# Patient Record
Sex: FEMALE | Race: WHITE | NOT HISPANIC OR LATINO | Employment: OTHER
[De-identification: names, ages, dates, MRNs, and addresses within clinical notes are randomized per-mention and may not be internally consistent; named-entity substitution may affect disease eponyms.]

---

## 2017-01-04 ENCOUNTER — RECORDS - HEALTHEAST (OUTPATIENT)
Dept: ADMINISTRATIVE | Facility: OTHER | Age: 37
End: 2017-01-04

## 2017-01-12 ENCOUNTER — COMMUNICATION - HEALTHEAST (OUTPATIENT)
Dept: NURSING | Facility: CLINIC | Age: 37
End: 2017-01-12

## 2017-01-12 DIAGNOSIS — R53.83 FATIGUE: ICD-10-CM

## 2017-01-13 ENCOUNTER — COMMUNICATION - HEALTHEAST (OUTPATIENT)
Dept: FAMILY MEDICINE | Facility: CLINIC | Age: 37
End: 2017-01-13

## 2017-01-17 ENCOUNTER — OFFICE VISIT - HEALTHEAST (OUTPATIENT)
Dept: FAMILY MEDICINE | Facility: CLINIC | Age: 37
End: 2017-01-17

## 2017-01-17 DIAGNOSIS — R63.5 WEIGHT GAIN DUE TO MEDICATION: ICD-10-CM

## 2017-01-17 DIAGNOSIS — T50.905A WEIGHT GAIN DUE TO MEDICATION: ICD-10-CM

## 2017-01-17 DIAGNOSIS — N89.8 VAGINAL DISCHARGE: ICD-10-CM

## 2017-01-17 DIAGNOSIS — G43.009 MIGRAINE WITHOUT AURA: ICD-10-CM

## 2017-01-17 DIAGNOSIS — M79.7 FIBROMYALGIA: ICD-10-CM

## 2017-02-15 ENCOUNTER — COMMUNICATION - HEALTHEAST (OUTPATIENT)
Dept: NURSING | Facility: CLINIC | Age: 37
End: 2017-02-15

## 2017-02-17 ENCOUNTER — COMMUNICATION - HEALTHEAST (OUTPATIENT)
Dept: NURSING | Facility: CLINIC | Age: 37
End: 2017-02-17

## 2017-02-17 DIAGNOSIS — R53.83 FATIGUE: ICD-10-CM

## 2017-03-06 ENCOUNTER — OFFICE VISIT - HEALTHEAST (OUTPATIENT)
Dept: NURSING | Facility: CLINIC | Age: 37
End: 2017-03-06

## 2017-03-06 DIAGNOSIS — G43.919 INTRACTABLE MIGRAINE WITHOUT STATUS MIGRAINOSUS, UNSPECIFIED MIGRAINE TYPE: ICD-10-CM

## 2017-03-06 DIAGNOSIS — G43.909 MIGRAINE: ICD-10-CM

## 2017-03-06 DIAGNOSIS — M79.7 FIBROMYALGIA: ICD-10-CM

## 2017-03-06 DIAGNOSIS — G47.00 INSOMNIA, UNSPECIFIED: ICD-10-CM

## 2017-03-13 ENCOUNTER — OFFICE VISIT - HEALTHEAST (OUTPATIENT)
Dept: FAMILY MEDICINE | Facility: CLINIC | Age: 37
End: 2017-03-13

## 2017-03-13 DIAGNOSIS — F33.9 MAJOR DEPRESSION, RECURRENT, CHRONIC (H): ICD-10-CM

## 2017-03-13 DIAGNOSIS — G43.909 MIGRAINE: ICD-10-CM

## 2017-03-13 DIAGNOSIS — M79.7 FIBROMYALGIA: ICD-10-CM

## 2017-03-13 DIAGNOSIS — G93.32 CHRONIC FATIGUE SYNDROME: ICD-10-CM

## 2017-03-13 DIAGNOSIS — J32.9 SINUSITIS: ICD-10-CM

## 2017-03-13 DIAGNOSIS — L70.9 ACNE: ICD-10-CM

## 2017-03-14 ENCOUNTER — AMBULATORY - HEALTHEAST (OUTPATIENT)
Dept: FAMILY MEDICINE | Facility: CLINIC | Age: 37
End: 2017-03-14

## 2017-03-14 ENCOUNTER — COMMUNICATION - HEALTHEAST (OUTPATIENT)
Dept: NURSING | Facility: CLINIC | Age: 37
End: 2017-03-14

## 2017-03-14 ENCOUNTER — COMMUNICATION - HEALTHEAST (OUTPATIENT)
Dept: FAMILY MEDICINE | Facility: CLINIC | Age: 37
End: 2017-03-14

## 2017-03-15 ENCOUNTER — COMMUNICATION - HEALTHEAST (OUTPATIENT)
Dept: FAMILY MEDICINE | Facility: CLINIC | Age: 37
End: 2017-03-15

## 2017-03-15 ENCOUNTER — AMBULATORY - HEALTHEAST (OUTPATIENT)
Dept: FAMILY MEDICINE | Facility: CLINIC | Age: 37
End: 2017-03-15

## 2017-03-16 ENCOUNTER — AMBULATORY - HEALTHEAST (OUTPATIENT)
Dept: NURSING | Facility: CLINIC | Age: 37
End: 2017-03-16

## 2017-03-17 ENCOUNTER — AMBULATORY - HEALTHEAST (OUTPATIENT)
Dept: FAMILY MEDICINE | Facility: CLINIC | Age: 37
End: 2017-03-17

## 2017-03-20 ENCOUNTER — AMBULATORY - HEALTHEAST (OUTPATIENT)
Dept: FAMILY MEDICINE | Facility: CLINIC | Age: 37
End: 2017-03-20

## 2017-03-20 DIAGNOSIS — G43.009 MIGRAINE WITHOUT AURA: ICD-10-CM

## 2017-03-30 ENCOUNTER — COMMUNICATION - HEALTHEAST (OUTPATIENT)
Dept: FAMILY MEDICINE | Facility: CLINIC | Age: 37
End: 2017-03-30

## 2017-03-30 DIAGNOSIS — M79.7 FIBROMYALGIA: ICD-10-CM

## 2017-04-17 ENCOUNTER — COMMUNICATION - HEALTHEAST (OUTPATIENT)
Dept: NURSING | Facility: CLINIC | Age: 37
End: 2017-04-17

## 2017-04-17 ENCOUNTER — OFFICE VISIT - HEALTHEAST (OUTPATIENT)
Dept: FAMILY MEDICINE | Facility: CLINIC | Age: 37
End: 2017-04-17

## 2017-04-17 DIAGNOSIS — G93.32 CHRONIC FATIGUE SYNDROME: ICD-10-CM

## 2017-04-17 DIAGNOSIS — K58.9 IRRITABLE BOWEL SYNDROME, UNSPECIFIED TYPE: ICD-10-CM

## 2017-04-17 DIAGNOSIS — R53.83 FATIGUE: ICD-10-CM

## 2017-04-17 DIAGNOSIS — F33.9 MAJOR DEPRESSION, RECURRENT, CHRONIC (H): ICD-10-CM

## 2017-04-17 DIAGNOSIS — Z30.9 CONTRACEPTION MANAGEMENT: ICD-10-CM

## 2017-04-17 DIAGNOSIS — R41.9 COGNITIVE COMPLAINTS: ICD-10-CM

## 2017-04-17 DIAGNOSIS — H02.409 DROOPING EYELID: ICD-10-CM

## 2017-06-12 ENCOUNTER — COMMUNICATION - HEALTHEAST (OUTPATIENT)
Dept: NURSING | Facility: CLINIC | Age: 37
End: 2017-06-12

## 2017-06-12 ENCOUNTER — OFFICE VISIT - HEALTHEAST (OUTPATIENT)
Dept: FAMILY MEDICINE | Facility: CLINIC | Age: 37
End: 2017-06-12

## 2017-06-12 DIAGNOSIS — R53.83 FATIGUE: ICD-10-CM

## 2017-06-12 DIAGNOSIS — G43.909 MIGRAINE: ICD-10-CM

## 2017-06-12 DIAGNOSIS — E88.40 MITOCHONDRIAL DISEASE (H): ICD-10-CM

## 2017-06-12 DIAGNOSIS — L70.0 ACNE VULGARIS: ICD-10-CM

## 2017-06-12 DIAGNOSIS — F33.9 MAJOR DEPRESSION, RECURRENT, CHRONIC (H): ICD-10-CM

## 2017-06-12 DIAGNOSIS — M26.609 TMJ (TEMPOROMANDIBULAR JOINT SYNDROME): ICD-10-CM

## 2017-06-12 DIAGNOSIS — G43.009 MIGRAINE WITHOUT AURA: ICD-10-CM

## 2017-06-12 DIAGNOSIS — K21.9 GERD (GASTROESOPHAGEAL REFLUX DISEASE): ICD-10-CM

## 2017-06-12 ASSESSMENT — MIFFLIN-ST. JEOR: SCORE: 1279.37

## 2017-06-20 ENCOUNTER — RECORDS - HEALTHEAST (OUTPATIENT)
Dept: ADMINISTRATIVE | Facility: OTHER | Age: 37
End: 2017-06-20

## 2017-06-21 ENCOUNTER — COMMUNICATION - HEALTHEAST (OUTPATIENT)
Dept: NURSING | Facility: CLINIC | Age: 37
End: 2017-06-21

## 2017-06-21 ENCOUNTER — COMMUNICATION - HEALTHEAST (OUTPATIENT)
Dept: FAMILY MEDICINE | Facility: CLINIC | Age: 37
End: 2017-06-21

## 2017-06-21 DIAGNOSIS — G43.519 MIGRAINE AURA, PERSISTENT, INTRACTABLE: ICD-10-CM

## 2017-06-23 ENCOUNTER — COMMUNICATION - HEALTHEAST (OUTPATIENT)
Dept: FAMILY MEDICINE | Facility: CLINIC | Age: 37
End: 2017-06-23

## 2017-07-06 ENCOUNTER — OFFICE VISIT - HEALTHEAST (OUTPATIENT)
Dept: FAMILY MEDICINE | Facility: CLINIC | Age: 37
End: 2017-07-06

## 2017-07-06 DIAGNOSIS — N76.0 BV (BACTERIAL VAGINOSIS): ICD-10-CM

## 2017-07-06 DIAGNOSIS — J32.9 SINUSITIS: ICD-10-CM

## 2017-07-06 DIAGNOSIS — B96.89 BV (BACTERIAL VAGINOSIS): ICD-10-CM

## 2017-07-07 ENCOUNTER — COMMUNICATION - HEALTHEAST (OUTPATIENT)
Dept: NURSING | Facility: CLINIC | Age: 37
End: 2017-07-07

## 2017-07-11 ENCOUNTER — RECORDS - HEALTHEAST (OUTPATIENT)
Dept: ADMINISTRATIVE | Facility: OTHER | Age: 37
End: 2017-07-11

## 2017-07-11 ENCOUNTER — OFFICE VISIT - HEALTHEAST (OUTPATIENT)
Dept: FAMILY MEDICINE | Facility: CLINIC | Age: 37
End: 2017-07-11

## 2017-07-11 DIAGNOSIS — R30.0 DYSURIA: ICD-10-CM

## 2017-07-11 ASSESSMENT — MIFFLIN-ST. JEOR: SCORE: 1292.98

## 2017-07-13 ENCOUNTER — COMMUNICATION - HEALTHEAST (OUTPATIENT)
Dept: SCHEDULING | Facility: CLINIC | Age: 37
End: 2017-07-13

## 2017-07-14 ENCOUNTER — COMMUNICATION - HEALTHEAST (OUTPATIENT)
Dept: FAMILY MEDICINE | Facility: CLINIC | Age: 37
End: 2017-07-14

## 2017-07-17 ENCOUNTER — OFFICE VISIT - HEALTHEAST (OUTPATIENT)
Dept: FAMILY MEDICINE | Facility: CLINIC | Age: 37
End: 2017-07-17

## 2017-07-17 DIAGNOSIS — G93.32 CHRONIC FATIGUE SYNDROME: ICD-10-CM

## 2017-07-17 DIAGNOSIS — E88.40 MITOCHONDRIAL DISEASE (H): ICD-10-CM

## 2017-07-17 DIAGNOSIS — K58.9 IRRITABLE BOWEL SYNDROME, UNSPECIFIED TYPE: ICD-10-CM

## 2017-07-17 DIAGNOSIS — R82.998 DARK URINE: ICD-10-CM

## 2017-07-17 DIAGNOSIS — M79.7 FIBROMYALGIA: ICD-10-CM

## 2017-07-24 ENCOUNTER — OFFICE VISIT - HEALTHEAST (OUTPATIENT)
Dept: FAMILY MEDICINE | Facility: CLINIC | Age: 37
End: 2017-07-24

## 2017-07-24 DIAGNOSIS — J20.9 ACUTE BRONCHITIS: ICD-10-CM

## 2017-07-24 DIAGNOSIS — J01.90 ACUTE SINUSITIS: ICD-10-CM

## 2017-07-28 ENCOUNTER — AMBULATORY - HEALTHEAST (OUTPATIENT)
Dept: CARE COORDINATION | Facility: CLINIC | Age: 37
End: 2017-07-28

## 2017-07-28 ENCOUNTER — COMMUNICATION - HEALTHEAST (OUTPATIENT)
Dept: CARE COORDINATION | Facility: CLINIC | Age: 37
End: 2017-07-28

## 2017-08-08 ENCOUNTER — RECORDS - HEALTHEAST (OUTPATIENT)
Dept: ADMINISTRATIVE | Facility: OTHER | Age: 37
End: 2017-08-08

## 2017-08-17 ENCOUNTER — RECORDS - HEALTHEAST (OUTPATIENT)
Dept: ADMINISTRATIVE | Facility: OTHER | Age: 37
End: 2017-08-17

## 2017-08-22 ENCOUNTER — OFFICE VISIT - HEALTHEAST (OUTPATIENT)
Dept: FAMILY MEDICINE | Facility: CLINIC | Age: 37
End: 2017-08-22

## 2017-08-22 DIAGNOSIS — R53.83 FATIGUE: ICD-10-CM

## 2017-08-22 DIAGNOSIS — E88.40 MITOCHONDRIAL DISEASE (H): ICD-10-CM

## 2017-08-22 DIAGNOSIS — Z12.4 SCREENING FOR CERVICAL CANCER: ICD-10-CM

## 2017-08-22 DIAGNOSIS — N89.8 VAGINAL ITCHING: ICD-10-CM

## 2017-08-25 ENCOUNTER — COMMUNICATION - HEALTHEAST (OUTPATIENT)
Dept: NURSING | Facility: CLINIC | Age: 37
End: 2017-08-25

## 2017-08-26 ENCOUNTER — COMMUNICATION - HEALTHEAST (OUTPATIENT)
Dept: FAMILY MEDICINE | Facility: CLINIC | Age: 37
End: 2017-08-26

## 2017-08-28 ENCOUNTER — COMMUNICATION - HEALTHEAST (OUTPATIENT)
Dept: FAMILY MEDICINE | Facility: CLINIC | Age: 37
End: 2017-08-28

## 2017-08-28 ENCOUNTER — AMBULATORY - HEALTHEAST (OUTPATIENT)
Dept: FAMILY MEDICINE | Facility: CLINIC | Age: 37
End: 2017-08-28

## 2017-08-28 LAB
HPV INTERPRETATION - HISTORICAL: NORMAL
HPV INTERPRETER - HISTORICAL: NORMAL

## 2017-08-29 ENCOUNTER — RECORDS - HEALTHEAST (OUTPATIENT)
Dept: ADMINISTRATIVE | Facility: OTHER | Age: 37
End: 2017-08-29

## 2017-08-30 ENCOUNTER — COMMUNICATION - HEALTHEAST (OUTPATIENT)
Dept: FAMILY MEDICINE | Facility: CLINIC | Age: 37
End: 2017-08-30

## 2017-08-30 DIAGNOSIS — G43.909 MIGRAINE HEADACHE: ICD-10-CM

## 2017-08-30 LAB
BKR LAB AP ABNORMAL BLEEDING: NO
BKR LAB AP BIRTH CONTROL/HORMONES: NORMAL
BKR LAB AP CERVICAL APPEARANCE: NORMAL
BKR LAB AP GYN ADEQUACY: NORMAL
BKR LAB AP GYN INTERPRETATION: NORMAL
BKR LAB AP HPV REFLEX: NORMAL
BKR LAB AP LMP: NORMAL
BKR LAB AP PATIENT STATUS: NORMAL
BKR LAB AP PREVIOUS ABNORMAL: NORMAL
BKR LAB AP PREVIOUS NORMAL: NORMAL
HIGH RISK?: YES
PATH REPORT.COMMENTS IMP SPEC: NORMAL
RESULT FLAG (HE HISTORICAL CONVERSION): NORMAL

## 2017-09-01 ENCOUNTER — COMMUNICATION - HEALTHEAST (OUTPATIENT)
Dept: FAMILY MEDICINE | Facility: CLINIC | Age: 37
End: 2017-09-01

## 2017-09-06 ENCOUNTER — COMMUNICATION - HEALTHEAST (OUTPATIENT)
Dept: FAMILY MEDICINE | Facility: CLINIC | Age: 37
End: 2017-09-06

## 2017-09-07 ENCOUNTER — RECORDS - HEALTHEAST (OUTPATIENT)
Dept: ADMINISTRATIVE | Facility: OTHER | Age: 37
End: 2017-09-07

## 2017-09-12 ENCOUNTER — RECORDS - HEALTHEAST (OUTPATIENT)
Dept: ADMINISTRATIVE | Facility: OTHER | Age: 37
End: 2017-09-12

## 2017-10-13 ENCOUNTER — COMMUNICATION - HEALTHEAST (OUTPATIENT)
Dept: NURSING | Facility: CLINIC | Age: 37
End: 2017-10-13

## 2017-10-16 ENCOUNTER — COMMUNICATION - HEALTHEAST (OUTPATIENT)
Dept: FAMILY MEDICINE | Facility: CLINIC | Age: 37
End: 2017-10-16

## 2017-10-17 ENCOUNTER — OFFICE VISIT - HEALTHEAST (OUTPATIENT)
Dept: FAMILY MEDICINE | Facility: CLINIC | Age: 37
End: 2017-10-17

## 2017-10-17 DIAGNOSIS — G43.909 MIGRAINE: ICD-10-CM

## 2017-10-17 DIAGNOSIS — L70.0 ACNE VULGARIS: ICD-10-CM

## 2017-10-17 DIAGNOSIS — G93.32 CHRONIC FATIGUE SYNDROME: ICD-10-CM

## 2017-10-17 DIAGNOSIS — M79.7 FIBROMYALGIA: ICD-10-CM

## 2017-10-17 DIAGNOSIS — E88.40 MITOCHONDRIAL DISEASE (H): ICD-10-CM

## 2017-10-17 DIAGNOSIS — J20.9 ACUTE BRONCHITIS: ICD-10-CM

## 2017-10-17 ASSESSMENT — MIFFLIN-ST. JEOR: SCORE: 1320.19

## 2017-10-27 ENCOUNTER — COMMUNICATION - HEALTHEAST (OUTPATIENT)
Dept: NURSING | Facility: CLINIC | Age: 37
End: 2017-10-27

## 2017-11-13 ENCOUNTER — RECORDS - HEALTHEAST (OUTPATIENT)
Dept: ADMINISTRATIVE | Facility: OTHER | Age: 37
End: 2017-11-13

## 2017-11-24 ENCOUNTER — COMMUNICATION - HEALTHEAST (OUTPATIENT)
Dept: NURSING | Facility: CLINIC | Age: 37
End: 2017-11-24

## 2017-12-04 ENCOUNTER — RECORDS - HEALTHEAST (OUTPATIENT)
Dept: ADMINISTRATIVE | Facility: OTHER | Age: 37
End: 2017-12-04

## 2017-12-05 ENCOUNTER — COMMUNICATION - HEALTHEAST (OUTPATIENT)
Dept: FAMILY MEDICINE | Facility: CLINIC | Age: 37
End: 2017-12-05

## 2017-12-05 DIAGNOSIS — M79.7 FIBROMYALGIA: ICD-10-CM

## 2017-12-05 DIAGNOSIS — E88.40 MITOCHONDRIAL DISEASE (H): ICD-10-CM

## 2017-12-07 ENCOUNTER — COMMUNICATION - HEALTHEAST (OUTPATIENT)
Dept: FAMILY MEDICINE | Facility: CLINIC | Age: 37
End: 2017-12-07

## 2017-12-12 ENCOUNTER — TRANSFERRED RECORDS (OUTPATIENT)
Dept: HEALTH INFORMATION MANAGEMENT | Facility: CLINIC | Age: 37
End: 2017-12-12

## 2017-12-12 ENCOUNTER — RECORDS - HEALTHEAST (OUTPATIENT)
Dept: ADMINISTRATIVE | Facility: OTHER | Age: 37
End: 2017-12-12

## 2017-12-27 ENCOUNTER — COMMUNICATION - HEALTHEAST (OUTPATIENT)
Dept: FAMILY MEDICINE | Facility: CLINIC | Age: 37
End: 2017-12-27

## 2017-12-27 DIAGNOSIS — E88.40 MITOCHONDRIAL DISEASE (H): ICD-10-CM

## 2018-01-22 ENCOUNTER — COMMUNICATION - HEALTHEAST (OUTPATIENT)
Dept: FAMILY MEDICINE | Facility: CLINIC | Age: 38
End: 2018-01-22

## 2018-01-30 ENCOUNTER — COMMUNICATION - HEALTHEAST (OUTPATIENT)
Dept: FAMILY MEDICINE | Facility: CLINIC | Age: 38
End: 2018-01-30

## 2018-02-01 ENCOUNTER — COMMUNICATION - HEALTHEAST (OUTPATIENT)
Dept: FAMILY MEDICINE | Facility: CLINIC | Age: 38
End: 2018-02-01

## 2018-02-05 ENCOUNTER — COMMUNICATION - HEALTHEAST (OUTPATIENT)
Dept: FAMILY MEDICINE | Facility: CLINIC | Age: 38
End: 2018-02-05

## 2018-02-07 ENCOUNTER — COMMUNICATION - HEALTHEAST (OUTPATIENT)
Dept: FAMILY MEDICINE | Facility: CLINIC | Age: 38
End: 2018-02-07

## 2018-02-16 ENCOUNTER — OFFICE VISIT - HEALTHEAST (OUTPATIENT)
Dept: FAMILY MEDICINE | Facility: CLINIC | Age: 38
End: 2018-02-16

## 2018-02-16 DIAGNOSIS — E88.40 MITOCHONDRIAL DISEASE (H): ICD-10-CM

## 2018-02-16 DIAGNOSIS — B37.0 THRUSH: ICD-10-CM

## 2018-02-16 DIAGNOSIS — G43.909 MIGRAINE: ICD-10-CM

## 2018-02-16 DIAGNOSIS — M79.7 FIBROMYALGIA: ICD-10-CM

## 2018-02-16 DIAGNOSIS — M26.609 TMJ DISEASE: ICD-10-CM

## 2018-02-20 ENCOUNTER — COMMUNICATION - HEALTHEAST (OUTPATIENT)
Dept: FAMILY MEDICINE | Facility: CLINIC | Age: 38
End: 2018-02-20

## 2018-02-20 ENCOUNTER — ANESTHESIA - HEALTHEAST (OUTPATIENT)
Dept: SURGERY | Facility: HOSPITAL | Age: 38
End: 2018-02-20

## 2018-02-20 ASSESSMENT — MIFFLIN-ST. JEOR: SCORE: 1298.65

## 2018-02-21 ENCOUNTER — OFFICE VISIT - HEALTHEAST (OUTPATIENT)
Dept: FAMILY MEDICINE | Facility: CLINIC | Age: 38
End: 2018-02-21

## 2018-02-21 ENCOUNTER — TRANSFERRED RECORDS (OUTPATIENT)
Dept: HEALTH INFORMATION MANAGEMENT | Facility: CLINIC | Age: 38
End: 2018-02-21

## 2018-02-21 DIAGNOSIS — N92.6 MENSTRUAL DISORDER: ICD-10-CM

## 2018-02-21 DIAGNOSIS — Z01.818 PRE-OP EXAM: ICD-10-CM

## 2018-02-21 DIAGNOSIS — M79.7 FIBROMYALGIA: ICD-10-CM

## 2018-02-21 LAB
ANION GAP SERPL CALCULATED.3IONS-SCNC: 10 MMOL/L (ref 5–18)
BUN SERPL-MCNC: 11 MG/DL (ref 8–22)
CALCIUM SERPL-MCNC: 9.4 MG/DL (ref 8.5–10.5)
CHLORIDE BLD-SCNC: 103 MMOL/L (ref 98–107)
CO2 SERPL-SCNC: 20 MMOL/L (ref 22–31)
CREAT SERPL-MCNC: 0.71 MG/DL (ref 0.6–1.1)
GFR SERPL CREATININE-BSD FRML MDRD: >60 ML/MIN/1.73M2
GLUCOSE BLD-MCNC: 93 MG/DL (ref 70–125)
HCG UR QL: NEGATIVE
HGB BLD-MCNC: 12.6 G/DL (ref 12–16)
POTASSIUM BLD-SCNC: 4.1 MMOL/L (ref 3.5–5)
SODIUM SERPL-SCNC: 133 MMOL/L (ref 136–145)
SP GR UR STRIP: 1.01 (ref 1–1.03)

## 2018-02-21 ASSESSMENT — MIFFLIN-ST. JEOR: SCORE: 1319.06

## 2018-02-22 ENCOUNTER — SURGERY - HEALTHEAST (OUTPATIENT)
Dept: SURGERY | Facility: HOSPITAL | Age: 38
End: 2018-02-22

## 2018-02-22 ENCOUNTER — COMMUNICATION - HEALTHEAST (OUTPATIENT)
Dept: FAMILY MEDICINE | Facility: CLINIC | Age: 38
End: 2018-02-22

## 2018-02-22 ASSESSMENT — MIFFLIN-ST. JEOR: SCORE: 1316.79

## 2018-02-23 ASSESSMENT — MIFFLIN-ST. JEOR: SCORE: 1350.36

## 2018-02-24 ASSESSMENT — MIFFLIN-ST. JEOR: SCORE: 1335.39

## 2018-02-25 ASSESSMENT — MIFFLIN-ST. JEOR: SCORE: 1320.87

## 2018-02-26 ENCOUNTER — COMMUNICATION - HEALTHEAST (OUTPATIENT)
Dept: FAMILY MEDICINE | Facility: CLINIC | Age: 38
End: 2018-02-26

## 2018-02-26 ASSESSMENT — MIFFLIN-ST. JEOR: SCORE: 1335.22

## 2018-02-27 ENCOUNTER — TRANSFERRED RECORDS (OUTPATIENT)
Dept: HEALTH INFORMATION MANAGEMENT | Facility: CLINIC | Age: 38
End: 2018-02-27

## 2018-02-27 ASSESSMENT — MIFFLIN-ST. JEOR: SCORE: 1332.67

## 2018-03-02 ENCOUNTER — COMMUNICATION - HEALTHEAST (OUTPATIENT)
Dept: FAMILY MEDICINE | Facility: CLINIC | Age: 38
End: 2018-03-02

## 2018-03-06 ENCOUNTER — OFFICE VISIT - HEALTHEAST (OUTPATIENT)
Dept: FAMILY MEDICINE | Facility: CLINIC | Age: 38
End: 2018-03-06

## 2018-03-06 DIAGNOSIS — D49.2 ABNORMAL SKIN GROWTH: ICD-10-CM

## 2018-03-06 DIAGNOSIS — Z90.710 H/O: HYSTERECTOMY: ICD-10-CM

## 2018-03-08 LAB
LAB AP CHARGES (HE HISTORICAL CONVERSION): NORMAL
PATH REPORT.COMMENTS IMP SPEC: NORMAL
PATH REPORT.FINAL DX SPEC: NORMAL
PATH REPORT.GROSS SPEC: NORMAL
PATH REPORT.MICROSCOPIC SPEC OTHER STN: NORMAL
PATH REPORT.RELEVANT HX SPEC: NORMAL
RESULT FLAG (HE HISTORICAL CONVERSION): NORMAL

## 2018-03-10 ENCOUNTER — COMMUNICATION - HEALTHEAST (OUTPATIENT)
Dept: FAMILY MEDICINE | Facility: CLINIC | Age: 38
End: 2018-03-10

## 2018-03-13 ENCOUNTER — TRANSFERRED RECORDS (OUTPATIENT)
Dept: HEALTH INFORMATION MANAGEMENT | Facility: CLINIC | Age: 38
End: 2018-03-13

## 2018-03-13 ENCOUNTER — RECORDS - HEALTHEAST (OUTPATIENT)
Dept: ADMINISTRATIVE | Facility: OTHER | Age: 38
End: 2018-03-13

## 2018-03-26 ENCOUNTER — COMMUNICATION - HEALTHEAST (OUTPATIENT)
Dept: FAMILY MEDICINE | Facility: CLINIC | Age: 38
End: 2018-03-26

## 2018-04-15 ENCOUNTER — COMMUNICATION - HEALTHEAST (OUTPATIENT)
Dept: FAMILY MEDICINE | Facility: CLINIC | Age: 38
End: 2018-04-15

## 2018-05-09 ENCOUNTER — OFFICE VISIT - HEALTHEAST (OUTPATIENT)
Dept: FAMILY MEDICINE | Facility: CLINIC | Age: 38
End: 2018-05-09

## 2018-05-09 ENCOUNTER — MEDICAL CORRESPONDENCE (OUTPATIENT)
Dept: HEALTH INFORMATION MANAGEMENT | Facility: CLINIC | Age: 38
End: 2018-05-09
Payer: COMMERCIAL

## 2018-05-09 DIAGNOSIS — R53.82 CHRONIC FATIGUE: ICD-10-CM

## 2018-05-09 DIAGNOSIS — L70.0 ACNE VULGARIS: ICD-10-CM

## 2018-05-09 DIAGNOSIS — G71.3 MITOCHONDRIAL MYOPATHY: ICD-10-CM

## 2018-05-09 DIAGNOSIS — R06.2 WHEEZING: ICD-10-CM

## 2018-05-09 DIAGNOSIS — M79.7 FIBROMYALGIA: ICD-10-CM

## 2018-05-12 ENCOUNTER — COMMUNICATION - HEALTHEAST (OUTPATIENT)
Dept: FAMILY MEDICINE | Facility: CLINIC | Age: 38
End: 2018-05-12

## 2018-05-14 ENCOUNTER — COMMUNICATION - HEALTHEAST (OUTPATIENT)
Dept: FAMILY MEDICINE | Facility: CLINIC | Age: 38
End: 2018-05-14

## 2018-05-15 ENCOUNTER — COMMUNICATION - HEALTHEAST (OUTPATIENT)
Dept: NURSING | Facility: CLINIC | Age: 38
End: 2018-05-15

## 2018-05-17 ENCOUNTER — COMMUNICATION - HEALTHEAST (OUTPATIENT)
Dept: FAMILY MEDICINE | Facility: CLINIC | Age: 38
End: 2018-05-17

## 2018-05-23 ENCOUNTER — PRE VISIT (OUTPATIENT)
Dept: NEUROLOGY | Facility: CLINIC | Age: 38
End: 2018-05-23

## 2018-05-23 NOTE — TELEPHONE ENCOUNTER
FUTURE VISIT INFORMATION      FUTURE VISIT INFORMATION:    Date: 8/27/18    Time: 3:50p    Location: [facility] [place]  REFERRAL INFORMATION:    Referring provider:   Dr. Martinez     Referring providers clinic:  [facility]    Reason for visit/diagnosis  Mitochondrial Myopathy-    RECORDS REQUESTED FROM:       Clinic name Comments Records Status Imaging Status   [facility] Ref, OV, MR Head Report Received PACS   [facility] OV Received    Jm Morin [facility] OV Received    [facility] OV Received                  RECORDS STATUS        RECORDS RECEIVED FROM: [facility]   DATE RECEIVED: 5/23/18   NOTES (FOR ALL VISITS) STATUS DETAILS   OFFICE NOTE from referring provider Received 3/13/18, 2/26/18, 2/21/18, 2/16/18, 10/17/17, 7/17/17, 6/12/17, 4/17/17   OFFICE NOTE from other specialist Received [facility]: 12/12/17, 9/12/17  Jm Morin: 11/13/17  [facility] 9/7/17   DISCHARGE SUMMARY from hospital N/A    DISCHARGE REPORT from the ER N/A    OPERATIVE REPORT N/A    MEDICATION LIST Received    IMAGING  (FOR ALL VISITS)     EMG N/A    EEG N/A    ECT N/A    MRI (HEAD, NECK, SPINE) Received MR Head Report: 10/29/15 (image in PACS)   CT (HEAD, NECK, SPINE) N/A    OTHER

## 2018-05-25 ENCOUNTER — COMMUNICATION - HEALTHEAST (OUTPATIENT)
Dept: FAMILY MEDICINE | Facility: CLINIC | Age: 38
End: 2018-05-25

## 2018-05-30 ENCOUNTER — COMMUNICATION - HEALTHEAST (OUTPATIENT)
Dept: FAMILY MEDICINE | Facility: CLINIC | Age: 38
End: 2018-05-30

## 2018-06-14 ENCOUNTER — COMMUNICATION - HEALTHEAST (OUTPATIENT)
Dept: NURSING | Facility: CLINIC | Age: 38
End: 2018-06-14

## 2018-06-20 ENCOUNTER — COMMUNICATION - HEALTHEAST (OUTPATIENT)
Dept: FAMILY MEDICINE | Facility: CLINIC | Age: 38
End: 2018-06-20

## 2018-06-25 ENCOUNTER — RECORDS - HEALTHEAST (OUTPATIENT)
Dept: ADMINISTRATIVE | Facility: OTHER | Age: 38
End: 2018-06-25

## 2018-07-07 ENCOUNTER — COMMUNICATION - HEALTHEAST (OUTPATIENT)
Dept: FAMILY MEDICINE | Facility: CLINIC | Age: 38
End: 2018-07-07

## 2018-07-07 DIAGNOSIS — M79.7 FIBROMYALGIA: ICD-10-CM

## 2018-07-09 ENCOUNTER — COMMUNICATION - HEALTHEAST (OUTPATIENT)
Dept: FAMILY MEDICINE | Facility: CLINIC | Age: 38
End: 2018-07-09

## 2018-07-16 ENCOUNTER — COMMUNICATION - HEALTHEAST (OUTPATIENT)
Dept: NURSING | Facility: CLINIC | Age: 38
End: 2018-07-16

## 2018-07-24 ENCOUNTER — RECORDS - HEALTHEAST (OUTPATIENT)
Dept: ADMINISTRATIVE | Facility: OTHER | Age: 38
End: 2018-07-24

## 2018-08-16 ENCOUNTER — COMMUNICATION - HEALTHEAST (OUTPATIENT)
Dept: NURSING | Facility: CLINIC | Age: 38
End: 2018-08-16

## 2018-08-23 NOTE — TELEPHONE ENCOUNTER
Note      FUTURE VISIT INFORMATION        FUTURE VISIT INFORMATION:    Date: 8/27/18    Time: 3:50p    Location: [facility] [place]  REFERRAL INFORMATION:    Referring provider:   Dr. Martinez     Referring providers clinic:  formerly [facility]    Reason for visit/diagnosis  Mitochondrial Myopathy-     RECORDS REQUESTED FROM:         Clinic name Comments Records Status Imaging Status   [facility] Ref, OV, MR Head Report Received PACS   [facility] OV Received     Jm Morin PT OV Received     [facility] OV Received                            RECORDS STATUS           RECORDS RECEIVED FROM: [facility]   DATE RECEIVED: 5/23/18   NOTES (FOR ALL VISITS) STATUS DETAILS   OFFICE NOTE from referring provider Received 3/13/18, 2/26/18, 2/21/18, 2/16/18, 10/17/17, 7/17/17, 6/12/17, 4/17/17   OFFICE NOTE from other specialist Received [facility]: 12/12/17, 9/12/17  Jm Morin: 11/13/17  [facility]"" [facility] 9/7/17   DISCHARGE SUMMARY from hospital Care Everywhere  02/22/2018, 01/08/2015   DISCHARGE REPORT from the ER Care Everywhere   10/13/2015   OPERATIVE REPORT Care Everywhere   02/22/2018   MEDICATION LIST Received     IMAGING  (FOR ALL VISITS)       EMG N/A     EEG N/A     ECT N/A     MRI (HEAD, NECK, SPINE) Received MR Head Report: 10/29/15 (image in PACS)   CT (HEAD, NECK, SPINE) N/A     OTHER        XR Cervical Spine   XR Thorasic Spine   PACS   03/25/2015

## 2018-08-25 ENCOUNTER — OFFICE VISIT - HEALTHEAST (OUTPATIENT)
Dept: FAMILY MEDICINE | Facility: CLINIC | Age: 38
End: 2018-08-25

## 2018-08-25 DIAGNOSIS — K21.9 GASTROESOPHAGEAL REFLUX DISEASE, ESOPHAGITIS PRESENCE NOT SPECIFIED: ICD-10-CM

## 2018-08-27 ENCOUNTER — RECORDS - HEALTHEAST (OUTPATIENT)
Dept: ADMINISTRATIVE | Facility: OTHER | Age: 38
End: 2018-08-27

## 2018-08-27 ENCOUNTER — OFFICE VISIT (OUTPATIENT)
Dept: NEUROLOGY | Facility: CLINIC | Age: 38
End: 2018-08-27
Payer: COMMERCIAL

## 2018-08-27 ENCOUNTER — PRE VISIT (OUTPATIENT)
Dept: NEUROLOGY | Facility: CLINIC | Age: 38
End: 2018-08-27

## 2018-08-27 VITALS
DIASTOLIC BLOOD PRESSURE: 75 MMHG | WEIGHT: 139.7 LBS | BODY MASS INDEX: 25.71 KG/M2 | SYSTOLIC BLOOD PRESSURE: 118 MMHG | HEIGHT: 62 IN | HEART RATE: 102 BPM

## 2018-08-27 DIAGNOSIS — G72.9 MYOPATHY: Primary | ICD-10-CM

## 2018-08-27 DIAGNOSIS — R53.82 CHRONIC FATIGUE: ICD-10-CM

## 2018-08-27 RX ORDER — RIZATRIPTAN BENZOATE 10 MG/1
10 TABLET, ORALLY DISINTEGRATING ORAL PRN
COMMUNITY
Start: 2018-05-09 | End: 2019-09-05

## 2018-08-27 RX ORDER — ZOLPIDEM TARTRATE 5 MG/1
2.5-5 TABLET ORAL
Status: ON HOLD | COMMUNITY
Start: 2018-06-20 | End: 2019-09-20

## 2018-08-27 RX ORDER — LIDOCAINE 50 MG/G
1 PATCH TOPICAL PRN
Status: ON HOLD | COMMUNITY
Start: 2017-12-13 | End: 2019-09-20

## 2018-08-27 RX ORDER — NAPROXEN 500 MG/1
500 TABLET ORAL DAILY PRN
COMMUNITY
End: 2019-09-05

## 2018-08-27 RX ORDER — CLINDAMYCIN AND BENZOYL PEROXIDE 10; 50 MG/G; MG/G
GEL TOPICAL
COMMUNITY
Start: 2018-07-09

## 2018-08-27 RX ORDER — OMEPRAZOLE 40 MG/1
40 CAPSULE, DELAYED RELEASE ORAL 2 TIMES DAILY
COMMUNITY
Start: 2017-10-17 | End: 2019-09-05

## 2018-08-27 RX ORDER — RIBOFLAVIN (VITAMIN B2) 400 MG
400 TABLET ORAL DAILY
COMMUNITY

## 2018-08-27 RX ORDER — DULOXETIN HYDROCHLORIDE 60 MG/1
60 CAPSULE, DELAYED RELEASE ORAL DAILY
COMMUNITY
Start: 2018-02-16 | End: 2019-09-05

## 2018-08-27 RX ORDER — CYCLOBENZAPRINE HCL 10 MG
5 TABLET ORAL EVERY 8 HOURS PRN
COMMUNITY
Start: 2018-02-16 | End: 2019-09-05

## 2018-08-27 RX ORDER — TRETINOIN 0.25 MG/G
GEL TOPICAL DAILY
COMMUNITY
Start: 2018-05-09 | End: 2023-05-03

## 2018-08-27 RX ORDER — MULTIVIT WITH MINERALS/LUTEIN
1000 TABLET ORAL DAILY
COMMUNITY
End: 2023-05-15

## 2018-08-27 RX ORDER — ALBUTEROL SULFATE 90 UG/1
1-2 AEROSOL, METERED RESPIRATORY (INHALATION) EVERY 4 HOURS PRN
COMMUNITY
Start: 2018-05-09 | End: 2019-09-05

## 2018-08-27 RX ORDER — ASCORBIC ACID 500 MG
500 TABLET ORAL DAILY
COMMUNITY
End: 2019-09-05

## 2018-08-27 RX ORDER — VALACYCLOVIR HYDROCHLORIDE 1 G/1
TABLET, FILM COATED ORAL PRN
COMMUNITY
Start: 2017-08-22 | End: 2019-09-05

## 2018-08-27 RX ORDER — CLOBETASOL PROPIONATE 0.5 MG/G
OINTMENT TOPICAL DAILY
COMMUNITY
Start: 2018-08-16 | End: 2019-09-05

## 2018-08-27 RX ORDER — ONDANSETRON 4 MG/1
TABLET, ORALLY DISINTEGRATING ORAL EVERY 8 HOURS PRN
COMMUNITY
Start: 2018-07-09 | End: 2019-09-05

## 2018-08-27 ASSESSMENT — ENCOUNTER SYMPTOMS
BLOATING: 1
JOINT SWELLING: 0
BREAST MASS: 0
BOWEL INCONTINENCE: 0
NAUSEA: 1
SPEECH CHANGE: 0
BACK PAIN: 1
ALTERED TEMPERATURE REGULATION: 0
BLOOD IN STOOL: 0
RECTAL PAIN: 0
HOT FLASHES: 0
DECREASED CONCENTRATION: 0
POLYPHAGIA: 1
TINGLING: 1
INCREASED ENERGY: 1
EYE WATERING: 0
EYE PAIN: 0
DECREASED APPETITE: 1
DIZZINESS: 0
DIARRHEA: 0
HEARTBURN: 1
CONSTIPATION: 0
INSOMNIA: 0
DEPRESSION: 1
FEVER: 0
LOSS OF CONSCIOUSNESS: 0
ARTHRALGIAS: 0
MYALGIAS: 1
MUSCLE CRAMPS: 0
VOMITING: 0
POLYDIPSIA: 0
JAUNDICE: 0
NECK PAIN: 1
DECREASED LIBIDO: 1
BREAST PAIN: 0
MEMORY LOSS: 0
NUMBNESS: 0
CHILLS: 1
WEIGHT LOSS: 1
EYE IRRITATION: 0
NERVOUS/ANXIOUS: 1
HALLUCINATIONS: 0
DOUBLE VISION: 0
PARALYSIS: 0
FATIGUE: 1
MUSCLE WEAKNESS: 1
STIFFNESS: 0
EYE REDNESS: 0
SEIZURES: 0
TREMORS: 0
DISTURBANCES IN COORDINATION: 0
WEAKNESS: 1
ABDOMINAL PAIN: 1
HEADACHES: 1
PANIC: 0

## 2018-08-27 NOTE — PATIENT INSTRUCTIONS
I cannot be confident that you have a mitochondrial disease until I see the report of your DNA testing that the late Dr Garcia ordered.    If your DNA testing showed an unequivocally pathogenic mitochondrial DNA mutation, then there is no question about the diagnosis.  If it didn't, however, then we may need to run further tests including muscle biopsy.     I would like you to have some FASTING (done after overnight fast) blood and urine tests that indicate how mitochondria work.  I would also like you to undergo an EMG test- I will do it. This is absolutely indicated in every person with fatigue/muscle tightness and pain who requests an evaluation from a Neuromuscular specialist.

## 2018-08-27 NOTE — LETTER
8/27/2018       RE: Sherry Sweeney  [address] [place] [address]     Dear Colleague,    Thank you for referring your patient, Sherry Sweeney, to the [facility] NEUROLOGY at [facility]. Please see a copy of my visit note below.    Referral: Dr. Martinez    Chief Complaint: Widespread Fatigue- ? Diagnosis of Mitochondrial myopathy  History of Present Illness:      Sherry Sweeney is a 38 year old female who presents to clinic today for a transfer of care with the questionable diagnosis of mitochondrial myopathy. Around 2012-13, she started to experience generalized fatigue. She had a stressful job at the time; despite switching to less stressful job, it seemed to get worse. The fatigue was constant. Sleeping well helped. She was off of antidepressants for several years. After having this fatigue for some time she was prescribed an anti-depressant which did not help. She reports tightness and pain of facial, neck, trunk and limb muscles when she would push herself to perform activities when tired. Any phsyical activity and her menstrual cycle would make the fatigue much worse. She has to sit after walking 2-4 blocks. She was sometimes have days where she would be in bed for days. Sometimes she could only sit up for 10 minutes at a time.   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   She had a hysterectomy in 2/2018. Her fatigue has improved by 10-20% since. She reports darker brown urine at times; she is not sure if it correlated with being more active. Fatigue is worse with worse with illness (when she had pneumonia, flu, colds, bacterial vaginosis).     She has numbness and tingling of fingertips. +Ligtheadedness, early satiety. Denies anhidrosis, syncope, or early satiety and has not noticed changes in bowel or bladder function. She denies cramps, fasciculations. Speech and swallowing are normal. No joint pain, night  sweats, rashes. 15 lb weight loss since hysterectomyReduced appetite; she may have an ulcer per her gastroenterologist (will get endoscopy this week). She denies breathing difficulties or shortness of breath while lying flat. She uses a wheelchair if she gets very fatigued; this rarely occurs. She sleeps 10-12 hours of good quality sleep after being put on Ambien.    She gets Botox for her migraines. They are well managed now.     She tried Vitamin B2 200mg BID, multivamins, Vitamin E 1000mg.   He has not tried to B1, B6, B12, CoQ10 or carnitine.   She uses muscle relaxers and TENS unit to help with the muscle tightening.   She did not have a muscle biopsy or EMG/NCS. She had a mitochondrial DNA blood test.      Prior pertinent laboratory work-up:  (2/2018) normal AST, ALT, lactic acid  (2/2016)  Normal CK, Vitamin D, TSH   (2015) normal CRP, cortisol  (2013) C3/4  (2013)- negative SSA, SSB, anti smith, anti RNP, SCL-70, Liz-1, Hepatitis C, HIV, CCP ab    Past Medical History:     Past Surgical History: hysterectomy 2/2018    Family history: no family history of mitochondrial disease diagnosis   Her maternal grandmother, mother had chronic fatigue. Maternal aunt fibromyalgia.    Social History: He denies tobacco, alcohol, or illicit drug use.   There is no known exposure to toxins or heavy metals.   Currently is on disability. No children.     Medical Allergies:  NKDA     Current Medications:      Physical examination:    General Appearance: NAD  Skin: There are no rashes or other skin lesions.  Musculoskeletal:  There is no scoliosis, lordosis, kyphosis, pes cavus, or hammertoes.    Neurologic examination:    Mental status:  Patient is alert, attentive, and oriented x 3.  Language is coherent and fluent without dysarthria or aphasia.  Memory, comprehension and ability to follow commands were intact.       Cranial nerves:  Optic discs were sharp.  Pupils were round and reacted to light.  Extraocular movements were  full. There was no face, jaw, palate or tongue weakness or atrophy. Hearing was grossly intact.  Shoulder shrug was normal.       Motor exam:   Motor exam: No atrophy or fasciculations.  No action or percussion myotonia or paramyotonia.  Manual muscle testing revealed the following MRC grade muscle power:   Right Left   Neck flexion 4-    Neck extension: 4-    Shoulder abduction:  4 4   Elbow extension: 5 5   Elbow flexion:  4+ 4+   Wrist flexion:  4 4   Wrist extension:  5 5   Finger flexion 4 4   FDI 5 5   APB 3+ 3+   Hip flexion 4 4   Hip extension 5 5   Knee flexion 4+ 4+   Knee extension 5 5   Dorsiflexion 4+ 4+   Plantar flexion 5 5   Able to stand with arms crossed from sitting positon unaided x 3  (some give way weakness on exam)  Complex motor skills revealed normal coordination.  Finger-nose- finger and heel to shin were intact.       Sensory exam unreliable and variable. Proprioception and vibration was intact. Romberg sign was absent.  Gait was normal.  He was able to walk on his heels, toes and tandem without any difficulty.    Able to stand on toes and heels    Deep tendon reflexes:   Right Left   Triceps 2 2   Biceps 2 2   Brachioradialis 2 2   Knee jerk 3+ 3+   Ankle jerk 2 2   + Cross adducotrs b/l  Plantar responses were flexor bilaterally.       Assessment:      Sherry Sweeney has a six year history of fluctuating fatigue and multiple somatic symptoms. We explained to her that she fits under the umbrella of chronic fatigue/fibromyalgia syndrome, which is a common condition (affecting up to 1 million Americans), unfortunately remaining unexplained in the majority of cases. Medical workup for common causes of fatigue such as anemia, thyroid disorders, adrenal insufficiency, infectious disorders (hepatitis, Lyme, etc) has been previously performed and was unrevealing. We told her that her given diagnosis of mitochondrial myopathy requires revisiting. A diagnosis of mitochondrial myopathy is solid only  if one of two criteria are fulfilled: 1) mtDNA analysis from blood or muscle sample shows an UNEQUIVOCALLY pathogenic mutation, and not a variant of unknown significance, or 2) Muscle biopsy shows definite mitochondrial abnormalities by histology, or biochemistry. She has not had a muscle biopsy ever and did not bring the report of her mtDNA analysis done last year with her. In addition, fatigue and weakness may be due to several other neuromuscular disorders (myasthenia, other myopathies, etc) that have not been thoroughly evaluated yet- she has not had any EMG studies, etc.    Plan:        - check biomarkers of mitochondrial dysfunction in fasting blood sample: GDF-15, organic acids, amino acids, etc.   - EMG/NCS- evaluate for myopathy, neuromuscular junction disorder  - will request and review mitochondrial DNA testing results from [facility]  - If mtDNA sequencing does NOT show a definitive pathogenic mutation, she will require a muscle biopsy     Gloria Kaminski DO 3  [facility] Neuromuscular Fellow [phone]    ATTENDING ADDENDUM: Patient seen and examined with Fellow Dr Kaminski at the [facility] today. Agree with her assessment and plan as above. TT spent for patient care 45 minutes; more than half was counseling.     Again, thank you for allowing me to participate in the care of your patient.      Sincerely,    Nadeem Trejo MD

## 2018-08-27 NOTE — MR AVS SNAPSHOT
After Visit Summary   8/27/2018    Sherry Sweeney    MRN: [id number]           Patient Information     Date Of Birth          1980        Visit Information        Provider Department      8/27/2018 3:50 PM Nadeem Trejo MD [facility] Neurology        Today's Diagnoses     Myopathy    -  1    Chronic fatigue           Follow-ups after your visit        Your next 10 appointments already scheduled     Sep 13, 2018  3:15 PM CDT   (Arrive by 3:00 PM)   EMG with Nadeem Trejo MD   [facility] EMG ([facility])    [address] [place] [id number] [phone]           Do not use lotions or creams on the area to be tested. If you are on blood thinners (Warfarin, Coumadin, Lovenox, etc), please contact your primary care physician to check if it is safe to stop them 3 days prior to testing. If you have anxiety, please check with your referring physician to obtain anti-anxiety medication for the procedure.              Future tests that were ordered for you today     Open Future Orders        Priority Expected Expires Ordered    EMG Routine  8/27/2019 8/27/2018    Carnitine free and total Routine  8/28/2019 8/27/2018    plasma GDF15 levels (Growth Differentiation Factor 15) Levels to [place] [facility]: Laboratory Miscellaneous Order Routine  8/28/2019 8/27/2018    Organic acid comprehensive urine Routine  8/28/2019 8/27/2018    Amino acids plasma quantitative Routine  8/28/2019 8/27/2018            Who to contact     Please call your clinic at [phone] to:    Ask questions about your health    Make or cancel appointments    Discuss your medicines    Learn about your test results    Speak to your doctor            Additional Information About Your Visit        [employer] Information     Travefy is an electronic gateway that provides easy, online access to your medical records. With Trave[employer], you can request a clinic  "appointment, read your test results, renew a prescription or communicate with your care team.     To sign up for [employer] visit the website at [email or web address]   You will be asked to enter the access code listed below, as well as some personal information. Please follow the directions to create your username and password.     Your access code is: [email or web address]  Expires: 2018  6:30 AM     Your access code will  in 90 days. If you need help or a new code, please contact your [facility] or call [phone] for assistance.        Care EveryWhere ID     This is your Care EveryWhere ID. This could be used by other organizations to access your [place] medical records  [id number]        Your Vitals Were     Pulse Height BMI (Body Mass Index)             102 1.575 m (5' 2\") 25.55 kg/m2          Blood Pressure from Last 3 Encounters:   18 118/75    Weight from Last 3 Encounters:   18 63.4 kg (139 lb 11.2 oz)               Primary Care Provider    None Specified       No primary provider on file.        Equal Access to Services     [facility] : Hadii marino oliver hadcatina Smallwood, wamalloryda perry, qaflorina mckeon, gerson redding . So [facility] [phone].    ATENCIÓN: Si habla español, tiene a cordoba disposición servicios gratuitos de asistencia lingüística. Llame al [phone].    We comply with applicable federal civil rights laws and [place] laws. We do not discriminate on the basis of race, color, national origin, age, disability, sex, sexual orientation, or gender identity.            Thank you!     Thank you for choosing [facility] NEUROLOGY  for your care. Our goal is always to provide you with excellent care. Hearing back from our patients is one way we can continue to improve our services. Please take a few minutes to complete the written survey that you may receive in the mail after your visit with us. Thank you!           "   Your Updated Medication List - Protect others around you: Learn how to safely use, store and throw away your medicines at www.disposemymeds.org.          This list is accurate as of 8/27/18  5:19 PM.  Always use your most recent med list.                   Brand Name Dispense Instructions for use Diagnosis    albuterol 108 (90 Base) MCG/ACT inhaler    PROAIR HFA/PROVENTIL HFA/VENTOLIN HFA     Inhale 1-2 puffs into the lungs every 4 hours as needed        ascorbic acid 500 MG tablet    VITAMIN C     Take 500 mg by mouth daily        boric acid 600 mg vaginal suppository - PHARMACY TO MIX COMPOUND      Place 600 mg vaginally At Bedtime        BOTOX IJ      Botox        clindamycin-benzoyl peroxide gel    BENZACLIN     daily as needed        clobetasol 0.05 % ointment    TEMOVATE     daily        cyclobenzaprine 10 MG tablet    FLEXERIL     Take 5 mg by mouth every 8 hours as needed        DAILY MULTI PO      Take 1 tablet by mouth daily        DULoxetine 60 MG EC capsule    CYMBALTA     Take 60 mg by mouth daily        lidocaine 5 % Patch    LIDODERM     as needed        naproxen 500 MG tablet    NAPROSYN     Take 500 mg by mouth daily as needed        omeprazole 40 MG capsule    priLOSEC     Take 40 mg by mouth 2 times daily        ondansetron 4 MG ODT tab    ZOFRAN-ODT     every 8 hours as needed        Riboflavin 400 MG Tabs      Take 400 mg by mouth 2 times daily        rizatriptan 10 MG ODT tab    MAXALT-MLT     Take 10 mg by mouth as needed        tretinoin 0.025 % topical gel    RETIN-A     Apply topically daily        valACYclovir 1000 mg tablet    VALTREX     as needed        vitamin E 1000 UNIT capsule    TOCOPHEROL     Take 1,000 Units by mouth daily        zolpidem 5 MG tablet    AMBIEN     Take 5 mg by mouth daily

## 2018-08-27 NOTE — PROGRESS NOTES
Referral: Dr. Martinez    Chief Complaint: Widespread Fatigue- ? Diagnosis of Mitochondrial myopathy  History of Present Illness:      Sherry Sweeney is a 38 year old female who presents to clinic today for a transfer of care with the questionable diagnosis of mitochondrial myopathy. Around 2012-13, she started to experience generalized fatigue. She had a stressful job at the time; despite switching to less stressful job, it seemed to get worse. The fatigue was constant. Sleeping well helped. She was off of antidepressants for several years. After having this fatigue for some time she was prescribed an anti-depressant which did not help. She reports tightness and pain of facial, neck, trunk and limb muscles when she would push herself to perform activities when tired. Any phsyical activity and her menstrual cycle would make the fatigue much worse. She has to sit after walking 2-4 blocks. She was sometimes have days where she would be in bed for days. Sometimes she could only sit up for 10 minutes at a time.   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   She had a hysterectomy in 2/2018. Her fatigue has improved by 10-20% since. She reports darker brown urine at times; she is not sure if it correlated with being more active. Fatigue is worse with worse with illness (when she had pneumonia, flu, colds, bacterial vaginosis).     She has numbness and tingling of fingertips. +Ligtheadedness, early satiety. Denies anhidrosis, syncope, or early satiety and has not noticed changes in bowel or bladder function. She denies cramps, fasciculations. Speech and swallowing are normal. No joint pain, night sweats, rashes. 15 lb weight loss since hysterectomyReduced appetite; she may have an ulcer per her gastroenterologist (will get endoscopy this week). She denies breathing difficulties or shortness of breath while lying flat. She uses a wheelchair if she gets very fatigued;  this rarely occurs. She sleeps 10-12 hours of good quality sleep after being put on Ambien.    She gets Botox for her migraines. They are well managed now.     She tried Vitamin B2 200mg BID, multivamins, Vitamin E 1000mg.   He has not tried to B1, B6, B12, CoQ10 or carnitine.   She uses muscle relaxers and TENS unit to help with the muscle tightening.   She did not have a muscle biopsy or EMG/NCS. She had a mitochondrial DNA blood test.      Prior pertinent laboratory work-up:  (2/2018) normal AST, ALT, lactic acid  (2/2016)  Normal CK, Vitamin D, TSH   (2015) normal CRP, cortisol  (2013) C3/4  (2013)- negative SSA, SSB, anti smith, anti RNP, SCL-70, Liz-1, Hepatitis C, HIV, CCP ab    Past Medical History:     Past Surgical History: hysterectomy 2/2018    Family history: no family history of mitochondrial disease diagnosis   Her maternal grandmother, mother had chronic fatigue. Maternal aunt fibromyalgia.    Social History: He denies tobacco, alcohol, or illicit drug use.   There is no known exposure to toxins or heavy metals.   Currently is on disability. No children.     Medical Allergies:  NKDA     Current Medications:      Physical examination:    General Appearance: NAD  Skin: There are no rashes or other skin lesions.  Musculoskeletal:  There is no scoliosis, lordosis, kyphosis, pes cavus, or hammertoes.    Neurologic examination:    Mental status:  Patient is alert, attentive, and oriented x 3.  Language is coherent and fluent without dysarthria or aphasia.  Memory, comprehension and ability to follow commands were intact.       Cranial nerves:  Optic discs were sharp.  Pupils were round and reacted to light.  Extraocular movements were full. There was no face, jaw, palate or tongue weakness or atrophy. Hearing was grossly intact.  Shoulder shrug was normal.       Motor exam:   Motor exam: No atrophy or fasciculations.  No action or percussion myotonia or paramyotonia.  Manual muscle testing revealed the  following MRC grade muscle power:   Right Left   Neck flexion 4-    Neck extension: 4-    Shoulder abduction:  4 4   Elbow extension: 5 5   Elbow flexion:  4+ 4+   Wrist flexion:  4 4   Wrist extension:  5 5   Finger flexion 4 4   FDI 5 5   APB 3+ 3+   Hip flexion 4 4   Hip extension 5 5   Knee flexion 4+ 4+   Knee extension 5 5   Dorsiflexion 4+ 4+   Plantar flexion 5 5   Able to stand with arms crossed from sitting positon unaided x 3  (some give way weakness on exam)  Complex motor skills revealed normal coordination.  Finger-nose- finger and heel to shin were intact.       Sensory exam unreliable and variable. Proprioception and vibration was intact. Romberg sign was absent.  Gait was normal.  He was able to walk on his heels, toes and tandem without any difficulty.    Able to stand on toes and heels    Deep tendon reflexes:   Right Left   Triceps 2 2   Biceps 2 2   Brachioradialis 2 2   Knee jerk 3+ 3+   Ankle jerk 2 2   + Cross adducotrs b/l  Plantar responses were flexor bilaterally.       Assessment:      Sherry Sweeney has a six year history of fluctuating fatigue and multiple somatic symptoms. We explained to her that she fits under the umbrella of chronic fatigue/fibromyalgia syndrome, which is a common condition (affecting up to 1 million Americans), unfortunately remaining unexplained in the majority of cases. Medical workup for common causes of fatigue such as anemia, thyroid disorders, adrenal insufficiency, infectious disorders (hepatitis, Lyme, etc) has been previously performed and was unrevealing. We told her that her given diagnosis of mitochondrial myopathy requires revisiting. A diagnosis of mitochondrial myopathy is solid only if one of two criteria are fulfilled: 1) mtDNA analysis from blood or muscle sample shows an UNEQUIVOCALLY pathogenic mutation, and not a variant of unknown significance, or 2) Muscle biopsy shows definite mitochondrial abnormalities by histology, or biochemistry. She has  not had a muscle biopsy ever and did not bring the report of her mtDNA analysis done last year with her. In addition, fatigue and weakness may be due to several other neuromuscular disorders (myasthenia, other myopathies, etc) that have not been thoroughly evaluated yet- she has not had any EMG studies, etc.    Plan:        - check biomarkers of mitochondrial dysfunction in fasting blood sample: GDF-15, organic acids, amino acids, etc.   - EMG/NCS- evaluate for myopathy, neuromuscular junction disorder  - will request and review mitochondrial DNA testing results from [facility]  - If mtDNA sequencing does NOT show a definitive pathogenic mutation, she will require a muscle biopsy     Gloria Kaminski DO 3  [facility] Neuromuscular Fellow [id number]    ATTENDING ADDENDUM: Patient seen and examined with Fellow Dr Kaminski at the [facility] today. Agree with her assessment and plan as above. TT spent for patient care 45 minutes; more than half was counseling. Nadeem Trejo MD    ADDENDUM (9/10/2018): Genetic testing done at [facility] on 12/2016 was reviewed. 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  I called patient two times and left voicemails twice. Those results DO NOT PROVE A DIAGNOSIS OF MITOCHONDRIAL MYOPATHY. The patient will require additional investigation to confirm or refute diagnosis, specifically a muscle biopsy and biochemical testing. Hopefully she will return our calls to schedule the biopsy. GM                  Answers for HPI/ROS submitted by the patient on 8/27/2018   General Symptoms: Yes  Skin Symptoms: No  HENT Symptoms: No  EYE SYMPTOMS: Yes  HEART SYMPTOMS: No  LUNG SYMPTOMS:  No  INTESTINAL SYMPTOMS: Yes  URINARY SYMPTOMS: No  GYNECOLOGIC SYMPTOMS: Yes  BREAST SYMPTOMS: Yes  SKELETAL SYMPTOMS: Yes  BLOOD SYMPTOMS: No  NERVOUS SYSTEM SYMPTOMS: Yes  MENTAL HEALTH SYMPTOMS: Yes  Fever: No  Loss of appetite: Yes  Weight loss: Yes  Fatigue: Yes  Chills: Yes  Increased stress: Yes  Excessive hunger: Yes  Excessive thirst: No  Feeling hot or cold when others believe the temperature is normal: No  Loss of height: No  Post-operative complications: No  Surgical site pain: No  Hallucinations: No  Change in or Loss of Energy: Yes  Hyperactivity: No  Confusion: No  Eye pain: No  Vision loss: No  Dry eyes: Yes  Watery eyes: No  Eye bulging: Yes  Double vision: No  Flashing of lights: No  Spots: Yes  Floaters: No  Redness: No  Crossed eyes: No  Tunnel Vision: No  Yellowing of eyes: No  Eye irritation: No  Heart burn or indigestion: Yes  Nausea: Yes  Vomiting: No  Abdominal pain: Yes  Bloating: Yes  Constipation: No  Diarrhea: No  Blood in stool: No  Black stools: No  Rectal or Anal pain: No  Fecal incontinence: No  Yellowing of skin or eyes: No  Vomit with blood: No  Change in stools: No  Back pain: Yes  Muscle aches: Yes  Neck pain: Yes  Swollen joints: No  Joint pain: No  Bone pain: No  Muscle cramps: No  Muscle weakness: Yes  Joint stiffness: No  Bone fracture: No  Trouble with coordination: No  Dizziness or trouble with balance: No  Fainting or black-out spells: No  Memory loss: No  Headache: Yes  Seizures: No  Speech problems: No  Tingling: Yes  Tremor: No  Weakness: Yes  Difficulty walking: Yes  Paralysis: No  Numbness: No  Bleeding or spotting between periods: No  Heavy or painful periods: No  Irregular periods: No  Vaginal discharge: No  Hot flashes: No  Vaginal dryness: Yes  Genital ulcers: No  Reduced libido: Yes  Painful intercourse: No  Difficulty with sexual arousal: Yes  Post-menopausal bleeding: No  Discharge: No  Lumps: No  Pain: No  Nipple retraction: Yes  Nervous or Anxious:  Yes  Depression: Yes  Trouble sleeping: No  Trouble thinking or concentrating: No  Mood changes: Yes  Panic attacks: No  PHQ-2 Score: 2

## 2018-09-05 ENCOUNTER — COMMUNICATION - HEALTHEAST (OUTPATIENT)
Dept: FAMILY MEDICINE | Facility: CLINIC | Age: 38
End: 2018-09-05

## 2018-09-14 ENCOUNTER — OFFICE VISIT - HEALTHEAST (OUTPATIENT)
Dept: FAMILY MEDICINE | Facility: CLINIC | Age: 38
End: 2018-09-14

## 2018-09-14 DIAGNOSIS — M79.7 FIBROMYALGIA: ICD-10-CM

## 2018-09-14 DIAGNOSIS — F33.9 MAJOR DEPRESSION, RECURRENT, CHRONIC (H): ICD-10-CM

## 2018-09-14 DIAGNOSIS — R10.13 ABDOMINAL PAIN, EPIGASTRIC: ICD-10-CM

## 2018-09-14 DIAGNOSIS — G71.3 MITOCHONDRIAL MYOPATHY: ICD-10-CM

## 2018-09-14 ASSESSMENT — MIFFLIN-ST. JEOR: SCORE: 1260.09

## 2018-09-24 ENCOUNTER — COMMUNICATION - HEALTHEAST (OUTPATIENT)
Dept: FAMILY MEDICINE | Facility: CLINIC | Age: 38
End: 2018-09-24

## 2018-09-27 ENCOUNTER — RECORDS - HEALTHEAST (OUTPATIENT)
Dept: ADMINISTRATIVE | Facility: OTHER | Age: 38
End: 2018-09-27

## 2018-10-08 ENCOUNTER — COMMUNICATION - HEALTHEAST (OUTPATIENT)
Dept: FAMILY MEDICINE | Facility: CLINIC | Age: 38
End: 2018-10-08

## 2018-10-24 ENCOUNTER — COMMUNICATION - HEALTHEAST (OUTPATIENT)
Dept: FAMILY MEDICINE | Facility: CLINIC | Age: 38
End: 2018-10-24

## 2018-12-01 ENCOUNTER — COMMUNICATION - HEALTHEAST (OUTPATIENT)
Dept: FAMILY MEDICINE | Facility: CLINIC | Age: 38
End: 2018-12-01

## 2018-12-13 ENCOUNTER — COMMUNICATION - HEALTHEAST (OUTPATIENT)
Dept: FAMILY MEDICINE | Facility: CLINIC | Age: 38
End: 2018-12-13

## 2018-12-28 ENCOUNTER — OFFICE VISIT - HEALTHEAST (OUTPATIENT)
Dept: FAMILY MEDICINE | Facility: CLINIC | Age: 38
End: 2018-12-28

## 2018-12-28 DIAGNOSIS — R39.9 UTI SYMPTOMS: ICD-10-CM

## 2018-12-28 DIAGNOSIS — J01.10 ACUTE FRONTAL SINUSITIS, RECURRENCE NOT SPECIFIED: ICD-10-CM

## 2018-12-28 LAB
ALBUMIN UR-MCNC: NEGATIVE MG/DL
APPEARANCE UR: CLEAR
BILIRUB UR QL STRIP: NEGATIVE
COLOR UR AUTO: YELLOW
GLUCOSE UR STRIP-MCNC: NEGATIVE MG/DL
HGB UR QL STRIP: NEGATIVE
KETONES UR STRIP-MCNC: NEGATIVE MG/DL
LEUKOCYTE ESTERASE UR QL STRIP: NEGATIVE
NITRATE UR QL: NEGATIVE
PH UR STRIP: 7 [PH] (ref 5–8)
SP GR UR STRIP: 1.02 (ref 1–1.03)
UROBILINOGEN UR STRIP-ACNC: NORMAL

## 2019-01-02 ENCOUNTER — RECORDS - HEALTHEAST (OUTPATIENT)
Dept: ADMINISTRATIVE | Facility: OTHER | Age: 39
End: 2019-01-02

## 2019-01-04 ENCOUNTER — RECORDS - HEALTHEAST (OUTPATIENT)
Dept: ADMINISTRATIVE | Facility: OTHER | Age: 39
End: 2019-01-04

## 2019-01-09 ENCOUNTER — OFFICE VISIT - HEALTHEAST (OUTPATIENT)
Dept: FAMILY MEDICINE | Facility: CLINIC | Age: 39
End: 2019-01-09

## 2019-01-09 DIAGNOSIS — R68.89 COLD INTOLERANCE: ICD-10-CM

## 2019-01-09 DIAGNOSIS — M79.661 PAIN OF RIGHT LOWER LEG: ICD-10-CM

## 2019-01-09 DIAGNOSIS — H04.123 DRY EYES: ICD-10-CM

## 2019-01-09 DIAGNOSIS — G71.3 MITOCHONDRIAL MYOPATHY: ICD-10-CM

## 2019-01-10 ENCOUNTER — COMMUNICATION - HEALTHEAST (OUTPATIENT)
Dept: FAMILY MEDICINE | Facility: CLINIC | Age: 39
End: 2019-01-10

## 2019-01-10 LAB — TSH SERPL DL<=0.005 MIU/L-ACNC: 1.62 UIU/ML (ref 0.3–5)

## 2019-01-16 ENCOUNTER — COMMUNICATION - HEALTHEAST (OUTPATIENT)
Dept: FAMILY MEDICINE | Facility: CLINIC | Age: 39
End: 2019-01-16

## 2019-02-05 ENCOUNTER — HOSPITAL ENCOUNTER (OUTPATIENT)
Dept: ULTRASOUND IMAGING | Facility: HOSPITAL | Age: 39
Discharge: HOME | End: 2019-02-05
Attending: FAMILY MEDICINE

## 2019-02-05 DIAGNOSIS — M79.661 PAIN OF RIGHT LOWER LEG: ICD-10-CM

## 2019-02-09 ENCOUNTER — COMMUNICATION - HEALTHEAST (OUTPATIENT)
Dept: FAMILY MEDICINE | Facility: CLINIC | Age: 39
End: 2019-02-09

## 2019-02-11 ENCOUNTER — COMMUNICATION - HEALTHEAST (OUTPATIENT)
Dept: FAMILY MEDICINE | Facility: CLINIC | Age: 39
End: 2019-02-11

## 2019-02-22 ENCOUNTER — OFFICE VISIT (OUTPATIENT)
Dept: URGENT CARE | Facility: URGENT CARE | Age: 39
End: 2019-02-22
Payer: COMMERCIAL

## 2019-02-22 VITALS
DIASTOLIC BLOOD PRESSURE: 86 MMHG | OXYGEN SATURATION: 100 % | TEMPERATURE: 97.7 F | WEIGHT: 130 LBS | HEART RATE: 90 BPM | BODY MASS INDEX: 23.78 KG/M2 | SYSTOLIC BLOOD PRESSURE: 125 MMHG

## 2019-02-22 DIAGNOSIS — R10.32 LLQ ABDOMINAL PAIN: Primary | ICD-10-CM

## 2019-02-22 DIAGNOSIS — R19.5 DARK STOOLS: ICD-10-CM

## 2019-02-22 LAB
BASOPHILS # BLD AUTO: 0 10E9/L (ref 0–0.2)
BASOPHILS NFR BLD AUTO: 0.3 %
DIFFERENTIAL METHOD BLD: ABNORMAL
EOSINOPHIL # BLD AUTO: 0.2 10E9/L (ref 0–0.7)
EOSINOPHIL NFR BLD AUTO: 1.6 %
ERYTHROCYTE [DISTWIDTH] IN BLOOD BY AUTOMATED COUNT: 12.9 % (ref 10–15)
HCT VFR BLD AUTO: 41.7 % (ref 35–47)
HEMOCCULT SP1 STL QL: NEGATIVE
HGB BLD-MCNC: 13.4 G/DL (ref 11.7–15.7)
LYMPHOCYTES # BLD AUTO: 4.8 10E9/L (ref 0.8–5.3)
LYMPHOCYTES NFR BLD AUTO: 34.5 %
MCH RBC QN AUTO: 27.6 PG (ref 26.5–33)
MCHC RBC AUTO-ENTMCNC: 32.1 G/DL (ref 31.5–36.5)
MCV RBC AUTO: 86 FL (ref 78–100)
MONOCYTES # BLD AUTO: 0.8 10E9/L (ref 0–1.3)
MONOCYTES NFR BLD AUTO: 5.4 %
NEUTROPHILS # BLD AUTO: 8.1 10E9/L (ref 1.6–8.3)
NEUTROPHILS NFR BLD AUTO: 58.2 %
PLATELET # BLD AUTO: 342 10E9/L (ref 150–450)
RBC # BLD AUTO: 4.86 10E12/L (ref 3.8–5.2)
WBC # BLD AUTO: 13.8 10E9/L (ref 4–11)

## 2019-02-22 PROCEDURE — 85025 COMPLETE CBC W/AUTO DIFF WBC: CPT | Performed by: FAMILY MEDICINE

## 2019-02-22 PROCEDURE — 82270 OCCULT BLOOD FECES: CPT | Performed by: FAMILY MEDICINE

## 2019-02-22 PROCEDURE — 99214 OFFICE O/P EST MOD 30 MIN: CPT | Performed by: FAMILY MEDICINE

## 2019-02-22 PROCEDURE — 36415 COLL VENOUS BLD VENIPUNCTURE: CPT | Performed by: FAMILY MEDICINE

## 2019-02-23 NOTE — PATIENT INSTRUCTIONS
Recheck on Monday, with either your primary care provider or your OB/GYN.  Continue working with your OB/gyn for the ultrasound they have planned for you.     If any new or worsening symptoms develop over the weekend (for example - fevers, increased pain, feeling lightheaded, or any other concerning symptoms), go to the emergency room for further evaluation.       Patient Education     Unknown Causes of Abdominal Pain (Female)    The exact cause of your belly (abdominal) pain is not clear. This does not mean that this is something to worry about. Everyone likes to know the exact cause of the problem. But sometimes with belly pain, there is no clear-cut cause, and this could be a good thing. The good news is that your symptoms can be treated, and you will feel better.   Your condition does not seem serious now. But sometimes the signs of a serious problem may take more time to appear. For this reason, it is important for you to watch for any new symptoms, problems, or worsening of your condition.  Over the next few days, the abdominal pain may come and go. Or it may be constant. Other common symptoms can include nausea and vomiting. Sometimes it can be difficult to tell if you feel nauseous. You may just feel bad and not connect that feeling to nausea. Constipation, diarrhea, and a fever may go along with the pain.  The pain may continue even if treated correctly over the following days. Depending on how things go, sometimes the cause can become clear and may need more or different treatment. Additional evaluations, medicines, or tests may also be needed.  Home care  Your healthcare provider may prescribe medicine for pain, symptoms, or an infection.  Follow the healthcare provider's instructions for taking these medicines.  General care    Rest as much as you can until your next exam. No strenuous activities.    Try to find positions that ease discomfort. A small pillow placed on the abdomen may help relieve  pain.    Something warm on your abdomen (such as a heating pad) may help, but be careful not to burn yourself.  Diet    Don t force yourself to eat, especially if having cramps, vomiting, or diarrhea.    Water is important so you don't get dehydrated. Soup may also be good. Sports drinks may also help, especially if they are not too acidic. Don't drink sugary drinks as this can make things worse. Take liquids in small amounts. Don t guzzle them.    Caffeine sometimes makes the pain and cramping worse.    Don t take dairy products if you have vomiting or diarrhea.    Don't eat large amounts at a time. Wait a few minutes between bites.    Eat a diet low in fiber (called a low-residue diet). Foods allowed include refined breads, white rice, fruit and vegetable juices without pulp, tender meats. These foods will pass more easily through the intestine.    Don t have whole-grain foods, whole fruits and vegetables, meats, seeds and nuts, fried or fatty foods, dairy, alcohol and spicy foods until your symptoms go away.  Follow-up care  Follow up with your healthcare provider, or as advised, if your pain does not begin to improve in the next 24 hours.  Call 911  Call 911 if any of these occur:    Trouble breathing    Confusion    Fainting or loss of consciousness    Rapid heart rate    Seizure  When to seek medical advice  Call your healthcare provider right away if any of these occur:    Pain gets worse or moves to the right lower abdomen    New or worsening vomiting or diarrhea    Swelling of the abdomen    Unable to pass stool for more than 3 days    Fever of 100.4 F (38 C) or higher, or as directed by your healthcare provider.    Blood in vomit or bowel movements (dark red or black color)    Yellow color of eyes and skin (jaundice)    Weakness, dizziness    Chest, arm, back, neck, or jaw pain    Unexpected vaginal bleeding or missed period    Can't keep down liquids or water and you are getting dehydrated  Date Last  Reviewed: 6/1/2018 2000-2018 [employer] [address] [place] [address], [place] [address]. All rights reserved. This information is not intended as a substitute for professional medical care. Always follow your healthcare professional's instructions.

## 2019-02-23 NOTE — PROGRESS NOTES
SUBJECTIVE:   Sherry Sweeney is a 38 year old female presenting with   Chief Complaint   Patient presents with     Urgent Care     Bowel Problems     c/o black stool and stomach pain for 1 week     Symptoms started 1 week ago and include: small loose to hard dark stools that are difficult to pass at times.  Left lower quadrant abdominal pressure and sharp pain that waxes and wanes but hasn't resolved.  No cramping pain.  No urinary frequency or dysuria.  Has had a white vaginal discharge.  Nausea but no vomiting.  No fevers.    No breathing concerns.    She went to see her OB/Gyn today and reports that she had a UA and wet prep that were negative.  She has also been set up to do an ultrasound next week for the LLQ pain.   Here in UC tonight due to the black coloration of the stools this week.   She is s/p hysterectomy.  Still has a single ovary remaining.     ROS:  5-Point Review of Systems Negative-- Except as stated above.    OBJECTIVE  /86   Pulse 90   Temp 97.7  F (36.5  C) (Oral)   Wt 59 kg (130 lb)   SpO2 100%   BMI 23.78 kg/m    GENERAL:  Awake, alert and interactive. No acute distress.  HEAD:   NC/AT, EOMI, clear conjunctiva.    CHEST:  Lungs are clear, no rhonchi, wheezing or rales. Normal symmetric air entry throughout both lung fields.   HEART:  S1 and S2 normal, no murmurs. Regular rate and rhythm.  BACK: no CVA TTP  ABD:  Soft, not distended, bowel sounds normal and heard throughout, mild tenderness to palpation left lower quadrant, rest of abdomen is non TTP  RECTUM:  Skin intact, no fissures or hemorrhoids present.  Tone intact.  No stool appreciated in rectum.      Labs:  Results for orders placed or performed in visit on 02/22/19   CBC with platelets and differential   Result Value Ref Range    WBC 13.8 (H) 4.0 - 11.0 10e9/L    RBC Count 4.86 3.8 - 5.2 10e12/L    Hemoglobin 13.4 11.7 - 15.7 g/dL    Hematocrit 41.7 35.0 - 47.0 %    MCV 86 78 - 100 fl    MCH 27.6 26.5 - 33.0 pg    MCHC 32.1  31.5 - 36.5 g/dL    RDW 12.9 10.0 - 15.0 %    Platelet Count 342 150 - 450 10e9/L    % Neutrophils 58.2 %    % Lymphocytes 34.5 %    % Monocytes 5.4 %    % Eosinophils 1.6 %    % Basophils 0.3 %    Absolute Neutrophil 8.1 1.6 - 8.3 10e9/L    Absolute Lymphocytes 4.8 0.8 - 5.3 10e9/L    Absolute Monocytes 0.8 0.0 - 1.3 10e9/L    Absolute Eosinophils 0.2 0.0 - 0.7 10e9/L    Absolute Basophils 0.0 0.0 - 0.2 10e9/L    Diff Method Automated Method    Occult blood stool 1-3 spec   Result Value Ref Range    Occult Blood Slide 1 Negative NEG^Negative         ASSESSMENT/PLAN    ICD-10-CM    1. LLQ abdominal pain R10.32 CBC with platelets and differential     Occult blood stool 1-3 spec     Occult blood stool 1-3 spec     CANCELED: Occult blood stool 1-3 spec   2. Dark stools R19.5 Occult blood stool 1-3 spec     CANCELED: Occult blood stool 1-3 spec       LLQ abdominal pain and dark stools x 1 week.  Afebrile.   WBC count elevated tonight at 13.8.  Hgb WNL.  Stool sample was provided in office that was negative for occult blood.   UA and wet prep negative per patient from earlier today when she visited her OB/gyn.  Ultrasound ordered to be done next week by her OB/gyn.  Colitis, diverticulitis, ovarian cyst vs other all remain in our differential tonight.  Advised conservative cares for now, but discussed if not improving over next few days, or if any worsening symptoms - further evaluation and treatment would be necessary.  Close f/u with PCP - has appointment already set up for Tuesday.  Discussed if ANY worsening symptoms over the weekend, to ER for more comprehensive evaluation/treatment.      Patient Instructions   Recheck on Monday, with either your primary care provider or your OB/GYN.  Continue working with your OB/gyn for the ultrasound they have planned for you.     If any new or worsening symptoms develop over the weekend (for example - fevers, increased pain, feeling lightheaded, or any other concerning  symptoms), go to the emergency room for further evaluation.

## 2019-02-27 ENCOUNTER — RECORDS - HEALTHEAST (OUTPATIENT)
Dept: ADMINISTRATIVE | Facility: OTHER | Age: 39
End: 2019-02-27

## 2019-03-01 ENCOUNTER — OFFICE VISIT - HEALTHEAST (OUTPATIENT)
Dept: FAMILY MEDICINE | Facility: CLINIC | Age: 39
End: 2019-03-01

## 2019-03-01 ENCOUNTER — RECORDS - HEALTHEAST (OUTPATIENT)
Dept: ADMINISTRATIVE | Facility: OTHER | Age: 39
End: 2019-03-01

## 2019-03-01 DIAGNOSIS — F33.9 MAJOR DEPRESSION, RECURRENT, CHRONIC (H): ICD-10-CM

## 2019-03-01 DIAGNOSIS — R10.32 LLQ ABDOMINAL PAIN: ICD-10-CM

## 2019-03-01 DIAGNOSIS — G71.3 MITOCHONDRIAL MYOPATHY: ICD-10-CM

## 2019-03-01 DIAGNOSIS — F41.1 GENERALIZED ANXIETY DISORDER: ICD-10-CM

## 2019-03-01 DIAGNOSIS — Z13.21 ENCOUNTER FOR VITAMIN DEFICIENCY SCREENING: ICD-10-CM

## 2019-03-01 LAB
C REACTIVE PROTEIN LHE: 0.2 MG/DL (ref 0–0.8)
ERYTHROCYTE [DISTWIDTH] IN BLOOD BY AUTOMATED COUNT: 12 % (ref 11–14.5)
ERYTHROCYTE [SEDIMENTATION RATE] IN BLOOD BY WESTERGREN METHOD: 10 MM/HR (ref 0–20)
HCT VFR BLD AUTO: 43.1 % (ref 35–47)
HGB BLD-MCNC: 13.9 G/DL (ref 12–16)
MCH RBC QN AUTO: 27.5 PG (ref 27–34)
MCHC RBC AUTO-ENTMCNC: 32.2 G/DL (ref 32–36)
MCV RBC AUTO: 85 FL (ref 80–100)
PLATELET # BLD AUTO: 346 THOU/UL (ref 140–440)
PMV BLD AUTO: 7.8 FL (ref 7–10)
RBC # BLD AUTO: 5.05 MILL/UL (ref 3.8–5.4)
WBC: 16.2 THOU/UL (ref 4–11)

## 2019-03-01 ASSESSMENT — MIFFLIN-ST. JEOR: SCORE: 1246.49

## 2019-03-04 ENCOUNTER — COMMUNICATION - HEALTHEAST (OUTPATIENT)
Dept: FAMILY MEDICINE | Facility: CLINIC | Age: 39
End: 2019-03-04

## 2019-03-05 ENCOUNTER — OFFICE VISIT - HEALTHEAST (OUTPATIENT)
Dept: FAMILY MEDICINE | Facility: CLINIC | Age: 39
End: 2019-03-05

## 2019-03-05 DIAGNOSIS — R11.0 NAUSEA: ICD-10-CM

## 2019-03-05 DIAGNOSIS — R10.33 PERIUMBILICAL ABDOMINAL PAIN: ICD-10-CM

## 2019-03-05 DIAGNOSIS — D72.829 LEUKOCYTOSIS, UNSPECIFIED TYPE: ICD-10-CM

## 2019-03-05 LAB
ERYTHROCYTE [DISTWIDTH] IN BLOOD BY AUTOMATED COUNT: 11.6 % (ref 11–14.5)
HCT VFR BLD AUTO: 40.3 % (ref 35–47)
HGB BLD-MCNC: 13.2 G/DL (ref 12–16)
MCH RBC QN AUTO: 27.6 PG (ref 27–34)
MCHC RBC AUTO-ENTMCNC: 32.7 G/DL (ref 32–36)
MCV RBC AUTO: 84 FL (ref 80–100)
PLATELET # BLD AUTO: 283 THOU/UL (ref 140–440)
PMV BLD AUTO: 7.9 FL (ref 7–10)
RBC # BLD AUTO: 4.77 MILL/UL (ref 3.8–5.4)
WBC: 7.9 THOU/UL (ref 4–11)

## 2019-03-07 ENCOUNTER — COMMUNICATION - HEALTHEAST (OUTPATIENT)
Dept: FAMILY MEDICINE | Facility: CLINIC | Age: 39
End: 2019-03-07

## 2019-03-07 DIAGNOSIS — R11.0 NAUSEA: ICD-10-CM

## 2019-03-07 DIAGNOSIS — R10.10 PAIN OF UPPER ABDOMEN: ICD-10-CM

## 2019-03-26 ENCOUNTER — COMMUNICATION - HEALTHEAST (OUTPATIENT)
Dept: FAMILY MEDICINE | Facility: CLINIC | Age: 39
End: 2019-03-26

## 2019-03-26 ENCOUNTER — OFFICE VISIT - HEALTHEAST (OUTPATIENT)
Dept: FAMILY MEDICINE | Facility: CLINIC | Age: 39
End: 2019-03-26

## 2019-03-26 DIAGNOSIS — F41.0 ANXIETY ATTACK: ICD-10-CM

## 2019-03-27 ENCOUNTER — OFFICE VISIT - HEALTHEAST (OUTPATIENT)
Dept: INTERNAL MEDICINE | Facility: CLINIC | Age: 39
End: 2019-03-27

## 2019-03-27 DIAGNOSIS — F41.1 GENERALIZED ANXIETY DISORDER: ICD-10-CM

## 2019-03-27 DIAGNOSIS — F33.9 MAJOR DEPRESSION, RECURRENT, CHRONIC (H): ICD-10-CM

## 2019-03-27 DIAGNOSIS — F41.0 ANXIETY ATTACK: ICD-10-CM

## 2019-03-27 ASSESSMENT — MIFFLIN-ST. JEOR: SCORE: 1228.34

## 2019-04-01 ENCOUNTER — OFFICE VISIT - HEALTHEAST (OUTPATIENT)
Dept: FAMILY MEDICINE | Facility: CLINIC | Age: 39
End: 2019-04-01

## 2019-04-01 DIAGNOSIS — F41.1 GENERALIZED ANXIETY DISORDER: ICD-10-CM

## 2019-04-03 ENCOUNTER — RECORDS - HEALTHEAST (OUTPATIENT)
Dept: ADMINISTRATIVE | Facility: OTHER | Age: 39
End: 2019-04-03

## 2019-04-04 ENCOUNTER — COMMUNICATION - HEALTHEAST (OUTPATIENT)
Dept: FAMILY MEDICINE | Facility: CLINIC | Age: 39
End: 2019-04-04

## 2019-04-04 DIAGNOSIS — F41.0 ANXIETY ATTACK: ICD-10-CM

## 2019-04-08 ENCOUNTER — COMMUNICATION - HEALTHEAST (OUTPATIENT)
Dept: FAMILY MEDICINE | Facility: CLINIC | Age: 39
End: 2019-04-08

## 2019-04-15 ENCOUNTER — OFFICE VISIT - HEALTHEAST (OUTPATIENT)
Dept: FAMILY MEDICINE | Facility: CLINIC | Age: 39
End: 2019-04-15

## 2019-04-15 DIAGNOSIS — F41.0 ANXIETY ATTACK: ICD-10-CM

## 2019-04-15 DIAGNOSIS — F41.1 GENERALIZED ANXIETY DISORDER: ICD-10-CM

## 2019-04-15 DIAGNOSIS — F33.9 MAJOR DEPRESSION, RECURRENT, CHRONIC (H): ICD-10-CM

## 2019-04-15 DIAGNOSIS — G71.3 MITOCHONDRIAL MYOPATHY: ICD-10-CM

## 2019-04-18 ENCOUNTER — COMMUNICATION - HEALTHEAST (OUTPATIENT)
Dept: FAMILY MEDICINE | Facility: CLINIC | Age: 39
End: 2019-04-18

## 2019-04-23 ENCOUNTER — COMMUNICATION - HEALTHEAST (OUTPATIENT)
Dept: FAMILY MEDICINE | Facility: CLINIC | Age: 39
End: 2019-04-23

## 2019-04-23 DIAGNOSIS — F41.1 GENERALIZED ANXIETY DISORDER: ICD-10-CM

## 2019-04-26 ENCOUNTER — COMMUNICATION - HEALTHEAST (OUTPATIENT)
Dept: FAMILY MEDICINE | Facility: CLINIC | Age: 39
End: 2019-04-26

## 2019-05-03 ENCOUNTER — OFFICE VISIT - HEALTHEAST (OUTPATIENT)
Dept: FAMILY MEDICINE | Facility: CLINIC | Age: 39
End: 2019-05-03

## 2019-05-03 DIAGNOSIS — R00.0 TACHYCARDIA: ICD-10-CM

## 2019-05-03 DIAGNOSIS — R03.0 ELEVATED BLOOD PRESSURE READING WITHOUT DIAGNOSIS OF HYPERTENSION: ICD-10-CM

## 2019-05-03 DIAGNOSIS — F41.1 GENERALIZED ANXIETY DISORDER: ICD-10-CM

## 2019-05-03 ASSESSMENT — MIFFLIN-ST. JEOR: SCORE: 1193.87

## 2019-05-06 ENCOUNTER — COMMUNICATION - HEALTHEAST (OUTPATIENT)
Dept: FAMILY MEDICINE | Facility: CLINIC | Age: 39
End: 2019-05-06

## 2019-05-06 DIAGNOSIS — G47.00 INSOMNIA, UNSPECIFIED TYPE: ICD-10-CM

## 2019-05-06 LAB
ATRIAL RATE - MUSE: 100 BPM
DIASTOLIC BLOOD PRESSURE - MUSE: NORMAL MMHG
INTERPRETATION ECG - MUSE: NORMAL
P AXIS - MUSE: 63 DEGREES
PR INTERVAL - MUSE: 122 MS
QRS DURATION - MUSE: 80 MS
QT - MUSE: 332 MS
QTC - MUSE: 428 MS
R AXIS - MUSE: 46 DEGREES
SYSTOLIC BLOOD PRESSURE - MUSE: NORMAL MMHG
T AXIS - MUSE: 41 DEGREES
VENTRICULAR RATE- MUSE: 100 BPM

## 2019-05-07 ENCOUNTER — TRANSFERRED RECORDS (OUTPATIENT)
Dept: HEALTH INFORMATION MANAGEMENT | Facility: CLINIC | Age: 39
End: 2019-05-07

## 2019-05-08 ENCOUNTER — MEDICAL CORRESPONDENCE (OUTPATIENT)
Dept: HEALTH INFORMATION MANAGEMENT | Facility: CLINIC | Age: 39
End: 2019-05-08

## 2019-05-15 ENCOUNTER — AMBULATORY - HEALTHEAST (OUTPATIENT)
Dept: NURSING | Facility: CLINIC | Age: 39
End: 2019-05-15

## 2019-05-15 DIAGNOSIS — F41.1 GENERALIZED ANXIETY DISORDER: ICD-10-CM

## 2019-05-21 ENCOUNTER — OFFICE VISIT - HEALTHEAST (OUTPATIENT)
Dept: FAMILY MEDICINE | Facility: CLINIC | Age: 39
End: 2019-05-21

## 2019-05-21 DIAGNOSIS — E88.40 MITOCHONDRIAL DISEASE (H): ICD-10-CM

## 2019-05-21 DIAGNOSIS — M79.7 FIBROMYALGIA: ICD-10-CM

## 2019-05-21 DIAGNOSIS — R20.2 NUMBNESS AND TINGLING: ICD-10-CM

## 2019-05-21 DIAGNOSIS — R61 NIGHT SWEATS: ICD-10-CM

## 2019-05-21 DIAGNOSIS — R20.0 NUMBNESS AND TINGLING: ICD-10-CM

## 2019-05-21 DIAGNOSIS — F33.9 MAJOR DEPRESSION, RECURRENT, CHRONIC (H): ICD-10-CM

## 2019-05-21 DIAGNOSIS — F41.1 GENERALIZED ANXIETY DISORDER: ICD-10-CM

## 2019-05-21 DIAGNOSIS — G71.3 MITOCHONDRIAL MYOPATHY: ICD-10-CM

## 2019-05-21 DIAGNOSIS — R30.0 DYSURIA: ICD-10-CM

## 2019-05-21 LAB
ALBUMIN SERPL-MCNC: 4.1 G/DL (ref 3.5–5)
ALBUMIN UR-MCNC: NEGATIVE MG/DL
ALP SERPL-CCNC: 64 U/L (ref 45–120)
ALT SERPL W P-5'-P-CCNC: 12 U/L (ref 0–45)
ANION GAP SERPL CALCULATED.3IONS-SCNC: 11 MMOL/L (ref 5–18)
APPEARANCE UR: CLEAR
AST SERPL W P-5'-P-CCNC: 15 U/L (ref 0–40)
BILIRUB SERPL-MCNC: 0.4 MG/DL (ref 0–1)
BILIRUB UR QL STRIP: NEGATIVE
BUN SERPL-MCNC: 6 MG/DL (ref 8–22)
CALCIUM SERPL-MCNC: 9.9 MG/DL (ref 8.5–10.5)
CHLORIDE BLD-SCNC: 105 MMOL/L (ref 98–107)
CO2 SERPL-SCNC: 23 MMOL/L (ref 22–31)
COLOR UR AUTO: YELLOW
CREAT SERPL-MCNC: 0.75 MG/DL (ref 0.6–1.1)
ESTRADIOL SERPL-MCNC: 34 PG/ML
FSH SERPL-ACNC: 4.8 MIU/ML
GFR SERPL CREATININE-BSD FRML MDRD: >60 ML/MIN/1.73M2
GLUCOSE BLD-MCNC: 91 MG/DL (ref 70–125)
GLUCOSE UR STRIP-MCNC: NEGATIVE MG/DL
HGB UR QL STRIP: NEGATIVE
KETONES UR STRIP-MCNC: NEGATIVE MG/DL
LEUKOCYTE ESTERASE UR QL STRIP: NEGATIVE
LH SERPL-ACNC: 2.2 MIU/ML
NITRATE UR QL: NEGATIVE
PH UR STRIP: 7 [PH] (ref 5–8)
POTASSIUM BLD-SCNC: 4.5 MMOL/L (ref 3.5–5)
PROT SERPL-MCNC: 7.1 G/DL (ref 6–8)
SODIUM SERPL-SCNC: 139 MMOL/L (ref 136–145)
SP GR UR STRIP: 1.01 (ref 1–1.03)
UROBILINOGEN UR STRIP-ACNC: NORMAL
VIT B12 SERPL-MCNC: 908 PG/ML (ref 213–816)

## 2019-05-22 ENCOUNTER — COMMUNICATION - HEALTHEAST (OUTPATIENT)
Dept: FAMILY MEDICINE | Facility: CLINIC | Age: 39
End: 2019-05-22

## 2019-05-25 ENCOUNTER — COMMUNICATION - HEALTHEAST (OUTPATIENT)
Dept: FAMILY MEDICINE | Facility: CLINIC | Age: 39
End: 2019-05-25

## 2019-05-25 DIAGNOSIS — F41.1 GENERALIZED ANXIETY DISORDER: ICD-10-CM

## 2019-06-21 ENCOUNTER — OFFICE VISIT - HEALTHEAST (OUTPATIENT)
Dept: FAMILY MEDICINE | Facility: CLINIC | Age: 39
End: 2019-06-21

## 2019-06-21 DIAGNOSIS — F41.1 GAD (GENERALIZED ANXIETY DISORDER): ICD-10-CM

## 2019-06-21 DIAGNOSIS — G47.00 INSOMNIA, UNSPECIFIED TYPE: ICD-10-CM

## 2019-06-21 ASSESSMENT — MIFFLIN-ST. JEOR: SCORE: 1160.3

## 2019-06-25 ENCOUNTER — COMMUNICATION - HEALTHEAST (OUTPATIENT)
Dept: FAMILY MEDICINE | Facility: CLINIC | Age: 39
End: 2019-06-25

## 2019-06-26 ENCOUNTER — COMMUNICATION - HEALTHEAST (OUTPATIENT)
Dept: FAMILY MEDICINE | Facility: CLINIC | Age: 39
End: 2019-06-26

## 2019-06-27 ENCOUNTER — COMMUNICATION - HEALTHEAST (OUTPATIENT)
Dept: FAMILY MEDICINE | Facility: CLINIC | Age: 39
End: 2019-06-27

## 2019-07-02 ENCOUNTER — AMBULATORY - HEALTHEAST (OUTPATIENT)
Dept: FAMILY MEDICINE | Facility: CLINIC | Age: 39
End: 2019-07-02

## 2019-07-02 ENCOUNTER — COMMUNICATION - HEALTHEAST (OUTPATIENT)
Dept: PHARMACY | Facility: CLINIC | Age: 39
End: 2019-07-02

## 2019-07-02 ENCOUNTER — AMBULATORY - HEALTHEAST (OUTPATIENT)
Dept: PHARMACY | Facility: CLINIC | Age: 39
End: 2019-07-02

## 2019-07-02 DIAGNOSIS — F41.1 GENERALIZED ANXIETY DISORDER: ICD-10-CM

## 2019-07-02 DIAGNOSIS — G71.3 MITOCHONDRIAL MYOPATHY: ICD-10-CM

## 2019-07-02 DIAGNOSIS — Z71.89 ENCOUNTER FOR HERB AND VITAMIN SUPPLEMENT MANAGEMENT: ICD-10-CM

## 2019-07-02 DIAGNOSIS — M79.7 FIBROMYALGIA: ICD-10-CM

## 2019-07-02 DIAGNOSIS — R06.02 SOB (SHORTNESS OF BREATH): ICD-10-CM

## 2019-07-02 DIAGNOSIS — G47.00 INSOMNIA, UNSPECIFIED TYPE: ICD-10-CM

## 2019-07-02 DIAGNOSIS — G43.009 MIGRAINE WITHOUT AURA AND WITHOUT STATUS MIGRAINOSUS, NOT INTRACTABLE: ICD-10-CM

## 2019-07-02 DIAGNOSIS — K27.9 PEPTIC ULCER: ICD-10-CM

## 2019-07-03 ENCOUNTER — RECORDS - HEALTHEAST (OUTPATIENT)
Dept: ADMINISTRATIVE | Facility: OTHER | Age: 39
End: 2019-07-03

## 2019-07-11 ENCOUNTER — COMMUNICATION - HEALTHEAST (OUTPATIENT)
Dept: FAMILY MEDICINE | Facility: CLINIC | Age: 39
End: 2019-07-11

## 2019-07-12 ENCOUNTER — AMBULATORY - HEALTHEAST (OUTPATIENT)
Dept: PHARMACY | Facility: CLINIC | Age: 39
End: 2019-07-12

## 2019-07-12 DIAGNOSIS — G47.00 INSOMNIA, UNSPECIFIED TYPE: ICD-10-CM

## 2019-07-12 DIAGNOSIS — F41.1 GENERALIZED ANXIETY DISORDER: ICD-10-CM

## 2019-07-12 DIAGNOSIS — G43.009 MIGRAINE WITHOUT AURA AND WITHOUT STATUS MIGRAINOSUS, NOT INTRACTABLE: ICD-10-CM

## 2019-07-12 DIAGNOSIS — M79.7 FIBROMYALGIA: ICD-10-CM

## 2019-07-12 DIAGNOSIS — E88.40 MITOCHONDRIAL DISEASE (H): ICD-10-CM

## 2019-07-22 ENCOUNTER — COMMUNICATION - HEALTHEAST (OUTPATIENT)
Dept: PHARMACY | Facility: CLINIC | Age: 39
End: 2019-07-22

## 2019-08-05 ENCOUNTER — AMBULATORY - HEALTHEAST (OUTPATIENT)
Dept: PHARMACY | Facility: CLINIC | Age: 39
End: 2019-08-05

## 2019-08-05 DIAGNOSIS — F41.1 GENERALIZED ANXIETY DISORDER: ICD-10-CM

## 2019-08-10 ENCOUNTER — COMMUNICATION - HEALTHEAST (OUTPATIENT)
Dept: FAMILY MEDICINE | Facility: CLINIC | Age: 39
End: 2019-08-10

## 2019-08-11 ENCOUNTER — COMMUNICATION - HEALTHEAST (OUTPATIENT)
Dept: FAMILY MEDICINE | Facility: CLINIC | Age: 39
End: 2019-08-11

## 2019-08-21 ENCOUNTER — OFFICE VISIT - HEALTHEAST (OUTPATIENT)
Dept: FAMILY MEDICINE | Facility: CLINIC | Age: 39
End: 2019-08-21

## 2019-08-21 DIAGNOSIS — F43.10 POSTTRAUMATIC STRESS DISORDER: ICD-10-CM

## 2019-08-21 DIAGNOSIS — F33.9 MAJOR DEPRESSION, RECURRENT, CHRONIC (H): ICD-10-CM

## 2019-08-21 DIAGNOSIS — G47.00 INSOMNIA, UNSPECIFIED TYPE: ICD-10-CM

## 2019-08-21 DIAGNOSIS — F41.1 GENERALIZED ANXIETY DISORDER: ICD-10-CM

## 2019-08-21 ASSESSMENT — MIFFLIN-ST. JEOR: SCORE: 1109.72

## 2019-08-22 ENCOUNTER — COMMUNICATION - HEALTHEAST (OUTPATIENT)
Dept: FAMILY MEDICINE | Facility: CLINIC | Age: 39
End: 2019-08-22

## 2019-09-05 ENCOUNTER — RECORDS - HEALTHEAST (OUTPATIENT)
Dept: ADMINISTRATIVE | Facility: OTHER | Age: 39
End: 2019-09-05

## 2019-09-05 ENCOUNTER — HOSPITAL ENCOUNTER (INPATIENT)
Facility: CLINIC | Age: 39
LOS: 15 days | Discharge: HOME | End: 2019-09-20
Attending: PSYCHIATRY & NEUROLOGY | Admitting: PSYCHIATRY & NEUROLOGY
Payer: COMMERCIAL

## 2019-09-05 DIAGNOSIS — R45.851 SUICIDAL IDEATIONS: ICD-10-CM

## 2019-09-05 DIAGNOSIS — R11.0 NAUSEA: ICD-10-CM

## 2019-09-05 DIAGNOSIS — F41.8 DEPRESSION WITH ANXIETY: ICD-10-CM

## 2019-09-05 DIAGNOSIS — M54.9 CHRONIC BACK PAIN, UNSPECIFIED BACK LOCATION, UNSPECIFIED BACK PAIN LATERALITY: ICD-10-CM

## 2019-09-05 DIAGNOSIS — G47.00 INSOMNIA, UNSPECIFIED TYPE: ICD-10-CM

## 2019-09-05 DIAGNOSIS — J30.2 SEASONAL ALLERGIC RHINITIS, UNSPECIFIED TRIGGER: ICD-10-CM

## 2019-09-05 DIAGNOSIS — G43.719 INTRACTABLE CHRONIC MIGRAINE WITHOUT AURA AND WITHOUT STATUS MIGRAINOSUS: ICD-10-CM

## 2019-09-05 DIAGNOSIS — G89.29 CHRONIC BACK PAIN, UNSPECIFIED BACK LOCATION, UNSPECIFIED BACK PAIN LATERALITY: ICD-10-CM

## 2019-09-05 DIAGNOSIS — R00.0 TACHYCARDIA: Primary | ICD-10-CM

## 2019-09-05 LAB
AMPHETAMINES UR QL SCN: NEGATIVE
BARBITURATES UR QL: NEGATIVE
BENZODIAZ UR QL: NEGATIVE
CANNABINOIDS UR QL SCN: NEGATIVE
COCAINE UR QL: NEGATIVE
ETHANOL UR QL SCN: NEGATIVE
HCG UR QL: NEGATIVE
OPIATES UR QL SCN: NEGATIVE

## 2019-09-05 PROCEDURE — 99285 EMERGENCY DEPT VISIT HI MDM: CPT | Mod: 25 | Performed by: PSYCHIATRY & NEUROLOGY

## 2019-09-05 PROCEDURE — 80307 DRUG TEST PRSMV CHEM ANLYZR: CPT | Performed by: PSYCHIATRY & NEUROLOGY

## 2019-09-05 PROCEDURE — 90791 PSYCH DIAGNOSTIC EVALUATION: CPT

## 2019-09-05 PROCEDURE — 25000131 ZZH RX MED GY IP 250 OP 636 PS 637: Mod: GY | Performed by: NURSE PRACTITIONER

## 2019-09-05 PROCEDURE — 81025 URINE PREGNANCY TEST: CPT | Performed by: PSYCHIATRY & NEUROLOGY

## 2019-09-05 PROCEDURE — 12400001 ZZH R&B MH UMMC

## 2019-09-05 PROCEDURE — 25000132 ZZH RX MED GY IP 250 OP 250 PS 637: Mod: GY | Performed by: NURSE PRACTITIONER

## 2019-09-05 PROCEDURE — 80320 DRUG SCREEN QUANTALCOHOLS: CPT | Performed by: PSYCHIATRY & NEUROLOGY

## 2019-09-05 PROCEDURE — 99284 EMERGENCY DEPT VISIT MOD MDM: CPT | Mod: Z6 | Performed by: PSYCHIATRY & NEUROLOGY

## 2019-09-05 RX ORDER — ATENOLOL 25 MG/1
25 TABLET ORAL 2 TIMES DAILY
Status: DISCONTINUED | OUTPATIENT
Start: 2019-09-05 | End: 2019-09-10

## 2019-09-05 RX ORDER — LORAZEPAM 0.5 MG/1
0.5 TABLET ORAL 3 TIMES DAILY
Status: DISCONTINUED | OUTPATIENT
Start: 2019-09-05 | End: 2019-09-09

## 2019-09-05 RX ORDER — MULTIVIT WITH MINERALS/LUTEIN
1000 TABLET ORAL DAILY
Status: DISCONTINUED | OUTPATIENT
Start: 2019-09-06 | End: 2019-09-20 | Stop reason: HOSPADM

## 2019-09-05 RX ORDER — ALUMINA, MAGNESIA, AND SIMETHICONE 2400; 2400; 240 MG/30ML; MG/30ML; MG/30ML
30 SUSPENSION ORAL EVERY 4 HOURS PRN
Status: DISCONTINUED | OUTPATIENT
Start: 2019-09-05 | End: 2019-09-20 | Stop reason: HOSPADM

## 2019-09-05 RX ORDER — ASCORBIC ACID 500 MG
500 TABLET ORAL DAILY
Status: DISCONTINUED | OUTPATIENT
Start: 2019-09-06 | End: 2019-09-20 | Stop reason: HOSPADM

## 2019-09-05 RX ORDER — TRAZODONE HYDROCHLORIDE 50 MG/1
50 TABLET, FILM COATED ORAL
Status: DISCONTINUED | OUTPATIENT
Start: 2019-09-05 | End: 2019-09-20 | Stop reason: HOSPADM

## 2019-09-05 RX ORDER — RIZATRIPTAN BENZOATE 10 MG/1
10 TABLET ORAL
Status: ON HOLD | COMMUNITY
End: 2019-09-20

## 2019-09-05 RX ORDER — OLANZAPINE 5 MG/1
5-10 TABLET ORAL
Status: DISCONTINUED | OUTPATIENT
Start: 2019-09-05 | End: 2019-09-20 | Stop reason: HOSPADM

## 2019-09-05 RX ORDER — HYDROXYZINE HYDROCHLORIDE 25 MG/1
25-50 TABLET, FILM COATED ORAL 3 TIMES DAILY PRN
Status: ON HOLD | COMMUNITY
End: 2019-09-20

## 2019-09-05 RX ORDER — ASCORBIC ACID 500 MG
500 TABLET ORAL DAILY
COMMUNITY

## 2019-09-05 RX ORDER — LIDOCAINE 4 G/G
1 PATCH TOPICAL DAILY PRN
Status: DISCONTINUED | OUTPATIENT
Start: 2019-09-05 | End: 2019-09-20 | Stop reason: HOSPADM

## 2019-09-05 RX ORDER — ATENOLOL 25 MG/1
25 TABLET ORAL 2 TIMES DAILY
Status: ON HOLD | COMMUNITY
End: 2019-09-20

## 2019-09-05 RX ORDER — RIBOFLAVIN (VITAMIN B2) 100 MG
400 TABLET ORAL DAILY
Status: DISCONTINUED | OUTPATIENT
Start: 2019-09-06 | End: 2019-09-20 | Stop reason: HOSPADM

## 2019-09-05 RX ORDER — ONDANSETRON 4 MG/1
4 TABLET, FILM COATED ORAL EVERY 8 HOURS PRN
Status: DISCONTINUED | OUTPATIENT
Start: 2019-09-05 | End: 2019-09-20 | Stop reason: HOSPADM

## 2019-09-05 RX ORDER — DESVENLAFAXINE 50 MG/1
50 TABLET, FILM COATED, EXTENDED RELEASE ORAL DAILY
Status: DISCONTINUED | OUTPATIENT
Start: 2019-09-06 | End: 2019-09-09

## 2019-09-05 RX ORDER — ONDANSETRON 4 MG/1
4 TABLET, FILM COATED ORAL EVERY 8 HOURS PRN
Status: ON HOLD | COMMUNITY
End: 2019-09-20

## 2019-09-05 RX ORDER — LORAZEPAM 0.5 MG/1
0.5 TABLET ORAL 3 TIMES DAILY
Status: ON HOLD | COMMUNITY
End: 2019-09-20

## 2019-09-05 RX ORDER — HYDROXYZINE HYDROCHLORIDE 25 MG/1
25-50 TABLET, FILM COATED ORAL 3 TIMES DAILY PRN
Status: DISCONTINUED | OUTPATIENT
Start: 2019-09-05 | End: 2019-09-20 | Stop reason: HOSPADM

## 2019-09-05 RX ORDER — CLINDAMYCIN AND BENZOYL PEROXIDE 10; 50 MG/G; MG/G
GEL TOPICAL DAILY
Status: DISCONTINUED | OUTPATIENT
Start: 2019-09-06 | End: 2019-09-20 | Stop reason: HOSPADM

## 2019-09-05 RX ORDER — OLANZAPINE 10 MG/2ML
5-10 INJECTION, POWDER, FOR SOLUTION INTRAMUSCULAR
Status: DISCONTINUED | OUTPATIENT
Start: 2019-09-05 | End: 2019-09-20 | Stop reason: HOSPADM

## 2019-09-05 RX ORDER — MULTIPLE VITAMINS W/ MINERALS TAB 9MG-400MCG
1 TAB ORAL DAILY
Status: DISCONTINUED | OUTPATIENT
Start: 2019-09-06 | End: 2019-09-20 | Stop reason: HOSPADM

## 2019-09-05 RX ORDER — DESVENLAFAXINE 50 MG/1
50 TABLET, FILM COATED, EXTENDED RELEASE ORAL DAILY
Status: ON HOLD | COMMUNITY
End: 2019-09-20

## 2019-09-05 RX ORDER — BISACODYL 10 MG
10 SUPPOSITORY, RECTAL RECTAL DAILY PRN
Status: DISCONTINUED | OUTPATIENT
Start: 2019-09-05 | End: 2019-09-20 | Stop reason: HOSPADM

## 2019-09-05 RX ORDER — ACETAMINOPHEN 325 MG/1
650 TABLET ORAL EVERY 4 HOURS PRN
Status: DISCONTINUED | OUTPATIENT
Start: 2019-09-05 | End: 2019-09-20 | Stop reason: HOSPADM

## 2019-09-05 RX ORDER — RIZATRIPTAN BENZOATE 10 MG/1
10 TABLET ORAL
Status: DISCONTINUED | OUTPATIENT
Start: 2019-09-05 | End: 2019-09-20 | Stop reason: HOSPADM

## 2019-09-05 RX ADMIN — ONDANSETRON HYDROCHLORIDE 4 MG: 4 TABLET, FILM COATED ORAL at 22:32

## 2019-09-05 RX ADMIN — LORAZEPAM 0.5 MG: 0.5 TABLET ORAL at 22:32

## 2019-09-05 ASSESSMENT — ENCOUNTER SYMPTOMS
NERVOUS/ANXIOUS: 1
SHORTNESS OF BREATH: 0
DIZZINESS: 0
HALLUCINATIONS: 0
DYSPHORIC MOOD: 1
ABDOMINAL PAIN: 0
BACK PAIN: 0
FEVER: 0
CHEST TIGHTNESS: 0

## 2019-09-05 ASSESSMENT — MIFFLIN-ST. JEOR: SCORE: 1126.3

## 2019-09-05 NOTE — ED PROVIDER NOTES
History     Chief Complaint   Patient presents with     Suicidal     The history is provided by the patient, medical records and a relative.     Sherry Sweeney is a 39 year old female who comes in due to having difficulties feeling better.  She has been struggling with more anxiety, depression and now some passive suicidal thoughts.  She has a lot of fatigue, hopelessness and helplessness.  She has never attempted suicide.  She has not been able to stay at her house alone and has been staying with various family members when her  is not at home.  Some of this has to do with some work being done on the house due to some water damage.  She is too anxious and uncomfortable with the workers in the house. She has struggled to sleep.  She has chronic pain from fibromyalgia.  She also has chronic fatigue syndrome.  She has tried multiple medications in the last 6 months which have all had side effects and made matters worse.  This all started when she stopped cymbalta which she had been taking for years.  It worked at first but then she thought it was making her symptoms of pain, fatigue and anxiety worse.  She stopped it.  Since then, her fatigue, pain and anxiety has even been worse.  The only new medication that has somewhat helped is pristiq.  She feels overwhelmed and is struggling to function at all.  She does have intrusive thoughts of shooting herself or hanging herself.  She asked the  to get rid of all the ropes in the house.  She has no access to a gun.  She denies she has any intent or plan at this time.    Please see the 's assessment in [facility] from today (9/5/19) for further details.    I have reviewed the Medications, Allergies, Past Medical and Surgical History, and Social History in the Epic system.    Review of Systems   Constitutional: Negative for fever.   Eyes: Negative for visual disturbance.   Respiratory: Negative for chest tightness and shortness of breath.    Cardiovascular:  Negative for chest pain.   Gastrointestinal: Negative for abdominal pain.   Musculoskeletal: Negative for back pain.   Neurological: Negative for dizziness.   Psychiatric/Behavioral: Positive for dysphoric mood and suicidal ideas (passive). Negative for hallucinations and self-injury. The patient is nervous/anxious.    All other systems reviewed and are negative.      Physical Exam   BP: (!) 141/92  Pulse: 89  SpO2: 100 %      Physical Exam   Constitutional: She is oriented to person, place, and time. She appears well-developed and well-nourished.   Cardiovascular: Normal rate, regular rhythm and normal heart sounds.   Pulmonary/Chest: Effort normal and breath sounds normal. No respiratory distress.   Neurological: She is alert and oriented to person, place, and time.   Psychiatric: Her speech is normal and behavior is normal. Judgment normal. Her mood appears anxious. She is not actively hallucinating. Thought content is not paranoid and not delusional. Cognition and memory are normal. She exhibits a depressed mood. She expresses suicidal (passive) ideation. She expresses no homicidal ideation. She expresses no suicidal plans and no homicidal plans.   Sherry is a 38 y/o female who looks older than her age.  She is well groomed with good eye contact.    Nursing note and vitals reviewed.      ED Course        Procedures               Labs Ordered and Resulted from Time of ED Arrival Up to the Time of Departure from the ED - No data to display         Assessments & Plan (with Medical Decision Making)   Sherry will be admitted to the hospital due to her unstable mood, anxiety and suicidal thoughts. She is not functioning well and has had several outpatient failures for treatment especially with medications.  She will go to station 20 under Dr. Rausch.    I have reviewed the nursing notes.    I have reviewed the findings, diagnosis, plan and need for follow up with the patient.    New Prescriptions    No medications on  file       Final diagnoses:   Depression with anxiety       9/5/2019   [facility], [place], EMERGENCY DEPARTMENT     Candido Romero MD  09/05/19 [id number]

## 2019-09-05 NOTE — ED NOTES
ED to Behavioral Floor Handoff    SITUATION  Sherry Sweeney is a 39 year old female who speaks English and lives in a home with others The patient arrived in the ED by private car from home with a complaint of Suicidal  .The patient's current symptoms started/worsened 1 day(s) ago and during this time the symptoms have increased.   In the ED, pt was diagnosed with   Final diagnoses:   Depression with anxiety        Initial vitals were: BP: (!) 141/92  Pulse: 89  SpO2: 100 %   --------  Is the patient diabetic? No   If yes, last blood glucose? --     If yes, was this treated in the ED? --  --------  Is the patient inebriated (ETOH) No or Impaired on other substances? No  MSSA done? No  Last MSSA score: --    Were withdrawal symptoms treated? No  Does the patient have a seizure history? No. If yes, date of most recent seizure--  --------  Is the patient patient experiencing suicidal ideation? reports occasional suicidal thoughts representing feeling that life is not worth feeling    Homicidal ideation? denies current or recent homicidal ideation or behaviors.    Self-injurious behavior/urges? denies current or recent self injurious behavior or ideation.  ------  Was pt aggressive in the ED No  Was a code called No  Is the pt now cooperative? Yes  -------  Meds given in ED: Medications - No data to display   Family present during ED course? Yes  Family currently present? Yes    BACKGROUND  Does the patient have a cognitive impairment or developmental disability? No  Allergies:   Allergies   Allergen Reactions     No Clinical Screening - See Comments Anaphylaxis     Stomach swelling     Albumin, Egg Other (See Comments)     Swollen colon, belly pain     Milnacipran Other (See Comments)     Chest pain, hot/cold flashes     Ciprofloxacin Itching and Rash     Rash over whole body      Sulfa Drugs Rash   .   Social demographics are   Social History     Socioeconomic History     Marital status:      Spouse name: None      Number of children: None     Years of education: None     Highest education level: None   Occupational History     None   Social Needs     Financial resource strain: None     Food insecurity:     Worry: None     Inability: None     Transportation needs:     Medical: None     Non-medical: None   Tobacco Use     Smoking status: Never Smoker     Smokeless tobacco: Never Used   Substance and Sexual Activity     Alcohol use: No     Drug use: No     Sexual activity: None   Lifestyle     Physical activity:     Days per week: None     Minutes per session: None     Stress: None   Relationships     Social connections:     Talks on phone: None     Gets together: None     Attends Anglican service: None     Active member of club or organization: None     Attends meetings of clubs or organizations: None     Relationship status: None     Intimate partner violence:     Fear of current or ex partner: None     Emotionally abused: None     Physically abused: None     Forced sexual activity: None   Other Topics Concern     None   Social History Narrative     None        ASSESSMENT  Labs results   Labs Ordered and Resulted from Time of ED Arrival Up to the Time of Departure from the ED   HCG QUALITATIVE URINE   DRUG ABUSE SCREEN 6 CHEM DEP URINE ([facility])      Imaging Studies: No results found for this or any previous visit (from the past 24 hour(s)).   Most recent vital signs BP (!) 141/92   Pulse 89   SpO2 100%    Abnormal labs/tests/findings requiring intervention:---   Pain control: good  Nausea control: good    RECOMMENDATION  Are any infection precautions needed (MRSA, VRE, etc.)? No If yes, what infection? --  ---  Does the patient have mobility issues? independently. If yes, what device does the pt use? ---  ---  Is patient on 72 hour hold or commitment? No If on 72 hour hold, have hold and rights been given to patient? No  Are admitting orders written if after 10 p.m. ?No  Tasks needing to be completed:---     Burak POLLARD  NURA Brown   [facility]-- [id number]   [phone] ED   [phone] ED

## 2019-09-05 NOTE — ED TRIAGE NOTES
Pt states that they recently took her off Cymbalta due to her depression not being manage well.  Pt after being switched to a new med found that the new med was making her Fibromyalgia signs and symptoms worse and was switched back to Cymbalta but is now having increasing depression and compulsive behavior.  Pt states she is having thoughts of taking her live but denies having taken anything in an attempt to harm herself.

## 2019-09-06 LAB
ALBUMIN SERPL-MCNC: 3.7 G/DL (ref 3.4–5)
ALP SERPL-CCNC: 67 U/L (ref 40–150)
ALT SERPL W P-5'-P-CCNC: 20 U/L (ref 0–50)
ANION GAP SERPL CALCULATED.3IONS-SCNC: 6 MMOL/L (ref 3–14)
AST SERPL W P-5'-P-CCNC: 17 U/L (ref 0–45)
BASOPHILS # BLD AUTO: 0 10E9/L (ref 0–0.2)
BASOPHILS NFR BLD AUTO: 0.4 %
BILIRUB SERPL-MCNC: 0.5 MG/DL (ref 0.2–1.3)
BUN SERPL-MCNC: 8 MG/DL (ref 7–30)
CALCIUM SERPL-MCNC: 8.7 MG/DL (ref 8.5–10.1)
CHLORIDE SERPL-SCNC: 102 MMOL/L (ref 94–109)
CHOLEST SERPL-MCNC: 190 MG/DL
CO2 SERPL-SCNC: 29 MMOL/L (ref 20–32)
CREAT SERPL-MCNC: 0.77 MG/DL (ref 0.52–1.04)
DIFFERENTIAL METHOD BLD: NORMAL
EOSINOPHIL # BLD AUTO: 0.1 10E9/L (ref 0–0.7)
EOSINOPHIL NFR BLD AUTO: 1.3 %
ERYTHROCYTE [DISTWIDTH] IN BLOOD BY AUTOMATED COUNT: 13.2 % (ref 10–15)
GFR SERPL CREATININE-BSD FRML MDRD: >90 ML/MIN/{1.73_M2}
GLUCOSE SERPL-MCNC: 83 MG/DL (ref 70–99)
HCT VFR BLD AUTO: 43.8 % (ref 35–47)
HDLC SERPL-MCNC: 49 MG/DL
HGB BLD-MCNC: 14 G/DL (ref 11.7–15.7)
IMM GRANULOCYTES # BLD: 0 10E9/L (ref 0–0.4)
IMM GRANULOCYTES NFR BLD: 0.2 %
LDLC SERPL CALC-MCNC: 117 MG/DL
LYMPHOCYTES # BLD AUTO: 3 10E9/L (ref 0.8–5.3)
LYMPHOCYTES NFR BLD AUTO: 34.1 %
MCH RBC QN AUTO: 27.5 PG (ref 26.5–33)
MCHC RBC AUTO-ENTMCNC: 32 G/DL (ref 31.5–36.5)
MCV RBC AUTO: 86 FL (ref 78–100)
MONOCYTES # BLD AUTO: 0.4 10E9/L (ref 0–1.3)
MONOCYTES NFR BLD AUTO: 4.3 %
NEUTROPHILS # BLD AUTO: 5.3 10E9/L (ref 1.6–8.3)
NEUTROPHILS NFR BLD AUTO: 59.7 %
NONHDLC SERPL-MCNC: 141 MG/DL
NRBC # BLD AUTO: 0 10*3/UL
NRBC BLD AUTO-RTO: 0 /100
PLATELET # BLD AUTO: 321 10E9/L (ref 150–450)
POTASSIUM SERPL-SCNC: 4 MMOL/L (ref 3.4–5.3)
PROT SERPL-MCNC: 7.3 G/DL (ref 6.8–8.8)
RBC # BLD AUTO: 5.1 10E12/L (ref 3.8–5.2)
SODIUM SERPL-SCNC: 137 MMOL/L (ref 133–144)
TRIGL SERPL-MCNC: 121 MG/DL
TSH SERPL DL<=0.005 MIU/L-ACNC: 3.06 MU/L (ref 0.4–4)
WBC # BLD AUTO: 8.9 10E9/L (ref 4–11)

## 2019-09-06 PROCEDURE — 80053 COMPREHEN METABOLIC PANEL: CPT | Performed by: NURSE PRACTITIONER

## 2019-09-06 PROCEDURE — G0177 OPPS/PHP; TRAIN & EDUC SERV: HCPCS

## 2019-09-06 PROCEDURE — 99223 1ST HOSP IP/OBS HIGH 75: CPT | Mod: AI | Performed by: PSYCHIATRY & NEUROLOGY

## 2019-09-06 PROCEDURE — 36415 COLL VENOUS BLD VENIPUNCTURE: CPT | Performed by: NURSE PRACTITIONER

## 2019-09-06 PROCEDURE — 25000131 ZZH RX MED GY IP 250 OP 636 PS 637: Performed by: NURSE PRACTITIONER

## 2019-09-06 PROCEDURE — 80061 LIPID PANEL: CPT | Performed by: NURSE PRACTITIONER

## 2019-09-06 PROCEDURE — 85025 COMPLETE CBC W/AUTO DIFF WBC: CPT | Performed by: NURSE PRACTITIONER

## 2019-09-06 PROCEDURE — 25000132 ZZH RX MED GY IP 250 OP 250 PS 637: Mod: GY

## 2019-09-06 PROCEDURE — 25000132 ZZH RX MED GY IP 250 OP 250 PS 637: Performed by: PSYCHIATRY & NEUROLOGY

## 2019-09-06 PROCEDURE — 12400001 ZZH R&B MH UMMC

## 2019-09-06 PROCEDURE — 25000132 ZZH RX MED GY IP 250 OP 250 PS 637: Performed by: NURSE PRACTITIONER

## 2019-09-06 PROCEDURE — 84443 ASSAY THYROID STIM HORMONE: CPT | Performed by: NURSE PRACTITIONER

## 2019-09-06 RX ORDER — DESVENLAFAXINE 25 MG/1
25 TABLET, EXTENDED RELEASE ORAL AT BEDTIME
Status: DISCONTINUED | OUTPATIENT
Start: 2019-09-06 | End: 2019-09-09

## 2019-09-06 RX ADMIN — LORAZEPAM 0.5 MG: 0.5 TABLET ORAL at 07:02

## 2019-09-06 RX ADMIN — RIZATRIPTAN 10 MG: 10 TABLET, FILM COATED ORAL at 21:57

## 2019-09-06 RX ADMIN — LORAZEPAM 0.5 MG: 0.5 TABLET ORAL at 20:35

## 2019-09-06 RX ADMIN — DESVENLAFAXINE SUCCINATE 50 MG: 50 TABLET, EXTENDED RELEASE ORAL at 10:53

## 2019-09-06 RX ADMIN — OXYCODONE HYDROCHLORIDE AND ACETAMINOPHEN 500 MG: 500 TABLET ORAL at 11:46

## 2019-09-06 RX ADMIN — ONDANSETRON HYDROCHLORIDE 4 MG: 4 TABLET, FILM COATED ORAL at 12:06

## 2019-09-06 RX ADMIN — ATENOLOL 25 MG: 25 TABLET ORAL at 20:35

## 2019-09-06 RX ADMIN — DESVENLAFAXINE SUCCINATE 25 MG: 25 TABLET, EXTENDED RELEASE ORAL at 21:58

## 2019-09-06 RX ADMIN — LORAZEPAM 0.5 MG: 0.5 TABLET ORAL at 14:30

## 2019-09-06 RX ADMIN — Medication 2.5 MG: at 00:46

## 2019-09-06 RX ADMIN — MULTIPLE VITAMINS W/ MINERALS TAB 1 TABLET: TAB at 11:46

## 2019-09-06 RX ADMIN — Medication 400 MG: at 10:53

## 2019-09-06 RX ADMIN — Medication 1000 UNITS: at 11:46

## 2019-09-06 ASSESSMENT — ACTIVITIES OF DAILY LIVING (ADL)
LAUNDRY: WITH SUPERVISION
DRESS: 0-->INDEPENDENT
DRESS: INDEPENDENT
AMBULATION: 0-->INDEPENDENT
RETIRED_COMMUNICATION: 0-->UNDERSTANDS/COMMUNICATES WITHOUT DIFFICULTY
DRESS: INDEPENDENT
COGNITION: 0 - NO COGNITION ISSUES REPORTED
FALL_HISTORY_WITHIN_LAST_SIX_MONTHS: NO
HYGIENE/GROOMING: INDEPENDENT
TRANSFERRING: 0-->INDEPENDENT
ORAL_HYGIENE: INDEPENDENT
ORAL_HYGIENE: INDEPENDENT
HYGIENE/GROOMING: INDEPENDENT
SWALLOWING: 0-->SWALLOWS FOODS/LIQUIDS WITHOUT DIFFICULTY
TOILETING: 0-->INDEPENDENT
BATHING: 0-->INDEPENDENT
RETIRED_EATING: 0-->INDEPENDENT

## 2019-09-06 NOTE — PROGRESS NOTES
Writer met with the patient to complete the initial psychosocial assessment. The patient reported that she had a Black coin Psychotropic test completed at [facility]. She gave writer permission to make a copy of the completed report. That copy has been placed in the patient's paper chart.

## 2019-09-06 NOTE — PROGRESS NOTES
"     09/05/19 2138   Valuables   Patient Belongings locker;sent to security per site process   Patient Belongings Remaining with Patient clothing   Patient Belongings Put in Hospital Secure Location (Security or Locker, etc.) other (see comments)   Did you bring any home meds/supplements to the hospital?  Yes   Disposition of meds  Sent to security/pharmacy per site process     Pt belongings:  Bra  Underwear  Blue bag  Tape measure  multiple Hair ties  5x packs electrode pads  Skin prep  Toothbrush  Toothpaste  Empi device  Assorted papers  Purple sweater  Blue long sleeve shirt  Grey tank top  Tylor france pants  Black sneakers  Blue folder with assorted papers  Rechargeable battery  Facial tissue  Cell phone  Blue belt  \"Best Self\" book  \"When God Winks..\" Book  [employer]  Compact disk  Photographs  Sunglasses  USB cord  Black bag  Black striped purse  bandaids  Panty liner  lipgloss  3x keys on a key chain  citranella oil  Plastic baggies  chapstick  Business cards  Pen   Black wallet  Health insurance cards  's license    Security envelope #[id number]:  [employer] #[id number]  Visa #[id number]  [employer] #[id number]  Visa Debit #[id number]  Checkbook #[id number]  $20.62    Security envelope #[id number]:  medications        A               Admission:  I am responsible for any personal items that are not sent to the safe or pharmacy.  [facility] is not responsible for loss, theft or damage of any property in my possession.    Signature:  _________________________________ Date: _______  Time: _____                                              Staff Signature:  ____________________________ Date: ________  Time: _____      2nd Staff person, if patient is unable/unwilling to sign:    Signature: ________________________________ Date: ________  Time: _____     Discharge:  [facility] has returned all of my personal belongings:    Signature: _________________________________ Date: ________  Time: _____                                      "     Staff Signature:  ____________________________ Date: ________  Time: _____

## 2019-09-06 NOTE — PROGRESS NOTES
Pt c/o heart palpitations. Pt stated it was anxiety. Vital Signs stable. Pt's 0800 ativan administered early offered support and reassurance.  She went back to her room to try to sleep.

## 2019-09-06 NOTE — PLAN OF CARE
Calm and pleasant.  Reports some fleeting thoughts of suicide but states she feels safe here. Is able to contract for safety. Has been up in milieu most of the day, social with peers  soft spoken.  Endorses depression and c/o some anxiety.  Received scheduled ativan with decrease in anxiety.

## 2019-09-06 NOTE — PLAN OF CARE
BEHAVIORAL TEAM DISCUSSION    Participants: Dr. Rausch and Fan Gandhi ([facility])  Progress: Continuing to assess.   Anticipated length of stay: 7-10 days.   Continued Stay Criteria/Rationale: Assessment, evaluation and recommendations.   Medical/Physical: No acute medical issues.   Precautions:   Behavioral Orders   Procedures     Code 1 - Restrict to Unit     Routine Programming     As clinically indicated     Status 15     Every 15 minutes.     Suicide precautions     Patients on Suicide Precautions should have a Combination Diet ordered that includes a Diet selection(s) AND a Behavioral Tray selection for Safe Tray - with utensils, or Safe Tray - NO utensils       Plan: The plan is to assess the patient for mental health and medication needs. The patient will be prescribed medications to treat the identified symptoms. Patient will participate in therapeutic skill building groups on the unit. [facility] to coordinate discharge/after care planning.     Rationale for change in precautions or plan: None.
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
## 2019-09-06 NOTE — PHARMACY-ADMISSION MEDICATION HISTORY
Admission medication history for the September 5, 2019 admission is complete.     Interview Sources:  Patient, [facility] medication dispensing report, patient's home medication bottles    Reliability of Source: Good    Medication Adherence: Poor    Current Outpatient Pharmacy: [id number]    Changes made to PTA medication list (reason)  Added: Hydroxyzine, lorazepam, desvenlafaxine, atenolol (patient provided prescription medication bottles for each of these medications)  Deleted:   - albuterol inhaler: inhale 1-2 puffs into lungs every 4 hours as needed  - cyclobenzaprine 10 mg: take 5 mg by mouth every 8 hours as needed  - naproxen 500 mg: take 500 mg by mouth daily as needed  - omeprazole 40 mg: take 40 mg by mouth 2 times daily  - valacyclovir 1000 mg: as needed  - clobetasol 0.05% ointment: daily    Changed:   - lidocaine 5% patch: as needed --> place 1 patch onto the skin as needed to neck, shoulders, and back.  - ondansetron 4 mg ODT tablet --> ondansetron 4 mg tablet  - rizatriptan 10 mg ODT tablet --> rizatriptan 10 mg tablet  - zolpidem 5 mg: take 5 mg by mouth daily --> take 2.5-5 mg by mouth nightly as needed  - clindamycin-benzoyl peroxide gel: daily as needed --> apply topically to acne once daily  - riboflavin: take 400 mg by mouth 2 times daily --> take 400 mg by mouth daily    Additional medication history information:   - For all medications that have been deleted, the patient reports that she does not need them anymore and has not taken them in over a month.  - The patient reports that she has not been taking her ascorbic acid, multivitamin, riboflavin, vitamin E or using her clindamycin-benzoyl peroxide and tretinoin gels due her poor mental health and states that her self-care has not been a priority. They patient states that she uses these medications when she is doing well mentally.  - The patient had genotyping done and is concerned about the possible adverse effects of some medications. She  has the results with her.     Prior to Admission Medication List:  Prior to Admission Medications   Prescriptions Last Dose Informant     LORazepam (ATIVAN) 0.5 MG tablet 9/3/2019 Pharmacy     Sig: Take 0.5 mg by mouth 3 times daily   Multiple Vitamins-Minerals (DAILY MULTI PO) Past Month Self     Sig: Take 1 tablet by mouth daily   OnabotulinumtoxinA (BOTOX IJ) More than a month Self     Sig: Inject every 3 months for migraines   Riboflavin 400 MG TABS More than a month Self     Sig: Take 400 mg by mouth daily    ascorbic acid 500 MG TABS More than a month Self     Sig: Take 500 mg by mouth daily   atenolol (TENORMIN) 25 MG tablet Past Month Pharmacy     Sig: Take 25 mg by mouth 2 times daily   boric acid 600 mg vaginal suppository - PHARMACY TO MIX COMPOUND 9/4/2019 at PM Self     Sig: Place 600 mg vaginally At Bedtime   clindamycin-benzoyl peroxide (BENZACLIN) gel More than a month Other     Sig: Apply topically to acne once daily   desvenlafaxine (PRISTIQ) 50 MG 24 hr tablet 9/5/2019 at AM Pharmacy     Sig: Take 50 mg by mouth daily   hydrOXYzine (ATARAX) 25 MG tablet 9/3/2019 Pharmacy     Sig: Take 25-50 mg by mouth 3 times daily as needed for itching    lidocaine (LIDODERM) 5 % Patch 9/4/2019 at pm Self     Sig: Place 1 patch onto the skin as needed To neck, shoulders, and back   ondansetron (ZOFRAN) 4 MG tablet Past Month Self     Sig: Take 4 mg by mouth every 8 hours as needed for nausea   rizatriptan (MAXALT) 10 MG tablet Past Week Self     Sig: Take 10 mg by mouth at onset of headache for migraine   tretinoin (RETIN-A) 0.025 % topical gel More than a month Self     Sig: Apply topically daily   vitamin E (TOCOPHEROL) 1000 UNIT capsule More than a month Self     Sig: Take 1,000 Units by mouth daily   zolpidem (AMBIEN) 5 MG tablet 9/4/2019 at PM Pharmacy     Sig: Take 2.5-5 mg by mouth nightly as needed       Facility-Administered Medications: None           Time spent: 45 minutes    Medication history  completed by:   Keyona Judd - Pharmacy Intern

## 2019-09-06 NOTE — PROGRESS NOTES
SPIRITUAL HEALTH SERVICES  SPIRITUAL ASSESSMENT Progress Note  [facility] ([facility]) Station 20     REFERRAL SOURCE: I did visit this morning patient Sherry per [facility]  referral. Pt was in the group room with the other patients and I informed her about the [facility] and my duty as the unit . Pt appreciated my visit attempt today and when she is ready for any spiritual care support, she promised to contact me.    PLAN: I will remain open to provide spiritual care for the pt as needed.    Jeet Glynn M.Div. ([place]), M.Th., D.Min., [facility]  Staff   Pager [phone]

## 2019-09-06 NOTE — PROGRESS NOTES
"   09/06/19 1505   General Information   Date Initially Attended OT 09/06/19     Attended 3/3 hrs of occupational therapy groups offered this date however excused self early in all three d/t physical symptoms i.e.. Stomach ache. Overall restricted affect yet cooperative.    Topic group focused on prompted questions to elicit sharing of ones interests, emotions, memories, hopes, etc. Pt Response: Respectful within group context. Demonstrated openness with peers and write..     Pt's first attendance in Occupational Therapy Clinic. Pt Response: Required encouragement to initiate, a simple coloring project. Demonstrated fair focus. Needs further observation for planning and problem solving. Able to ask for assistance as needed and appeared comfortable in the presence of peers and staff.     Collaborative multi-step hands-on meal preparation group. 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   Pt Response: Expressed interest in educational materials as she hasn't \"given much thought to how food changes my mood\". Attentive to discussion and receptive to information.       "

## 2019-09-06 NOTE — PROGRESS NOTES
"Pt is 39 year old female. Per report pt has been having suicide thoughts to hang self or use a gun. Per report pt has extreme fear of being left home alone without the  while at work. Pt feels unsafe at home when  is at work. Per report pt was set to start adult day treatment in 12 days at [facility]. Pt presents with a blunted and flat affect. Pt states she wishes \"I wasn't around anymore\" denies any plan. Pt contracts for safety while in the hospital. Denies /VH. Utox was negative. Pt is voluntary. Pt was unable to complete the admission interview due to being too tired.   "

## 2019-09-07 PROCEDURE — 25000132 ZZH RX MED GY IP 250 OP 250 PS 637: Mod: GY | Performed by: NURSE PRACTITIONER

## 2019-09-07 PROCEDURE — 25000132 ZZH RX MED GY IP 250 OP 250 PS 637: Mod: GY | Performed by: PSYCHIATRY & NEUROLOGY

## 2019-09-07 PROCEDURE — 25000131 ZZH RX MED GY IP 250 OP 636 PS 637: Mod: GY | Performed by: NURSE PRACTITIONER

## 2019-09-07 PROCEDURE — 12400001 ZZH R&B MH UMMC

## 2019-09-07 PROCEDURE — 25000125 ZZHC RX 250: Performed by: NURSE PRACTITIONER

## 2019-09-07 RX ADMIN — ONDANSETRON HYDROCHLORIDE 4 MG: 4 TABLET, FILM COATED ORAL at 10:17

## 2019-09-07 RX ADMIN — Medication 1000 UNITS: at 09:37

## 2019-09-07 RX ADMIN — ONDANSETRON HYDROCHLORIDE 4 MG: 4 TABLET, FILM COATED ORAL at 22:35

## 2019-09-07 RX ADMIN — DESVENLAFAXINE SUCCINATE 25 MG: 25 TABLET, EXTENDED RELEASE ORAL at 21:37

## 2019-09-07 RX ADMIN — LORAZEPAM 0.5 MG: 0.5 TABLET ORAL at 15:21

## 2019-09-07 RX ADMIN — CLINDAMYCIN PHOSPHATE AND BENZOYL PEROXIDE: 10; 50 GEL TOPICAL at 21:40

## 2019-09-07 RX ADMIN — LORAZEPAM 0.5 MG: 0.5 TABLET ORAL at 09:35

## 2019-09-07 RX ADMIN — MULTIPLE VITAMINS W/ MINERALS TAB 1 TABLET: TAB at 09:37

## 2019-09-07 RX ADMIN — Medication 2.5 MG: at 23:12

## 2019-09-07 RX ADMIN — ATENOLOL 25 MG: 25 TABLET ORAL at 21:37

## 2019-09-07 RX ADMIN — OXYCODONE HYDROCHLORIDE AND ACETAMINOPHEN 500 MG: 500 TABLET ORAL at 09:38

## 2019-09-07 RX ADMIN — DESVENLAFAXINE SUCCINATE 50 MG: 50 TABLET, EXTENDED RELEASE ORAL at 09:35

## 2019-09-07 RX ADMIN — Medication 400 MG: at 15:22

## 2019-09-07 RX ADMIN — LORAZEPAM 0.5 MG: 0.5 TABLET ORAL at 21:37

## 2019-09-07 ASSESSMENT — ACTIVITIES OF DAILY LIVING (ADL)
LAUNDRY: WITH SUPERVISION
HYGIENE/GROOMING: INDEPENDENT
HYGIENE/GROOMING: INDEPENDENT
ORAL_HYGIENE: INDEPENDENT
ORAL_HYGIENE: INDEPENDENT
DRESS: INDEPENDENT
DRESS: INDEPENDENT
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
## 2019-09-07 NOTE — PLAN OF CARE
"48 hour assessment:  continues to c/o some anxiety and feeling \"down\".  Soft spoken, alert and orientated x 4.  Denies suicidal thoughts, thoughts of self harm and hallucinations.  Has been up in milieu and interacting with some of her peers. Continues to c/o ongoing nausea, medicated x 1 with Zofran.   "

## 2019-09-07 NOTE — PROGRESS NOTES
Pt denies SI/SIB and hallucinations. Pt states she is feeling much better today and that she is happy with how much more at ease the medication has made her feel. Pt states she is hopeful about the change to her antidepressant. Pt showered this evening.         09/06/19 6252   Behavioral Health   Hallucinations denies / not responding to hallucinations   Thinking poor concentration   Orientation person: oriented;place: oriented;date: oriented;time: oriented   Memory baseline memory   Insight insight appropriate to situation   Judgement impaired   Eye Contact at examiner   Affect full range affect   Mood mood is calm   Physical Appearance/Attire neat   Hygiene well groomed   Suicidality other (see comments)  (denies)   1. Wish to be Dead (Past Month) No   2. Non-Specific Active Suicidal Thoughts (Past Month) No   Self Injury other (see comment)  (denies)   Elopement   (none observed)   Activity other (see comment)  (visible in milieu)   Speech clear;coherent   Medication Sensitivity no observed side effects   Psychomotor / Gait balanced;steady   Activities of Daily Living   Hygiene/Grooming independent   Oral Hygiene independent   Dress independent   Laundry with supervision   Room Organization independent

## 2019-09-08 LAB
DEPRECATED S PYO AG THROAT QL EIA: NORMAL
SPECIMEN SOURCE: NORMAL

## 2019-09-08 PROCEDURE — 12400001 ZZH R&B MH UMMC

## 2019-09-08 PROCEDURE — 25000132 ZZH RX MED GY IP 250 OP 250 PS 637: Mod: GY | Performed by: PSYCHIATRY & NEUROLOGY

## 2019-09-08 PROCEDURE — 87880 STREP A ASSAY W/OPTIC: CPT | Performed by: NURSE PRACTITIONER

## 2019-09-08 PROCEDURE — 25000125 ZZHC RX 250: Performed by: NURSE PRACTITIONER

## 2019-09-08 PROCEDURE — 25000132 ZZH RX MED GY IP 250 OP 250 PS 637: Mod: GY | Performed by: NURSE PRACTITIONER

## 2019-09-08 PROCEDURE — 25000132 ZZH RX MED GY IP 250 OP 250 PS 637: Mod: GY

## 2019-09-08 PROCEDURE — 87081 CULTURE SCREEN ONLY: CPT | Performed by: NURSE PRACTITIONER

## 2019-09-08 RX ADMIN — LORAZEPAM 0.5 MG: 0.5 TABLET ORAL at 09:30

## 2019-09-08 RX ADMIN — OXYCODONE HYDROCHLORIDE AND ACETAMINOPHEN 500 MG: 500 TABLET ORAL at 09:30

## 2019-09-08 RX ADMIN — MULTIPLE VITAMINS W/ MINERALS TAB 1 TABLET: TAB at 09:29

## 2019-09-08 RX ADMIN — ATENOLOL 25 MG: 25 TABLET ORAL at 21:54

## 2019-09-08 RX ADMIN — LORAZEPAM 0.5 MG: 0.5 TABLET ORAL at 19:14

## 2019-09-08 RX ADMIN — Medication 1000 UNITS: at 09:30

## 2019-09-08 RX ADMIN — Medication 400 MG: at 09:30

## 2019-09-08 RX ADMIN — DESVENLAFAXINE SUCCINATE 50 MG: 50 TABLET, EXTENDED RELEASE ORAL at 09:30

## 2019-09-08 RX ADMIN — CLINDAMYCIN PHOSPHATE AND BENZOYL PEROXIDE: 10; 50 GEL TOPICAL at 09:31

## 2019-09-08 RX ADMIN — DESVENLAFAXINE SUCCINATE 25 MG: 25 TABLET, EXTENDED RELEASE ORAL at 21:54

## 2019-09-08 ASSESSMENT — MIFFLIN-ST. JEOR: SCORE: 1132.65

## 2019-09-08 ASSESSMENT — ACTIVITIES OF DAILY LIVING (ADL)
DRESS: INDEPENDENT
HYGIENE/GROOMING: INDEPENDENT
LAUNDRY: WITH SUPERVISION
HYGIENE/GROOMING: INDEPENDENT
ORAL_HYGIENE: INDEPENDENT
ORAL_HYGIENE: INDEPENDENT
DRESS: INDEPENDENT

## 2019-09-08 NOTE — PROGRESS NOTES
09/08/19 1449   Behavioral Health   Hallucinations denies / not responding to hallucinations   Thinking intact   Orientation person: oriented;place: oriented;date: oriented   Memory baseline memory   Insight admits / accepts   Judgement impaired   Eye Contact at examiner   Affect blunted, flat   Mood depressed;other (see comments)  (reports feeling better)   Physical Appearance/Attire attire appropriate to age and situation   Hygiene well groomed   Suicidality other (see comments)  (denies)   1. Wish to be Dead (Past Month) No   2. Non-Specific Active Suicidal Thoughts (Past Month) No   Self Injury other (see comment)  (denies)   Elopement   (nothing to report)   Activity other (see comment)  (social and in the milieu)   Speech clear;coherent   Medication Sensitivity sedation   Psychomotor / Gait steady;balanced   Psycho Education   Type of Intervention 1:1 intervention   Response participates, initiates socially appropriate   Hours 0.5   Treatment Detail   (check in)   Activities of Daily Living   Hygiene/Grooming independent   Oral Hygiene independent   Dress independent   Room Organization independent       The patient was out of her room and in the milieu most of the shift.  The patient was out for both meals and was engaged with the activities on the unit.  The patient had a visit from her  and several family members this shift and it appeared to be a positive visit.  The patient reports she is feeling better and more stable.  The patient reported she was feeling physically tired today and thinks it might be due to her medication.  The patient denies SI and SiB.

## 2019-09-08 NOTE — PROVIDER NOTIFICATION
Pt explains that she became nauseous after taking her Prestiq this evening and last evening. Pt given Zofran PRN after.     Pt also explained to writer that the last two mornings after waking up she has felt heart palpitations in her chest. She said it goes away in a few minutes and she has been waiting until they subside before getting out of bed. They have not occurred at anytime throughout the day. VSS.

## 2019-09-08 NOTE — PROGRESS NOTES
09/07/19    Art Therapy   Type of Intervention structured groups   Response Participated with encouragement   Hours 1   Treatment Detail    Identity Art      Problem-  Major depressive disorder severe recurrence  Generalized anxiety disorder  History of posttraumatic stress disorder  History of eating disorder  History of dyslexia        Goal- process, express, cope and regulate feelings and emotions through Art Therapy directives.     Outcome- Pt  Has an art background and really enjoyed exploring the mediums of brush pens and tempera paint sticks. She made a floral piece with her name. She said she enjoyed gardening and giving fresh cut flowers as gifts. She was pleasant and cooperative and interested int he field of Art Therapy.

## 2019-09-09 PROCEDURE — 25000132 ZZH RX MED GY IP 250 OP 250 PS 637: Mod: GY | Performed by: PSYCHIATRY & NEUROLOGY

## 2019-09-09 PROCEDURE — 99232 SBSQ HOSP IP/OBS MODERATE 35: CPT | Performed by: PSYCHIATRY & NEUROLOGY

## 2019-09-09 PROCEDURE — 12400001 ZZH R&B MH UMMC

## 2019-09-09 PROCEDURE — 25000132 ZZH RX MED GY IP 250 OP 250 PS 637: Mod: GY

## 2019-09-09 PROCEDURE — 25000132 ZZH RX MED GY IP 250 OP 250 PS 637: Mod: GY | Performed by: NURSE PRACTITIONER

## 2019-09-09 RX ORDER — LORAZEPAM 0.5 MG/1
0.5 TABLET ORAL 3 TIMES DAILY PRN
Status: DISCONTINUED | OUTPATIENT
Start: 2019-09-09 | End: 2019-09-20 | Stop reason: HOSPADM

## 2019-09-09 RX ORDER — DESVENLAFAXINE 25 MG/1
25 TABLET, EXTENDED RELEASE ORAL ONCE
Status: COMPLETED | OUTPATIENT
Start: 2019-09-09 | End: 2019-09-09

## 2019-09-09 RX ADMIN — Medication 400 MG: at 09:38

## 2019-09-09 RX ADMIN — HYDROXYZINE HYDROCHLORIDE 25 MG: 25 TABLET, FILM COATED ORAL at 21:21

## 2019-09-09 RX ADMIN — MAGNESIUM HYDROXIDE 30 ML: 400 SUSPENSION ORAL at 21:24

## 2019-09-09 RX ADMIN — MULTIPLE VITAMINS W/ MINERALS TAB 1 TABLET: TAB at 09:38

## 2019-09-09 RX ADMIN — Medication 1000 UNITS: at 09:38

## 2019-09-09 RX ADMIN — LORAZEPAM 0.5 MG: 0.5 TABLET ORAL at 09:38

## 2019-09-09 RX ADMIN — DESVENLAFAXINE 25 MG: 25 TABLET, EXTENDED RELEASE ORAL at 15:18

## 2019-09-09 RX ADMIN — OXYCODONE HYDROCHLORIDE AND ACETAMINOPHEN 500 MG: 500 TABLET ORAL at 09:38

## 2019-09-09 RX ADMIN — CLINDAMYCIN PHOSPHATE AND BENZOYL PEROXIDE: 10; 50 GEL TOPICAL at 09:37

## 2019-09-09 RX ADMIN — DESVENLAFAXINE SUCCINATE 50 MG: 50 TABLET, EXTENDED RELEASE ORAL at 09:37

## 2019-09-09 ASSESSMENT — ACTIVITIES OF DAILY LIVING (ADL)
HYGIENE/GROOMING: INDEPENDENT
HYGIENE/GROOMING: INDEPENDENT
DRESS: SCRUBS (BEHAVIORAL HEALTH)
LAUNDRY: WITH SUPERVISION
ORAL_HYGIENE: INDEPENDENT
ORAL_HYGIENE: INDEPENDENT
HYGIENE/GROOMING: INDEPENDENT
DRESS: INDEPENDENT
DRESS: SCRUBS (BEHAVIORAL HEALTH)
LAUNDRY: WITH SUPERVISION
LAUNDRY: WITH SUPERVISION
ORAL_HYGIENE: INDEPENDENT

## 2019-09-09 NOTE — PROGRESS NOTES
09/08/19 2000   Group Therapy Session   Group Attendance attended group session   Total Time (minutes) 45   Group Type psychotherapeutic   Group Topic Covered other (see comments)   Patient Participation/Contribution cooperative with task;discussed personal experience with topic;expressed understanding of topic;offered helpful suggestions to peers;listened actively   Psychotherapy group goals: Identify and share responses to 4 questions (fears, loves/likes, things to let go, wants).  Valery presented in a pleasant mood. She was very helpful to an elderly patient by getting her a snack and asking if she needed anything else.  She actively engaged in the activity and group discussion.

## 2019-09-09 NOTE — PLAN OF CARE
"Pt was visible in the milieu watching tv but withdrawn. Pt reported that earlier in the shift she had negative thoughts \"like I am going to lose my  or am not going to make it\". Pt requested for prn medication and was helpful. Denied SI/SIB/AH/VH. Endorsed anxiety \"5\" and depression \"7\" prn medication and was helpful. Pt ate supper.   " Telephone Encounter by Brenda Krause RMA at 08/17/18 09:02 AM     Author:  Brenda Krause RMA Service:  (none) Author Type:  Medical Assistant     Filed:  08/17/18 09:02 AM Encounter Date:  7/24/2018 Status:  Signed     :  Brenda Krause RMA (Medical Assistant)            msg routed to R/S pool[EP1.1M]   Electronically Signed by:    KIA Villalobos , 8/17/2018[EP1.1T]        Revision History        User Key Date/Time User Provider Type Action    > EP1.1 08/17/18 09:02 AM Brenda Krause RMA Medical Assistant Sign    M - Manual, T - Template

## 2019-09-09 NOTE — PROGRESS NOTES
Pt spent the entire morning sleeping and attended afternoon groups. She reported to the writer that she is depressed, suicidal ideations thoughts but no plans,some anxiety. She had both meals with no problems. She was later on observed reading in the lounge. She appears cooperative and pleasant on approach.     09/09/19 1413   Behavioral Health   Hallucinations denies / not responding to hallucinations   Thinking distractable   Orientation time: oriented;date: oriented;place: oriented;person: oriented   Memory baseline memory   Insight admits / accepts   Judgement impaired   Eye Contact at examiner   Affect blunted, flat   Mood depressed;mood is calm   Physical Appearance/Attire appears stated age   Hygiene well groomed   Suicidality other (see comments)  (Denies)   1. Wish to be Dead (Past Month) No   2. Non-Specific Active Suicidal Thoughts (Past Month) No   Self Injury other (see comment)  (Denies)   Activity other (see comment)  (Social with others)   Speech coherent;clear   Medication Sensitivity no stated side effects   Psychomotor / Gait balanced;steady   Psycho Education   Type of Intervention 1:1 intervention   Response participates, initiates socially appropriate   Hours 0.5   Activities of Daily Living   Hygiene/Grooming independent   Oral Hygiene independent   Dress scrubs (behavioral health)   Laundry with supervision   Room Organization independent   Activity   Activity Assistance Provided independent

## 2019-09-09 NOTE — PROGRESS NOTES
"  [facility], [place]   Psychiatric Progress Note        Interim History:   The patient's care was discussed with the treatment team during the daily team meeting and/or staff's chart notes were reviewed.  Staff report patient is visible but keep to self. Reported feeling dep and anxious but denied SI and CATHI. No overt psychosis, sher or confusion. Affect is blunted but engage don approach. Slept 7hrs last night. Appetite is intact. Independent with ADL.     The patient noted that she continues to feel depressed and anxious. Tells me that she continues to have \"suicidal visions\", unable to contract for safety in the community but no intent or plans while in the hospital. She stated that she is \"afraid to be alone\". Encouraged to attend and participate in programing. Hesitant about DBT but willing to consider. No hallucination or racing thoughts but ruminative thoughts persist. Tolerating medications well. Hesitant about starting Gabapentin stating that it was sedating when give for pain in the past, but likely started at higher dose. Agreed to switch Ativan to PRN and noted that PRN Vistaril been helpful. Eating and sleeping well.     Reviewed medications and care plan.        Medications:       atenolol  25 mg Oral BID     Boric acid 600 mg vaginal suppository  600 mg Vaginal At Bedtime     clindamycin-benzoyl peroxide   Topical Daily     desvenlafaxine succinate  25 mg Oral Once     [START ON 9/10/2019] desvenlafaxine  75 mg Oral Daily     multivitamin w/minerals  1 tablet Oral Daily     riboflavin  400 mg Oral Daily     vitamin C  500 mg Oral Daily     vitamin E  1,000 Units Oral Daily          Allergies:     Allergies   Allergen Reactions     No Clinical Screening - See Comments Anaphylaxis     Stomach swelling     Albumin, Egg Other (See Comments)     Swollen colon, belly pain     Milnacipran Other (See Comments)     Chest pain, hot/cold flashes     Ciprofloxacin Itching and Rash "     Rash over whole body      Sulfa Drugs Rash          Labs:     Recent Results (from the past 24 hour(s))   Rapid strep screen    Collection Time: 09/08/19  7:50 PM   Result Value Ref Range    Specimen Description Throat     Rapid Strep A Screen       NEGATIVE: No Group A streptococcal antigen detected by immunoassay, await culture report.   Beta strep group A culture    Collection Time: 09/08/19  7:50 PM   Result Value Ref Range    Specimen Description Throat     Special Requests Specimen collected in eSwab transport (white cap)     Culture Micro       Negative after 24 hours, await final report at 48 hours.          Psychiatric Examination:     Vitals:    09/08/19 1600 09/08/19 1700 09/08/19 2100 09/09/19 0838   BP: 94/66 107/74 104/73    Pulse: 87 87 76    Resp: 16 16  16   Temp: 98.6  F (37  C) 98.3  F (36.8  C)  98  F (36.7  C)   TempSrc: Oral Oral  Oral   SpO2:       Weight:       Height:                         Sitting Orthostatic BP: 106/75      Sitting Orthostatic Pulse: 79 bpm      Standing Orthostatic BP: 108/77      Standing Orthostatic Pulse: 81 bpm       Weight is 111 lbs 3.2 oz  Body mass index is 20.34 kg/m .    Appearance: awake, alert, appeared as age stated, well groomed and no apparent distress  Attitude:  cooperative  Eye Contact:  good  Mood:  anxious and depressed  Affect:  appropriate and in normal range and reactive  Speech:  clear, coherent and normal prosody  Psychomotor Behavior:  no evidence of tardive dyskinesia, dystonia, or tics and intact station, gait and muscle tone  Throught Process:  linear and goal oriented  Associations:  no loose associations  Thought Content:  no evidence of psychotic thought and passive suicidal ideation present  Insight:  fair  Judgement:  fair  Oriented to:  time, person, and place  Attention Span and Concentration:  intact  Recent and Remote Memory:  intact         Precautions:     Behavioral Orders   Procedures     Code 1 - Restrict to Unit     Routine  Programming     As clinically indicated     Status 15     Every 15 minutes.     Suicide precautions     Patients on Suicide Precautions should have a Combination Diet ordered that includes a Diet selection(s) AND a Behavioral Tray selection for Safe Tray - with utensils, or Safe Tray - NO utensils            Diagnoses:     Major depressive disorder severe recurrence  Generalized anxiety disorder  History of posttraumatic stress disorder  History of eating disorder  History of dyslexia  Cluster B traits suggestive.          Plan:     Medications:  -- Pristiq: resumed on admission, titrated to 50 mg qam and 25 mg, and later adjusted to 75 mg daily.   -- Ativan: continued at 0.5 mg TID, but later switched to PRN for severe anxiety. PRN Vistaril 25-50 mg TID PRN continued. May consider adding Gabapentin if symptoms persist.    -- Ambien: resumed at 5 mg qhs.     Legal Status and Disposition:  -- volunt.   -- discharge will be granted once established mood stabilizations and safety in the community. Recommended referral to Day Program vs DBT.

## 2019-09-10 LAB
BACTERIA SPEC CULT: NORMAL
Lab: NORMAL
SPECIMEN SOURCE: NORMAL

## 2019-09-10 PROCEDURE — G0177 OPPS/PHP; TRAIN & EDUC SERV: HCPCS

## 2019-09-10 PROCEDURE — 25000132 ZZH RX MED GY IP 250 OP 250 PS 637: Mod: GY | Performed by: PSYCHIATRY & NEUROLOGY

## 2019-09-10 PROCEDURE — 12400001 ZZH R&B MH UMMC

## 2019-09-10 PROCEDURE — 25000132 ZZH RX MED GY IP 250 OP 250 PS 637: Mod: GY

## 2019-09-10 PROCEDURE — 25000131 ZZH RX MED GY IP 250 OP 636 PS 637: Mod: GY | Performed by: NURSE PRACTITIONER

## 2019-09-10 PROCEDURE — 99232 SBSQ HOSP IP/OBS MODERATE 35: CPT | Performed by: PSYCHIATRY & NEUROLOGY

## 2019-09-10 PROCEDURE — H2032 ACTIVITY THERAPY, PER 15 MIN: HCPCS

## 2019-09-10 PROCEDURE — 25000132 ZZH RX MED GY IP 250 OP 250 PS 637: Mod: GY | Performed by: NURSE PRACTITIONER

## 2019-09-10 RX ORDER — GABAPENTIN 100 MG/1
100 CAPSULE ORAL 3 TIMES DAILY
Status: DISCONTINUED | OUTPATIENT
Start: 2019-09-10 | End: 2019-09-11

## 2019-09-10 RX ADMIN — Medication 2.5 MG: at 23:27

## 2019-09-10 RX ADMIN — Medication 1000 UNITS: at 10:20

## 2019-09-10 RX ADMIN — OXYCODONE HYDROCHLORIDE AND ACETAMINOPHEN 500 MG: 500 TABLET ORAL at 10:20

## 2019-09-10 RX ADMIN — ONDANSETRON HYDROCHLORIDE 4 MG: 4 TABLET, FILM COATED ORAL at 10:50

## 2019-09-10 RX ADMIN — MULTIPLE VITAMINS W/ MINERALS TAB 1 TABLET: TAB at 10:20

## 2019-09-10 RX ADMIN — HYDROXYZINE HYDROCHLORIDE 25 MG: 25 TABLET, FILM COATED ORAL at 13:32

## 2019-09-10 RX ADMIN — DESVENLAFAXINE SUCCINATE 75 MG: 50 TABLET, EXTENDED RELEASE ORAL at 10:20

## 2019-09-10 RX ADMIN — HYDROXYZINE HYDROCHLORIDE 25 MG: 25 TABLET, FILM COATED ORAL at 22:35

## 2019-09-10 RX ADMIN — CLINDAMYCIN PHOSPHATE AND BENZOYL PEROXIDE: 10; 50 GEL TOPICAL at 15:16

## 2019-09-10 RX ADMIN — GABAPENTIN 100 MG: 100 CAPSULE ORAL at 21:16

## 2019-09-10 ASSESSMENT — ACTIVITIES OF DAILY LIVING (ADL)
ORAL_HYGIENE: INDEPENDENT
DRESS: INDEPENDENT
HYGIENE/GROOMING: PROMPTS

## 2019-09-10 NOTE — PLAN OF CARE
48 Hour assessment       Problem: Suicidal Behavior  Goal: Suicidal Behavior is Absent or Managed  Outcome: Improving       D: Pt had an unremarkable evening. She was present in the milieu. Patient presents with flat affect but brighten slightly on approach. Patient is organized and logical in conversation. Patient presents well-groomed. Patient continues to be medication compliant.     Patient c/o constipation MOM given.   Patient refused HS atenolol     Patient denies SI/SIB/HI.   Patient endorses anxiety rate 4/10 as well as depression rated  5/10    A: Provided active listening, emotional encouragement and goal setting, safety planning safety checks q 15min, and medication administration.    R: Patient was behaviorally appropriate during this shift.  PRN MOM given. Did not require any restraints or seclusion.

## 2019-09-10 NOTE — PROGRESS NOTES
"  [facility], [place]   Psychiatric Progress Note        Interim History:     The patient's care was discussed with the treatment team during the daily team meeting and/or staff's chart notes were reviewed.  Staff report patient is blunted but visible and engaged on approach. Out for meals but not attending groups. Reported feeling better, endorsing \"some\" depression and anxiety. BP is low. No SI or CATHI. No overt psychosis, sher or confusion. Sleeping well. Appetite is intact. Independent with ADL.     The patient noted that she is not feeling well today. Anxiety and depression increased partly due to disruption in the milieu. Slept and eating well. Tolerating medications well and receptive to proposed changed. Agreed to add Gabapentin after I reviewed medication profile. No current SI or CATHI, but noted that she feels \"scared to leave'. No hallucinations or racing thought. Agreed to referral to DBT. Noted that she is having loose stool after given laxative.     Reviewed medications and care plan.          Medications:       atenolol  25 mg Oral BID     Boric acid 600 mg vaginal suppository  600 mg Vaginal At Bedtime     clindamycin-benzoyl peroxide   Topical Daily     desvenlafaxine  75 mg Oral Daily     multivitamin w/minerals  1 tablet Oral Daily     riboflavin  400 mg Oral Daily     vitamin C  500 mg Oral Daily     vitamin E  1,000 Units Oral Daily          Allergies:     Allergies   Allergen Reactions     No Clinical Screening - See Comments Anaphylaxis     Stomach swelling     Albumin, Egg Other (See Comments)     Swollen colon, belly pain     Milnacipran Other (See Comments)     Chest pain, hot/cold flashes     Ciprofloxacin Itching and Rash     Rash over whole body      Sulfa Drugs Rash          Labs:     No results found for this or any previous visit (from the past 24 hour(s)).       Psychiatric Examination:     Vitals:    09/08/19 2100 09/09/19 0838 09/09/19 1609 09/09/19 2000 "   BP: 104/73  102/70 109/76   Pulse: 76  82 87   Resp:  16 16 16   Temp:  98  F (36.7  C) 98.2  F (36.8  C) 98.3  F (36.8  C)   TempSrc:  Oral Oral Oral   SpO2:       Weight:       Height:                         Sitting Orthostatic BP: 106/75      Sitting Orthostatic Pulse: 79 bpm      Standing Orthostatic BP: 108/77      Standing Orthostatic Pulse: 81 bpm       Weight is 111 lbs 3.2 oz  Body mass index is 20.34 kg/m .    Appearance: awake, alert, appeared as age stated, well groomed and no apparent distress  Attitude:  cooperative  Eye Contact:  good  Mood:  anxious and depressed  Affect:  appropriate and in normal range and reactive  Speech:  clear, coherent and normal prosody  Psychomotor Behavior:  no evidence of tardive dyskinesia, dystonia, or tics and intact station, gait and muscle tone  Throught Process:  linear and goal oriented  Associations:  no loose associations  Thought Content:  no evidence of psychotic thought and no current SI but does not feel safe in the community if discharged.   Insight:  fair  Judgement:  fair  Oriented to:  time, person, and place  Attention Span and Concentration:  intact  Recent and Remote Memory:  intact         Precautions:     Behavioral Orders   Procedures     Code 1 - Restrict to Unit     Routine Programming     As clinically indicated     Status 15     Every 15 minutes.     Suicide precautions     Patients on Suicide Precautions should have a Combination Diet ordered that includes a Diet selection(s) AND a Behavioral Tray selection for Safe Tray - with utensils, or Safe Tray - NO utensils            Diagnoses:     Major depressive disorder severe recurrence  Generalized anxiety disorder  History of posttraumatic stress disorder  History of eating disorder  History of dyslexia  Cluster B traits suggestive.          Plan:     Medications:  -- Pristiq: resumed on admission, titrated to 50 mg qam and 25 mg, and later adjusted to 75 mg daily.   -- Ativan: continued at 0.5  mg TID, but later switched to PRN for severe anxiety. PRN Vistaril 25-50 mg TID PRN continued. May consider adding Gabapentin if symptoms persist.    -- Ambien: resumed at 5 mg qhs.   -- Gabapentin started t 100 mg TID to address anxiety.     Legal Status and Disposition:  -- volunt.   -- discharge will be granted once established mood stabilizations and safety in the community. Recommended referral to DBT. The patient plans to start Day program at [facility] while awaiting DBT.

## 2019-09-10 NOTE — PROGRESS NOTES
09/09/19 2300   Behavioral Health   Hallucinations denies / not responding to hallucinations   Thinking distractable   Orientation person: oriented;place: oriented   Memory baseline memory   Insight insight appropriate to situation   Judgement impaired   Eye Contact at examiner   Affect blunted, flat   Mood mood is calm   Physical Appearance/Attire neat   Hygiene well groomed   Suicidality other (see comments)  (denies)   1. Wish to be Dead (Past Month) No   2. Non-Specific Active Suicidal Thoughts (Past Month) No   Self Injury other (see comment)  (denies)   Activity other (see comment)  (denies)   Speech clear;coherent   Activities of Daily Living   Hygiene/Grooming independent   Oral Hygiene independent   Dress independent   Laundry with supervision   Room Organization independent       Pt denied SI and SIB.  Pt ate supper, visible in the milieu and socialized with others.

## 2019-09-10 NOTE — PROGRESS NOTES
Writer spoke with the patient in her room who was teary eyed. The patient stated that she is not feeling like she is getting any better. She reported that today is very hard day for her. She stated that she has been thinking about the reason for her admission and her life overall and this is not where she saw herself being. The patient stated that she is just sad today. The patient reported that she does not know if her medications are working and requested that writer inform the attending about her feelings. The patient stated that she is trying to stay out of her room to avoid becoming more depressed and isolative.     Writer received a voicemail from the patient's sister, Linda. Linda stated that she is wanting to speak with writer regarding the acuity of the unit. She stated that when the patient was first admitted, they were assured that the unit the patient would be on would be quiet and optimal for her mental health. Linda stated that with the patients currently on the unit she is not sure that the patient is getting the care that she needs. Linda provided her contact information and requested a return call.     The patient signed an JASON for her sister, Linda. A copy has been placed in the patient's paper chart.

## 2019-09-10 NOTE — PROGRESS NOTES
"Pt appears anxious, sad and worried. Pt was observed pouting and tearing up in the milieu. Pt endorsed anxiety and depression which both were rated a 9. Pt denies feeling suicidal or wanting to hurt herself and others. Pt expressed the following why she is feeling so anxious and depressed. \" I just feel really hopeless, like failure, fearful and my PTSD is being triggered by this male patient that keeps acting erratically\". When pt was asked why her anxiety and depression are so high she replied \" I used to have a really good job and I got sick and now I am on social security. So now I feel trapped, scared of the future and I am just really dependent on my family and I have never been dependent on anyone.\" pt was asked about the things/cpimg skills she practices to maintain a positive mindset and attitude as she continues to get help here in the hospital. Pt shared that staying out of her room, going to groups and being social helps her. Pt denies Si, SIB.        09/10/19 1258   Behavioral Health   Hallucinations denies / not responding to hallucinations   Thinking confused;distractable;poor concentration   Orientation person: oriented;place: oriented;date: oriented;time: oriented   Memory baseline memory   Insight poor   Judgement impaired   Eye Contact at examiner   Affect blunted, flat;sad   Mood shame/guilt;anxious   Physical Appearance/Attire disheveled   Hygiene neglected grooming - unclean body, hair, teeth   1. Wish to be Dead (Past Month) No   2. Non-Specific Active Suicidal Thoughts (Past Month) No   Self Injury   (denies)   Elopement   (none observed)   Activity isolative;withdrawn   Speech clear;coherent   Psychomotor / Gait balanced;steady   Activities of Daily Living   Hygiene/Grooming prompts   Oral Hygiene independent   Dress independent      "

## 2019-09-11 PROCEDURE — H2032 ACTIVITY THERAPY, PER 15 MIN: HCPCS

## 2019-09-11 PROCEDURE — G0177 OPPS/PHP; TRAIN & EDUC SERV: HCPCS

## 2019-09-11 PROCEDURE — 12400001 ZZH R&B MH UMMC

## 2019-09-11 PROCEDURE — 25000132 ZZH RX MED GY IP 250 OP 250 PS 637: Mod: GY | Performed by: NURSE PRACTITIONER

## 2019-09-11 PROCEDURE — 25000132 ZZH RX MED GY IP 250 OP 250 PS 637: Mod: GY

## 2019-09-11 PROCEDURE — 90853 GROUP PSYCHOTHERAPY: CPT

## 2019-09-11 PROCEDURE — 25000132 ZZH RX MED GY IP 250 OP 250 PS 637: Mod: GY | Performed by: PSYCHIATRY & NEUROLOGY

## 2019-09-11 PROCEDURE — 99232 SBSQ HOSP IP/OBS MODERATE 35: CPT | Performed by: PSYCHIATRY & NEUROLOGY

## 2019-09-11 RX ORDER — GABAPENTIN 100 MG/1
200 CAPSULE ORAL 3 TIMES DAILY
Status: DISCONTINUED | OUTPATIENT
Start: 2019-09-11 | End: 2019-09-12

## 2019-09-11 RX ADMIN — HYDROXYZINE HYDROCHLORIDE 25 MG: 25 TABLET, FILM COATED ORAL at 17:54

## 2019-09-11 RX ADMIN — TRAZODONE HYDROCHLORIDE 50 MG: 50 TABLET ORAL at 22:54

## 2019-09-11 RX ADMIN — GABAPENTIN 200 MG: 100 CAPSULE ORAL at 20:55

## 2019-09-11 RX ADMIN — OXYCODONE HYDROCHLORIDE AND ACETAMINOPHEN 500 MG: 500 TABLET ORAL at 08:29

## 2019-09-11 RX ADMIN — MULTIPLE VITAMINS W/ MINERALS TAB 1 TABLET: TAB at 08:29

## 2019-09-11 RX ADMIN — DESVENLAFAXINE SUCCINATE 75 MG: 50 TABLET, EXTENDED RELEASE ORAL at 08:30

## 2019-09-11 RX ADMIN — GABAPENTIN 200 MG: 100 CAPSULE ORAL at 13:36

## 2019-09-11 RX ADMIN — GABAPENTIN 100 MG: 100 CAPSULE ORAL at 08:30

## 2019-09-11 ASSESSMENT — ACTIVITIES OF DAILY LIVING (ADL)
HYGIENE/GROOMING: INDEPENDENT
DRESS: INDEPENDENT
HYGIENE/GROOMING: INDEPENDENT
ORAL_HYGIENE: INDEPENDENT
ORAL_HYGIENE: INDEPENDENT
DRESS: SCRUBS (BEHAVIORAL HEALTH)
LAUNDRY: WITH SUPERVISION

## 2019-09-11 NOTE — PROGRESS NOTES
Writer spoke with the patient this morning to inquire about how the rest of her night went yesterday. The patient stated that her day yesterday was rough but this morning is better. The patient reported some anxiety  Associated with a patient that was re-admitted. She stated that she is worried that she will not get better and be re-admitted to the hospital. The patient stated that she is feeling some SI today, but it comes and goes. The patient provided writer with her sister, Linda's phone number for writer to return a call to her.

## 2019-09-11 NOTE — PROGRESS NOTES
09/10/19 1400   Occupational Therapy   Type of Intervention structured groups   Response Initiates, socially acceptable   Hours 2     Attended 2/2 hrs of occupational therapy groups offered this date. Overall sad affect yet cooperative with encouragement.      Wellness and coping skill based game: Movement [place]. Education was provided on the sensory system, mind-body connection, and the use of movement and sensory strategies for self regulation. Pt Response: When asked how she treats her body well for overall health and wellness, Pt became tearful and stated that she typically makes sure she is eating healthy, however this morning was the first time she did not have an appetite / did not eat her breakfast and that worried her. Following, was I to participate in guided stretches and exercises, however appeared tired with restricted movement and minimal enthusiasm.     Occupational therapy clinic to complete a simple coloring task. Pt Response: Quiet and content. Demonstrated consistent performance skills as observed on previous dates.     OT staff met with pt to review the role of occupational therapy and explained the value of having them involved in their treatment plan including options to meet current needs/self-identified goals. Pt was given a self-assessment to inform OT initial assessment - to be completed with continued clinical observation.

## 2019-09-11 NOTE — PROGRESS NOTES
Pt attended groups this shift and she reported having the anxiety and depression. She denies suicidal ideations or SIB. She has been in the lounge with other peers and appears cooperative when approached. No side effects with medications. She was observed reading in the lounge.     09/11/19 1400   Behavioral Health   Hallucinations denies / not responding to hallucinations   Thinking intact   Orientation time: oriented;date: oriented;place: oriented;person: oriented   Memory baseline memory   Insight poor   Judgement impaired   Eye Contact at examiner   Affect blunted, flat   Mood depressed;mood is calm   Physical Appearance/Attire disheveled   Hygiene well groomed   Suicidality other (see comments)  (Denies)   1. Wish to be Dead (Past Month) No   2. Non-Specific Active Suicidal Thoughts (Past Month) No   Self Injury other (see comment)  (Denies)   Activity other (see comment)  (Social with others)   Speech coherent;clear   Medication Sensitivity no stated side effects   Psychomotor / Gait balanced;steady   Psycho Education   Type of Intervention 1:1 intervention   Response participates, initiates socially appropriate   Hours 0.5   Activities of Daily Living   Hygiene/Grooming independent   Oral Hygiene independent   Dress scrubs (behavioral health)   Laundry with supervision   Room Organization independent   Activity   Activity Assistance Provided independent

## 2019-09-11 NOTE — PROGRESS NOTES
Occupational Therapy Initial Assessment     Description: OT staff met with pt to review the role of occupational therapy and explain the value of having them involved in their treatment plan including options to meet current needs/self-identified goals. The below evaluation is a compilation of chart review, functional performance observation, and information obtained from an OT self-assessment.     Pt reported minimal structure within daily routine. 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 Took pride in this work and experienced a significant loss of purposeful engagement when resigned d/t onset of chronic fatigue syndrome. Currently unemployed, on SSDI. Reported poor self-esteem and decreased mood exacerbated by depending on  financially and minimal engagement in her community. 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      09/11/19 0919   Clinical Impression   Affect Restricted   Orientation Oriented to person, place and time   Appearance and ADLs General cleanliness observed in most areas   Attention to Internal Stimuli No observed signs   Interaction Skills Interacts with prompts, minimal response   Ability to Communicate Needs Independent   Verbal Content Clear   Ability to Maintain Boundaries Maintains appropriate physical boundaries;Maintains appropriate verbal boundaries   Participation Participates with minimal encouragement   Concentration Concentrates 30+ minutes   Ability to Concentrate With structure   Follows and Comprehends Directions Independently follows multi-step directions   Memory Delayed and immediate recall intact   Organization Independently organizes all tasks   Decision Making Independent   Planning and Problem Solving Independently plans ahead   Ability to Apply and Learn Concepts Applies within group structure   Frustrations /  "Stress Tolerance Independently identifies sources of frustration/stress  (Self reported stressors: isolation and finances. Utilizes coping skills with encouragement - identified \"being with family\" and coloring as helpful. Receptive to additional coping skill suggestions)   Level of Insight Some insight  (Reported goal to \"figure out medications\" to improve mood )   Self Esteem Needs further assessment  (unable to idetify positive qualities of self)   Social Supports Has knowledge of support systems  (\"family and friends\" specifically )     Pt identified interest to explore healthy coping strategies via cooking/baking, journaling, coloring, and arts/crafts in hopes to improve overall MH stability, specifically improved mood. Hopeful that changes in medications will alleviate depression.      Assessment: Pt would benefit from engagement in OT groups that support healthy recovery, specifically exploration of positive coping skills for symptom management/relapse prevention.     Plan: Initiate occupational therapy goals per plan of care.     Within 1 week, Pt will demonstrate increased self-esteem as evidenced by >2 self-reported positive affirmations.       "

## 2019-09-11 NOTE — PROGRESS NOTES
Pt said that overall she is still struggling mightily, but is thankful that at least for today she has not had SI. The blinds were opened to let sunlight into her room which she said helped her mood. She was appreciative of 1:1 time, and also attended music OT group later this evening. She expressed some frustration that her Rx changes have not had the desired effect as soon as she would like, and she was also exasperated by a loud intrusive peer on the unit. She was easily tearful but was notably brighter later in evening after having socialized a bit. She was also taught about the anti depressive effects of aerobic exercise and said she hopes to start a routine post discharge. Overall she is fragile but perhaps slightly improving very slowly.

## 2019-09-11 NOTE — PROGRESS NOTES
Participated in Music Therapy group with focus on mood elevation, validation and decreasing anxiety and improved group cohesiveness. Engaged and cooperative in music listening interventions.   Showed progress in session goals.     Appeared slightly withdrawn, but did engage when prompted.

## 2019-09-11 NOTE — PROGRESS NOTES
"  [facility], [place]   Psychiatric Progress Note        Interim History:     The patient's care was discussed with the treatment team during the daily team meeting and/or staff's chart notes were reviewed.  Staff report patient is continued to reported ongoing depression and anxiety, but inconsistent with reported SI. Tearful at times. Slept well. Attending groups and visible in the milieu.  Out for meals and engaged on approach.  No overt psychosis, sher or confusion.  Appetite is intact. Independent with ADL.     The patient noted that she is feeling better today, partly, because the milieu is \"more calm, the other juana gone\". Slept well, but had some difficulty falling sleep due to ruminative thoughts. Believes that depression and anxiety improved today. No current SI or CATHI. No hallucinations or paranoia. Eating well. Tolerating medications well, believes  that Gabapentin been helpful and open to increasing the dose. I also discussed ECT, which maybe considered in future, if symptoms failed to resolve.      Reviewed medications and care plan.          Medications:       Boric acid 600 mg vaginal suppository  600 mg Vaginal At Bedtime     clindamycin-benzoyl peroxide   Topical Daily     desvenlafaxine  75 mg Oral Daily     gabapentin  100 mg Oral TID     multivitamin w/minerals  1 tablet Oral Daily     riboflavin  400 mg Oral Daily     vitamin C  500 mg Oral Daily     vitamin E  1,000 Units Oral Daily          Allergies:     Allergies   Allergen Reactions     No Clinical Screening - See Comments Anaphylaxis     Stomach swelling     Albumin, Egg Other (See Comments)     Swollen colon, belly pain     Milnacipran Other (See Comments)     Chest pain, hot/cold flashes     Ciprofloxacin Itching and Rash     Rash over whole body      Sulfa Drugs Rash          Labs:     No results found for this or any previous visit (from the past 24 hour(s)).       Psychiatric Examination:     Vitals:    " 09/09/19 1609 09/09/19 2000 09/10/19 1600 09/11/19 0835   BP: 102/70 109/76 109/63    Pulse: 82 87 89    Resp: 16 16 16 16   Temp: 98.2  F (36.8  C) 98.3  F (36.8  C) 98.6  F (37  C) 97.5  F (36.4  C)   TempSrc: Oral Oral Oral Oral   SpO2:       Weight:       Height:                         Sitting Orthostatic BP: 106/75      Sitting Orthostatic Pulse: 79 bpm      Standing Orthostatic BP: 108/77      Standing Orthostatic Pulse: 81 bpm       Weight is 111 lbs 3.2 oz  Body mass index is 20.34 kg/m .    Appearance: awake, alert, appeared as age stated, well groomed and no apparent distress  Attitude:  cooperative  Eye Contact:  good  Mood:  anxious, depressed and better  Affect:  appropriate and in normal range and reactive  Speech:  clear, coherent and normal prosody  Psychomotor Behavior:  no evidence of tardive dyskinesia, dystonia, or tics and intact station, gait and muscle tone  Throught Process:  linear and goal oriented  Associations:  no loose associations  Thought Content:  no evidence of psychotic thought and no current SI but does not feel safe in the community if discharged.   Insight:  fair  Judgement:  fair  Oriented to:  time, person, and place  Attention Span and Concentration:  intact  Recent and Remote Memory:  intact         Precautions:     Behavioral Orders   Procedures     Code 1 - Restrict to Unit     Routine Programming     As clinically indicated     Status 15     Every 15 minutes.     Suicide precautions     Patients on Suicide Precautions should have a Combination Diet ordered that includes a Diet selection(s) AND a Behavioral Tray selection for Safe Tray - with utensils, or Safe Tray - NO utensils            Diagnoses:     Major depressive disorder severe recurrence  Generalized anxiety disorder  History of posttraumatic stress disorder  History of eating disorder  History of dyslexia  Cluster B traits suggestive.          Plan:     Medications:  -- Pristiq: resumed on admission, titrated  to 50 mg qam and 25 mg, and later adjusted to 75 mg daily. Plan to increase to 100 mg in 1-2 days.   -- Ativan: continued at 0.5 mg TID, but later switched to PRN for severe anxiety. PRN Vistaril 25-50 mg TID PRN continued. May consider adding Gabapentin if symptoms persist.    -- Ambien: resumed at 2.5-5 mg qhs.   -- Gabapentin started and titrated to 200 mg TID to address anxiety.     Legal Status and Disposition:  -- volunt.   -- discharge will be granted once established mood stabilizations and safety in the community. Recommended referral to DBT. The patient plans to start Day program at [facility] while awaiting DBT.

## 2019-09-12 PROCEDURE — 90853 GROUP PSYCHOTHERAPY: CPT

## 2019-09-12 PROCEDURE — 12400001 ZZH R&B MH UMMC

## 2019-09-12 PROCEDURE — 25000132 ZZH RX MED GY IP 250 OP 250 PS 637: Mod: GY | Performed by: NURSE PRACTITIONER

## 2019-09-12 PROCEDURE — 25000132 ZZH RX MED GY IP 250 OP 250 PS 637: Mod: GY

## 2019-09-12 PROCEDURE — 99232 SBSQ HOSP IP/OBS MODERATE 35: CPT | Performed by: PSYCHIATRY & NEUROLOGY

## 2019-09-12 PROCEDURE — 25000132 ZZH RX MED GY IP 250 OP 250 PS 637: Mod: GY | Performed by: PSYCHIATRY & NEUROLOGY

## 2019-09-12 PROCEDURE — G0177 OPPS/PHP; TRAIN & EDUC SERV: HCPCS

## 2019-09-12 RX ORDER — GABAPENTIN 100 MG/1
100 CAPSULE ORAL 3 TIMES DAILY
Status: DISCONTINUED | OUTPATIENT
Start: 2019-09-12 | End: 2019-09-16

## 2019-09-12 RX ORDER — LANOLIN ALCOHOL/MO/W.PET/CERES
3 CREAM (GRAM) TOPICAL
Status: DISCONTINUED | OUTPATIENT
Start: 2019-09-12 | End: 2019-09-20 | Stop reason: HOSPADM

## 2019-09-12 RX ADMIN — GABAPENTIN 100 MG: 100 CAPSULE ORAL at 20:57

## 2019-09-12 RX ADMIN — GABAPENTIN 100 MG: 100 CAPSULE ORAL at 13:25

## 2019-09-12 RX ADMIN — OXYCODONE HYDROCHLORIDE AND ACETAMINOPHEN 500 MG: 500 TABLET ORAL at 09:29

## 2019-09-12 RX ADMIN — DESVENLAFAXINE SUCCINATE 75 MG: 50 TABLET, EXTENDED RELEASE ORAL at 09:26

## 2019-09-12 RX ADMIN — Medication 400 MG: at 09:28

## 2019-09-12 RX ADMIN — Medication 1000 UNITS: at 09:27

## 2019-09-12 RX ADMIN — HYDROXYZINE HYDROCHLORIDE 25 MG: 25 TABLET, FILM COATED ORAL at 23:41

## 2019-09-12 RX ADMIN — CLINDAMYCIN PHOSPHATE AND BENZOYL PEROXIDE: 10; 50 GEL TOPICAL at 09:31

## 2019-09-12 RX ADMIN — MELATONIN TAB 3 MG 3 MG: 3 TAB at 23:59

## 2019-09-12 RX ADMIN — GABAPENTIN 200 MG: 100 CAPSULE ORAL at 09:29

## 2019-09-12 RX ADMIN — MULTIPLE VITAMINS W/ MINERALS TAB 1 TABLET: TAB at 09:28

## 2019-09-12 ASSESSMENT — ACTIVITIES OF DAILY LIVING (ADL)
ORAL_HYGIENE: INDEPENDENT
ORAL_HYGIENE: INDEPENDENT
DRESS: INDEPENDENT
LAUNDRY: WITH SUPERVISION
LAUNDRY: WITH SUPERVISION
DRESS: INDEPENDENT
HYGIENE/GROOMING: INDEPENDENT
HYGIENE/GROOMING: INDEPENDENT

## 2019-09-12 ASSESSMENT — MIFFLIN-ST. JEOR: SCORE: 1137.64

## 2019-09-12 NOTE — PLAN OF CARE
BEHAVIORAL TEAM DISCUSSION    Participants: Fan Blackwell (CTC), Mellisa (NP-Student), Nimo (RN), Vi Smith (OT)  Progress: Some progress; staff report that the patient is participatory in activities on the unit, but continues to report Anxiety and Depression symptoms.   Anticipated length of stay: 3-5 days.   Continued Stay Criteria/Rationale: Continued stabilization.   Medical/Physical: No acute medical issues.   Precautions:   Behavioral Orders   Procedures     Code 1 - Restrict to Unit     Routine Programming     As clinically indicated     Status 15     Every 15 minutes.     Suicide precautions     Patients on Suicide Precautions should have a Combination Diet ordered that includes a Diet selection(s) AND a Behavioral Tray selection for Safe Tray - with utensils, or Safe Tray - NO utensils       Plan: The patient will continue to have medication adjustments. [facility] will coordinate the discharge plans for the patient. Upon stabilization, the patient will be assessed for discharge.     Rationale for change in precautions or plan: None.

## 2019-09-12 NOTE — PROGRESS NOTES
Behavioral Health  Note   Behavioral Health  Spirituality Group Note     Unit 20    Name: Sherry Sweeney    YOB: 1980   MRN: [id number]    Age: 39 year old     Patient attended -led group, which included discussion of spirituality, coping with illness and building resilience.   Patient attended group for 1.0 hrs.   patient minimally participated, but was respectful to the group process.     Jeet [facility]  Staff    Page [phone]

## 2019-09-12 NOTE — PROGRESS NOTES
"  [facility], [place]   Psychiatric Progress Note        Interim History:     The patient's care was discussed with the treatment team during the daily team meeting and/or staff's chart notes were reviewed.  Staff report patient c/o having anxiety but no SI or CATHI. Somatic with c/o facial numbness. Attending groups. Engaged with peers, brighten on approach and social with peers last evening. Slept well. Out for meals and eating well. No overt psychosis, sher or confusion. Tearful at times.  Independent with ADL.     The patient noted that she reported that dep and anx is better, but has more \"physical problems\". She tells me that she has recognized a pattern, feeling emotional better when has physical symptoms and physical better when she is depressed and anxious. She tells me that she had difficulty falling sleep last night but slept well. She c/o have facial \"numbness\" and believes that she has edema (but none observed on exam), she believes that it might be due to increasing Gabapentin or taking PRN trazodone  Last night. She acknowledged that Gabapentin been helpful. No SI or CATHI. No hallucinations or racing thoughts. Eating well. I reviewed ECT and she asked to review educational material.  I reviewed few DBT and CBT principles.      Reviewed medications and care plan.          Medications:       Boric acid 600 mg vaginal suppository  600 mg Vaginal At Bedtime     clindamycin-benzoyl peroxide   Topical Daily     desvenlafaxine  75 mg Oral Daily     gabapentin  200 mg Oral TID     multivitamin w/minerals  1 tablet Oral Daily     riboflavin  400 mg Oral Daily     vitamin C  500 mg Oral Daily     vitamin E  1,000 Units Oral Daily          Allergies:     Allergies   Allergen Reactions     No Clinical Screening - See Comments Anaphylaxis     Stomach swelling     Albumin, Egg Other (See Comments)     Swollen colon, belly pain     Milnacipran Other (See Comments)     Chest pain, hot/cold " flashes     Ciprofloxacin Itching and Rash     Rash over whole body      Sulfa Drugs Rash          Labs:     No results found for this or any previous visit (from the past 24 hour(s)).       Psychiatric Examination:     Vitals:    09/11/19 0835 09/11/19 1558 09/11/19 1801 09/12/19 0901   BP:  116/80 121/85    Pulse:  91 88    Resp: 16 16 16 16   Temp: 97.5  F (36.4  C) 98.7  F (37.1  C)  98  F (36.7  C)   TempSrc: Oral Oral  Oral   SpO2:       Weight:    50.9 kg (112 lb 4.8 oz)   Height:                         Sitting Orthostatic BP: 106/75      Sitting Orthostatic Pulse: 79 bpm      Standing Orthostatic BP: 108/77      Standing Orthostatic Pulse: 81 bpm       Weight is 112 lbs 4.8 oz  Body mass index is 20.54 kg/m .    Appearance: awake, alert, appeared as age stated, well groomed and no apparent distress  Attitude:  cooperative  Eye Contact:  good  Mood:  anxious, depressed and better  Affect:  appropriate and in normal range and reactive  Speech:  clear, coherent and normal prosody  Psychomotor Behavior:  no evidence of tardive dyskinesia, dystonia, or tics and intact station, gait and muscle tone  Throught Process:  linear and goal oriented  Associations:  no loose associations  Thought Content:  no evidence of suicidal ideation or homicidal ideation and no evidence of psychotic thought  Insight:  fair  Judgement:  fair  Oriented to:  time, person, and place  Attention Span and Concentration:  intact  Recent and Remote Memory:  intact         Precautions:     Behavioral Orders   Procedures     Code 1 - Restrict to Unit     Routine Programming     As clinically indicated     Status 15     Every 15 minutes.     Suicide precautions     Patients on Suicide Precautions should have a Combination Diet ordered that includes a Diet selection(s) AND a Behavioral Tray selection for Safe Tray - with utensils, or Safe Tray - NO utensils            Diagnoses:     Major depressive disorder severe recurrence  Generalized  anxiety disorder  Cluster B traits vs disorder suggestive.   History of posttraumatic stress disorder  History of eating disorder  History of dyslexia           Plan:     -- patient to review ECT material. May consider ECT consult if symptoms failed to improved.     Medications:  -- Pristiq: resumed on admission, titrated to 50 mg qam and 25 mg, and later adjusted to 75 mg daily. Plan to increase if symptoms persist.    -- Ativan: continued at 0.5 mg TID, but later switched to PRN for severe anxiety. PRN Vistaril 25-50 mg TID PRN continued. May consider adding Gabapentin if symptoms persist.    -- Ambien: resumed at 2.5-5 mg qhs.   -- Gabapentin started and titrated to 200 mg TID to address anxiety. However, dose was lowered to 100 mg TID due to perceived side effects.     Legal Status and Disposition:  -- volunt.   -- discharge will be granted once established mood stabilizations and safety in the community. Recommended referral to DBT. The patient plans to start Day program at [facility] while awaiting DBT.

## 2019-09-12 NOTE — PROGRESS NOTES
"   09/11/19 1400   Occupational Therapy   Type of Intervention structured groups   Response Initiates, socially acceptable   Hours 2     Presented with a broader range affect and increased socialization this date.     Mental health management group focusing on coping skill exploration. Education provided on maladaptive versus adaptive response to stress /symptoms and use within routine. Pt Response: Identified several coping skills utilized to reduce stress and manage symptoms: \"quit negative thinking\" and go for a bike ride. Accepted additional suggestions within conversation.     Occupational Therapy Clinic. Pt Response:  Improvement seen in initiation of more complex, creative, and meaningful projects. I to gather materials, sequence and adjust to workspace demands as needed as well as helping others. Demonstrated improved focus, planning, and problem solving. Appeared more comfortable interacting with peers and staff.     "

## 2019-09-12 NOTE — PROGRESS NOTES
09/11/19 2005   Group Therapy Session   Group Attendance attended group session   Total Time (minutes) 45   Group Type psychotherapeutic   Group Topic Covered other (see comments)   Patient Participation/Contribution cooperative with task;discussed personal experience with topic;listened actively;offered helpful suggestions to peers   Psychotherapy group goals: Identify positive thoughts/affirmations and process those that resonate with patients.     Valery presented as pleasant though sad. She actively engaged in the activity and participated in the group discussion demonstrating insight into her mental health and the benefits of positive thoughts.

## 2019-09-12 NOTE — PROGRESS NOTES
09/12/19 0415   Behavioral Health   Hallucinations denies / not responding to hallucinations   Thinking intact   Orientation person: oriented;place: oriented;date: oriented;time: oriented   Memory baseline memory   Insight poor   Judgement impaired   Eye Contact at examiner   Affect blunted, flat   Mood mood is calm   Physical Appearance/Attire neat   Hygiene well groomed   Suicidality other (see comments)  (denies)   1. Wish to be Dead (Past Month) No   2. Non-Specific Active Suicidal Thoughts (Past Month) No   Self Injury other (see comment)  (none observed)   Elopement   (none observed)   Activity other (see comment)  (active in milieu)   Speech clear;coherent   Medication Sensitivity no stated side effects   Psychomotor / Gait balanced;steady   Activities of Daily Living   Hygiene/Grooming independent   Oral Hygiene independent   Dress independent   Laundry with supervision   Room Organization independent     Pt was present in the milieu, attended group programming and was sociable with peers. Pt reports feeling depressed at a 4 out of 10 with 10 being the most severe and anxious at a 4 out of 10 with 10 being the most severe. Pt denies SI/SIB/HI/AH/VH or racing thoughts.

## 2019-09-12 NOTE — PLAN OF CARE
"Pt was visible in the milieu. Pt was watching tv in the lounge and was minimally social with peers.  Pt presents with flat affect. Denies SI/SIB/AH/VH. Pt reports feeling \"tired\" \"weird\". Pt says she is worried about \"life things\". Endorsed anxiety and prn was administered and was helpful.   "

## 2019-09-12 NOTE — PROGRESS NOTES
"   09/12/19 1400   Occupational Therapy   Type of Intervention structured groups   Response Initiates, socially acceptable   Hours 2     Attended 2/2 hrs of occupational therapy groups offered this date. Overall congruent affect and full engagement.      Mental health management group focused on self-care planning. Education was provided on various ways to take care of one's emotional, physical, social, mental, and occupational self. Pt Response: Able to identify >2 self-care strategies/domain of 'My Self Care Plan\" worksheet. I to engage in hands-on activity to follow.     Occupational therapy clinic to continue work on a personally meaningful project. Pt Response: Demonstrated consistent performance skills as observed on previous dates.     "

## 2019-09-12 NOTE — PROGRESS NOTES
"Pt demonstrated significant ability to attend to the needs of peers moving in and out of the session.  When supported to listen to the needs she feels in her own body and express them in movement, pt appeared restricted in the torso, and tentative but curious about her own needs.  Progressively, her movement did appear to become more pleasurable (though still gentle and cautious) and she was surprised she was physically able to participate since just months earlier she was in a wheelchair due to pain.  She explained her pain has shifted to mental and emotional, and she became tearful and physically shaky.    She verbalized her symptoms and personal history to student RNs attending the session expressing a hope that they would find it helpful for their future careers.  Pt started her personal history at the time of her own undergraduate studies and ended with being unsure about finding her current meaningful work, wondering out loud if it might be \"too late\" for her.  Pt expressed gratitude for the perspective of \"listening to her pain\" as a source for guiding her healing path, as well as being encouraged to continue her development throughout her lifetime.  Pt lingered after the session, and appeared eager to start OT.      This therapist recommends continuing to assess if groups are meeting pt treatment goals given current acuity on the unit.  In this session, four separate peers came in and out of the session (brief enough to be not billable) with disruptive behaviors.  This therapist observed the patient's therapeutic process interrupted repeatedly by peers during this single DMT session.  This was communicated to the unit OT.    "

## 2019-09-13 ENCOUNTER — COMMUNICATION - HEALTHEAST (OUTPATIENT)
Dept: FAMILY MEDICINE | Facility: CLINIC | Age: 39
End: 2019-09-13

## 2019-09-13 DIAGNOSIS — F33.9 MAJOR DEPRESSION, RECURRENT, CHRONIC (H): ICD-10-CM

## 2019-09-13 PROCEDURE — 25000132 ZZH RX MED GY IP 250 OP 250 PS 637: Mod: GY | Performed by: NURSE PRACTITIONER

## 2019-09-13 PROCEDURE — 12400001 ZZH R&B MH UMMC

## 2019-09-13 PROCEDURE — G0177 OPPS/PHP; TRAIN & EDUC SERV: HCPCS

## 2019-09-13 PROCEDURE — 25000132 ZZH RX MED GY IP 250 OP 250 PS 637: Mod: GY

## 2019-09-13 PROCEDURE — 25000132 ZZH RX MED GY IP 250 OP 250 PS 637: Mod: GY | Performed by: PSYCHIATRY & NEUROLOGY

## 2019-09-13 PROCEDURE — H2032 ACTIVITY THERAPY, PER 15 MIN: HCPCS

## 2019-09-13 RX ADMIN — OXYCODONE HYDROCHLORIDE AND ACETAMINOPHEN 500 MG: 500 TABLET ORAL at 09:39

## 2019-09-13 RX ADMIN — MELATONIN TAB 3 MG 3 MG: 3 TAB at 23:39

## 2019-09-13 RX ADMIN — CLINDAMYCIN PHOSPHATE AND BENZOYL PEROXIDE: 10; 50 GEL TOPICAL at 09:51

## 2019-09-13 RX ADMIN — TRAZODONE HYDROCHLORIDE 50 MG: 50 TABLET ORAL at 00:49

## 2019-09-13 RX ADMIN — ALUMINUM HYDROXIDE, MAGNESIUM HYDROXIDE, AND DIMETHICONE 30 ML: 400; 400; 40 SUSPENSION ORAL at 21:42

## 2019-09-13 RX ADMIN — MULTIPLE VITAMINS W/ MINERALS TAB 1 TABLET: TAB at 09:39

## 2019-09-13 RX ADMIN — DESVENLAFAXINE SUCCINATE 75 MG: 50 TABLET, EXTENDED RELEASE ORAL at 09:40

## 2019-09-13 RX ADMIN — GABAPENTIN 100 MG: 100 CAPSULE ORAL at 14:02

## 2019-09-13 RX ADMIN — HYDROXYZINE HYDROCHLORIDE 25 MG: 25 TABLET, FILM COATED ORAL at 23:39

## 2019-09-13 RX ADMIN — Medication 400 MG: at 09:39

## 2019-09-13 RX ADMIN — Medication 1000 UNITS: at 09:39

## 2019-09-13 RX ADMIN — GABAPENTIN 100 MG: 100 CAPSULE ORAL at 09:39

## 2019-09-13 RX ADMIN — GABAPENTIN 100 MG: 100 CAPSULE ORAL at 21:40

## 2019-09-13 ASSESSMENT — ACTIVITIES OF DAILY LIVING (ADL)
HYGIENE/GROOMING: INDEPENDENT
DRESS: INDEPENDENT
DRESS: INDEPENDENT
LAUNDRY: WITH SUPERVISION
LAUNDRY: WITH SUPERVISION
HYGIENE/GROOMING: INDEPENDENT
ORAL_HYGIENE: INDEPENDENT
ORAL_HYGIENE: INDEPENDENT

## 2019-09-13 NOTE — PROGRESS NOTES
09/12/19 2000   Behavioral Health   Hallucinations denies / not responding to hallucinations   Thinking intact   Orientation person: oriented;place: oriented   Memory baseline memory   Insight insight appropriate to situation   Judgement impaired   Eye Contact at examiner   Affect full range affect   Mood mood is calm   Physical Appearance/Attire neat   Hygiene well groomed   Suicidality other (see comments)  (denies)   1. Wish to be Dead (Past Month) No   2. Non-Specific Active Suicidal Thoughts (Past Month) No   Self Injury other (see comment)  (denies)   Activity other (see comment)  (visible in the milieu and socialized with others )   Speech clear;coherent   Activities of Daily Living   Hygiene/Grooming independent   Oral Hygiene independent   Dress independent   Laundry with supervision   Room Organization independent       Pt denied SI and SIB.  Pt was seems calm, ate supper, visible in the milieu, attended group and socialized with others.

## 2019-09-13 NOTE — PROGRESS NOTES
09/12/19 2005   Group Therapy Session   Group Attendance attended group session   Total Time (minutes) 50   Group Type psychotherapeutic   Group Topic Covered other (see comments)   Patient Participation/Contribution cooperative with task;discussed personal experience with topic;expressed understanding of topic;listened actively;offered helpful suggestions to peers   Patient participated in psychotherapy group which focused on personal resiliency by identifying individual strengths and supports.    Sherry presented as calm and quiet though she was actively engaged in the activity and processed with the group, sharing feedback and demonstrated positive social interactions.

## 2019-09-13 NOTE — PLAN OF CARE
Problem: OT General Care Plan  Goal: OT Frequency  Description  Pt will practice using >2 coping strategies to manage stress and reduce symptoms to demonstrate increased readiness for discharge.     Attended 2/2 hrs of occupational therapy groups offered this date. Overall congruent-bright affect and full engagement.    Occupational therapy clinic to initiate a novel task with added personal challenge. Pt Response: Demonstrated improved performance skills as observed on previous dates in the context of increased complexity of chosen project.     Outcome: Improving

## 2019-09-14 PROCEDURE — 25000132 ZZH RX MED GY IP 250 OP 250 PS 637: Mod: GY | Performed by: PSYCHIATRY & NEUROLOGY

## 2019-09-14 PROCEDURE — 25000132 ZZH RX MED GY IP 250 OP 250 PS 637: Mod: GY

## 2019-09-14 PROCEDURE — 12400001 ZZH R&B MH UMMC

## 2019-09-14 PROCEDURE — 25000132 ZZH RX MED GY IP 250 OP 250 PS 637: Mod: GY | Performed by: NURSE PRACTITIONER

## 2019-09-14 RX ADMIN — Medication 400 MG: at 08:43

## 2019-09-14 RX ADMIN — DESVENLAFAXINE SUCCINATE 75 MG: 50 TABLET, EXTENDED RELEASE ORAL at 08:45

## 2019-09-14 RX ADMIN — HYDROXYZINE HYDROCHLORIDE 25 MG: 25 TABLET, FILM COATED ORAL at 21:05

## 2019-09-14 RX ADMIN — Medication 1000 UNITS: at 08:45

## 2019-09-14 RX ADMIN — CLINDAMYCIN PHOSPHATE AND BENZOYL PEROXIDE: 10; 50 GEL TOPICAL at 08:47

## 2019-09-14 RX ADMIN — OXYCODONE HYDROCHLORIDE AND ACETAMINOPHEN 500 MG: 500 TABLET ORAL at 08:45

## 2019-09-14 RX ADMIN — MELATONIN TAB 3 MG 3 MG: 3 TAB at 21:05

## 2019-09-14 RX ADMIN — GABAPENTIN 100 MG: 100 CAPSULE ORAL at 21:03

## 2019-09-14 RX ADMIN — GABAPENTIN 100 MG: 100 CAPSULE ORAL at 14:18

## 2019-09-14 RX ADMIN — MULTIPLE VITAMINS W/ MINERALS TAB 1 TABLET: TAB at 08:45

## 2019-09-14 RX ADMIN — GABAPENTIN 100 MG: 100 CAPSULE ORAL at 08:45

## 2019-09-14 ASSESSMENT — ACTIVITIES OF DAILY LIVING (ADL)
LAUNDRY: WITH SUPERVISION
DRESS: INDEPENDENT
LAUNDRY: WITH SUPERVISION
ORAL_HYGIENE: INDEPENDENT
HYGIENE/GROOMING: INDEPENDENT
ORAL_HYGIENE: INDEPENDENT
HYGIENE/GROOMING: INDEPENDENT
DRESS: INDEPENDENT

## 2019-09-14 NOTE — PROGRESS NOTES
Patient was visible in milieu, social and playing cards with peers. Attended and participated in community meeting and other unit activities. Patient appears brighter today but somewhat anxious. Denied SI/SIB.         09/14/19 1517   Behavioral Health   Hallucinations denies / not responding to hallucinations   Thinking distractable   Orientation person: oriented;place: oriented;date: oriented;time: oriented   Memory baseline memory   Insight insight appropriate to situation   Judgement impaired   Eye Contact at examiner   Affect full range affect   Mood mood is calm   Physical Appearance/Attire appears stated age   Hygiene well groomed   Suicidality other (see comments)  (denied )   1. Wish to be Dead (Past Month) No   2. Non-Specific Active Suicidal Thoughts (Past Month) No   Self Injury   (denied)   Elopement   (none observed/mentioned )   Activity other (see comment)  (visible in milieu. socializing and playing cards )   Speech clear;coherent   Medication Sensitivity no stated side effects   Psychomotor / Gait balanced;steady   Psycho Education   Type of Intervention 1:1 intervention   Response participates, initiates socially appropriate   Hours 0.5   Treatment Detail   (check-in)   Safety   Suicidality Status 15   Activities of Daily Living   Hygiene/Grooming independent   Oral Hygiene independent   Dress independent   Laundry with supervision   Room Organization independent   Groups   Details   (attended and participated in community meeting )

## 2019-09-14 NOTE — PLAN OF CARE
"Pt was visible in the milieu and social with peers. Played some games with peers and attended groups. Pt presents with full range affect. Pt Denies SI/SIB/VH/AH. Rates anxiety \"4\" and depression \"6\". Pt reports that she is usually more depressed in the morning and as the day progresses she gets better.   "

## 2019-09-15 PROCEDURE — 25000132 ZZH RX MED GY IP 250 OP 250 PS 637: Mod: GY | Performed by: PSYCHIATRY & NEUROLOGY

## 2019-09-15 PROCEDURE — 25000132 ZZH RX MED GY IP 250 OP 250 PS 637: Mod: GY | Performed by: NURSE PRACTITIONER

## 2019-09-15 PROCEDURE — 12400001 ZZH R&B MH UMMC

## 2019-09-15 PROCEDURE — 25000132 ZZH RX MED GY IP 250 OP 250 PS 637: Mod: GY

## 2019-09-15 PROCEDURE — H2032 ACTIVITY THERAPY, PER 15 MIN: HCPCS

## 2019-09-15 RX ADMIN — GABAPENTIN 100 MG: 100 CAPSULE ORAL at 22:00

## 2019-09-15 RX ADMIN — HYDROXYZINE HYDROCHLORIDE 50 MG: 25 TABLET, FILM COATED ORAL at 08:59

## 2019-09-15 RX ADMIN — GABAPENTIN 100 MG: 100 CAPSULE ORAL at 08:53

## 2019-09-15 RX ADMIN — MULTIPLE VITAMINS W/ MINERALS TAB 1 TABLET: TAB at 08:53

## 2019-09-15 RX ADMIN — HYDROXYZINE HYDROCHLORIDE 25 MG: 25 TABLET, FILM COATED ORAL at 22:27

## 2019-09-15 RX ADMIN — CLINDAMYCIN PHOSPHATE AND BENZOYL PEROXIDE: 10; 50 GEL TOPICAL at 08:57

## 2019-09-15 RX ADMIN — ACETAMINOPHEN 650 MG: 325 TABLET, FILM COATED ORAL at 10:38

## 2019-09-15 RX ADMIN — DESVENLAFAXINE SUCCINATE 75 MG: 50 TABLET, EXTENDED RELEASE ORAL at 08:53

## 2019-09-15 RX ADMIN — MELATONIN TAB 3 MG 3 MG: 3 TAB at 22:25

## 2019-09-15 RX ADMIN — Medication 200 MG: at 08:52

## 2019-09-15 RX ADMIN — Medication 1000 UNITS: at 08:53

## 2019-09-15 RX ADMIN — LORAZEPAM 0.5 MG: 0.5 TABLET ORAL at 10:38

## 2019-09-15 RX ADMIN — GABAPENTIN 100 MG: 100 CAPSULE ORAL at 14:32

## 2019-09-15 ASSESSMENT — ACTIVITIES OF DAILY LIVING (ADL)
DRESS: INDEPENDENT
ORAL_HYGIENE: INDEPENDENT
HYGIENE/GROOMING: INDEPENDENT
DRESS: INDEPENDENT
ORAL_HYGIENE: INDEPENDENT
LAUNDRY: WITH SUPERVISION
HYGIENE/GROOMING: INDEPENDENT

## 2019-09-15 ASSESSMENT — MIFFLIN-ST. JEOR: SCORE: 1138.55

## 2019-09-15 NOTE — PROGRESS NOTES
Patient was calm and had a bright affect. The patient spent the evening in lounge socializing, playing games and watching tv with others. Patient complained of pressure in frontal lobe. She thinks this could be a cause from depression. Continues to feel depressed but denied SI/SIB.  Reports feeling down during the morning hours but as the day goes on her mood improves.        09/14/19 2129   Behavioral Health   Hallucinations denies / not responding to hallucinations   Thinking distractable   Orientation person: oriented;place: oriented;date: oriented;time: oriented   Memory baseline memory   Insight admits / accepts   Judgement impaired   Eye Contact at examiner   Affect full range affect   Mood mood is calm   Physical Appearance/Attire appears stated age   Hygiene well groomed   Suicidality other (see comments)  (denied )   1. Wish to be Dead (Past Month) No   2. Non-Specific Active Suicidal Thoughts (Past Month) No   Self Injury   (denied )   Elopement   (none observed/mentioned )   Activity other (see comment)  (visible, social and watching tv with others )   Speech clear;coherent   Medication Sensitivity no stated side effects   Psychomotor / Gait balanced;steady   Psycho Education   Type of Intervention 1:1 intervention   Response participates, initiates socially appropriate   Hours 0.5   Treatment Detail   (check-in)   Safety   Suicidality Status 15   Activities of Daily Living   Hygiene/Grooming independent   Oral Hygiene independent   Dress independent   Laundry with supervision   Room Organization independent   Groups   Details   (attended groups)

## 2019-09-16 PROCEDURE — G0177 OPPS/PHP; TRAIN & EDUC SERV: HCPCS

## 2019-09-16 PROCEDURE — 25000132 ZZH RX MED GY IP 250 OP 250 PS 637: Mod: GY | Performed by: NURSE PRACTITIONER

## 2019-09-16 PROCEDURE — 99232 SBSQ HOSP IP/OBS MODERATE 35: CPT | Performed by: PSYCHIATRY & NEUROLOGY

## 2019-09-16 PROCEDURE — 25000132 ZZH RX MED GY IP 250 OP 250 PS 637: Mod: GY | Performed by: PSYCHIATRY & NEUROLOGY

## 2019-09-16 PROCEDURE — 12400001 ZZH R&B MH UMMC

## 2019-09-16 PROCEDURE — 25000132 ZZH RX MED GY IP 250 OP 250 PS 637: Mod: GY

## 2019-09-16 PROCEDURE — 90853 GROUP PSYCHOTHERAPY: CPT

## 2019-09-16 RX ORDER — DESVENLAFAXINE 50 MG/1
100 TABLET, FILM COATED, EXTENDED RELEASE ORAL DAILY
Status: DISCONTINUED | OUTPATIENT
Start: 2019-09-17 | End: 2019-09-18

## 2019-09-16 RX ORDER — GABAPENTIN 100 MG/1
200 CAPSULE ORAL 3 TIMES DAILY
Status: DISCONTINUED | OUTPATIENT
Start: 2019-09-16 | End: 2019-09-20 | Stop reason: HOSPADM

## 2019-09-16 RX ADMIN — MELATONIN TAB 3 MG 3 MG: 3 TAB at 20:47

## 2019-09-16 RX ADMIN — DESVENLAFAXINE SUCCINATE 75 MG: 50 TABLET, EXTENDED RELEASE ORAL at 09:16

## 2019-09-16 RX ADMIN — CLINDAMYCIN PHOSPHATE AND BENZOYL PEROXIDE: 10; 50 GEL TOPICAL at 09:21

## 2019-09-16 RX ADMIN — GABAPENTIN 200 MG: 100 CAPSULE ORAL at 14:31

## 2019-09-16 RX ADMIN — MULTIPLE VITAMINS W/ MINERALS TAB 1 TABLET: TAB at 09:17

## 2019-09-16 RX ADMIN — OXYCODONE HYDROCHLORIDE AND ACETAMINOPHEN 500 MG: 500 TABLET ORAL at 09:17

## 2019-09-16 RX ADMIN — HYDROXYZINE HYDROCHLORIDE 25 MG: 25 TABLET, FILM COATED ORAL at 20:47

## 2019-09-16 RX ADMIN — Medication 1000 UNITS: at 09:16

## 2019-09-16 RX ADMIN — GABAPENTIN 100 MG: 100 CAPSULE ORAL at 09:17

## 2019-09-16 RX ADMIN — GABAPENTIN 200 MG: 100 CAPSULE ORAL at 20:47

## 2019-09-16 RX ADMIN — Medication 400 MG: at 09:17

## 2019-09-16 ASSESSMENT — ACTIVITIES OF DAILY LIVING (ADL)
DRESS: STREET CLOTHES
LAUNDRY: WITH SUPERVISION
HYGIENE/GROOMING: INDEPENDENT
ORAL_HYGIENE: INDEPENDENT

## 2019-09-16 NOTE — PROGRESS NOTES
Pt attended groups and she reported not feeling well. She reported having SI thoughts  but no plans. She had both meals with no problems. No side effects from medications and said to writer that no consideration of ECT treatment.. She reports being depressed.     09/16/19 1341   Behavioral Health   Hallucinations denies / not responding to hallucinations   Thinking distractable   Orientation time: oriented;date: oriented;place: oriented;person: oriented   Memory baseline memory   Insight admits / accepts   Judgement impaired   Eye Contact at examiner   Affect blunted, flat   Mood mood is calm;depressed   Physical Appearance/Attire appears stated age   Hygiene well groomed   Suicidality thoughts only   1. Wish to be Dead (Past Month) No   2. Non-Specific Active Suicidal Thoughts (Past Month) No   Self Injury other (see comment)  (Denies)   Elopement Statements about wanting to leave   Medication Sensitivity no stated side effects   Psycho Education   Type of Intervention 1:1 intervention   Response participates, initiates socially appropriate   Hours 0.5   Activities of Daily Living   Hygiene/Grooming independent   Oral Hygiene independent   Dress street clothes   Laundry with supervision   Room Organization independent   Activity   Activity Assistance Provided independent

## 2019-09-16 NOTE — PROGRESS NOTES
[facility], [place]   Psychiatric Progress Note        Interim History:     The patient's care was discussed with the treatment team during the daily team meeting and/or staff's chart notes were reviewed.  Staff report patient c/o having anxiety and reporting periodical Suicidal ideation. She is able to contract for safety. Was seen at the interview room. Was cooperative and pleasant, had many questions about her meds. Said that she was glad to be at this hospital because still present Suicidal ideation and severe anxiety. She agreed with my suggestion to increase Pristiq to 100 mg daily and Gabapentin to 200 mg tid. Said that she was planning to start going to Day Program at [facility] and asked if she could come here instead. I suggested her to chose program which would be more convenient to her.   Reviewed medications and care plan. Provided support and psychoeducation.          Medications:       Boric acid 600 mg vaginal suppository  600 mg Vaginal At Bedtime     clindamycin-benzoyl peroxide   Topical Daily     [START ON 9/17/2019] desvenlafaxine  100 mg Oral Daily     gabapentin  200 mg Oral TID     multivitamin w/minerals  1 tablet Oral Daily     riboflavin  400 mg Oral Daily     vitamin C  500 mg Oral Daily     vitamin E  1,000 Units Oral Daily          Allergies:     Allergies   Allergen Reactions     No Clinical Screening - See Comments Anaphylaxis     Stomach swelling     Albumin, Egg Other (See Comments)     Swollen colon, belly pain     Milnacipran Other (See Comments)     Chest pain, hot/cold flashes     Ciprofloxacin Itching and Rash     Rash over whole body      Sulfa Drugs Rash          Labs:     No results found for this or any previous visit (from the past 24 hour(s)).       Psychiatric Examination:     Vitals:    09/14/19 1603 09/15/19 0837 09/15/19 1600 09/16/19 0836   BP:   107/76 113/79   BP Location:   Right arm Right arm   Pulse:   90 86   Resp: 16 16 16 16   Temp:  97.4  F (36.3  C) 98.3  F (36.8  C) 98.6  F (37  C) 97.7  F (36.5  C)   TempSrc: Oral Oral Oral Temporal   SpO2:       Weight:  51 kg (112 lb 8 oz)     Height:           Orthostatic Vitals       Most Recent      Sitting Orthostatic /79 09/15 0837    Sitting Orthostatic Pulse (bpm) 93 09/15 0837    Standing Orthostatic /82 09/16 0836    Standing Orthostatic Pulse (bpm) 96 09/16 0836         Weight is 112 lbs 8 oz  Body mass index is 20.58 kg/m .    Appearance: awake, alert, appeared as age stated, well groomed and no apparent distress  Attitude:  cooperative  Eye Contact:  good  Mood:  anxious, depressed and slightly better  Affect:  appropriate and in normal range and reactive  Speech:  clear, coherent and normal prosody  Psychomotor Behavior:  no evidence of tardive dyskinesia, dystonia, or tics and intact station, gait and muscle tone  Throught Process:  linear and goal oriented  Associations:  no loose associations  Thought Content:  no evidence of suicidal ideation or homicidal ideation and no evidence of psychotic thought  Insight:  fair  Judgement:  fair  Oriented to:  time, person, and place  Attention Span and Concentration:  intact  Recent and Remote Memory:  intact         Precautions:     Behavioral Orders   Procedures     Code 1 - Restrict to Unit     Routine Programming     As clinically indicated     Status 15     Every 15 minutes.     Suicide precautions     Patients on Suicide Precautions should have a Combination Diet ordered that includes a Diet selection(s) AND a Behavioral Tray selection for Safe Tray - with utensils, or Safe Tray - NO utensils            Diagnoses:     Major depressive disorder severe recurrence  Generalized anxiety disorder  Cluster B traits vs disorder suggestive.   History of posttraumatic stress disorder  History of eating disorder  History of dyslexia           Plan:     -- patient to review ECT material. May consider ECT consult if symptoms failed to improved.      Medications:  -- Pristiq: resumed on admission, titrated to 50 mg qam and 25 mg, and later adjusted to 75 mg daily. Will increase to 100 mg daily today.   -- Ativan: continued at 0.5 mg TID, but later switched to PRN for severe anxiety. PRN Vistaril 25-50 mg TID PRN continued.   -- Ambien: resumed at 2.5-5 mg qhs.   -- Gabapentin started and titrated to 200 mg TID to address anxiety.     Legal Status and Disposition:  -- volunt.   -- discharge will be granted once established mood stabilizations and safety in the community. Recommended referral to DBT. The patient plans to start Day program at [facility] while awaiting DBT.     Johny Almaraz MD  [facility] Psychiatry

## 2019-09-16 NOTE — PLAN OF CARE
Problem: OT General Care Plan  Goal: OT Frequency  Description  Pt will practice using >2 coping strategies to manage stress and reduce symptoms to demonstrate increased readiness for discharge.     Attended 3/3 hrs of occupational therapy groups offered this date. Overall content and cooperative.    Mental health management group with a focus on coping through movement to facilitate relaxation and stress management via floor yoga. Pt Response: followed and engaged in all yoga poses, and verbalized beneficial response to practice.     Occupational therapy clinic to initiate a new project. Pt Response: Demonstrated consistent performance skills as observed on previous dates.     Collaborative multi-step hands-on meal preparation group. 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 Pt Response: Demonstrated excellent process, performance, and collaborative social interaction skills, specifically attention to detail and organization.     Outcome: Improving

## 2019-09-16 NOTE — PROGRESS NOTES
"   09/15/19 2000   Behavioral Health   Hallucinations denies / not responding to hallucinations   Thinking distractable   Orientation person: oriented;place: oriented   Memory baseline memory   Insight insight appropriate to situation   Judgement impaired   Eye Contact at examiner   Affect full range affect   Mood mood is calm   Physical Appearance/Attire neat   Hygiene well groomed   Suicidality other (see comments)  (denies)   1. Wish to be Dead (Past Month) No   2. Non-Specific Active Suicidal Thoughts (Past Month) No   Self Injury other (see comment)  (denies)   Activity other (see comment)  (visible in the miliue and socialized with others )   Speech clear;coherent   Activities of Daily Living   Hygiene/Grooming independent   Oral Hygiene independent   Dress independent   Laundry with supervision   Room Organization independent       Pt denied SI and SIB.  Pt reported feeling \"fine.\" Pt ate supper, visible in the milieu, attended groups and socialized with others.   "

## 2019-09-16 NOTE — PROGRESS NOTES
Participated in Music Therapy group with focus on mood elevation, validation and decreasing anxiety and improved group cohesiveness. Engaged and cooperative in music listening interventions.   Showed progress in session goals.     Showed great tolerance and compassion for other louder group members.

## 2019-09-17 PROCEDURE — 12400001 ZZH R&B MH UMMC

## 2019-09-17 PROCEDURE — 25000132 ZZH RX MED GY IP 250 OP 250 PS 637: Mod: GY | Performed by: NURSE PRACTITIONER

## 2019-09-17 PROCEDURE — 25000132 ZZH RX MED GY IP 250 OP 250 PS 637: Mod: GY

## 2019-09-17 PROCEDURE — G0177 OPPS/PHP; TRAIN & EDUC SERV: HCPCS

## 2019-09-17 PROCEDURE — 25000132 ZZH RX MED GY IP 250 OP 250 PS 637: Mod: GY | Performed by: PSYCHIATRY & NEUROLOGY

## 2019-09-17 PROCEDURE — H2032 ACTIVITY THERAPY, PER 15 MIN: HCPCS

## 2019-09-17 RX ADMIN — GABAPENTIN 200 MG: 100 CAPSULE ORAL at 21:03

## 2019-09-17 RX ADMIN — HYDROXYZINE HYDROCHLORIDE 25 MG: 25 TABLET, FILM COATED ORAL at 21:05

## 2019-09-17 RX ADMIN — GABAPENTIN 200 MG: 100 CAPSULE ORAL at 08:51

## 2019-09-17 RX ADMIN — HYDROXYZINE HYDROCHLORIDE 25 MG: 25 TABLET, FILM COATED ORAL at 15:45

## 2019-09-17 RX ADMIN — GABAPENTIN 200 MG: 100 CAPSULE ORAL at 15:44

## 2019-09-17 RX ADMIN — Medication 1000 UNITS: at 15:44

## 2019-09-17 RX ADMIN — MULTIPLE VITAMINS W/ MINERALS TAB 1 TABLET: TAB at 08:51

## 2019-09-17 RX ADMIN — DESVENLAFAXINE SUCCINATE 100 MG: 50 TABLET, EXTENDED RELEASE ORAL at 08:51

## 2019-09-17 RX ADMIN — MELATONIN TAB 3 MG 3 MG: 3 TAB at 21:05

## 2019-09-17 RX ADMIN — OXYCODONE HYDROCHLORIDE AND ACETAMINOPHEN 500 MG: 500 TABLET ORAL at 08:53

## 2019-09-17 RX ADMIN — CLINDAMYCIN PHOSPHATE AND BENZOYL PEROXIDE: 10; 50 GEL TOPICAL at 08:56

## 2019-09-17 RX ADMIN — Medication 400 MG: at 15:44

## 2019-09-17 RX ADMIN — HYDROXYZINE HYDROCHLORIDE 25 MG: 25 TABLET, FILM COATED ORAL at 08:51

## 2019-09-17 ASSESSMENT — ACTIVITIES OF DAILY LIVING (ADL)
ORAL_HYGIENE: INDEPENDENT
HYGIENE/GROOMING: INDEPENDENT
LAUNDRY: WITH SUPERVISION
DRESS: STREET CLOTHES

## 2019-09-17 ASSESSMENT — MIFFLIN-ST. JEOR: SCORE: 1140.81

## 2019-09-17 NOTE — PLAN OF CARE
Patient was pleasant and bright upon approach, presented with full range affect, was visible in the milieu, social with peers and staff, was able to verbalize need, appeared well groomed and neat, endorsed anxiety of 5/10 and depression of  6/10, stated that her mood has improved from baseline during morning hours but began to spiral down during evening hours due to the acquity of the milieu, very anxious about future plans, said she appreciates when staff are available to her and it makes her feel present and acknowledged, denies any urges of SI/SIB or hallucinations, asked for PRN melatonin 3 mg and Hydroxyzine 25 mg along with her nigh medications, no other concerns reported or observed,  currently stable at baseline will continue to monitor.

## 2019-09-17 NOTE — PROGRESS NOTES
Case Management Note    Writer attempted to meet with patient this morning just to introduce self and discuss her care. At the time she was participating in an OT led group with two therapists and the therapist reported that due to the sensitive and powerful nature of the the patient's sharing/participation, she suggested that writer attempt to meet with the pt at another time. There was no team meeting today.      Patient continues to stabilize.  Notes indicate that this patient's discharge plan once stabilized is for Day Tx at [facility] and that Dr. REYNOLDS also recommends DBT.

## 2019-09-17 NOTE — PLAN OF CARE
Problem: OT General Care Plan  Goal: OT Frequency  Description  Pt will practice using >2 coping strategies to manage stress and reduce symptoms to demonstrate increased readiness for discharge.     Attended 2/2 hrs of occupational therapy groups offered this date. Overall congruent affect and full engagement.    Mental health management and coping group: '[place]' to increase awareness of where one is in the recovery process and to identify obstacles and tools which will enable one to improve mental health. Pt Response: Actively participated and shared insight with peers and staff including personal obstacles and tools used. Stated she has acquired more tools while she has been here.   Pt attended >45 mins in OT Clinic group - No Charge.     Outcome: Improving

## 2019-09-17 NOTE — PROGRESS NOTES
"The pt had a \"rough morning, emotionally.\" She also reported to this writer that she was having certain \"memory glitches\" in which she thought it was March. She later reported feeling like \"my frontal lobe is achy or pressured.\" She has told this writer in previous shifts that mornings are the worst for her. She denies any SI/SIB urges and hallucinations. She attends all groups offered, and is somewhat social when she is out. She continues to have a blunted/flat affect. She has agreed to come to staff if she has a change in mood or urges.     09/17/19 1435   Behavioral Health   Hallucinations denies / not responding to hallucinations   Thinking poor concentration   Orientation date, disoriented;person: oriented;place: oriented   Memory other (see comment)  (pt states she \"didn't know what month it was\")   Insight insight appropriate to situation   Judgement impaired   Eye Contact at examiner   Affect blunted, flat;sad   Mood mood is calm;depressed   Physical Appearance/Attire appears stated age   Hygiene well groomed   Suicidality other (see comments)  (none stated)   1. Wish to be Dead (Past Month) No   2. Non-Specific Active Suicidal Thoughts (Past Month) No   Self Injury other (see comment)  (pt denies)   Elopement   (nothing to report)   Activity other (see comment)  (attended all groups)   Speech coherent;clear   Medication Sensitivity no stated side effects;no observed side effects   Psychomotor / Gait balanced;steady     "

## 2019-09-17 NOTE — PROGRESS NOTES
09/16/19 2000   Group Therapy Session   Group Attendance attended group session   Total Time (minutes) 40   Group Type psychotherapeutic   Group Topic Covered other (see comments)   Patient Participation/Contribution cooperative with task;discussed personal experience with topic;listened actively   Patient participated in psychotherapy group which included a mindfulness activity focusing on the 5 senses and then processing as a group.    Sherry presented in a pleasant, calm mood. She engaged in the activity and processed with the group. Initiated social interactions with group.

## 2019-09-17 NOTE — PROGRESS NOTES
09/17/19 1600   General Information   Art Directive other (see comments)   AT directive was to draw and/or paint an image of self as a landscape. Goals of directive: to use metaphor to explore aspects of self, to create a personal self narrative, to identify personal strengths and goals, emotional expression. Pt was a positive participant, focused on task for the full duration of group. Pt created a mountain landscape and shared that the mountains represent various perspectives, referring to focusing on the positive things in life moving forward.  Pts mood was calm.

## 2019-09-18 PROCEDURE — 25000132 ZZH RX MED GY IP 250 OP 250 PS 637: Mod: GY | Performed by: PSYCHIATRY & NEUROLOGY

## 2019-09-18 PROCEDURE — 99221 1ST HOSP IP/OBS SF/LOW 40: CPT | Performed by: PHYSICIAN ASSISTANT

## 2019-09-18 PROCEDURE — 25000132 ZZH RX MED GY IP 250 OP 250 PS 637: Mod: GY | Performed by: PHYSICIAN ASSISTANT

## 2019-09-18 PROCEDURE — 12400001 ZZH R&B MH UMMC

## 2019-09-18 PROCEDURE — 99207 ZZC CONSULT E&M CHANGED TO INITIAL LEVEL: CPT | Performed by: PHYSICIAN ASSISTANT

## 2019-09-18 PROCEDURE — 99233 SBSQ HOSP IP/OBS HIGH 50: CPT | Performed by: PSYCHIATRY & NEUROLOGY

## 2019-09-18 PROCEDURE — 25000132 ZZH RX MED GY IP 250 OP 250 PS 637: Mod: GY

## 2019-09-18 PROCEDURE — 25000132 ZZH RX MED GY IP 250 OP 250 PS 637: Mod: GY | Performed by: NURSE PRACTITIONER

## 2019-09-18 PROCEDURE — H2032 ACTIVITY THERAPY, PER 15 MIN: HCPCS

## 2019-09-18 PROCEDURE — G0177 OPPS/PHP; TRAIN & EDUC SERV: HCPCS

## 2019-09-18 RX ORDER — LORATADINE 10 MG/1
10 TABLET ORAL DAILY
Status: DISCONTINUED | OUTPATIENT
Start: 2019-09-18 | End: 2019-09-20 | Stop reason: HOSPADM

## 2019-09-18 RX ORDER — BUPROPION HYDROCHLORIDE 100 MG/1
100 TABLET, EXTENDED RELEASE ORAL DAILY
Status: DISCONTINUED | OUTPATIENT
Start: 2019-09-18 | End: 2019-09-20 | Stop reason: HOSPADM

## 2019-09-18 RX ORDER — FLUTICASONE PROPIONATE 50 MCG
2 SPRAY, SUSPENSION (ML) NASAL DAILY
Status: DISCONTINUED | OUTPATIENT
Start: 2019-09-18 | End: 2019-09-20 | Stop reason: HOSPADM

## 2019-09-18 RX ADMIN — GABAPENTIN 200 MG: 100 CAPSULE ORAL at 13:27

## 2019-09-18 RX ADMIN — DESVENLAFAXINE SUCCINATE 100 MG: 50 TABLET, EXTENDED RELEASE ORAL at 10:14

## 2019-09-18 RX ADMIN — GABAPENTIN 200 MG: 100 CAPSULE ORAL at 21:10

## 2019-09-18 RX ADMIN — LORATADINE 10 MG: 10 TABLET ORAL at 13:27

## 2019-09-18 RX ADMIN — Medication 400 MG: at 10:15

## 2019-09-18 RX ADMIN — MELATONIN TAB 3 MG 3 MG: 3 TAB at 21:12

## 2019-09-18 RX ADMIN — Medication 1000 UNITS: at 10:15

## 2019-09-18 RX ADMIN — MULTIPLE VITAMINS W/ MINERALS TAB 1 TABLET: TAB at 10:15

## 2019-09-18 RX ADMIN — GABAPENTIN 200 MG: 100 CAPSULE ORAL at 10:15

## 2019-09-18 RX ADMIN — FLUTICASONE PROPIONATE 2 SPRAY: 50 SPRAY, METERED NASAL at 16:09

## 2019-09-18 RX ADMIN — BUPROPION HYDROCHLORIDE 100 MG: 100 TABLET, EXTENDED RELEASE ORAL at 13:26

## 2019-09-18 RX ADMIN — OXYCODONE HYDROCHLORIDE AND ACETAMINOPHEN 500 MG: 500 TABLET ORAL at 10:15

## 2019-09-18 RX ADMIN — CLINDAMYCIN PHOSPHATE AND BENZOYL PEROXIDE: 10; 50 GEL TOPICAL at 10:21

## 2019-09-18 RX ADMIN — HYDROXYZINE HYDROCHLORIDE 25 MG: 25 TABLET, FILM COATED ORAL at 21:12

## 2019-09-18 ASSESSMENT — ACTIVITIES OF DAILY LIVING (ADL)
HYGIENE/GROOMING: INDEPENDENT
ORAL_HYGIENE: INDEPENDENT
DRESS: STREET CLOTHES
LAUNDRY: WITH SUPERVISION

## 2019-09-18 NOTE — PROGRESS NOTES
"Writer spoke with the patient to check-in and inquire about discharge. The patient stated that she is not doing well today. The patient reported that she is feeling very confused and does not know what the date is. She stated that she thought it was March yesterday. She stated that she thinks the confusion came about after she was started on her Cymbalta and Seroquel. The patient is also reporting increased back pain (6/10), her ears being \"plugged up\", and nerve tingling. The patient reported that she does not feel like she is ready for discharge. Stated that she would like to get better so she can leave. The patient stated that she does not think her mood has improved.   "

## 2019-09-18 NOTE — PROGRESS NOTES
09/18/19 1500   Occupational Therapy   Type of Intervention structured groups   Response Initiates, socially acceptable   Hours 1     Attended 1/2 hrs of occupational therapy groups offered this date. Overall content, bright upon approach, and engaged.      Occupational therapy clinic to continue work on a personally meaningful project. Pt Response: Expressed looking forward to working on her project. Worked alone yet appeared comfortable in the presence of peers. Demonstrated I performance skills.

## 2019-09-18 NOTE — PROGRESS NOTES
"Patient reports continued depression but with some improvement, and states today is the first day she has not had any SI thoughts at all. Patient states her \"fogginess\", confusion, and difficulty concentrating and remembering have gotten slightly worse. Patient also notes that she has a strange \"swelling\" feeling in her forehead and that he has been experiencing tingling around her left shoulder. Patient was visible and social with mostly flat affect during the shift.     09/17/19 2100   Behavioral Health   Hallucinations denies / not responding to hallucinations   Thinking confused;poor concentration   Orientation person: oriented;place: oriented;date: oriented;time: oriented   Memory new learning, recall loss   Insight admits / accepts;insight appropriate to situation   Judgement intact   Eye Contact at examiner   Affect blunted, flat;sad   Mood depressed;mood is calm   Physical Appearance/Attire appears stated age;attire appropriate to age and situation   Hygiene well groomed   Suicidality other (see comments)  (Denies)   1. Wish to be Dead (Past Month) No   2. Non-Specific Active Suicidal Thoughts (Past Month) No   Self Injury other (see comment)  (Denies)   Activity other (see comment)  (Visible and social.)   Speech clear;coherent   Medication Sensitivity no stated side effects;no observed side effects   Psychomotor / Gait balanced;steady   Activities of Daily Living   Hygiene/Grooming independent   Oral Hygiene independent   Dress street clothes   Laundry with supervision   Room Organization independent     "

## 2019-09-18 NOTE — CONSULTS
[facility]  Consult Note - Hospitalist Service     Date of Admission:  9/5/2019  Consult Requested by: Dr. Almaraz  Reason for Consult: Ear pain    Assessment & Plan   Sherry Sweeney is a 39 year old female with a history of fibromyalgia, mitochondrial disease, IBS, depression, PTSD, and anxiety who was admitted to inpatient behavioral health on 9/5/19 with suicidal ideation. Medicine consulted today for evaluation of ear pain.     Bilateral ear discomfort: Plugging, pressure, intermittent popping and vertigo along with seasonal nasal symptoms and recurrent sinusitis suggestive of underlying eustachian tube dysfunction, allergic rhinitis. No e/o AOM or otitis externa.    - Start Flonase 2 sprays each nare once daily   - Start Claritin 10 mg daily    Tension headache: Occurs intermittently.    - Ice/heat packs applied to the neck/shoulders PRN   - Tylenol PRN    The patient's care was discussed with the Patient.    Patient is currently stable and Medicine will sign off. Please do not hesitate to contact if new questions or concerns arise.     Concepción Farah PA-C  [facility]  Hospitalist Service  Pager: [phone]      ______________________________________________________________________    Chief Complaint   Ear plugging    History is obtained from the patient    History of Present Illness   Sherry Sweeney is a 39 year old female with a history of fibromyalgia, mitochondrial disease, IBS, depression, PTSD, and anxiety who was admitted to inpatient behavioral [facility] on 9/5/19 with suicidal ideation. Medicine consulted today for evaluation of ear pain.     She reports a few days of plugged sensation of bilateral ears. Inner ear occasionally feels painful. Sometimes hears popping noises. Intermittently has positional-induced dizziness that resolves spontaneously within seconds. No drainage, hearing changes, fever/chills. Does have  post-nasal drip and some mild nasal congestion along with occasional pain across frontal sinuses. Has a history of frequent sinusitis, symptoms get worse seasonally. Occasionally takes an unknown antihistamine at home but is not maintained on anything daily.     Has occasional throbbing vice-like headaches. No vision changes, photophobia, phonophobia, weakness, paresthesias. No jaw pain.       Review of Systems   The 10 point Review of Systems is negative other than noted in the HPI or here.     Past Medical History    I have reviewed this patient's medical history and updated it with pertinent information if needed.   Past Medical History:   Diagnosis Date     Depression      Fibromyalgia      IBS (irritable bowel syndrome)      Mitochondrial disease (H)        Past Surgical History   I have reviewed this patient's surgical history and updated it with pertinent information if needed.  History reviewed. No pertinent surgical history.    Social History   I have reviewed this patient's social history and updated it with pertinent information if needed.  Social History     Tobacco Use     Smoking status: Never Smoker     Smokeless tobacco: Never Used   Substance Use Topics     Alcohol use: No     Drug use: No       Family History   I have reviewed this patient's family history and updated it with pertinent information if needed.   Family History   Problem Relation Age of Onset     Depression Mother         sertraline     Depression Brother      Anxiety Disorder Maternal Grandmother        Medications   Current Facility-Administered Medications   Medication     acetaminophen (TYLENOL) tablet 650 mg     alum & mag hydroxide-simethicone (MYLANTA ES/MAALOX  ES) suspension 30 mL     bisacodyl (DULCOLAX) Suppository 10 mg     Boric acid 600 mg vaginal suppository     buPROPion (WELLBUTRIN SR) 12 hr tablet 100 mg     clindamycin-benzoyl peroxide (BENZACLIN) 1-5 % gel GEL     [START ON 9/19/2019] desvenlafaxine succinate  (PRISTIQ) 24 hr tablet 75 mg     gabapentin (NEURONTIN) capsule 200 mg     hydrOXYzine (ATARAX) tablet 25-50 mg     Lidocaine (LIDOCARE) 4 % Patch 1 patch     LORazepam (ATIVAN) tablet 0.5 mg     magnesium hydroxide (MILK OF MAGNESIA) suspension 30 mL     melatonin tablet 3 mg     multivitamin w/minerals (THERA-VIT-M) tablet 1 tablet     OLANZapine (zyPREXA) tablet 5-10 mg    Or     OLANZapine (zyPREXA) injection 5-10 mg     ondansetron (ZOFRAN) tablet 4 mg     riboflavin (vitamin  B-2) tablet 400 mg     rizatriptan (MAXALT) tablet 10 mg     traZODone (DESYREL) tablet 50 mg     vitamin C (ASCORBIC ACID) tablet 500 mg     vitamin E (TOCOPHEROL) 1000 units (900 mg) capsule 1,000 Units     zolpidem (AMBIEN) half-tab 2.5-5 mg       Allergies   Allergies   Allergen Reactions     No Clinical Screening - See Comments Anaphylaxis     Stomach swelling     Albumin, Egg Other (See Comments)     Swollen colon, belly pain     Milnacipran Other (See Comments)     Chest pain, hot/cold flashes     Seroquel [Quetiapine]      Tachycardia, nervousness, elevated blood pressure.      Ciprofloxacin Itching and Rash     Rash over whole body      Sulfa Drugs Rash       Physical Exam   Vital Signs: Temp: 98.2  F (36.8  C) Temp src: Oral BP: 109/73 Pulse: 87   Resp: 16 SpO2: 100 % O2 Device: None (Room air)    Weight: 113 lbs 0 oz    Constitutional: Awake and alert, in no apparent distress.   Eyes: Sclera clear, anicteric   ENT: Mucous membranes moist. Oropharynx with scant clear drainage, no exudate or erythema. Bilateral TMs are pearly gray with small clear effusions present, no inflammation or erythema, no perforation. Bilateral ear canals are patent, non-erythematous.   Respiratory: Breathing comfortably on room air. CTA bilaterally.  Cardiovascular: RRR, no rubs or murmurs.   Skin: Good color. No jaundice.  Neurologic: Alert and oriented. No focal deficits.   Neuropsychiatric: Calm and cooperative. Good eye contact.         Data    ROUTINE IP LABS (Last four results)  No lab results found in last 7 days.  No lab results found in last 7 days.  No lab results found in last 7 days.     Glucose Values Latest Ref Rng & Units 9/6/2019   Bedside Glucose (mg/dl )  - --   GLUCOSE 70 - 99 mg/dL 83   Some recent data might be hidden

## 2019-09-18 NOTE — PROGRESS NOTES
"Pt joined the session late but expressed disappointment in her assessment of \"lack of change or progress\" in her symptoms.  Indeed, physical movement appeared similarly bound to the previous week with this therapist.  Pt appreciated the visual image of emotional work being \"deep\" and \"beneath the surface\" where it can be harder to see and take longer to bear fruit.  This became the group movement analogy for physically reaching up and ou,t as well as down and in to explore emotional seeds.       09/18/19 1015   Dance Movement Therapy   Type of Intervention structured groups   Response participates with encouragement   Hours 0.5     "

## 2019-09-18 NOTE — PROGRESS NOTES
[facility], [place]   Psychiatric Progress Note        Interim History:     The patient's care was discussed with the treatment team during the daily team meeting and/or staff's chart notes were reviewed.  Staff report patient c/o having anxiety and reporting periodical Suicidal ideation. Interestingly, she told OT that she felt that she had learned a lot of helpful skills and appeared to be brighter and future focused. During today visit with me Sherry reported feeling pressure in her forehead area, fullness in ears, more on the right side. She also complained of feeling confused and connected it with increased dose of Pristiq. Didn't have any other explanation for still depressed mood. We discussed decreasing Pristiq and adding Wellbutrin SR. She was OK with this plan.   Reviewed medications and care plan. Provided support and psychoeducation.          Medications:       Boric acid 600 mg vaginal suppository  600 mg Vaginal At Bedtime     buPROPion  100 mg Oral Daily     clindamycin-benzoyl peroxide   Topical Daily     [START ON 9/19/2019] desvenlafaxine  75 mg Oral Daily     fluticasone  2 spray Both Nostrils Daily     gabapentin  200 mg Oral TID     loratadine  10 mg Oral Daily     multivitamin w/minerals  1 tablet Oral Daily     riboflavin  400 mg Oral Daily     vitamin C  500 mg Oral Daily     vitamin E  1,000 Units Oral Daily          Allergies:     Allergies   Allergen Reactions     No Clinical Screening - See Comments Anaphylaxis     Stomach swelling     Albumin, Egg Other (See Comments)     Swollen colon, belly pain     Milnacipran Other (See Comments)     Chest pain, hot/cold flashes     Seroquel [Quetiapine]      Tachycardia, nervousness, elevated blood pressure.      Ciprofloxacin Itching and Rash     Rash over whole body      Sulfa Drugs Rash          Labs:     No results found for this or any previous visit (from the past 24 hour(s)).       Psychiatric Examination:      Vitals:    09/16/19 1600 09/17/19 0854 09/17/19 1617 09/18/19 0911   BP: 115/76 111/78 109/73    BP Location: Right arm Right arm     Pulse: 85 101 87    Resp:  16 16    Temp: 99.2  F (37.3  C) 97.9  F (36.6  C) 97.6  F (36.4  C) 98.2  F (36.8  C)   TempSrc: Oral Temporal Oral Oral   SpO2:   100%    Weight:  51.3 kg (113 lb)     Height:           Orthostatic Vitals       Most Recent      Sitting Orthostatic /82 09/18 0911    Sitting Orthostatic Pulse (bpm) 96 09/18 0911    Standing Orthostatic /79 09/18 0911    Standing Orthostatic Pulse (bpm) 101 09/18 0911         Weight is 113 lbs 0 oz  Body mass index is 20.67 kg/m .    Appearance: awake, alert, appeared as age stated, well groomed and no apparent distress  Attitude:  cooperative  Eye Contact:  good  Mood:  anxious, depressed and slightly better  Affect:  appropriate and in normal range and reactive  Speech:  clear, coherent and normal prosody  Psychomotor Behavior:  no evidence of tardive dyskinesia, dystonia, or tics and intact station, gait and muscle tone  Throught Process:  linear and goal oriented  Associations:  no loose associations  Thought Content:  no evidence of active suicidal ideation or homicidal ideation and no evidence of psychotic thought, reports passive Suicidal ideation off and on.  Insight:  fair  Judgement:  fair  Oriented to:  time, person, and place  Attention Span and Concentration:  intact  Recent and Remote Memory:  intact         Precautions:     Behavioral Orders   Procedures     Code 1 - Restrict to Unit     Routine Programming     As clinically indicated     Status 15     Every 15 minutes.     Suicide precautions     Patients on Suicide Precautions should have a Combination Diet ordered that includes a Diet selection(s) AND a Behavioral Tray selection for Safe Tray - with utensils, or Safe Tray - NO utensils            Diagnoses:     Major depressive disorder severe recurrence  Generalized anxiety disorder  Cluster  B traits vs disorder suggestive.   History of posttraumatic stress disorder  History of eating disorder  History of dyslexia         Plan:     -- patient to review ECT material. May consider ECT consult if symptoms failed to improved.     Medications:  -- Pristiq: resumed on admission, titrated to 50 mg qam and 25 mg, and later adjusted to 75 mg daily. Because of possible side effects will decrease back to 75 mg daily.   -- Ativan: continued at 0.5 mg TID, but later switched to PRN for severe anxiety. PRN Vistaril 25-50 mg TID PRN continued.   -- Ambien: resumed at 2.5-5 mg qhs.   -- Gabapentin started and titrated to 200 mg TID to address anxiety.   -- Will start on Wellbutrin  mg daily.   Will ask for internal med consult due to above mentioned concerns.  Legal Status and Disposition:  -- volunt.   -- discharge will be granted once established mood stabilizations and safety in the community. Recommended referral to DBT. The patient plans to start Day program at [facility] while awaiting DBT.     Johny Almaraz MD  [facility] Psychiatry

## 2019-09-18 NOTE — PROGRESS NOTES
Pt states that in the morning is worse for her and she reports to the writer that she has SI thoughts only. She attended groups and she reports that her depression is due to financial issues and loneness at home. She spent most of her time in the lounge.     09/18/19 1438   Behavioral Health   Hallucinations denies / not responding to hallucinations   Thinking distractable   Orientation time: oriented;date: oriented;place: oriented;person: oriented   Memory baseline memory   Insight poor   Judgement impaired   Eye Contact at examiner   Affect blunted, flat   Mood depressed;mood is calm   Physical Appearance/Attire appears stated age   Hygiene well groomed   Suicidality thoughts only   1. Wish to be Dead (Past Month) No   2. Non-Specific Active Suicidal Thoughts (Past Month) No   Self Injury other (see comment)  (Denies)   Activity other (see comment)  (Social with others)   Speech clear;coherent   Medication Sensitivity no stated side effects   Psychomotor / Gait balanced;steady   Psycho Education   Type of Intervention 1:1 intervention   Response participates, initiates socially appropriate   Hours 0.5   Activities of Daily Living   Hygiene/Grooming independent   Oral Hygiene independent   Dress street clothes   Laundry with supervision   Room Organization independent   Activity   Activity Assistance Provided independent

## 2019-09-19 PROCEDURE — 12400001 ZZH R&B MH UMMC

## 2019-09-19 PROCEDURE — 25000132 ZZH RX MED GY IP 250 OP 250 PS 637: Mod: GY | Performed by: PSYCHIATRY & NEUROLOGY

## 2019-09-19 PROCEDURE — 25000132 ZZH RX MED GY IP 250 OP 250 PS 637: Mod: GY | Performed by: NURSE PRACTITIONER

## 2019-09-19 PROCEDURE — 25000131 ZZH RX MED GY IP 250 OP 636 PS 637: Mod: GY | Performed by: NURSE PRACTITIONER

## 2019-09-19 PROCEDURE — 25000132 ZZH RX MED GY IP 250 OP 250 PS 637: Mod: GY

## 2019-09-19 PROCEDURE — 90853 GROUP PSYCHOTHERAPY: CPT

## 2019-09-19 PROCEDURE — 99232 SBSQ HOSP IP/OBS MODERATE 35: CPT | Performed by: PSYCHIATRY & NEUROLOGY

## 2019-09-19 PROCEDURE — 25000132 ZZH RX MED GY IP 250 OP 250 PS 637: Mod: GY | Performed by: PHYSICIAN ASSISTANT

## 2019-09-19 RX ADMIN — CLINDAMYCIN PHOSPHATE AND BENZOYL PEROXIDE: 10; 50 GEL TOPICAL at 10:21

## 2019-09-19 RX ADMIN — GABAPENTIN 200 MG: 100 CAPSULE ORAL at 21:09

## 2019-09-19 RX ADMIN — HYDROXYZINE HYDROCHLORIDE 25 MG: 25 TABLET, FILM COATED ORAL at 11:39

## 2019-09-19 RX ADMIN — GABAPENTIN 200 MG: 100 CAPSULE ORAL at 10:10

## 2019-09-19 RX ADMIN — LORATADINE 10 MG: 10 TABLET ORAL at 10:09

## 2019-09-19 RX ADMIN — HYDROXYZINE HYDROCHLORIDE 25 MG: 25 TABLET, FILM COATED ORAL at 21:09

## 2019-09-19 RX ADMIN — Medication 400 MG: at 10:08

## 2019-09-19 RX ADMIN — Medication 1000 UNITS: at 10:09

## 2019-09-19 RX ADMIN — OXYCODONE HYDROCHLORIDE AND ACETAMINOPHEN 500 MG: 500 TABLET ORAL at 10:10

## 2019-09-19 RX ADMIN — MELATONIN TAB 3 MG 3 MG: 3 TAB at 21:09

## 2019-09-19 RX ADMIN — DESVENLAFAXINE SUCCINATE 75 MG: 50 TABLET, EXTENDED RELEASE ORAL at 10:10

## 2019-09-19 RX ADMIN — BUPROPION HYDROCHLORIDE 100 MG: 100 TABLET, EXTENDED RELEASE ORAL at 10:09

## 2019-09-19 RX ADMIN — FLUTICASONE PROPIONATE 2 SPRAY: 50 SPRAY, METERED NASAL at 10:10

## 2019-09-19 RX ADMIN — GABAPENTIN 200 MG: 100 CAPSULE ORAL at 15:23

## 2019-09-19 RX ADMIN — ONDANSETRON HYDROCHLORIDE 4 MG: 4 TABLET, FILM COATED ORAL at 11:04

## 2019-09-19 RX ADMIN — MULTIPLE VITAMINS W/ MINERALS TAB 1 TABLET: TAB at 10:09

## 2019-09-19 ASSESSMENT — ACTIVITIES OF DAILY LIVING (ADL)
LAUNDRY: WITH SUPERVISION
HYGIENE/GROOMING: INDEPENDENT
ORAL_HYGIENE: INDEPENDENT
DRESS: INDEPENDENT
DRESS: INDEPENDENT
LAUNDRY: WITH SUPERVISION
ORAL_HYGIENE: INDEPENDENT
HYGIENE/GROOMING: INDEPENDENT

## 2019-09-19 ASSESSMENT — MIFFLIN-ST. JEOR: SCORE: 1143.99

## 2019-09-19 NOTE — PROGRESS NOTES
"   09/18/19 2100   General Information   Art Directive other (see comments)   AT directive was to create a drawing that represents your own personal boundaries. Author read a passage referring to fences around a person's house as a metaphor for boundaries (thinking about how much you let others in or how much you try to keep them out.) Goals of directive: emotional expression, identifying personal strengths.  Pt was a positive participant, focused on task for the full duration of group.   Pt hurried through task, creating a simplified heart with a large black \"X\" through the center of heart. Pt held it up for author but was hesitant to talk about the image at this time and said she would like to create another drawing. Pt made a very detailed image of tulips.  Pts mood was calm.  "

## 2019-09-19 NOTE — PLAN OF CARE
BEHAVIORAL TEAM DISCUSSION    Participants: Dr. Almaraz, Fan Gandhi (CTC), Og Thomas (RN)  Progress: Progressing; staff report that the patient is participatory in activities on the unit.   Anticipated length of stay: Discharged planned for 9/20/19.   Continued Stay Criteria/Rationale: Stabilization and med monitoring.   Medical/Physical: No acute medical issues.   Precautions:   Behavioral Orders   Procedures     Code 1 - Restrict to Unit     Routine Programming     As clinically indicated     Status 15     Every 15 minutes.     Suicide precautions     Patients on Suicide Precautions should have a Combination Diet ordered that includes a Diet selection(s) AND a Behavioral Tray selection for Safe Tray - with utensils, or Safe Tray - NO utensils       Plan: The patient will discharge home tomorrow. Her disposition [facility], f/u outpatient psychiatry and DBT after Day Treatment completion.     Rationale for change in precautions or plan: None.

## 2019-09-19 NOTE — PLAN OF CARE
Patient appeared slightly brighter tonight, mood was calm, stated that she felt drowsy from the Claritin she took today, she showered, participated in group activities, was selectively social with peers, endorsed passive suicidal thoughts but had no intent to harm self, was able to contract for safety, took all her night  Medications, anxious about future plans, currently stable at baseline no other behaviors or issues noted.

## 2019-09-19 NOTE — PROGRESS NOTES
[facility], [place]   Psychiatric Progress Note        Interim History:     The patient's care was discussed with the treatment team during the daily team meeting and/or staff's chart notes were reviewed.  Staff report patient c/o having anxiety and reporting periodic Suicidal ideation, though, admitted it is chronic. She denied presence of active plan, contracted for safety on this unit. During today visit reported still having a lot of concerns of her safety after returning home, then, admitted that she would benefit from going to [facility] after discharge and reported that  is supportive. Was seen yesterday by IM, started on Claritin for sinus infection, denied having any side effects. She was started yesterday on Wellbutrin SR, no immediate side effects. Asked if she would be discharged on just Wellbutrin. I advised her that, most likely, she would leave on combination of Prstiq and Wellbutrin SR, recommended her to discuss further med changes with her outpatient provider.   Reviewed medications and care plan. Provided support and psychoeducation.       Medications:       Boric acid 600 mg vaginal suppository  600 mg Vaginal At Bedtime     buPROPion  100 mg Oral Daily     clindamycin-benzoyl peroxide   Topical Daily     desvenlafaxine  75 mg Oral Daily     fluticasone  2 spray Both Nostrils Daily     gabapentin  200 mg Oral TID     loratadine  10 mg Oral Daily     multivitamin w/minerals  1 tablet Oral Daily     riboflavin  400 mg Oral Daily     vitamin C  500 mg Oral Daily     vitamin E  1,000 Units Oral Daily          Allergies:     Allergies   Allergen Reactions     No Clinical Screening - See Comments Anaphylaxis     Stomach swelling     Albumin, Egg Other (See Comments)     Swollen colon, belly pain     Milnacipran Other (See Comments)     Chest pain, hot/cold flashes     Seroquel [Quetiapine]      Tachycardia, nervousness, elevated blood pressure.      Ciprofloxacin Itching  and Rash     Rash over whole body      Sulfa Drugs Rash          Labs:     No results found for this or any previous visit (from the past 24 hour(s)).       Psychiatric Examination:     Vitals:    09/17/19 1617 09/18/19 0911 09/18/19 1646 09/19/19 0843   BP: 109/73  108/71    BP Location:   Right arm    Pulse: 87  99    Resp: 16   16   Temp: 97.6  F (36.4  C) 98.2  F (36.8  C) 98.4  F (36.9  C) 98.7  F (37.1  C)   TempSrc: Oral Oral Oral Oral   SpO2: 100%   98%   Weight:    51.6 kg (113 lb 11.2 oz)   Height:           Orthostatic Vitals       Most Recent      Sitting Orthostatic /82 09/18 0911    Sitting Orthostatic Pulse (bpm) 96 09/18 0911    Standing Orthostatic /79 09/18 0911    Standing Orthostatic Pulse (bpm) 101 09/18 0911         Weight is 113 lbs 11.2 oz  Body mass index is 20.8 kg/m .    Appearance: awake, alert, appeared as age stated, well groomed and no apparent distress  Attitude:  cooperative  Eye Contact:  good  Mood:  anxious, depressed and slightly better  Affect:  appropriate and in normal range and reactive  Speech:  clear, coherent and normal prosody  Psychomotor Behavior:  no evidence of tardive dyskinesia, dystonia, or tics and intact station, gait and muscle tone  Throught Process:  linear and goal oriented  Associations:  no loose associations  Thought Content:  no evidence of active suicidal ideation or homicidal ideation and no evidence of psychotic thought, reports passive Suicidal ideation off and on.  Insight:  fair  Judgement:  fair  Oriented to:  time, person, and place  Attention Span and Concentration:  intact  Recent and Remote Memory:  intact         Precautions:     Behavioral Orders   Procedures     Code 1 - Restrict to Unit     Routine Programming     As clinically indicated     Status 15     Every 15 minutes.     Suicide precautions     Patients on Suicide Precautions should have a Combination Diet ordered that includes a Diet selection(s) AND a Behavioral Tray  selection for Safe Tray - with utensils, or Safe Tray - NO utensils            Diagnoses:     Major depressive disorder severe recurrence  Generalized anxiety disorder  Cluster B traits vs disorder suggestive.   History of posttraumatic stress disorder  History of eating disorder  History of dyslexia         Plan:     -- patient to review ECT material. May consider ECT consult if symptoms failed to improved.     Medications:  -- Pristiq: resumed on admission, titrated to 50 mg qam and 25 mg, and later adjusted to 75 mg daily. Because of possible side effects will continue 75 mg daily.   -- Ativan: continued at 0.5 mg TID, but later switched to PRN for severe anxiety. PRN Vistaril 25-50 mg TID PRN continued.   -- Ambien: resumed at 2.5-5 mg qhs.   -- Gabapentin started and titrated to 200 mg TID to address anxiety.   -- Will continue Wellbutrin  mg daily.   Will ask for internal med consult due to above mentioned concerns.  Legal Status and Disposition:  -- volunt.   -- discharge will be granted once established mood stabilizations and safety in the community. Likely, tomorrow. Recommended referral to DBT. The patient plans to start PHP at [facility] while awaiting DBT.     Johny Almaraz MD  [facility] Psychiatry

## 2019-09-19 NOTE — PROGRESS NOTES
Patient was present in milieu and social with others. Stated morning hours are difficult for her. Reports feeling nausea and upset stomach this morning. Refused breakfast but ate 65% of her lunch. Continues to reports worsen depression and loneness. Continues to have SI thought, denied SIB.     09/19/19 1409   Behavioral Health   Hallucinations denies / not responding to hallucinations   Thinking distractable;poor concentration   Orientation person: oriented;place: oriented;date: oriented;time: oriented   Memory baseline memory   Insight insight appropriate to situation   Judgement impaired   Eye Contact at examiner   Affect blunted, flat   Mood depressed;anxious   Physical Appearance/Attire appears stated age   Hygiene well groomed   Suicidality thoughts only   1. Wish to be Dead (Past Month) No   2. Non-Specific Active Suicidal Thoughts (Past Month) No   Self Injury   (denies)   Elopement   (none observed/mentioned )   Activity other (see comment)  (visible, slightly social and attending groups)   Speech clear;coherent   Medication Sensitivity no stated side effects   Psychomotor / Gait balanced;steady   Psycho Education   Type of Intervention 1:1 intervention   Response participates, initiates socially appropriate   Hours 0.5   Treatment Detail   (check-in)   Safety   Suicidality Status 15   Activities of Daily Living   Hygiene/Grooming independent   Oral Hygiene independent   Dress independent   Laundry with supervision   Room Organization independent   Groups   Details   (attended and participated in groups)

## 2019-09-20 VITALS
TEMPERATURE: 98.3 F | RESPIRATION RATE: 16 BRPM | HEART RATE: 108 BPM | BODY MASS INDEX: 20.92 KG/M2 | WEIGHT: 113.7 LBS | OXYGEN SATURATION: 98 % | HEIGHT: 62 IN | SYSTOLIC BLOOD PRESSURE: 113 MMHG | DIASTOLIC BLOOD PRESSURE: 78 MMHG

## 2019-09-20 PROCEDURE — 25000132 ZZH RX MED GY IP 250 OP 250 PS 637: Mod: GY | Performed by: PSYCHIATRY & NEUROLOGY

## 2019-09-20 PROCEDURE — 25000132 ZZH RX MED GY IP 250 OP 250 PS 637: Mod: GY | Performed by: NURSE PRACTITIONER

## 2019-09-20 PROCEDURE — 99239 HOSP IP/OBS DSCHRG MGMT >30: CPT | Performed by: PSYCHIATRY & NEUROLOGY

## 2019-09-20 RX ORDER — HYDROXYZINE HYDROCHLORIDE 25 MG/1
25-50 TABLET, FILM COATED ORAL 3 TIMES DAILY PRN
Qty: 60 TABLET | Refills: 1 | Status: SHIPPED | OUTPATIENT
Start: 2019-09-20 | End: 2021-08-25

## 2019-09-20 RX ORDER — DESVENLAFAXINE 25 MG/1
75 TABLET, EXTENDED RELEASE ORAL DAILY
Qty: 90 TABLET | Refills: 0 | Status: SHIPPED | OUTPATIENT
Start: 2019-09-21 | End: 2021-08-25 | Stop reason: SINTOL

## 2019-09-20 RX ORDER — ATENOLOL 25 MG/1
25 TABLET ORAL 2 TIMES DAILY
Qty: 45 TABLET | Refills: 0 | Status: ON HOLD | OUTPATIENT
Start: 2019-09-20 | End: 2021-10-18

## 2019-09-20 RX ORDER — LANOLIN ALCOHOL/MO/W.PET/CERES
3 CREAM (GRAM) TOPICAL
Qty: 30 TABLET | Refills: 0 | Status: SHIPPED | OUTPATIENT
Start: 2019-09-20 | End: 2021-08-25

## 2019-09-20 RX ORDER — ONDANSETRON 4 MG/1
4 TABLET, FILM COATED ORAL EVERY 8 HOURS PRN
Qty: 60 TABLET | Refills: 0 | Status: SHIPPED | OUTPATIENT
Start: 2019-09-20 | End: 2021-08-25

## 2019-09-20 RX ORDER — FLUTICASONE PROPIONATE 50 MCG
2 SPRAY, SUSPENSION (ML) NASAL DAILY
Qty: 11.1 ML | Refills: 0 | Status: SHIPPED | OUTPATIENT
Start: 2019-09-21 | End: 2021-08-25

## 2019-09-20 RX ORDER — LIDOCAINE 50 MG/G
1 PATCH TOPICAL PRN
Qty: 30 PATCH | Refills: 0 | Status: SHIPPED | OUTPATIENT
Start: 2019-09-20 | End: 2022-05-11

## 2019-09-20 RX ORDER — RIZATRIPTAN BENZOATE 10 MG/1
10 TABLET ORAL
Qty: 30 TABLET | Refills: 0 | Status: SHIPPED | OUTPATIENT
Start: 2019-09-20 | End: 2021-07-28

## 2019-09-20 RX ORDER — ZOLPIDEM TARTRATE 5 MG/1
2.5-5 TABLET ORAL
Qty: 30 TABLET | Refills: 0 | Status: SHIPPED | OUTPATIENT
Start: 2019-09-20 | End: 2021-08-25

## 2019-09-20 RX ORDER — GABAPENTIN 100 MG/1
200 CAPSULE ORAL 3 TIMES DAILY
Qty: 180 CAPSULE | Refills: 0 | Status: SHIPPED | OUTPATIENT
Start: 2019-09-20 | End: 2021-07-19

## 2019-09-20 RX ORDER — BUPROPION HYDROCHLORIDE 100 MG/1
100 TABLET, EXTENDED RELEASE ORAL DAILY
Qty: 30 TABLET | Refills: 1 | Status: SHIPPED | OUTPATIENT
Start: 2019-09-21 | End: 2021-08-25

## 2019-09-20 RX ORDER — LORATADINE 10 MG/1
10 TABLET ORAL DAILY
Qty: 30 TABLET | Refills: 0 | Status: SHIPPED | OUTPATIENT
Start: 2019-09-21 | End: 2021-08-25

## 2019-09-20 RX ORDER — LORAZEPAM 0.5 MG/1
0.5 TABLET ORAL 3 TIMES DAILY PRN
Qty: 60 TABLET | Refills: 0 | Status: SHIPPED | OUTPATIENT
Start: 2019-09-20 | End: 2021-08-25

## 2019-09-20 RX ADMIN — GABAPENTIN 200 MG: 100 CAPSULE ORAL at 09:28

## 2019-09-20 RX ADMIN — ACETAMINOPHEN 650 MG: 325 TABLET, FILM COATED ORAL at 09:52

## 2019-09-20 RX ADMIN — ACETAMINOPHEN 650 MG: 325 TABLET, FILM COATED ORAL at 14:25

## 2019-09-20 RX ADMIN — CLINDAMYCIN PHOSPHATE AND BENZOYL PEROXIDE: 10; 50 GEL TOPICAL at 09:30

## 2019-09-20 RX ADMIN — BUPROPION HYDROCHLORIDE 100 MG: 100 TABLET, EXTENDED RELEASE ORAL at 09:27

## 2019-09-20 RX ADMIN — FLUTICASONE PROPIONATE 2 SPRAY: 50 SPRAY, METERED NASAL at 09:28

## 2019-09-20 RX ADMIN — DESVENLAFAXINE SUCCINATE 75 MG: 50 TABLET, EXTENDED RELEASE ORAL at 09:27

## 2019-09-20 RX ADMIN — GABAPENTIN 200 MG: 100 CAPSULE ORAL at 14:25

## 2019-09-20 ASSESSMENT — ACTIVITIES OF DAILY LIVING (ADL)
ORAL_HYGIENE: INDEPENDENT
DRESS: INDEPENDENT;STREET CLOTHES
HYGIENE/GROOMING: INDEPENDENT

## 2019-09-20 NOTE — PROGRESS NOTES
"Pt reports \"I had one suicidal urge today but I asked for a hydroxyzine right away and felt better.\" Pt states that she is afraid of what will happen if she goes home and has these urges when she's alone. Pt rates her anxiety as 7/10 and depression as 6/10 compared to the 10/10 when she was admitted. Pt states these are worse in the mornings. Pt states that her sleep has been better with the switch to melatonin and hydroxyzine. Pt states that she thinks her dizziness and problems with remembering things that just happened are side effects of her medication. Pt agrees that PHP will be good for her but she is curious if it can happen more often. Pt attended groups and was present in the milieu.            09/19/19 2142   Behavioral Health   Hallucinations denies / not responding to hallucinations   Thinking distractable;poor concentration   Orientation person: oriented;place: oriented;date: oriented;time: oriented   Memory baseline memory   Insight insight appropriate to situation   Judgement impaired   Eye Contact at examiner   Affect blunted, flat   Mood depressed;anxious   Physical Appearance/Attire appears stated age   Hygiene well groomed   Suicidality thoughts only   1. Wish to be Dead (Past Month) No   2. Non-Specific Active Suicidal Thoughts (Past Month) No   Non-Specific Active Suicidal Thought Description (Recent) If I wasn't in the hospital I wouldn't be safe   Self Injury other (see comment)  (denies)   Elopement   (none observed)   Activity other (see comment)  (social in milieu, attending groups)   Speech clear;coherent   Medication Sensitivity other (see comment)  (dizziness, short term memory issues)   Psychomotor / Gait balanced;steady   Activities of Daily Living   Hygiene/Grooming independent   Oral Hygiene independent   Dress independent   Laundry with supervision   Room Organization independent     "

## 2019-09-20 NOTE — PROGRESS NOTES
09/19/19 2000   Group Therapy Session   Group Attendance attended group session   Total Time (minutes) 50   Group Type psychotherapeutic   Group Topic Covered other (see comments)   Patient Participation/Contribution cooperative with task;discussed personal experience with topic;listened actively   Patient participated in psychotherapy group which focused on personal resiliency by identifying individual strengths and positive coping skills.    Sherry presented in a pleasant mood. She was engaged with the activity and processed with the group. Demonstrated positive social interactions with group.

## 2019-09-20 NOTE — DISCHARGE INSTRUCTIONS
Behavioral Discharge Planning and Instructions  Summary:  You were admitted on 9/5/2019  due to Depression.  You were treated by Dr. Johny Almaraz MD and Dr. Kaelyn Thompson discharged on 09/20/2019 from [facility] to Home at [address] [place] [address].     Principal Diagnosis:   Major depressive disorder severe recurrence  Generalized anxiety disorder  History of posttraumatic stress disorder  History of eating disorder  History of dyslexia    Health Care Follow-up Appointments:  Medication Management   Wednesday, September 25th at 3:00pm with [facility]   [address] [facility] [facility], [address]  [place] [id number]  Phone: [phone]  The agency asks that you arrive at least 30 minutes prior to the start of the appointment to complete new patient paperwork. Please be sure to bring your insurance card and ID to present to the reception staff. If you need to reschedule this appointment, please call at least 24 hours prior to the start of the appointment to do so.     Adult Day Treatment (ADT)  Days: Mondays and Fridays, 1:00pm-4:00pm   [facility]  [address] [place] [address], [facility] [address]  [place] [address]  Phone: [phone]  Please be sure to bring your insurance card and ID to present to the reception staff. If you need to reschedule this appointment, please call at least 24 hours prior to the start of the appointment if you need to cancel your attendance for the day.      Outpatient Therapy  Thursday, October 3rd at 2:00pm with [facility]   [address] [facility], [address]  [place] [address]  Phone: [phone]   If you need to reschedule this appointment, please call at least 24 hours prior to the start of the appointment to do so.   Attend all scheduled appointments with your outpatient providers. Call at least 24 hours in advance if you need to reschedule an appointment to ensure continued access to your outpatient  "providers.   Major Treatments, Procedures and Findings:  You were provided with: a psychiatric assessment, medication evaluation and/or management, group therapy and milieu management    Symptoms to Report: feeling more aggressive, increased confusion, losing more sleep, mood getting worse or thoughts of suicide    Early warning signs can include: increased depression or anxiety sleep disturbances increased thoughts or behaviors of suicide or self-harm  increased unusual thinking, such as paranoia or hearing voices    Safety and Wellness:  Take all medicines as directed.  Make no changes unless your doctor suggests them. Follow treatment recommendations. Refrain from alcohol and non-prescribed drugs.  Ask your support system to help you reduce your access to items that could harm yourself or others. Items could include:   - Firearms  - Medicines (both prescribed and over-the-counter)  - Knives and other sharp objects  - Ropes and like materials  - Car keys  If there is a concern for safety, call 911. If there is a concern for safety, call 911.    Resources:   [facility] Crisis Response - Adult 955-684-7312  Crisis Intervention: 388.846.8514 or 718-114-9094 (TTY: 859.215.9032).  Call anytime for help.  National [place] on Mental Illness (www.[place].alonzo.org): 854.942.6581 or 302-221-9193.  Suicide Awareness Voices of Education (SAVE) (www.save.org): 711-543-GJZA (8110)  National Suicide Prevention Line (www.mentalhealthmn.org): 027-034-FMAJ (5926)  Mental Health Consumer/Survivor Network of [place] (www.mhcsn.net): 603.199.8778 or 031-291-3747  Mental Health Association of [place] (www.mentalhealth.org): 864.425.7816 or 449-065-5190  Self- Management and Recovery Training., SMART-- Toll free: 223.572.9118  www.[employer]  Text 4 Life: txt \"LIFE\" to 89903 for immediate support and crisis intervention  Crisis text line: Text \"[place]\" to 658374. Free, confidential, 24/7.  Crisis Intervention: 838.364.6843 or 495-489-2391. Call " anytime for help.     Lifestyle Adjustment:   1. Adjust your lifestyle to get enough sleep, relaxation, exercise and good nutrition.  Continue to develop healthy coping skills to decrease stress and promote a healthy  lifestyle.  2. Abstain from all substances of abuse.  3. Take medications as prescribed.  Please work with your doctor to discuss any concerns you have with your medications or side effects you may be experiencing.  4. Follow up with appointments as scheduled.      If you would like to obtain any specific documentation regarding your hospitalization after your discharge, contact [place] Release of Information/Medical Records:  [phone]

## 2019-09-20 NOTE — PLAN OF CARE
"Discharge:  alert and orientated x 4.  C/o generalizes body aches/pains.  Medicated x 2 with Tylenol with decrease in symptoms.  Denies suicidal thoughts , thoughts of self harm and hallucinations.   Has flat and blunted affect, but brightens upon approach. Received written discharge instructions with verbal review of instructions.  All questions answered, verbalizes understanding.  Aware of need to put up prescribed medications at [facility] pharmacy. All belongings returned, unable to locate documented \"3x keys on a key chain\" in belongings.  States she is unaware of bringing any keys into the hospital and does not know what kind of keys these could be.  Document on belongings sheet.  Discharge at 1530 to home ,  provided transportation home.   "

## 2019-09-22 NOTE — DISCHARGE SUMMARY
Admit Date:     09/05/2019   Discharge Date:     09/20/2019      The patient was hospitalized between 09/05/2019 and 09/20/2019.  On the day of discharge, I spent with the patient 40 minutes; greater than 50% of the time was spent on counseling and coordinating care, clarifying diagnostic prognostic issues, presence of support in community, reviewing discharge medications and post-discharge followup.      CHIEF COMPLAINT AND REASON FOR ADMISSION:  The patient is a 39-year-old female with history of fibromyalgia, ? disease affecting her muscles, depression, posttraumatic stress disorder, dyslexia, eating disorder, generalized anxiety disorder, was admitted for worsening depressive symptoms and suicidal thoughts.  The patient reported discontinuation of Cymbalta, thinking it was related to worsening migraines.  She also reported worsening thoughts of harming herself, trouble functioning.  Upon meeting with her, she said that she had not been working for several years and felt as though she was a failure.  She continued to have suicidal thoughts that have worsened recently.  The patient reported feeling unsafe for when she is alone.  For more details about her past psychiatric symptoms and the patient's current presentation, please refer to Dr. Ben Quesada's note from 09/06/2019.      DISCHARGE DIAGNOSES:   1.  Major depressive disorder, moderate severity, recurrent.   2.  Generalized anxiety disorder.   3.  History of posttraumatic stress disorder.   4.  History of eating disorder.   5.  History of dyslexia.     6.  There is also a possibility of combination of cluster B and C personality disorder traits.      CONSULTATIONS:  The patient was seen by Internal Medicine due to her complaints of ear pain.  Internal Medicine suspected eustachian tube dysfunction or allergic rhinitis, recommended to start Flonase and start Claritin.  I appreciate Internal Medicine's help with this patient.      LABORATORY WORK:   Comprehensive metabolic battery was normal.  Fasting lipid panel showed decreased high-density cholesterol 49, low-density cholesterol 117, non-high-density cholesterol 141, but normal triglycerides.  The patient's pregnancy test was negative.  CBC with differential was normal.  TSH was normal.  Glucose was normal.  Urine drug screen was negative for all screened substances.  Micro culture of specimen from the patient's throat showed no beta streptococcus.      HOSPITAL COURSE:  After the patient was admitted to the hospital, as she had already discontinued Cymbalta, she was started on Pristiq and followed by Noemi Young, clinical nurse specialist.  The patient was followed by myself for the last week.  She presented as isolative and initially highly anxious, very focused on medications and possible side effects, reported better sleep, better appetite.  She reported multiple physical problems, for example, tingling in her face, numbness, often a fullness behind her forehead.  She reported that she recognized a pattern of feeling emotionally better when she has physical symptoms and physically better when she is depressed and anxious.  Throughout hospitalization, multiple medication changes were done per patient's request.  Pristiq was increased to 75 mg and 100 mg and then decreased back to 75 mg, as patient started complaining about fullness in her head.  She was started on Wellbutrin, and we had a number of discussions about this medication, as patient tended to blame side effects on this medication.  She shows some insight and eventually agreed to give medication a little bit more time to kick in, as by the end of hospitalization, she still complained about feeling depressed and highly anxious.  I told her that as of the moment of discharge, she was still taking only 100 mg of Wellbutrin slow-release, with a maximum recommended dose of 450 mg.  The patient, as stated, agreed to be more patient with medication  and give it more time to kick in.  Attempts were made to provide services, as much as possible, support outside; however, confirmed the patient was enrolled in day treatment program at [facility] on Monday and Friday.  She was recommended to ask her therapist to see her more frequently and also talk to day treatment about 3 days per week program instead of 2 days per week.  The patient's Pristiq was also increased.  Gabapentin was also increased to 200 mg 3 times a day.  Overall, the patient reported improvement and, by the end of hospitalization, said that she could contract for safety.  As she remained needy and anxious and was asking multiple questions about her physical symptoms, she was seen by Internal Medicine.  Internal Medicine reported above.  On the day of discharge, I had quite a long meeting with the patient.  We went over her medications.  I advised her to talk about further changes in her antidepressants with her prescriber.  We talked about post-discharge followup.      DISCHARGE MEDICATIONS:   1.  Ascorbic acid 500 mg daily.   2.  Atenolol 25 mg 2 times a day.   3.  Boric acid 600 mg vaginal suppository; Pharmacy to mix compound.  Place 600 vaginally at bedtime.   4.  Botox injected every 3 months for migraines.   5.  Bupropion 100 mg daily, slow release.   6.  Clindamycin/benzoyl peroxide 1-5% external gel, apply topically to acne once a day.   7.  Multivitamins 1 tablet daily.   8.  Pristiq 75 mg daily.   9.  Flonase 2 sprays into both nostrils daily.   10.  Gabapentin 200 mg 3 times a day.   11.  Hydroxyzine 25-50 mg by mouth 3 times a day as needed for anxiety.   12.  Lidocaine 4% patch applied to skin as needed to neck, shoulders and back.   13.  Loratadine 10 mg daily.   14.  Lorazepam 0.5 mg 2 times a day p.r.n. for anxiety.   15.  Melatonin 3 mg nightly as needed for sleep.   16.  Zofran 4 mg every 8 hours as needed for nausea.   17.  Riboflavin 400 mg daily.   18.  Maxalt 10 mg  at onset of headache for migraine.   19.  Tretinoin 0.025% external gel, apply topically daily.   20.  Vitamin E 900 mg capsule daily.   21.  Ambien 2.5-5 mg at bedtime p.r.n. for sleep.      FOLLOWUP APPOINTMENTS:  [facility] on 2018 at 3 p.m. with Princess Hurt, nurse practitioner; adult day treatment  and  1 p.m. to 4 p.m. at [facility] in [place]; outpatient therapy appointment on 10/03/2019 at 2 p.m. with Dafne Nichols at [facility] in [place].         KATIA PERALTA MD             D: 2019   T: 2019   MT: TOMMY      Name:     MICHELET JONES   MRN:      [id number]        Account:        [id number]   :      1980           Admit Date:     2019                                  Discharge Date: 2019      Document: [id number]

## 2019-11-14 ENCOUNTER — RECORDS - HEALTHEAST (OUTPATIENT)
Dept: ADMINISTRATIVE | Facility: OTHER | Age: 39
End: 2019-11-14

## 2019-12-11 ENCOUNTER — OFFICE VISIT - HEALTHEAST (OUTPATIENT)
Dept: FAMILY MEDICINE | Facility: CLINIC | Age: 39
End: 2019-12-11

## 2019-12-11 ENCOUNTER — COMMUNICATION - HEALTHEAST (OUTPATIENT)
Dept: FAMILY MEDICINE | Facility: CLINIC | Age: 39
End: 2019-12-11

## 2019-12-11 DIAGNOSIS — N64.4 BREAST PAIN: ICD-10-CM

## 2019-12-11 DIAGNOSIS — L70.0 ACNE VULGARIS: ICD-10-CM

## 2019-12-11 DIAGNOSIS — F41.1 GENERALIZED ANXIETY DISORDER: ICD-10-CM

## 2019-12-11 DIAGNOSIS — G43.009 MIGRAINE WITHOUT AURA AND WITHOUT STATUS MIGRAINOSUS, NOT INTRACTABLE: ICD-10-CM

## 2019-12-11 DIAGNOSIS — F33.9 MAJOR DEPRESSION, RECURRENT, CHRONIC (H): ICD-10-CM

## 2019-12-11 ASSESSMENT — PATIENT HEALTH QUESTIONNAIRE - PHQ9: SUM OF ALL RESPONSES TO PHQ QUESTIONS 1-9: 21

## 2019-12-11 ASSESSMENT — MIFFLIN-ST. JEOR: SCORE: 1122.87

## 2019-12-12 ENCOUNTER — COMMUNICATION - HEALTHEAST (OUTPATIENT)
Dept: SCHEDULING | Facility: CLINIC | Age: 39
End: 2019-12-12

## 2019-12-12 DIAGNOSIS — N64.4 BREAST PAIN: ICD-10-CM

## 2019-12-19 ENCOUNTER — HOSPITAL ENCOUNTER (OUTPATIENT)
Dept: MAMMOGRAPHY | Facility: CLINIC | Age: 39
Discharge: HOME | End: 2019-12-19
Attending: FAMILY MEDICINE

## 2019-12-19 ENCOUNTER — RECORDS - HEALTHEAST (OUTPATIENT)
Dept: ADMINISTRATIVE | Facility: OTHER | Age: 39
End: 2019-12-19

## 2019-12-19 DIAGNOSIS — N64.4 BREAST PAIN: ICD-10-CM

## 2020-01-03 ENCOUNTER — HOSPITAL ENCOUNTER (OUTPATIENT)
Dept: CARDIOLOGY | Facility: CLINIC | Age: 40
Discharge: HOME | End: 2020-01-03

## 2020-01-03 ENCOUNTER — AMBULATORY - HEALTHEAST (OUTPATIENT)
Dept: LAB | Facility: CLINIC | Age: 40
End: 2020-01-03

## 2020-01-03 ENCOUNTER — AMBULATORY - HEALTHEAST (OUTPATIENT)
Dept: CARDIOLOGY | Facility: CLINIC | Age: 40
End: 2020-01-03

## 2020-01-03 DIAGNOSIS — R00.0 INCREASED PULSE RATE: ICD-10-CM

## 2020-01-03 DIAGNOSIS — R42 DIZZINESS: ICD-10-CM

## 2020-01-03 DIAGNOSIS — F33.1 MAJOR DEPRESSIVE DISORDER, RECURRENT EPISODE, MODERATE (H): ICD-10-CM

## 2020-01-03 DIAGNOSIS — F33.9 MAJOR DEPRESSION, RECURRENT, CHRONIC (H): ICD-10-CM

## 2020-01-03 LAB
25(OH)D3 SERPL-MCNC: 27.5 NG/ML (ref 30–80)
ALBUMIN SERPL-MCNC: 4.1 G/DL (ref 3.5–5)
ALP SERPL-CCNC: 56 U/L (ref 45–120)
ALT SERPL W P-5'-P-CCNC: 14 U/L (ref 0–45)
ANION GAP SERPL CALCULATED.3IONS-SCNC: 6 MMOL/L (ref 5–18)
AST SERPL W P-5'-P-CCNC: 17 U/L (ref 0–40)
ATRIAL RATE - MUSE: 75 BPM
BASOPHILS # BLD AUTO: 0.1 THOU/UL (ref 0–0.2)
BASOPHILS NFR BLD AUTO: 1 % (ref 0–2)
BILIRUB SERPL-MCNC: 0.4 MG/DL (ref 0–1)
BUN SERPL-MCNC: 8 MG/DL (ref 8–22)
CALCIUM SERPL-MCNC: 9.5 MG/DL (ref 8.5–10.5)
CHLORIDE BLD-SCNC: 104 MMOL/L (ref 98–107)
CHOLEST SERPL-MCNC: 193 MG/DL
CO2 SERPL-SCNC: 30 MMOL/L (ref 22–31)
CREAT SERPL-MCNC: 0.78 MG/DL (ref 0.6–1.1)
DIASTOLIC BLOOD PRESSURE - MUSE: NORMAL
EOSINOPHIL # BLD AUTO: 0.1 THOU/UL (ref 0–0.4)
EOSINOPHIL NFR BLD AUTO: 1 % (ref 0–6)
ERYTHROCYTE [DISTWIDTH] IN BLOOD BY AUTOMATED COUNT: 12.8 % (ref 11–14.5)
FASTING STATUS PATIENT QL REPORTED: YES
GFR SERPL CREATININE-BSD FRML MDRD: >60 ML/MIN/1.73M2
GLUCOSE BLD-MCNC: 84 MG/DL (ref 70–125)
HCT VFR BLD AUTO: 42.3 % (ref 35–47)
HDLC SERPL-MCNC: 52 MG/DL
HGB BLD-MCNC: 13.7 G/DL (ref 12–16)
INTERPRETATION ECG - MUSE: NORMAL
IRON SATN MFR SERPL: 28 % (ref 20–50)
IRON SERPL-MCNC: 82 UG/DL (ref 42–175)
LDLC SERPL CALC-MCNC: 116 MG/DL
LYMPHOCYTES # BLD AUTO: 3 THOU/UL (ref 0.8–4.4)
LYMPHOCYTES NFR BLD AUTO: 38 % (ref 20–40)
MAGNESIUM SERPL-MCNC: 2.2 MG/DL (ref 1.8–2.6)
MCH RBC QN AUTO: 28.2 PG (ref 27–34)
MCHC RBC AUTO-ENTMCNC: 32.4 G/DL (ref 32–36)
MCV RBC AUTO: 87 FL (ref 80–100)
MONOCYTES # BLD AUTO: 0.4 THOU/UL (ref 0–0.9)
MONOCYTES NFR BLD AUTO: 5 % (ref 2–10)
NEUTROPHILS # BLD AUTO: 4.4 THOU/UL (ref 2–7.7)
NEUTROPHILS NFR BLD AUTO: 55 % (ref 50–70)
P AXIS - MUSE: 74 DEGREES
PHOSPHATE SERPL-MCNC: 3.8 MG/DL (ref 2.5–4.5)
PLATELET # BLD AUTO: 296 THOU/UL (ref 140–440)
PMV BLD AUTO: 9.6 FL (ref 8.5–12.5)
POTASSIUM BLD-SCNC: 3.9 MMOL/L (ref 3.5–5)
PR INTERVAL - MUSE: 144 MS
PROT SERPL-MCNC: 7 G/DL (ref 6–8)
QRS DURATION - MUSE: 88 MS
QT - MUSE: 384 MS
QTC - MUSE: 428 MS
R AXIS - MUSE: 82 DEGREES
RBC # BLD AUTO: 4.86 MILL/UL (ref 3.8–5.4)
SODIUM SERPL-SCNC: 140 MMOL/L (ref 136–145)
SYSTOLIC BLOOD PRESSURE - MUSE: NORMAL
T AXIS - MUSE: 60 DEGREES
TIBC SERPL-MCNC: 288 UG/DL (ref 313–563)
TRANSFERRIN SERPL-MCNC: 231 MG/DL (ref 212–360)
TRIGL SERPL-MCNC: 125 MG/DL
TSH SERPL DL<=0.005 MIU/L-ACNC: 1.51 UIU/ML (ref 0.3–5)
VENTRICULAR RATE- MUSE: 75 BPM
WBC: 8 THOU/UL (ref 4–11)

## 2020-01-09 ENCOUNTER — AMBULATORY - HEALTHEAST (OUTPATIENT)
Dept: LAB | Facility: CLINIC | Age: 40
End: 2020-01-09

## 2020-01-09 DIAGNOSIS — F33.1 MAJOR DEPRESSIVE DISORDER, RECURRENT EPISODE, MODERATE (H): ICD-10-CM

## 2020-01-09 LAB
CK SERPL-CCNC: 76 U/L (ref 30–190)
FOLATE SERPL-MCNC: 17.9 NG/ML
VIT B12 SERPL-MCNC: 1039 PG/ML (ref 213–816)

## 2020-01-22 ENCOUNTER — OFFICE VISIT - HEALTHEAST (OUTPATIENT)
Dept: FAMILY MEDICINE | Facility: CLINIC | Age: 40
End: 2020-01-22

## 2020-01-22 DIAGNOSIS — F41.1 GENERALIZED ANXIETY DISORDER: ICD-10-CM

## 2020-01-22 DIAGNOSIS — F33.9 MAJOR DEPRESSION, RECURRENT, CHRONIC (H): ICD-10-CM

## 2020-01-22 DIAGNOSIS — E88.40 MITOCHONDRIAL DISEASE (H): ICD-10-CM

## 2020-01-22 DIAGNOSIS — G47.00 INSOMNIA, UNSPECIFIED TYPE: ICD-10-CM

## 2020-01-22 DIAGNOSIS — R53.82 CHRONIC FATIGUE: ICD-10-CM

## 2020-01-23 ENCOUNTER — COMMUNICATION - HEALTHEAST (OUTPATIENT)
Dept: FAMILY MEDICINE | Facility: CLINIC | Age: 40
End: 2020-01-23

## 2020-01-23 ASSESSMENT — PATIENT HEALTH QUESTIONNAIRE - PHQ9: SUM OF ALL RESPONSES TO PHQ QUESTIONS 1-9: 16

## 2020-01-27 ENCOUNTER — COMMUNICATION - HEALTHEAST (OUTPATIENT)
Dept: FAMILY MEDICINE | Facility: CLINIC | Age: 40
End: 2020-01-27

## 2020-01-27 DIAGNOSIS — F41.1 GENERALIZED ANXIETY DISORDER: ICD-10-CM

## 2020-01-28 ENCOUNTER — COMMUNICATION - HEALTHEAST (OUTPATIENT)
Dept: FAMILY MEDICINE | Facility: CLINIC | Age: 40
End: 2020-01-28

## 2020-03-17 ENCOUNTER — COMMUNICATION - HEALTHEAST (OUTPATIENT)
Dept: FAMILY MEDICINE | Facility: CLINIC | Age: 40
End: 2020-03-17

## 2020-03-17 DIAGNOSIS — R21 RASH: ICD-10-CM

## 2020-03-25 ENCOUNTER — COMMUNICATION - HEALTHEAST (OUTPATIENT)
Dept: FAMILY MEDICINE | Facility: CLINIC | Age: 40
End: 2020-03-25

## 2020-03-25 DIAGNOSIS — F43.10 POSTTRAUMATIC STRESS DISORDER: ICD-10-CM

## 2020-03-25 DIAGNOSIS — F33.9 MAJOR DEPRESSION, RECURRENT, CHRONIC (H): ICD-10-CM

## 2020-03-25 DIAGNOSIS — G71.3 MITOCHONDRIAL MYOPATHY: ICD-10-CM

## 2020-03-25 DIAGNOSIS — G43.009 MIGRAINE WITHOUT AURA AND WITHOUT STATUS MIGRAINOSUS, NOT INTRACTABLE: ICD-10-CM

## 2020-03-25 DIAGNOSIS — F41.1 GENERALIZED ANXIETY DISORDER: ICD-10-CM

## 2020-04-01 ENCOUNTER — COMMUNICATION - HEALTHEAST (OUTPATIENT)
Dept: FAMILY MEDICINE | Facility: CLINIC | Age: 40
End: 2020-04-01

## 2020-04-02 ENCOUNTER — RECORDS - HEALTHEAST (OUTPATIENT)
Dept: ADMINISTRATIVE | Facility: OTHER | Age: 40
End: 2020-04-02

## 2020-04-14 ENCOUNTER — OFFICE VISIT - HEALTHEAST (OUTPATIENT)
Dept: FAMILY MEDICINE | Facility: CLINIC | Age: 40
End: 2020-04-14

## 2020-04-14 ENCOUNTER — COMMUNICATION - HEALTHEAST (OUTPATIENT)
Dept: FAMILY MEDICINE | Facility: CLINIC | Age: 40
End: 2020-04-14

## 2020-04-14 DIAGNOSIS — J01.00 ACUTE NON-RECURRENT MAXILLARY SINUSITIS: ICD-10-CM

## 2020-04-15 ENCOUNTER — COMMUNICATION - HEALTHEAST (OUTPATIENT)
Dept: BEHAVIORAL HEALTH | Facility: CLINIC | Age: 40
End: 2020-04-15

## 2020-04-15 ENCOUNTER — OFFICE VISIT - HEALTHEAST (OUTPATIENT)
Dept: BEHAVIORAL HEALTH | Facility: CLINIC | Age: 40
End: 2020-04-15

## 2020-04-15 DIAGNOSIS — G47.09 OTHER INSOMNIA: ICD-10-CM

## 2020-04-15 DIAGNOSIS — F33.9 MAJOR DEPRESSION, RECURRENT, CHRONIC (H): ICD-10-CM

## 2020-04-15 DIAGNOSIS — F41.1 GENERALIZED ANXIETY DISORDER: ICD-10-CM

## 2020-04-15 ASSESSMENT — ANXIETY QUESTIONNAIRES
7. FEELING AFRAID AS IF SOMETHING AWFUL MIGHT HAPPEN: NEARLY EVERY DAY
3. WORRYING TOO MUCH ABOUT DIFFERENT THINGS: NEARLY EVERY DAY
5. BEING SO RESTLESS THAT IT IS HARD TO SIT STILL: MORE THAN HALF THE DAYS
4. TROUBLE RELAXING: MORE THAN HALF THE DAYS
GAD7 TOTAL SCORE: 15
2. NOT BEING ABLE TO STOP OR CONTROL WORRYING: MORE THAN HALF THE DAYS
1. FEELING NERVOUS, ANXIOUS, OR ON EDGE: MORE THAN HALF THE DAYS
IF YOU CHECKED OFF ANY PROBLEMS ON THIS QUESTIONNAIRE, HOW DIFFICULT HAVE THESE PROBLEMS MADE IT FOR YOU TO DO YOUR WORK, TAKE CARE OF THINGS AT HOME, OR GET ALONG WITH OTHER PEOPLE: EXTREMELY DIFFICULT
6. BECOMING EASILY ANNOYED OR IRRITABLE: SEVERAL DAYS

## 2020-04-15 ASSESSMENT — PATIENT HEALTH QUESTIONNAIRE - PHQ9: SUM OF ALL RESPONSES TO PHQ QUESTIONS 1-9: 16

## 2020-05-06 ENCOUNTER — OFFICE VISIT - HEALTHEAST (OUTPATIENT)
Dept: FAMILY MEDICINE | Facility: CLINIC | Age: 40
End: 2020-05-06

## 2020-05-06 ENCOUNTER — COMMUNICATION - HEALTHEAST (OUTPATIENT)
Dept: FAMILY MEDICINE | Facility: CLINIC | Age: 40
End: 2020-05-06

## 2020-05-06 DIAGNOSIS — N30.00 ACUTE CYSTITIS WITHOUT HEMATURIA: ICD-10-CM

## 2020-05-12 ENCOUNTER — COMMUNICATION - HEALTHEAST (OUTPATIENT)
Dept: BEHAVIORAL HEALTH | Facility: CLINIC | Age: 40
End: 2020-05-12

## 2020-05-13 ENCOUNTER — OFFICE VISIT - HEALTHEAST (OUTPATIENT)
Dept: BEHAVIORAL HEALTH | Facility: CLINIC | Age: 40
End: 2020-05-13

## 2020-05-13 DIAGNOSIS — F33.9 MAJOR DEPRESSION, RECURRENT, CHRONIC (H): ICD-10-CM

## 2020-05-13 ASSESSMENT — ANXIETY QUESTIONNAIRES
5. BEING SO RESTLESS THAT IT IS HARD TO SIT STILL: MORE THAN HALF THE DAYS
IF YOU CHECKED OFF ANY PROBLEMS ON THIS QUESTIONNAIRE, HOW DIFFICULT HAVE THESE PROBLEMS MADE IT FOR YOU TO DO YOUR WORK, TAKE CARE OF THINGS AT HOME, OR GET ALONG WITH OTHER PEOPLE: VERY DIFFICULT
4. TROUBLE RELAXING: MORE THAN HALF THE DAYS
7. FEELING AFRAID AS IF SOMETHING AWFUL MIGHT HAPPEN: NEARLY EVERY DAY
1. FEELING NERVOUS, ANXIOUS, OR ON EDGE: NEARLY EVERY DAY
GAD7 TOTAL SCORE: 15
3. WORRYING TOO MUCH ABOUT DIFFERENT THINGS: MORE THAN HALF THE DAYS
2. NOT BEING ABLE TO STOP OR CONTROL WORRYING: MORE THAN HALF THE DAYS
6. BECOMING EASILY ANNOYED OR IRRITABLE: SEVERAL DAYS

## 2020-05-13 ASSESSMENT — PATIENT HEALTH QUESTIONNAIRE - PHQ9: SUM OF ALL RESPONSES TO PHQ QUESTIONS 1-9: 11

## 2020-05-15 ENCOUNTER — COMMUNICATION - HEALTHEAST (OUTPATIENT)
Dept: FAMILY MEDICINE | Facility: CLINIC | Age: 40
End: 2020-05-15

## 2020-05-15 DIAGNOSIS — R53.83 FATIGUE: ICD-10-CM

## 2020-05-19 ENCOUNTER — COMMUNICATION - HEALTHEAST (OUTPATIENT)
Dept: BEHAVIORAL HEALTH | Facility: CLINIC | Age: 40
End: 2020-05-19

## 2020-05-19 DIAGNOSIS — F33.9 MAJOR DEPRESSION, RECURRENT, CHRONIC (H): ICD-10-CM

## 2020-06-09 ENCOUNTER — COMMUNICATION - HEALTHEAST (OUTPATIENT)
Dept: BEHAVIORAL HEALTH | Facility: CLINIC | Age: 40
End: 2020-06-09

## 2020-06-10 ENCOUNTER — OFFICE VISIT - HEALTHEAST (OUTPATIENT)
Dept: BEHAVIORAL HEALTH | Facility: CLINIC | Age: 40
End: 2020-06-10

## 2020-06-10 DIAGNOSIS — F41.1 GENERALIZED ANXIETY DISORDER: ICD-10-CM

## 2020-06-10 DIAGNOSIS — G47.09 OTHER INSOMNIA: ICD-10-CM

## 2020-06-10 DIAGNOSIS — F33.9 MAJOR DEPRESSION, RECURRENT, CHRONIC (H): ICD-10-CM

## 2020-06-10 ASSESSMENT — ANXIETY QUESTIONNAIRES
5. BEING SO RESTLESS THAT IT IS HARD TO SIT STILL: NOT AT ALL
3. WORRYING TOO MUCH ABOUT DIFFERENT THINGS: NEARLY EVERY DAY
6. BECOMING EASILY ANNOYED OR IRRITABLE: SEVERAL DAYS
7. FEELING AFRAID AS IF SOMETHING AWFUL MIGHT HAPPEN: NEARLY EVERY DAY
1. FEELING NERVOUS, ANXIOUS, OR ON EDGE: NEARLY EVERY DAY
GAD7 TOTAL SCORE: 14
2. NOT BEING ABLE TO STOP OR CONTROL WORRYING: SEVERAL DAYS
4. TROUBLE RELAXING: NEARLY EVERY DAY

## 2020-06-10 ASSESSMENT — PATIENT HEALTH QUESTIONNAIRE - PHQ9: SUM OF ALL RESPONSES TO PHQ QUESTIONS 1-9: 17

## 2020-06-11 ENCOUNTER — COMMUNICATION - HEALTHEAST (OUTPATIENT)
Dept: BEHAVIORAL HEALTH | Facility: CLINIC | Age: 40
End: 2020-06-11

## 2020-06-11 ENCOUNTER — COMMUNICATION - HEALTHEAST (OUTPATIENT)
Dept: FAMILY MEDICINE | Facility: CLINIC | Age: 40
End: 2020-06-11

## 2020-06-11 DIAGNOSIS — R21 RASH: ICD-10-CM

## 2020-06-17 ENCOUNTER — RECORDS - HEALTHEAST (OUTPATIENT)
Dept: ADMINISTRATIVE | Facility: OTHER | Age: 40
End: 2020-06-17

## 2020-06-22 ENCOUNTER — OFFICE VISIT - HEALTHEAST (OUTPATIENT)
Dept: BEHAVIORAL HEALTH | Facility: CLINIC | Age: 40
End: 2020-06-22

## 2020-06-22 DIAGNOSIS — F33.9 MAJOR DEPRESSION, RECURRENT, CHRONIC (H): ICD-10-CM

## 2020-06-22 DIAGNOSIS — F41.1 GENERALIZED ANXIETY DISORDER: ICD-10-CM

## 2020-06-22 ASSESSMENT — ANXIETY QUESTIONNAIRES
1. FEELING NERVOUS, ANXIOUS, OR ON EDGE: NEARLY EVERY DAY
GAD7 TOTAL SCORE: 19
6. BECOMING EASILY ANNOYED OR IRRITABLE: NEARLY EVERY DAY
2. NOT BEING ABLE TO STOP OR CONTROL WORRYING: MORE THAN HALF THE DAYS
7. FEELING AFRAID AS IF SOMETHING AWFUL MIGHT HAPPEN: NEARLY EVERY DAY
4. TROUBLE RELAXING: NEARLY EVERY DAY
5. BEING SO RESTLESS THAT IT IS HARD TO SIT STILL: MORE THAN HALF THE DAYS
3. WORRYING TOO MUCH ABOUT DIFFERENT THINGS: NEARLY EVERY DAY

## 2020-06-22 ASSESSMENT — PATIENT HEALTH QUESTIONNAIRE - PHQ9: SUM OF ALL RESPONSES TO PHQ QUESTIONS 1-9: 18

## 2020-06-23 ENCOUNTER — COMMUNICATION - HEALTHEAST (OUTPATIENT)
Dept: BEHAVIORAL HEALTH | Facility: CLINIC | Age: 40
End: 2020-06-23

## 2020-06-24 ENCOUNTER — COMMUNICATION - HEALTHEAST (OUTPATIENT)
Dept: BEHAVIORAL HEALTH | Facility: CLINIC | Age: 40
End: 2020-06-24

## 2020-07-06 ENCOUNTER — OFFICE VISIT - HEALTHEAST (OUTPATIENT)
Dept: BEHAVIORAL HEALTH | Facility: CLINIC | Age: 40
End: 2020-07-06

## 2020-07-06 DIAGNOSIS — F33.9 MAJOR DEPRESSION, RECURRENT, CHRONIC (H): ICD-10-CM

## 2020-07-06 DIAGNOSIS — F41.1 GENERALIZED ANXIETY DISORDER: ICD-10-CM

## 2020-07-06 ASSESSMENT — ANXIETY QUESTIONNAIRES
4. TROUBLE RELAXING: NEARLY EVERY DAY
7. FEELING AFRAID AS IF SOMETHING AWFUL MIGHT HAPPEN: NEARLY EVERY DAY
2. NOT BEING ABLE TO STOP OR CONTROL WORRYING: NEARLY EVERY DAY
1. FEELING NERVOUS, ANXIOUS, OR ON EDGE: NEARLY EVERY DAY
6. BECOMING EASILY ANNOYED OR IRRITABLE: NEARLY EVERY DAY
3. WORRYING TOO MUCH ABOUT DIFFERENT THINGS: NEARLY EVERY DAY
GAD7 TOTAL SCORE: 20
5. BEING SO RESTLESS THAT IT IS HARD TO SIT STILL: MORE THAN HALF THE DAYS

## 2020-07-06 ASSESSMENT — PATIENT HEALTH QUESTIONNAIRE - PHQ9: SUM OF ALL RESPONSES TO PHQ QUESTIONS 1-9: 14

## 2020-07-20 ENCOUNTER — COMMUNICATION - HEALTHEAST (OUTPATIENT)
Dept: FAMILY MEDICINE | Facility: CLINIC | Age: 40
End: 2020-07-20

## 2020-07-23 ENCOUNTER — COMMUNICATION - HEALTHEAST (OUTPATIENT)
Dept: FAMILY MEDICINE | Facility: CLINIC | Age: 40
End: 2020-07-23

## 2020-07-23 ENCOUNTER — OFFICE VISIT - HEALTHEAST (OUTPATIENT)
Dept: FAMILY MEDICINE | Facility: CLINIC | Age: 40
End: 2020-07-23

## 2020-07-23 DIAGNOSIS — R10.12 ABDOMINAL PAIN, LEFT UPPER QUADRANT: ICD-10-CM

## 2020-07-27 ENCOUNTER — COMMUNICATION - HEALTHEAST (OUTPATIENT)
Dept: BEHAVIORAL HEALTH | Facility: CLINIC | Age: 40
End: 2020-07-27

## 2020-08-03 ENCOUNTER — COMMUNICATION - HEALTHEAST (OUTPATIENT)
Dept: BEHAVIORAL HEALTH | Facility: CLINIC | Age: 40
End: 2020-08-03

## 2020-08-03 ENCOUNTER — OFFICE VISIT - HEALTHEAST (OUTPATIENT)
Dept: BEHAVIORAL HEALTH | Facility: CLINIC | Age: 40
End: 2020-08-03

## 2020-08-03 DIAGNOSIS — F41.1 GENERALIZED ANXIETY DISORDER: ICD-10-CM

## 2020-08-03 DIAGNOSIS — F33.9 MAJOR DEPRESSION, RECURRENT, CHRONIC (H): ICD-10-CM

## 2020-08-03 ASSESSMENT — ANXIETY QUESTIONNAIRES
IF YOU CHECKED OFF ANY PROBLEMS ON THIS QUESTIONNAIRE, HOW DIFFICULT HAVE THESE PROBLEMS MADE IT FOR YOU TO DO YOUR WORK, TAKE CARE OF THINGS AT HOME, OR GET ALONG WITH OTHER PEOPLE: SOMEWHAT DIFFICULT
2. NOT BEING ABLE TO STOP OR CONTROL WORRYING: SEVERAL DAYS
3. WORRYING TOO MUCH ABOUT DIFFERENT THINGS: MORE THAN HALF THE DAYS
6. BECOMING EASILY ANNOYED OR IRRITABLE: MORE THAN HALF THE DAYS
1. FEELING NERVOUS, ANXIOUS, OR ON EDGE: MORE THAN HALF THE DAYS
7. FEELING AFRAID AS IF SOMETHING AWFUL MIGHT HAPPEN: NEARLY EVERY DAY
GAD7 TOTAL SCORE: 13
5. BEING SO RESTLESS THAT IT IS HARD TO SIT STILL: SEVERAL DAYS
4. TROUBLE RELAXING: MORE THAN HALF THE DAYS

## 2020-08-03 ASSESSMENT — PATIENT HEALTH QUESTIONNAIRE - PHQ9: SUM OF ALL RESPONSES TO PHQ QUESTIONS 1-9: 10

## 2020-08-11 ENCOUNTER — COMMUNICATION - HEALTHEAST (OUTPATIENT)
Dept: BEHAVIORAL HEALTH | Facility: CLINIC | Age: 40
End: 2020-08-11

## 2020-08-17 ENCOUNTER — OFFICE VISIT - HEALTHEAST (OUTPATIENT)
Dept: BEHAVIORAL HEALTH | Facility: CLINIC | Age: 40
End: 2020-08-17

## 2020-08-17 ENCOUNTER — COMMUNICATION - HEALTHEAST (OUTPATIENT)
Dept: BEHAVIORAL HEALTH | Facility: CLINIC | Age: 40
End: 2020-08-17

## 2020-08-17 DIAGNOSIS — F33.9 MAJOR DEPRESSION, RECURRENT, CHRONIC (H): ICD-10-CM

## 2020-08-17 ASSESSMENT — ANXIETY QUESTIONNAIRES
GAD7 TOTAL SCORE: 14
2. NOT BEING ABLE TO STOP OR CONTROL WORRYING: SEVERAL DAYS
4. TROUBLE RELAXING: NEARLY EVERY DAY
7. FEELING AFRAID AS IF SOMETHING AWFUL MIGHT HAPPEN: MORE THAN HALF THE DAYS
1. FEELING NERVOUS, ANXIOUS, OR ON EDGE: MORE THAN HALF THE DAYS
3. WORRYING TOO MUCH ABOUT DIFFERENT THINGS: MORE THAN HALF THE DAYS
5. BEING SO RESTLESS THAT IT IS HARD TO SIT STILL: MORE THAN HALF THE DAYS
6. BECOMING EASILY ANNOYED OR IRRITABLE: MORE THAN HALF THE DAYS

## 2020-08-17 ASSESSMENT — PATIENT HEALTH QUESTIONNAIRE - PHQ9: SUM OF ALL RESPONSES TO PHQ QUESTIONS 1-9: 11

## 2020-08-24 ENCOUNTER — COMMUNICATION - HEALTHEAST (OUTPATIENT)
Dept: BEHAVIORAL HEALTH | Facility: CLINIC | Age: 40
End: 2020-08-24

## 2020-08-24 DIAGNOSIS — F33.9 MAJOR DEPRESSION, RECURRENT, CHRONIC (H): ICD-10-CM

## 2020-09-08 ENCOUNTER — COMMUNICATION - HEALTHEAST (OUTPATIENT)
Dept: FAMILY MEDICINE | Facility: CLINIC | Age: 40
End: 2020-09-08

## 2020-09-14 ENCOUNTER — OFFICE VISIT - HEALTHEAST (OUTPATIENT)
Dept: FAMILY MEDICINE | Facility: CLINIC | Age: 40
End: 2020-09-14

## 2020-09-14 DIAGNOSIS — K29.00 ACUTE GASTRITIS WITHOUT HEMORRHAGE, UNSPECIFIED GASTRITIS TYPE: ICD-10-CM

## 2020-09-14 DIAGNOSIS — G43.009 MIGRAINE WITHOUT AURA AND WITHOUT STATUS MIGRAINOSUS, NOT INTRACTABLE: ICD-10-CM

## 2020-09-14 ASSESSMENT — ANXIETY QUESTIONNAIRES
7. FEELING AFRAID AS IF SOMETHING AWFUL MIGHT HAPPEN: NEARLY EVERY DAY
GAD7 TOTAL SCORE: 19
2. NOT BEING ABLE TO STOP OR CONTROL WORRYING: NEARLY EVERY DAY
IF YOU CHECKED OFF ANY PROBLEMS ON THIS QUESTIONNAIRE, HOW DIFFICULT HAVE THESE PROBLEMS MADE IT FOR YOU TO DO YOUR WORK, TAKE CARE OF THINGS AT HOME, OR GET ALONG WITH OTHER PEOPLE: VERY DIFFICULT
6. BECOMING EASILY ANNOYED OR IRRITABLE: NEARLY EVERY DAY
5. BEING SO RESTLESS THAT IT IS HARD TO SIT STILL: MORE THAN HALF THE DAYS
3. WORRYING TOO MUCH ABOUT DIFFERENT THINGS: NEARLY EVERY DAY
4. TROUBLE RELAXING: NEARLY EVERY DAY
1. FEELING NERVOUS, ANXIOUS, OR ON EDGE: MORE THAN HALF THE DAYS

## 2020-09-14 ASSESSMENT — PATIENT HEALTH QUESTIONNAIRE - PHQ9: SUM OF ALL RESPONSES TO PHQ QUESTIONS 1-9: 8

## 2020-09-15 ENCOUNTER — OFFICE VISIT - HEALTHEAST (OUTPATIENT)
Dept: BEHAVIORAL HEALTH | Facility: CLINIC | Age: 40
End: 2020-09-15

## 2020-09-15 DIAGNOSIS — F33.9 MAJOR DEPRESSION, RECURRENT, CHRONIC (H): ICD-10-CM

## 2020-09-15 ASSESSMENT — ANXIETY QUESTIONNAIRES
GAD7 TOTAL SCORE: 19
IF YOU CHECKED OFF ANY PROBLEMS ON THIS QUESTIONNAIRE, HOW DIFFICULT HAVE THESE PROBLEMS MADE IT FOR YOU TO DO YOUR WORK, TAKE CARE OF THINGS AT HOME, OR GET ALONG WITH OTHER PEOPLE: EXTREMELY DIFFICULT
6. BECOMING EASILY ANNOYED OR IRRITABLE: NEARLY EVERY DAY
2. NOT BEING ABLE TO STOP OR CONTROL WORRYING: MORE THAN HALF THE DAYS
4. TROUBLE RELAXING: NEARLY EVERY DAY
7. FEELING AFRAID AS IF SOMETHING AWFUL MIGHT HAPPEN: NEARLY EVERY DAY
3. WORRYING TOO MUCH ABOUT DIFFERENT THINGS: NEARLY EVERY DAY
5. BEING SO RESTLESS THAT IT IS HARD TO SIT STILL: MORE THAN HALF THE DAYS
1. FEELING NERVOUS, ANXIOUS, OR ON EDGE: NEARLY EVERY DAY

## 2020-09-15 ASSESSMENT — PATIENT HEALTH QUESTIONNAIRE - PHQ9: SUM OF ALL RESPONSES TO PHQ QUESTIONS 1-9: 8

## 2020-09-17 ENCOUNTER — COMMUNICATION - HEALTHEAST (OUTPATIENT)
Dept: BEHAVIORAL HEALTH | Facility: CLINIC | Age: 40
End: 2020-09-17

## 2020-09-17 DIAGNOSIS — F33.9 MAJOR DEPRESSION, RECURRENT, CHRONIC (H): ICD-10-CM

## 2020-09-24 ENCOUNTER — COMMUNICATION - HEALTHEAST (OUTPATIENT)
Dept: BEHAVIORAL HEALTH | Facility: CLINIC | Age: 40
End: 2020-09-24

## 2020-09-24 DIAGNOSIS — F33.9 MAJOR DEPRESSION, RECURRENT, CHRONIC (H): ICD-10-CM

## 2020-10-13 ENCOUNTER — OFFICE VISIT - HEALTHEAST (OUTPATIENT)
Dept: BEHAVIORAL HEALTH | Facility: CLINIC | Age: 40
End: 2020-10-13

## 2020-10-13 DIAGNOSIS — E55.9 VITAMIN D DEFICIENCY, UNSPECIFIED: ICD-10-CM

## 2020-10-13 DIAGNOSIS — F41.1 GENERALIZED ANXIETY DISORDER: ICD-10-CM

## 2020-10-13 DIAGNOSIS — Z51.81 MEDICATION MONITORING ENCOUNTER: ICD-10-CM

## 2020-10-13 DIAGNOSIS — F33.41 MDD (MAJOR DEPRESSIVE DISORDER), RECURRENT, IN PARTIAL REMISSION (H): ICD-10-CM

## 2020-10-13 ASSESSMENT — ANXIETY QUESTIONNAIRES
4. TROUBLE RELAXING: NEARLY EVERY DAY
2. NOT BEING ABLE TO STOP OR CONTROL WORRYING: SEVERAL DAYS
6. BECOMING EASILY ANNOYED OR IRRITABLE: MORE THAN HALF THE DAYS
5. BEING SO RESTLESS THAT IT IS HARD TO SIT STILL: MORE THAN HALF THE DAYS
3. WORRYING TOO MUCH ABOUT DIFFERENT THINGS: MORE THAN HALF THE DAYS
7. FEELING AFRAID AS IF SOMETHING AWFUL MIGHT HAPPEN: NEARLY EVERY DAY
IF YOU CHECKED OFF ANY PROBLEMS ON THIS QUESTIONNAIRE, HOW DIFFICULT HAVE THESE PROBLEMS MADE IT FOR YOU TO DO YOUR WORK, TAKE CARE OF THINGS AT HOME, OR GET ALONG WITH OTHER PEOPLE: VERY DIFFICULT
1. FEELING NERVOUS, ANXIOUS, OR ON EDGE: MORE THAN HALF THE DAYS
GAD7 TOTAL SCORE: 15

## 2020-10-13 ASSESSMENT — PATIENT HEALTH QUESTIONNAIRE - PHQ9: SUM OF ALL RESPONSES TO PHQ QUESTIONS 1-9: 10

## 2020-10-22 ENCOUNTER — COMMUNICATION - HEALTHEAST (OUTPATIENT)
Dept: FAMILY MEDICINE | Facility: CLINIC | Age: 40
End: 2020-10-22

## 2020-10-22 DIAGNOSIS — K29.00 ACUTE GASTRITIS WITHOUT HEMORRHAGE, UNSPECIFIED GASTRITIS TYPE: ICD-10-CM

## 2020-11-10 ENCOUNTER — COMMUNICATION - HEALTHEAST (OUTPATIENT)
Dept: BEHAVIORAL HEALTH | Facility: CLINIC | Age: 40
End: 2020-11-10

## 2020-11-10 DIAGNOSIS — F33.9 MAJOR DEPRESSION, RECURRENT, CHRONIC (H): ICD-10-CM

## 2020-12-14 ENCOUNTER — COMMUNICATION - HEALTHEAST (OUTPATIENT)
Dept: FAMILY MEDICINE | Facility: CLINIC | Age: 40
End: 2020-12-14

## 2020-12-14 DIAGNOSIS — M79.661 RIGHT CALF PAIN: ICD-10-CM

## 2020-12-15 ENCOUNTER — HOSPITAL ENCOUNTER (OUTPATIENT)
Dept: ULTRASOUND IMAGING | Facility: HOSPITAL | Age: 40
Discharge: HOME | End: 2020-12-15
Attending: FAMILY MEDICINE

## 2020-12-15 DIAGNOSIS — M79.661 RIGHT CALF PAIN: ICD-10-CM

## 2020-12-16 ENCOUNTER — OFFICE VISIT - HEALTHEAST (OUTPATIENT)
Dept: FAMILY MEDICINE | Facility: CLINIC | Age: 40
End: 2020-12-16

## 2020-12-16 DIAGNOSIS — M79.661 RIGHT CALF PAIN: ICD-10-CM

## 2021-01-11 ENCOUNTER — OFFICE VISIT - HEALTHEAST (OUTPATIENT)
Dept: BEHAVIORAL HEALTH | Facility: CLINIC | Age: 41
End: 2021-01-11

## 2021-01-11 DIAGNOSIS — F33.9 MAJOR DEPRESSION, RECURRENT, CHRONIC (H): ICD-10-CM

## 2021-01-11 DIAGNOSIS — G47.09 OTHER INSOMNIA: ICD-10-CM

## 2021-01-11 ASSESSMENT — ANXIETY QUESTIONNAIRES
3. WORRYING TOO MUCH ABOUT DIFFERENT THINGS: MORE THAN HALF THE DAYS
IF YOU CHECKED OFF ANY PROBLEMS ON THIS QUESTIONNAIRE, HOW DIFFICULT HAVE THESE PROBLEMS MADE IT FOR YOU TO DO YOUR WORK, TAKE CARE OF THINGS AT HOME, OR GET ALONG WITH OTHER PEOPLE: EXTREMELY DIFFICULT
5. BEING SO RESTLESS THAT IT IS HARD TO SIT STILL: NOT AT ALL
4. TROUBLE RELAXING: NEARLY EVERY DAY
2. NOT BEING ABLE TO STOP OR CONTROL WORRYING: MORE THAN HALF THE DAYS
6. BECOMING EASILY ANNOYED OR IRRITABLE: NEARLY EVERY DAY
7. FEELING AFRAID AS IF SOMETHING AWFUL MIGHT HAPPEN: NEARLY EVERY DAY
GAD7 TOTAL SCORE: 16
1. FEELING NERVOUS, ANXIOUS, OR ON EDGE: NEARLY EVERY DAY

## 2021-01-11 ASSESSMENT — PATIENT HEALTH QUESTIONNAIRE - PHQ9: SUM OF ALL RESPONSES TO PHQ QUESTIONS 1-9: 16

## 2021-01-18 ENCOUNTER — RECORDS - HEALTHEAST (OUTPATIENT)
Dept: ADMINISTRATIVE | Facility: OTHER | Age: 41
End: 2021-01-18

## 2021-02-15 ENCOUNTER — COMMUNICATION - HEALTHEAST (OUTPATIENT)
Dept: BEHAVIORAL HEALTH | Facility: CLINIC | Age: 41
End: 2021-02-15

## 2021-02-15 DIAGNOSIS — G47.09 OTHER INSOMNIA: ICD-10-CM

## 2021-02-16 ENCOUNTER — COMMUNICATION - HEALTHEAST (OUTPATIENT)
Dept: BEHAVIORAL HEALTH | Facility: CLINIC | Age: 41
End: 2021-02-16

## 2021-02-16 DIAGNOSIS — G47.09 OTHER INSOMNIA: ICD-10-CM

## 2021-02-25 ENCOUNTER — COMMUNICATION - HEALTHEAST (OUTPATIENT)
Dept: BEHAVIORAL HEALTH | Facility: CLINIC | Age: 41
End: 2021-02-25

## 2021-02-25 ENCOUNTER — AMBULATORY - HEALTHEAST (OUTPATIENT)
Dept: BEHAVIORAL HEALTH | Facility: CLINIC | Age: 41
End: 2021-02-25

## 2021-02-25 ENCOUNTER — COMMUNICATION - HEALTHEAST (OUTPATIENT)
Dept: FAMILY MEDICINE | Facility: CLINIC | Age: 41
End: 2021-02-25

## 2021-02-25 DIAGNOSIS — K29.00 ACUTE GASTRITIS WITHOUT HEMORRHAGE, UNSPECIFIED GASTRITIS TYPE: ICD-10-CM

## 2021-03-01 ENCOUNTER — COMMUNICATION - HEALTHEAST (OUTPATIENT)
Dept: FAMILY MEDICINE | Facility: CLINIC | Age: 41
End: 2021-03-01

## 2021-03-05 ENCOUNTER — COMMUNICATION - HEALTHEAST (OUTPATIENT)
Dept: BEHAVIORAL HEALTH | Facility: CLINIC | Age: 41
End: 2021-03-05

## 2021-03-08 ENCOUNTER — COMMUNICATION - HEALTHEAST (OUTPATIENT)
Dept: FAMILY MEDICINE | Facility: CLINIC | Age: 41
End: 2021-03-08

## 2021-03-12 ENCOUNTER — COMMUNICATION - HEALTHEAST (OUTPATIENT)
Dept: BEHAVIORAL HEALTH | Facility: CLINIC | Age: 41
End: 2021-03-12

## 2021-03-15 ENCOUNTER — COMMUNICATION - HEALTHEAST (OUTPATIENT)
Dept: FAMILY MEDICINE | Facility: CLINIC | Age: 41
End: 2021-03-15

## 2021-03-15 DIAGNOSIS — G47.00 INSOMNIA, UNSPECIFIED TYPE: ICD-10-CM

## 2021-03-23 ENCOUNTER — COMMUNICATION - HEALTHEAST (OUTPATIENT)
Dept: BEHAVIORAL HEALTH | Facility: CLINIC | Age: 41
End: 2021-03-23

## 2021-03-23 DIAGNOSIS — F33.9 MAJOR DEPRESSION, RECURRENT, CHRONIC (H): ICD-10-CM

## 2021-03-25 ENCOUNTER — COMMUNICATION - HEALTHEAST (OUTPATIENT)
Dept: FAMILY MEDICINE | Facility: CLINIC | Age: 41
End: 2021-03-25

## 2021-03-31 ENCOUNTER — COMMUNICATION - HEALTHEAST (OUTPATIENT)
Dept: FAMILY MEDICINE | Facility: CLINIC | Age: 41
End: 2021-03-31

## 2021-04-05 ENCOUNTER — COMMUNICATION - HEALTHEAST (OUTPATIENT)
Dept: FAMILY MEDICINE | Facility: CLINIC | Age: 41
End: 2021-04-05

## 2021-04-05 ENCOUNTER — OFFICE VISIT - HEALTHEAST (OUTPATIENT)
Dept: BEHAVIORAL HEALTH | Facility: CLINIC | Age: 41
End: 2021-04-05

## 2021-04-05 ENCOUNTER — COMMUNICATION - HEALTHEAST (OUTPATIENT)
Dept: BEHAVIORAL HEALTH | Facility: CLINIC | Age: 41
End: 2021-04-05

## 2021-04-05 DIAGNOSIS — G47.09 OTHER INSOMNIA: ICD-10-CM

## 2021-04-05 DIAGNOSIS — F33.9 MAJOR DEPRESSION, RECURRENT, CHRONIC (H): ICD-10-CM

## 2021-04-05 ASSESSMENT — ANXIETY QUESTIONNAIRES
4. TROUBLE RELAXING: NEARLY EVERY DAY
IF YOU CHECKED OFF ANY PROBLEMS ON THIS QUESTIONNAIRE, HOW DIFFICULT HAVE THESE PROBLEMS MADE IT FOR YOU TO DO YOUR WORK, TAKE CARE OF THINGS AT HOME, OR GET ALONG WITH OTHER PEOPLE: EXTREMELY DIFFICULT
6. BECOMING EASILY ANNOYED OR IRRITABLE: NEARLY EVERY DAY
5. BEING SO RESTLESS THAT IT IS HARD TO SIT STILL: MORE THAN HALF THE DAYS
2. NOT BEING ABLE TO STOP OR CONTROL WORRYING: MORE THAN HALF THE DAYS
7. FEELING AFRAID AS IF SOMETHING AWFUL MIGHT HAPPEN: NEARLY EVERY DAY
3. WORRYING TOO MUCH ABOUT DIFFERENT THINGS: MORE THAN HALF THE DAYS
1. FEELING NERVOUS, ANXIOUS, OR ON EDGE: NEARLY EVERY DAY
GAD7 TOTAL SCORE: 18

## 2021-04-05 ASSESSMENT — PATIENT HEALTH QUESTIONNAIRE - PHQ9: SUM OF ALL RESPONSES TO PHQ QUESTIONS 1-9: 9

## 2021-04-23 ENCOUNTER — COMMUNICATION - HEALTHEAST (OUTPATIENT)
Dept: BEHAVIORAL HEALTH | Facility: CLINIC | Age: 41
End: 2021-04-23

## 2021-05-26 ASSESSMENT — PATIENT HEALTH QUESTIONNAIRE - PHQ9
SUM OF ALL RESPONSES TO PHQ QUESTIONS 1-9: 21
SUM OF ALL RESPONSES TO PHQ QUESTIONS 1-9: 11
SUM OF ALL RESPONSES TO PHQ QUESTIONS 1-9: 10
SUM OF ALL RESPONSES TO PHQ QUESTIONS 1-9: 16
SUM OF ALL RESPONSES TO PHQ QUESTIONS 1-9: 8

## 2021-05-27 VITALS
DIASTOLIC BLOOD PRESSURE: 77 MMHG | SYSTOLIC BLOOD PRESSURE: 112 MMHG | HEART RATE: 82 BPM | OXYGEN SATURATION: 100 % | RESPIRATION RATE: 16 BRPM | TEMPERATURE: 98.3 F

## 2021-05-27 ASSESSMENT — PATIENT HEALTH QUESTIONNAIRE - PHQ9
SUM OF ALL RESPONSES TO PHQ QUESTIONS 1-9: 10
SUM OF ALL RESPONSES TO PHQ QUESTIONS 1-9: 14
SUM OF ALL RESPONSES TO PHQ QUESTIONS 1-9: 16
SUM OF ALL RESPONSES TO PHQ QUESTIONS 1-9: 11
SUM OF ALL RESPONSES TO PHQ QUESTIONS 1-9: 16
SUM OF ALL RESPONSES TO PHQ QUESTIONS 1-9: 17
SUM OF ALL RESPONSES TO PHQ QUESTIONS 1-9: 9
SUM OF ALL RESPONSES TO PHQ QUESTIONS 1-9: 8
SUM OF ALL RESPONSES TO PHQ QUESTIONS 1-9: 18

## 2021-05-27 NOTE — PROGRESS NOTES
Assessment:     1. Generalized anxiety disorder  Ambulatory referral to Psychology    Ambulatory referral to Psychiatry   2. Major depression, recurrent, chronic (H)     3. Mitochondrial myopathy     4. Anxiety attack  LORazepam (ATIVAN) 0.5 MG tablet       Plan:     Sherry continues to struggle with severe anxiety symptoms and I would like her to have a consultation with 1 of our psychiatrist.  I also placed a referral for her to consult with a psychologist.  Continue on Cymbalta 60 mg daily and I am hoping that the improvement she felt over the weekend we will continue as she is on that dose of medication longer.  I refilled her Ativan to take as needed.  Follow-up in 2 weeks.    Subjective:       39 y.o. female presents for a follow-up visit as it relates to her anxiety symptoms.  The patient reports that she did increase her Cymbalta to 60 mg daily as of a week ago.  Her  accompanies her today and states that she actually had a pretty good weekend as it relates to her anxiety and did not need to take any benzodiazepines all weekend.  However, she has been feeling very anxious and agitated again today as her  went off to work.  The patient states that she is ruminating on all kinds of anxiety provoking thoughts and even having fatalistic thoughts like what happens if her  were to die.  She cannot seem to shake these and she has this significant feeling of anxiety.  The patient is tolerating the Cymbalta well and did not notice any side effects with the increase in the dosage.  She does not have an appointment yet to see a psychotherapist.      Reviewed: The following portions of the patient's history were reviewed and updated as appropriate: allergies, current medications, past family history, past medical history, past social history, past surgical history and problem list.    Review of Systems  Pertinent items are noted in HPI.        Objective:     /72 (Patient Site: Left Arm, Patient  Position: Sitting, Cuff Size: Adult Regular)   Pulse 62   Wt 124 lb (56.2 kg)   LMP 02/06/2018 (Approximate)   BMI 21.97 kg/m    General appearance: alert, appears stated age and anxious      This note has been dictated using voice recognition software. Any grammatical or context distortions are unintentional and inherent to the software

## 2021-05-27 NOTE — PROGRESS NOTES
Chief Complaint   Patient presents with     Anxiety     over whelming anxiety, panic and chest pain x 4 days           HPI      Patient is here for 4 days of feeling anxious with chest heaviness, shortness of breath, unable to sleep, poor oral intake, and unable to concentrate. She had a panic attack in the past and her current symptoms are like that. She stopped her Duloxetine 4 weeks ago because she was feeling well and wanted to be off meds, this was planned with her physician. No recent events that triggered her symptoms. She denied feeling depressed, SI/HI. She said it is all anxiety issues, no depression issues. No fever, chills, cough, abdominal pain. No hx of CAD, PE/DVT.    ROS: Pertinent ROS noted in HPI.     Allergies   Allergen Reactions     Influenza Virus Vaccines Shortness Of Breath     Yeast, Dried Anaphylaxis     Stomach swelling     Egg White Other (See Comments)     Swollen colon, belly pain     Gluten      Savella [Milnacipran] Other (See Comments)     Chest pain, hot/cold flashes     Ciprofloxacin Itching and Rash     Rash over whole body      Sulfa (Sulfonamide Antibiotics) Rash       Patient Active Problem List   Diagnosis     Major depression, recurrent, chronic (H)     Post-traumatic Stress Disorder     Peptic Ulcer     Generalized Anxiety Disorder     Fibromyalgia     Insomnia     Nausea     IBS (irritable bowel syndrome)     Migraine without aura     Chronic fatigue     Acne vulgaris     Mitochondrial myopathy     H/O: hysterectomy     HCAP (healthcare-associated pneumonia)       Family History   Problem Relation Age of Onset     Heart attack Father 45             Hyperlipidemia Mother      Hypertension Mother      Breast cancer Mother      Cancer Mother         thyroid      Meniere's disease Mother      Colon cancer Maternal Grandfather      Breast cancer Maternal Grandmother      Malig Hypertension Neg Hx        Social History     Socioeconomic History     Marital status:       Spouse name: Not on file     Number of children: Not on file     Years of education: Not on file     Highest education level: Not on file   Occupational History     Not on file   Social Needs     Financial resource strain: Not on file     Food insecurity:     Worry: Not on file     Inability: Not on file     Transportation needs:     Medical: Not on file     Non-medical: Not on file   Tobacco Use     Smoking status: Never Smoker     Smokeless tobacco: Never Used   Substance and Sexual Activity     Alcohol use: No     Drug use: No     Sexual activity: Not on file   Lifestyle     Physical activity:     Days per week: Not on file     Minutes per session: Not on file     Stress: Not on file   Relationships     Social connections:     Talks on phone: Not on file     Gets together: Not on file     Attends Baptism service: Not on file     Active member of club or organization: Not on file     Attends meetings of clubs or organizations: Not on file     Relationship status: Not on file     Intimate partner violence:     Fear of current or ex partner: Not on file     Emotionally abused: Not on file     Physically abused: Not on file     Forced sexual activity: Not on file   Other Topics Concern     Not on file   Social History Narrative     Not on file         Objective:      Vitals:    03/26/19 1500 03/26/19 1506   BP: (!) 141/101 109/76   Patient Site: Right Arm Right Arm   Patient Position: Sitting Sitting   Cuff Size: Adult Regular Adult Regular   Pulse: 98    Resp: 16    Temp: 98.3  F (36.8  C)    TempSrc: Oral    Weight: 129 lb 3 oz (58.6 kg)        Gen:NAD  CV: RRR, normal S1S2, no M, R, G  Pulm: CTAB, normal effort  Chest wall: mild sternal tenderness to palpation  Abd: normal inspection, normal bowel sounds, soft, no pain, no mass/HSM  Psych: normal mood and affect  JEREMY score = 18  PHQ-9 score = 20    Anxiety attack  -     LORazepam (ATIVAN) 0.5 MG tablet; Take 1 tablet (0.5 mg total) by mouth every 8 (eight) hours  as needed for anxiety.  -     Nursing communication    Will start Ativan to bridge her until seen by primary care, which we will set up for 1-2 days. F/u plan discussed.

## 2021-05-27 NOTE — PROGRESS NOTES
Assessment:     1. Generalized anxiety disorder  DULoxetine (CYMBALTA) 30 MG capsule       Plan:     Sherry is a 39-year-old female presenting today with symptoms of severe anxiety.  We talked about resuming her Cymbalta versus trying a different anxiety medication such as Lexapro.  Ultimately, because of the history of chronic pain issues related to fibromyalgia and her mitochondrial issues we did decide to resume her Cymbalta starting at 30 mg once daily at night.  I asked her to return to clinic in 2 weeks for reevaluation at which time we may need to increase the medication to 60 mg daily.  She can continue on the Ativan every 8 hours until we start to have a response from the Cymbalta with improvement in her symptoms.  If she does experience increase in her symptoms of sedation and headaches again, we could consider switching to a different medication.  Overall 25 minutes was spent with the patient today along with her  of which over 50% was spent counseling.    Subjective:       39 y.o. female presents for evaluation of significant increase in anxiety symptoms.  The patient gradually weaned off of her Cymbalta and has been off that medication for about a month now.  She did have some withdrawal side effects initially but felt like she was doing pretty well up until the past couple of weeks.  The patient does have a history of some depression, anxiety and PTSD.  She was motivated to get off of Cymbalta because of concerns about possible side effects including concerns about sedation and possible trigger for her migraines.  The patient mentions that she does feel that since coming off the medication she has been a bit less tired as well as having slightly less headaches.  However, her anxiety has been severe.  She describes panic attacks and feeling extremely anxious all day.  She is worrying about everything and feels the intensity similar to right after she had her traumatic event 2 years ago.  She was  seen a couple of times last week but has not resumed her Cymbalta and waited for today to talk about a comprehensive plan.  She was given Ativan and is taking that every 8 hours and does find that to take the edge off of her anxiety.      Reviewed: The following portions of the patient's history were reviewed and updated as appropriate: allergies, current medications, past family history, past medical history, past social history, past surgical history and problem list.    Review of Systems  Pertinent items are noted in HPI.        Objective:     BP 90/62 (Patient Site: Left Arm, Patient Position: Sitting, Cuff Size: Adult Regular)   Pulse 68   Wt 126 lb (57.2 kg)   LMP 02/06/2018 (Approximate)   BMI 22.32 kg/m    General appearance: alert, appears stated age, cooperative and very anxious appearing      This note has been dictated using voice recognition software. Any grammatical or context distortions are unintentional and inherent to the software
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
## 2021-05-27 NOTE — PATIENT INSTRUCTIONS - HE
Go to the Emergency Department if your symptoms worsen. Otherwise, follow up with primary care provider as scheduled.

## 2021-05-28 ASSESSMENT — ANXIETY QUESTIONNAIRES
GAD7 TOTAL SCORE: 15
GAD7 TOTAL SCORE: 19
GAD7 TOTAL SCORE: 13
GAD7 TOTAL SCORE: 18
GAD7 TOTAL SCORE: 16
GAD7 TOTAL SCORE: 19
GAD7 TOTAL SCORE: 14
GAD7 TOTAL SCORE: 15
GAD7 TOTAL SCORE: 14
GAD7 TOTAL SCORE: 19
GAD7 TOTAL SCORE: 20
GAD7 TOTAL SCORE: 15

## 2021-05-28 NOTE — PROGRESS NOTES
"Assessment:     1. Tachycardia  Electrocardiogram Perform and Read    atenolol (TENORMIN) 25 MG tablet   2. Generalized anxiety disorder  DULoxetine (CYMBALTA) 30 MG capsule   3. Elevated blood pressure reading without diagnosis of hypertension         Plan:     Sherry is a 39-year-old female presenting today with an unusual elevation in her blood pressure and pulse after initiating Seroquel.  An EKG today showed that the patient was indeed in sinus tachycardia and she certainly does have an elevated blood pressure reading.  I started her on atenolol 25 mill grams twice daily and agree with discontinuation of the Seroquel as this is a noted unusual reaction that can occur with the medication.  Continue to follow with psychiatry as it relates to psychiatric medications.    Subjective:       39 y.o. female presents for evaluation of elevated blood pressure and pulse.  The patient was seen by her new psychiatrist who started her on Seroquel 25 mg at bedtime.  However, the patient did not feel well after starting this medication complaining of palpitations in addition to other side effects.  The patient has contacted her psychiatrist who recommended discontinuing the Seroquel.  He also reduced her Cymbalta down to 30 mg daily.  His plan at this point is to have her genetically tested to help guide what medication she might do best with going forward.  The patient denies associated chest pain of any kind.  She denies any shortness of breath.  She is not having any headache at the moment.      Reviewed: The following portions of the patient's history were reviewed and updated as appropriate: allergies, current medications, past family history, past medical history, past social history, past surgical history and problem list.    Review of Systems  Pertinent items are noted in HPI.        Objective:     BP (!) 134/104 (Patient Site: Left Arm, Patient Position: Sitting, Cuff Size: Adult Regular)   Pulse (!) 104   Ht 5' 3\" " (1.6 m)   Wt 123 lb 6.4 oz (56 kg)   LMP 02/06/2018 (Approximate)   BMI 21.86 kg/m    General appearance: alert, appears stated age and cooperative  Lungs: clear to auscultation bilaterally  Heart: regular rate and rhythm, S1, S2 normal, no murmur, click, rub or gallop      This note has been dictated using voice recognition software. Any grammatical or context distortions are unintentional and inherent to the software

## 2021-05-28 NOTE — PROGRESS NOTES
I met with Sherry Sweeney at the request of Dr Cuevas recheck her blood pressure.  Blood pressure medications on the MAR were reviewed with patient.    Patient has taken all medications as per usual regimen: Yes  Patient reports tolerating them without any issues or concerns: Yes    Vitals:    05/15/19 0936   BP: 100/74   Patient Site: Left Arm   Patient Position: Sitting   Cuff Size: Adult Regular       Blood pressure was taken, previous encounter was reviewed, patient was instructed to Follow up May 21st with Dr Martinez.

## 2021-05-29 NOTE — PROGRESS NOTES
Assessment:     1. Generalized anxiety disorder     2. Dysuria  Urinalysis-UC if Indicated   3. Numbness and tingling  Vitamin B12    Comprehensive Metabolic Panel   4. Night sweats  Luteinizing Hormone (LH)    Follicle Stimulating Hormone (FSH)    Estradiol   5. Major depression, recurrent, chronic (H)     6. Fibromyalgia     7. Mitochondrial myopathy     8. Mitochondrial disease (H)         Plan:     Sherry is a 39-year-old who comes in today for follow-up visit.  The patient's blood pressure and pulse have normalized as she is now off of her Seroquel.  She is taking Ambien at bedtime and we talked about the use of sleep medications and the potential for dependency on those.  The patient will follow up with her psychiatrist in the next 2 to 3 weeks for reevaluation of her anxiety and depression medication and a new plan.  She is struggling still with significant disability from her chronic issues of mitochondrial myopathy and fibromyalgia.  I feel that the significant challenges with her mental health have certainly contributed in the past couple of months with exacerbation of those ongoing symptoms.  However, she really has had persistent and severe disabling symptoms associated with her chronic mitochondrial disease for the past several years.  I check some labs today as well as a urinalysis.  I will get back to the patient following those results.  Follow-up in 8 weeks.    Subjective:       39 y.o. female presents for a follow-up visit.  The patient has been struggling with extreme anxiety after she discontinued Cymbalta couple months ago.  She is back on Cymbalta 30 mg daily as she did not have any improvement and actually seemed worse on the 60 mg.  This was recommended by her psychiatrist who she saw an established care with a couple of weeks ago.  The patient was recommended to have a genetic testing when in for a swab which was performed and she has a follow-up with her psychiatrist in the next 2 to 3  weeks.  She states that some days are better than others but overall she is doing a bit better.  She was placed on Seroquel and had significant negative reaction to that including tachycardia and elevated blood pressure.  She is off of her atenolol now and blood pressure and pulse are back to normal.  The patient continues to have significant issues with pain and fatigue which seem again to be extreme following this bout with severe anxiety.  She has blurry vision and is noticing again cognitive changes especially around concentration and even motivation.  She gets intermittent numbness and tingling in her extremities and at times burning down her spine.  She had a hysterectomy in the last couple of years and unilateral oophorectomy and at times is having night sweats and wonders what her hormonal status is.  She is taking Ambien at night for sleep which does provide some benefit.  She describes muscle weakness and ongoing difficulty swallowing at times as well as dizziness.  The patient has some burning with urination a couple of weeks ago and had a test done at the pharmacy and was told she should follow-up with me due to some blood in the urine.      Reviewed: The following portions of the patient's history were reviewed and updated as appropriate: allergies, current medications, past family history, past medical history, past social history, past surgical history and problem list.    Review of Systems  Pertinent items are noted in HPI.        Objective:     /70 (Patient Site: Left Arm, Patient Position: Sitting, Cuff Size: Adult Regular)   Pulse 68   Wt 122 lb (55.3 kg)   LMP 02/06/2018 (Approximate)   BMI 21.61 kg/m    General appearance: alert, appears stated age and cooperative      This note has been dictated using voice recognition software. Any grammatical or context distortions are unintentional and inherent to the software

## 2021-05-29 NOTE — PROGRESS NOTES
Assessment:     1. JEREMY (generalized anxiety disorder)  Ambulatory referral to Psychology    Ambulatory referral to Medication Management    CANCELED: Ambulatory referral to Medication Management   2. Insomnia, unspecified type  Ambulatory referral to Psychology    Ambulatory referral to Medication Management    CANCELED: Ambulatory referral to Medication Management       Plan:     Sherry is a 39-year-old female presenting today with a number of symptoms of which many are closely related to her underlying severe anxiety.  The patient is recently on Pristiq but unfortunately is not feeling comfortable with recommendations or the approach of her psychiatrist.  In hearing her concerns, I would like to refer her internally to 1 of our psychiatrists and that order was placed.  In the meantime I did ask her to see Whitney, one of our PharmD's, to assess her medication and make any recommendations in the meantime.  I did not make any medication adjustments today but also placed referral to see 1 of our psychologist.    Subjective:       39 y.o. female presents for follow-up visit as it relates to ongoing significant symptoms including difficulty sleeping and severe anxiety.  The patient was referred to see psychiatry and has been seen a couple of times by her new psychiatrist but is not feeling comfortable with him as a provider.  She did have genetic drug testing done and brings in that report today.  Based on the information the patient was started on Pristiq about a week ago.  The patient continues to feel anxious all the time and actually describes an intense sense of fear on a regular basis.  She describes intense restlessness and is having severe difficulty sleeping.  She is using Ambien to sleep but only sleeps for 2 to 3 hours and then re-doses with Ambien 5 mg.  She has a tingly pressure-like sensation around her head.  She also describes decrease in smell and that she is forgetting things.  The patient is  "accompanied by her  and they mention today a symptom that had been ongoing for over a year when she was on her Cymbalta previously of excessive online shopping.  After discontinuation of the Cymbalta that seems to no longer be happening.  In addition since being off Cymbalta she notes a decrease in migraine headaches, increased energy and is not experiencing extreme fatigue as she had been.      Reviewed: The following portions of the patient's history were reviewed and updated as appropriate: allergies, current medications, past family history, past medical history, past social history, past surgical history and problem list.    Review of Systems  Pertinent items are noted in HPI.        Objective:     /72 (Patient Site: Left Arm, Patient Position: Sitting, Cuff Size: Adult Regular)   Pulse 74   Ht 5' 3\" (1.6 m)   Wt 116 lb (52.6 kg)   LMP 02/06/2018 (Approximate)   BMI 20.55 kg/m    General appearance: alert, appears stated age, cooperative and anxious appearing      This note has been dictated using voice recognition software. Any grammatical or context distortions are unintentional and inherent to the software  "

## 2021-05-29 NOTE — TELEPHONE ENCOUNTER
Refill Approved    Rx renewed per Medication Renewal Policy. Medication was last renewed on 5/3/19.    Nancy Peterson, [facility] Triage/Med Refill 5/26/2019     Requested Prescriptions   Pending Prescriptions Disp Refills     DULoxetine (CYMBALTA) 30 MG capsule [Pharmacy Med Name: DULOXETINE HCL DR 30 MG CAP] 60 capsule 1     Sig: TAKE 1 CAPSULE BY MOUTH TWICE A DAY       Tricyclics/Misc Antidepressant/Antianxiety Meds Refill Protocol Passed - 5/25/2019  7:40 AM        Passed - PCP or prescribing provider visit in last year     Last office visit with prescriber/PCP: 5/21/2019 Layla Martinez MD OR same dept: 5/21/2019 Layla Martinez MD OR same specialty: 5/21/2019 Layla Martinez MD  Last physical: 2/10/2016 Last MTM visit: 10/9/2015 Silvia Zhou, PharmD   Next visit within 3 mo: Visit date not found  Next physical within 3 mo: Visit date not found  Prescriber OR PCP: Layla Martinez MD  Last diagnosis associated with med order: 1. Generalized anxiety disorder  - DULoxetine (CYMBALTA) 30 MG capsule [Pharmacy Med Name: DULOXETINE HCL DR 30 MG CAP]; TAKE 1 CAPSULE BY MOUTH TWICE A DAY  Dispense: 60 capsule; Refill: 1    If protocol passes may refill for 12 months if within 3 months of last provider visit (or a total of 15 months).

## 2021-05-30 VITALS — WEIGHT: 135 LBS | BODY MASS INDEX: 24.3 KG/M2

## 2021-05-30 VITALS — WEIGHT: 128 LBS | BODY MASS INDEX: 23.04 KG/M2

## 2021-05-30 VITALS — BODY MASS INDEX: 24.53 KG/M2 | WEIGHT: 136.3 LBS

## 2021-05-30 NOTE — TELEPHONE ENCOUNTER
I spoke with Sherry today for MTM and we did mention about getting set up for psych and therapy. She is interested in [facility] -- do you know if that is an option for her? Thanks!     Whitney Cochran, Pharm.D., [facility] HospitalCP  Medication Therapy Management Pharmacist  [facility]

## 2021-05-30 NOTE — PROGRESS NOTES
MTM Follow Up Encounter  Assessment & Plan                                                        Anxiety: Since last appt, does not seem to have a significant change in her anxiety. Recommended increasing Pristiq to 100 mg daily, but she was hesitant. Recommended that she think about it. Therefore will refill 50 mg and I will MyChart her in 1 week. If she decides to increase to 100 mg, she can take two 50 mg tablets. Reviewed that we can start with this to see if it help maria c fogginess. If no change on 100 mg after 4 weeks, will decrease to 50 mg.   PLAN:   1. Refilled Pristiq 50 mg daily today. Recommended increase to 100 mg but patient was hesitant. Will follow up in about a week.      Insomnia: Will eventually start to taper her off zolpidem, but recommended to continue to use and limit middle of the night use to when she has 4 hours left.     Migraine Headaches: Patient to continue to follow up with neurology.      Mitochondrial Myopathy: Stable. Recommended to continue current regimen.      Fibromyalgia: Stable. Recommended to continue current regimen.      Follow Up  MyChart 1 week       Subjective & Objective                                                       Sherry Sweeney is a 39 y.o. female called for a follow up visit for Medication Therapy Management.     Chief Complaint: anxiety follow up    Medical History: Reports that she started down this path years ago (2006?). Extreme pain and fatigue, diagnosed with fibromyalgia + chronic fatigue. Also having really bad migraines. Was not able to get out of bed for a while, was in a wheelchair, not able to drive. [facility] and [facility] provider there thought she had mitochondrial myopathy (MM). That doctor passed away. Seen at the  after, but went back to [facility] and they left it with MM. Currently on social security disability.    Hx of hysterectomy. Would bedridden from her periods. Hysterectomy improved things, but was told that she was given less  anesthesia. She is sensitive to medications.      Anxiety: Continues on Pristiq 50 mg daily -- started 6/19/19. At last MTM appt, hydroxyzine 25-50 mg PRN started. Tried hydroxyzine 25 mg-- thinks it kind of helps, used when anxious.  Some improvement -- she has noticed she has been able to eat again, better appetite. A little better with the suicidal thoughts. Biggest concern right now is if she is off disability. She cannot think clearly.   She is not taking lorazepam right now. Denies panic attacks, but is restless when alone and cloudy thinking. Is able to do more things now however.  Is worried about being on medication that would contribute to dependency.  Stopped duloxetine in January, was starting to feel better and went into massive panic attack.    Reports she memory has been terrible lately. Psych told her that duloxetine put her in a state of sher, but she does not have bipolar. Was online shopping while on it. Now that she is off of it she is thinking more clearly.   Genesight results in media -- Intermediate metabolizer of CYP2B6 and 2C9, poor metabolizer of CYP2D6.   New psychiatrist appt on 8/2  Would like to be educated on her medications.   Atenolol was prescribed for a fast heartbeat -- heart palpitations from being on the wrong meds. Does not have symptoms right now, but would like to have it in case.      Insomnia: Currently taking zolpidem 5 mg HS. will take a second dose in the middle the night if needed -- now will only take if she has at last 4 hours left to sleep.  Worried about a dependency on Ambien -- tried to decrease to 5 mg. Getting about 6 hours of sleep. Denies parasomnia, but it runs in her family.  Used to sleep 10 to 12 hours a night, now sleeping 6 to 7 hours.  Wakes up only once.     Migraine Headaches: sees neurology. Botox every 3 months - last on 7/3/19.  Uses Maxalt PRN -- not needed for a while.  Wonders if Botox is good for her and whether she should continue. Were  really bad before and would last for days. Now since off duloxetine she has not had a migraine.      Mitochondrial Myopathy: currently taking no medication. Will get twitches sometimes, but not taking cyclobenzaprine 5-10 mg PRN right now.      Fibromyalgia: Thinks duloxetine was helping with her pain. Reports that her pain is ok. When she gets anxious and afraid her arms start to burn.       PMH: reviewed in EPIC   Allergies/ADRs: reviewed in EPIC   Alcohol: reviewed in EPIC  Tobacco:   Social History     Tobacco Use   Smoking Status Never Smoker   Smokeless Tobacco Never Used     Today's Vitals: There were no vitals filed for this visit.  ----------------    Much or all of the text in this note was generated through the use of Dragon Dictate voice-to-text software. Errors in spelling or words which seem out of context are unintentional. Sound alike errors, in particular, may have escaped editing.    The patient declined an after visit summary    I spent 15 minutes with this patient today;   All changes were made via collaborative practice agreement with Layla Martinez MD. A copy of the visit note was provided to the patient's provider.     Whitney Cochran, Pharm.D., [facility]  Medication Therapy Management Pharmacist  [facility]     Current Outpatient Medications   Medication Sig Dispense Refill     albuterol (PROAIR HFA;PROVENTIL HFA;VENTOLIN HFA) 90 mcg/actuation inhaler Inhale 1-2 puffs every 4 (four) hours as needed for wheezing or shortness of breath (or coughing). 1 Inhaler 2     atenolol (TENORMIN) 25 MG tablet Take 1 tablet (25 mg total) by mouth 2 (two) times a day as needed. 14 tablet 1     BORIC ACID, BULK, MISC Use As Directed.       clindamycin-benzoyl peroxide (BENZACLIN) gel APPLY TO ACNE ONCE DAILY 50 g 1     cyclobenzaprine (FLEXERIL) 10 MG tablet Take 0.5-1 tablets (5-10 mg total) by mouth every 8 (eight) hours as needed for muscle spasms. 30 tablet 2     desvenlafaxine  succinate (PRISTIQ) 50 MG 24 hr tablet Take 50 mg by mouth daily.             hydrOXYzine pamoate (VISTARIL) 25 MG capsule Take 1-2 capsules (25-50 mg total) by mouth 3 (three) times a day as needed for anxiety. 30 capsule 0     ibuprofen (ADVIL,MOTRIN) 400 MG tablet Take 1.5 tablets (600 mg total) by mouth every 6 (six) hours as needed. 30 tablet 0     lidocaine (LIDODERM) 5 % On for 12 hours then off for 12 hours 90 patch 1     multivitamin therapeutic tablet Take 1 tablet by mouth daily.       naproxen (NAPROSYN) 500 MG tablet TAKE 1 TABLET BY MOUTH TWICE A DAY WITH FOOD 20 tablet 0     nystatin (MYCOSTATIN) powder Apply topically 4 (four) times a day.       omeprazole (PRILOSEC) 40 MG capsule Take 1 capsule (40 mg total) by mouth 2 (two) times a day before meals. 60 capsule 1     ONABOTULINUMTOXINA (BOTOX INJ) Inject as directed every 3 (three) months.       ondansetron (ZOFRAN-ODT) 4 MG disintegrating tablet TAKE 1 TABLET (4 MG TOTAL) BY MOUTH EVERY 8 (EIGHT) HOURS AS NEEDED FOR NAUSEA. 30 tablet 1     riboflavin, vitamin B2, 400 mg Tab Take 400 mg by mouth 2 (two) times a day.       rizatriptan (MAXALT-MLT) 10 MG disintegrating tablet Take 1 tablet (10 mg total) by mouth as needed for migraine. 10 tablet 3     tretinoin (RETIN-A) 0.025 % gel Apply topically daily. 45 g 2     valACYclovir (VALTREX) 1000 MG tablet Take 2 tablets PO 12 hours apart PRN episode 20 tablet 2     vitamin E 1000 UNIT capsule Take 1,000 Units by mouth daily.       zolpidem (AMBIEN) 5 MG tablet Take 1 tablet (5 mg total) by mouth at bedtime. 60 tablet 0     No current facility-administered medications for this visit.

## 2021-05-31 ENCOUNTER — RECORDS - HEALTHEAST (OUTPATIENT)
Dept: ADMINISTRATIVE | Facility: CLINIC | Age: 41
End: 2021-05-31

## 2021-05-31 VITALS — BODY MASS INDEX: 26.05 KG/M2 | HEIGHT: 63 IN | WEIGHT: 147 LBS

## 2021-05-31 VITALS — BODY MASS INDEX: 25.52 KG/M2 | WEIGHT: 144 LBS | HEIGHT: 63 IN

## 2021-05-31 VITALS — BODY MASS INDEX: 27 KG/M2 | WEIGHT: 150 LBS

## 2021-05-31 VITALS — WEIGHT: 147 LBS | BODY MASS INDEX: 26.46 KG/M2

## 2021-05-31 VITALS — WEIGHT: 146 LBS | BODY MASS INDEX: 26.28 KG/M2

## 2021-05-31 VITALS — WEIGHT: 153 LBS | BODY MASS INDEX: 27.11 KG/M2 | HEIGHT: 63 IN

## 2021-05-31 NOTE — PROGRESS NOTES
Assessment:     1. Major depression, recurrent, chronic (H)  ARIPiprazole (ABILIFY) 5 MG tablet   2. Generalized anxiety disorder     3. Post-traumatic Stress Disorder     4. Insomnia, unspecified type         Plan:     Sherry is a 39-year-old female presenting today with ongoing concerns regarding her mental health.  I advised the patient to continue on Pristiq 50 mg daily but to discontinue the Lamictal based on fairly intense side effects at a relatively low dose.  I prescribed Abilify 5 mg to take once daily and recommend to take this in the evening.  I asked to follow-up in 2 weeks with me regarding her response to that medication.  I encouraged her to look into the option of seeing 1 of the psychiatric nurse practitioners at [facility] if she is unable to get into see a psychiatrist for a while.  We discussed a crisis plan and directives to go to the emergency department for possible inpatient treatment if her suicidal ideations become more active.  I do feel that she was able to contract for safety today and that she has good family support.    Subjective:       39 y.o. female presents today initially as an annual exam but comes accompanied by her sister-in-law with ongoing significant concerns regarding her mental health.  The patient has established now with a new mental health psychotherapist but is unable to have a consultation with a psychiatrist for several weeks.  She has seen her new psychotherapist twice and feels that she is going to be able to work well with that individual and that it is relieved to have someone to see on a weekly basis.  She called her health insurance who did offer her to have a consultation telephonically with 1 of their psychiatrists and she was advised to continue on her Desvenlafaxine 50 mg dose but had in addition of Lamictal 25 mg daily.  She has been taking the Lamictal for the past 4 to 5 days but reports significant sedation as well as increase in her diffuse  "body aches with that medication.  She is unable to get back in touch with the psychiatrist regarding a plan.  The patient feels she is tolerating the Pristiq well but is hesitant to go up any higher as she had a significant issue around excessive online shopping when she was on a higher dose of Cymbalta over a couple of year period and her tendency to do that completely went away when the medication was discontinued as did her energy level improved.  However, since coming off the medication she has suffered ongoing with severe anxiety as well as some depression.  It was suggested by someone that that behavior could have been related to triggering some bipolar symptoms but that diagnosis has not been established.  The patient does state that she successfully has weaned herself for the most part off of Ambien and takes it very rarely. The patient has had active suicidal ideations on a daily basis and has been staying with a family member 24 hours a day because of this.  She has been provided information regarding an intensive daytime program for depression and anxiety by her psychotherapist and she is in the process of looking into that further.  When asked if she has a plan as it relates to suicide or if she feels safe she states that with the support of her family she does feel she is in a safe environment and does not have any immediate plans.      Reviewed: The following portions of the patient's history were reviewed and updated as appropriate: allergies, current medications, past family history, past medical history, past social history, past surgical history and problem list.    Review of Systems  Pertinent items are noted in HPI.        Objective:     /62 (Patient Site: Left Arm, Patient Position: Sitting, Cuff Size: Adult Regular)   Pulse 64   Ht 5' 1.5\" (1.562 m)   Wt 110 lb 1.6 oz (49.9 kg)   LMP 02/06/2018 (Approximate)   BMI 20.47 kg/m    General appearance: alert, appears stated age, " cooperative, fatigued and anxious      This note has been dictated using voice recognition software. Any grammatical or context distortions are unintentional and inherent to the software

## 2021-06-01 VITALS — WEIGHT: 149 LBS | BODY MASS INDEX: 26.39 KG/M2

## 2021-06-01 VITALS — BODY MASS INDEX: 25.1 KG/M2 | WEIGHT: 141.7 LBS

## 2021-06-01 VITALS — BODY MASS INDEX: 27.36 KG/M2 | WEIGHT: 152 LBS

## 2021-06-01 VITALS — WEIGHT: 148 LBS | BODY MASS INDEX: 26.22 KG/M2

## 2021-06-01 VITALS — WEIGHT: 151 LBS | BODY MASS INDEX: 26.75 KG/M2 | HEIGHT: 63 IN

## 2021-06-01 VITALS — HEIGHT: 63 IN | WEIGHT: 154 LBS | BODY MASS INDEX: 27.29 KG/M2

## 2021-06-01 NOTE — TELEPHONE ENCOUNTER
RN cannot approve Refill Request    RN can NOT refill this medication med is not covered by policy/route to provider. Last office visit: 6/21/2019 Layla Martinez MD Last Physical: 8/21/2019 Last MTM visit: 10/9/2015 Silvia Zhou, PharmD Last visit same specialty: 6/21/2019 Layla Martinez MD.  Next visit within 3 mo: Visit date not found  Next physical within 3 mo: Visit date not found      Layla Ordonez, [facility] Triage/Med Refill 9/13/2019    Requested Prescriptions   Pending Prescriptions Disp Refills     ARIPiprazole (ABILIFY) 5 MG tablet [Pharmacy Med Name: ARIPIPRAZOLE 5 MG TABLET] 30 tablet 0     Sig: TAKE 1 TABLET BY MOUTH EVERY DAY       There is no refill protocol information for this order

## 2021-06-02 ENCOUNTER — RECORDS - HEALTHEAST (OUTPATIENT)
Dept: ADMINISTRATIVE | Facility: CLINIC | Age: 41
End: 2021-06-02

## 2021-06-02 VITALS — WEIGHT: 138 LBS | HEIGHT: 63 IN | BODY MASS INDEX: 24.45 KG/M2

## 2021-06-02 VITALS — WEIGHT: 129.19 LBS | BODY MASS INDEX: 22.88 KG/M2

## 2021-06-02 VITALS — BODY MASS INDEX: 21.97 KG/M2 | WEIGHT: 124 LBS

## 2021-06-02 VITALS — HEIGHT: 63 IN | BODY MASS INDEX: 23.92 KG/M2 | WEIGHT: 135 LBS

## 2021-06-02 VITALS — WEIGHT: 135 LBS | BODY MASS INDEX: 23.91 KG/M2

## 2021-06-02 VITALS — WEIGHT: 126 LBS | BODY MASS INDEX: 22.32 KG/M2

## 2021-06-02 VITALS — BODY MASS INDEX: 23.21 KG/M2 | HEIGHT: 63 IN | WEIGHT: 131 LBS

## 2021-06-02 VITALS — WEIGHT: 139 LBS | BODY MASS INDEX: 24.62 KG/M2

## 2021-06-02 VITALS — BODY MASS INDEX: 23.91 KG/M2 | WEIGHT: 135 LBS

## 2021-06-03 VITALS — HEIGHT: 63 IN | BODY MASS INDEX: 21.86 KG/M2 | WEIGHT: 123.4 LBS

## 2021-06-03 VITALS — HEIGHT: 63 IN | BODY MASS INDEX: 20.55 KG/M2 | WEIGHT: 116 LBS

## 2021-06-03 VITALS — BODY MASS INDEX: 20.26 KG/M2 | HEIGHT: 62 IN | WEIGHT: 110.1 LBS

## 2021-06-03 VITALS — BODY MASS INDEX: 21.61 KG/M2 | WEIGHT: 122 LBS

## 2021-06-04 VITALS
DIASTOLIC BLOOD PRESSURE: 70 MMHG | BODY MASS INDEX: 21.75 KG/M2 | SYSTOLIC BLOOD PRESSURE: 118 MMHG | HEART RATE: 68 BPM | WEIGHT: 117 LBS

## 2021-06-04 VITALS
BODY MASS INDEX: 20.8 KG/M2 | WEIGHT: 113 LBS | HEIGHT: 62 IN | SYSTOLIC BLOOD PRESSURE: 90 MMHG | DIASTOLIC BLOOD PRESSURE: 68 MMHG | HEART RATE: 66 BPM

## 2021-06-04 NOTE — PROGRESS NOTES
Assessment:     1. Breast pain  Mammo Diagnostic Right    US Breast Right Limited 1-3 Quadrants   2. Acne vulgaris  minocycline (MINOCIN,DYNACIN) 50 MG capsule   3. Migraine without aura and without status migrainosus, not intractable  rizatriptan (MAXALT) 10 MG tablet   4. Major depression, recurrent, chronic (H)     5. Generalized anxiety disorder         Plan:     Sherry is a 39-year-old female presenting today specifically regarding breast pain.  Her breast exam is unremarkable other than tenderness and I suspect this is likely hormonal.  However, especially with her family history of recommended a diagnostic mammogram and ultrasound and a referral was placed.  The patient continues to struggle with severe depression as well as anxiety but is seeing her mental health team on a regular basis and does have plans for an outpatient intensive program.  The patient would benefit from some oral antibiotics for her acne and a prescription was provided for minocycline 50 mg to take twice daily with a follow-up plan in 3 months.  I refilled the patient's Maxalt as it relates to her chronic recurrent migraine headaches.    Subjective:       39 y.o. female presents for evaluation of right-sided breast pain for the past 3 weeks.  The patient has a family history of both the mother as well as maternal grandmother with breast cancer and so she became concerned.  The patient states that her left breast hurts mildly as well.  She is status post hysterectomy.  She has not felt any lumps or bumps.  The patient continues to follow closely with her psychiatric team including a psychotherapist and a nurse practitioner who is managing her medications.  They have recommended an intensive outpatient treatment program and she is likely going to be participating in that in the near future.  The patient is requesting a refill on her Maxalt today for migraine headaches.  Lastly, the patient states that over the past few weeks she has had  "progressive worsening facial acne.  She states that with her anxiety she has been picking quite a bit as well.  She has had this issue in the past and has responded well to oral antibiotics and wonders if that is an option at this time.      Reviewed: The following portions of the patient's history were reviewed and updated as appropriate: allergies, current medications, past family history, past medical history, past social history, past surgical history and problem list.    Review of Systems  Pertinent items are noted in HPI.        Objective:     BP 90/68 (Patient Site: Left Arm, Patient Position: Sitting, Cuff Size: Adult Regular)   Pulse 66   Ht 5' 1.5\" (1.562 m)   Wt 113 lb (51.3 kg)   LMP 02/06/2018 (Approximate)   BMI 21.01 kg/m    General appearance: alert, appears stated age and cooperative  Breasts: tender to palpation right breast more than left. No masses or axillary adenopathy.   Skin: moderate acne cheeks and forehead with inflammatory papules and some scarring      This note has been dictated using voice recognition software. Any grammatical or context distortions are unintentional and inherent to the software  "

## 2021-06-04 NOTE — TELEPHONE ENCOUNTER
Orders being requested:   Scheduling needs clarification on mammogram and US orders.  Is the patient to have a bilateral mammogram?  And  Bilateral US  Scheduled, with patients history and complaints at visit?  Please enter into patients chart for scheduling to assist with the appropriate tests to order please    Current orders are for right side only    Reason service is needed/diagnosis: see patient visit notes 12/11/19  When are orders needed by: ASAP  Where to send Orders: Please place in patients chart for scheduling.  Okay to leave detailed message?  Yes  On patients chart if needed

## 2021-06-04 NOTE — TELEPHONE ENCOUNTER
Patient Returning Call  Reason for call: Clarification and Protocol clearance .   Information relayed to patient:  Relayed PCP note to scheduling.   Patient has additional questions:  Yes  If YES, what are your questions/concerns:  Scheduling states that per protocol, Patient should have a full bilateral US as it has been over a year since last US and it should be noted that she is also having pain on right side of breast . Please note and call central scheduling to bring resolution to this matter.   Okay to leave a detailed message?:   Ext [phone]

## 2021-06-04 NOTE — TELEPHONE ENCOUNTER
RN cannot approve Refill Request    RN can NOT refill this medication PCP messaged that patient is overdue for Labs. Last office visit: 12/11/2019 Layla Martinez MD Last Physical: 8/21/2019 Last MTM visit: Visit date not found Last visit same specialty: 12/11/2019 Layla Martinez MD.        Denise Eason, [facility] Triage/Med Refill 12/13/2019    Requested Prescriptions   Pending Prescriptions Disp Refills     desvenlafaxine succinate (PRISTIQ) 50 MG 24 hr tablet [Pharmacy Med Name: DESVENLAFAXINE SUC ER 50 MG TB] 30 tablet 1     Sig: TAKE 1 TABLET BY MOUTH EVERY DAY       Venlafaxine/Desvenlafaxine Refill Protocol Failed - 12/11/2019  9:32 AM        Failed - Fasting lipid cascade in last year     Cholesterol   Date Value Ref Range Status   02/10/2016 178 <=199 mg/dL Final     Triglycerides   Date Value Ref Range Status   02/10/2016 113 <=149 mg/dL Final     HDL Cholesterol   Date Value Ref Range Status   02/10/2016 54 >=50 mg/dL Final     LDL Calculated   Date Value Ref Range Status   02/10/2016 101 <=129 mg/dL Final     Patient Fasting > 8hrs?   Date Value Ref Range Status   02/10/2016 No  Final             Passed - LFT or AST or ALT in last year     Albumin   Date Value Ref Range Status   05/21/2019 4.1 3.5 - 5.0 g/dL Final     Bilirubin, Total   Date Value Ref Range Status   05/21/2019 0.4 0.0 - 1.0 mg/dL Final     Bilirubin, Direct   Date Value Ref Range Status   10/13/2015 0.1 <=0.5 mg/dL Final     Alkaline Phosphatase   Date Value Ref Range Status   05/21/2019 64 45 - 120 U/L Final     AST   Date Value Ref Range Status   05/21/2019 15 0 - 40 U/L Final     ALT   Date Value Ref Range Status   05/21/2019 12 0 - 45 U/L Final     Protein, Total   Date Value Ref Range Status   05/21/2019 7.1 6.0 - 8.0 g/dL Final                Passed - PCP or prescribing provider visit in last year     Last office visit with prescriber/PCP: 12/11/2019 Layla Martinez MD OR same dept: 12/11/2019 Michelle  Layla MEZA MD OR same specialty: 12/11/2019 Layla Martinez MD  Last physical: 8/21/2019 Last MTM visit: Visit date not found   Next visit within 3 mo: Visit date not found  Next physical within 3 mo: Visit date not found  Prescriber OR PCP: Layla Martinez MD  Last diagnosis associated with med order: 1. Generalized anxiety disorder  - desvenlafaxine succinate (PRISTIQ) 50 MG 24 hr tablet [Pharmacy Med Name: DESVENLAFAXINE SUC ER 50 MG TB]; TAKE 1 TABLET BY MOUTH EVERY DAY  Dispense: 30 tablet; Refill: 1    If protocol passes may refill for 12 months if within 3 months of last provider visit (or a total of 15 months).             Passed - Blood Pressure in last year     BP Readings from Last 1 Encounters:   12/11/19 90/68

## 2021-06-05 ENCOUNTER — RECORDS - HEALTHEAST (OUTPATIENT)
Dept: LAB | Facility: CLINIC | Age: 41
End: 2021-06-05

## 2021-06-05 VITALS
DIASTOLIC BLOOD PRESSURE: 62 MMHG | WEIGHT: 122 LBS | SYSTOLIC BLOOD PRESSURE: 100 MMHG | OXYGEN SATURATION: 99 % | BODY MASS INDEX: 22.31 KG/M2 | HEART RATE: 88 BPM

## 2021-06-05 DIAGNOSIS — R10.9 ABDOMINAL PAIN: ICD-10-CM

## 2021-06-05 DIAGNOSIS — R11.0 NAUSEA WITHOUT VOMITING: ICD-10-CM

## 2021-06-05 NOTE — PROGRESS NOTES
Assessment:     1. Major depression, recurrent, chronic (H)     2. Mitochondrial disease (H)     3. Generalized anxiety disorder     4. Chronic fatigue     5. Insomnia, unspecified type         Plan:     Sherry is a 39-year-old female presenting today for general follow-up visit.  I am pleased to see that she is finally doing a bit better from a mental health standpoint and that she seems to be responding to the intervention as provided by the outpatient mental health program that she is participating in.  She continues to follow-up with her mitochondrial specialist.  She continues to be significantly affected by her disability but feels she is actively working on acceptance as well as trying to figure out where she is going to go from here with her life.  I recommended that the patient follow-up for an annual physical exam in 6 months.    Subjective:       39 y.o. female presents today for a general follow-up visit the patient has a history significant for severe chronic fatigue secondary to mitochondrial disease.  The patient has a specialist whom she sees at [facility] for this diagnosis.  The patient has been disabled from her occupation and work in general for the past few years due to this diagnosis.  The patient continues to struggle significantly both from the mitochondrial myopathy as well as mental health issues related to that.  The patient has been struggling with severe depression as well as anxiety over the past year.  She sees a psychiatric nurse practitioner and was hospitalized at [facility] due to significant suicidal ideations and depression.  The patient comes in today doing a little bit better from a mental health standpoint.  She attributes this improvement in large part to the help she got while she was inpatient at [facility] but also an outpatient intensive mental health program that she has been participating in and finding very helpful.  The patient's psychiatric nurse practitioner is trying to  make some adjustments in her medication.  She continues to struggle with insomnia but has found that the combination of hydroxyzine and melatonin to be quite helpful most nights.  She is pleased that she is off of Ambien.  The patient is fatigued extremely easily and still spends quite a bit of time in bed.  However, she is surprised that she is able to make it through a half a day of the outpatient program and pleased as well as this gives her some hope.  Part of the patient's struggle in the past couple of years has been around a combination of excepting her current physical disabilities and profound fatigue and how that impacts her quality of life as well as even her self identity.  The patient had a very rewarding occupation preceding the progressive nature of her illness.      Reviewed: The following portions of the patient's history were reviewed and updated as appropriate: allergies, current medications, past family history, past medical history, past social history, past surgical history and problem list.    Review of Systems  Pertinent items are noted in HPI.        Objective:     /70 (Patient Site: Left Arm, Patient Position: Sitting, Cuff Size: Adult Regular)   Pulse 68   Wt 117 lb (53.1 kg)   LMP 02/06/2018 (Approximate)   BMI 21.75 kg/m    General appearance: alert, appears stated age and cooperative      This note has been dictated using voice recognition software. Any grammatical or context distortions are unintentional and inherent to the software

## 2021-06-05 NOTE — TELEPHONE ENCOUNTER
"Hi Dr Martinez, Please see below from patient.   There is nothing we can do on our end as far as getting this approved. Appealing is what would have to be done, and Carlene is working on that now.    I am not sure what you advise her to do as far as \"next step\".       "

## 2021-06-07 NOTE — PROGRESS NOTES
This video/telephone visit will be conducted via a call between you and your physician/provider. We have found that certain health care needs can be provided without the need for an in-person physical exam. This service lets us provide the care you need with a video /telephone conversation. If a prescription is necessary we can send it directly to your pharmacy. If lab work is needed we can place an order for that and you can then stop by our lab to have the test done at a later time.    Just as we bill insurance for in-person visits, we also bill insurance for video/telephone visits. If you have questions about your insurance coverage, we recommend that you speak with your insurance company.    Patient has given verbal consent for video visit? yes  Patient would like the video visit invitation sent by:   Send to email: [email or web address]  Rhonda Locke CMA      Patient verified allergies, medications and pharmacy via phone. PHQ : 16 very difficult and JEREMY: 15, extremely difficult done verbally with writer. Patient states she is ready for visit.

## 2021-06-07 NOTE — PROGRESS NOTES
Medications Phoned  to Pharmacy [] yes [x]no  Name of Pharmacist:  List Medications, including dose, quantity and instructions    Medications ordered this visit were e-scribed.  Verified by order class [x] yes  [] no    Medication changes or discontinuations were communicated to patient's pharmacy: [] yes  [x] no    Dictation completed at time of chart check: [] yes  [x] no    I have checked the documentation for today s encounters and the above information has been reviewed and completed.
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
## 2021-06-07 NOTE — PATIENT INSTRUCTIONS - HE
1. START taking gabapentin 300mg three times a day for anxiety  2. Continue all other medications as prescribed  3. Have your pharmacy contact us for a refill if you are running low on medications (We may ask you to come into clinic to get a refill from the nurse)  4. Complete Authorization for Release of Protected Health Information for past psychiatric records  5. No alcohol or drug use  6. No driving if sedated  7. Call the clinic with any questions or concerns ([phone])  8. Reach out for help if you feel like hurting yourself or others:   a. [facility]: [address] [place] [address], [address] (phone: [phone])  b. [facility] Suicide Hotline: [phone]   c. Call 911 or go to nearest Emergency room   9. Next appointment in approximately 1 month. Follow up as directed, for your appointments, per your After Visit Summary Form

## 2021-06-07 NOTE — PROGRESS NOTES
"Sherry Sweeney is a 40 y.o. female who is being evaluated via a billable video visit.      The patient has been notified of following:     \"This video visit will be conducted via a call between you and your physician/provider. We have found that certain health care needs can be provided without the need for an in-person physical exam.  This service lets us provide the care you need with a video conversation.  If a prescription is necessary we can send it directly to your pharmacy.  If lab work is needed we can place an order for that and you can then stop by our lab to have the test done at a later time.    Video visits are billed at different rates depending on your insurance coverage. Please reach out to your insurance provider with any questions.    If during the course of the call the physician/provider feels a video visit is not appropriate, you will not be charged for this service.\"    Patient has given verbal consent to a Video visit? Yes    Patient would like to receive their AVS by AVS Preference: Jaclyn.    Patient would like the video invitation sent by: Send to e-mail at: [email or web address]      Video Start Time: 3:34    Additional provider notes: Sherry is a 40-year-old female with a 2-week history of facial pain and pressure in particular on the left side over her maxillary sinus and somewhat behind her left eye.  She has some symptoms also involving the right maxillary sinus.  The patient reports that she had intermittent symptoms for a week or 2 prior.  She has tried a humidifier as well as some over-the-counter medication but without relief.  She describes some thick discolored nasal discharge at times although it is not copious and she does not have postnasal drip.  She denies any associated fever.  She also does not report associated allergy symptoms.  The patient also has a history of major depression and I did check in with her regarding this.  She went through an intensive outpatient program " and reports definite improvement.  She reports that she does still have days where she feels quite depressed but overall much improved.    Objective: Well-appearing female in no distress.    Problem List Items Addressed This Visit     None      Visit Diagnoses     Acute non-recurrent maxillary sinusitis    -  Primary    Relevant Medications    amoxicillin-clavulanate (AUGMENTIN) 875-125 mg per tablet            Video-Visit Details    Type of service:  Video Visit    Video End Time (time video stopped): 3:40    Originating Location (pt. Location): Home    Distant Location (provider location):  [facility]     Mode of Communication:  Video Conference via [facility]      Layla Martinez MD

## 2021-06-08 NOTE — PATIENT INSTRUCTIONS - HE
"1. START taking Wellbutrin XL 300mg daily   2. Continue other medications as prescribed  3. Have your pharmacy contact us for a refill if you are running low on medications (We may ask you to come into clinic to get a refill from the nurse)  4. No alcohol or drug use  5. No driving if sedated  6. Contact the clinic with any questions or concerns   a. Phone: [phone]  b. Fax: [phone]  7. Reach out for help if you feel like hurting yourself or others:   a. [facility]: [address] [place] [address], [address] (phone: [phone])  b. [facility] Suicide Hotline: [phone]   c. Crisis Texting Line: Text \"[place]\" to [phone]  d. Call 911 or go to nearest Emergency room   8. Follow up as directed in 2 weeks, for your appointments, per your After Visit Summary Form    "

## 2021-06-08 NOTE — PROGRESS NOTES
"Telemedicine Visit: The patient's condition can be safely assessed and treated via synchronous audio and visual telemedicine encounter.      Reason for Telemedicine Visit: Patient unable to travel    Originating Site (Patient Location): Patient's home    Distant Site (Provider Location): [facility]: [facility]    Consent:  The patient/guardian has verbally consented to: the potential risks and benefits of telemedicine (video visit) versus in person care; bill my insurance or make self-payment for services provided; and responsibility for payment of non-covered services.     Mode of Communication:  Video Conference via [employer]    As the provider I attest to compliance with applicable laws and regulations related to telemedicine.    Outpatient Psychiatric Follow Up    Date of Service: 5/13/2020    --  Chief Complaint: \"I have a list of things to talk to you about\"     --  History of Present Illness/Client Impression of Mental Health Consult:    Sherry Sweeney is a 40 y.o. female who presents for telephone visit follow up. Last visit occurred 4/15/20. At that time made no changes to current medications but added gabapentin three times a day for anxiety. Appears stated age, short hair, clean hair, somewhat unkempt hair, wearing purple sweater, appears to have 2 small red dots on face, and wearring earrings.    Since last visit, has had a friend that had to be tested for COVID-19 that she worries about. Continues to describe increased worrying related to corona virus situation. States UTI and sinus infection were treated over the last couple of weeks and that she has been taking antibiotics as prescribed. Notices itchy rash on back and more recently upper neck. Describes having scabs on her back from itching. States he medical NP decreased Pristiq previously and noticed rash went away but then re-increased dose and the rash returned.     States mood is \"good\". Rates " depression 6/10 and anxiety 6/10. However, feels like anxiety fluctuates during the day. Denies any sleep difficulties. Feels well rested in the morning. Denies appetite or weight concerns. Denies suicidal and homicidal ideation. No overt psychosis. Denies all other psychiatric symptoms. Denies physical concerns.     Did not start gabapentin due to concerns possible side effect of dizziness because she was needing to drive groceries to her mother. Would like to try initiation.  Asks about restarting methylphenidate because it was helpful for concentration when prescribed by a previous nurse practitioner. Notices improved concentration and ability to get things accomplished with bupropion. Wonders if this may be helpful if changed to extended release. Asks about melatonin being somewhat different than post-hospitalization prescription and if this could be changed back as she noticed improved sleep on the previous formulation. Denies any medication side effects other than possibly the rash on her back. However, states that rash is manageable, that she would prefer working through the rash versus changing antidepressant medication due to improved mood symptoms, and denies other signs of allergic reaction. Tells writer she has also discussed with PCP and prescribed cream for skin which has been helpful.    Medication adherence: Reviewed risk/benefits of medication , Patient able to verbalize understanding of side effects  and Patient verbally consents to taking medications  Medication side effects: itching and rash on upper back/neck  The patient was given information on medications: all current prescribed medications by writer.  [place] Prescription Monitoring program: Not indicated for this patient.    Clinical Outcomes Measures:  PHQ-9 Total Score: 11  JEREMY-7: 15    --  Current Medications:  Current Outpatient Medications   Medication Sig Dispense Refill     buPROPion (WELLBUTRIN SR) 150 MG 12 hr tablet Take 1  tablet (150 mg total) by mouth daily. 30 tablet 2     cholecalciferol, vitamin D3, 400 unit cap Take 2 capsules by mouth daily.       desvenlafaxine succinate 25 mg Tb24 Take 50 mg by mouth every morning AND 25 mg every evening. 90 tablet 2     gabapentin (NEURONTIN) 300 MG capsule Take 1 capsule (300 mg total) by mouth 3 (three) times a day. 90 capsule 2     hydrOXYzine pamoate (VISTARIL) 50 MG capsule Take 1 capsule (50 mg total) by mouth at bedtime as needed for other (sleep). 30 capsule 2     melatonin 3 mg Tab tablet Take 1 tablet (3 mg total) by mouth at bedtime as needed.  0     multivitamin therapeutic tablet Take 1 tablet by mouth daily.       nystatin (MYCOSTATIN) powder Apply topically 4 (four) times a day.       ondansetron (ZOFRAN-ODT) 4 MG disintegrating tablet TAKE 1 TABLET (4 MG TOTAL) BY MOUTH EVERY 8 (EIGHT) HOURS AS NEEDED FOR NAUSEA. 30 tablet 1     riboflavin, vitamin B2, 400 mg Tab Take 400 mg by mouth 2 (two) times a day.       rizatriptan (MAXALT) 10 MG tablet Take 1 tablet (10 mg total) by mouth as needed for migraine. May repeat in 2 hours if needed 10 tablet 3     triamcinolone (KENALOG) 0.1 % cream Apply to affected skin twice daily for up to 14 days. 30 g 0     ubidecarenone (COENZYME Q10 ORAL) Take 100 mg by mouth daily.       ibuprofen (ADVIL,MOTRIN) 400 MG tablet Take 1.5 tablets (600 mg total) by mouth every 6 (six) hours as needed. 30 tablet 0     melatonin-pyridoxine HCl, B6, 3-10 mg Tab Take 3 mg by mouth.       naproxen (NAPROSYN) 500 MG tablet TAKE 1 TABLET BY MOUTH TWICE A DAY WITH FOOD 20 tablet 0     ONABOTULINUMTOXINA (BOTOX INJ) Inject as directed every 3 (three) months.       valACYclovir (VALTREX) 1000 MG tablet Take 2 tablets PO 12 hours apart PRN episode 20 tablet 2     No current facility-administered medications for this visit.        --  Allergies  Allergies   Allergen Reactions     Influenza Virus Vaccines Shortness Of Breath     Yeast, Dried Anaphylaxis     Stomach  "swelling     Egg White Other (See Comments)     Swollen colon, belly pain     Gluten      Savella [Milnacipran] Other (See Comments)     Chest pain, hot/cold flashes     Ciprofloxacin Itching and Rash     Rash over whole body      Quetiapine Palpitations     Sulfa (Sulfonamide Antibiotics) Rash       --  Lab Results:   No visits with results within 30 Day(s) from this visit.   Latest known visit with results is:   Lab on 01/09/2020   Component Date Value     Vitamin B-12 01/09/2020 1,039*     Folate 01/09/2020 17.9      CK, Total 01/09/2020 76        __  Review of Systems:   There were no vitals filed for this visit. unable to assess  There is no height or weight on file to calculate BMI. unable to assess    As noted in the subjective section above, otherwise a 10 point review of systems is negative. Limited ability to assess given virtual nature of visit. Review of symptoms based entirely on patient's verbal report and what writer able to assess via camera  __  Psychiatric Examination:    Appearance:  Appears stated age, short hair, clean hair, somewhat unkempt hair, wearing purple sweater, appears to have 2 small red dots on face, and wearring earrings.  Orientation: Patient alert and oriented to person, place, time, and situation  Reliability:  Patient appears to be an adequate historian.    Behavior: Patient makes good eye contact, and engages with normal rapport in the interview.   There is no evidence of responding to hallucinations or flashbacks.  Speech: Speech is spontaneous and coherent, with a normal rate, rhythm and tone.    Language:There are no difficulties with expressive or receptive language as observed throughout the interview.    Mood: Described as \"good\".    Affect: Congruent and shows a normal range and level of reactivity  Judgement: Able to make basic decision regarding safety.  Insight: Good awareness of physical and mental health conditions and aware of needs around care for these.  Gait and " station: unable to assess  Thought process: Logical but tangential  Thought content: No evidence of delusions or paranoia.  No thoughts of self harm or suicide. No thoughts of harming others.  Associations: Connected  Fund of knowledge: Average  Attention / Concentration: Able to remain focused during the interview with minimal distractibility or need for redirection.  Short Term Memory: Grossly intact as evidence by client recalling themes and ideas discussed.  Long Term Memory: Intact  Motor Status: unable to assess    Assessment / Impression  1. Major depressive disorder, recurrent, moderate  1. Bupropion (Wellbutrin SR) 150 mg daily for depression  2. Pristiq 75 mg daily (50 mg in the morning and 25 mg at bedtime) for depression and anxiety  3. Hydroxyzine 50 mg at bedtime as needed for sleep and anxiety  4. Melatonin 3 mg at bedtime as needed for sleep  5. Gabapentin 300 mg 3 times a day for anxiety and pain     Sherry Sweeney is a 40 y.o. female who presents for follow up appointment.  Meets criteria for major depressive disorder, recurrent, moderate. Referred to this writer at the recommendation of primary care provider due to discharge from [facility]. Past medication trials include: Cymbalta and quetiapine.     Pristiq medication and bupropion combination seem to be well controlling depressive symptoms.  Patient hesitant to change bupropion or Pristiq dosing due to success on these medications. Willing to change to bupropion XR formulation today with goal of improving concentration throughout day.  Plan to initiate gabapentin 300 mg 3 times daily for for anxiety and fibromyalgia pain as patient did not start after last visit.  No new lab work ordered this visit due to restrictions on travel and comprehensive lab results available in January of this year.  We will refill all current medications in addition to new.  Will also attempt entering new NAC orders and change melatonin formulation per patient  request.     Plan to follow up in 4 weeks for evaluation of current medication trials, lab work, and ongoing psychiatric assessment. Patient educated that they may schedule sooner appointment or contact writer for any worsening or lack of improvement in symptoms.      Patient denies suicidal and homicidal ideation. Not at imminent risk this visit. Educated on need to seek emergent services should they become a risk to themselves or others. Sherry Sweeney verbalized understanding and agreement with this safety plan.     Rule out: Major depressive disorder, generalized anxiety disorder, adjustment disorder, and PTSD.    --  Plan  1. Continue to monitor for safety  2. Current Medications  1. CHANGE Bupropion (Wellbutrin SR) 150 mg daily to XL for depression  2. Continue Pristiq 75 mg daily (50 mg in the morning and 25 mg at bedtime) for depression and anxiety  3. Continue Hydroxyzine 50 mg at bedtime as needed for sleep and anxiety  4. CHANGE Melatonin 3 mg to Melatonin Pyridoxine HCL 3mg bedtime as needed for sleep  5. Continue Gabapentin 300 mg 3 times a day for anxiety and pain  6. Neuroleptic Consent Form Signed: n/a  7. Non-Opioid Contract Agreement Form Signed: n/a  8. REFILLS: new medication prescriptions sent to pharmacy  3. Labs ordered this visit: n/a  4. Collaborate with interdisciplinary care team as needed.  5. ROIs: n/a  6. Consent to Communicate: n/a  7. Patient will continue abstinence from drugs and alcohol  8. Patient to return to clinic in 4 weeks for evaluation of medication trials and continued assessment. Ongoing patient psychoeducation regarding chronic illness and treatment .  9. I reviewed the potential risks, side effects, and benefits of all medications with the patient. Patient verbalized understanding and was encouraged to call clinic with further questions or concerns.    START TIME: 10:42 AM  END TIME: 11:16    Phone call duration: 34 minutes with > 50% spent on coordination of care and  psycho-education.    Dr. Lisa Emanuel, DNP, APRN, PMHNP-BC  Nurse Practitioner - Psychiatry

## 2021-06-08 NOTE — PROGRESS NOTES
The patient emailed the Care Guide and requested a refill on her Methelphedidate and also to get a monthly appointment with Dr. Martinez set up for the year. The Care Guide set the appointments up for the monthly meetings with Dr. Martinez and sent in the request for the medication refill. The Care Guide called the patient with the list of appointments.

## 2021-06-08 NOTE — TELEPHONE ENCOUNTER
Received refill request for Bupropion, called pharmacy as it appears there is a refill, pharmacist said insurance is requiring a 90 day refill. Message left for patient to discuss with provider.

## 2021-06-08 NOTE — PATIENT INSTRUCTIONS - HE
1. Send pictures of rash and NAC prescription label through [employer] if able  a. Follow up with provider or emergency services as needed for any signs of worsening allergic reaction.  2. START taking one bupropion (Wellbutrin XL) 150mg tablet every morning  3. Continue other medications as prescribed  4. Have your pharmacy contact us for a refill if you are running low on medications (We may ask you to come into clinic to get a refill from the nurse)  5. No alcohol or drug use  6. No driving if sedated  7. Contact the clinic with any questions or concerns   a. Phone: [phone]  b. Fax: [phone]  8. Reach out for help if you feel like hurting yourself or others:   a. [facility]: [address] [place] [address], [address] (phone: [phone])  b. [facility] Suicide Hotline: 745.422.8207   c. Call 911 or go to nearest Emergency room   9. Follow up as directed in 1 month, for your appointments, per your After Visit Summary Form

## 2021-06-08 NOTE — PROGRESS NOTES
MARYAN received a e-mail from Marisa asking to schedule her appointments with her PCP and CG the same day throughout the year. MARYAN scheduled all appointments and faxed over all of her appointments for the year along with scheduling Marisa to see our MTM on March 6th at our [place] site.  MARYAN also requested a refill on patients METHYLPHENIDATE 10 MG TABLET

## 2021-06-08 NOTE — PROGRESS NOTES
"Sherry Sweeney is a 40 y.o. female who is being evaluated via a billable video visit.      The patient has been notified of following:     \"This video visit will be conducted via a call between you and your physician/provider. We have found that certain health care needs can be provided without the need for an in-person physical exam.  This service lets us provide the care you need with a video conversation.  If a prescription is necessary we can send it directly to your pharmacy.  If lab work is needed we can place an order for that and you can then stop by our lab to have the test done at a later time.    Video visits are billed at different rates depending on your insurance coverage. Please reach out to your insurance provider with any questions.    If during the course of the call the physician/provider feels a video visit is not appropriate, you will not be charged for this service.\"    Patient has given verbal consent to a Video visit? yes    Patient would like to receive their AVS by AVS Preference: Jaclyn.    Patient would like the video invitation sent by: [phone]    Will anyone else be joining your video visit? No        Video Start Time: 2:39    Additional provider notes: Sherry is a 40-year-old female presenting with a virtual visit today regarding urinary tract symptoms.  The patient was seen in 14 April with symptoms of acute sinusitis and was treated with a course of Augmentin.  However, in the past 2 to 3 days she has had increasing symptoms of urgency, frequency and some suprapubic discomfort.  She denies any back pain or fever.     GENERAL: healthy, alert and no distress  EYES: Eyes grossly normal to inspection, conjunctivae and sclerae normal  RESP: no audible wheeze, cough, or visible cyanosis.  No visible retractions or increased work of breathing.  Able to speak fully in complete sentences.  PSYCH: mentation appears normal, affect normal/bright, judgement and insight intact, normal speech " and appearance well-groomed     Problem List Items Addressed This Visit     None      Visit Diagnoses     Acute cystitis without hematuria    -  Primary    Relevant Medications    nitrofurantoin, macrocrystal-monohydrate, (MACROBID) 100 MG capsule          Video-Visit Details    Type of service:  Video Visit    Video End Time (time video stopped): 2:44 PM  Originating Location (pt. Location): Home    Distant Location (provider location):  [facility] MEDICINE/OB     Platform used for Video Visit: [email or web address]      Layla Martinez MD

## 2021-06-08 NOTE — PROGRESS NOTES
. This video/telephone visit will be conducted via a call between you and your physician/provider. We have found that certain health care needs can be provided without the need for an in-person physical exam. This service lets us provide the care you need with a video /telephone conversation. If a prescription is necessary we can send it directly to your pharmacy. If lab work is needed we can place an order for that and you can then stop by our lab to have the test done at a later time.    Just as we bill insurance for in-person visits, we also bill insurance for video/telephone visits. If you have questions about your insurance coverage, we recommend that you speak with your insurance company.    Patient has given verbal consent for video visit?  yes  Patient would like the video visit invitation sent by:  Send to email: [email or web address]  Patient verified allergies, medications and pharmacy via phone. PHQ : 11 and JEREMY: 15 done verbally with writer. Patient states she is ready for visit.  Rhonda Locke, CMA

## 2021-06-08 NOTE — PROGRESS NOTES
Assessment:     1. Vaginal discharge  Wet Prep, Vaginal   2. Weight gain due to medication     3. Fibromyalgia     4. Migraine without aura            Plan:     Sherry is a 35 yo with a negative wet prep; she may have cleared the infection on her own. We discussed the depo, weight gain and migraines. She is due for her next depo shot in March. We will see her back before that to reassess decision to continue this or look for an alternative.     Subjective:       36 y.o. female presents for evaluation of vaginal discharge. The patient has a history of recurrent bacterial vaginosis and called in recently with symptoms concerning for that. She noted a history of lower pelvic discomfort, bloating, vaginal discharge and odor. She relates that these are the same symptoms that she has had in the past. However, in the past couple of days her symptoms have improved and are nearly resolved.   The patient continues to struggle with weight gain since starting Depo. She does feel that it has really helped her frequency of migraines. She does still get migraine headaches but not for days and days. She also feels that they are easier to manage. She has tried cutting back and changing her diet but still struggles with the weight gain. She is looking into ways to exercise but is limited significantly by her fibromyalgia pain and fatigue related to that diagnosis.     The following portions of the patient's history were reviewed and updated as appropriate: allergies, current medications, past family history, past medical history, past social history, past surgical history and problem list.    Review of Systems  Pertinent items are noted in HPI.     History   Smoking Status     Never Smoker   Smokeless Tobacco     Never Used         Objective:        Visit Vitals     /78 (Patient Site: Left Arm, Patient Position: Sitting, Cuff Size: Adult Regular)     Pulse 68     Wt 128 lb (58.1 kg)     Breastfeeding No     BMI 23.04 kg/m2      General appearance: alert, appears stated age and cooperative  Pelvic: vagina normal with minimal discharge; wet prep done       This note has been dictated using voice recognition software. Any grammatical or context distortions are unintentional and inherent to the software

## 2021-06-09 NOTE — PROGRESS NOTES
Injection given today of Depo-subQProvera 104mg/0.65mL  Lot number [id number]  Exp  04/2020  NDC 1376-9538-61  Given sub q left side of abdomen  JIM Robertson

## 2021-06-09 NOTE — PATIENT INSTRUCTIONS - HE
"1. START taking Pristiq 75mg daily for depression and anxiety.   2. Continue other medications as prescribed  3. Have your pharmacy contact us for a refill if you are running low on medications (We may ask you to come into clinic to get a refill from the nurse)  4. No alcohol or drug use  5. No driving if sedated  6. Contact the clinic with any questions or concerns   a. Phone: [phone]  b. Fax: [phone]  7. Reach out for help if you feel like hurting yourself or others:   a. [facility]: [address] [place] [address], [address] (phone: [phone])  b. [facility] Suicide Hotline: 211.611.6851   c. Crisis Texting Line: Text \"[place]\" to 895159  d. Call 911 or go to nearest Emergency room   8. Follow up as directed in 1 month, for your appointments, per your After Visit Summary Form    "

## 2021-06-09 NOTE — PROGRESS NOTES
"Telemedicine Visit: The patient's condition can be safely assessed and treated via synchronous audio and visual telemedicine encounter.      Reason for Telemedicine Visit: Patient unable to travel    Originating Site (Patient Location): Patient's home    Distant Site (Provider Location): [facility]: [facility]    Consent:  The patient/guardian has verbally consented to: the potential risks and benefits of telemedicine (video visit) versus in person care; bill my insurance or make self-payment for services provided; and responsibility for payment of non-covered services.     Mode of Communication:  Video Conference via [employer]    As the provider I attest to compliance with applicable laws and regulations related to telemedicine.    Outpatient Psychiatric Follow Up    Date of Service: 6/22/2020    --  Chief Complaint: \"things have been interesting\"     --  History of Present Illness/Client Impression of Mental Health Consult:    Sherry Sweeney is a 40 y.o. female who presents for telephone visit follow up. Last visit occurred 6/10/20. At that time increased Wellbutrin XL dose. Appears stated age, short hair, clean hair, somewhat unkempt hair, wearing yellow sweater, appears to have no red dots on face, and wearring earrings.    Since last visit, noticed more irritability and stress since initiation. However, is not sure due to several construction projects going on at her home. Complaining of rash today that has now spread to knees, elbows, and umbilicus. Notices that it has gotten worse. Received cream from dermatologist. Negative for celiacs. Rash preceded Pristiq initiation but worsened as dose increased.  Continues using hydroxyzine at bedtime with positive effect for bedtime anxiety and sleep.     States mood is \"good\". Rates depression and anxiety lower. However, feels like anxiety fluctuates during the day in relation to current external stressors. Denies any " sleep difficulties. Feels well rested in the morning. Denies appetite or weight concerns. Denies suicidal and homicidal ideation. No overt psychosis. Denies all other psychiatric symptoms. Denies new physical concerns.     Medication adherence: Reviewed risk/benefits of medication , Patient able to verbalize understanding of side effects  and Patient verbally consents to taking medications  Medication side effects: denies  The patient was given information on medications: all current prescribed medications by writer.  [place] Prescription Monitoring program: Not indicated for this patient.     Clinical Outcomes Measures:  PHQ-9 Total Score: 18  JEREMY-7: 19    --  Current Medications:  Current Outpatient Medications   Medication Sig Dispense Refill     acetylcysteine (NAC) 600 mg cap capsule Take 1 capsule (600 mg total) by mouth 2 (two) times a day. 60 capsule 0     buPROPion (WELLBUTRIN XL) 150 MG 24 hr tablet Take 2 tablets (300 mg total) by mouth daily. 180 tablet 0     cholecalciferol, vitamin D3, 400 unit cap Take 2 capsules by mouth daily.       desvenlafaxine succinate 25 mg Tb24 Take 50 mg by mouth every morning AND 25 mg every evening. 270 tablet 2     gabapentin (NEURONTIN) 300 MG capsule Take 1 capsule (300 mg total) by mouth 3 (three) times a day. 90 capsule 2     hydrOXYzine pamoate (VISTARIL) 50 MG capsule Take 1 capsule (50 mg total) by mouth at bedtime as needed. 90 capsule 2     ibuprofen (ADVIL,MOTRIN) 400 MG tablet Take 1.5 tablets (600 mg total) by mouth every 6 (six) hours as needed. 30 tablet 0     melatonin 3 mg Tab tablet Take 1 tablet (3 mg total) by mouth at bedtime as needed. 90 tablet 0     multivitamin therapeutic tablet Take 1 tablet by mouth daily.       naproxen (NAPROSYN) 500 MG tablet TAKE 1 TABLET BY MOUTH TWICE A DAY WITH FOOD 20 tablet 0     nystatin (MYCOSTATIN) powder Apply topically 4 (four) times a day.       ONABOTULINUMTOXINA (BOTOX INJ) Inject as directed every 3 (three)  months.       ondansetron (ZOFRAN-ODT) 4 MG disintegrating tablet TAKE 1 TABLET (4 MG TOTAL) BY MOUTH EVERY 8 (EIGHT) HOURS AS NEEDED FOR NAUSEA. 30 tablet 1     riboflavin, vitamin B2, 400 mg Tab Take 400 mg by mouth 2 (two) times a day.       rizatriptan (MAXALT) 10 MG tablet Take 1 tablet (10 mg total) by mouth as needed for migraine. May repeat in 2 hours if needed 10 tablet 3     triamcinolone (KENALOG) 0.1 % cream Apply to affected skin twice daily for up to 14 days. 30 g 0     ubidecarenone (COENZYME Q10 ORAL) Take 100 mg by mouth daily.       valACYclovir (VALTREX) 1000 MG tablet Take 2 tablets PO 12 hours apart PRN episode 20 tablet 2     melatonin-pyridoxine HCl, B6, 3-1 mg Tab Take 3 mg by mouth at bedtime. 30 tablet 2     No current facility-administered medications for this visit.        --  Allergies  Allergies   Allergen Reactions     Influenza Virus Vaccines Shortness Of Breath     Yeast, Dried Anaphylaxis     Stomach swelling     Egg White Other (See Comments)     Swollen colon, belly pain     Gluten      Savella [Milnacipran] Other (See Comments)     Chest pain, hot/cold flashes     Ciprofloxacin Itching and Rash     Rash over whole body      Quetiapine Palpitations     Sulfa (Sulfonamide Antibiotics) Rash       --  Lab Results:   No visits with results within 30 Day(s) from this visit.   Latest known visit with results is:   Lab on 01/09/2020   Component Date Value     Vitamin B-12 01/09/2020 1,039*     Folate 01/09/2020 17.9      CK, Total 01/09/2020 76        __  Review of Systems:   There were no vitals filed for this visit. unable to assess  There is no height or weight on file to calculate BMI. unable to assess    As noted in the subjective section above, otherwise a 10 point review of systems is negative. Limited ability to assess given virtual nature of visit. Review of symptoms based entirely on patient's verbal report and what writer able to assess via camera  __  Psychiatric  "Examination:    Appearance:  Appears stated age, short hair, clean hair, somewhat unkempt hair, wearing purple sweater,no small red dots on face, and wearring earrings.  Orientation: Patient alert and oriented to person, place, time, and situation  Reliability:  Patient appears to be an adequate historian.    Behavior: Patient makes good eye contact, and engages with normal rapport in the interview.   There is no evidence of responding to hallucinations or flashbacks.  Speech: Speech is spontaneous and coherent, with a normal rate, rhythm and tone.    Language:There are no difficulties with expressive or receptive language as observed throughout the interview.    Mood: Described as \"good\".    Affect: Congruent and shows a normal range and level of reactivity  Judgement: Able to make basic decision regarding safety.  Insight: Good awareness of physical and mental health conditions and aware of needs around care for these.  Gait and station: unable to assess  Thought process: Logical but tangential  Thought content: No evidence of delusions or paranoia.  No thoughts of self harm or suicide. No thoughts of harming others.  Associations: Connected  Fund of knowledge: Average  Attention / Concentration: Able to remain focused during the interview with minimal distractibility or need for redirection.  Short Term Memory: Grossly intact as evidence by client recalling themes and ideas discussed.  Long Term Memory: Intact  Motor Status: unable to assess    Assessment / Impression  1. Major depression, recurrent, chronic (H)    2. Generalized anxiety disorder     Sherry Sweeney is a 40 y.o. female who presents for follow up appointment.  Meets criteria for major depressive disorder, recurrent, moderate. Referred to this writer at the recommendation of primary care provider due to discharge from [facility]. Past medication trials include: Cymbalta and quetiapine.     Pristiq medication and bupropion combination seem to be well " controlling depressive symptoms. Responded well to increase in Wellbutrin XL dose and has less tiredness but overall improvement in mood stability throughout day. Describes ongoing rash that has since spread on body since increase in Pristiq several visits ago. Will try decreasing Pristiq dose to see if rash improves and rule out medication causation. Responded well to gabapentin 300 mg 3 times daily for for anxiety and fibromyalgia pain.  No new lab work ordered this visit due to restrictions on travel and comprehensive lab results available in January of this year.  We will refill all current medications in addition to new.  Will also attempt entering new NAC orders and change melatonin formulation per patient request.     Plan to follow up in 2 weeks for evaluation of current medication trials, lab work, and ongoing psychiatric assessment. Patient educated that they may schedule sooner appointment or contact writer for any worsening or lack of improvement in symptoms.      Patient denies suicidal and homicidal ideation. Not at imminent risk this visit. Educated on need to seek emergent services should they become a risk to themselves or others. Sherry Sweeeny verbalized understanding and agreement with this safety plan.     Rule out: Major depressive disorder, generalized anxiety disorder, adjustment disorder, and PTSD.    --  Plan  1. Continue to monitor for safety  2. Current Medications  1. Continue Bupropion (Wellbutrin XL) 300 mg daily for depression  2. TAPER Pristiq 75mg to 50 mg daily (25 mg in the morning and 25 mg at bedtime) for depression and anxiety  3. Continue Hydroxyzine 50 mg at bedtime as needed for sleep and anxiety  4. Continue Melatonin Pyridoxine HCL 3mg bedtime as needed for sleep  5. Continue Gabapentin 300 mg 3 times a day for anxiety and pain  6. Continue  two times a day for intrusive thinking control   7. Neuroleptic Consent Form Signed: n/a  8. Non-Opioid Contract Agreement Form  Signed: n/a  9. REFILLS: new medication prescriptions sent to pharmacy  3. Labs ordered this visit: n/a  4. Collaborate with interdisciplinary care team as needed.  5. ROIs: n/a  6. Consent to Communicate: n/a  7. Patient will continue abstinence from drugs and alcohol  8. Patient to return to clinic in 2 weeks for evaluation of medication trials and continued assessment. Ongoing patient psychoeducation regarding chronic illness and treatment .  9. I reviewed the potential risks, side effects, and benefits of all medications with the patient. Patient verbalized understanding and was encouraged to call clinic with further questions or concerns.    START TIME: 12:30 AM  END TIME: 1:00 PM    Phone call duration: 30 minutes with > 50% spent on coordination of care and psycho-education.    Dr. Lisa Emanuel, DNP, APRN, PMHNP-BC  Nurse Practitioner - Psychiatry

## 2021-06-09 NOTE — PROGRESS NOTES
Assessment:     1. Fibromyalgia     2. Migraine  amitriptyline (ELAVIL) 10 MG tablet   3. Chronic fatigue syndrome     4. Acne     5. Major depression, recurrent, chronic     6. Sinusitis           Plan:     Sherry is a very pleasant 36-year-old female who comes in today for routine medication check was also some new acute issues.  I agree with moving to a subcutaneous Depo-Provera and a coordinated being able to provide this with our pharmacy team.  The patient is revealed, next week to have this done.  I prescribed BenzaClin gel to apply twice daily for her acne.  I also prescribed Augmentin twice daily for 10 days for presumed sinusitis.  Continue her other current chronic medications as she does seem to be responding and doing a little bit better.  However, the patient remains severely disabled by her fibromyalgia and chronic fatigue syndrome.    Subjective:       36 y.o. female presents for medication check visit.  The patient has a history of severe and debilitating fibromyalgia as well as chronic fatigue syndrome.  The patient also has a history of rather intractable migraine headaches which has responded to a combination of amitriptyline along with Depo-Provera.  The patient is on Cymbalta as well as Lyrica for her fibromyalgia pain.  The patient states that she has noted some slight improvement in her functioning.  Specifically she notes that she has been able to prepare dinner recently for her .  However, she remarks that she does this in several stages and has to rest in between.  She is still quite disabled by her chronic medical conditions and has days where she spends most of the day in bed.  The patient notes that she definitely feels that the Depo-Provera has helped with migraine management as well as the variability in her symptoms of fibromyalgia she would feel premenstrually.  However, she continues to experience progressive weight gain despite every effort to eat small portions, healthy  options and not indulge.  The patient was speaking with our Pharm.D. who suggested she could give a trial to the subcutaneous form of Depo-Provera which has significantly less weight gain associated with her.  She would like to give that a try.  She has called her insurance and found out that it is a covered benefit.  The patient does have one acute symptom and that is regarding sinus symptoms.  She reports left-sided facial discomfort around her maxillary as well as frontal sinus and has had thick discolored yellow discharge from her left knee are that at times is bloody.  She also reports an associated cough.  She denies any fever.  She is prone to sinus infections.  The patient also has been struggling with acne.  This seems to come on since starting the Depo-Provera and she wonders what options she has.    The following portions of the patient's history were reviewed and updated as appropriate: allergies, current medications, past family history, past medical history, past social history, past surgical history and problem list.    Review of Systems  Pertinent items are noted in HPI.     History   Smoking Status     Never Smoker   Smokeless Tobacco     Never Used         Objective:        Visit Vitals     /62 (Patient Site: Left Arm, Patient Position: Sitting, Cuff Size: Adult Regular)     Pulse 70     Wt 135 lb (61.2 kg)     Breastfeeding No     BMI 24.3 kg/m2     General appearance: alert, appears stated age, cooperative and fatigued  Lungs: clear to auscultation bilaterally  Heart: regular rate and rhythm, S1, S2 normal, no murmur, click, rub or gallop  Skin: acne present on her face       This note has been dictated using voice recognition software. Any grammatical or context distortions are unintentional and inherent to the software

## 2021-06-09 NOTE — PROGRESS NOTES
"Telemedicine Visit: The patient's condition can be safely assessed and treated via synchronous audio and visual telemedicine encounter.      Reason for Telemedicine Visit: Patient unable to travel    Originating Site (Patient Location): Patient's home    Distant Site (Provider Location): [facility]: [facility]    Consent:  The patient/guardian has verbally consented to: the potential risks and benefits of telemedicine (video visit) versus in person care; bill my insurance or make self-payment for services provided; and responsibility for payment of non-covered services.     Mode of Communication:  Video Conference via [employer]    As the provider I attest to compliance with applicable laws and regulations related to telemedicine.    Outpatient Psychiatric Follow Up    Date of Service: 7/6/2020    --  Chief Complaint: \"I've felt more depressed\"     --  History of Present Illness/Client Impression of Mental Health Consult:    Sherry Sweeney is a 40 y.o. female who presents for telephone visit follow up. Last visit occurred 6/22/20. At that time decreased Pristiq dosage due to spread of rash. Appears stated age, short hair, clean hair, somewhat unkempt hair, wearing yellow sweater, appears to have no red dots on face, and wearring earrings.    Since last visit, describes new steroid cream and feels that rash has significantly reduced. No new spreading of rash and not sure if this is due to new cream or Pristiq decrease. Notices more forgetfulness, difficulty getting up in morning, more depressive feelings, and more daytime tiredness since decrease in Pristiq. More easily irritable since last visit. Voices preference to resume previous Pristiq dosing due to worsening depression. Continues with weekly therapy visits.     States mood is \"tired\". Rates depression and anxiety lower. However, feels like anxiety fluctuates during the day in relation to current external " stressors. Denies any sleep difficulties. Feels poorly rested in the morning. Denies appetite or weight concerns. Denies suicidal and homicidal ideation. No overt psychosis. Denies all other psychiatric symptoms. Denies new physical concerns.     Medication adherence: Reviewed risk/benefits of medication , Patient able to verbalize understanding of side effects  and Patient verbally consents to taking medications  Medication side effects: denies  The patient was given information on medications: all current prescribed medications by writer.  [place] Prescription Monitoring program: Not indicated for this patient.     Clinical Outcomes Measures:  PHQ-9 Total Score: 14  JEREMY-7: 19    --  Current Medications:  Current Outpatient Medications   Medication Sig Dispense Refill     acetylcysteine (NAC) 600 mg cap capsule Take 1 capsule (600 mg total) by mouth 2 (two) times a day. 60 capsule 0     buPROPion (WELLBUTRIN XL) 150 MG 24 hr tablet Take 2 tablets (300 mg total) by mouth daily. 180 tablet 0     cholecalciferol, vitamin D3, 400 unit cap Take 2 capsules by mouth daily.       desvenlafaxine succinate 25 mg Tb24 Take 50 mg by mouth every morning AND 25 mg every evening. 270 tablet 2     gabapentin (NEURONTIN) 300 MG capsule Take 1 capsule (300 mg total) by mouth 3 (three) times a day. 90 capsule 2     hydrOXYzine pamoate (VISTARIL) 50 MG capsule Take 1 capsule (50 mg total) by mouth at bedtime as needed. 90 capsule 2     ibuprofen (ADVIL,MOTRIN) 400 MG tablet Take 1.5 tablets (600 mg total) by mouth every 6 (six) hours as needed. 30 tablet 0     melatonin-pyridoxine HCl, B6, 3-1 mg Tab Take 3 mg by mouth at bedtime. 30 tablet 2     multivitamin therapeutic tablet Take 1 tablet by mouth daily.       naproxen (NAPROSYN) 500 MG tablet TAKE 1 TABLET BY MOUTH TWICE A DAY WITH FOOD 20 tablet 0     nystatin (MYCOSTATIN) powder Apply topically 4 (four) times a day.       ONABOTULINUMTOXINA (BOTOX INJ) Inject as directed every  3 (three) months.       ondansetron (ZOFRAN-ODT) 4 MG disintegrating tablet TAKE 1 TABLET (4 MG TOTAL) BY MOUTH EVERY 8 (EIGHT) HOURS AS NEEDED FOR NAUSEA. 30 tablet 1     riboflavin, vitamin B2, 400 mg Tab Take 400 mg by mouth 2 (two) times a day.       rizatriptan (MAXALT) 10 MG tablet Take 1 tablet (10 mg total) by mouth as needed for migraine. May repeat in 2 hours if needed 10 tablet 3     triamcinolone (KENALOG) 0.1 % cream Apply to affected skin twice daily for up to 14 days. 30 g 0     ubidecarenone (COENZYME Q10 ORAL) Take 100 mg by mouth daily.       valACYclovir (VALTREX) 1000 MG tablet Take 2 tablets PO 12 hours apart PRN episode 20 tablet 2     No current facility-administered medications for this visit.        --  Allergies  Allergies   Allergen Reactions     Influenza Virus Vaccines Shortness Of Breath     Yeast, Dried Anaphylaxis     Stomach swelling     Egg White Other (See Comments)     Swollen colon, belly pain     Gluten      Savella [Milnacipran] Other (See Comments)     Chest pain, hot/cold flashes     Ciprofloxacin Itching and Rash     Rash over whole body      Quetiapine Palpitations     Sulfa (Sulfonamide Antibiotics) Rash       --  Lab Results:   No visits with results within 30 Day(s) from this visit.   Latest known visit with results is:   Lab on 01/09/2020   Component Date Value     Vitamin B-12 01/09/2020 1,039*     Folate 01/09/2020 17.9      CK, Total 01/09/2020 76        __  Review of Systems:   There were no vitals filed for this visit. unable to assess  There is no height or weight on file to calculate BMI. unable to assess    As noted in the subjective section above, otherwise a 10 point review of systems is negative. Limited ability to assess given virtual nature of visit. Review of symptoms based entirely on patient's verbal report and what writer able to assess via camera  __  Psychiatric Examination:    Appearance:  Appears stated age, short hair, clean hair, somewhat unkempt  "hair, wearing purple sweater, small red dots on face (related to skin picking), and wearring earrings.  Orientation: Patient alert and oriented to person, place, time, and situation  Reliability:  Patient appears to be an adequate historian.    Behavior: Patient makes good eye contact, and engages with normal rapport in the interview.   There is no evidence of responding to hallucinations or flashbacks.  Speech: Speech is spontaneous and coherent, with a normal rate, rhythm and tone.    Language:There are no difficulties with expressive or receptive language as observed throughout the interview.    Mood: Described as \"tired\".    Affect: Congruent and shows a normal range and level of reactivity  Judgement: Able to make basic decision regarding safety.  Insight: Good awareness of physical and mental health conditions and aware of needs around care for these.  Gait and station: unable to assess  Thought process: Logical but tangential  Thought content: No evidence of delusions or paranoia.  No thoughts of self harm or suicide. No thoughts of harming others.  Associations: Connected  Fund of knowledge: Average  Attention / Concentration: Able to remain focused during the interview with minimal distractibility or need for redirection.  Short Term Memory: Grossly intact as evidence by client recalling themes and ideas discussed.  Long Term Memory: Intact  Motor Status: No tremor or overt changes in motor status.     Assessment / Impression  1. Major depression, recurrent, chronic (H)    2. Generalized anxiety disorder     Sherry Sweeney is a 40 y.o. female who presents for follow up appointment.  Meets criteria for major depressive disorder, recurrent, moderate. Referred to this writer at the recommendation of primary care provider due to discharge from [facility]. Past medication trials include: Cymbalta and quetiapine.     Pristiq medication and bupropion combination seem to be well controlling depressive symptoms. Worsened " depressive symptoms since decrease of Pristiq in hopes of decreasing rash. Patient reports rash significantly better today in part to new cream she is using. Discussed risks and benefits of increasing Pristiq. Patient asked to reincrease dose due to worsened symptoms and verbalized education of risks. Made plan to communicate if return of rash symptoms and reduce dose at that time. No other medication changes indicated today. No new lab work ordered this visit due to restrictions on travel and comprehensive lab results available in January of this year.       Plan to follow up in 4 weeks for evaluation of current medication trials, lab work, and ongoing psychiatric assessment. Patient educated that they may schedule sooner appointment or contact writer for any worsening or lack of improvement in symptoms.      Patient denies suicidal and homicidal ideation. Not at imminent risk this visit. Educated on need to seek emergent services should they become a risk to themselves or others. Sherry Sweeney verbalized understanding and agreement with this safety plan.     Rule out: Major depressive disorder, generalized anxiety disorder, adjustment disorder, and PTSD.    --  Plan  1. Continue to monitor for safety  2. Current Medications  1. Continue Bupropion (Wellbutrin XL) 300 mg daily for depression  2. TITRATE Pristiq 50 mg to 75mg daily (25 mg in the morning and 50 mg at bedtime) for depression and anxiety  3. Continue Hydroxyzine 50 mg at bedtime as needed for sleep and anxiety  4. Continue melatonin Pyridoxine HCL 3mg bedtime as needed for sleep  5. Continue Gabapentin 300 mg 3 times a day for anxiety and pain  6. Continue  two times a day for intrusive thinking control   7. Neuroleptic Consent Form Signed: n/a  8. Non-Opioid Contract Agreement Form Signed: n/a  9. REFILLS: NAC, Pristiq and melatonin pyridoxine HCL refilled x2  3. Labs ordered this visit: n/a  4. Collaborate with interdisciplinary care team as  needed.  5. ROIs: n/a  6. Consent to Communicate: n/a  7. Patient will continue abstinence from drugs and alcohol  8. Patient to return to clinic in 4 weeks for evaluation of medication trials and continued assessment. Ongoing patient psychoeducation regarding chronic illness and treatment .  9. I reviewed the potential risks, side effects, and benefits of all medications with the patient. Patient verbalized understanding and was encouraged to call clinic with further questions or concerns.    START TIME: 12:09 AM  END TIME: 12:29 PM    Phone call duration:  20 minutes with > 50% spent on coordination of care and psycho-education.    Dr. Lisa Emanuel, DNP, APRN, PMHNP-BC  Nurse Practitioner - Psychiatry

## 2021-06-09 NOTE — PROGRESS NOTES
MTM Encounter    ASSESSMENT AND PLAN    1. Fibromyalgia/Chronic Fatigue  Stable, but still struggling with chronic pain and fatigue. She is working with [facility] and other specialists to possibly get more answers regarding her diagnosis and treatment options. She is on a lower dose of Lyrica - typical doses for treating fibromyalgia are 300-450 mg/day. I am hesitant to recommend a dose increase because of weight gain concerns and her evening dose tends to make it harder for her to fall asleep so she takes at dinner instead. I discussed this with her so she is aware there is room to optimize the dose for possible improvement in symptoms, but she feels it is working well enough right now.     2. Migraine  Improvement in migraine frequency since addition of amitriptyline. She is noticing a slight increase in severity, which the amitriptyline also helped with, so I would recommend a dose increase. Typical dose of migraine prevention is 25-50 mg daily. Because she is also on Cymbalta, which affects serotonin, and she tends to be sensitive to medication changes, recommended a slight dose increase from 20 mg to 30 mg. Signs of serotonin syndrome were discussed. She agreed with the plan.   Regarding the weight gain due to Depo-Provera, unfortunately this is a common side effect affecting up to 40% of patients. She is greatly restricting calories which is not helping. I understand her concern with being able to lose the weight because of her limitations with fibromyalgia and fatigue. This is unfortunate since the medication has been beneficial with reducing her migraines. One option to consider is changing to the SubQ version of Depo-Provera. Interestingly, the incidence of weight gain with SubQ is only 6% compared to 40% with IM. The SubQ version is not available generically, so is likely more expensive, but the patient will contact her insurance company and perhaps we could complete a PA. Other options to consider would be  Nexplanon or progesterone IUD, such as Mirena.   Plan   1. Increase amitriptyline to 30 mg at bedtime.  2. Consider Depo-Provera SubQ.     3. Insomnia  Stable and with some possible improvement in her daytime fatigue since stopping clonazepam and switching to amitriptyline, which helps both her sleep and migraines.         SUBJECTIVE AND OBJECTIVE  Sherry Sweeney is a 36 y.o. female here for a medication therapy management (MTM) appointment.     1. Fibromyalgia/Chronic Fatigue  Sherry is currently taking Lyrica 75mg BID and Cymbalta 60 mg BID. She also takes methylphenidate 15 mg BID to help with fatigue. We had tried a change to Savella in the past as an alternative SNRI, but she experienced some withdrawal when tapering duloxetine and then had cardiac-related side effects to Savella that resolved when she stopped it. As far as PRN medications for her pain, she uses Lidoderm patches and tizanidine as needed. She's been to [place] recently and diagnosed with fibromyalgia and chronic fatigue. She's also seeing a specialist who is checking her for myopathies and dystrophies, which she should have results back soon.     2. Migraine  Sherry is taking amitriptyline 20 mg at bedtime for migraine prevention. Since going on the Depo-Provera shot, her migraines have reduced in frequency. She likes not getting a period because it can be very draining for her and she thinks the hormone component is helping reduce her migraines. She's been gaining a lot of weight, which is very concerning for her as she has trouble exercising with her conditions and is worried how she will lose the weight. She's been cutting back on calories down to 1200 munir/day, and still gaining weight. Reducing her calories seems to exacerbate her fatigue. She uses Maxalt MLT as needed for abortive therapy, which works well. She's had about 5 migraines this past month, which is a big improvement. In October, she had a migraine that lasted 25 days.     Wt Readings  from Last 3 Encounters:   01/17/17 128 lb (58.1 kg)   12/16/16 127 lb (57.6 kg)   11/21/16 125 lb (56.7 kg)       3. Insomnia  Sherry has seen a sleep specialist and was diagnosed with parasomnia, which she describes as waking up in the middle of the night without being aware of it. She is no longer taking clonazepam 0.5mg every night because addition of amitriptyline for her migraines has been helping her fall asleep without it. She actually feels better off of the clonazepam. She has been on trazodone in the past, but this caused severe next day drowsiness. She also tried Ambien but it actually made her more awake and she was unable to sleep for three days.          Sherry's medication list was reviewed with them, discussing reason for use, directions for use, and potential side effects of each medication as needed. Indication, safety, efficacy, and convenience was assessed for all medications addressed above.  No environmental factors were noted currently affecting patient.  This care plan was communicated via EMR with her primary care provider, Layla Martinez MD, who is the authorizing prescriber for this visit.  Direct supervision was available by either the patient's PCP or other available provider.    Time Spent: 60 minutes  Time and complexity billing metrics are included in the doc-flowsheet linked to this visit.       Noemi Chun, PharmD, BCACP    Current Outpatient Prescriptions   Medication Sig Dispense Refill     amitriptyline (ELAVIL) 10 MG tablet TAKE 1 TABLET BY MOUTH EVERY EVENING 90 tablet 1     CA CARB & GLUC/MAG OX & GLUC (CALCIUM MAGNESIUM ORAL) Take by mouth.       CHOLECALCIFEROL, VITAMIN D3, (VITAMIN D3 ORAL) Take by mouth.       clonazePAM (KLONOPIN) 0.5 MG tablet TAKE 1 TABLET (0.5 MG TOTAL) BY MOUTH BEDTIME. 90 tablet 1     DULoxetine (CYMBALTA) 60 MG capsule Take 60 mg by mouth 2 (two) times a day.  1     hyoscyamine (LEVBID) 0.375 mg 12 hr tablet Take 0.375 mg by mouth 2 (two) times  a day as needed.       lidocaine (LIDODERM) 5 %(700 mg/patch) Place 1 patch on the skin daily. On for 12 hours then off for 12 hours       lidocaine-prilocaine (EMLA) cream        methylphenidate (RITALIN) 10 MG tablet Take 1.5 tablets PO twice daily 90 tablet 0     ondansetron (ZOFRAN-ODT) 4 MG disintegrating tablet Take 1 tablet (4 mg total) by mouth every 8 (eight) hours as needed for nausea. One or Two Tablets Every 4-6 Hours For Nausea 30 tablet 1     pregabalin (LYRICA) 75 MG capsule TAKE ONE CAPSULE BY MOUTH TWICE A DAY 60 capsule 5     rizatriptan (MAXALT-MLT) 10 MG disintegrating tablet        tiZANidine (ZANAFLEX) 2 MG tablet Take 1 tablet (2 mg total) by mouth every 8 (eight) hours as needed (muscle spasm and fibromyalgia). 30 tablet 2     valACYclovir (VALTREX) 1000 MG tablet Take 2 tablets PO 12 hours apart PRN episode 20 tablet 2     Current Facility-Administered Medications   Medication Dose Route Frequency Provider Last Rate Last Dose     medroxyPROGESTERone injection 150 mg (DEPO-PROVERA)  150 mg Intramuscular Q6 Months Layla Martinez MD   150 mg at 09/20/16 1620     medroxyPROGESTERone injection 150 mg (DEPO-PROVERA)  150 mg Intramuscular Q3 Months Layla Martinez MD   150 mg at 12/16/16 1650

## 2021-06-09 NOTE — PROGRESS NOTES
This video/telephone visit will be conducted via a call between you and your physician/provider. We have found that certain health care needs can be provided without the need for an in-person physical exam. This service lets us provide the care you need with a video /telephone conversation. If a prescription is necessary we can send it directly to your pharmacy. If lab work is needed we can place an order for that and you can then stop by our lab to have the test done at a later time.    Just as we bill insurance for in-person visits, we also bill insurance for video/telephone visits. If you have questions about your insurance coverage, we recommend that you speak with your insurance company.    Patient has given verbal consent for video/Telephone visit? yes  Patient would like the video visit invitation sent by: Text to cell phone: NICOLE or Send to email: [email or web address]  JIM/FELIZ: GAYE CHAIDEZ    Patient verified allergies, medications and pharmacy via phone. PHQ : 14 and JEREMY: 20 done verbally with writer. Patient states she is ready for visit.    ________________________________________  Medications Phoned  to Pharmacy [] yes [x]no  Name of Pharmacist:  List Medications, including dose, quantity and instructions    Medications ordered this visit were e-scribed.  Verified by order class [x] yes  [] no  NAC and Pristiq   Medication changes or discontinuations were communicated to patient's pharmacy: [] yes  [x] no    Dictation completed at time of chart check: [] yes  [] no    I have checked the documentation for today s encounters and the above information has been reviewed and completed.

## 2021-06-09 NOTE — PATIENT INSTRUCTIONS - HE
"1. START taking Pristiq 25mg two times a day  2. Continue other medications as prescribed  3. Have your pharmacy contact us for a refill if you are running low on medications (We may ask you to come into clinic to get a refill from the nurse)  4. No alcohol or drug use  5. No driving if sedated  6. Contact the clinic with any questions or concerns   a. Phone: [phone]  b. Fax: [phone]  7. Reach out for help if you feel like hurting yourself or others:   a. [facility]: [address] [place] [address], [address] (phone: [phone])  b. [facility] Suicide Hotline: 642.523.2267   c. Crisis Texting Line: Text \"[place]\" to 500253  d. Call 911 or go to nearest Emergency room   8. Follow up as directed in 2 weeks, for your appointments, per your After Visit Summary Form    "

## 2021-06-10 NOTE — TELEPHONE ENCOUNTER
PA for pristiq is denied. Appeal information faxed to provider's email and placed in provider's box.

## 2021-06-10 NOTE — PROGRESS NOTES
________________________________________  Medications Phoned  to Pharmacy [] yes [x]no  Name of Pharmacist:  List Medications, including dose, quantity and instructions    Medications ordered this visit were e-scribed.  Verified by order class [x] yes  [] no  Bupropion 150mg  Pristiq 50 mg  Gabapentin 300 mg    Medication changes or discontinuations were communicated to patient's pharmacy: [] yes  [x] no    Dictation completed at time of chart check: [x] yes  [] no    I have checked the documentation for today s encounters and the above information has been reviewed and completed.

## 2021-06-10 NOTE — PROGRESS NOTES
"Assessment:     1. Cognitive complaints  Neuropsychological testing   2. Fatigue  methylphenidate (RITALIN) 10 MG tablet   3. Irritable bowel syndrome, unspecified type     4. Major depression, recurrent, chronic     5. Drooping eyelid  Ambulatory referral to Ophthalmology   6. Contraception management  Ambulatory referral to Obstetrics / Gynecology       Plan:     I advised that we try to quantify her cognitive concerns with neuropsychological testing and an order was placed; I do have concerns, however, with her ability to complete this test due to her profound fatigue. I referred the patient to an eye doctor to see if she has issues with her vision due to her eyelids. I also referred her for a formal consult to talk to a gynecologist about the Essure procedure. After she and her  have decided what they wish to do about contraception, then I would discuss next steps related to her depo-provera.     Subjective:       37 y.o. female presents for routine follow up. Sherry is starting to find that she feels like she is noticing cognitive issues that are more pronounced than she has noticed in the past. She feels like she is confused at time and finds herself doing unusual things such as placing the milk in the cupboard. She is no longer driving at all due in part to this and her decreased reaction time. 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 She is no longer taking Clonazepam due to excessive daytime somnolence. Since stopping the medication she is not having as much sleepiness during the day and finds that the pain overall is not as bad. Her right ear has felt sore and a bit itchy for a while.  She also has a question regarding droopy eyelids. She finds that she frequently is elevating her forehead in order to \"open up\" her eyes more fully. She wonders if this \"scrunching\" of the muscles may be contributing to " "her headaches.   The patient also would like to get off of her Depo Provera due to continued issues with weight gain. The patient, despite dietary restricting and healthy eating, has been unable to stabilize her weight on Depo. She does still need something for contraception and would like to know options for permanent birth control. She is worried about having worsening issues with pain and headaches without the depo due to monthly cycle.     The following portions of the patient's history were reviewed and updated as appropriate: allergies, current medications, past family history, past medical history, past social history, past surgical history and problem list.    Review of Systems  Pertinent items are noted in HPI.     History   Smoking Status     Never Smoker   Smokeless Tobacco     Never Used       Objective:      /74 (Patient Site: Left Arm, Patient Position: Sitting, Cuff Size: Adult Regular)  Pulse 72  Wt 136 lb 4.8 oz (61.8 kg)  Breastfeeding? No  BMI 24.53 kg/m2  General appearance: alert, appears stated age, cooperative and fatigued  Eyes: eyelids do appear mildly \"droopy\"  Ears: normal TM's and external ear canals both ears  Throat: lips, mucosa, and tongue normal; teeth and gums normal       This note has been dictated using voice recognition software. Any grammatical or context distortions are unintentional and inherent to the software  "

## 2021-06-10 NOTE — PATIENT INSTRUCTIONS - HE
"1. Continue medications as prescribed  2. Have your pharmacy contact us for a refill if you are running low on medications (We may ask you to come into clinic to get a refill from the nurse)  3. No alcohol or drug use  4. No driving if sedated  5. Contact the clinic with any questions or concerns   a. Phone: [phone]  b. Fax: [phone]  6. Reach out for help if you feel like hurting yourself or others:   a. [facility]: [address] [place] [address], [address] (phone: [phone])  b. [facility] Suicide Hotline: [phone]   c. Crisis Texting Line: Text \"[place]\" to 301419  d. Call 911 or go to nearest Emergency room   7. Follow up as directed in 2 weeks, for your appointments, per your After Visit Summary Form    "

## 2021-06-10 NOTE — TELEPHONE ENCOUNTER
"Spoke with the pharmacy. They state that the previous PA approval was for the 25 mg tablet. Current script is for 50 mg, taking two daily. Pharmacy states that they would need a new script for a 100 mg tablet, ROCHELLE, with a note to pharmacy that \"Brand is necessary\". Placed a call to pt to find out of she's willing to try the 100 mg generic. Pharmacy states the generic will not need a PA, but the brand will need a new PA. Pharmacy thought they remembered pt was allergic to a certain dosage of the generic, but not all. Instructed pt to call triage for clarification.   "

## 2021-06-10 NOTE — TELEPHONE ENCOUNTER
Received refill request for patient Wellbutrin. Patient should still have meds remaining. Given 90 days supply on 6/10/2020

## 2021-06-10 NOTE — PROGRESS NOTES
This video visit will be conducted via a call between you and your physician/provider. We have found that certain health care needs can be provided without the need for an in-person physical exam. This service lets us provide the care you need with a video conversation. If a prescription is necessary we can send it directly to your pharmacy. If lab work is needed we can place an order for that and you can then stop by our lab to have the test done at a later time.    Just as we bill insurance for in-person visits, we also bill insurance for video visits. If you have questions about your insurance coverage, we recommend that you speak with your insurance company.    Patient has given verbal consent for video visit? yes  Patient would like the video visit invitation sent by:  Send to email: [email or web address]  Eliz ASNTIAGO RN.  Patient verified allergies, medications and pharmacy via phone. PHQ 9:10 and JEREMY 7 :13  done verbally with writer. Patient states yes  is ready for visit.

## 2021-06-10 NOTE — TELEPHONE ENCOUNTER
Pristiq order updated as PA for brand name now approved. Order signed and to be sent to pharmacy. Interim order to be discontinued.

## 2021-06-10 NOTE — PROGRESS NOTES
________________________________________  Medications Phoned  to Pharmacy [] yes [x]no  Name of Pharmacist:  List Medications, including dose, quantity and instructions    Medications ordered this visit were e-scribed.  Verified by order class [x] yes  [] no    Medication changes or discontinuations were communicated to patient's pharmacy: [] yes  [] no none noted    Dictation completed at time of chart check: [x] yes  [] no    I have checked the documentation for today s encounters and the above information has been reviewed and completed.

## 2021-06-10 NOTE — TELEPHONE ENCOUNTER
Appeal faxed back to # [phone].  Last visit notes faxed per COLLETTE Emanuel's request for Appeal. Confirmation fax received that is went through successfully.

## 2021-06-10 NOTE — PROGRESS NOTES
"Telemedicine Visit: The patient's condition can be safely assessed and treated via synchronous audio and visual telemedicine encounter.      Reason for Telemedicine Visit: Patient unable to travel    Originating Site (Patient Location): Patient's home    Distant Site (Provider Location): [facility]: [facility]    Consent:  The patient/guardian has verbally consented to: the potential risks and benefits of telemedicine (video visit) versus in person care; bill my insurance or make self-payment for services provided; and responsibility for payment of non-covered services.     Mode of Communication:  Video Conference via [employer]    As the provider I attest to compliance with applicable laws and regulations related to telemedicine.    Outpatient Psychiatric Follow Up    Date of Service: 8/3/2020    --  Chief Complaint: \"I've felt more depressed\"     --  History of Present Illness/Client Impression of Mental Health Consult:    Sherry Sweeney is a 40 y.o. female who presents for virtual visit follow up. Last visit occurred 6/22/20. At that time decreased Pristiq dosage due to spread of rash. Appears stated age, short hair, clean hair, somewhat unkempt hair, wearing yellow sweater, appears to have no red dots on face, and wearring earrings.    Since last visit, reports feeling better since titration of Pristiq back to 75mg. Notices improved clarity of thoughts, more energy, and less depression. Reports rash has returned. States she has been taking \"round tablets\" and had improved skin. Started taking \"square tablets\" which were the 25mg doses and noticed return of rash. Showed writer physical bottles containing medications. Generic 50mg tablet noted to be from company called \"[employer]\" and 25mg dose from company called \" [employer]\". Continues with weekly therapy visits.     States mood is \"better\". Rates depression and anxiety lower. However, feels like " anxiety fluctuates during the day in relation to current external stressors. Denies any sleep difficulties. Feels poorly rested in the morning. Denies appetite or weight concerns. Denies suicidal and homicidal ideation. No overt psychosis. Denies all other psychiatric symptoms. Denies new physical concerns.     Medication adherence: Reviewed risk/benefits of medication , Patient able to verbalize understanding of side effects  and Patient verbally consents to taking medications  Medication side effects: denies  The patient was given information on medications: all current prescribed medications by writer.  [place] Prescription Monitoring program: Not indicated for this patient.     Clinical Outcomes Measures:  PHQ-9 Total Score: 10  JEREMY-7: 19    --  Current Medications:  Current Outpatient Medications   Medication Sig Dispense Refill     norethindrone-ethinyl estradiol-iron (MICROGESTIN FE 1.5/30) 1.5 mg-30 mcg (21)/75 mg (7) per tablet Take 1 tablet by mouth daily.       spironolactone (ALDACTONE) 50 MG tablet Take 50 mg by mouth daily. 2  Tabs by mouth-patient reported       acetylcysteine (NAC) 600 mg cap capsule Take 1 capsule (600 mg total) by mouth 2 (two) times a day. 60 capsule 2     buPROPion (WELLBUTRIN XL) 150 MG 24 hr tablet Take 2 tablets (300 mg total) by mouth daily. 180 tablet 0     cholecalciferol, vitamin D3, 400 unit cap Take 2 capsules by mouth daily.       desvenlafaxine succinate 25 mg Tb24 Take 50 mg by mouth every morning AND 25 mg every evening. 270 tablet 2     gabapentin (NEURONTIN) 300 MG capsule Take 1 capsule (300 mg total) by mouth 3 (three) times a day. 90 capsule 2     hydrOXYzine pamoate (VISTARIL) 50 MG capsule Take 1 capsule (50 mg total) by mouth at bedtime as needed. 90 capsule 2     ibuprofen (ADVIL,MOTRIN) 400 MG tablet Take 1.5 tablets (600 mg total) by mouth every 6 (six) hours as needed. 30 tablet 0     melatonin-pyridoxine HCl, B6, 3-1 mg Tab Take 3 mg by mouth at  bedtime. 30 tablet 2     multivitamin therapeutic tablet Take 1 tablet by mouth daily.       naproxen (NAPROSYN) 500 MG tablet Take 1 tablet (500 mg total) by mouth 2 (two) times a day with meals. 30 tablet 0     nystatin (MYCOSTATIN) powder Apply topically 4 (four) times a day.       ONABOTULINUMTOXINA (BOTOX INJ) Inject as directed every 3 (three) months.       ondansetron (ZOFRAN-ODT) 4 MG disintegrating tablet TAKE 1 TABLET (4 MG TOTAL) BY MOUTH EVERY 8 (EIGHT) HOURS AS NEEDED FOR NAUSEA. 30 tablet 1     riboflavin, vitamin B2, 400 mg Tab Take 400 mg by mouth 2 (two) times a day.       rizatriptan (MAXALT) 10 MG tablet Take 1 tablet (10 mg total) by mouth as needed for migraine. May repeat in 2 hours if needed 10 tablet 3     triamcinolone (KENALOG) 0.1 % cream Apply to affected skin twice daily for up to 14 days. 30 g 0     ubidecarenone (COENZYME Q10 ORAL) Take 100 mg by mouth daily.       valACYclovir (VALTREX) 1000 MG tablet Take 2 tablets PO 12 hours apart PRN episode 20 tablet 2     No current facility-administered medications for this visit.        --  Allergies  Allergies   Allergen Reactions     Ciprofloxacin Itching, Rash and Hives     Rash over whole body      Egg White Other (See Comments)     Swollen colon, belly pain     Influenza Virus Vaccines Shortness Of Breath     Sulfa (Sulfonamide Antibiotics) Rash and Hives     Yeast, Dried Anaphylaxis     Stomach swelling     Gluten      Savella [Milnacipran] Other (See Comments)     Chest pain, hot/cold flashes     Quetiapine Palpitations     Tachycardia, nervousness, elevated blood pressure.        --  Lab Results:   Admission on 07/23/2020, Discharged on 07/23/2020   Component Date Value     WBC 07/23/2020 12.9*     RBC 07/23/2020 4.71      Hemoglobin 07/23/2020 13.2      Hematocrit 07/23/2020 40.7      MCV 07/23/2020 86      MCH 07/23/2020 28.0      MCHC 07/23/2020 32.4      RDW 07/23/2020 12.7      Platelets 07/23/2020 317      MPV 07/23/2020 9.5       Sodium 07/23/2020 138      Potassium 07/23/2020 3.9      Chloride 07/23/2020 103      CO2 07/23/2020 26      Anion Gap, Calculation 07/23/2020 9      Glucose 07/23/2020 87      BUN 07/23/2020 9      Creatinine 07/23/2020 0.75      GFR MDRD Af Amer 07/23/2020 >60      GFR MDRD Non Af Amer 07/23/2020 >60      Bilirubin, Total 07/23/2020 0.2      Calcium 07/23/2020 9.7      Protein, Total 07/23/2020 7.1      Albumin 07/23/2020 4.1      Alkaline Phosphatase 07/23/2020 52      AST 07/23/2020 21      ALT 07/23/2020 17      Lipase 07/23/2020 28      Color, UA 07/23/2020 Yellow      Clarity, UA 07/23/2020 Clear      Glucose, UA 07/23/2020 Negative      Bilirubin, UA 07/23/2020 Negative      Ketones, UA 07/23/2020 Negative      Specific Gravity, UA 07/23/2020 1.008      Blood, UA 07/23/2020 Negative      pH, UA 07/23/2020 6.0      Protein, UA 07/23/2020 Negative      Urobilinogen, UA 07/23/2020 <2.0 E.U./dL      Nitrite, UA 07/23/2020 Negative      Leukocytes, UA 07/23/2020 Negative      SYSTOLIC BLOOD PRESSURE 07/23/2020 133      DIASTOLIC BLOOD PRESSURE 07/23/2020 85      VENTRICULAR RATE 07/23/2020 76      ATRIAL RATE 07/23/2020 76      P-R INTERVAL 07/23/2020 160      QRS DURATION 07/23/2020 82      Q-T INTERVAL 07/23/2020 354      QTC CALCULATION (BEZET) 07/23/2020 398      P Axis 07/23/2020 83      R AXIS 07/23/2020 72      T AXIS 07/23/2020 95      MUSE DIAGNOSIS 07/23/2020                      Value:Normal sinus rhythm  Normal ECG  When compared with ECG of 03-JAN-2020 09:29,  No significant change was found  Confirmed by SEE ED PROVIDER NOTE FOR, ECG INTERPRETATION ([id number]),  COSMO LIRA ([id number]) on 7/24/2020 3:20:28 AM         __  Review of Systems:   There were no vitals filed for this visit. unable to assess  There is no height or weight on file to calculate BMI. unable to assess    As noted in the subjective section above, otherwise a 10 point review of systems is negative. Limited ability to assess given  "virtual nature of visit. Review of symptoms based entirely on patient's verbal report and what writer able to assess via camera  __  Psychiatric Examination:    Appearance:  Appears stated age, short hair, clean hair, somewhat unkempt hair, wearing purple sweater, small red dots on face (related to skin picking), and wearring earrings.  Orientation: Patient alert and oriented to person, place, time, and situation  Reliability:  Patient appears to be an adequate historian.    Behavior: Patient makes good eye contact, and engages with normal rapport in the interview.   There is no evidence of responding to hallucinations or flashbacks.  Speech: Speech is spontaneous and coherent, with a normal rate, rhythm and tone.    Language:There are no difficulties with expressive or receptive language as observed throughout the interview.    Mood: Described as \"tired\".    Affect: Congruent and shows a normal range and level of reactivity  Judgement: Able to make basic decision regarding safety.  Insight: Good awareness of physical and mental health conditions and aware of needs around care for these.  Gait and station: unable to assess  Thought process: Logical but tangential  Thought content: No evidence of delusions or paranoia.  No thoughts of self harm or suicide. No thoughts of harming others.  Associations: Connected  Fund of knowledge: Average  Attention / Concentration: Able to remain focused during the interview with minimal distractibility or need for redirection.  Short Term Memory: Grossly intact as evidence by client recalling themes and ideas discussed.  Long Term Memory: Intact  Motor Status: No tremor or overt changes in motor status.     Assessment / Impression  1. Major depression, recurrent, chronic (H)    2. Generalized anxiety disorder     Sherry Sweeney is a 40 y.o. female who presents for follow up appointment.  Meets criteria for major depressive disorder, recurrent, moderate. Referred to this writer at the " recommendation of primary care provider due to discharge from [facility]. Past medication trials include: Cymbalta and quetiapine.     Pristiq medication and bupropion combination seem to be well controlling depressive symptoms. Improved management since last dose increase but return of rash that seems to be associated with [employer] generic brand. Spoke with patient's pharmacy and generic dosing unable to be filled by [id number]. Requested that medication be filled with Pristiq brand only for this reason. Medication order updated with this information. No other medication changes indicated today. No new lab work ordered this visit due to restrictions on travel and comprehensive lab results available in January of this year.       Plan to follow up in 2 weeks for evaluation of current medication trials, lab work, and ongoing psychiatric assessment. Patient educated that they may schedule sooner appointment or contact writer for any worsening or lack of improvement in symptoms.      Patient denies suicidal and homicidal ideation. Not at imminent risk this visit. Educated on need to seek emergent services should they become a risk to themselves or others. Sherry Sweeney verbalized understanding and agreement with this safety plan.     Rule out: Major depressive disorder, generalized anxiety disorder, adjustment disorder, and PTSD.    --  Plan  1. Continue to monitor for safety  2. Current Medications  1. Continue Bupropion (Wellbutrin XL) 300 mg daily for depression  2. Continue Pristiq 75mg daily (25 mg in the morning and 50 mg at bedtime) for depression and anxiety. BRAND NAME ONLY. DO NOT FILL [employer] GENERIC DUE TO SIDE EFFECTS.   3. Continue Hydroxyzine 50 mg at bedtime as needed for sleep and anxiety  4. Continue melatonin Pyridoxine HCL 3mg bedtime as needed for sleep  5. Continue Gabapentin 300 mg 3 times a day for anxiety and pain  6. Continue  two times a day for intrusive thinking control    7. Neuroleptic Consent Form Signed: n/a  8. Non-Opioid Contract Agreement Form Signed: n/a  9. REFILLS: melatonin Pyridoxine HCL   3. Labs ordered this visit: n/a  4. Collaborate with interdisciplinary care team as needed.  5. ROIs: n/a  6. Consent to Communicate: n/a  7. Patient will continue abstinence from drugs and alcohol  8. Patient to return to clinic in 2 weeks for evaluation of medication trials and continued assessment. Ongoing patient psychoeducation regarding chronic illness and treatment .  9. I reviewed the potential risks, side effects, and benefits of all medications with the patient. Patient verbalized understanding and was encouraged to call clinic with further questions or concerns.    START TIME: 11:45 AM  END TIME: 12:00 PM    Phone call duration:  15 minutes with > 50% spent on coordination of care and psycho-education.    Dr. Lisa Emanuel, DNP, APRN, PMHNP-BC  Nurse Practitioner - Psychiatry

## 2021-06-10 NOTE — PATIENT INSTRUCTIONS - HE
"1. START taking Pristiq 50mg two times a day   2. Continue medications as prescribed  3. Have your pharmacy contact us for a refill if you are running low on medications (We may ask you to come into clinic to get a refill from the nurse)  4. No alcohol or drug use  5. No driving if sedated  6. Contact the clinic with any questions or concerns   a. Phone: [phone]  b. Fax: [phone]  7. Reach out for help if you feel like hurting yourself or others:   a. [facility]: [address] [place] [address], [address] (phone: [phone])  b. [facility] Suicide Hotline: [phone]   c. Crisis Texting Line: Text \"[place]\" to 307007  d. Call 911 or go to nearest Emergency room   8. Follow up as directed in 1 month, for your appointments, per your After Visit Summary Form    "

## 2021-06-10 NOTE — PROGRESS NOTES
"Telemedicine Visit: The patient's condition can be safely assessed and treated via synchronous audio and visual telemedicine encounter.      Reason for Telemedicine Visit: Patient unable to travel    Originating Site (Patient Location): Patient's home    Distant Site (Provider Location): [facility]: [facility]    Consent:  The patient/guardian has verbally consented to: the potential risks and benefits of telemedicine (video visit) versus in person care; bill my insurance or make self-payment for services provided; and responsibility for payment of non-covered services.     Mode of Communication:  Video Conference via [address]    As the provider I attest to compliance with applicable laws and regulations related to telemedicine.    Outpatient Psychiatric Follow Up    Date of Service: 8/17/2020    --  Chief Complaint: \"this rash was worse again\"     --  History of Present Illness/Client Impression of Mental Health Consult:    Sherry Sweeney is a 40 y.o. female who presents for virtual visit follow up. Last visit occurred 8/3/20. At that time changed formulation from generic to brand Pristiq due to side effects.     Started taking 50mg and 1/2 of 50mg pill of medications due to worsening rash symptoms. Rash improved after stopping 25mg desvenalafaxine brand of pills. Showed writer rash on R forearm and how it was finally starting to heal after blistering. Previously spoke with pharmacy to determine that particular generic was causing rash and that generic that was working was not produced in dosing patient required. Since then PA initiated and refused. Continues with weekly therapy visits.     States mood is \"good\". Rates depression and anxiety lower. However, feels like anxiety fluctuates during the day in relation to current external stressors. Denies any sleep difficulties. Feels poorly rested in the morning. Denies appetite or weight concerns. Denies suicidal and " homicidal ideation. No overt psychosis. Denies all other psychiatric symptoms. Denies new physical concerns.     Medication adherence: Reviewed risk/benefits of medication , Patient able to verbalize understanding of side effects  and Patient verbally consents to taking medications  Medication side effects: denies  The patient was given information on medications: all current prescribed medications by writer.  [place] Prescription Monitoring program: Not indicated for this patient.     Clinical Outcomes Measures:  PHQ-9 Total Score: 11  JEREMY-7: 19    --  Current Medications:  Current Outpatient Medications   Medication Sig Dispense Refill     acetylcysteine (NAC) 600 mg cap capsule Take 1 capsule (600 mg total) by mouth 2 (two) times a day. 60 capsule 2     buPROPion (WELLBUTRIN XL) 150 MG 24 hr tablet Take 2 tablets (300 mg total) by mouth daily. 180 tablet 0     cholecalciferol, vitamin D3, 400 unit cap Take 2 capsules by mouth daily.       clobetasoL (TEMOVATE) 0.05 % ointment APPLY 1 2 TIMES DAILY AS NEEDED TO AFFECTED AREA, AVOID FACE, ARMPITS, AND GENITALS       desvenlafaxine succinate 25 mg Tb24 Take 50 mg by mouth every morning AND 25 mg every evening. 270 tablet 2     hydrOXYzine pamoate (VISTARIL) 50 MG capsule Take 1 capsule (50 mg total) by mouth at bedtime as needed. 90 capsule 2     ibuprofen (ADVIL,MOTRIN) 400 MG tablet Take 1.5 tablets (600 mg total) by mouth every 6 (six) hours as needed. 30 tablet 0     melatonin-pyridoxine HCl, B6, 3-1 mg Tab Take 3 mg by mouth at bedtime. 30 tablet 2     multivitamin therapeutic tablet Take 1 tablet by mouth daily.       naproxen (NAPROSYN) 500 MG tablet Take 1 tablet (500 mg total) by mouth 2 (two) times a day with meals. 30 tablet 0     norethindrone-ethinyl estradiol-iron (MICROGESTIN FE 1.5/30) 1.5 mg-30 mcg (21)/75 mg (7) per tablet Take 1 tablet by mouth daily.       nystatin (MYCOSTATIN) powder Apply topically 4 (four) times a day.       ondansetron  (ZOFRAN-ODT) 4 MG disintegrating tablet TAKE 1 TABLET (4 MG TOTAL) BY MOUTH EVERY 8 (EIGHT) HOURS AS NEEDED FOR NAUSEA. 30 tablet 1     riboflavin, vitamin B2, 400 mg Tab Take 400 mg by mouth 2 (two) times a day.       rizatriptan (MAXALT) 10 MG tablet Take 1 tablet (10 mg total) by mouth as needed for migraine. May repeat in 2 hours if needed 10 tablet 3     triamcinolone (KENALOG) 0.1 % cream Apply to affected skin twice daily for up to 14 days. 30 g 0     ubidecarenone (COENZYME Q10 ORAL) Take 100 mg by mouth daily.       valACYclovir (VALTREX) 1000 MG tablet Take 2 tablets PO 12 hours apart PRN episode 20 tablet 2     gabapentin (NEURONTIN) 300 MG capsule Take 1 capsule (300 mg total) by mouth 3 (three) times a day. 90 capsule 2     ONABOTULINUMTOXINA (BOTOX INJ) Inject as directed every 3 (three) months.       spironolactone (ALDACTONE) 50 MG tablet Take 50 mg by mouth daily. 2  Tabs by mouth-patient reported       No current facility-administered medications for this visit.        --  Allergies  Allergies   Allergen Reactions     Ciprofloxacin Itching, Rash and Hives     Rash over whole body      Egg White Other (See Comments)     Swollen colon, belly pain     Influenza Virus Vaccines Shortness Of Breath     Sulfa (Sulfonamide Antibiotics) Rash and Hives     Yeast, Dried Anaphylaxis     Stomach swelling     Gluten      Savella [Milnacipran] Other (See Comments)     Chest pain, hot/cold flashes     Quetiapine Palpitations     Tachycardia, nervousness, elevated blood pressure.        --  Lab Results:   Admission on 07/23/2020, Discharged on 07/23/2020   Component Date Value     WBC 07/23/2020 12.9*     RBC 07/23/2020 4.71      Hemoglobin 07/23/2020 13.2      Hematocrit 07/23/2020 40.7      MCV 07/23/2020 86      MCH 07/23/2020 28.0      MCHC 07/23/2020 32.4      RDW 07/23/2020 12.7      Platelets 07/23/2020 317      MPV 07/23/2020 9.5      Sodium 07/23/2020 138      Potassium 07/23/2020 3.9      Chloride  07/23/2020 103      CO2 07/23/2020 26      Anion Gap, Calculation 07/23/2020 9      Glucose 07/23/2020 87      BUN 07/23/2020 9      Creatinine 07/23/2020 0.75      GFR MDRD Af Amer 07/23/2020 >60      GFR MDRD Non Af Amer 07/23/2020 >60      Bilirubin, Total 07/23/2020 0.2      Calcium 07/23/2020 9.7      Protein, Total 07/23/2020 7.1      Albumin 07/23/2020 4.1      Alkaline Phosphatase 07/23/2020 52      AST 07/23/2020 21      ALT 07/23/2020 17      Lipase 07/23/2020 28      Color, UA 07/23/2020 Yellow      Clarity, UA 07/23/2020 Clear      Glucose, UA 07/23/2020 Negative      Bilirubin, UA 07/23/2020 Negative      Ketones, UA 07/23/2020 Negative      Specific Gravity, UA 07/23/2020 1.008      Blood, UA 07/23/2020 Negative      pH, UA 07/23/2020 6.0      Protein, UA 07/23/2020 Negative      Urobilinogen, UA 07/23/2020 <2.0 E.U./dL      Nitrite, UA 07/23/2020 Negative      Leukocytes, UA 07/23/2020 Negative      SYSTOLIC BLOOD PRESSURE 07/23/2020 133      DIASTOLIC BLOOD PRESSURE 07/23/2020 85      VENTRICULAR RATE 07/23/2020 76      ATRIAL RATE 07/23/2020 76      P-R INTERVAL 07/23/2020 160      QRS DURATION 07/23/2020 82      Q-T INTERVAL 07/23/2020 354      QTC CALCULATION (BEZET) 07/23/2020 398      P Axis 07/23/2020 83      R AXIS 07/23/2020 72      T AXIS 07/23/2020 95      MUSE DIAGNOSIS 07/23/2020                      Value:Normal sinus rhythm  Normal ECG  When compared with ECG of 03-JAN-2020 09:29,  No significant change was found  Confirmed by SEE ED PROVIDER NOTE FOR, ECG INTERPRETATION ([id number]),  COSMO LIRA ([id number]) on 7/24/2020 3:20:28 AM         __  Review of Systems:   There were no vitals filed for this visit. unable to assess  There is no height or weight on file to calculate BMI. unable to assess    As noted in the subjective section above, otherwise a 10 point review of systems is negative. Limited ability to assess given virtual nature of visit. Review of symptoms based entirely on  "patient's verbal report and what writer able to assess via camera  __  Psychiatric Examination:    Appearance:  Appears stated age, short hair, clean hair, somewhat unkempt hair, wearing purple sweater, small red dots on face (related to skin picking), and wearring earrings. Rash spread to forearms. Healing blisters noted.  Orientation: Patient alert and oriented to person, place, time, and situation  Reliability:  Patient appears to be an adequate historian.    Behavior: Patient makes good eye contact, and engages with normal rapport in the interview.   There is no evidence of responding to hallucinations or flashbacks.  Speech: Speech is spontaneous and coherent, with a normal rate, rhythm and tone.    Language:There are no difficulties with expressive or receptive language as observed throughout the interview.    Mood: Described as \"good\".    Affect: Congruent and shows a normal range and level of reactivity  Judgement: Able to make basic decision regarding safety.  Insight: Good awareness of physical and mental health conditions and aware of needs around care for these.  Gait and station: unable to assess  Thought process: Logical but tangential  Thought content: No evidence of delusions or paranoia.  No thoughts of self harm or suicide. No thoughts of harming others.  Associations: Connected  Fund of knowledge: Average  Attention / Concentration: Able to remain focused during the interview with minimal distractibility or need for redirection.  Short Term Memory: Grossly intact as evidence by client recalling themes and ideas discussed.  Long Term Memory: Intact  Motor Status: No tremor or overt changes in motor status.         __  Assessment/Impression:     1. Major depression, recurrent, chronic (H)  - buPROPion (WELLBUTRIN XL) 150 MG 24 hr tablet; Take 2 tablets (300 mg total) by mouth daily.  Dispense: 180 tablet; Refill: 0  - melatonin-pyridoxine HCl, B6, 3-1 mg Tab; Take 3 mg by mouth at bedtime.  " Dispense: 90 tablet; Refill: 2  - desvenlafaxine succinate (PRISTIQ) 50 MG 24 hr tablet; Take 2 tablets (100 mg total) by mouth daily.  Dispense: 180 tablet; Refill: 0  - gabapentin (NEURONTIN) 300 MG capsule; Take 1 capsule (300 mg total) by mouth 3 (three) times a day.  Dispense: 270 capsule; Refill: 2    Sherry Sweeney is a 40 y.o. female who presents for follow up appointment.  Meets criteria for major depressive disorder, recurrent, moderate and JEREMY. Referred to this writer at the recommendation of primary care provider due to discharge from [facility]. Past medication trials include: duloxetine, 2 desvenlafaxine  generic brands, lorazepam, hydroxyzine, lamotrigine, aripiprazole, gabapentin, pregabalin, amitriptyline, quetiapine, bupropion, escitalopram, quetiapine and clonazepam.    Pristiq medication and bupropion combination seem to be well controlling depressive symptoms. However ongoing rash with spread since last visit with continued use of generic Pristiq medication. PA submitted and denied after last appointment. Called insurance company regarding refusal and clarified refusal reason. Sherry has tried at least 3 alternatives listed and has MDD diagnosis which were reportedly reasons denied. Will ask to have PA resubmitted. Requested that medication be filled with Pristiq brand only for this reason. Will [id number] generic order for less caustic 50mg generic brand until able to fill with 75mg of trade. Medication order updated with this information. Patient verbalized understanding and agreement with plan. No other medication changes indicated today. No new lab work ordered this visit due to restrictions on travel and comprehensive lab results available in January of this year.      Plan to follow up in 4 weeks for evaluation of current medication trials, lab work, and ongoing psychiatric assessment. Patient educated that they may schedule sooner appointment or contact writer for any worsening or lack of improvement  in symptoms.     Patient denies suicidal and homicidal ideation. Not at imminent risk this visit. Educated on need to seek emergent services should they become a risk to themselves or others. Sherry Sweeney verbalized understanding and agreement with this safety plan.    Rule out: Major depressive disorder, generalized anxiety disorder, adjustment disorder, and PTSD.    --  Plan  1. Continue to monitor for safety  2. Current Medications  1. Continue Bupropion (Wellbutrin XL) 300 mg daily for depression  2. TITRATE Pristiq 75mg daily to 50mg two times a day using only [place] generic brand. Plan to resume Pristiq trade brand at 75mg daily when able.  3. Continue Hydroxyzine 50 mg at bedtime as needed for sleep and anxiety  4. Continue melatonin Pyridoxine HCL 3mg bedtime as needed for sleep  5. Continue Gabapentin 300 mg 3 times a day for anxiety and pain  6. Continue  two times a day for intrusive thinking control   7. Neuroleptic Consent Form Signed: n/a  8. Non-Opioid Contract Agreement Form Signed: n/a  9. REFILLS: melatonin Pyridoxine HCL, gabapentin, bupropion, and desvenlafaxine   3. Labs ordered this visit: n/a  4. Collaborate with interdisciplinary care team as needed.  5. ROIs: n/a  6. Consent to Communicate: n/a  7. Patient will continue abstinence from drugs and alcohol  8. Patient to return to clinic in 2 weeks for evaluation of medication trials and continued assessment. Ongoing patient psychoeducation regarding chronic illness and treatment .  9. I reviewed the potential risks, side effects, and benefits of all medications with the patient. Patient verbalized understanding and was encouraged to call clinic with further questions or concerns.    START TIME: 3:04 PM  END TIME: 3:35 PM    Phone call duration:  31 minutes with > 50% spent on coordination of care and psycho-education.    Dr. Lisa Emanuel, DNP, APRN, PMHNP-BC  Nurse Practitioner - Psychiatry

## 2021-06-10 NOTE — TELEPHONE ENCOUNTER
PA initiated for Pristiq (brand - pharmacy indicates pt gets rash from generic) via fax through [employer]eds.

## 2021-06-11 NOTE — PROGRESS NOTES
Assessment/Plan:    Sherry was seen today for dizziness and vaginal itching.    Diagnoses and all orders for this visit:    Vaginal discharge: Positive for BV.  We will treat with metronidazole.  -     Wet Prep, Vaginal    Sinus pain: The patient is having symptoms that have actually improved and are in some ways consistent with bacterial rhinosinusitis.  Given the improvement as well as treatment with metronidazole, will defer starting treatment for sinus infection with antibiotics.  If she has worsening symptoms over the next 24-96 hours, I would consider starting pharmacotherapy.  Patient will follow-up as necessary.    Yossi Hernandez MD  _______________________________    Chief Complaint   Patient presents with     Dizziness     x 4-5 days      Vaginal Itching     Subjective: Sherry Sweeney is a 37 y.o. year old female who I have seen in clinic before who presents with the following acute complaint(s):    Multiple concerns:   - history of mitochondrial myopathy   - has had lots of infections recently.   - vaginal itchy and discharge   - dizzy and feels off.  She wonders if she has a sinus   - pains on the inside of her nose.   - some facial/teeth pain.     - discolored nasal discharge   - using nasal moisterizer   - subjective fevers   - energy has been poor.  Struggling to remain upright right now    ROS: Complete review of systems obtained.  Pertinent items are listed above.     The following portions of the patient's history were reviewed and updated as appropriate: allergies, current medications, past medical history and problem list.     Objective:   /70 (Patient Site: Left Arm, Patient Position: Sitting, Cuff Size: Adult Regular)  Pulse 100  Temp 98.1  F (36.7  C) (Oral)   Breastfeeding? No  Gen.: No acute distress  HEENT: Tympanic membranes bilaterally are gray and glistening.  Her submandibular lymph nodes are normal size.  She tells me that mildly tender.  Posterior pharynx without erythema or  tonsillar exudate.  Mild maxillary sinus tenderness.  No obvious nasal discharge.  Cardiac: Regular rate and rhythm, normal S1/S2, no murmurs or gallops  Respiratory: Clear to auscultation bilaterally.  : External genitalia appear normal.  There is scant normal physiologic discharge.  Vaginal vault appears normal.  Cervix appears normal.  No blood.    No results found for this or any previous visit (from the past 24 hour(s)).  No results found.    Additional History from Old Records Summarized (2): no  Decision to Obtain Records (1): no  Radiology Tests Summarized or Ordered (1): no  Labs Reviewed or Ordered (1): yes  Medicine Test Summarized or Ordered (1): no  Independent Review of EKG or X-RAY(2 each): no    This note has been dictated using voice recognition software. Any grammatical or context distortions are unintentional and inherent to the software

## 2021-06-11 NOTE — PROGRESS NOTES
"SUBJECTIVE: Sherry Sweeney is a 37 y.o. female who complains of brown urine with NO urinary frequency, urgency or dysuria x 14 days, without flank pain, fever, or abnormal vaginal discharge or bleeding. But she has had some chills and sweats.    OBJECTIVE: /70  Pulse 84  Temp 98.4  F (36.9  C) (Oral)   Resp 14  Ht 5' 2.5\" (1.588 m)  Wt 147 lb (66.7 kg)  BMI 26.46 kg/m2   Appears well, in no apparent distress.  Vital signs are normal. The abdomen is soft without tenderness, guarding, mass, rebound or organomegaly. No CVA tenderness or inguinal adenopathy noted. Urine dipstick shows positive for RBC's and positive for leukocytes.  Negative for nitrite.     uc pen     ASSESSMENT: 2 weeks of brown urine of unclear etiology.    PLAN: will await micro and  urine culture.  If negative would order ct stone run and urology consult because patient is very concerned and wants further action.    "

## 2021-06-11 NOTE — PROGRESS NOTES
ASSESSMENT:   SINUS CONGESTION  Treatment options were disucssed. Conservative measures (nasal saline mist,  such as simply saline over the counter, steroid nasal spray, along with tylenol/ ibuprofen) recommended.  If not effective by can call and I would start aggressive therapy with oral antibiotics.  She is hoping to avoid antibiotics if possible.     If still not getting better, let clinic know.      Chief Complaint   Patient presents with     Sinus Problem     Patient has been sick for over a week.      Urinary Tract Infection     Concerned because she has been having brown urine. Started a couple weeks ago.         SUBJECTIVE:Sherry Sweeney is a 37 y.o. female  comes in for nasal congestion, cough, green nasal drainage, postnasal drainage, sore throat, hoarse voice and general malaise  For 7 days. She feels likeshe is not getting better. There is not fever.  There is not no shortness of breath or chest pain.  She  does not know have pressure pain in the cheeks bilaterally but does feel some pain in the forehead and between the eyes bilaterally.      History   Smoking Status     Never Smoker   Smokeless Tobacco     Never Used      Current Outpatient Prescriptions   Medication Sig Dispense Refill     amitriptyline (ELAVIL) 10 MG tablet Take 3 tablets (30 mg total) by mouth at bedtime. 270 tablet 3     CA CARB & GLUC/MAG OX & GLUC (CALCIUM MAGNESIUM ORAL) Take by mouth.       CHOLECALCIFEROL, VITAMIN D3, (VITAMIN D3 ORAL) Take by mouth.       clindamycin-benzoyl peroxide (BENZACLIN) gel Apply to acne twice daily 50 g 1     DULoxetine (CYMBALTA) 60 MG capsule Take 60 mg by mouth 2 (two) times a day.  1     lidocaine (LIDODERM) 5 %(700 mg/patch) Place 1 patch on the skin daily. On for 12 hours then off for 12 hours       lidocaine-prilocaine (EMLA) cream        methylphenidate (RITALIN) 10 MG tablet Take 1.5 tablets PO twice daily 90 tablet 0     metroNIDAZOLE (FLAGYL) 500 MG tablet Take 1 tablet (500 mg total) by  "mouth 2 (two) times a day for 7 days. 14 tablet 0     omeprazole (PRILOSEC) 40 MG capsule Take 1 capsule (40 mg total) by mouth daily. 60 capsule 1     ondansetron (ZOFRAN-ODT) 4 MG disintegrating tablet Take 1 tablet (4 mg total) by mouth every 8 (eight) hours as needed for nausea. One or Two Tablets Every 4-6 Hours For Nausea 30 tablet 1     pregabalin (LYRICA) 75 MG capsule TAKE ONE CAPSULE BY MOUTH TWICE A DAY 60 capsule 5     rizatriptan (MAXALT-MLT) 10 MG disintegrating tablet Take 1 tablet (10 mg total) by mouth as needed for migraine. 10 tablet 3     tiZANidine (ZANAFLEX) 2 MG tablet Take 1 tablet (2 mg total) by mouth every 8 (eight) hours as needed (muscle spasm and fibromyalgia). 30 tablet 2     valACYclovir (VALTREX) 1000 MG tablet Take 2 tablets PO 12 hours apart PRN episode 20 tablet 2     No current facility-administered medications for this visit.      Allergies   Allergen Reactions     Ciprofloxacin      Dairy      Gluten      Other Food Allergy      Egg whites     Savella [Milnacipran] Other (See Comments)     Chest pain, hot/cold flashes     Sulfa (Sulfonamide Antibiotics)      Yeast, Dried      Review of Systems -  10 point ros is negative, except as noted above.        OBJECTIVE: /70  Pulse 84  Temp 98.4  F (36.9  C) (Oral)   Resp 14  Ht 5' 2.5\" (1.588 m)  Wt 147 lb (66.7 kg)  BMI 26.46 kg/m2   General: healthy but tired appearing 37 y.o. female   Eyes: normal.  No drainage.  Ears: normal canals and tms bilaterally   Nose: bilateral nasal congestion and erythema noted.   Oropharynx: mild erythema noted.   Sinuses: tenderness noted over the maxillary sinuses bilaterally.  CV: regular rate and rhythm normal s1 and s2 with no murmers.  Abdomen: benign. Soft.   Lungs clear to ascultation bilaterally    "

## 2021-06-11 NOTE — TELEPHONE ENCOUNTER
RN called [facility] pharmacy and spoke with a staff who stated that they are waiting to get the melatonin from the warehouse. They have to order it. The new prescription is there.

## 2021-06-11 NOTE — PROGRESS NOTES
Assessment:     1. GERD (gastroesophageal reflux disease)     2. Migraine  amitriptyline (ELAVIL) 10 MG tablet   3. Fatigue  methylphenidate (RITALIN) 10 MG tablet   4. Mitochondrial disease  Ambulatory referral to Neurology   5. TMJ (temporomandibular joint syndrome)  Ambulatory referral to [facility]   6. Migraine without aura     7. Major depression, recurrent, chronic     8. Acne vulgaris         Plan:     Sherry is a 37-year-old female here today for routine follow-up visit and medication refill.  I placed the referrals for her to follow-up with Dr. Garcia as well as her TMJ specialist.  I reviewed her medications today and would continue all of those.  I agree with discontinuing the Depo-Provera with the significant weight gain she is experienced from that.  We will need to keep an eye on recurrence of her PMS symptoms that previously affected her fibromyalgia and migraines significantly.     Subjective:       37 y.o. female presents for routine follow-up visit.  The patient did have a chance to meet with the gynecologist regarding contraception and the concern regarding difficult periods.  The gynecologist recommended at this point discontinuing the Depo-Provera and then simply monitoring her symptoms which could then be addressed as she starts to again experience more premenstrual issues.  Her  is looking into having a vasectomy performed and they plan to use abstinence in the meantime for contraception.  The patient contacted Dr. Garcia office, the neuromuscular specialist, to inquire regarding the results of her mitochondrial testing.  She was told that she does have a mitochondrial DNA abnormality and she has plans to follow-up with Dr. Garcia's office to review further.  The patient continues to experience profound fatigability with minimal activity above the norm.  She has found the Maxalt to be helpful at terminating her headaches when she gets them.  The patient continues on Cymbalta as well  "as amitriptyline and Lyrica to help with her fibromyalgia pain.  Lastly, the patient needs a referral back to see her TMJ specialist for getting another brace.    The following portions of the patient's history were reviewed and updated as appropriate: allergies, current medications, past family history, past medical history, past social history, past surgical history and problem list.    Review of Systems  Pertinent items are noted in HPI.     History   Smoking Status     Never Smoker   Smokeless Tobacco     Never Used       Objective:      /62 (Patient Site: Left Arm, Patient Position: Sitting, Cuff Size: Adult Regular)  Pulse 72  Ht 5' 2.5\" (1.588 m)  Wt 144 lb (65.3 kg)  Breastfeeding? No  BMI 25.92 kg/m2  General appearance: alert, appears stated age and cooperative       This note has been dictated using voice recognition software. Any grammatical or context distortions are unintentional and inherent to the software  "

## 2021-06-11 NOTE — PATIENT INSTRUCTIONS - HE
"1. Continue medications as prescribed  2. Have your pharmacy contact us for a refill if you are running low on medications (We may ask you to come into clinic to get a refill from the nurse)  3. No alcohol or drug use  4. No driving if sedated  5. Contact the clinic with any questions or concerns   a. Phone: [phone]  b. Fax: [phone]  6. Reach out for help if you feel like hurting yourself or others:   a. [facility]: [address] [place] [address], [address] (phone: [phone])  b. [facility] Suicide Hotline: 644.252.7538   c. Crisis Texting Line: Text \"[place]\" to 101609  d. Call 911 or go to nearest Emergency room   7. Follow up as directed in 1 month, for your appointments, per your After Visit Summary Form    "

## 2021-06-11 NOTE — PROGRESS NOTES
"Telemedicine Visit: The patient's condition can be safely assessed and treated via synchronous audio and visual telemedicine encounter.      Reason for Telemedicine Visit: Patient unable to travel    Originating Site (Patient Location): Patient's home    Distant Site (Provider Location): Provider Remote Setting- Home Office    Consent:  The patient/guardian has verbally consented to: the potential risks and benefits of telemedicine (video visit) versus in person care; bill my insurance or make self-payment for services provided; and responsibility for payment of non-covered services.     Mode of Communication:  Video Conference via [employer]    As the provider I attest to compliance with applicable laws and regulations related to telemedicine.    Outpatient Psychiatric Follow Up    Date of Service: 9/15/2020    --  Chief Complaint: \"this rash was worse again\"     --  History of Present Illness/Client Impression of Mental Health Consult:    Sherry Sweeney is a 40 y.o. female who presents for virtual visit follow up. Last visit occurred 8/17/20. At that time changed formulation from generic to brand Pristiq due to side effects.     Denies rash since starting Pristiq brand name prescription. Feeling somewhat more irritable since transition to brand approximately 2 weeks ago. Otherwise feels more stable with mood and anxiety. Diagnosed with acute gastritis by PCP and started on omeprazole 40mg for next 6 weeks.       Started taking 50mg and 1/2 of 50mg pill of medications due to worsening rash symptoms. Rash improved after stopping 25mg desvenalafaxine brand of pills. Showed writer rash on R forearm and how it was finally starting to heal after blistering. Previously spoke with pharmacy to determine that particular generic was causing rash and that generic that was working was not produced in dosing patient required. Since then PA initiated and refused. Continues with weekly therapy visits.     States mood is \"good\". " Rates depression and anxiety lower. However, feels like anxiety fluctuates during the day in relation to current external stressors. Denies any sleep difficulties. Feels poorly rested in the morning. Denies appetite or weight concerns. Denies suicidal and homicidal ideation. No overt psychosis. Denies all other psychiatric symptoms. Denies new physical concerns.     Medication adherence: Reviewed risk/benefits of medication , Patient able to verbalize understanding of side effects  and Patient verbally consents to taking medications  Medication side effects: denies  The patient was given information on medications: all current prescribed medications by writer.  [place] Prescription Monitoring program: Not indicated for this patient.     Clinical Outcomes Measures:  PHQ-9 Total Score: 8  JEREMY-7: 19    --  Current Medications:  Current Outpatient Medications   Medication Sig Dispense Refill     acetylcysteine (NAC) 600 mg cap capsule Take 1 capsule (600 mg total) by mouth 2 (two) times a day. 60 capsule 2     buPROPion (WELLBUTRIN XL) 150 MG 24 hr tablet Take 2 tablets (300 mg total) by mouth daily. 180 tablet 0     cholecalciferol, vitamin D3, 400 unit cap Take 2 capsules by mouth daily.       clobetasoL (TEMOVATE) 0.05 % ointment APPLY 1 2 TIMES DAILY AS NEEDED TO AFFECTED AREA, AVOID FACE, ARMPITS, AND GENITALS       desvenlafaxine succinate (PRISTIQ) 25 mg Tb24 Take 50 mg by mouth every morning AND 25 mg every evening. 270 tablet 0     gabapentin (NEURONTIN) 300 MG capsule Take 1 capsule (300 mg total) by mouth 3 (three) times a day. 270 capsule 2     hydrOXYzine pamoate (VISTARIL) 50 MG capsule Take 1 capsule (50 mg total) by mouth at bedtime as needed. 90 capsule 2     ibuprofen (ADVIL,MOTRIN) 400 MG tablet Take 1.5 tablets (600 mg total) by mouth every 6 (six) hours as needed. 30 tablet 0     melatonin-pyridoxine HCl, B6, 3-1 mg Tab Take 3 mg by mouth at bedtime. 90 tablet 2     multivitamin therapeutic tablet  Take 1 tablet by mouth daily.       naproxen (NAPROSYN) 500 MG tablet Take 1 tablet (500 mg total) by mouth 2 (two) times a day with meals. 30 tablet 0     norethindrone-ethinyl estradiol-iron (MICROGESTIN FE 1.5/30) 1.5 mg-30 mcg (21)/75 mg (7) per tablet Take 1 tablet by mouth daily.       nystatin (MYCOSTATIN) powder Apply topically 4 (four) times a day.       omeprazole (PRILOSEC) 40 MG capsule Take 1 capsule (40 mg total) by mouth daily. 45 capsule 0     ONABOTULINUMTOXINA (BOTOX INJ) Inject as directed every 3 (three) months.       ondansetron (ZOFRAN-ODT) 4 MG disintegrating tablet TAKE 1 TABLET (4 MG TOTAL) BY MOUTH EVERY 8 (EIGHT) HOURS AS NEEDED FOR NAUSEA. 30 tablet 1     riboflavin, vitamin B2, 400 mg Tab Take 400 mg by mouth 2 (two) times a day.       rizatriptan (MAXALT) 10 MG tablet Take 1 tablet (10 mg total) by mouth as needed for migraine. May repeat in 2 hours if needed 10 tablet 3     spironolactone (ALDACTONE) 50 MG tablet Take 50 mg by mouth daily. 2  Tabs by mouth-patient reported       triamcinolone (KENALOG) 0.1 % cream Apply to affected skin twice daily for up to 14 days. 30 g 0     ubidecarenone (COENZYME Q10 ORAL) Take 100 mg by mouth daily.       valACYclovir (VALTREX) 1000 MG tablet Take 2 tablets PO 12 hours apart PRN episode 20 tablet 2     No current facility-administered medications for this visit.        --  Allergies  Allergies   Allergen Reactions     Ciprofloxacin Itching, Rash and Hives     Rash over whole body      Egg White Other (See Comments)     Swollen colon, belly pain     Influenza Virus Vaccines Shortness Of Breath     Sulfa (Sulfonamide Antibiotics) Rash and Hives     Yeast, Dried Anaphylaxis     Stomach swelling     Gluten      Savella [Milnacipran] Other (See Comments)     Chest pain, hot/cold flashes     Quetiapine Palpitations     Tachycardia, nervousness, elevated blood pressure.        --  Lab Results:   No visits with results within 30 Day(s) from this visit.    Latest known visit with results is:   Admission on 07/23/2020, Discharged on 07/23/2020   Component Date Value     WBC 07/23/2020 12.9*     RBC 07/23/2020 4.71      Hemoglobin 07/23/2020 13.2      Hematocrit 07/23/2020 40.7      MCV 07/23/2020 86      MCH 07/23/2020 28.0      MCHC 07/23/2020 32.4      RDW 07/23/2020 12.7      Platelets 07/23/2020 317      MPV 07/23/2020 9.5      Sodium 07/23/2020 138      Potassium 07/23/2020 3.9      Chloride 07/23/2020 103      CO2 07/23/2020 26      Anion Gap, Calculation 07/23/2020 9      Glucose 07/23/2020 87      BUN 07/23/2020 9      Creatinine 07/23/2020 0.75      GFR MDRD Af Amer 07/23/2020 >60      GFR MDRD Non Af Amer 07/23/2020 >60      Bilirubin, Total 07/23/2020 0.2      Calcium 07/23/2020 9.7      Protein, Total 07/23/2020 7.1      Albumin 07/23/2020 4.1      Alkaline Phosphatase 07/23/2020 52      AST 07/23/2020 21      ALT 07/23/2020 17      Lipase 07/23/2020 28      Color, UA 07/23/2020 Yellow      Clarity, UA 07/23/2020 Clear      Glucose, UA 07/23/2020 Negative      Bilirubin, UA 07/23/2020 Negative      Ketones, UA 07/23/2020 Negative      Specific Gravity, UA 07/23/2020 1.008      Blood, UA 07/23/2020 Negative      pH, UA 07/23/2020 6.0      Protein, UA 07/23/2020 Negative      Urobilinogen, UA 07/23/2020 <2.0 E.U./dL      Nitrite, UA 07/23/2020 Negative      Leukocytes, UA 07/23/2020 Negative      SYSTOLIC BLOOD PRESSURE 07/23/2020 133      DIASTOLIC BLOOD PRESSURE 07/23/2020 85      VENTRICULAR RATE 07/23/2020 76      ATRIAL RATE 07/23/2020 76      P-R INTERVAL 07/23/2020 160      QRS DURATION 07/23/2020 82      Q-T INTERVAL 07/23/2020 354      QTC CALCULATION (BEZET) 07/23/2020 398      P Axis 07/23/2020 83      R AXIS 07/23/2020 72      T AXIS 07/23/2020 95      MUSE DIAGNOSIS 07/23/2020                      Value:Normal sinus rhythm  Normal ECG  When compared with ECG of 03-JAN-2020 09:29,  No significant change was found  Confirmed by SEE ED PROVIDER  "NOTE FOR, ECG INTERPRETATION (4000),  COSMO LIRA ([id number]) on 7/24/2020 3:20:28 AM         __  Review of Systems:   There were no vitals filed for this visit. unable to assess  There is no height or weight on file to calculate BMI. unable to assess    As noted in the subjective section above, otherwise a 10 point review of systems is negative. Limited ability to assess given virtual nature of visit. Review of symptoms based entirely on patient's verbal report and what writer able to assess via camera  __  Psychiatric Examination:    Appearance:  Appears stated age, short hair, clean hair, somewhat unkempt hair, wearing purple sweater, less red dots on face (related to skin picking/not rash), and wearring earrings. No rash visible on observable skin.  Orientation: Patient alert and oriented to person, place, time, and situation  Reliability:  Patient appears to be an adequate historian.    Behavior: Patient makes good eye contact, and engages with normal rapport in the interview.   There is no evidence of responding to hallucinations or flashbacks.  Speech: Speech is spontaneous and coherent, with a normal rate, rhythm and tone.    Language:There are no difficulties with expressive or receptive language as observed throughout the interview.    Mood: Described as \"good\".    Affect: Congruent and shows a normal range and level of reactivity  Judgement: Able to make basic decision regarding safety.  Insight: Good awareness of physical and mental health conditions and aware of needs around care for these.  Gait and station: unable to assess  Thought process: Logical but tangential  Thought content: No evidence of delusions or paranoia.  No thoughts of self harm or suicide. No thoughts of harming others.  Associations: Connected  Fund of knowledge: Average  Attention / Concentration: Able to remain focused during the interview with minimal distractibility or need for redirection.  Short Term Memory: Grossly intact " as evidence by client recalling themes and ideas discussed.  Long Term Memory: Intact  Motor Status: No tremor or overt changes in motor status.         __  Assessment/Impression:  1. Major depression, recurrent, chronic (H)  - acetylcysteine (NAC) 600 mg cap capsule; Take 1 capsule (600 mg total) by mouth 2 (two) times a day.  Dispense: 60 capsule; Refill: 2    Sherry Sweeney is a 40 y.o. female who presents for follow up appointment.  Meets criteria for major depressive disorder, recurrent, moderate and JEREMY. Referred to this writer at the recommendation of primary care provider due to discharge from [facility]. Past medication trials include: duloxetine, 2 desvenlafaxine  generic brands, lorazepam, hydroxyzine, lamotrigine, aripiprazole, gabapentin, pregabalin, amitriptyline, quetiapine, bupropion, escitalopram, quetiapine and clonazepam    Pristiq medication and bupropion combination seem to be well controlling depressive symptoms. No rash since cessation of generic and initiation of Pristiq brand medication. Possible side effects of GI discomfort and irritability with alteration in medication formulation and dose. Was taking 50mg of generic only until PA approved for brand. On brand name for last 2 weeks. Will re-evaluate at next appointment. Patient verbalized understanding and agreement with plan. No other medication changes indicated today. No new lab work ordered this visit.    Plan to follow up in 4 weeks for evaluation of current medication trials, lab work, and ongoing psychiatric assessment. Patient educated that they may schedule sooner appointment or contact writer for any worsening or lack of improvement in symptoms.    Patient denies suicidal and homicidal ideation. Not at imminent risk this visit. Educated on need to seek emergent services should they become a risk to themselves or others. Sherry Sweeney verbalized understanding and agreement with this safety plan.    --  Plan  1. Continue to monitor for  safety  2. Current Medications  1. Continue Bupropion (Wellbutrin XL) 300 mg daily for depression  2. Continue Pristiq (BRAND ONLY) 75mg daily for depression and anxiety  3. Continue Hydroxyzine 50 mg at bedtime as needed for sleep and anxiety  4. Continue melatonin Pyridoxine HCL 3mg bedtime as needed for sleep  5. Continue Gabapentin 300 mg 3 times a day for anxiety and pain  6. Continue  two times a day for intrusive thinking control   7. Neuroleptic Consent Form Signed: n/a  8. Non-Opioid Contract Agreement Form Signed: n/a  9. REFILLS: NAC  3. Labs ordered this visit: n/a  4. Collaborate with interdisciplinary care team as needed.  5. ROIs: n/a  6. Consent to Communicate: n/a  7. Patient will continue abstinence from drugs and alcohol  8. Patient to return to clinic in 4 weeks for evaluation of medication trials and continued assessment. Ongoing patient psychoeducation regarding chronic illness and treatment .  9. I reviewed the potential risks, side effects, and benefits of all medications with the patient. Patient verbalized understanding and was encouraged to call clinic with further questions or concerns.    START TIME: 11:06 AM  END TIME: 11:32 AM    Phone call duration:  26 minutes with > 50% spent on coordination of care and psycho-education.    Dr. Lisa Emanuel, DNP, APRN, PMHNP-BC  Nurse Practitioner - Psychiatry

## 2021-06-11 NOTE — PROGRESS NOTES
Assessment/Plan:     Problem List Items Addressed This Visit        Edg Concept Cardiac Problems    Migraine without aura    Relevant Medications    rizatriptan (MAXALT) 10 MG tablet      Other Visit Diagnoses     Acute gastritis without hemorrhage, unspecified gastritis type    -  Primary    Relevant Medications    omeprazole (PRILOSEC) 40 MG capsule        Something the patient has been some type of acute gastroenteritis which has since significantly improved.  However, she still has a sensation of early satiety, bloating and I think continuing on PPI would be a good idea.  I prescribed a prescription dose omeprazole 40 mg daily and recommended continuing this for 6 weeks.  I refilled the patient's Maxalt today she does continue to get migraine headaches.  She should continue to follow closely with her psychiatrist.    Subjective:       40 y.o. female presents for evaluation of GI symptoms.  Patient contacted me via my chart complaining of a burning abdominal discomfort associated with a two-week history of significant diarrhea.  Today the patient states that the diarrhea has resolved.  She purchased some over-the-counter omeprazole and start taking that and is feeling better.  The patient continues to follow with her psychiatrist and overall feels her mental health is doing reasonably well.  She continues to struggle with disabling fatigue but reflects on her much better she is than last year when she was hospitalized with severe depression and suicidal ideations.  The patient is on oral birth control pills as prescribed by gynecologist for a couple of small simple ovarian cysts.  She does report that although the burning discomfort has improved significantly in her abdomen, she at times feels a sense of pressure after eating even a small meal.      Reviewed: The following portions of the patient's history were reviewed and updated as appropriate: allergies, current medications, past family history, past medical  history, past social history, past surgical history and problem list.    Review of Systems  Pertinent items are noted in HPI.        Objective:     /62 (Patient Site: Left Arm, Patient Position: Sitting, Cuff Size: Adult Regular)   Pulse 88   Wt 122 lb (55.3 kg)   LMP 02/06/2018 (Approximate)   SpO2 99%   BMI 22.31 kg/m    General appearance: alert, appears stated age and cooperative      This note has been dictated using voice recognition software. Any grammatical or context distortions are unintentional and inherent to the software

## 2021-06-11 NOTE — TELEPHONE ENCOUNTER
"Writer spoke with Lisa Emanuel NP, and then contacted patient and pharmacy. Patient reports that according to her pill bottle she was using meds she had left from provider, LUCIANO Amaya, and took Melatonin SR 3 mg, replied \"I guess it worked\". Pharmacist said they had never been able to fill the combination melatonin and said the 5 mg/5 mg isn't available to them either.  "

## 2021-06-11 NOTE — PROGRESS NOTES
6/12/17-  Contacted patient, Left message advising Care Guide- Juanita, is out of the clinic and will not be able to meet with patient, advised patient PCP appointment is scheduled at 345 pm and provided my direct contact number if there are urgent issues or needs.

## 2021-06-11 NOTE — PROGRESS NOTES
Assessment:     1. Mitochondrial disease  Ambulatory referral to Physical Therapy   2. Dark urine  Urinalysis Macro & Micro   3. Chronic fatigue syndrome     4. Fibromyalgia     5. Irritable bowel syndrome, unspecified type           Plan:     In regards to the patient's urinary concerns today, I explained that it is very important that she stay well-hydrated and that is most likely the explanation for the color of her urine.  Her urinalysis in office today does not suggest anything else abnormal.  I did place a referral to physical therapy as it relates to trying to improve her strength.  The patient continues to struggle with irritable bowel symptoms and those have intensified to some extent lately.  She continues to have chronic pain that is quite diffuse related to her fibromyalgia.  I reviewed the patient's current medication list and I think this is reasonable.  She will be following up with Dr. Garcia in the near future and I asked her to follow-up with me in 3 months.    Subjective:       37 y.o. female presents for a visit related to a couple of issues and concerns.  First, the patient reports that her urine seems to be quite a bit darker than normal recently.  The patient was seen a couple of weeks ago regarding a possible urinary tract infection.  She really has not had symptoms of infection, however, but is concerned about the color of her urine.  She does admit to not drinking enough fluids often during the day.  The patient has been struggling with more intense migraine headaches since discontinuing the Depo-Provera but has had some relief of these in the past few days.  The patient brought in documentation from Dr. Garcia's office that she has been confirmed to have a mitochondrial disease that helps explain her fatigability and lack of stamina.  The patient does wonder if some physical therapy could help improve her strength at all.  The patient is otherwise overall coping but does have a significant  limitation to her life.  She has been disabled from being able to work for over 2 years now.  She spends much of the day needing to lay down.  She reports that something as simple as preparing a dinner for her and her  will cause such fatigue that she will be tired the entire next day.    The following portions of the patient's history were reviewed and updated as appropriate: allergies, current medications, past family history, past medical history, past social history, past surgical history and problem list.    Review of Systems  Pertinent items are noted in HPI.     History   Smoking Status     Never Smoker   Smokeless Tobacco     Never Used       Objective:      /62 (Patient Site: Left Arm, Patient Position: Sitting, Cuff Size: Adult Regular)  Pulse 72  Wt 146 lb (66.2 kg)  Breastfeeding? No  BMI 26.28 kg/m2  General appearance: alert, appears stated age, cooperative and fatigued       This note has been dictated using voice recognition software. Any grammatical or context distortions are unintentional and inherent to the software

## 2021-06-11 NOTE — TELEPHONE ENCOUNTER
[facility] pharmacy sent alternative request for Melatonin-pyridoxine. They said they are unable to order that product. Their suggestion was to order it separately. Pharmacist stated that is an OTC product and insurance won't cover.    Pharmacist said that he had informed patient that product was not available but hadn't discussed OTC status.     I called patient to let her know status. She said that she just found out that a melatonin 5 mg - pyridoxine B6 5 mg is available at [facility] and other pharmacies and requested that be added to note to Lisa Emanuel NP. Assured patient that I would include this information.

## 2021-06-11 NOTE — PROGRESS NOTES
________________________________________  Medications Phoned  to Pharmacy [] yes [x]no  Name of Pharmacist:  List Medications, including dose, quantity and instructions    Medications ordered this visit were e-scribed.  Verified by order class [x] yes  [] no  acetylcysteine (NAC) 600 mg     Medication changes or discontinuations were communicated to patient's pharmacy: [] yes  [x] no    Dictation completed at time of chart check: [x] yes  [] no    I have checked the documentation for today s encounters and the above information has been reviewed and completed.

## 2021-06-12 NOTE — TELEPHONE ENCOUNTER
In Dr. Martinez's absence, I reviewed the most recent note which outlined a plan for omeprazole.  If the patient has been feeling better, I recommend that she decrease her dose of omeprazole to 20 mg for 2 to 4 weeks and then further decrease to taking omeprazole every other day for a week or 2 before discontinuing the medication.  Refill of prescription strength omeprazole is not indicated based on Dr. Martinez's note.  Please review with this patient and have her follow-up with Dr. Martinez if she would like to revised this plan.

## 2021-06-12 NOTE — PROGRESS NOTES
E-mail sent to patient :  Good morning Sherry,    I hope you are able to stay indoors today with all of this rain!! I do have to say; I love listening to it outside my window, it is so relaxing!    How are you doing? I am wondering if you are still interested in meeting with our  to discuss some homemaking or other services that we could assist you with applying for. Our  Noemi is a great resource for you and can really give you some great information to assist you with some needs you may have.    Let me know if you would like me to schedule you an appointment with her. J    Thank you and have a great weekend!

## 2021-06-12 NOTE — PROGRESS NOTES
"Assessment:     1. Vaginal itching  Wet Prep, Vaginal   2. Fatigue  valACYclovir (VALTREX) 1000 MG tablet   3. Screening for cervical cancer  Gynecologic Cytology (PAP Smear)    HPV Cascade (PCR)   4. Mitochondrial disease         Plan:     Sherry is a pleasant 37-year-old female with a mitochondrial disorder who comes in today for follow-up visit.  Her chief complaint today was around vaginal irritation and itching.  A wet prep came back negative and the patient is going to plan to just monitor for now.  Her Pap smear was updated at today's visit.  Follow-up as needed.    Subjective:       37 y.o. female presents for a follow-up visit as well as a concern regarding vaginal itching.  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  She did bring in the genetic testing to be reviewed today and had some questions regarding some research she has been doing.  Due to weight gain, the patient has discontinued using Depo-Provera and has not yet resumed having her periods.  She does have intermittent severe headaches and recently met with a neurologist who recommended Botox injections and she is proceeding along with that process of initiating that.  The patient reports that she has really learned to minimize her excursions and activities because of how profoundly exhausted and fatigued she feels for a day or 2 after that.  She is finding if she can reserve her energy and \"pace herself\" she is a bit more functional at home but still struggles to perform things as basic is making a meal.  The patient recently has been experiencing some vaginal irritation and itching.  However, she denies any vaginal discharge.    The following portions of the patient's history were reviewed and updated as appropriate: allergies, current medications, past family history, past medical history, past social history, past surgical history and problem " list.    Review of Systems  Pertinent items are noted in HPI.     History   Smoking Status     Never Smoker   Smokeless Tobacco     Never Used       Objective:      /74 (Patient Site: Left Arm, Patient Position: Sitting, Cuff Size: Adult Regular)  Pulse 68  Wt 150 lb (68 kg)  BMI 27 kg/m2  General appearance: alert, appears stated age and cooperative  Pelvic: cervix normal in appearance, external genitalia normal and vagina normal without discharge; pap and wet prep done today      This note has been dictated using voice recognition software. Any grammatical or context distortions are unintentional and inherent to the software

## 2021-06-12 NOTE — PROGRESS NOTES
"Telemedicine Visit: The patient's condition can be safely assessed and treated via synchronous audio and visual telemedicine encounter.      Reason for Telemedicine Visit: Patient unable to travel    Originating Site (Patient Location): Patient's other parking lot    Distant Site (Provider Location): Provider Remote Setting- Home Office    Consent:  The patient/guardian has verbally consented to: the potential risks and benefits of telemedicine (video visit) versus in person care; bill my insurance or make self-payment for services provided; and responsibility for payment of non-covered services.     Mode of Communication:  Video Conference via [employer]    As the provider I attest to compliance with applicable laws and regulations related to telemedicine.    Outpatient Psychiatric Follow Up    Date of Service: 10/13/2020    --  Chief Complaint: \"I have no rash!\"     --  History of Present Illness/Client Impression of Mental Health Consult:    Sherry Sweeney is a 40 y.o. female who presents for virtual visit follow up. Last visit occurred 9/15/20. At that time no medication changes made. Observed calling from car sitting in parking lot.     Denies rash since starting Pristiq brand name prescription. Reports improvements to GI distress symptoms after completing transition from generic to brand Pristiq. Still taking omeprazole but hoping to have re-evaluated. Participating in counseling every Monday which continues to be helpful. Believes current combination of medications effective for symptoms. Denies side effects.    States mood is \"good\". Rates depression and anxiety lower. No reported sleep difficulties. Feels rested in the morning. No appetite or weight concerns. No suicidal and homicidal ideation. No overt psychosis. Denies all other psychiatric symptoms. Denies new physical concerns.     Medication adherence: Reviewed risk/benefits of medication , Patient able to verbalize understanding of side effects  and " Patient verbally consents to taking medications  Medication side effects: denies  The patient was given information on medications: all current prescribed medications by writer.  [place] Prescription Monitoring program: Not indicated for this patient.     Clinical Outcomes Measures:  PHQ-9 Total Score: 10  JEREMY-7: 15    --  Current Medications:  Current Outpatient Medications   Medication Sig Dispense Refill     acetylcysteine (NAC) 600 mg cap capsule Take 1 capsule (600 mg total) by mouth 2 (two) times a day. 60 capsule 2     buPROPion (WELLBUTRIN XL) 150 MG 24 hr tablet Take 2 tablets (300 mg total) by mouth daily. 180 tablet 0     cholecalciferol, vitamin D3, 400 unit cap Take 2 capsules by mouth daily.       clobetasoL (TEMOVATE) 0.05 % ointment APPLY 1 2 TIMES DAILY AS NEEDED TO AFFECTED AREA, AVOID FACE, ARMPITS, AND GENITALS       desvenlafaxine succinate (PRISTIQ) 25 mg Tb24 Take 50 mg by mouth every morning AND 25 mg every evening. 270 tablet 0     gabapentin (NEURONTIN) 300 MG capsule Take 1 capsule (300 mg total) by mouth 3 (three) times a day. 270 capsule 2     hydrOXYzine pamoate (VISTARIL) 50 MG capsule Take 1 capsule (50 mg total) by mouth at bedtime as needed. 90 capsule 2     ibuprofen (ADVIL,MOTRIN) 400 MG tablet Take 1.5 tablets (600 mg total) by mouth every 6 (six) hours as needed. 30 tablet 0     multivitamin therapeutic tablet Take 1 tablet by mouth daily.       naproxen (NAPROSYN) 500 MG tablet Take 1 tablet (500 mg total) by mouth 2 (two) times a day with meals. 30 tablet 0     norethindrone-ethinyl estradiol-iron (MICROGESTIN FE 1.5/30) 1.5 mg-30 mcg (21)/75 mg (7) per tablet Take 1 tablet by mouth daily.       nystatin (MYCOSTATIN) powder Apply topically 4 (four) times a day.       omeprazole (PRILOSEC) 40 MG capsule Take 1 capsule (40 mg total) by mouth daily. 45 capsule 0     ONABOTULINUMTOXINA (BOTOX INJ) Inject as directed every 3 (three) months.       ondansetron (ZOFRAN-ODT) 4 MG  disintegrating tablet TAKE 1 TABLET (4 MG TOTAL) BY MOUTH EVERY 8 (EIGHT) HOURS AS NEEDED FOR NAUSEA. 30 tablet 1     riboflavin, vitamin B2, 400 mg Tab Take 400 mg by mouth 2 (two) times a day.       rizatriptan (MAXALT) 10 MG tablet Take 1 tablet (10 mg total) by mouth as needed for migraine. May repeat in 2 hours if needed 10 tablet 3     spironolactone (ALDACTONE) 50 MG tablet Take 50 mg by mouth daily. 2  Tabs by mouth-patient reported       triamcinolone (KENALOG) 0.1 % cream Apply to affected skin twice daily for up to 14 days. 30 g 0     ubidecarenone (COENZYME Q10 ORAL) Take 100 mg by mouth daily.       valACYclovir (VALTREX) 1000 MG tablet Take 2 tablets PO 12 hours apart PRN episode 20 tablet 2     melatonin-pyridoxine HCl, B6, 3-1 mg Tab Take 3 mg by mouth at bedtime. 90 tablet 2     No current facility-administered medications for this visit.        --  Allergies  Allergies   Allergen Reactions     Ciprofloxacin Itching, Rash and Hives     Rash over whole body      Egg White Other (See Comments)     Swollen colon, belly pain     Influenza Virus Vaccines Shortness Of Breath     Sulfa (Sulfonamide Antibiotics) Rash and Hives     Yeast, Dried Anaphylaxis     Stomach swelling     Gluten      Savella [Milnacipran] Other (See Comments)     Chest pain, hot/cold flashes     Quetiapine Palpitations     Tachycardia, nervousness, elevated blood pressure.        --  Lab Results:   No visits with results within 30 Day(s) from this visit.   Latest known visit with results is:   Admission on 07/23/2020, Discharged on 07/23/2020   Component Date Value     WBC 07/23/2020 12.9*     RBC 07/23/2020 4.71      Hemoglobin 07/23/2020 13.2      Hematocrit 07/23/2020 40.7      MCV 07/23/2020 86      MCH 07/23/2020 28.0      MCHC 07/23/2020 32.4      RDW 07/23/2020 12.7      Platelets 07/23/2020 317      MPV 07/23/2020 9.5      Sodium 07/23/2020 138      Potassium 07/23/2020 3.9      Chloride 07/23/2020 103      CO2 07/23/2020 26       Anion Gap, Calculation 07/23/2020 9      Glucose 07/23/2020 87      BUN 07/23/2020 9      Creatinine 07/23/2020 0.75      GFR MDRD Af Amer 07/23/2020 >60      GFR MDRD Non Af Amer 07/23/2020 >60      Bilirubin, Total 07/23/2020 0.2      Calcium 07/23/2020 9.7      Protein, Total 07/23/2020 7.1      Albumin 07/23/2020 4.1      Alkaline Phosphatase 07/23/2020 52      AST 07/23/2020 21      ALT 07/23/2020 17      Lipase 07/23/2020 28      Color, UA 07/23/2020 Yellow      Clarity, UA 07/23/2020 Clear      Glucose, UA 07/23/2020 Negative      Bilirubin, UA 07/23/2020 Negative      Ketones, UA 07/23/2020 Negative      Specific Gravity, UA 07/23/2020 1.008      Blood, UA 07/23/2020 Negative      pH, UA 07/23/2020 6.0      Protein, UA 07/23/2020 Negative      Urobilinogen, UA 07/23/2020 <2.0 E.U./dL      Nitrite, UA 07/23/2020 Negative      Leukocytes, UA 07/23/2020 Negative      SYSTOLIC BLOOD PRESSURE 07/23/2020 133      DIASTOLIC BLOOD PRESSURE 07/23/2020 85      VENTRICULAR RATE 07/23/2020 76      ATRIAL RATE 07/23/2020 76      P-R INTERVAL 07/23/2020 160      QRS DURATION 07/23/2020 82      Q-T INTERVAL 07/23/2020 354      QTC CALCULATION (BEZET) 07/23/2020 398      P Axis 07/23/2020 83      R AXIS 07/23/2020 72      T AXIS 07/23/2020 95      MUSE DIAGNOSIS 07/23/2020                      Value:Normal sinus rhythm  Normal ECG  When compared with ECG of 03-JAN-2020 09:29,  No significant change was found  Confirmed by SEE ED PROVIDER NOTE FOR, ECG INTERPRETATION ([id number]),  COSMO LIRA ([id number]) on 7/24/2020 3:20:28 AM         __  Review of Systems:   There were no vitals filed for this visit. unable to assess  There is no height or weight on file to calculate BMI. unable to assess    As noted in the subjective section above, otherwise a 10 point review of systems is negative. Limited ability to assess given virtual nature of visit. Review of symptoms based entirely on patient's verbal report and what writer  "able to assess via camera  __  Psychiatric Examination:    Appearance:  Appears stated age, good hygiene, well groomed, casually dressed appropriate for weather, no marks on face (in past- related to skin picking/not rash), and wearring simple jewelry  Orientation: Patient alert and oriented to person, place, time, and situation  Reliability:  Patient appears to be an adequate historian.    Behavior: Patient makes good eye contact, and engages with normal rapport in the interview.   There is no evidence of responding to hallucinations or flashbacks.  Speech: Speech is spontaneous and coherent, with a normal rate, rhythm and tone.    Language:There are no difficulties with expressive or receptive language as observed throughout the interview.    Mood: Described as \"good\".    Affect: Congruent and shows a normal range and level of reactivity  Judgement: Able to make basic decision regarding safety.  Insight: Good awareness of physical and mental health conditions and aware of needs around care for these.  Gait and station: unable to assess  Thought process: Logical   Thought content: No evidence of delusions or paranoia.  No thoughts of self harm or suicide. No thoughts of harming others.  Associations: Connected  Fund of knowledge: Average  Attention / Concentration: Able to remain focused during the interview with minimal distractibility or need for redirection.  Short Term Memory: Grossly intact as evidence by client recalling themes and ideas discussed.  Long Term Memory: Intact  Motor Status: No tremor or overt changes in motor status.         __  Assessment/Impression:  1. MDD (major depressive disorder), recurrent, in partial remission (H)    2. Generalized anxiety disorder    3. Medication monitoring encounter  - Comprehensive metabolic panel; Future  - Basic metabolic panel; Future  - Vitamin D, Total (25-Hydroxy)  - Vitamin B12; Future  - Folate; Future    4. Vitamin D deficiency, unspecified   - Vitamin D, " Total (25-Hydroxy)    Sherry Sweeney is a 40 y.o. female who presents for follow up appointment.  Referred to this writer at the recommendation of primary care provider due to discharge from [facility]. Past medication trials include: duloxetine, 2 desvenlafaxine  generic brands, lorazepam, hydroxyzine, lamotrigine, aripiprazole, gabapentin, pregabalin, amitriptyline, quetiapine, bupropion, escitalopram, quetiapine and clonazepam    Pristiq medication and bupropion combination seem to be well controlling depressive and anxiety symptoms. At baseline. No rash since cessation of generic and initiation of Pristiq brand medication. Improvements in GI complaints since last visit. Denies side effects from medications. No other medication changes indicated today. Reviewed recent lab work. Placed several lab orders for medication monitoring with ask to be completed before next visit. Continue with outpatient psychotherapy visits due to positive therapeutic effect.    Patient denies suicidal and homicidal ideation. Not at imminent risk this visit. Educated on need to seek emergent services should they become a risk to themselves or others. Sherry Sweeney verbalized understanding and agreement with this safety plan.    --  Plan  1. Continue to monitor for safety  2. Current Medications  1. Continue Bupropion (Wellbutrin XL) 300 mg daily for depression  2. Continue Pristiq (BRAND ONLY) 75mg daily for depression and anxiety  3. Continue Hydroxyzine 50 mg at bedtime as needed for sleep and anxiety  4. Continue melatonin Pyridoxine HCL 3mg bedtime as needed for sleep  5. Continue Gabapentin 300 mg 3 times a day for anxiety and pain  6. Continue  two times a day for intrusive thinking control   7. Neuroleptic Consent Form Signed: n/a  8. Non-Opioid Contract Agreement Form Signed: n/a  9. REFILLS: none due today  3. Labs ordered this visit: n/a  4. Collaborate with interdisciplinary care team as needed.  5. ROIs: n/a  6. Consent to  Communicate: n/a  7. Patient will continue abstinence from drugs and alcohol  8. Patient to return to clinic in 3 months for evaluation of medication trials and continued assessment. Ongoing patient psychoeducation regarding chronic illness and treatment.  1. Patient educated that they may schedule sooner appointment or contact writer for any worsening or lack of improvement in symptoms.  9. I reviewed the potential risks, side effects, and benefits of all medications with the patient. Patient verbalized understanding and was encouraged to call clinic with further questions or concerns.    START TIME: 2:00 PM  END TIME: 2:30 PM    Appointment duration: 30 minutes with > 75% spent on coordination of care and psycho-education regarding illness, symptoms, neurobiological basis of disease, lab work, alternative interventions, sleep hygiene, safety planning, care planning, and pharmacology.    Dr. Lisa Emanuel, DNP, APRN, PMHNP-BC  Nurse Practitioner - Psychiatry    This medical report was made using Dragon Dictation. Spelling and grammatical errors with Dragon exist and are not intentional.

## 2021-06-12 NOTE — PROGRESS NOTES
Pt was a no show for [facility] SW visit to discuss homemaking and meals services.  Please reschedule at next outreach if needed. Based on diagnoses in chart, pt will most likely not qualify for these in-home services, FYI. SW can meet and assess farther, pt may have private pay options.    [facility] Care Guide Delegation:  Due: at next outreach  Delegation: Ask pt if she would like to re-schedule with SW.

## 2021-06-12 NOTE — PATIENT INSTRUCTIONS - HE
"1. Have lab work completed before next visit.   2. Continue medications as prescribed  3. Have your pharmacy contact us for a refill if you are running low on medications (We may ask you to come into clinic to get a refill from the nurse)  4. No alcohol or drug use  5. No driving if sedated  6. Contact the clinic with any questions or concerns   a. Phone: [phone]  b. Fax: [phone]  7. Reach out for help if you feel like hurting yourself or others:   a. [facility]: [address] [place] [address], [id number] (phone: [phone])  b. [facility] Suicide Hotline: [phone]   c. Crisis Texting Line: Text \"[place]\" to 090965  d. Call 911 or go to nearest Emergency room   8. Follow up as directed in 3 months by video for your appointment    "

## 2021-06-12 NOTE — TELEPHONE ENCOUNTER
RN cannot approve Refill Request    RN can NOT refill this medication Sig clarification. Last office visit: 9/14/2020 Layla Martinez MD Last Physical: 8/21/2019 Last MTM visit: Visit date not found Last visit same specialty: 9/14/2020 Layla Martinez MD.  Next visit within 3 mo: Visit date not found  Next physical within 3 mo: Visit date not found      [facility], Care Connection Triage/Med Refill 10/24/2020    Requested Prescriptions   Pending Prescriptions Disp Refills     omeprazole (PRILOSEC) 40 MG capsule [Pharmacy Med Name: OMEPRAZOLE DR 40 MG CAPSULE] 45 capsule 0     Sig: TAKE 1 CAPSULE BY MOUTH EVERY DAY       GI Medications Refill Protocol Passed - 10/22/2020 12:31 PM        Passed - PCP or prescribing provider visit in last 12 or next 3 months.     Last office visit with prescriber/PCP: 9/14/2020 Layla Martinez MD OR same dept: 9/14/2020 Layla Martinez MD OR same specialty: 9/14/2020 Layla Martinez MD  Last physical: 8/21/2019 Last MTM visit: Visit date not found   Next visit within 3 mo: Visit date not found  Next physical within 3 mo: Visit date not found  Prescriber OR PCP: Layla Martinez MD  Last diagnosis associated with med order: 1. Acute gastritis without hemorrhage, unspecified gastritis type  - omeprazole (PRILOSEC) 40 MG capsule [Pharmacy Med Name: OMEPRAZOLE DR 40 MG CAPSULE]; TAKE 1 CAPSULE BY MOUTH EVERY DAY  Dispense: 45 capsule; Refill: 0    If protocol passes may refill for 12 months if within 3 months of last provider visit (or a total of 15 months).

## 2021-06-13 NOTE — PROGRESS NOTES
Contact has been by email. I have sent a email today to Sherry at [email or web address]    Attempt 2: Care Guide called patient.  If this patient is returning my call, please transfer to Jena @ [phone]

## 2021-06-13 NOTE — PROGRESS NOTES
Contact has been by email. I have sent a email today to Sherry at [email or web address]    Attempt 1: Care Guide called patient.  If this patient is returning my call, please transfer to Jena @ [phone]

## 2021-06-13 NOTE — PROGRESS NOTES
Assessment:     1. Acne vulgaris     2. Acute bronchitis  albuterol (PROAIR HFA;PROVENTIL HFA;VENTOLIN HFA) 90 mcg/actuation inhaler   3. Migraine  amitriptyline (ELAVIL) 10 MG tablet   4. Mitochondrial disease     5. Fibromyalgia     6. Chronic fatigue syndrome           Plan:     Sherry is a 36 yo female presenting today for a routine medication check visit. I refilled a couple of the medications today and reviewed all of her chronic medications. I think it is reasonable to try Benzaclin once daily in the morning and continue with Retin A at bedtime. F/U in 3 months.     Subjective:       37 y.o. female presents for evaluation acne f/u doing well, overall. However, she would like to go back once daily on Benzaclin in addition to the Retin A for a more complete treatment. She received her first Botox from her neurologist for migraine headaches. She continues to have migraines at a frequency of about one per week but since the injection is finding that the Maxalt seems to be more effective. She emailed yesterday about her right breast itching around the nipple. I advised that she try some OTC Clotrimazole cream along with some low dose OTC hydrocortisone cream. She reports that her breast is tender as well now. She has had some spotting a couple times since coming off DepoProvera but has not yet had a period. She and her  are not sexually active due to her chronic pain and fatigue issues. She continues to have fluctuation in her symptoms day to day but overall her symptoms of fatigue and chronic pain are very debilitating.     The following portions of the patient's history were reviewed and updated as appropriate: allergies, current medications, past family history, past medical history, past social history, past surgical history and problem list.    Review of Systems  Pertinent items are noted in HPI.     History   Smoking Status     Never Smoker   Smokeless Tobacco     Never Used       Objective:      BP  "110/70 (Patient Site: Left Arm, Patient Position: Sitting, Cuff Size: Adult Regular)  Pulse 74  Ht 5' 2.5\" (1.588 m)  Wt 153 lb (69.4 kg)  Breastfeeding? No  BMI 27.54 kg/m2  General appearance: alert, appears stated age and cooperative  Skin: some mild acne present on nose and chin area.        This note has been dictated using voice recognition software. Any grammatical or context distortions are unintentional and inherent to the software  "

## 2021-06-13 NOTE — TELEPHONE ENCOUNTER
Date of Last Office Visit: 10/13/20  Date of Next Office Visit: None scheduled, behavioral access to contact patient.  No shows since last visit: 0  Cancellations since last visit: 0  ED visits since last visit:  0    Medication bupropion 150 mg date last ordered: 8/17/20  Qty: 180  Refills: 0    Lapse in therapy greater than 7 days: No  Medication refill request verified as identical to current order: Yes  Result of Last DAM, VPA, Li+ Level, CBC, or Carbamazepine Level (at or since last visit): N/A     [] Medication refilled per [facility].   [x] Medication unable to be refilled by RN due to criteria not met as indicated below:     []Eligibility - not seen in last year    [x]Supervision - no future appointment    []Compliance     []Verification - order discrepancy    []Controlled Medication    []Medication not included in RN Protocol    []90 - day supply request    []Other     Current Medication list:  Sherry Sweeney   (MRN [id number])  Your Current Medications Are    acetylcysteine (NAC) 600 mg cap capsule Take 1 capsule (600 mg total) by mouth 2 (two) times a day.   buPROPion (WELLBUTRIN XL) 150 MG 24 hr tablet Take 2 tablets (300 mg total) by mouth daily.   cholecalciferol, vitamin D3, 400 unit cap Take 2 capsules by mouth daily.   clobetasoL (TEMOVATE) 0.05 % ointment APPLY 1 2 TIMES DAILY AS NEEDED TO AFFECTED AREA, AVOID FACE, ARMPITS, AND GENITALS   desvenlafaxine succinate (PRISTIQ) 25 mg Tb24 Take 50 mg by mouth every morning AND 25 mg every evening.   gabapentin (NEURONTIN) 300 MG capsule Take 1 capsule (300 mg total) by mouth 3 (three) times a day.   hydrOXYzine pamoate (VISTARIL) 50 MG capsule Take 1 capsule (50 mg total) by mouth at bedtime as needed.   ibuprofen (ADVIL,MOTRIN) 400 MG tablet Take 1.5 tablets (600 mg total) by mouth every 6 (six) hours as needed.   melatonin-pyridoxine HCl, B6, 3-1 mg Tab Take 3 mg by mouth at bedtime.   multivitamin therapeutic tablet Take 1 tablet by mouth daily.    naproxen (NAPROSYN) 500 MG tablet Take 1 tablet (500 mg total) by mouth 2 (two) times a day with meals.   norethindrone-ethinyl estradiol-iron (MICROGESTIN FE 1.5/30) 1.5 mg-30 mcg (21)/75 mg (7) per tablet Take 1 tablet by mouth daily.   nystatin (MYCOSTATIN) powder Apply topically 4 (four) times a day.   omeprazole (PRILOSEC) 40 MG capsule Take 1 capsule (40 mg total) by mouth daily.   ONABOTULINUMTOXINA (BOTOX INJ) Inject as directed every 3 (three) months.   ondansetron (ZOFRAN-ODT) 4 MG disintegrating tablet TAKE 1 TABLET (4 MG TOTAL) BY MOUTH EVERY 8 (EIGHT) HOURS AS NEEDED FOR NAUSEA.   riboflavin, vitamin B2, 400 mg Tab Take 400 mg by mouth 2 (two) times a day.   rizatriptan (MAXALT) 10 MG tablet Take 1 tablet (10 mg total) by mouth as needed for migraine. May repeat in 2 hours if needed   spironolactone (ALDACTONE) 50 MG tablet Take 50 mg by mouth daily. 2  Tabs by mouth-patient reported   triamcinolone (KENALOG) 0.1 % cream Apply to affected skin twice daily for up to 14 days.   ubidecarenone (COENZYME Q10 ORAL) Take 100 mg by mouth daily.   valACYclovir (VALTREX) 1000 MG tablet Take 2 tablets PO 12 hours apart PRN episode       Medication Plan of Care at last office visit with MD/CNP:  Plan  1. Continue to monitor for safety  2. Current Medications  1. Continue Bupropion (Wellbutrin XL) 300 mg daily for depression  2. Continue Pristiq (BRAND ONLY) 75mg daily for depression and anxiety  3. Continue Hydroxyzine 50 mg at bedtime as needed for sleep and anxiety  4. Continue melatonin Pyridoxine HCL 3mg bedtime as needed for sleep  5. Continue Gabapentin 300 mg 3 times a day for anxiety and pain  6. Continue  two times a day for intrusive thinking control   7. Neuroleptic Consent Form Signed: n/a  8. Non-Opioid Contract Agreement Form Signed: n/a  9. REFILLS: none due today  3. Labs ordered this visit: n/a  4. Collaborate with interdisciplinary care team as needed.  5. ROIs: n/a  6. Consent to  Communicate: n/a  7. Patient will continue abstinence from drugs and alcohol  8. Patient to return to clinic in 3 months for evaluation of medication trials and continued assessment. Ongoing patient psychoeducation regarding chronic illness and treatment.  1. Patient educated that they may schedule sooner appointment or contact writer for any worsening or lack of improvement in symptoms.  I reviewed the potential risks, side effects, and benefits of all medications with the patient. Patient verbalized understanding and was encouraged to call clinic with further questions or concerns.

## 2021-06-13 NOTE — PROGRESS NOTES
"Sherry Sweeney is a 40 y.o. female who is being evaluated via a billable video visit.      The patient has been notified of following:     \"This video visit will be conducted via a call between you and your physician/provider. We have found that certain health care needs can be provided without the need for an in-person physical exam.  This service lets us provide the care you need with a video conversation.  If a prescription is necessary we can send it directly to your pharmacy.  If lab work is needed we can place an order for that and you can then stop by our lab to have the test done at a later time.    Video visits are billed at different rates depending on your insurance coverage. Please reach out to your insurance provider with any questions.    If during the course of the call the physician/provider feels a video visit is not appropriate, you will not be charged for this service.\"    Patient has given verbal consent to a Video visit? Yes  How would you like to obtain your AVS? AVS Preference: MyChart.  If dropped by the video visit, the video invitation should be sent to: Text to cell phone: [email or web address]  Will anyone else be joining your video visit? No        Video Start Time: 8:12    Additional provider notes: Sherry is a 40-year-old female presenting today via a virtual visit to discuss calf pain.  The patient reports onset of right localized calf pain about 4 to 5 days ago did contact me regarding this.  She described as sharp localized pain with tenderness when pressing on that area.  She states that she thought she saw a little bluish discoloration under the skin as well.  She denies associated swelling.  The pain increased over a couple of days and she became concerned and contacted me.  I did schedule her for an ultrasound to assess for DVT and she had that done yesterday and it came back negative.  She states that the pain does feel better today.     GENERAL: Healthy, alert and no " distress  EYES: Eyes grossly normal to inspection. No discharge or erythema, or obvious scleral/conjunctival abnormalities.    Problem List Items Addressed This Visit     None      Visit Diagnoses     Right calf pain    -  Primary        The patient's leg pain is improving and she had a negative duplex venous ultrasound done yesterday.  This sounds most likely to represent a superficial thrombophlebitis although 1 was not seen on ultrasound yesterday.  I discussed symptomatic treatment and she will monitor for now.    Video-Visit Details    Type of service:  Video Visit    Video End Time (time video stopped): 8:22  Originating Location (pt. Location): Home    Distant Location (provider location):  [facility]     Platform used for Video Visit: Steph Martinez MD

## 2021-06-14 NOTE — PROGRESS NOTES
"Telemedicine Visit: The patient's condition can be safely assessed and treated via synchronous audio and visual telemedicine encounter.      Reason for Telemedicine Visit: Patient unable to travel    Originating Site (Patient Location): Patient's home    Distant Site (Provider Location): Provider Remote Setting- Home Office    Consent:  The patient/guardian has verbally consented to: the potential risks and benefits of telemedicine (video visit) versus in person care; bill my insurance or make self-payment for services provided; and responsibility for payment of non-covered services.     Mode of Communication:  Video Conference via GFRANQ    As the provider I attest to compliance with applicable laws and regulations related to telemedicine.    Outpatient Psychiatric Follow Up    Date of Service: 1/11/2021      --  Chief Complaint: \"It went so fast, I thought I had more time\"     --  History of Present Illness/Client Impression of Mental Health Consult:    Sherry Sweeney is a 40 y.o. female who presents for virtual visit follow up. Last visit occurred 10/13/20.    Feeling more irritable which she wonders if because of the external stressor or medication. Also more difficulty waking up in morning due to excessive tiredness. Discussed strategies for current medications in efforts to address. Reports feeling current medication combination effective for mental health symptoms. Denies other side effects. Discussed previously ordered labs and deference due to ongoing pandemic and anxiety going into clinic.     States mood is \"good\". Rates depression and anxiety manageable. No other sleep or energy maintenance concerns. No appetite or weight concerns. No suicidal and homicidal ideation. No overt psychosis. Denies all other psychiatric symptoms. No new physical concerns.     Medication adherence: Reviewed risk/benefits of medication , Patient able to verbalize understanding of side effects  and Patient verbally consents " to taking medications  Medication side effects: none  The patient was given information on medications: currently prescribed    --  [place] Prescription Monitoring Program  No worrisome pharmacy activity.     --  Clinical Outcomes Measures:  PHQ-9 Total Score: 16  JEREMY-7: 16    --  Current Medications:  Current Outpatient Medications   Medication Sig Dispense Refill     acetylcysteine (NAC) 600 mg cap capsule Take 1 capsule (600 mg total) by mouth 2 (two) times a day. 60 capsule 2     buPROPion (WELLBUTRIN XL) 150 MG 24 hr tablet Take 2 tablets (300 mg total) by mouth daily. 180 tablet 0     cholecalciferol, vitamin D3, 400 unit cap Take 2 capsules by mouth daily.       clobetasoL (TEMOVATE) 0.05 % ointment APPLY 1 2 TIMES DAILY AS NEEDED TO AFFECTED AREA, AVOID FACE, ARMPITS, AND GENITALS       desvenlafaxine succinate (PRISTIQ) 25 mg Tb24 Take 50 mg by mouth every morning AND 25 mg every evening. 270 tablet 0     gabapentin (NEURONTIN) 300 MG capsule Take 1 capsule (300 mg total) by mouth 3 (three) times a day. 270 capsule 2     hydrOXYzine pamoate (VISTARIL) 50 MG capsule Take 1 capsule (50 mg total) by mouth at bedtime as needed. 90 capsule 2     ibuprofen (ADVIL,MOTRIN) 400 MG tablet Take 1.5 tablets (600 mg total) by mouth every 6 (six) hours as needed. 30 tablet 0     melatonin-pyridoxine HCl, B6, 3-1 mg Tab Take 3 mg by mouth at bedtime. 90 tablet 2     multivitamin therapeutic tablet Take 1 tablet by mouth daily.       naproxen (NAPROSYN) 500 MG tablet Take 1 tablet (500 mg total) by mouth 2 (two) times a day with meals. 30 tablet 0     norethindrone-ethinyl estradiol-iron (MICROGESTIN FE 1.5/30) 1.5 mg-30 mcg (21)/75 mg (7) per tablet Take 1 tablet by mouth daily.       nystatin (MYCOSTATIN) powder Apply topically 4 (four) times a day.       omeprazole (PRILOSEC) 40 MG capsule Take 1 capsule (40 mg total) by mouth daily. 45 capsule 0     ONABOTULINUMTOXINA (BOTOX INJ) Inject as directed every 3 (three)  months.       ondansetron (ZOFRAN-ODT) 4 MG disintegrating tablet TAKE 1 TABLET (4 MG TOTAL) BY MOUTH EVERY 8 (EIGHT) HOURS AS NEEDED FOR NAUSEA. 30 tablet 1     riboflavin, vitamin B2, 400 mg Tab Take 400 mg by mouth 2 (two) times a day.       rizatriptan (MAXALT) 10 MG tablet Take 1 tablet (10 mg total) by mouth as needed for migraine. May repeat in 2 hours if needed 10 tablet 3     spironolactone (ALDACTONE) 50 MG tablet Take 50 mg by mouth daily. 2  Tabs by mouth-patient reported       triamcinolone (KENALOG) 0.1 % cream Apply to affected skin twice daily for up to 14 days. 30 g 0     ubidecarenone (COENZYME Q10 ORAL) Take 100 mg by mouth daily.       valACYclovir (VALTREX) 1000 MG tablet Take 2 tablets PO 12 hours apart PRN episode 20 tablet 2     No current facility-administered medications for this visit.        --  Allergies  Allergies   Allergen Reactions     Ciprofloxacin Itching, Rash and Hives     Rash over whole body      Egg White Other (See Comments)     Swollen colon, belly pain     Influenza Virus Vaccines Shortness Of Breath     Sulfa (Sulfonamide Antibiotics) Rash and Hives     Yeast, Dried Anaphylaxis     Stomach swelling     Gluten      Savella [Milnacipran] Other (See Comments)     Chest pain, hot/cold flashes     Quetiapine Palpitations     Tachycardia, nervousness, elevated blood pressure.      --  Summary of Diagnostic Studies  No visits with results within 30 Day(s) from this visit.   Latest known visit with results is:   Admission on 07/23/2020, Discharged on 07/23/2020   Component Date Value Ref Range Status     WBC 07/23/2020 12.9* 4.0 - 11.0 thou/uL Final     RBC 07/23/2020 4.71  3.80 - 5.40 mill/uL Final     Hemoglobin 07/23/2020 13.2  12.0 - 16.0 g/dL Final     Hematocrit 07/23/2020 40.7  35.0 - 47.0 % Final     MCV 07/23/2020 86  80 - 100 fL Final     MCH 07/23/2020 28.0  27.0 - 34.0 pg Final     MCHC 07/23/2020 32.4  32.0 - 36.0 g/dL Final     RDW 07/23/2020 12.7  11.0 - 14.5 %  Final     Platelets 07/23/2020 317  140 - 440 thou/uL Final     MPV 07/23/2020 9.5  8.5 - 12.5 fL Final     Sodium 07/23/2020 138  136 - 145 mmol/L Final     Potassium 07/23/2020 3.9  3.5 - 5.0 mmol/L Final     Chloride 07/23/2020 103  98 - 107 mmol/L Final     CO2 07/23/2020 26  22 - 31 mmol/L Final     Anion Gap, Calculation 07/23/2020 9  5 - 18 mmol/L Final     Glucose 07/23/2020 87  70 - 125 mg/dL Final     BUN 07/23/2020 9  8 - 22 mg/dL Final     Creatinine 07/23/2020 0.75  0.60 - 1.10 mg/dL Final     GFR MDRD Af Amer 07/23/2020 >60  >60 mL/min/1.73m2 Final     GFR MDRD Non Af Amer 07/23/2020 >60  >60 mL/min/1.73m2 Final     Bilirubin, Total 07/23/2020 0.2  0.0 - 1.0 mg/dL Final     Calcium 07/23/2020 9.7  8.5 - 10.5 mg/dL Final     Protein, Total 07/23/2020 7.1  6.0 - 8.0 g/dL Final     Albumin 07/23/2020 4.1  3.5 - 5.0 g/dL Final     Alkaline Phosphatase 07/23/2020 52  45 - 120 U/L Final     AST 07/23/2020 21  0 - 40 U/L Final     ALT 07/23/2020 17  0 - 45 U/L Final     Lipase 07/23/2020 28  0 - 52 U/L Final     Color, UA 07/23/2020 Yellow  Colorless, Yellow, Straw, Light Yellow Final     Clarity, UA 07/23/2020 Clear  Clear Final     Glucose, UA 07/23/2020 Negative  Negative Final     Bilirubin, UA 07/23/2020 Negative  Negative Final     Ketones, UA 07/23/2020 Negative  Negative, 60 mg/dL Final     Specific Gravity, UA 07/23/2020 1.008  1.001 - 1.030 Final     Blood, UA 07/23/2020 Negative  Negative Final     pH, UA 07/23/2020 6.0  4.5 - 8.0 Final     Protein, UA 07/23/2020 Negative  Negative mg/dL Final     Urobilinogen, UA 07/23/2020 <2.0 E.U./dL  <2.0 E.U./dL, 2.0 E.U./dL Final     Nitrite, UA 07/23/2020 Negative  Negative Final     Leukocytes, UA 07/23/2020 Negative  Negative Final     SYSTOLIC BLOOD PRESSURE 07/23/2020 133  mmHg Final     DIASTOLIC BLOOD PRESSURE 07/23/2020 85  mmHg Final     VENTRICULAR RATE 07/23/2020 76  BPM Final     ATRIAL RATE 07/23/2020 76  BPM Final     P-R INTERVAL 07/23/2020  160  ms Final     QRS DURATION 07/23/2020 82  ms Final     Q-T INTERVAL 07/23/2020 354  ms Final     QTC CALCULATION (BEZET) 07/23/2020 398  ms Final     P Axis 07/23/2020 83  degrees Final     R AXIS 07/23/2020 72  degrees Final     T AXIS 07/23/2020 95  degrees Final     MUSE DIAGNOSIS 07/23/2020    Final                    Value:Normal sinus rhythm  Normal ECG  When compared with ECG of 03-JAN-2020 09:29,  No significant change was found  Confirmed by SEE ED PROVIDER NOTE FOR, ECG INTERPRETATION ([id number]),  COSMO LIRA ([id number]) on 7/24/2020 3:20:28 AM         --  Review of Systems  Wt Readings from Last 3 Encounters:   09/14/20 122 lb (55.3 kg)   07/23/20 118 lb (53.5 kg)   01/22/20 117 lb (53.1 kg)     Temp Readings from Last 3 Encounters:   07/23/20 98.2  F (36.8  C) (Oral)   07/23/20 98.3  F (36.8  C) (Oral)   03/26/19 98.3  F (36.8  C) (Oral)     BP Readings from Last 3 Encounters:   09/14/20 100/62   07/23/20 124/79   07/23/20 112/77     Pulse Readings from Last 3 Encounters:   09/14/20 88   07/23/20 78   07/23/20 82      LMP 02/06/2018 (Approximate)  unable to assess today Pain Score: moderate  Pain Location: throughout     As noted in the subjective section above, otherwise a 10 point review of systems is negative. Limited ability to assess given virtual nature of visit. Review of symptoms based entirely on patient's verbal report and what writer is able to assess via camera if used during appointment.    --  Mental Status Examination    Appearance: appears stated age, clean, well groomed, casually dressed appropriate for weather   Orientation: Patient alert and oriented to person, place, time, and situation  Reliability:  Patient appears to be an adequate historian.   Behavior: Patient makes good eye contact and engages with normal rapport in the interview.   There is no evidence of responding to hallucinations or flashbacks.  Speech: Speech is spontaneous and coherent, with a normal rate, rhythm,  "and tone.    Language: There are no difficulties with expressive or receptive language as observed throughout the interview.    Mood: Described as \"good\".    Affect: Congruent and shows a normal range and level of reactivity.  Judgement: Able to make basic decision regarding safety.  Insight: Good awareness of physical and mental health conditions and aware of needs around care for these.  Gait and station: unable to assess   Thought process: Logical   Thought content: No evidence of delusions or paranoia.  No thoughts of self-harm or suicide. No thoughts of harming others.  Associations: Connected  Fund of knowledge: Average  Attention / Concentration: Able to remain focused during the interview with minimal distractibility or need for redirection.  Short Term Memory: Grossly intact as evidence by client recalling themes and ideas discussed.  Long Term Memory: Intact  Motor Status: No recent apparent change.  No current tremor.    --  Diagnostic Impression:  1. Major depression, recurrent, chronic (H)  - acetylcysteine (NAC) 600 mg cap capsule; Take 1 capsule (600 mg total) by mouth 2 (two) times a day.  Dispense: 60 capsule; Refill: 2  - buPROPion (WELLBUTRIN XL) 300 MG 24 hr tablet; Take 1 tablet (300 mg total) by mouth every morning.  Dispense: 30 tablet; Refill: 2    2. Other insomnia  - hydrOXYzine pamoate (VISTARIL) 25 MG capsule; Take 1 capsule (25 mg total) by mouth at bedtime as needed, may repeat once for anxiety (sleep).  Dispense: 60 capsule; Refill: 0    --  Medical Decision-Making   Sherry Sweeney is a 40 y.o. female who presents for ongoing outpatient psychiatric care. Information today collected from patient's verbal report and review of records available in EHR. Referred to this writer at the recommendation of primary care provider due to discharge from [facility]. Medically complex: has Major depression, recurrent, chronic (H); Post-traumatic Stress Disorder; Generalized Anxiety Disorder; Fibromyalgia; " Insomnia; Low back pain; IBS (irritable bowel syndrome); Migraine without aura; Debility, unspecified; Acne vulgaris; Mitochondrial myopathy; H/O: hysterectomy; Mitochondrial disease (H); Suicidal ideations; Temporomandibular joint-pain-dysfunction syndrome (TMJ); At high risk for falls; Hypermobility syndrome; Muscle fatigue; Myofascial pain; and Parasomnia on their problem list. Past medication trials include: duloxetine, desvenlafaxine  generic brands, lorazepam, hydroxyzine, lamotrigine, aripiprazole, gabapentin, pregabalin, amitriptyline, quetiapine, bupropion, escitalopram, quetiapine and clonazepam    Pristiq medication and bupropion combination seem to be well controlling depressive and anxiety symptoms. At baseline. No rash since cessation of generic and initiation of Pristiq brand medication. Improvements in GI complaints since last visit. Possible irritability secondary to medications but unclear with external stressors. Encouraged to try taking morning Pristiq later in morning and trial hydroxyzine 25mg dose for excessive fatigue in morning reported. No other medication changes indicated today. Reviewed recent lab work. Placed several lab orders for medication monitoring with ask to be completed. Deferring at this time per patient request due to ongoing pandemic. Continue with outpatient psychotherapy visits due to positive therapeutic effect.    Patient denies suicidal and homicidal ideation. Not at imminent risk this visit. Educated on need to seek emergent services should they become a risk to themselves or others. Sherry Sweeney verbalized understanding and agreement with this safety plan.    --  Plan  1. Continue to monitor for safety  2. Current Medications   1. Continue Bupropion (Wellbutrin XL) 300 mg daily for depression  2. Continue Pristiq (BRAND ONLY) 75mg daily for depression and anxiety  3. Continue Hydroxyzine 25-50 mg at bedtime as needed for sleep and anxiety  4. Continue melatonin  Pyridoxine HCL 3mg bedtime as needed for sleep  5. Continue Gabapentin 300 mg 3 times a day for anxiety and pain  6. Continue  two times a day for intrusive thinking control   7. REFILLS: none due today  3. Labs ordered this visit: n/a  4. Continue outpatient therapy for mood stabilization and non-pharmacologic support. Collaborate with interdisciplinary care team as needed.  5. Patient will continue abstinence from drugs and alcohol  6. Patient to return to clinic in 3 months for evaluation of medication trials and continued assessment. Ongoing patient psychoeducation regarding chronic illness and treatment.  1. Patient educated that they may schedule sooner appointment or contact writer for any worsening or lack of improvement in symptoms.  7. I reviewed the potential risks, side effects, and benefits of all medications with the patient. Patient verbalized understanding and was encouraged to call clinic with further questions or concerns.    START TIME: 3:07 PM  END TIME: 3:30 PM    Appointment duration: 23 minutes with > 75% spent on coordination of care and psycho-education regarding illness, symptoms, neurobiological basis of disease, lab work, alternative interventions, sleep hygiene, safety planning, care planning, and pharmacology.    Dr. Lisa Emanuel, DNP, APRN, PMHNP-BC  Nurse Practitioner - Psychiatry    This medical report was made using Dragon Dictation. Spelling and grammatical errors with Dragon exist and are not intentional.

## 2021-06-14 NOTE — PROGRESS NOTES
This video/telephone visit will be conducted via a call between you and your physician/provider. We have found that certain health care needs can be provided without the need for an in-person physical exam. This service lets us provide the care you need with a video /telephone conversation. If a prescription is necessary we can send it directly to your pharmacy. If lab work is needed we can place an order for that and you can then stop by our lab to have the test done at a later time.    Just as we bill insurance for in-person visits, we also bill insurance for video/telephone visits. If you have questions about your insurance coverage, we recommend that you speak with your insurance company.    Patient has given verbal consent for video/Telephone visit? Yes  Patient would like the video visit invitation sent by:  Send to email: [email or web address], if connection issues, please call:  [phone]  JIM/FELIZ RUBI CMA    States she forgot to complete her lab work, thought she had more time.    Patient verified allergies, medications and pharmacy via phone. PHQ: 16 and JEREMY: 16 done verbally with writer. Patient states she is ready for visit.     info available Online

## 2021-06-14 NOTE — PROGRESS NOTES
Pt returned care guide's 11/24 email outreach to establish initial contact. Pt described interest in meeting in person and care guide offered apt. Pt also asked about wether or not she would continue to have her med refill requests handled by care guide. Care guide advised pt to call pharmacy directly, who will then send request to clinic, or call clinic directly for med refill requests.    12/5/17

## 2021-06-14 NOTE — PROGRESS NOTES
Contact has been by email. I have sent a email today to Sherry at [email or web address]    Attempt 3: Care Guide emailed patient.  If this patient is returning my call, please transfer to Katelyn at ext [phone].  I have called this patient 3 times over the past two weeks and have been unsuccessful in reaching her.  This patient has also not returned any of my messages.  I will continue attempting to reach out to this patient in one month.  I will also check this patient's chart for upcoming appointments, ER reports that may contain a new phone number, or any other recent activity.    11/24/17

## 2021-06-14 NOTE — PROGRESS NOTES
________________________________________  Medications Phoned  to Pharmacy [] yes [x]no  Name of Pharmacist:  List Medications, including dose, quantity and instructions    Medications ordered this visit were e-scribed.  Verified by order class [x] yes  [] no   mg  Bupropion 300 mg  Hydroxyzine 25 mg    Medication changes or discontinuations were communicated to patient's pharmacy: [] yes  [x] no    Dictation completed at time of chart check: [x] yes  [] no    I have checked the documentation for today s encounters and the above information has been reviewed and completed.

## 2021-06-14 NOTE — PATIENT INSTRUCTIONS - HE
"1. Continue medications as prescribed  2. Have your pharmacy contact us for a refill if you are running low on medications (We may ask you to come into clinic to get a refill from the nurse)  3. No alcohol or drug use  4. No driving if sedated  5. Contact the clinic with any questions or concerns   a. Phone: [phone]  b. Fax: [phone]  6. Reach out for help if you feel like hurting yourself or others:   a. [facility]: [address] [place] [address], [address] (phone: [phone])  b. [facility] Suicide Hotline: 192.423.5011   c. Crisis Texting Line: Text \"[place]\" to 331801  d. Call 911 or go to nearest Emergency room   7. Follow up as directed in 3 months by video for your appointment    "

## 2021-06-15 NOTE — TELEPHONE ENCOUNTER
Refill Approved    Rx renewed per Medication Renewal Policy. Medication was last renewed on 09/14/2020,  Last office visit was 12/16/2020 with PCP.    Keyona Simon, [facility] Triage/Med Refill 2/26/2021     Requested Prescriptions   Pending Prescriptions Disp Refills     omeprazole (PRILOSEC) 40 MG capsule [Pharmacy Med Name: OMEPRAZOLE DR 40 MG CAPSULE] 45 capsule 0     Sig: TAKE 1 CAPSULE BY MOUTH EVERY DAY       GI Medications Refill Protocol Passed - 2/25/2021  1:08 PM        Passed - PCP or prescribing provider visit in last 12 or next 3 months.     Last office visit with prescriber/PCP: 9/14/2020 Layla Martinez MD OR same dept: 9/14/2020 Layla Martinez MD OR same specialty: 9/14/2020 Layla Martinez MD  Last physical: 8/21/2019 Last MTM visit: Visit date not found   Next visit within 3 mo: Visit date not found  Next physical within 3 mo: Visit date not found  Prescriber OR PCP: Layla Martinez MD  Last diagnosis associated with med order: 1. Acute gastritis without hemorrhage, unspecified gastritis type  - omeprazole (PRILOSEC) 40 MG capsule [Pharmacy Med Name: OMEPRAZOLE DR 40 MG CAPSULE]; TAKE 1 CAPSULE BY MOUTH EVERY DAY  Dispense: 45 capsule; Refill: 0    If protocol passes may refill for 12 months if within 3 months of last provider visit (or a total of 15 months).

## 2021-06-15 NOTE — TELEPHONE ENCOUNTER
Called patient, no answer. Left message for her to return call to triage. Will send patient mychart message with details of provider's directives.

## 2021-06-15 NOTE — TELEPHONE ENCOUNTER
Date of Last Office Visit: 1-11-21  Date of Next Office Visit: 4-5-21  No shows since last visit: 0  Cancellations since last visit: 0    Medication requested: vistaril     Lapse in medication adherence greater than 5 days?: no    Medication refill request verified as identical to current order?: yes  Result of Last DAM, VPA, Li+ Level, CBC, or Carbamazepine Level (at or since last visit): N/A    [x]Medication refilled per  Medication Refill in Ambulatory Care  policy.  []Medication unable to be refilled by RN due to criteria not met as indicated below:    []Eligibility - not seen in the last year   []Supervision - no future appointment   []Compliance - no shows, cancellations or lapse in therapy   []Verification - order discrepancy   []Controlled medication   []Medication not included in policy   []90-day supply request   []Other

## 2021-06-15 NOTE — TELEPHONE ENCOUNTER
Called pharmacy and spoke with Po, pharmacist who said the Atarax script was received at the pharmacy and he will cancel the Vistaril/hydroxyzine pamoate.    Called patient, informed her of the above and she was very thankful. Said she had started feeling a little sick and frustrated, but this is helpful.

## 2021-06-15 NOTE — TELEPHONE ENCOUNTER
OK to wait for [place]  Looks like zolpidem 5 mg was given in July 2019.  Prior to that she was taking Zolpidem 12.5 mg CR.  I do not see retinoid cream on previous med list.

## 2021-06-15 NOTE — TELEPHONE ENCOUNTER
Reviewed encounter. Will ask nursing to call patient given medication error reviewed to collect more information. Please ask if noticing side effects or changes in physical/mood symptoms since error made? How many days does she think she was taking the 600mg dose? Will ask nursing to educate on medication side effect risks. Encourage follow up with PCP or in person appointment if noticing changes or worsening symptoms. In meantime she should continue prescribed dosing unless otherwise directed by medical provider. Also offer earlier appointment with myself as able.

## 2021-06-15 NOTE — TELEPHONE ENCOUNTER
Reviewed encounter documentation. Resent correct hydroxyzine HCL order. Please verify at pharmacy, discontinue pamoate, and alert patient once completed.

## 2021-06-15 NOTE — TELEPHONE ENCOUNTER
Attempted to reach pt by phone to discuss her concerns. Had to leave a message to return call to triage. Also contacted her pharmacy. She picked up the hydroxyzine script sent in on 2/16, however it was for the pamoate, which pt does not want. She states that it is ineffective and prefers the hydroxyzine HCl. Please advise.

## 2021-06-15 NOTE — PROGRESS NOTES
Care guide emailed pt to discuss pt priority needs: pain management, stress management (parasomnia not discussed). CG offered to meet pt in person for routine meet and greet. CG also offered RECAM.    At next outreach CG will:  Schedule RECAM if indicated

## 2021-06-16 NOTE — PROGRESS NOTES
This video/telephone visit will be conducted via a call between you and your physician/provider. We have found that certain health care needs can be provided without the need for an in-person physical exam. This service lets us provide the care you need with a video /telephone conversation. If a prescription is necessary we can send it directly to your pharmacy. If lab work is needed we can place an order for that and you can then stop by our lab to have the test done at a later time.    Just as we bill insurance for in-person visits, we also bill insurance for video/telephone visits. If you have questions about your insurance coverage, we recommend that you speak with your insurance company.    Patient has given verbal consent for video/Telephone visit? Yes Patient would like the video visit invitation sent by: Text to cell phone: NICOLE Send to email: [email or web address]  or SIP CALL to:  NICOLE FERNANDEZ/FELIZ : Harjinder BARRY LPN    Patient verified allergies, medications and pharmacy via phone. PHQ : 9  and JEREMY: 18 done verbally with writer. Patient states sheis ready for visit.    : Provider to review    ________________________________________  Medications Phoned  to Pharmacy [] yes [x]no  Name of Pharmacist:  List Medications, including dose, quantity and instructions    Medications ordered this visit were e-scribed.  Verified by order class [x] yes  [] no  Melatonin, Atarax, Pristiq, and Wellbutrin  Medication changes or discontinuations were communicated to patient's pharmacy: [] yes  [x] no    Dictation completed at time of chart check: [] yes  [x] no    I have checked the documentation for today s encounters and the above information has been reviewed and completed.

## 2021-06-16 NOTE — PROGRESS NOTES
"Sherry return CG email sharing that she was sorry to have not returned CG email earlier. She reported she \"totally spaced\" because her memory is \"not what it used to be.\" She reported missing an OV with PCP dt migraines and exhaustion.  She asked CG to postpone scheduling in person meeting because she \"just can't take on anything more at the moment.\"    CG deferred returning pt email to respect pt request for space. CG responded by providing psychosocial support, agreeing it is not necessary to meet in person if it adds to her stress levels. CG asked pt if she is comfortable conversing over email at this time.     Since this email 02/05, pt has been in to see PCP 02/16.    At next outreach CG will:  Assess pt priority need  Discuss goals if indicated    "

## 2021-06-16 NOTE — PROGRESS NOTES
Skin / nail biopsy  Date/Time: 3/11/2018 1:06 PM  Performed by: MATTHEW RESENDIZ  Authorized by: MATTHEW RESENDIZ   Consent: Verbal consent obtained.  Consent given by: patient  Comments: The patient has a 3 mm x 4 mm elevated papular lesion with some pigment just below her right breast.  The skin was cleansed with Betadine.  Lidocaine with epinephrine was injected subcutaneously.  A shave biopsy was performed.  Silver nitrate was used to obtain hemostasis.  Antibiotic ointment and a bandage was then applied.  I did send this for pathology.

## 2021-06-16 NOTE — PATIENT INSTRUCTIONS - HE
"1. Continue medications as prescribed  2. Have your pharmacy contact us for a refill if you are running low on medications (We may ask you to come into clinic to get a refill from the nurse)  3. No alcohol or drug use  4. No driving if sedated  5. Contact the clinic with any questions or concerns   a. Phone: [phone]  b. Fax: [phone]  6. Reach out for help if you feel like hurting yourself or others:   a. [facility]: [address] [place] [address], [address] (phone: [phone])  b. [facility] Suicide Hotline: 380.212.6021   c. Crisis Texting Line: Text \"[place]\" to 599625  d. Call 911 or go to nearest Emergency room   7. Follow up as directed in 3 months by video for your appointment    "

## 2021-06-16 NOTE — PROGRESS NOTES
"Telemedicine Visit: The patient's condition can be safely assessed and treated via synchronous audio and visual telemedicine encounter.      Reason for Telemedicine Visit: Patient unable to travel    Originating Site (Patient Location): Patient's home    Distant Site (Provider Location): Provider Remote Setting- Home Office    Consent:  The patient/guardian has verbally consented to: the potential risks and benefits of telemedicine (video visit) versus in person care; bill my insurance or make self-payment for services provided; and responsibility for payment of non-covered services.     Mode of Communication:  Video Conference via [employer]    As the provider I attest to compliance with applicable laws and regulations related to telemedicine.    Outpatient Psychiatric Follow Up    Date of Service: 4/5/2021      --  Chief Complaint: \"things have been good\"     --  History of Present Illness/Client Impression of Mental Health Consult:    Sherry Sweeney is a 41 y.o. female who presents for virtual visit follow up. Last visit occurred 1/11/2021.    Was accepted into the master's [employer] program at the [facility] which she is enjoying. Continues to feel irritability higher but manageable since starting medications. Reports feeling current medication combination effective for mental health symptoms and reluctant to consider changes. Denies other side effects.     States mood is \"good\". Rates depression and anxiety manageable. No other sleep or energy maintenance concerns. No appetite or weight concerns. No suicidal and homicidal ideation. No overt psychosis. Denies all other psychiatric symptoms. No new physical concerns.     Medication adherence: Reviewed risk/benefits of medication , Patient able to verbalize understanding of side effects  and Patient verbally consents to taking medications  Medication side effects: none  The patient was given information on medications: currently prescribed    --  [place] Prescription " Monitoring Program  No worrisome pharmacy activity.     --  Clinical Outcomes Measures:  PHQ-9 Total Score: 9  JEREMY-7: 18    --  Current Medications:  Current Outpatient Medications   Medication Sig Dispense Refill     acetylcysteine (NAC) 600 mg cap capsule Take 1 capsule (600 mg total) by mouth 2 (two) times a day. 60 capsule 2     buPROPion (WELLBUTRIN XL) 300 MG 24 hr tablet Take 1 tablet (300 mg total) by mouth every morning. 30 tablet 2     cholecalciferol, vitamin D3, 400 unit cap Take 2 capsules by mouth daily.       clobetasoL (TEMOVATE) 0.05 % ointment APPLY 1 2 TIMES DAILY AS NEEDED TO AFFECTED AREA, AVOID FACE, ARMPITS, AND GENITALS       desvenlafaxine succinate (PRISTIQ) 25 mg Tb24 Take 50 mg by mouth every morning AND 25 mg every evening. 270 tablet 0     gabapentin (NEURONTIN) 300 MG capsule Take 1 capsule (300 mg total) by mouth 3 (three) times a day. 270 capsule 2     hydrOXYzine HCL (ATARAX) 25 MG tablet Take 1 tablet (25 mg total) by mouth at bedtime as needed, may repeat once for anxiety (sleep). 60 tablet 2     ibuprofen (ADVIL,MOTRIN) 400 MG tablet Take 1.5 tablets (600 mg total) by mouth every 6 (six) hours as needed. 30 tablet 0     melatonin-pyridoxine HCl, B6, 3-1 mg Tab Take 3 mg by mouth at bedtime. 90 tablet 2     multivitamin therapeutic tablet Take 1 tablet by mouth daily.       naproxen (NAPROSYN) 500 MG tablet Take 1 tablet (500 mg total) by mouth 2 (two) times a day with meals. 30 tablet 0     norethindrone-ethinyl estradiol-iron (MICROGESTIN FE 1.5/30) 1.5 mg-30 mcg (21)/75 mg (7) per tablet Take 1 tablet by mouth daily.       nystatin (MYCOSTATIN) powder Apply topically 4 (four) times a day.       omeprazole (PRILOSEC) 40 MG capsule TAKE 1 CAPSULE BY MOUTH EVERY DAY 90 capsule 2     ONABOTULINUMTOXINA (BOTOX INJ) Inject as directed every 3 (three) months.       ondansetron (ZOFRAN-ODT) 4 MG disintegrating tablet TAKE 1 TABLET (4 MG TOTAL) BY MOUTH EVERY 8 (EIGHT) HOURS AS NEEDED  FOR NAUSEA. 30 tablet 1     riboflavin, vitamin B2, 400 mg Tab Take 400 mg by mouth 2 (two) times a day.       rizatriptan (MAXALT) 10 MG tablet Take 1 tablet (10 mg total) by mouth as needed for migraine. May repeat in 2 hours if needed 10 tablet 3     spironolactone (ALDACTONE) 50 MG tablet Take 50 mg by mouth daily. 2  Tabs by mouth-patient reported       triamcinolone (KENALOG) 0.1 % cream Apply to affected skin twice daily for up to 14 days. 30 g 0     ubidecarenone (COENZYME Q10 ORAL) Take 100 mg by mouth daily.       valACYclovir (VALTREX) 1000 MG tablet Take 2 tablets PO 12 hours apart PRN episode 20 tablet 2     zolpidem (AMBIEN) 5 MG tablet Take 1 tablet (5 mg total) by mouth daily. 30 tablet 0     No current facility-administered medications for this visit.        --  Allergies  Allergies   Allergen Reactions     Ciprofloxacin Itching, Rash and Hives     Rash over whole body      Egg White Other (See Comments)     Swollen colon, belly pain     Influenza Virus Vaccines Shortness Of Breath     Sulfa (Sulfonamide Antibiotics) Rash and Hives     Yeast, Dried Anaphylaxis     Stomach swelling     Gluten      Savella [Milnacipran] Other (See Comments)     Chest pain, hot/cold flashes     Quetiapine Palpitations     Tachycardia, nervousness, elevated blood pressure.      --  Summary of Diagnostic Studies  No visits with results within 30 Day(s) from this visit.   Latest known visit with results is:   Admission on 07/23/2020, Discharged on 07/23/2020   Component Date Value Ref Range Status     WBC 07/23/2020 12.9* 4.0 - 11.0 thou/uL Final     RBC 07/23/2020 4.71  3.80 - 5.40 mill/uL Final     Hemoglobin 07/23/2020 13.2  12.0 - 16.0 g/dL Final     Hematocrit 07/23/2020 40.7  35.0 - 47.0 % Final     MCV 07/23/2020 86  80 - 100 fL Final     MCH 07/23/2020 28.0  27.0 - 34.0 pg Final     MCHC 07/23/2020 32.4  32.0 - 36.0 g/dL Final     RDW 07/23/2020 12.7  11.0 - 14.5 % Final     Platelets 07/23/2020 317  140 - 440  thou/uL Final     MPV 07/23/2020 9.5  8.5 - 12.5 fL Final     Sodium 07/23/2020 138  136 - 145 mmol/L Final     Potassium 07/23/2020 3.9  3.5 - 5.0 mmol/L Final     Chloride 07/23/2020 103  98 - 107 mmol/L Final     CO2 07/23/2020 26  22 - 31 mmol/L Final     Anion Gap, Calculation 07/23/2020 9  5 - 18 mmol/L Final     Glucose 07/23/2020 87  70 - 125 mg/dL Final     BUN 07/23/2020 9  8 - 22 mg/dL Final     Creatinine 07/23/2020 0.75  0.60 - 1.10 mg/dL Final     GFR MDRD Af Amer 07/23/2020 >60  >60 mL/min/1.73m2 Final     GFR MDRD Non Af Amer 07/23/2020 >60  >60 mL/min/1.73m2 Final     Bilirubin, Total 07/23/2020 0.2  0.0 - 1.0 mg/dL Final     Calcium 07/23/2020 9.7  8.5 - 10.5 mg/dL Final     Protein, Total 07/23/2020 7.1  6.0 - 8.0 g/dL Final     Albumin 07/23/2020 4.1  3.5 - 5.0 g/dL Final     Alkaline Phosphatase 07/23/2020 52  45 - 120 U/L Final     AST 07/23/2020 21  0 - 40 U/L Final     ALT 07/23/2020 17  0 - 45 U/L Final     Lipase 07/23/2020 28  0 - 52 U/L Final     Color, UA 07/23/2020 Yellow  Colorless, Yellow, Straw, Light Yellow Final     Clarity, UA 07/23/2020 Clear  Clear Final     Glucose, UA 07/23/2020 Negative  Negative Final     Bilirubin, UA 07/23/2020 Negative  Negative Final     Ketones, UA 07/23/2020 Negative  Negative, 60 mg/dL Final     Specific Gravity, UA 07/23/2020 1.008  1.001 - 1.030 Final     Blood, UA 07/23/2020 Negative  Negative Final     pH, UA 07/23/2020 6.0  4.5 - 8.0 Final     Protein, UA 07/23/2020 Negative  Negative mg/dL Final     Urobilinogen, UA 07/23/2020 <2.0 E.U./dL  <2.0 E.U./dL, 2.0 E.U./dL Final     Nitrite, UA 07/23/2020 Negative  Negative Final     Leukocytes, UA 07/23/2020 Negative  Negative Final     SYSTOLIC BLOOD PRESSURE 07/23/2020 133  mmHg Final     DIASTOLIC BLOOD PRESSURE 07/23/2020 85  mmHg Final     VENTRICULAR RATE 07/23/2020 76  BPM Final     ATRIAL RATE 07/23/2020 76  BPM Final     P-R INTERVAL 07/23/2020 160  ms Final     QRS DURATION 07/23/2020 82  ms  Final     Q-T INTERVAL 07/23/2020 354  ms Final     QTC CALCULATION (BEZET) 07/23/2020 398  ms Final     P Axis 07/23/2020 83  degrees Final     R AXIS 07/23/2020 72  degrees Final     T AXIS 07/23/2020 95  degrees Final     MUSE DIAGNOSIS 07/23/2020    Final                    Value:Normal sinus rhythm  Normal ECG  When compared with ECG of 03-JAN-2020 09:29,  No significant change was found  Confirmed by SEE ED PROVIDER NOTE FOR, ECG INTERPRETATION ([id number]),  COSMO LIRA ([id number]) on 7/24/2020 3:20:28 AM         --  Review of Systems  Wt Readings from Last 3 Encounters:   09/14/20 122 lb (55.3 kg)   07/23/20 118 lb (53.5 kg)   01/22/20 117 lb (53.1 kg)     Temp Readings from Last 3 Encounters:   07/23/20 98.2  F (36.8  C) (Oral)   07/23/20 98.3  F (36.8  C) (Oral)   03/26/19 98.3  F (36.8  C) (Oral)     BP Readings from Last 3 Encounters:   09/14/20 100/62   07/23/20 124/79   07/23/20 112/77     Pulse Readings from Last 3 Encounters:   09/14/20 88   07/23/20 78   07/23/20 82      LMP 02/06/2018 (Approximate)  unable to assess today Pain Score: moderate  Pain Location: throughout     As noted in the subjective section above, otherwise a 10 point review of systems is negative. Limited ability to assess given virtual nature of visit. Review of symptoms based entirely on patient's verbal report and what writer is able to assess via camera if used during appointment.    --  Mental Status Examination    Appearance: appears stated age, clean, well groomed, casually dressed appropriate for weather   Orientation: Patient alert and oriented to person, place, time, and situation  Reliability:  Patient appears to be an adequate historian.   Behavior: Patient makes good eye contact and engages with normal rapport in the interview.   There is no evidence of responding to hallucinations or flashbacks.  Speech: Speech is spontaneous and coherent, with a normal rate, rhythm, and tone.    Language: There are no difficulties  "with expressive or receptive language as observed throughout the interview.    Mood: Described as \"good\".    Affect: Congruent and shows a normal range and level of reactivity.  Judgement: Able to make basic decision regarding safety.  Insight: Good awareness of physical and mental health conditions and aware of needs around care for these.  Gait and station: unable to assess   Thought process: Logical   Thought content: No evidence of delusions or paranoia.  No thoughts of self-harm or suicide. No thoughts of harming others.  Associations: Connected  Fund of knowledge: Average  Attention / Concentration: Able to remain focused during the interview with minimal distractibility or need for redirection.  Short Term Memory: Grossly intact as evidence by client recalling themes and ideas discussed.  Long Term Memory: Intact  Motor Status: No recent apparent change.  No current tremor.    --  Diagnostic Impression:  1. Major depression, recurrent, chronic (H)  - buPROPion (WELLBUTRIN XL) 300 MG 24 hr tablet; Take 1 tablet (300 mg total) by mouth every morning.  Dispense: 90 tablet; Refill: 0  - desvenlafaxine succinate (PRISTIQ) 25 mg Tb24; Take 50 mg by mouth every morning AND 25 mg every evening.  Dispense: 270 tablet; Refill: 0  - melatonin 3 mg Tab tablet; Take 1 tablet (3 mg total) by mouth at bedtime.  Dispense: 90 each; Refill: 0    2. Other insomnia  - hydrOXYzine HCL (ATARAX) 25 MG tablet; Take 1 tablet (25 mg total) by mouth at bedtime as needed, may repeat once for anxiety (sleep).  Dispense: 180 tablet; Refill: 0  - melatonin 3 mg Tab tablet; Take 1 tablet (3 mg total) by mouth at bedtime.  Dispense: 90 each; Refill: 0    --  Medical Decision-Making   Sherry Sweeney is a 41 y.o. female who presents for ongoing outpatient psychiatric care. Information today collected from patient's verbal report and review of records available in EHR. Referred to this writer at the recommendation of primary care provider due to " discharge from [facility]. Medically complex: has Major depression, recurrent, chronic (H); Post-traumatic Stress Disorder; Generalized Anxiety Disorder; Fibromyalgia; Insomnia; Low back pain; IBS (irritable bowel syndrome); Migraine without aura; Debility, unspecified; Acne vulgaris; Mitochondrial myopathy; H/O: hysterectomy; Mitochondrial disease (H); Suicidal ideations; Temporomandibular joint-pain-dysfunction syndrome (TMJ); At high risk for falls; Hypermobility syndrome; Muscle fatigue; Myofascial pain; and Parasomnia on their problem list. Past medication trials include: duloxetine, desvenlafaxine  generic brand, lorazepam, hydroxyzine, lamotrigine, aripiprazole, gabapentin, pregabalin, amitriptyline, quetiapine, bupropion, escitalopram, quetiapine and clonazepam    Pristiq medication and bupropion combination seem to be well controlling depressive and anxiety symptoms per subjective report. At baseline. No rash reported since cessation of generic and initiation of Pristiq brand medication. Possible irritability secondary to medications ongoing which we discussed. Resolution in fatigue since last visit. No other medication changes indicated today. Reviewed recent lab work. Past orders of several lab orders for medication monitoring pending with ask to be completed when able. Continue with outpatient psychotherapy visits due to positive therapeutic effect.    Patient denies suicidal and homicidal ideation. Not at imminent risk this visit. Educated on need to seek emergent services should they become a risk to themselves or others. Sherry Sweeney verbalized understanding and agreement with this safety plan.    --  Plan  1. Continue to monitor for safety  2. Current Medications   1. Continue Bupropion (Wellbutrin XL) 300 mg daily for depression  2. Continue Pristiq (BRAND ONLY) 75mg daily for depression and anxiety  3. Continue Hydroxyzine 25-50 mg at bedtime as needed for sleep and anxiety  4. Continue melatonin  Pyridoxine HCL 3mg bedtime as needed for sleep  5. Continue Gabapentin 300 mg 3 times a day for anxiety and pain  6. Continue  two times a day for intrusive thinking control   7. REFILLS: see above  3. Labs ordered this visit: see above  4. Continue outpatient therapy for mood stabilization and non-pharmacologic support. Collaborate with interdisciplinary care team as needed.  5. Patient will continue abstinence from drugs and alcohol  6. Patient to return to clinic in 3 months for evaluation of medication trials and continued assessment. Ongoing patient psychoeducation regarding chronic illness and treatment.  1. Patient educated that they may schedule sooner appointment or contact writer for any worsening or lack of improvement in symptoms.  7. I reviewed the potential risks, side effects, and benefits of all medications with the patient. Patient verbalized understanding and was encouraged to call clinic with further questions or concerns.    START TIME: 1:15 PM  END TIME: 1:45 PM    Appointment duration: 30 minutes with > 75% spent on coordination of care and psycho-education regarding illness, symptoms, neurobiological basis of disease, lab work, alternative interventions, sleep hygiene, safety planning, care planning, and pharmacology.    Lisa Shelton, DNP, APRN, PMHNP-BC  Nurse Practitioner - Psychiatry    This medical report was made using Dragon Dictation. Spelling and grammatical errors with Dragon exist and are not intentional.

## 2021-06-16 NOTE — TELEPHONE ENCOUNTER
"Pharmacy sent request, \"patient requesting Rx for Melatonin and Vitamin D3\"    Date of Last Office Visit: 1/11/21  Date of Next Office Visit: 4/5/21  No shows since last visit: none  Cancellations since last visit: none     Medication requested: melatonin SR 3 mg tab Date last ordered: 9/24/20 Qty: 90 Refills: 2  Medication requested: cholecalciferol, vitamin D3, 400 unit cap, take two cap daily Date last ordered: 1/22/2020 Qty: 0 (not listed, Provider Historical) Refills: 0     Review of [place] ?: NA    Lapse in medication adherence greater than 5 days?: unknown  If yes, call patient and gather details: NA  Medication refill request verified as identical to current order?: no  Result of Last DAM, VPA, Li+ Level, CBC, or Carbamazepine Level (at or since last visit): N/A    []Medication refilled per  Medication Refill in Ambulatory Care  policy.  [x]Medication unable to be refilled by RN due to criteria not met as indicated below:    []Eligibility - not seen in the last year   []Supervision - no future appointment   []Compliance - no shows, cancellations or lapse in therapy   []Verification - order discrepancy   []Controlled medication   [x]Medication not included in policy   []90-day supply request   []Other    "

## 2021-06-16 NOTE — PROGRESS NOTES
Assessment:     1. Fibromyalgia  Ambulatory referral to PT/OT   2. Mitochondrial disease  Ambulatory referral to PT/OT   3. Migraine  amitriptyline (ELAVIL) 10 MG tablet   4. TMJ disease  Ambulatory referral to TMJ Pain Clinic   5. Thrush         Plan:     Sherry is a 37-year-old female presenting today regarding a few issues.  I did prescribe nystatin swish and swallow for presumed thrush based on her symptoms.  I referred the patient to a TMJ pain clinic.  Referral was also placed for physical therapy.  I refilled the patient's amitriptyline to try to help with her migraine headaches.  I did discuss the patient the consultation she had with her OB/GYN regarding options.  Really the options come down to a trial of an IUD versus a hysterectomy.  She expressed that she really feels that she suffers so significantly through her period that she is leaning towards a hysterectomy.  I expressed that this is really a decision to be made between her and her gynecologist.  Follow-up as needed.    Subjective:       37 y.o. female presents for evaluation of a couple of concerns.  First, the patient has a history of thrush in the past and feels that she is getting that back again.  She has used the swish and swallow successfully in the past.  She feels a coating on her tongue and a little bit of burning.  The patient would also like me to check her ears as they have been feeling itchy lately.  Patient also wanted to discuss her visit with her gynecologist recently.  The patient saw her gynecologist as it relates to concerns about her period.  The patient had been on Depo-Provera for some time but had a significant amount of weight gain related to that.  She was on this to try to help suppress her periods due to feeling exhausted and having a lot of difficulty managing her fibromyalgia and other symptoms through her period.  The gynecologist provided her with a couple of options including a hysterectomy without oophorectomy.  She  wanted my opinion on that today.  The patient would also like a referral to a TMJ pain specialist.      The following portions of the patient's history were reviewed and updated as appropriate: allergies, current medications, past family history, past medical history, past social history, past surgical history and problem list.    Review of Systems  Pertinent items are noted in HPI.     History   Smoking Status     Never Smoker   Smokeless Tobacco     Never Used       Objective:      /78 (Patient Site: Left Arm, Patient Position: Sitting, Cuff Size: Adult Large)  Pulse 70  Wt 152 lb (68.9 kg)  LMP 01/30/2018 (Approximate)  Breastfeeding? No  BMI 27.36 kg/m2  General appearance: alert, appears stated age and cooperative       This note has been dictated using voice recognition software. Any grammatical or context distortions are unintentional and inherent to the software

## 2021-06-16 NOTE — PROGRESS NOTES
Care Guide Katelyn sent Sherry outreach email to ask about how her recovery from 02/22 surgery is going. Attempt 1: Care Guide emailed patient.  If this patient is returning my call, please transfer to Katelyn at ext [phone].    Next outreach: 04/26/18    At next outreach CG will:  Assess pt priority need

## 2021-06-16 NOTE — TELEPHONE ENCOUNTER
Will send refill of melatonin but ask that patient have primary care refill Vitamin D as this is not something I have prescribed for her in past.

## 2021-06-16 NOTE — PROGRESS NOTES
09/13/19 [facility]   Therapeutic Recreation   Type of Intervention structured groups   Activity game   Response Participates, initiates socially appropriate   Hours 1        Pt actively participated in a structured Therapeutic Recreation group with a focus on leisure participation, stress reduction, and social engagement via a group game. Pt remained focused and engaged throughout full duration of group. Showed progress in session goals. Pt mood was calm and was appropriate with interactions.  Appropriately shared sense of humor with peers during the group and appeared to brighten with social interaction. Pt was encouraging and giving compliments to another peer during the activity.    Conjuntivae and eyelids appear normal , Sclerae : White without injection

## 2021-06-16 NOTE — ANESTHESIA CARE TRANSFER NOTE
Last vitals:   Vitals:    02/22/18 0916   BP: 125/73   Pulse: (!) 115   Resp: 20   Temp: 36.3  C (97.4  F)   SpO2: 98%   patient complaining of 7/10 pain, cramping in nature, upon arrival to PACU.  Will give 50mcg Fentanyl now.    Patient's level of consciousness is drowsy  Spontaneous respirations: yes  Maintains airway independently: yes  Dentition unchanged: yes  Oropharynx: oropharynx clear of all foreign objects    QCDR Measures:  ASA# 20 - Surgical Safety Checklist: WHO surgical safety checklist completed prior to induction  PQRS# 430 - Adult PONV Prevention: 4558F - Pt received => 2 anti-emetic agents (different classes) preop & intraop  ASA# 8 - Peds PONV Prevention: NA - Not pediatric patient, not GA or 2 or more risk factors NOT present  PQRS# 424 - Amanda-op Temp Management: 4559F - At least one body temp DOCUMENTED => 35.5C or 95.9F within required timeframe  PQRS# 426 - PACU Transfer Protocol: - Transfer of care checklist used  ASA# 14 - Acute Post-op Pain: ASA14B - Patient did NOT experience pain >= 7 out of 10

## 2021-06-16 NOTE — PROGRESS NOTES
Preoperative Exam    Scheduled Procedure: total abdominal hysterectomy bilateral salpingectomy   Surgery Date:  02/22/2018  Surgery Location: [facility], fax [phone]    Surgeon:  Dr. Montes     Assessment/Plan:     Problem List Items Addressed This Visit     Fibromyalgia    Relevant Orders    Pregnancy (Beta-hCG, Qual), Urine    Hemoglobin    Basic Metabolic Panel    Menstrual disorder     No contraindication to surgery.  Able to perform >4 metabolic equivalents.  Removal of uterus is hoped to help with post-menstrual cycle fatigue related to menses and mitochondrial disorder.  Depo not effective.     - lab work today: surgery is tomorrow.  These should be reviewed at the hospital.     - no cardiovascular history.  ECG not indicated   -  will be available to help care for patient post-operatively   - history of intraoperative and post-operative nausea/emesis.  Recommend intraoperative anti-emetics.   - history of being slow to wake up with anesthesia    - complicated health history.  Recommend hospitalist consultation while inpatient.     - known IBS: recommend aggressive bowel regimen post-operatively.           Relevant Orders    Pregnancy (Beta-hCG, Qual), Urine    Hemoglobin    Basic Metabolic Panel      Other Visit Diagnoses     Pre-op exam    -  Primary    Relevant Orders    Pregnancy (Beta-hCG, Qual), Urine    Hemoglobin    Basic Metabolic Panel        Surgical Procedure Risk: Intermediate (reported cardiac risk generally 1-5%)  Have you had prior anesthesia?: Yes  Have you or any family members had a previous anesthesia reaction:  Yes: vomiting   Do you or any family members have a history of a clotting or bleeding disorder?: No  Cardiac Risk Assessment: no increased risk for major cardiac complications    Patient approved for surgery with general or local anesthesia.    Please Note:  Patient is taking medications for Chronic Pain. - Fibromyalgia medications.  No opioids.  No  benzos.     Functional Status: Independent  Patient plans to recover at home with family.     Subjective:      Sherry Sweeney is a 37 y.o. female who presents for a preoperative consultation.      All other systems reviewed and are negative, other than those listed in the HPI.    Pertinent History  Do you have difficulty breathing or chest pain after walking up a flight of stairs: Yes: part of mitochondrial myopathy.  Uses albuterol which helps.  History of obstructive sleep apnea: No  Steroid use in the last 6 months: No  Frequent Aspirin/NSAID use: No  Prior Blood Transfusion: No  Prior Blood Transfusion Reaction: No  If for some reason prior to, during or after the procedure, if it is medically indicated, would you be willing to have a blood transfusion?:  There is no transfusion refusal.    Current Outpatient Prescriptions   Medication Sig Dispense Refill     albuterol (PROAIR HFA;PROVENTIL HFA;VENTOLIN HFA) 90 mcg/actuation inhaler Inhale 1-2 puffs every 4 (four) hours as needed for wheezing or shortness of breath (or coughing). 1 Inhaler 2     amitriptyline (ELAVIL) 10 MG tablet Take 1 tablet (10 mg total) by mouth at bedtime. 30 tablet 5     ascorbic acid, vitamin C, (ASCORBIC ACID WITH MELI HIPS) 500 MG tablet Take 500 mg by mouth daily.       clindamycin-benzoyl peroxide (BENZACLIN) gel Apply to acne once daily 50 g 1     cyclobenzaprine (FLEXERIL) 10 MG tablet Take 0.5 tablets (5 mg total) by mouth every 8 (eight) hours as needed for muscle spasms. 30 tablet 2     DULoxetine (CYMBALTA) 60 MG capsule Take 1 capsule (60 mg total) by mouth daily. 90 capsule 3     fluconazole (DIFLUCAN) 150 MG tablet Take 1 tablet (150 mg total) by mouth daily. 5 tablet 0     Lactobacillus rhamnosus GG (CULTURELLE) 10-15 Billion cell capsule Take 1 capsule by mouth daily.       lidocaine (LIDODERM) 5 % On for 12 hours then off for 12 hours 90 patch 1     lidocaine-prilocaine (EMLA) cream        melatonin 5 mg Tab tablet Take 10  mg by mouth at bedtime as needed.        multivitamin with minerals (THERA-M) 9 mg iron-400 mcg Tab tablet Take 1 tablet by mouth daily.       naproxen (NAPROSYN) 500 MG tablet Take 1 tablet (500 mg total) by mouth 2 (two) times a day with meals. (Patient taking differently: Take 500 mg by mouth 2 (two) times a day as needed. ) 20 tablet 0     NON FORMULARY Midnight: 1.5 mg melatonin, bromelain       omeprazole (PRILOSEC) 40 MG capsule Take 1 capsule (40 mg total) by mouth daily. 90 capsule 1     ondansetron (ZOFRAN-ODT) 4 MG disintegrating tablet Take 1 tablet (4 mg total) by mouth every 8 (eight) hours as needed for nausea. 30 tablet 1     riboflavin, vitamin B2, (VITAMIN B-2) 25 mg Tab Take 400 mg by mouth 2 (two) times a day.       rizatriptan (MAXALT-MLT) 10 MG disintegrating tablet Take 1 tablet (10 mg total) by mouth as needed for migraine. 10 tablet 3     tretinoin (RETIN-A) 0.01 % gel Apply topically at bedtime. 45 g 1     valACYclovir (VALTREX) 1000 MG tablet Take 2 tablets PO 12 hours apart PRN episode 20 tablet 2     vitamin E 1000 UNIT capsule Take 1,000 Units by mouth daily.       No current facility-administered medications for this visit.         Allergies   Allergen Reactions     Yeast, Dried Anaphylaxis     Egg White Other (See Comments)     Swollen colon, belly pain     Gluten      Savella [Milnacipran] Other (See Comments)     Chest pain, hot/cold flashes     Ciprofloxacin Itching and Rash     Sulfa (Sulfonamide Antibiotics) Rash       Patient Active Problem List   Diagnosis     Major depression, recurrent, chronic     Post-traumatic Stress Disorder     Peptic Ulcer     Generalized Anxiety Disorder     Fibromyalgia     Insomnia     Nausea     IBS (irritable bowel syndrome)     Migraine without aura     Chronic fatigue syndrome     Acne vulgaris     Mitochondrial disease     Menstrual disorder       Past Medical History:   Diagnosis Date     Chronic fatigue syndrome      Chronic migraine       "Depression      Fibromyalgia      History of anesthesia complications     slow to wake     Mitochondrial myopathy      Parasomnia      PONV (postoperative nausea and vomiting)        Past Surgical History:   Procedure Laterality Date     SC REMOVAL OF OVARIAN CYST(S)      Description: Ovarian Cystectomy;  Recorded: 11/07/2013;       Social History     Social History     Marital status:      Spouse name: N/A     Number of children: N/A     Years of education: N/A     Occupational History     Not on file.     Social History Main Topics     Smoking status: Never Smoker     Smokeless tobacco: Never Used     Alcohol use No     Drug use: No     Sexual activity: Not on file     Other Topics Concern     Not on file     Social History Narrative       Patient Care Team:  Layla Martinez MD as PCP - General  Katelyn Art as Clinic Care Coordination Care Guide          Objective:     Vitals:    02/21/18 1447   BP: 126/80   Pulse: 88   Resp: 18   Temp: 98.1  F (36.7  C)   TempSrc: Oral   SpO2: 98%   Weight: 151 lb (68.5 kg)   Height: 5' 3\" (1.6 m)   LMP: 01/30/2018         Physical Exam:  Physical Exam   Constitutional: She is oriented to person, place, and time. She appears well-developed and well-nourished. No distress.   HENT:   Head: Normocephalic and atraumatic.   Right Ear: External ear normal.   Left Ear: External ear normal.   Mouth/Throat: Oropharynx is clear and moist. No oropharyngeal exudate.   Eyes: Conjunctivae and EOM are normal. Pupils are equal, round, and reactive to light. Left eye exhibits no discharge. No scleral icterus.   Neck: Normal range of motion. Neck supple. No thyromegaly present.   Cardiovascular: Normal rate, regular rhythm and normal heart sounds.  Exam reveals no gallop and no friction rub.    No murmur heard.  Pulmonary/Chest: Effort normal and breath sounds normal. No respiratory distress. She has no wheezes.   Abdominal: Soft. She exhibits no distension and no mass. There is no " tenderness. No hernia.   Musculoskeletal: Normal range of motion. She exhibits no edema.   Lymphadenopathy:     She has no cervical adenopathy.   Neurological: She is alert and oriented to person, place, and time. She has normal reflexes. She displays normal reflexes. No cranial nerve deficit.   Skin: Skin is warm. No rash noted.   Psychiatric: She has a normal mood and affect. Her behavior is normal. Judgment and thought content normal.     There are no Patient Instructions on file for this visit.    Labs:  Labs pending at this time.  Results will be reviewed when available.    Immunization History   Administered Date(s) Administered     Hep A, historic 10/11/2011, 04/13/2012     Hep B, historic 10/11/2011, 11/11/2011, 04/13/2012     Influenza,live, Nasal Laiv4 10/24/2014     Tdap 06/11/2010     Electronically signed by Yossi Hernandez MD 02/21/18 2:52 PM

## 2021-06-16 NOTE — ANESTHESIA POSTPROCEDURE EVALUATION
Patient: Sherry Sweeney  TOTAL ABDOMINAL HYSTERECTOMY BILATERAL SALPINGECTOMY, RIGHT OOPHORECTOMY  Anesthesia type: general    Patient location: PACU  Last vitals:   Vitals:    02/22/18 1410   BP: 99/60   Pulse: 100   Resp: 18   Temp: 36.9  C (98.4  F)   SpO2: 98%     Post vital signs: stable  Level of consciousness: awake and responds to simple questions  Post-anesthesia pain: pain controlled  Post-anesthesia nausea and vomiting: no  Pulmonary: unassisted, return to baseline  Cardiovascular: stable and blood pressure at baseline  Hydration: adequate  Anesthetic events: no    QCDR Measures:  ASA# 11 - Amanda-op Cardiac Arrest: ASA11B - Patient did NOT experience unanticipated cardiac arrest  ASA# 12 - Amanda-op Mortality Rate: ASA12B - Patient did NOT die  ASA# 13 - PACU Re-Intubation Rate: ASA13B - Patient did NOT require a new airway mgmt  ASA# 10 - Composite Anes Safety: ASA10A - No serious adverse event    Additional Notes:

## 2021-06-16 NOTE — ANESTHESIA PREPROCEDURE EVALUATION
"Anesthesia Evaluation      Patient summary reviewed   History of anesthetic complications (PONV.  Hx slow emergence.)     Airway   Mallampati: II  Neck ROM: full   Pulmonary - negative ROS and normal exam                          Cardiovascular - negative ROS and normal exam   Neuro/Psych    (+) neuromuscular disease (Mitochondrial myopathy. Fibromyalgia.),  depression (PTSD), anxiety/panic attacks,     Comments: Chronic fatigue syndrome.  Chronic migraine HA's.  Parasomnia    Endo/Other    (-) no diabetes     Comments: MITOCHONDRIAL MYOPATHY    TMJ Syndrome    GI/Hepatic/Renal    (+) GERD,     (-) impaired hepatic function, renal disease    Comments: IBS     Other findings:    Patient Active Problem List  Diagnosis    Major depression, recurrent, chronic    Post-traumatic Stress Disorder    Peptic Ulcer    Generalized Anxiety Disorder    Fibromyalgia    Insomnia    Nausea    IBS (irritable bowel syndrome)    Migraine without aura    Chronic fatigue syndrome    Acne vulgaris    Mitochondrial disease    Menstrual disorder        Past Medical History:  Diagnosis Date    Chronic fatigue syndrome      Chronic migraine      Depression      Fibromyalgia      History of anesthesia complications      slow to wake    Mitochondrial myopathy      Parasomnia      PONV (postoperative nausea and vomiting)          Past Surgical History:  Procedure Laterality Date    ID REMOVAL OF OVARIAN CYST(S)        Description: Ovarian Cystectomy; Recorded: 11/07/2013;          Dental - normal exam                        Anesthesia Plan  Planned anesthetic: general endotracheal  No Sux - may cause lethal, acute hyperkalemia.  0.9% NaCl IVF.  No lactated ringers.  Modified RSI with Zemuron  BIS monitor  No tylenol  Limit propofol use. No propofol infusion.  Precedex infusion 0.5 mcg/kg/hr  Fentanyl only. No dilaudid.  Titrate NDMR  Sugammadex reversal  Monitor glucose  Toradol 30 mg IV if \"ok\" with Dr. Bedolla  Decadron 10 mg " IV  Scopolamine patch  ASA 3   Induction: intravenous   Anesthetic plan and risks discussed with: patient and spouse  Anesthesia plan special considerations: rapid sequence induction, antiemetics,   Post-op plan: routine recovery

## 2021-06-16 NOTE — PROGRESS NOTES
Pt responded to CG's past email, updating that she is undergoing a hysterectomy on 02/22.  She shard that she appreciates the opportunity to update CG over email, as it's her preferred method of contact at this time.    CG returned pt email and asked if pt feels she has the support and services she'll need to recover from surgery. CG invited questions and concerns. CG asked pt what her priority is for her health an wellbeing at this time.    At next outreach CG will:  Assess pt priority need  Establish interval

## 2021-06-17 NOTE — PROGRESS NOTES
Assessment:     1. Mitochondrial myopathy  Ambulatory referral to Neurology   2. Wheezing  albuterol (PROAIR HFA;PROVENTIL HFA;VENTOLIN HFA) 90 mcg/actuation inhaler   3. Acne vulgaris     4. Chronic fatigue     5. Fibromyalgia         Plan:     Sherry is a 38-year-old female presenting today for a general follow-up visit.  I refilled the patient's albuterol and increased her Retin-A to 0.025% strength.  I referred the patient to the department of neurology at the [facility] and somebody who specializes in mitochondrial issues.  I am pleased that she has done so well following her hysterectomy and seems to have some improvement in her overall symptoms.  I reviewed her medication list today and would have her continue on all of those.  I prescribed Rozerem to try at bedtime to help with initiation of sleep.  We did talk today briefly about the possibility of low-dose Ambien perhaps 2.5 mg at bedtime to see if that would help if the Rozerem does not work.    Subjective:       38 y.o. female presents for a follow-up visit as it relates to her history of mitochondrial myopathy with associated chronic fatigue, diffuse pains as well as other symptoms.  The patient is currently in the process of finding a new provider who specializes in mitochondrial myopathy as her provider unfortunately passed away this past year.  The patient is now a couple months out from her hysterectomy which was done because of the significant increase in her symptoms of fatigue around the time of her menstrual cycle.  The patient has now recovered enough from the hysterectomy and is starting to feel that this procedure definitely helped her.  She reports that for the past few weeks she has felt better than she has in a while.  She continues to struggle and be limited with significant disability.  She went on a weekend trip for the first time in a couple of years but reports that it took a lot out of her and they did need to bring her  wheelchair in order to get around.  The patient is having difficulty with sleeping at night and in particular falling asleep.  She wonders what options there are to help with that.  She is found that most of the over-the-counter medications make her feel even more drowsy in the morning.  She has tried melatonin without much effectiveness.  Lastly, the patient does feel that Retin-A helps with her acne but she is requesting a slightly higher dosage.    The following portions of the patient's history were reviewed and updated as appropriate: allergies, current medications, past family history, past medical history, past social history, past surgical history and problem list.    Review of Systems  Pertinent items are noted in HPI.     History   Smoking Status     Never Smoker   Smokeless Tobacco     Never Used         Objective:      /62 (Patient Site: Left Arm, Patient Position: Sitting, Cuff Size: Adult Regular)  Pulse 66  Wt 149 lb (67.6 kg)  Breastfeeding? No  BMI 26.39 kg/m2  General appearance: alert, appears stated age and cooperative       This note has been dictated using voice recognition software. Any grammatical or context distortions are unintentional and inherent to the software

## 2021-06-17 NOTE — TELEPHONE ENCOUNTER
Telephone Encounter by Melissa Serrano RN at 8/17/2020  4:23 PM     Author: Melissa Serrano RN Service: -- Author Type: Registered Nurse    Filed: 8/17/2020  4:27 PM Encounter Date: 8/17/2020 Status: Signed    : Melissa Serrano RN (Registered Nurse)       Alternative for desvenlafaxine request from insurance. Insurance will not cover 2 tabs of 50mg but will cover one 100mg tablet daily.

## 2021-06-18 NOTE — PROGRESS NOTES
Care Guide Katelyn sent Sherry outreach email to ask about how she is feeling and if she has any concerns or new priorities she would like support from [facility] on. Pt did not respond to 's 03/26 email.  Attempt 2: Care Guide emailed patient.  If this patient is returning my call, please transfer to Katelyn at ext [phone] MWF, and ext [phone] T/TH.    Next outreach: 05/22/18

## 2021-06-18 NOTE — PROGRESS NOTES
Care Guide Katelyn sent Sherry outreach email to ask about how she is feeling and if she has any concerns or new priorities she would like support from [facility] on. Pt did not respond to 's 05/15 email.  Attempt 3: Care Guide emailed patient.  If this patient is returning my call, please transfer to Katelyn at ext [phone] MWF, and ext [phone] T/TH.     Next outreach: 07/14/18

## 2021-06-19 NOTE — PROGRESS NOTES
I have called Sherry several times over the past 3 months.  This patient has not returned any of my messages. I have informed the primary care provider by tasking regarding the lack of successful follow up. At this time the patient will be unenrolled from the Clinic Care Coordination program and no further outreach will be done. Should the patient's needs or engagement change in the future, I have encouraged the primary care provider to replace the order for Ambulatory Referral to Care Management.

## 2021-06-19 NOTE — PROGRESS NOTES
Care Guide emailed patient.  If this patient is returning my call, please transfer to Katelyn [facility] ext [phone], T/TH ext [phone]  I have emailed Sherry and have been unsuccessful in reaching this patient for 2 months now.  This patient has also not returned any of my messages.  I will continue attempting to reach out to this patient in one month.  I will also check this patient's chart for upcoming appointments, ER reports that may contain a new phone number, or any other recent activity.     Next outreach: 08/16/18    Plan:  -Unenroll from Clinic Care Coordination if no response

## 2021-06-20 NOTE — PROGRESS NOTES
Subjective:   Sherry Sweeney is a(n) 38 y.o. White or  female who presents to [facility] with the following complaint(s):  POSS ULCER (Increasingly worse x 3 weeks. No appetite. Pain in the abdomen after eating x week. )    History of Present Illness:  Symptoms developed approximately 3 weeks ago. First noted early satiety and bloating. Has not noted left upper abdominal discomfort, especially after eating. Feels tender in this area. Pain is described as raw, rated 6/10 at worst, after meals. Notes a bitter / metallic taste in her mouth after eating. Developed daytime reflux symptoms approximately 2 weeks ago. Reflux occurs randomly and after meals at times. Occasionally nauseated. No vomiting. No diarrhea. No constipation. No melena or hematochezia. Gallbladder is in place. Abdominal surgical history includes ovarian cystectomy and appendectomy in 1996 and total abdominal hysterectomy in February 2018. Had an unremarkable EGD in February 2015.     The following portions of the patient's history were reviewed and updated as appropriate: allergies, current medications, past family history, past medical history, past social history, past surgical history and problem list.    Review of Systems:   Review of Systems   Constitutional: Positive for appetite change, chills and fatigue. Negative for diaphoresis and fever.   HENT: Negative for congestion, ear pain, rhinorrhea, sinus pain, sinus pressure and sore throat.    Respiratory: Negative for cough and shortness of breath.    Cardiovascular: Negative for chest pain and palpitations.   Gastrointestinal: Positive for abdominal distention, abdominal pain and nausea. Negative for anal bleeding, blood in stool, constipation, diarrhea and vomiting.   Genitourinary: Positive for vaginal discharge (being treated for BV). Negative for decreased urine volume, dysuria, flank pain, hematuria, pelvic pain, vaginal bleeding and vaginal pain.   Skin: Negative for pallor and  rash.     Objective:     Vitals:    08/25/18 1315   BP: 90/60   Pulse: 95   Resp: 20   Temp: 97.9  F (36.6  C)   SpO2: 99%     Physical Exam   Constitutional: She is oriented to person, place, and time. She appears well-developed and well-nourished. No distress.   HENT:   Mouth/Throat: Oropharynx is clear and moist and mucous membranes are normal.   Neck: Neck supple. No thyromegaly present.   Cardiovascular: Normal rate, regular rhythm and normal heart sounds.  Exam reveals no gallop and no friction rub.    No murmur heard.  Pulmonary/Chest: Effort normal and breath sounds normal. No respiratory distress. She has no wheezes. She has no rales.   Abdominal: Soft. Normal appearance and bowel sounds are normal. She exhibits no distension and no mass. There is no hepatosplenomegaly. There is tenderness in the epigastric area and left upper quadrant. There is no guarding and no CVA tenderness.   Lymphadenopathy:     She has no cervical adenopathy.   Neurological: She is alert and oriented to person, place, and time.   Skin: Skin is warm and dry.       Assessment/Plan   1. Gastroesophageal reflux disease, esophagitis presence not specified  - Doubling omeprazole dose from 40 mg daily to 40 mg two times a day due to uncontrolled symptoms. Discussed a trial of sucralfate but opted not to proceed due to risk of inhibiting absorption of her other medications. Recommended follow up with PCP in 2 weeks to discuss symptoms with referral to Gastroenterology for EGD at that point if her symptoms have not improved. Patient requested that Gastroenterology referral be initiated now rather that waiting for follow up with her PCP.   - omeprazole (PRILOSEC) 40 MG capsule; Take 1 capsule (40 mg total) by mouth 2 (two) times a day before meals.  Dispense: 60 capsule; Refill: 0  - Ambulatory referral to Gastroenterology   - Discussed signs and symptoms that warrant emergent medical attention.   - Follow up as needed.     Kit Scales,  MD

## 2021-06-20 NOTE — PROGRESS NOTES
Assessment:     1. Mitochondrial myopathy     2. Major depression, recurrent, chronic (H)     3. Abdominal pain, epigastric     4. Fibromyalgia         Plan:     Sherry is a very pleasant 38-year-old female presenting today for a general follow-up visit.  I do think it is reasonable if she is more comfortable for her to schedule a consultation with the new neuromuscular specialist at [facility] since this is where all of her records were at since she is somewhat hesitant to have many of the tests completely repeated that have been done previously by Dr. Garcia.  She will make that appointment soon as she is able to start seeing patients in November.  Continue on her current medications for her treatment of depression.  She is currently on omeprazole but with some persistent epigastric symptoms and is seeing a gastroenterologist for this with a plan for possibly repeating an upper GI endoscopy.  They also talked about a referral to a functional bowel clinic.    Subjective:       38 y.o. female presents for a routine follow up visit.  The patient reports that overall she has been doing reasonably well over the summer.  The patient has been having chronically recurrent bacterial vaginosis infections.  She is seeing a gynecologist regarding this and has been requiring recurring dosages of metronidazole to treat this.  The patient does state that when she gets an infection she gets irritation and discharge and often increased fatigue as well.  She has also been having issues with left upper quadrant abdominal pain with concern about possible ulcer disease. She has been taking Omeprazole twice daily with some benefit and will plan to proceed to an UGI endoscopy if not getting better.  The patient did have a consultation at the [facility] with a physician regarding her mitochondrial myopathy.  He proposed repeating many of the tests that have been done previously by Dr. Garcia.  She would like my opinion regarding  "whether pursue that as another physician has been higher to replace Dr. Garcia at [facility].  The patient's mood has been doing reasonably well on her current medications.  She does still get very fatigued when she has an outing or does something out of the ordinary and will need a day or 2 to recover.  However, overall she has been able to manage this better and her quality of life does feel improved.      Reviewed: The following portions of the patient's history were reviewed and updated as appropriate: allergies, current medications, past family history, past medical history, past social history, past surgical history and problem list.    Review of Systems  Pertinent items are noted in HPI.       Objective:     BP 90/58 (Patient Site: Left Arm, Patient Position: Sitting, Cuff Size: Adult Regular)  Pulse 68  Ht 5' 3\" (1.6 m)  Wt 138 lb (62.6 kg)  LMP 02/06/2018 (Approximate)  BMI 24.45 kg/m2  General appearance: alert, appears stated age and cooperative      This note has been dictated using voice recognition software. Any grammatical or context distortions are unintentional and inherent to the software  "

## 2021-06-22 NOTE — PROGRESS NOTES
"ASSESSMENT:   1. Acute frontal sinusitis, recurrence not specified  Urinalysis-UC if Indicated    amoxicillin-clavulanate (AUGMENTIN) 875-125 mg per tablet   2. UTI symptoms  amoxicillin-clavulanate (AUGMENTIN) 875-125 mg per tablet       PLAN:  History and exam consistent with sinusitis.  UA negative for infection.  Will treat with Augmenting, symptomatic cares advised.  Follow up prn.    I discussed red flag symptoms, return precautions to clinic/ER and follow up care with patient/parent.  Expected clinical course, symptomatic cares advised. Questions answered. Patient/parent amenable with plan.    Patient Instructions:  Patient Instructions   I believe a sinus infection is the cause of your symptoms. I think this is a likely bacterial infection.  I have sent a prescription for Augmentin to your pharmacy.  Take this twice daily with food. Take a probiotic such as Culturelle or Florastor while on the antibiotic or eat a Greek yogurt containing \"live active cultures\" daily.       For relief of your symptoms:     Use Momo's vapor rub on your nostrils to promote air flow through your nose    Take hot steam showers/baths at least 2x per day until sinuses are cleared    Apply warm packs to the face.      Drink plenty of fluids to assist with clearing of secretions    Take Sudafed twice daily     Use afrin spray as prescribed for nasal congestion for the next 3 days.     Take Tylenol or Ibuprofen for pain. Do not take more than: Tylenol 1000 mg 4 times a day = 4000 mg per day. Ibuprofen 600 mg 5 times a day = 3000 mg WITH FOOD  Nasal saline rinses/sprays 1-2 times daily. Obtain nasal saline spray (Ayr or Ocean are brand names).  Get into a hot shower, and wait for the sensation of your sinuses \"opening\". Occlude one side of your nose, and use a gentle spray of saline into the opposite side of your nose.  Then blow your nose to try to mobilize the nasal secretions.    Please take your medicines as recommended above and " review the discharge instructions for concerning signs/symptoms that would require your prompt return to the clinic for further evaluation. Please follow up in clinic as we have recommended below.  If your symptoms worsen prior to your follow up appointment, do not hesitate to return here to the clinic or emergency department for further evaluation.          SUBJECTIVE:   Sherry Sweeney is a 38 y.o. female who presents today with 2 weeks of nasal congestion, runny nose, pressure around the left eye.  No fevers.  Is having hot/cold flashes, body aches.  Yesterday developed dysuria, difficulty starting urine stream today.  No abdominal or back pain.  No vomiting, but does have nausea.  No diarrhea.    ROS:  Comprehensive 12 pt ROS completed, positives noted in HPI, otherwise negative.      Past Medical History:  Patient Active Problem List   Diagnosis     Major depression, recurrent, chronic (H)     Post-traumatic Stress Disorder     Peptic Ulcer     Generalized Anxiety Disorder     Fibromyalgia     Insomnia     Nausea     IBS (irritable bowel syndrome)     Migraine without aura     Chronic fatigue     Acne vulgaris     Mitochondrial myopathy     H/O: hysterectomy     HCAP (healthcare-associated pneumonia)       Surgical History:  Past Surgical History:   Procedure Laterality Date     HYSTERECTOMY Bilateral 2018    Procedure: TOTAL ABDOMINAL HYSTERECTOMY BILATERAL SALPINGECTOMY, RIGHT OOPHORECTOMY;  Surgeon: Joanne Bedolla DO;  [place] [facility] [place]:      PICC  2018          [facility] REMOVAL OF OVARIAN CYST(S)      Description: Ovarian Cystectomy;  Recorded: 2013;           Family History:  Family History   Problem Relation Age of Onset     Heart attack Father 45             Hyperlipidemia Mother      Hypertension Mother      Breast cancer Mother      Cancer Mother         thyroid      Meniere's disease Mother      Colon cancer Maternal Grandfather      Breast cancer Maternal  Grandmother      Luis Hypertension Neg Hx        Reviewed; Non-contributory    Social History     Tobacco Use   Smoking Status Never Smoker   Smokeless Tobacco Never Used         Current Medications:  Current Outpatient Medications on File Prior to Visit   Medication Sig Dispense Refill     albuterol (PROAIR HFA;PROVENTIL HFA;VENTOLIN HFA) 90 mcg/actuation inhaler Inhale 1-2 puffs every 4 (four) hours as needed for wheezing or shortness of breath (or coughing). 1 Inhaler 2     ascorbic acid, vitamin C, (ASCORBIC ACID WITH MELI HIPS) 500 MG tablet Take 500 mg by mouth daily.       BORIC ACID, BULK, MISC Use As Directed.       clindamycin-benzoyl peroxide (BENZACLIN) gel APPLY TO ACNE ONCE DAILY 50 g 1     cyclobenzaprine (FLEXERIL) 10 MG tablet Take 0.5-1 tablets (5-10 mg total) by mouth every 8 (eight) hours as needed for muscle spasms. 30 tablet 2     DULoxetine (CYMBALTA) 60 MG capsule Take 1 capsule (60 mg total) by mouth daily. 90 capsule 3     DULoxetine (CYMBALTA) 60 MG capsule TAKE 1 CAPSULE (60 MG TOTAL) BY MOUTH 2 (TWO) TIMES A DAY. 180 capsule 2     ibuprofen (ADVIL,MOTRIN) 400 MG tablet Take 1.5 tablets (600 mg total) by mouth every 6 (six) hours as needed. 30 tablet 0     lidocaine (LIDODERM) 5 % On for 12 hours then off for 12 hours 90 patch 1     multivitamin therapeutic tablet Take 1 tablet by mouth daily.       naproxen (NAPROSYN) 500 MG tablet Take 500 mg by mouth 2 (two) times a day with meals.       naproxen (NAPROSYN) 500 MG tablet TAKE 1 TABLET BY MOUTH TWICE A DAY WITH FOOD 20 tablet 0     nystatin (MYCOSTATIN) powder Apply topically 4 (four) times a day.       omeprazole (PRILOSEC) 40 MG capsule Take 1 capsule (40 mg total) by mouth daily. 90 capsule 1     ONABOTULINUMTOXINA (BOTOX INJ) Inject as directed every 3 (three) months.       onabotulinumtoxinA (BOTOX INJ) Botox       ondansetron (ZOFRAN-ODT) 4 MG disintegrating tablet TAKE 1 TABLET (4 MG TOTAL) BY MOUTH EVERY 8 (EIGHT) HOURS AS  NEEDED FOR NAUSEA. 30 tablet 1     riboflavin, vitamin B2, 400 mg Tab Take 400 mg by mouth 2 (two) times a day.       rizatriptan (MAXALT-MLT) 10 MG disintegrating tablet Take 1 tablet (10 mg total) by mouth as needed for migraine. 10 tablet 3     tretinoin (RETIN-A) 0.025 % gel Apply topically daily. 45 g 2     valACYclovir (VALTREX) 1000 MG tablet Take 2 tablets PO 12 hours apart PRN episode 20 tablet 2     vitamin E 1000 UNIT capsule Take 1,000 Units by mouth daily.       zolpidem (AMBIEN) 10 mg tablet Take 1 tablet (10 mg total) by mouth at bedtime as needed for sleep. 30 tablet 2     No current facility-administered medications on file prior to visit.        Allergies:   Allergies   Allergen Reactions     Yeast, Dried Anaphylaxis     Stomach swelling     Egg White Other (See Comments)     Swollen colon, belly pain     Gluten      Savella [Milnacipran] Other (See Comments)     Chest pain, hot/cold flashes     Ciprofloxacin Itching and Rash     Rash over whole body      Sulfa (Sulfonamide Antibiotics) Rash       OBJECTIVE:   Vitals:    12/28/18 1539   BP: 110/68   Patient Site: Right Arm   Patient Position: Sitting   Cuff Size: Adult Regular   Pulse: 96   Resp: 20   Temp: 98.1  F (36.7  C)   TempSrc: Oral   SpO2: 98%   Weight: 139 lb (63 kg)     Physical exam reveals a pleasant 38 y.o. female.   Appears healthy, alert and cooperative. Non-toxic appearance.  Eyes:  No conjunctivitis, lids normal.   Ears:  Normal appearing auricle. External auditory meatus without excessive cerumen, edema or erythema. normal TMs bilaterally and normal canals bilaterally.  No mastoid tenderness. No pain with palpation over tragus.  Nose:    Mucosa normal. No rhinorrhea. Left frontal sinus tenderness.  Mouth:  Mucosa pink and moist.  postnasal drip. No trismus. No evidence of PTA. Normal phonation.  Neck: few small anterior cervical nodes  Lungs: Chest is clear, no wheezing, rhonchi or rales. Symmetric air entry throughout both lung  fields.  Heart: regular rate and rhythm, no murmur, rub or gallop  Abdomen: soft, nontender. No masses or organomegaly. No CVAT  Skin: pink, warm, dry, and without lesions on limited skin exam. No rashes.       RADIOLOGY    No results found.    LABORATORY STUDIES    Recent Results (from the past 24 hour(s))   Urinalysis-UC if Indicated   Result Value Ref Range    Color, UA Yellow Colorless, Yellow, Straw, Light Yellow    Clarity, UA Clear Clear    Glucose, UA Negative Negative    Bilirubin, UA Negative Negative    Ketones, UA Negative Negative    Specific Gravity, UA 1.020 1.005 - 1.030    Blood, UA Negative Negative    pH, UA 7.0 5.0 - 8.0    Protein, UA Negative Negative mg/dL    Urobilinogen, UA 0.2 E.U./dL 0.2 E.U./dL, 1.0 E.U./dL    Nitrite, UA Negative Negative    Leukocytes, UA Negative Negative           Latoya Waters, CNP

## 2021-06-22 NOTE — PROGRESS NOTES
Assessment:     1. Pain of right lower leg  US Venous Leg Right   2. Mitochondrial myopathy  Ambulatory referral to Neurology   3. Dry eyes  Ambulatory referral to Ophthalmology   4. Cold intolerance  Thyroid Cascade       Plan:     In regards to the pain, it is unusual with the recurrent bruising in the same area and I recommended checking an ultrasound of the leg as a next step.  I increased her omeprazole to twice daily and asked her to let me know if in 2 weeks her symptoms do not improve.  I think it is reasonable to try to discontinue her duloxetine but recommended stepping down to 30 mg daily for a month and if she is doing well and has not noticed a significant increase in her pain or worsening mood, she could discontinue the medication and see how she does.  I did note her allergy to influenza vaccine based on her respiratory symptoms that came on shortly after receiving the vaccine this year.  Referrals were placed.  Lastly, I checked a TSH due to her cold intolerance.  Follow-up in 3 months.    Subjective:       38 y.o. female presents today with a number of concerns and for regular medication check visit.  The patient has a history of mitochondrial myopathy and is on Social Security disability for this diagnosis.  The patient overall is doing reasonably well.  She reports taking a vacation over the holidays to visit a relative in [place].  This was difficult but she is pleased to say that she was able to make it.  They used a wheelchair to get her through the airport.  She did find sitting on the airplane for long period of time to be difficult.  She was quite exhausted upon return but overall was glad that she made it.  The patient still does not drive any long distance much more than 5 or 10 minutes because of fatigue with her arms.  She has a couple of concerns today including right lower leg pain and bruising.  The patient reports that this is been off and on that she gets an area of a significant  bruise that then resolves but comes back again.  As the pain increases that usually when she sees a bruise a present itself.  She has not had significant associated swelling of that leg.  The patient also is feeling recurrence of epigastric discomfort especially when her stomach is empty and is concerned that she may have her ulcer back.  She is currently on omeprazole 40 mg daily.  The patient continues to have itching of her ear and then she is having some issues with some pruritus involving her nipples.  She was told in a minute clinic to give a trial to nystatin powder but she finds it quite difficult and wonders if there is anything else that could help.  The patient is contemplating trying to come off of duloxetine.  She really feels that her mood has been more consistently much better and wonders if it really is helping with the pain.  The patient also wanted to mention that she had a significant reaction about an hour and a half after receiving the flu shot this year.  She had difficulty breathing and felt swelling of her throat.  She almost called 911 but instead used an old albuterol inhaler and after couple of hours the symptoms seem to improve.  The patient needs referrals placed for the neuromuscular specialist at [facility], as well as [facility].  Lastly, the patient reports that she is finding that she is not very tolerant of the cold recently and feels cold all the time.      Reviewed: The following portions of the patient's history were reviewed and updated as appropriate: allergies, current medications, past family history, past medical history, past social history, past surgical history and problem list.    Review of Systems  Pertinent items are noted in HPI.        Objective:     /64 (Patient Site: Left Arm, Patient Position: Sitting, Cuff Size: Adult Regular)   Pulse 62   Wt 135 lb (61.2 kg)   LMP 02/06/2018 (Approximate)   BMI 23.91 kg/m    General appearance: alert, appears  stated age and cooperative      This note has been dictated using voice recognition software. Any grammatical or context distortions are unintentional and inherent to the software

## 2021-06-24 NOTE — PROGRESS NOTES
Assessment:     1. Periumbilical abdominal pain     2. Leukocytosis, unspecified type  HM2(CBC w/o Differential)   3. Nausea         Plan:     Sherry is a 38-year-old female presenting today with periumbilical abdominal pain.  I rechecked a CBC today and it came back perfectly within normal limits.  She does have a history of ulcer and I do suspect that that might be what is going on now, however, she is already on omeprazole twice daily.  She has a gastroenterologist and I recommended making a follow-up appointment to see him for consultation.  She can continue to take Zofran as needed and I encouraged her to let me know if she has any worsening or red flag-like symptoms and reviewed those with her today.    Subjective:       38 y.o. female presents for reevaluation of abdominal pain and nausea.  The patient was seen last Friday at which time she complained of more of a left lower quadrant abdominal pain with associated nausea.  She also complained of feeling hot and chilled at times although had no definite fever.  She had seen her gynecologist and a pelvic ultrasound was pending.  I ordered a hemogram in addition to a sed rate and CRP and those came back somewhat discrepant.  The white count was elevated at 16,000 but her sed rate and CRP were completely within normal limits.  She is here today for reevaluation.  The patient states that she feels about the same as she did previously.  Her pelvic ultrasound came back to showing some tiny cysts on her ovaries but no explanation for the pain.  The pain has not changed although she describes it is more periumbilical today.  She does have nausea that seems a little bit worse after eating.  She denies any episode of vomiting and her bowel movements have gone back to normal.      Reviewed: The following portions of the patient's history were reviewed and updated as appropriate: allergies, current medications, past family history, past medical history, past social  history, past surgical history and problem list.    Review of Systems  Pertinent items are noted in HPI.        Objective:     /62 (Patient Site: Left Arm, Patient Position: Sitting, Cuff Size: Adult Regular)   Pulse 76   Wt 135 lb (61.2 kg)   LMP 02/06/2018 (Approximate)   BMI 23.91 kg/m    General appearance: alert, appears stated age and cooperative      This note has been dictated using voice recognition software. Any grammatical or context distortions are unintentional and inherent to the software

## 2021-06-24 NOTE — PROGRESS NOTES
Assessment:     1. LLQ abdominal pain  HM2(CBC w/o Differential)    Erythrocyte Sedimentation Rate    C-Reactive Protein(CRP)   2. Encounter for vitamin deficiency screening     3. Mitochondrial myopathy     4. Generalized anxiety disorder     5. Major depression, recurrent, chronic (H)         Plan:     Sherry is a 37 yo female presenting with LLQ abdominal pain of unclear etiology.  She has not had any associated fevers or chills which would make infection a lot less likely.  I checked a CBC, sed rate and CRP today in order to get a better idea if an infectious process is likely.  She has a pelvic ultrasound scheduled for tomorrow with her gynecologist and that will also be helpful.  She does not have an abdominal exam that would be concerning for the need to work this up quicker with a CT of the abdomen.  I will follow-up with her early next week regarding the results of her blood work and will whether we need to do any further testing.  She does have a history of peptic ulcer disease and that may be a contribute factor although this does not seem to fit with the location of the pain at this time.  We will need to monitor the patient's mood closely over the next few weeks after discontinuation of her Cymbalta.  However, the patient really would like to get off of the medication and feels that her mood is been consistently much better recently and that she is hoping to be able to get off successfully without a relapse.  Also, the patient has established care with Dr. Godinez her new neuromuscular specialist as it relates to her mitochondrial issues.    Subjective:       38 y.o. female presents today regarding a few concerns. First, the patient is having some abdominal pain and nausea. She was seen by her gynecologist and has a pelvic ultrasound to evaluate further. She denies associated fever or vomiting, but has been feeling nauseated. She had some looser stools but denies blood. She describes the pain as  "periumbilical to LLQ. She denies any associated urinary tract symptoms. She also wanted to let me know that she has weaned off of Cymbalta all together about 3 weeks ago. She did have some withdrawal side effects, despite a slow wean off the medication. She has been more emotional as well, however, she feels hopeful that she will not need the medication and hopes that her mood evens out after a while.       Reviewed: The following portions of the patient's history were reviewed and updated as appropriate: allergies, current medications, past family history, past medical history, past social history, past surgical history and problem list.    Review of Systems  Pertinent items are noted in HPI.        Objective:     /72   Pulse 76   Resp 14   Ht 5' 3\" (1.6 m)   Wt 135 lb (61.2 kg)   LMP 02/06/2018 (Approximate)   BMI 23.91 kg/m    General appearance: alert, appears stated age and cooperative  Lungs: clear to auscultation bilaterally  Heart: regular rate and rhythm, S1, S2 normal, no murmur, click, rub or gallop  Abdomen: non-distended, positive bowel sounds, tender to palpation more in lower quadrants and describes pain in the left lower abdomen with pressing on right.       This note has been dictated using voice recognition software. Any grammatical or context distortions are unintentional and inherent to the software  "

## 2021-06-25 NOTE — PROGRESS NOTES
Progress Notes by Marley Cox MD at 7/24/2017  2:20 PM     Author: Marley Cox MD Service: -- Author Type: Physician    Filed: 7/24/2017  6:08 PM Encounter Date: 7/24/2017 Status: Signed    : Marley Cox MD (Physician)         Subjective:   Sherry Sweeney is a 37 y.o. female  Roomed by: Gareth FRAZIER    Accompanied by Spouse    Refills needed? No    Do you have any forms that need to be filled out? No      Chief Complaint   Patient presents with   ? Cough     started 1 week ago   ? Heartburn     burning sensation started 1 week ago.    Started feeling sinus pressure and congestion before 7/6. Says the nasal drainage has gotten better. But having a lot of post nasal drip. Started coughing 2 days ago. Denies any CP, and it's irritating to breathe. Says hard to sleep the past 3 night because of the burning in her throat and her lungs. Has tried pseudoephedrine, cough suppressant. Admits chills off and on for the past 2 weeks. Admits more fatigue; appetite has been up and down but was less while on medication for BV diagnosed on 7/6. Admits getting migraines and admits having one today and occasional dizziness. Denies nausea, vomiting, or diarrhea. Admits having continued LLQ pain from IBS. Taking ranitidine sometimes OTC as needed for heartburn.     Review of Systems  Const - Resp - see HPI  Allergies   Allergen Reactions   ? Ciprofloxacin    ? Dairy    ? Gluten    ? Other Food Allergy      Egg whites   ? Savella [Milnacipran] Other (See Comments)     Chest pain, hot/cold flashes   ? Sulfa (Sulfonamide Antibiotics)    ? Yeast, Dried        Current Outpatient Prescriptions:   ?  amitriptyline (ELAVIL) 10 MG tablet, Take 3 tablets (30 mg total) by mouth at bedtime., Disp: 270 tablet, Rfl: 3  ?  b complex vitamins tablet, Take 1 tablet by mouth daily., Disp: , Rfl:   ?  CA CARB & GLUC/MAG OX & GLUC (CALCIUM MAGNESIUM ORAL), Take by mouth., Disp: , Rfl:   ?  CHOLECALCIFEROL, VITAMIN D3, (VITAMIN D3  ORAL), Take by mouth., Disp: , Rfl:   ?  clindamycin-benzoyl peroxide (BENZACLIN) gel, Apply to acne twice daily, Disp: 50 g, Rfl: 1  ?  DULoxetine (CYMBALTA) 60 MG capsule, Take 60 mg by mouth 2 (two) times a day., Disp: , Rfl: 1  ?  lidocaine (LIDODERM) 5 %(700 mg/patch), Place 1 patch on the skin daily. On for 12 hours then off for 12 hours, Disp: , Rfl:   ?  lidocaine-prilocaine (EMLA) cream, , Disp: , Rfl:   ?  omeprazole (PRILOSEC) 40 MG capsule, Take 1 capsule (40 mg total) by mouth daily., Disp: 60 capsule, Rfl: 1  ?  ondansetron (ZOFRAN-ODT) 4 MG disintegrating tablet, Take 1 tablet (4 mg total) by mouth every 8 (eight) hours as needed for nausea. One or Two Tablets Every 4-6 Hours For Nausea, Disp: 30 tablet, Rfl: 1  ?  rizatriptan (MAXALT-MLT) 10 MG disintegrating tablet, Take 1 tablet (10 mg total) by mouth as needed for migraine., Disp: 10 tablet, Rfl: 3  ?  tiZANidine (ZANAFLEX) 2 MG tablet, Take 1 tablet (2 mg total) by mouth every 8 (eight) hours as needed (muscle spasm and fibromyalgia)., Disp: 30 tablet, Rfl: 2  ?  valACYclovir (VALTREX) 1000 MG tablet, Take 2 tablets PO 12 hours apart PRN episode, Disp: 20 tablet, Rfl: 2  Patient Active Problem List   Diagnosis   ? Major depression, recurrent, chronic   ? Post-traumatic Stress Disorder   ? Peptic Ulcer   ? Generalized Anxiety Disorder   ? Fibromyalgia   ? Chest Pain   ? Abdominal Pain   ? Insomnia   ? Nausea   ? Fatigue   ? Back pain   ? IBS (irritable bowel syndrome)   ? Migraine without aura   ? Chronic fatigue syndrome   ? Acne vulgaris   ? Mitochondrial disease     Medical History Reviewed  Objective:     Vitals:    07/24/17 1630   Pulse: 95   Temp: 98.7  F (37.1  C)   TempSrc: Oral   SpO2: 100%   Weight: 147 lb (66.7 kg)   Gen - Pt in NAD  Eyes - Conjunctiva non injected, no drainage  Face - mildly TTP over maxillary and mildly TTP over frontal sinus areas  Ears - external canals - no induration, Right TM - not injected, Left TM - not  injected   Nose - not congested, no nasal drainage  Pharynx - non injected, tonsils 1+ size  Neck - supple, no cervical adenopathy, no masses  Cor - RRR w/o murmur  Lungs - Fair air entry; no wheezes or crackles noted on auscultation - sporadic coarse dry coughing noted  Skin - no lesions, no rashes noted     Assessment - Plan   Medical Decision Making -36-year-old woman presenting with symptoms of sinusitis and exam that is consistent with acute bronchitis.  There was no evidence for a pneumonia.  Because of patient's IBS doxycycline was chosen as the antibiotic.  After patient's albuterol neb she said that she could take a deeper breath and that was confirmed on exam.    1. Acute sinusitis  - doxycycline (ADOXA) 100 MG tablet; Take 1 tablet (100 mg total) by mouth 2 (two) times a day for 7 days.  Dispense: 14 tablet; Refill: 0    2. Acute bronchitis  - albuterol nebulizer solution 2.5 mg (PROVENTIL); Take 3 mL (2.5 mg total) by nebulization once.  - albuterol (PROAIR HFA;PROVENTIL HFA;VENTOLIN HFA) 90 mcg/actuation inhaler; Inhale 1-2 puffs every 4 (four) hours as needed for wheezing or shortness of breath (or coughing).  Dispense: 1 Inhaler; Refill: 0 -   Vortex spacer given to patient with inhaler use instructions.    At the conclusion of the encounter, assessment and plan were discussed.   All questions were answered.   The patient or guardian acknowledged understanding and was involved in the decision making regarding the overall care plan.    Patient Instructions     1. Keep well hydrated  2. If symptoms not improved after completing antibiotics, follow up with primary  3. If you have any questions, call the clinic number        Acute Bacterial Rhinosinusitis (ABRS)  Acute bacterial rhinosinusitis (ABRS) is an infection of your nasal cavity and sinuses. Its caused by bacteria. Acute means that youve had symptoms for less than 12 weeks.  Understanding your sinuses  The nasal cavity is the large air-filled  space behind your nose. The sinuses are a group of spaces formed by the bones of your face. They connect with your nasal cavity. ABRS causes the tissue lining these spaces to become inflamed. Mucus may not drain normally. This leads to facial pain and other symptoms.  What causes ABRS?  ABRS most often follows an upper respiratory infection caused by a virus. Bacteria then infect the lining of your nasal cavity and sinuses. But you can also get ABRS if you have:    Nasal allergies    Long-term nasal swelling and congestion not caused by allergies    Blockage in the nose  Symptoms of ABRS  The symptoms of ABRS may be different for each person, and can include:    Nasal congestion    Runny nose    Fluid draining from the nose down the throat (postnasal drip)    Headache    Cough    Pain in the sinuses    Thick, colored fluid from the nose (mucus)    Fever  Diagnosing ABRS  ABRS may be diagnosed if youve had an upper respiratory infection like a cold and cough for longer than 10 to 14 days. Your health care provider will ask about your symptoms and your medical history. The provider will check your vital signs, including your temperature. Youll have a physical exam. The health care provider will check your ears, nose, and throat. You likely wont need any tests. If ABRS comes back, you may have a culture or other tests.  Treatment for ABRS  Treatment may include:    Antibiotic medicine. This is for symptoms that last for at least 10 to 14 days.    Nasal corticosteroid medicine. Drops or spray used in the nose can lessen swelling and congestion.    Over-the-counter pain medicine. This is to lessen sinus pain and pressure.    Nasal decongestant medicine. Spray or drops may help to lessen congestion. Do not use them for more than a few days.    Salt wash (saline irrigation). This can help to loosen mucus.  Possible complications of ABRS  ABRS may come back or become long-term (chronic).  In rare cases, ABRS may cause  complications such as:     Inflamed tissue around the brain and spinal cord (meningitis)    Inflamed tissue around the eyes (orbital cellulitis)    Inflamed bones around the sinuses (osteitis)  These problems may need to be treated in a hospital with intravenous (IV) antibiotic medicine or surgery.  When to call the health care provider  Call your health care provider if you have any of the following:    Symptoms that dont get better, or get worse    Symptoms that dont get better after 3 to 5 days on antibiotics    Trouble seeing    Swelling around your eyes    Confusion or trouble staying awake   Date Last Reviewed: 3/3/2015    [id number] [employer]. [address] [place] [address]. All rights reserved. This information is not intended as a substitute for professional medical care. Always follow your healthcare professional's instructions.        What Is Acute Bronchitis?  Acute or short-term bronchitis last for days or weeks. It occurs when the bronchial tubes (airways in the lungs) are irritated by a virus, bacteria, or allergen. This causes a cough that produces yellow or greenish mucus.  Inside healthy lungs    Air travels in and out of the lungs through the airways. The linings of these airways produce sticky mucus. This mucus traps particles that enter the lungs. Tiny structures called cilia then sweep the particles out of the airways.     Healthy airway: Airways are normally open. Air moves in and out easily.      Healthy cilia: Tiny, hairlike cilia sweep mucus and particles up and out of the airways.   Lungs with bronchitis  Bronchitis often occurs with a cold or the flu virus. The airways become inflamed (red and swollen). There is a deep hacking cough from the extra mucus. Other symptoms may include:    Wheezing    Chest discomfort    Shortness of breath    Mild fever  A second infection, this time due to bacteria, may then occur. And airways irritated by allergens or smoke are  more likely to get infected.        Inflamed airway: Inflammation and extra mucus narrow the airway, causing shortness of breath.      Impaired cilia: Extra mucus impairs cilia, causing congestion and wheezing. Smoking makes the problem worse.   Making a diagnosis  A physical exam, health history, and certain tests help your healthcare provider make the diagnosis.  Health history  Your healthcare provider will ask you about your symptoms.  The exam  Your provider listens to your chest for signs of congestion. He or she may also check your ears, nose, and throat.  Possible tests    A sputum test for bacteria. This requires a sample of mucus from the lungs.    A nasal or throat swab for bacterial infection.    A chest X-ray if your healthcare provider thinks you have pneumonia.    Tests to check for an underlying condition, such as allergies, asthma, or COPD. You may need to see a specialist for more lung function testing.  Treating a cough  The main treatment for bronchitis is easing symptoms. Avoiding smoke, allergens, and other things that trigger coughing can often help. If the infection is bacterial, you may be given antibiotics. During the illness, it's important to get plenty of sleep. To ease symptoms:    Dont smoke, and avoid secondhand smoke.    Use a humidifier, or breathe in steam from a hot shower. This may help loosen mucus.    Drink a lot of water and juice. They can soothe the throat and may help thin mucus.    Sit up or use extra pillows when in bed to help lessen coughing and congestion.    Ask your provider about using cough medicine, pain and fever medicine, or a decongestant.  Antibiotics  Most cases of bronchitis are caused by cold or flu viruses. Antibiotics dont treat viral illness. Taking antibiotics when they are not needed increases your risk of getting an infection later that is antibiotic-resistant. Your provider will prescribe antibiotics if the infection is caused by bacteria. If they  are prescribed:    Take the medicine until it is used up, even if symptoms have improved. If you dont, the bronchitis may come back.    Take them as directed. For instance, some medicines should be taken with food.    Ask your provider or pharmacist what side effects are common, and what to do about them.  Follow-up care  You should see your provider again in 2 to 3 weeks. By this time, symptoms should have improved. An infection that lasts longer may mean you have a more serious problem.  Prevention    Avoid tobacco smoke. If you smoke, quit. Stay away from smoky places. Ask friends and family not to smoke around you, or in your home or car.    Get checked for allergies.    Ask your provider about getting a yearly flu shot, and pneumococcal or pneumonia shots.    Wash your hands often. This helps reduce the chance of picking up viruses that cause colds and flu.  Call your healthcare provider if:    Symptoms worsen, or new symptoms develop.    Breathing problems worsen or  become severe.    Symptoms dont get better within a week, or within 3 days of taking antibiotics.   Date Last Reviewed: 6/18/2014 2000-2016 [employer] [address] [place] [address], [place] [address]. All rights reserved. This information is not intended as a substitute for professional medical care. Always follow your healthcare professional's instructions.

## 2021-06-29 NOTE — PROGRESS NOTES
Progress Notes by Fidelia Sun CMA at 9/15/2020 11:00 AM     Author: Fidelia Sun CMA Service: -- Author Type: Certified Medical Assistant    Filed: 9/15/2020 11:35 AM Encounter Date: 9/15/2020 Status: Signed    : Fidelia Sun CMA (Certified Medical Assistant)       This video/telephone visit will be conducted via a call between you and your physician/provider. We have found that certain health care needs can be provided without the need for an in-person physical exam. This service lets us provide the care you need with a video /telephone conversation. If a prescription is necessary we can send it directly to your pharmacy. If lab work is needed we can place an order for that and you can then stop by our lab to have the test done at a later time.   Just as we bill insurance for in-person visits, we also bill insurance for video/telephone visits. If you have questions about your insurance coverage, we recommend that you speak with your insurance company.   Patient has given verbal consent for video/Telephone visit? Yes  Patient would like the video visit invitation sent by: Send to email: [email or web address], if connection issues,please call:  [phone]  JIM/FELIZ RUBI CMA    Patient verified allergies, medications and pharmacy via phone. PHQ: 8 and JEREMY: 19 done verbally with writer. Patient states she is ready for visit.    [place]  reviewed prior to appt, please see embedded report below:

## 2021-06-29 NOTE — PROGRESS NOTES
Progress Notes by Marly Mercado CMA at 6/10/2020 10:30 AM     Author: Marly Mercado CMA Service: Addiction Care Author Type: Certified Medical Assistant    Filed: 6/14/2020  9:49 AM Encounter Date: 6/10/2020 Status: Signed    : Marly Mercado CMA (Certified Medical Assistant)       This video/telephone visit will be conducted via a call between you and your physician/provider. We have found that certain health care needs can be provided without the need for an in-person physical exam. This service lets us provide the care you need with a video /telephone conversation. If a prescription is necessary we can send it directly to your pharmacy. If lab work is needed we can place an order for that and you can then stop by our lab to have the test done at a later time.   Just as we bill insurance for in-person visits, we also bill insurance for video/telephone visits. If you have questions about your insurance coverage, we recommend that you speak with your insurance company.   Patient has given verbal consent for video/Telephone visit? yes  Patient would like the video visit invitation sent by: Send to email: [email or web address]  JIM/FELIZ CASTILLO    Patient verified allergies, medications and pharmacy via phone. PHQ : and JEREMY:  done verbally with writer. Patient states she is ready for visit.  PHQ-17  JEREMY-14        ________________________________________  Medications Phoned  to Pharmacy [] yes [x]no  Name of Pharmacist:  List Medications, including dose, quantity and instructions    Medications ordered this visit were e-scribed.  Verified by order class [x] yes  [] no  Wellbutrin 150 mg  Desvenlafaxine 25 mg  Gabapentin 300 mg  Hydroxyzine 50 mg  Melatonin 3 mg     Medication changes or discontinuations were communicated to patient's pharmacy: [] yes  [x] no    Dictation completed at time of chart check: [] yes  [x] no    I have checked the documentation for todays encounters and the  above information has been reviewed and completed.

## 2021-06-29 NOTE — PROGRESS NOTES
Progress Notes by Lisa Soares LPN at 6/22/2020 12:30 PM     Author: Lisa Soares LPN Service: -- Author Type: Licensed Nurse    Filed: 6/28/2020 12:56 PM Encounter Date: 6/22/2020 Status: Signed    : Lisa Soares LPN (Licensed Nurse)       This video/telephone visit will be conducted via a call between you and your physician/provider. We have found that certain health care needs can be provided without the need for an in-person physical exam. This service lets us provide the care you need with a video /telephone conversation. If a prescription is necessary we can send it directly to your pharmacy. If lab work is needed we can place an order for that and you can then stop by our lab to have the test done at a later time.   Just as we bill insurance for in-person visits, we also bill insurance for video/telephone visits. If you have questions about your insurance coverage, we recommend that you speak with your insurance company.   Patient has given verbal consent for video/Telephone visit? yes  Patient would like the video visit invitation sent by: Text to cell phone: NA or Send to email: [email or web address]   JIM/LPN; AD, LPN    Patient verified allergies, medications and pharmacy via phone. PHQ : 18 and JEREMY:  19 done verbally with writer. Patient states she  is ready for visit.

## 2021-06-29 NOTE — PROGRESS NOTES
Progress Notes by Fidelia Sun CMA at 10/13/2020  2:00 PM     Author: Fidelia Sun CMA Service: -- Author Type: Certified Medical Assistant    Filed: 10/13/2020  4:09 PM Encounter Date: 10/13/2020 Status: Signed    : Fidelia Sun CMA (Certified Medical Assistant)       This video/telephone visit will be conducted via a call between you and your physician/provider. We have found that certain health care needs can be provided without the need for an in-person physical exam. This service lets us provide the care you need with a video /telephone conversation. If a prescription is necessary we can send it directly to your pharmacy. If lab work is needed we can place an order for that and you can then stop by our lab to have the test done at a later time.   Just as we bill insurance for in-person visits, we also bill insurance for video/telephone visits. If you have questions about your insurance coverage, we recommend that you speak with your insurance company.   Patient has given verbal consent for video/Telephone visit? Yes  Patient would like the video visit invitation sent by:  Send to email: [email or web address], if connection issues, please call:  [phone]   JIM/FELIZ RUBI CMA    Patient verified allergies, medications and pharmacy via phone. PHQ: 10 and JEREMY: 15 done verbally with writer. Patient states she is ready for visit.    [place]  reviewed prior to appt, please see embedded report below:

## 2021-06-29 NOTE — PROGRESS NOTES
Progress Notes by Og Menendez PA-C at 7/23/2020  4:30 PM     Author: Og Menendez PA-C Service: -- Author Type: Physician Assistant    Filed: 7/23/2020  5:28 PM Encounter Date: 7/23/2020 Status: Signed    : Og Menendez PA-C (Physician Assistant)       Chief Complaint:    Chief Complaint   Patient presents with   ? Abdominal Pain     Left mid abdomen, nausea, sleepy, forgetfull, x3 days       HPI: Sherry Sweeney is an 40 y.o. female who presents for evaluation and treatment of L sided abdominal pain.  Symptoms started roughly 3 days ago.  Nothing makes the pain better or worse.  She has had some loose stools, but no diarrhea.  Patient had historectomy, but does still have L side ovari.  She denies any nausea or vomiting.  No urinary frequency or urgency.  No black tarry stools.  Last bowel movement was yesterday and was normal.    ROS:    Review of Systems   Constitutional: Negative.  Negative for activity change, appetite change, fatigue, fever and unexpected weight change.   HENT: Negative.  Negative for congestion, ear discharge, ear pain, sinus pressure, sinus pain, sore throat and trouble swallowing.    Eyes: Negative for pain, discharge, redness and itching.   Respiratory: Negative.  Negative for chest tightness, shortness of breath and wheezing.    Cardiovascular: Negative.  Negative for chest pain and palpitations.   Gastrointestinal: Positive for abdominal pain. Negative for blood in stool, diarrhea, nausea and vomiting.   Endocrine: Negative.  Negative for polydipsia.   Genitourinary: Negative.  Negative for dysuria, frequency and urgency.   Musculoskeletal: Negative.  Negative for arthralgias and myalgias.   Skin: Negative.  Negative for color change, rash and wound.   Allergic/Immunologic: Negative.  Negative for immunocompromised state.   Neurological: Negative.  Negative for dizziness and headaches.   Hematological: Negative.  Negative for adenopathy.        Family History  Family  History   Problem Relation Age of Onset   ? Heart attack Father 45           ? Depression Father    ? Hyperlipidemia Mother    ? Hypertension Mother    ? Breast cancer Mother 62   ? Cancer Mother         thyroid    ? Meniere's disease Mother    ? Anxiety disorder Mother    ? Bone cancer Maternal Grandfather    ? Breast cancer Maternal Grandmother 60   ? Anxiety disorder Maternal Grandmother    ? Anxiety disorder Paternal Grandfather    ? Malig Hypertension Neg Hx        Social History  Social History     Socioeconomic History   ? Marital status:      Spouse name: Not on file   ? Number of children: Not on file   ? Years of education: Not on file   ? Highest education level: Not on file   Occupational History   ? Not on file   Social Needs   ? Financial resource strain: Not on file   ? Food insecurity     Worry: Not on file     Inability: Not on file   ? Transportation needs     Medical: Not on file     Non-medical: Not on file   Tobacco Use   ? Smoking status: Never Smoker   ? Smokeless tobacco: Never Used   Substance and Sexual Activity   ? Alcohol use: No   ? Drug use: No   ? Sexual activity: Not on file   Lifestyle   ? Physical activity     Days per week: Not on file     Minutes per session: Not on file   ? Stress: Not on file   Relationships   ? Social connections     Talks on phone: Not on file     Gets together: Not on file     Attends Worship service: Not on file     Active member of club or organization: Not on file     Attends meetings of clubs or organizations: Not on file     Relationship status: Not on file   ? Intimate partner violence     Fear of current or ex partner: Not on file     Emotionally abused: Not on file     Physically abused: Not on file     Forced sexual activity: Not on file   Other Topics Concern   ? Not on file   Social History Narrative   ? Not on file        Surgical History:  Past Surgical History:   Procedure Laterality Date   ? HYSTERECTOMY Bilateral 2018     Procedure: TOTAL ABDOMINAL HYSTERECTOMY BILATERAL SALPINGECTOMY, RIGHT OOPHORECTOMY;  Surgeon: Joanne Bedolla DO;  Location: [facility];  Service:    ? PICC  2/24/2018        ? OK REMOVAL OF OVARIAN CYST(S)      Description: Ovarian Cystectomy;  Recorded: 11/07/2013;        Problem List:  Patient Active Problem List   Diagnosis   ? Major depression, recurrent, chronic (H)   ? Post-traumatic Stress Disorder   ? Generalized Anxiety Disorder   ? Fibromyalgia   ? Insomnia   ? IBS (irritable bowel syndrome)   ? Migraine without aura   ? Chronic fatigue   ? Acne vulgaris   ? Mitochondrial myopathy   ? H/O: hysterectomy   ? Mitochondrial disease (H)        Allergies:  Allergies   Allergen Reactions   ? Ciprofloxacin Itching, Rash and Hives     Rash over whole body    ? Egg White Other (See Comments)     Swollen colon, belly pain   ? Influenza Virus Vaccines Shortness Of Breath   ? Sulfa (Sulfonamide Antibiotics) Rash and Hives   ? Yeast, Dried Anaphylaxis     Stomach swelling   ? Gluten    ? Savella [Milnacipran] Other (See Comments)     Chest pain, hot/cold flashes   ? Quetiapine Palpitations     Tachycardia, nervousness, elevated blood pressure.         Current Meds:    Current Outpatient Medications:   ?  acetylcysteine (NAC) 600 mg cap capsule, Take 1 capsule (600 mg total) by mouth 2 (two) times a day., Disp: 60 capsule, Rfl: 2  ?  buPROPion (WELLBUTRIN XL) 150 MG 24 hr tablet, Take 2 tablets (300 mg total) by mouth daily., Disp: 180 tablet, Rfl: 0  ?  cholecalciferol, vitamin D3, 400 unit cap, Take 2 capsules by mouth daily., Disp: , Rfl:   ?  desvenlafaxine succinate 25 mg Tb24, Take 50 mg by mouth every morning AND 25 mg every evening., Disp: 270 tablet, Rfl: 2  ?  gabapentin (NEURONTIN) 300 MG capsule, Take 1 capsule (300 mg total) by mouth 3 (three) times a day., Disp: 90 capsule, Rfl: 2  ?  hydrOXYzine pamoate (VISTARIL) 50 MG capsule, Take 1 capsule (50 mg total) by mouth at bedtime as needed.,  Disp: 90 capsule, Rfl: 2  ?  ibuprofen (ADVIL,MOTRIN) 400 MG tablet, Take 1.5 tablets (600 mg total) by mouth every 6 (six) hours as needed., Disp: 30 tablet, Rfl: 0  ?  melatonin-pyridoxine HCl, B6, 3-1 mg Tab, Take 3 mg by mouth at bedtime., Disp: 30 tablet, Rfl: 2  ?  multivitamin therapeutic tablet, Take 1 tablet by mouth daily., Disp: , Rfl:   ?  naproxen (NAPROSYN) 500 MG tablet, TAKE 1 TABLET BY MOUTH TWICE A DAY WITH FOOD, Disp: 20 tablet, Rfl: 0  ?  nystatin (MYCOSTATIN) powder, Apply topically 4 (four) times a day., Disp: , Rfl:   ?  ONABOTULINUMTOXINA (BOTOX INJ), Inject as directed every 3 (three) months., Disp: , Rfl:   ?  ondansetron (ZOFRAN-ODT) 4 MG disintegrating tablet, TAKE 1 TABLET (4 MG TOTAL) BY MOUTH EVERY 8 (EIGHT) HOURS AS NEEDED FOR NAUSEA., Disp: 30 tablet, Rfl: 1  ?  riboflavin, vitamin B2, 400 mg Tab, Take 400 mg by mouth 2 (two) times a day., Disp: , Rfl:   ?  rizatriptan (MAXALT) 10 MG tablet, Take 1 tablet (10 mg total) by mouth as needed for migraine. May repeat in 2 hours if needed, Disp: 10 tablet, Rfl: 3  ?  triamcinolone (KENALOG) 0.1 % cream, Apply to affected skin twice daily for up to 14 days., Disp: 30 g, Rfl: 0  ?  ubidecarenone (COENZYME Q10 ORAL), Take 100 mg by mouth daily., Disp: , Rfl:   ?  valACYclovir (VALTREX) 1000 MG tablet, Take 2 tablets PO 12 hours apart PRN episode, Disp: 20 tablet, Rfl: 2     PHYSICAL EXAM:   Vital signs noted and reviewed by Og Menendez    /77   Pulse 82   Temp 98.3  F (36.8  C) (Oral)   Resp 16   LMP 02/06/2018 (Approximate)   SpO2 100%      PER:  Physical Exam  Vitals signs reviewed.   Constitutional:       Appearance: Normal appearance. She is well-developed. She is not ill-appearing or toxic-appearing.   HENT:      Head: Normocephalic and atraumatic.      Right Ear: Hearing, tympanic membrane, ear canal and external ear normal. No drainage, swelling or tenderness. Tympanic membrane is not perforated, erythematous, retracted  or bulging.      Left Ear: Hearing, tympanic membrane, ear canal and external ear normal. No drainage, swelling or tenderness. Tympanic membrane is not perforated, erythematous, retracted or bulging.      Nose: No nasal deformity, mucosal edema, congestion or rhinorrhea.      Right Sinus: No maxillary sinus tenderness or frontal sinus tenderness.      Left Sinus: No maxillary sinus tenderness or frontal sinus tenderness.      Mouth/Throat:      Pharynx: No pharyngeal swelling, oropharyngeal exudate, posterior oropharyngeal erythema or uvula swelling.      Tonsils: No tonsillar exudate or tonsillar abscesses. 0 on the right. 0 on the left.   Eyes:      General: Lids are normal.         Right eye: No foreign body or discharge.         Left eye: No foreign body or discharge.      Conjunctiva/sclera:      Right eye: Right conjunctiva is not injected.      Left eye: Left conjunctiva is not injected.   Neck:      Musculoskeletal: Full passive range of motion without pain and normal range of motion. No neck rigidity.   Cardiovascular:      Rate and Rhythm: Normal rate and regular rhythm.      Heart sounds: Normal heart sounds, S1 normal and S2 normal. No murmur. No friction rub. No gallop.    Pulmonary:      Effort: Pulmonary effort is normal. No accessory muscle usage, prolonged expiration, respiratory distress or retractions.      Breath sounds: Normal breath sounds and air entry. No stridor, decreased air movement or transmitted upper airway sounds. No decreased breath sounds, wheezing or rales.   Abdominal:      General: Bowel sounds are normal.      Palpations: Abdomen is soft.      Tenderness: There is no abdominal tenderness. There is left CVA tenderness and guarding. There is no right CVA tenderness.       Lymphadenopathy:      Head:      Right side of head: No submental, submandibular, tonsillar, preauricular or posterior auricular adenopathy.      Left side of head: No submental, submandibular, tonsillar,  preauricular or posterior auricular adenopathy.      Cervical:      Right cervical: No superficial or posterior cervical adenopathy.     Left cervical: No superficial or posterior cervical adenopathy.   Skin:     General: Skin is warm.      Coloration: Skin is not cyanotic or jaundiced.   Neurological:      Mental Status: She is alert.      Motor: No weakness or abnormal muscle tone.      Coordination: Coordination is intact.      Gait: Gait is intact. Gait normal.      Deep Tendon Reflexes: Reflexes are normal and symmetric.   Psychiatric:         Attention and Perception: Attention normal.         Mood and Affect: Mood normal.         Speech: Speech normal.         Behavior: Behavior normal.         Thought Content: Thought content normal.          Labs:     Results for orders placed or performed in visit on 01/09/20   Vitamin B12   Result Value Ref Range    Vitamin B-12 1,039 (H) 213 - 816 pg/mL   Folate, Serum   Result Value Ref Range    Folate 17.9 >=3.5 ng/mL   CK Total   Result Value Ref Range    CK, Total 76 30 - 190 U/L     Medical Decision Making:    Differential Diagnosis:    Kidney stone, pyelonephritis, diverticulitis, ovarian cyst.     ASSESSMENT:     1. Abdominal pain, left upper quadrant         PLAN:     Patient presents with L sided abdominal pain.  She is in no acute distress.  She has LUQ tenderness with palpation as well as L sided CVA tenderness.  She is afebrile with stable vital signs.  At this time, I cannot rule out kidney stone, diverticulitis, pyelonephritis, or ovarian cyst.  Patient instructed to go to the ED now for further evaluation, lab work, and imaging.    Patient refused EMS transport and will go by private vehicle.  Patient instructed to follow up with PCP in 1 week if symptoms are not improving.  Patient discharged in stable condition.    Patient verbalized understanding and agreed with this plan.     Og Menendez  7/23/2020, 4:56 PM

## 2021-07-03 NOTE — ADDENDUM NOTE
Addendum Note by Lucia Emanuel CNP at 8/3/2020 11:30 AM     Author: Lucia Emanuel CNP Service: -- Author Type: Nurse Practitioner    Filed: 8/3/2020 12:29 PM Encounter Date: 8/3/2020 Status: Signed    : Lucia Emanuel CNP (Nurse Practitioner)    Addended by: LUCIA EMANUEL on: 8/3/2020 12:29 PM        Modules accepted: Orders

## 2021-07-03 NOTE — ADDENDUM NOTE
Addendum Note by Layla Martinez MD at 12/17/2019 11:10 AM     Author: Layla Martinez MD Service: -- Author Type: Physician    Filed: 12/17/2019 11:10 AM Encounter Date: 12/12/2019 Status: Signed    : Layla Martinez MD (Physician)    Addended by: LAYLA MARTINEZ on: 12/17/2019 11:10 AM        Modules accepted: Orders

## 2021-07-03 NOTE — ADDENDUM NOTE
Addendum Note by Layla Martinez MD at 10/17/2018  1:03 PM     Author: Layla Martinez MD Service: -- Author Type: Physician    Filed: 10/17/2018  1:03 PM Encounter Date: 10/8/2018 Status: Signed    : Layla Martinez MD (Physician)    Addended by: LAYLA MARTINEZ on: 10/17/2018 01:03 PM        Modules accepted: Orders

## 2021-07-03 NOTE — ADDENDUM NOTE
Addendum Note by Layla Martinez MD at 12/13/2017  7:55 AM     Author: Layla Martinez MD Service: -- Author Type: Physician    Filed: 12/13/2017  7:55 AM Encounter Date: 12/5/2017 Status: Signed    : Layla Martinez MD (Physician)    Addended by: LAYLA MARTINEZ on: 12/13/2017 07:55 AM        Modules accepted: Orders

## 2021-07-03 NOTE — ADDENDUM NOTE
Addendum Note by Lucia Emanuel CNP at 8/3/2020 11:30 AM     Author: Lucia Emanuel CNP Service: -- Author Type: Nurse Practitioner    Filed: 8/3/2020  2:44 PM Encounter Date: 8/3/2020 Status: Signed    : Lucia Emanuel CNP (Nurse Practitioner)    Addended by: LUCIA EMANUEL on: 8/3/2020 02:44 PM        Modules accepted: Orders

## 2021-07-03 NOTE — ADDENDUM NOTE
Addendum Note by Layla Martinez MD at 3/26/2020  4:23 PM     Author: Layla Martinez MD Service: -- Author Type: Physician    Filed: 3/26/2020  4:23 PM Encounter Date: 3/25/2020 Status: Signed    : Layla Martinez MD (Physician)    Addended by: LAYLA MARTINEZ on: 3/26/2020 04:23 PM        Modules accepted: Orders

## 2021-07-04 NOTE — ADDENDUM NOTE
Addendum Note by Lucia Shelton CNP at 2/25/2021 12:41 PM     Author: Lucia Shelton CNP Service: Psychiatry Author Type: Nurse Practitioner    Filed: 2/25/2021 12:41 PM Encounter Date: 2/15/2021 Status: Signed    : Lucia Shelton CNP (Nurse Practitioner)    Addended by: LUCIA DUDLEY on: 2/25/2021 12:41 PM        Modules accepted: Orders

## 2021-07-13 DIAGNOSIS — F41.8 DEPRESSION WITH ANXIETY: ICD-10-CM

## 2021-07-13 DIAGNOSIS — F33.9 MAJOR DEPRESSION, RECURRENT, CHRONIC (H): Primary | ICD-10-CM

## 2021-07-13 RX ORDER — BUPROPION HYDROCHLORIDE 300 MG/1
300 TABLET ORAL DAILY
COMMUNITY
Start: 2021-04-05 | End: 2021-07-13

## 2021-07-13 NOTE — TELEPHONE ENCOUNTER
Previously followed by AVI Shelton.    Date of Last Office Visit: 4/5/21 Nikita  Date of Next Office Visit: 10/28/21 Kandy  No shows since last visit: 0  Cancellations since last visit: 0    Medication requested: Wellbutrin  mg Date last ordered: 4/5/21 Qty: 90 Refills: 0     buPROPion (WELLBUTRIN XL) 300 MG 24 hr tablet 90 tablet 0 4/5/2021  No   Sig - Route: Take 1 tablet (300 mg total) by mouth every morning. - Oral       Review of MN ?: NA    Lapse in medication adherence greater than 5 days?: appears yes  If yes, call patient and gather details: no  Medication refill request verified as identical to current order?: yes  Result of Last DAM, VPA, Li+ Level, CBC, or Carbamazepine Level (at or since last visit): NO    []Medication refilled per  Medication Refill in Ambulatory Care  policy.  [x]Medication unable to be refilled by RN due to criteria not met as indicated below:    []Eligibility - not seen in the last year   []Supervision - no future appointment   [x]Compliance - no shows, cancellations or lapse in therapy   []Verification - order discrepancy   []Controlled medication   []Medication not included in policy   [x]90-day supply request   [x]Other: LPN is processing request

## 2021-07-15 RX ORDER — BUPROPION HYDROCHLORIDE 300 MG/1
300 TABLET ORAL DAILY
Qty: 90 TABLET | Refills: 0 | Status: SHIPPED | OUTPATIENT
Start: 2021-07-15 | End: 2021-08-25

## 2021-07-15 NOTE — TELEPHONE ENCOUNTER
3 month supply given, to follow up with Dr. Gaitan in October    Lea Santana [facility]

## 2021-07-19 ENCOUNTER — TELEPHONE (OUTPATIENT)
Dept: FAMILY MEDICINE | Facility: CLINIC | Age: 41
End: 2021-07-19

## 2021-07-19 DIAGNOSIS — F41.8 DEPRESSION WITH ANXIETY: ICD-10-CM

## 2021-07-19 RX ORDER — GABAPENTIN 100 MG/1
200 CAPSULE ORAL 3 TIMES DAILY
Qty: 540 CAPSULE | Refills: 1 | Status: SHIPPED | OUTPATIENT
Start: 2021-07-19 | End: 2021-08-25

## 2021-07-19 NOTE — TELEPHONE ENCOUNTER
Reason for Call:  Patient is calling for a refill of medication  gabapentin (NEURONTIN) 100 MG capsule 180 capsule 0   Takes 2 capsules 3 x a day    SEND TO [facility] [place]    Detailed comments: patient takes this for mental health, states no longer see prescribing provider.    Phone Number Patient can be reached at: Home number on file [phone] (home)    Best Time: any    Can we leave a detailed message on this number? YES    Call taken on 7/19/2021 at 9:36 AM by Haydee Sanabria

## 2021-07-23 DIAGNOSIS — G43.719 INTRACTABLE CHRONIC MIGRAINE WITHOUT AURA AND WITHOUT STATUS MIGRAINOSUS: ICD-10-CM

## 2021-07-27 ENCOUNTER — MYC MEDICAL ADVICE (OUTPATIENT)
Dept: FAMILY MEDICINE | Facility: CLINIC | Age: 41
End: 2021-07-27

## 2021-07-27 NOTE — TELEPHONE ENCOUNTER
"Routing refill request to provider for review/approval because:  Serotonin agonist request needs review    Last Written Prescription Date:  9/14/2020  Last Fill Quantity: 10,  # refills: 3   Last office visit provider:  7/2/2021 Dr. Kinney     rizatriptan (MAXALT) 10 MG tablet 10 tablet 3 9/14/2020  No   Sig - Route: Take 1 tablet (10 mg total) by mouth as needed for migraine. May repeat in 2 hours if needed - Oral   Sent to pharmacy as: rizatriptan 10 mg tablet (MAXALT)   E-Prescribing Status: Receipt confirmed by pharmacy (9/14/2020 12:22 PM CDT         Requested Prescriptions   Pending Prescriptions Disp Refills     rizatriptan (MAXALT) 10 MG tablet [Pharmacy Med Name: RIZATRIPTAN 10 MG TABLET] 10 tablet 3     Sig: TAKE 1 TABLET BY MOUTH AS NEEDED FOR MIGRAINE. MAY REPEAT IN 2 HOURS IF NEEDED       Serotonin Agonists Failed - 7/23/2021  6:30 PM        Failed - Serotonin Agonist request needs review.     Please review patient's record. If patient has had 8 or more treatments in the past month, please forward to provider.          Passed - Blood pressure under 140/90 in past 12 months     BP Readings from Last 3 Encounters:   09/14/20 100/62   07/23/20 112/77   01/22/20 118/70                 Passed - Recent (12 mo) or future (30 days) visit within the authorizing provider's specialty     Patient has had an office visit with the authorizing provider or a provider within the authorizing providers department within the previous 12 mos or has a future within next 30 days. See \"Patient Info\" tab in inbasket, or \"Choose Columns\" in Meds & Orders section of the refill encounter.              Passed - Medication is active on med list        Passed - Patient is age 18 or older        Passed - No active pregnancy on record        Passed - No positive pregnancy test in past 12 months             Beverly Smallwood RN 07/27/21 3:56 PM  "

## 2021-07-28 RX ORDER — RIZATRIPTAN BENZOATE 10 MG/1
TABLET ORAL
Qty: 10 TABLET | Refills: 3 | Status: SHIPPED | OUTPATIENT
Start: 2021-07-28 | End: 2022-01-21

## 2021-07-31 ENCOUNTER — E-VISIT (OUTPATIENT)
Dept: URGENT CARE | Facility: CLINIC | Age: 41
End: 2021-07-31
Payer: COMMERCIAL

## 2021-07-31 DIAGNOSIS — N39.0 ACUTE UTI (URINARY TRACT INFECTION): Primary | ICD-10-CM

## 2021-07-31 PROCEDURE — 99421 OL DIG E/M SVC 5-10 MIN: CPT | Performed by: NURSE PRACTITIONER

## 2021-07-31 RX ORDER — NITROFURANTOIN 25; 75 MG/1; MG/1
100 CAPSULE ORAL 2 TIMES DAILY
Qty: 10 CAPSULE | Refills: 0 | Status: SHIPPED | OUTPATIENT
Start: 2021-07-31 | End: 2021-08-05

## 2021-08-01 NOTE — PATIENT INSTRUCTIONS
Dear Sherry Sweeney    After reviewing your responses, I've been able to diagnose you with a urinary tract infection, which is a common infection of the bladder with bacteria.  This is not a sexually transmitted infection, though urinating immediately after intercourse can help prevent infections.  Drinking lots of fluids is also helpful to clear your current infection and prevent the next one.      I have sent a prescription for antibiotics to your pharmacy to treat this infection.    It is important that you take all of your prescribed medication even if your symptoms are improving after a few doses.  Taking all of your medicine helps prevent the symptoms from returning.     If your symptoms worsen, you develop pain in your back or stomach, develop fevers, or are not improving in 5 days, please contact your primary care provider for an appointment or visit any of our convenient Walk-in or Urgent Care Centers to be seen, which can be found on our website here.    Thanks again for choosing us as your health care partner,    Nancy Nielsen, CNP

## 2021-08-02 ENCOUNTER — MYC MEDICAL ADVICE (OUTPATIENT)
Dept: FAMILY MEDICINE | Facility: CLINIC | Age: 41
End: 2021-08-02

## 2021-08-08 ENCOUNTER — E-VISIT (OUTPATIENT)
Dept: URGENT CARE | Facility: URGENT CARE | Age: 41
End: 2021-08-08
Payer: COMMERCIAL

## 2021-08-08 ENCOUNTER — E-VISIT (OUTPATIENT)
Dept: URGENT CARE | Facility: CLINIC | Age: 41
End: 2021-08-08
Payer: COMMERCIAL

## 2021-08-08 DIAGNOSIS — N39.0 ACUTE UTI (URINARY TRACT INFECTION): Primary | ICD-10-CM

## 2021-08-08 PROCEDURE — 99207 PR NO BILLABLE SERVICE THIS VISIT: CPT | Performed by: NURSE PRACTITIONER

## 2021-08-08 PROCEDURE — 99421 OL DIG E/M SVC 5-10 MIN: CPT | Performed by: EMERGENCY MEDICINE

## 2021-08-08 RX ORDER — NITROFURANTOIN 25; 75 MG/1; MG/1
100 CAPSULE ORAL 2 TIMES DAILY
Qty: 10 CAPSULE | Refills: 0 | Status: SHIPPED | OUTPATIENT
Start: 2021-08-08 | End: 2021-08-13

## 2021-08-08 NOTE — PATIENT INSTRUCTIONS
Dear Sherry Sweeney    After reviewing your responses, I've been able to diagnose you with a urinary tract infection, which is a common infection of the bladder with bacteria.  This is not a sexually transmitted infection, though urinating immediately after intercourse can help prevent infections.  Drinking lots of fluids is also helpful to clear your current infection and prevent the next one.      I have sent a prescription for antibiotics to your pharmacy to treat this infection.    It is important that you take all of your prescribed medication even if your symptoms are improving after a few doses.  Taking all of your medicine helps prevent the symptoms from returning.     If your symptoms worsen, you develop pain in your back or stomach, develop fevers, or are not improving in 5 days, please contact your primary care provider for an appointment or visit any of our convenient Walk-in or Urgent Care Centers to be seen, which can be found on our website here.    Thanks again for choosing us as your health care partner,    Og Greenberg MD

## 2021-08-08 NOTE — PATIENT INSTRUCTIONS
Dear Sherry Sweeney,    We are sorry you are not feeling well. Based on the responses you provided it is recommended that you be seen in-person in urgent care so we can better evaluate your symptoms. Please click here to find the nearest urgent care location to you.   You will not be charged for this Visit. Thank you for trusting us with your care.    Given that macrobid did not work and you are feeling feverish you should have an in person exam and labwork work to rule out pyelonephritis kidney infection. I hope you feel better soon.    Amanda Peres, NP

## 2021-08-14 ENCOUNTER — HEALTH MAINTENANCE LETTER (OUTPATIENT)
Age: 41
End: 2021-08-14

## 2021-08-16 RX ORDER — HYDROXYZINE HYDROCHLORIDE 25 MG/1
25-50 TABLET, FILM COATED ORAL 3 TIMES DAILY PRN
Qty: 60 TABLET | Refills: 1 | Status: CANCELLED | OUTPATIENT
Start: 2021-08-16

## 2021-08-16 NOTE — TELEPHONE ENCOUNTER
Patient also requesting Hydroxyzine. She has appointment with Carline MURDOCK on 10/28/2021.    Last script noted below:    Outpatient Medication Detail     Disp Refills Start End ROCHELLE   hydrOXYzine HCL (ATARAX) 25 MG tablet 180 tablet 0 4/5/2021  No   Sig - Route: Take 1 tablet (25 mg total) by mouth at bedtime as needed, may repeat once for anxiety (sleep). - Oral   Sent to pharmacy as: hydrOXYzine HCL 25 mg tablet (ATARAX)   E-Prescribing Status: Receipt confirmed by pharmacy (4/5/2021  1:42 PM CDT)   hydrOXYzine HCL (ATARAX) 25 MG tablet [[id number]]    Electronically signed by: Lisa Shelton CNP on 04/05/21 1342 Status: Active   Ordering user: Lisa Shelton CNP 04/05/21 1342 Authorized by: Lisa Shelton CNP   PRN reasons: anxiety   PRN Comment: sleep   Frequency: Bedtime PRN may repeat x1 04/05/21 - Until Discontinued Released by: Lisa Shelton CNP 04/05/21 1342   Diagnoses  Other insomnia [G47.09]

## 2021-08-25 ENCOUNTER — OFFICE VISIT (OUTPATIENT)
Dept: FAMILY MEDICINE | Facility: CLINIC | Age: 41
End: 2021-08-25
Payer: COMMERCIAL

## 2021-08-25 VITALS
SYSTOLIC BLOOD PRESSURE: 100 MMHG | WEIGHT: 130 LBS | HEIGHT: 61 IN | HEART RATE: 82 BPM | OXYGEN SATURATION: 99 % | BODY MASS INDEX: 24.55 KG/M2 | DIASTOLIC BLOOD PRESSURE: 60 MMHG

## 2021-08-25 DIAGNOSIS — E55.9 VITAMIN D DEFICIENCY: ICD-10-CM

## 2021-08-25 DIAGNOSIS — R11.0 NAUSEA: ICD-10-CM

## 2021-08-25 DIAGNOSIS — R10.13 EPIGASTRIC PAIN: ICD-10-CM

## 2021-08-25 DIAGNOSIS — Z00.00 ENCOUNTER FOR MEDICARE ANNUAL WELLNESS EXAM: Primary | ICD-10-CM

## 2021-08-25 DIAGNOSIS — L70.0 ACNE VULGARIS: ICD-10-CM

## 2021-08-25 DIAGNOSIS — Z79.899 MEDICATION MANAGEMENT: ICD-10-CM

## 2021-08-25 DIAGNOSIS — G71.3 MITOCHONDRIAL MYOPATHY: ICD-10-CM

## 2021-08-25 DIAGNOSIS — G43.909 MIGRAINE WITHOUT STATUS MIGRAINOSUS, NOT INTRACTABLE, UNSPECIFIED MIGRAINE TYPE: ICD-10-CM

## 2021-08-25 DIAGNOSIS — F41.8 DEPRESSION WITH ANXIETY: ICD-10-CM

## 2021-08-25 DIAGNOSIS — G47.00 INSOMNIA, UNSPECIFIED TYPE: ICD-10-CM

## 2021-08-25 DIAGNOSIS — Z86.19 HISTORY OF COLD SORES: ICD-10-CM

## 2021-08-25 DIAGNOSIS — F33.9 MAJOR DEPRESSION, RECURRENT, CHRONIC (H): ICD-10-CM

## 2021-08-25 PROBLEM — F43.10 POST-TRAUMATIC STRESS DISORDER: Status: ACTIVE | Noted: 2021-08-25

## 2021-08-25 PROBLEM — M79.7 FIBROMYALGIA: Status: ACTIVE | Noted: 2021-08-25

## 2021-08-25 PROBLEM — M26.629 ARTHRALGIA OF TEMPOROMANDIBULAR JOINT: Status: ACTIVE | Noted: 2018-03-28

## 2021-08-25 PROBLEM — F41.1 GENERALIZED ANXIETY DISORDER: Status: ACTIVE | Noted: 2021-08-25

## 2021-08-25 PROBLEM — Z90.710 H/O: HYSTERECTOMY: Status: ACTIVE | Noted: 2018-02-22

## 2021-08-25 LAB
ALBUMIN SERPL-MCNC: 3.8 G/DL (ref 3.5–5)
ALP SERPL-CCNC: 50 U/L (ref 45–120)
ALT SERPL W P-5'-P-CCNC: 14 U/L (ref 0–45)
ANION GAP SERPL CALCULATED.3IONS-SCNC: 7 MMOL/L (ref 5–18)
AST SERPL W P-5'-P-CCNC: 17 U/L (ref 0–40)
BILIRUB SERPL-MCNC: 0.3 MG/DL (ref 0–1)
BUN SERPL-MCNC: 8 MG/DL (ref 8–22)
CALCIUM SERPL-MCNC: 9.5 MG/DL (ref 8.5–10.5)
CHLORIDE BLD-SCNC: 104 MMOL/L (ref 98–107)
CO2 SERPL-SCNC: 27 MMOL/L (ref 22–31)
CREAT SERPL-MCNC: 0.75 MG/DL (ref 0.6–1.1)
GFR SERPL CREATININE-BSD FRML MDRD: >90 ML/MIN/1.73M2
GLUCOSE BLD-MCNC: 79 MG/DL (ref 70–125)
POTASSIUM BLD-SCNC: 4.4 MMOL/L (ref 3.5–5)
PROT SERPL-MCNC: 6.6 G/DL (ref 6–8)
SODIUM SERPL-SCNC: 138 MMOL/L (ref 136–145)

## 2021-08-25 PROCEDURE — 99213 OFFICE O/P EST LOW 20 MIN: CPT | Mod: 25 | Performed by: FAMILY MEDICINE

## 2021-08-25 PROCEDURE — 80053 COMPREHEN METABOLIC PANEL: CPT | Performed by: FAMILY MEDICINE

## 2021-08-25 PROCEDURE — 82607 VITAMIN B-12: CPT | Performed by: FAMILY MEDICINE

## 2021-08-25 PROCEDURE — G0439 PPPS, SUBSEQ VISIT: HCPCS | Performed by: FAMILY MEDICINE

## 2021-08-25 PROCEDURE — 82306 VITAMIN D 25 HYDROXY: CPT | Performed by: FAMILY MEDICINE

## 2021-08-25 PROCEDURE — 82746 ASSAY OF FOLIC ACID SERUM: CPT | Performed by: FAMILY MEDICINE

## 2021-08-25 PROCEDURE — 36415 COLL VENOUS BLD VENIPUNCTURE: CPT | Performed by: FAMILY MEDICINE

## 2021-08-25 RX ORDER — SUCRALFATE ORAL 1 G/10ML
1 SUSPENSION ORAL 4 TIMES DAILY
Qty: 280 ML | Refills: 0 | Status: SHIPPED | OUTPATIENT
Start: 2021-08-25 | End: 2021-09-01

## 2021-08-25 RX ORDER — OMEPRAZOLE 40 MG/1
40 CAPSULE, DELAYED RELEASE ORAL DAILY
Qty: 60 CAPSULE | Refills: 0 | Status: SHIPPED | OUTPATIENT
Start: 2021-08-25 | End: 2022-05-11

## 2021-08-25 RX ORDER — SPIRONOLACTONE 50 MG/1
50 TABLET, FILM COATED ORAL 2 TIMES DAILY
Qty: 180 TABLET | Refills: 3 | Status: ON HOLD | OUTPATIENT
Start: 2021-08-25 | End: 2021-10-18

## 2021-08-25 RX ORDER — TRETINOIN 0.5 MG/G
CREAM TOPICAL AT BEDTIME
Qty: 45 G | Refills: 0 | Status: SHIPPED | OUTPATIENT
Start: 2021-08-25 | End: 2021-12-01

## 2021-08-25 RX ORDER — BUPROPION HYDROCHLORIDE 300 MG/1
300 TABLET ORAL DAILY
Qty: 90 TABLET | Refills: 3 | Status: ON HOLD | OUTPATIENT
Start: 2021-08-25 | End: 2021-10-18

## 2021-08-25 RX ORDER — DESVENLAFAXINE 25 MG/1
TABLET, EXTENDED RELEASE ORAL
COMMUNITY
Start: 2021-04-05 | End: 2021-09-30

## 2021-08-25 RX ORDER — VALACYCLOVIR HYDROCHLORIDE 1 G/1
TABLET, FILM COATED ORAL
Qty: 12 TABLET | Refills: 3 | Status: SHIPPED | OUTPATIENT
Start: 2021-08-25 | End: 2024-03-01

## 2021-08-25 RX ORDER — VALACYCLOVIR HYDROCHLORIDE 1 G/1
TABLET, FILM COATED ORAL
COMMUNITY
Start: 2020-09-29 | End: 2021-08-25

## 2021-08-25 RX ORDER — ZOLPIDEM TARTRATE 5 MG/1
5 TABLET ORAL
Qty: 30 TABLET | Refills: 0 | Status: SHIPPED | OUTPATIENT
Start: 2021-08-25 | End: 2022-07-18

## 2021-08-25 RX ORDER — GABAPENTIN 100 MG/1
300 CAPSULE ORAL 2 TIMES DAILY
Qty: 540 CAPSULE | Refills: 3 | Status: SHIPPED | OUTPATIENT
Start: 2021-08-25 | End: 2023-01-19

## 2021-08-25 RX ORDER — HYDROXYZINE HYDROCHLORIDE 25 MG/1
25-50 TABLET, FILM COATED ORAL AT BEDTIME
Qty: 180 TABLET | Refills: 1 | Status: SHIPPED | OUTPATIENT
Start: 2021-08-25 | End: 2021-12-20

## 2021-08-25 RX ORDER — LANOLIN ALCOHOL/MO/W.PET/CERES
3 CREAM (GRAM) TOPICAL
Qty: 30 TABLET | Refills: 3 | Status: SHIPPED | OUTPATIENT
Start: 2021-08-25 | End: 2021-11-18

## 2021-08-25 RX ORDER — ONDANSETRON 4 MG/1
4 TABLET, FILM COATED ORAL EVERY 8 HOURS PRN
Qty: 30 TABLET | Refills: 0 | Status: SHIPPED | OUTPATIENT
Start: 2021-08-25 | End: 2022-08-03

## 2021-08-25 ASSESSMENT — PATIENT HEALTH QUESTIONNAIRE - PHQ9
10. IF YOU CHECKED OFF ANY PROBLEMS, HOW DIFFICULT HAVE THESE PROBLEMS MADE IT FOR YOU TO DO YOUR WORK, TAKE CARE OF THINGS AT HOME, OR GET ALONG WITH OTHER PEOPLE: SOMEWHAT DIFFICULT
SUM OF ALL RESPONSES TO PHQ QUESTIONS 1-9: 6
SUM OF ALL RESPONSES TO PHQ QUESTIONS 1-9: 6

## 2021-08-25 ASSESSMENT — MIFFLIN-ST. JEOR: SCORE: 1192.06

## 2021-08-25 NOTE — PATIENT INSTRUCTIONS
Patient Education   Personalized Prevention Plan  You are due for the preventive services outlined below.  Your care team is available to assist you in scheduling these services.  If you have already completed any of these items, please share that information with your care team to update in your medical record.  Health Maintenance Due   Topic Date Due     ANNUAL REVIEW OF HM ORDERS  Never done     Depression Action Plan  Never done     Diptheria Tetanus Pertussis (DTAP/TDAP/TD) Vaccine (2 - Td or Tdap) 06/11/2020     Flu Vaccine (1) 09/01/2021

## 2021-08-25 NOTE — PROGRESS NOTES
SUBJECTIVE:   Sherry Sweeney is a 41 year old female who presents for Preventive Visit.      Patient has been advised of split billing requirements and indicates understanding: Yes   Are you in the first 12 months of your Medicare coverage?  No    HPI  Do you feel safe in your environment? Yes    Have you ever done Advance Care Planning? (For example, a Health Directive, POLST, or a discussion with a medical provider or your loved ones about your wishes): Yes, patient states has an Advance Care Planning document and will bring a copy to the clinic.       Fall risk     click delete button to remove this line now  Cognitive Screening Pt is only 41 declined    Do you have sleep apnea, excessive snoring or daytime drowsiness?: no    Reviewed and updated as needed this visit by clinical staff  Tobacco  Allergies               Reviewed and updated as needed this visit by Provider                Social History     Tobacco Use     Smoking status: Never Smoker     Smokeless tobacco: Never Used   Substance Use Topics     Alcohol use: No     If you drink alcohol do you typically have >3 drinks per day or >7 drinks per week? No    No flowsheet data found.      Current providers sharing in care for this patient include:   Patient Care Team:  Layla Martinez as PCP - General (Family Medicine)  Layla Martinez as Referring Physician (Family Practice)  Nadeem Trejo MD as MD (Neurology)  Whitney Cochran, PharmD as Pharmacist (Pharmacist)  Layla Martinez as Assigned PCP  Lisa Shelton CNP as Assigned Behavioral Health Provider    The following health maintenance items are reviewed in Epic and correct as of today:  Health Maintenance Due   Topic Date Due     ANNUAL REVIEW OF HM ORDERS  Never done     DEPRESSION ACTION PLAN  Never done     DTAP/TDAP/TD IMMUNIZATION (2 - Td or Tdap) 06/11/2020     INFLUENZA VACCINE (1) 09/01/2021     Lab work is in process  Mammogram Screening: yearly  "mammogram advised starting at age 45  Last 3 Pap and HPV Results:   PAP / HPV 8/22/2017 4/11/2016   PAP Negative for squamous intraepithelial lesion or malignancy  Electronically signed by Harry Block CT (ASCP) on 8/30/2017 at 11:42 AM   Atypical squamous cells of undetermined significance  Electronically signed by Lenin Siddiqui MD on 4/19/2016 at  1:06 PM       Any new diagnosis of family breast, ovarian, or bowel cancer? No    FHS-7: No flowsheet data found.  click delete button to remove this line now  Patient under 40 years of age: Routine Mammogram Screening not recommended.   Pertinent mammograms are reviewed under the imaging tab.    Review of Systems  Constitutional, HEENT, cardiovascular, pulmonary, gi and gu systems are negative, except as otherwise noted.    OBJECTIVE:   /60 (BP Location: Left arm, Patient Position: Left side, Cuff Size: Adult Regular)   Pulse 82   Ht 1.549 m (5' 1\")   Wt 59 kg (130 lb)   SpO2 99%   BMI 24.56 kg/m   Estimated body mass index is 24.56 kg/m  as calculated from the following:    Height as of this encounter: 1.549 m (5' 1\").    Weight as of this encounter: 59 kg (130 lb).  Physical Exam  GENERAL: healthy, alert and no distress  NECK: no adenopathy, no asymmetry, masses, or scars and thyroid normal to palpation  RESP: lungs clear to auscultation - no rales, rhonchi or wheezes  CV: regular rate and rhythm, normal S1 S2, no S3 or S4, no murmur, click or rub, no peripheral edema and peripheral pulses strong  ABDOMEN: soft, nontender, no hepatosplenomegaly, no masses and bowel sounds normal  MS: no gross musculoskeletal defects noted, no edema    Diagnostic Test Results:  Labs reviewed in Epic    ASSESSMENT / PLAN:     Problem List Items Addressed This Visit        Nervous and Auditory    Migraine headache     Continues to struggle with frequent migraine headaches.  Uses Maxalt as needed and is considering going back for another Botox injection.  " Encouraged her to continue to follow with her neurologist.         Relevant Medications    desvenlafaxine succinate (PRISTIQ) 25 MG 24 hr tablet    buPROPion (WELLBUTRIN XL) 300 MG 24 hr tablet    gabapentin (NEURONTIN) 100 MG capsule    Other Relevant Orders    Adult Neurology Referral       Endocrine    Mitochondrial myopathy     On Social Security disability due to her mitochondrial myopathy.  Continues to struggle with fatigue and weakness.            Musculoskeletal and Integumentary    Acne vulgaris     Uses Retin-A cream         Relevant Medications    spironolactone (ALDACTONE) 50 MG tablet    tretinoin (RETIN-A) 0.05 % external cream       Behavioral    Major depression, recurrent, chronic (H)     Currently looking to establish with a new psychiatrist and requested that I provide refills on her psychiatric medication which I did today.  Depression currently in clinical remission with a PHQ-9 score of 6.         Relevant Medications    desvenlafaxine succinate (PRISTIQ) 25 MG 24 hr tablet    buPROPion (WELLBUTRIN XL) 300 MG 24 hr tablet    hydrOXYzine (ATARAX) 25 MG tablet       Other    Insomnia     Continues to struggle with this and uses Ambien at bedtime as needed.         Relevant Medications    zolpidem (AMBIEN) 5 MG tablet    melatonin 3 MG tablet      Other Visit Diagnoses     Encounter for Medicare annual wellness exam    -  Primary    Nausea        Relevant Medications    ondansetron (ZOFRAN) 4 MG tablet    Depression with anxiety        Relevant Medications    desvenlafaxine succinate (PRISTIQ) 25 MG 24 hr tablet    buPROPion (WELLBUTRIN XL) 300 MG 24 hr tablet    gabapentin (NEURONTIN) 100 MG capsule    hydrOXYzine (ATARAX) 25 MG tablet    Other Relevant Orders    Vitamin D Deficiency (Completed)    History of cold sores        Relevant Medications    valACYclovir (VALTREX) 1000 mg tablet    Epigastric pain        Relevant Medications    sucralfate (CARAFATE) 1 GM/10ML suspension    omeprazole  "(PRILOSEC) 40 MG DR capsule    Medication management        Relevant Orders    Comprehensive metabolic panel (Completed)    Vitamin B12 (Completed)    Folate (Completed)    Vitamin D deficiency        Relevant Orders    Vitamin D Deficiency (Completed)          Patient has been advised of split billing requirements and indicates understanding: Yes  COUNSELING:  Reviewed preventive health counseling, as reflected in patient instructions       Regular exercise       Healthy diet/nutrition       Osteoporosis prevention/bone health    Estimated body mass index is 24.56 kg/m  as calculated from the following:    Height as of this encounter: 1.549 m (5' 1\").    Weight as of this encounter: 59 kg (130 lb).        She reports that she has never smoked. She has never used smokeless tobacco.      Appropriate preventive services were discussed with this patient, including applicable screening as appropriate for cardiovascular disease, diabetes, osteopenia/osteoporosis, and glaucoma.  As appropriate for age/gender, discussed screening for colorectal cancer, prostate cancer, breast cancer, and cervical cancer. Checklist reviewing preventive services available has been given to the patient.    Reviewed patients plan of care and provided an AVS. The Basic Care Plan (routine screening as documented in Health Maintenance) for Sherry meets the Care Plan requirement. This Care Plan has been established and reviewed with the Patient.    Counseling Resources:  ATP IV Guidelines  Pooled Cohorts Equation Calculator  Breast Cancer Risk Calculator  Breast Cancer: Medication to Reduce Risk  FRAX Risk Assessment  ICSI Preventive Guidelines  Dietary Guidelines for Americans, 2010  USDA's MyPlate  ASA Prophylaxis  Lung CA Screening    MATTHEW RESENDIZ  [facility]    Identified Health Risks:  Answers for HPI/ROS submitted by the patient on 8/25/2021  If you checked off any problems, how difficult have these problems " made it for you to do your work, take care of things at home, or get along with other people?: Somewhat difficult  PHQ9 TOTAL SCORE: 6

## 2021-08-26 LAB
DEPRECATED CALCIDIOL+CALCIFEROL SERPL-MC: 28 UG/L (ref 30–80)
FOLATE SERPL-MCNC: 14.1 NG/ML
VIT B12 SERPL-MCNC: 1024 PG/ML (ref 213–816)

## 2021-08-26 ASSESSMENT — PATIENT HEALTH QUESTIONNAIRE - PHQ9: SUM OF ALL RESPONSES TO PHQ QUESTIONS 1-9: 6

## 2021-08-29 PROBLEM — G47.50 PARASOMNIA: Status: ACTIVE | Noted: 2020-09-14

## 2021-08-29 PROBLEM — R45.851 SUICIDAL IDEATIONS: Status: RESOLVED | Noted: 2019-09-05 | Resolved: 2021-08-29

## 2021-08-29 NOTE — ASSESSMENT & PLAN NOTE
On Social Security disability due to her mitochondrial myopathy.  Continues to struggle with fatigue and weakness.

## 2021-08-29 NOTE — ASSESSMENT & PLAN NOTE
Continues to struggle with frequent migraine headaches.  Uses Maxalt as needed and is considering going back for another Botox injection.  Encouraged her to continue to follow with her neurologist.

## 2021-08-29 NOTE — ASSESSMENT & PLAN NOTE
Currently looking to establish with a new psychiatrist and requested that I provide refills on her psychiatric medication which I did today.  Depression currently in clinical remission with a PHQ-9 score of 6.

## 2021-09-05 ENCOUNTER — HOSPITAL ENCOUNTER (EMERGENCY)
Facility: HOSPITAL | Age: 41
Discharge: HOME | End: 2021-09-05
Attending: EMERGENCY MEDICINE | Admitting: EMERGENCY MEDICINE
Payer: COMMERCIAL

## 2021-09-05 ENCOUNTER — OFFICE VISIT (OUTPATIENT)
Dept: FAMILY MEDICINE | Facility: CLINIC | Age: 41
End: 2021-09-05
Payer: COMMERCIAL

## 2021-09-05 ENCOUNTER — APPOINTMENT (OUTPATIENT)
Dept: CT IMAGING | Facility: HOSPITAL | Age: 41
End: 2021-09-05
Attending: EMERGENCY MEDICINE
Payer: COMMERCIAL

## 2021-09-05 VITALS
DIASTOLIC BLOOD PRESSURE: 86 MMHG | SYSTOLIC BLOOD PRESSURE: 124 MMHG | WEIGHT: 129.2 LBS | OXYGEN SATURATION: 100 % | HEART RATE: 80 BPM | TEMPERATURE: 98.5 F | BODY MASS INDEX: 24.41 KG/M2

## 2021-09-05 VITALS
BODY MASS INDEX: 24.56 KG/M2 | SYSTOLIC BLOOD PRESSURE: 127 MMHG | RESPIRATION RATE: 18 BRPM | DIASTOLIC BLOOD PRESSURE: 76 MMHG | WEIGHT: 130 LBS | HEART RATE: 81 BPM | TEMPERATURE: 98.3 F | OXYGEN SATURATION: 98 %

## 2021-09-05 DIAGNOSIS — N89.8 VAGINAL ITCHING: ICD-10-CM

## 2021-09-05 DIAGNOSIS — R10.32 ABDOMINAL PAIN, LEFT LOWER QUADRANT: Primary | ICD-10-CM

## 2021-09-05 DIAGNOSIS — N70.11 HYDROSALPINX: ICD-10-CM

## 2021-09-05 DIAGNOSIS — R10.32 LLQ ABDOMINAL PAIN: ICD-10-CM

## 2021-09-05 LAB
ALBUMIN UR-MCNC: NEGATIVE MG/DL
ANION GAP SERPL CALCULATED.3IONS-SCNC: 11 MMOL/L (ref 5–18)
APPEARANCE UR: CLEAR
BACTERIA #/AREA URNS HPF: ABNORMAL /HPF
BASOPHILS # BLD AUTO: 0 10E3/UL (ref 0–0.2)
BASOPHILS NFR BLD AUTO: 0 %
BILIRUB UR QL STRIP: NEGATIVE
BUN SERPL-MCNC: 9 MG/DL (ref 8–22)
CALCIUM SERPL-MCNC: 9.1 MG/DL (ref 8.5–10.5)
CHLORIDE BLD-SCNC: 106 MMOL/L (ref 98–107)
CLUE CELLS: NORMAL
CO2 SERPL-SCNC: 20 MMOL/L (ref 22–31)
COLOR UR AUTO: YELLOW
CREAT SERPL-MCNC: 0.72 MG/DL (ref 0.6–1.1)
EOSINOPHIL # BLD AUTO: 0.1 10E3/UL (ref 0–0.7)
EOSINOPHIL NFR BLD AUTO: 1 %
ERYTHROCYTE [DISTWIDTH] IN BLOOD BY AUTOMATED COUNT: 12.5 % (ref 10–15)
GFR SERPL CREATININE-BSD FRML MDRD: >90 ML/MIN/1.73M2
GLUCOSE BLD-MCNC: 79 MG/DL (ref 70–125)
GLUCOSE UR STRIP-MCNC: NEGATIVE MG/DL
HCT VFR BLD AUTO: 38.2 % (ref 35–47)
HGB BLD-MCNC: 12.9 G/DL (ref 11.7–15.7)
HGB UR QL STRIP: NEGATIVE
IMM GRANULOCYTES # BLD: 0 10E3/UL
IMM GRANULOCYTES NFR BLD: 0 %
KETONES UR STRIP-MCNC: NEGATIVE MG/DL
LEUKOCYTE ESTERASE UR QL STRIP: NEGATIVE
LYMPHOCYTES # BLD AUTO: 2.3 10E3/UL (ref 0.8–5.3)
LYMPHOCYTES NFR BLD AUTO: 30 %
MCH RBC QN AUTO: 28.4 PG (ref 26.5–33)
MCHC RBC AUTO-ENTMCNC: 33.8 G/DL (ref 31.5–36.5)
MCV RBC AUTO: 84 FL (ref 78–100)
MONOCYTES # BLD AUTO: 0.5 10E3/UL (ref 0–1.3)
MONOCYTES NFR BLD AUTO: 7 %
NEUTROPHILS # BLD AUTO: 4.7 10E3/UL (ref 1.6–8.3)
NEUTROPHILS NFR BLD AUTO: 62 %
NITRATE UR QL: NEGATIVE
PH UR STRIP: 8.5 [PH] (ref 5–8)
PLATELET # BLD AUTO: 292 10E3/UL (ref 150–450)
POTASSIUM BLD-SCNC: 4.6 MMOL/L (ref 3.5–5)
RBC # BLD AUTO: 4.55 10E6/UL (ref 3.8–5.2)
RBC #/AREA URNS AUTO: ABNORMAL /HPF
SODIUM SERPL-SCNC: 137 MMOL/L (ref 136–145)
SP GR UR STRIP: 1.02 (ref 1–1.03)
SQUAMOUS #/AREA URNS AUTO: ABNORMAL /LPF
TRICHOMONAS, WET PREP: NORMAL
UROBILINOGEN UR STRIP-ACNC: 0.2 E.U./DL
WBC # BLD AUTO: 7.5 10E3/UL (ref 4–11)
WBC #/AREA URNS AUTO: ABNORMAL /HPF
WBC'S/HIGH POWER FIELD, WET PREP: NORMAL
YEAST, WET PREP: NORMAL

## 2021-09-05 PROCEDURE — 250N000011 HC RX IP 250 OP 636: Performed by: EMERGENCY MEDICINE

## 2021-09-05 PROCEDURE — 96374 THER/PROPH/DIAG INJ IV PUSH: CPT | Mod: 59

## 2021-09-05 PROCEDURE — 36415 COLL VENOUS BLD VENIPUNCTURE: CPT | Performed by: EMERGENCY MEDICINE

## 2021-09-05 PROCEDURE — 74177 CT ABD & PELVIS W/CONTRAST: CPT

## 2021-09-05 PROCEDURE — 85025 COMPLETE CBC W/AUTO DIFF WBC: CPT | Performed by: NURSE PRACTITIONER

## 2021-09-05 PROCEDURE — 99285 EMERGENCY DEPT VISIT HI MDM: CPT | Mod: 25

## 2021-09-05 PROCEDURE — 81001 URINALYSIS AUTO W/SCOPE: CPT | Performed by: NURSE PRACTITIONER

## 2021-09-05 PROCEDURE — 99214 OFFICE O/P EST MOD 30 MIN: CPT | Performed by: NURSE PRACTITIONER

## 2021-09-05 PROCEDURE — 87210 SMEAR WET MOUNT SALINE/INK: CPT | Performed by: NURSE PRACTITIONER

## 2021-09-05 PROCEDURE — 80048 BASIC METABOLIC PNL TOTAL CA: CPT | Performed by: EMERGENCY MEDICINE

## 2021-09-05 PROCEDURE — 36415 COLL VENOUS BLD VENIPUNCTURE: CPT | Performed by: NURSE PRACTITIONER

## 2021-09-05 RX ORDER — IOPAMIDOL 755 MG/ML
100 INJECTION, SOLUTION INTRAVASCULAR ONCE
Status: COMPLETED | OUTPATIENT
Start: 2021-09-05 | End: 2021-09-05

## 2021-09-05 RX ORDER — OXYCODONE HYDROCHLORIDE 5 MG/1
5 TABLET ORAL EVERY 6 HOURS PRN
Qty: 12 TABLET | Refills: 0 | Status: SHIPPED | OUTPATIENT
Start: 2021-09-05 | End: 2022-04-28

## 2021-09-05 RX ORDER — KETOROLAC TROMETHAMINE 30 MG/ML
30 INJECTION, SOLUTION INTRAMUSCULAR; INTRAVENOUS ONCE
Status: COMPLETED | OUTPATIENT
Start: 2021-09-05 | End: 2021-09-05

## 2021-09-05 RX ADMIN — KETOROLAC TROMETHAMINE 30 MG: 30 INJECTION, SOLUTION INTRAMUSCULAR; INTRAVENOUS at 19:47

## 2021-09-05 RX ADMIN — IOPAMIDOL 100 ML: 755 INJECTION, SOLUTION INTRAVENOUS at 20:41

## 2021-09-05 ASSESSMENT — ENCOUNTER SYMPTOMS
APPETITE CHANGE: 1
VOMITING: 0
ABDOMINAL PAIN: 1
FLANK PAIN: 0
DYSURIA: 0
FEVER: 0
CHILLS: 1
NAUSEA: 0

## 2021-09-05 NOTE — ED TRIAGE NOTES
Pt comes in sent from urgent care for LLQ abdominal pain. HX of ovarian cysts. Pt had some labs, pelvic, urine  done at Urgent Care. Sent here for further evaluation.

## 2021-09-05 NOTE — PATIENT INSTRUCTIONS
Patient Education     Unknown Causes of Abdominal Pain (Female)    The exact cause of your belly (abdominal) pain is not clear. This does not mean that this is something to worry about. Everyone likes to know the exact cause of the problem. But sometimes with belly pain, there is no clear-cut cause, and this could be a good thing. The good news is that your symptoms can be treated, and you will feel better.   Your condition does not seem serious now. But sometimes the signs of a serious problem may take more time to appear. For this reason, it is important for you to watch for any new symptoms, problems, or worsening of your condition.  Over the next few days, the abdominal pain may come and go. Or it may be constant. Other common symptoms can include nausea and vomiting. Sometimes it can be difficult to tell if you feel nauseous. You may just feel bad and not connect that feeling to nausea. Constipation, diarrhea, and a fever may go along with the pain.  The pain may continue even if treated correctly over the following days. Depending on how things go, sometimes the cause can become clear and may need more or different treatment. Additional evaluations, medicines, or tests may also be needed.  Home care  Your healthcare provider may prescribe medicine for pain, symptoms, or an infection.  Follow the healthcare provider's instructions for taking these medicines.  General care    Rest as much as you can until your next exam. No strenuous activities.    Try to find positions that ease discomfort. A small pillow placed on the abdomen may help relieve pain.    Something warm on your abdomen (such as a heating pad) may help, but be careful not to burn yourself.  Diet    Don t force yourself to eat, especially if having cramps, vomiting, or diarrhea.    Water is important so you don't get dehydrated. Soup may also be good. Sports drinks may also help, especially if they are not too acidic. Don't drink sugary drinks as  this can make things worse. Take liquids in small amounts. Don t guzzle them.    Caffeine sometimes makes the pain and cramping worse.    Don t take dairy products if you have vomiting or diarrhea.    Don't eat large amounts at a time. Wait a few minutes between bites.    Eat a diet low in fiber (called a low-residue diet). Foods allowed include refined breads, white rice, fruit and vegetable juices without pulp, tender meats. These foods will pass more easily through the intestine.    Don t have whole-grain foods, whole fruits and vegetables, meats, seeds and nuts, fried or fatty foods, dairy, alcohol and spicy foods until your symptoms go away.  Follow-up care  Follow up with your healthcare provider, or as advised, if your pain does not begin to improve in the next 24 hours.  Call 911  Call 911 if any of these occur:    Trouble breathing    Confusion    Fainting or loss of consciousness    Rapid heart rate    Seizure  When to seek medical advice  Call your healthcare provider right away if any of these occur:    Pain gets worse or moves to the right lower abdomen    New or worsening vomiting or diarrhea    Swelling of the abdomen    Unable to pass stool for more than 3 days    Fever of 100.4 F (38 C) or higher, or as directed by your healthcare provider.    Blood in vomit or bowel movements (dark red or black color)    Yellow color of eyes and skin (jaundice)    Weakness, dizziness    Chest, arm, back, neck, or jaw pain    Unexpected vaginal bleeding or missed period    Can't keep down liquids or water and you are getting dehydrated  [facility] last reviewed this educational content on 6/1/2018 2000-2021 [employer]. All rights reserved. This information is not intended as a substitute for professional medical care. Always follow your healthcare professional's instructions.

## 2021-09-05 NOTE — PROGRESS NOTES
SUBJECTIVE:   Sherry Sweeney is a 41 year old female presenting with a chief complaint of   Chief Complaint   Patient presents with     Abdominal Pain     Left lower abdominal pain, worse with movement.  Ongoing x4 days.        She is an established patient of [facility].    Abdominal Pain    Location: LLQ   Radiation: None.    Pain character: pressure and sharp,   Severity: 9 on a scale of 1-10.    Duration: ongoing, and progressive over past 4 days  Course of Illness: slowly progressive.  Exacerbated by: movement/walking  Relieved by: nothing.  Associated Symptoms: none.  Female : no specific vaginal discharge or odor, but has had a BV history. No urinary symptoms. Endorses chills, no fevers.   Surgical History: Hx of emergent right hemorrhagic ovarian surgery followed by hysterectomy with LEFT ovary preserved. Appendectomy.    Review of Systems   Constitutional: Positive for appetite change and chills. Negative for fever.   Gastrointestinal: Positive for abdominal pain. Negative for nausea and vomiting.   Genitourinary: Negative for dysuria, flank pain, vaginal bleeding and vaginal discharge.       Past Medical History:   Diagnosis Date     Chronic fatigue syndrome      Chronic migraine      Depression      Depression      Fibromyalgia      Fibromyalgia      History of anesthesia complications     slow to wake     IBS (irritable bowel syndrome)      Mitochondrial disease (H)      Mitochondrial myopathy      Parasomnia      PONV (postoperative nausea and vomiting)      Family History   Problem Relation Age of Onset     Depression Mother         sertraline     Depression Brother      Anxiety Disorder Maternal Grandmother      Coronary Artery Disease Father 45.00             Depression Father      Hyperlipidemia Mother      Hypertension Mother      Breast Cancer Mother 62.00     Cancer Mother         thyroid      Meniere's disease Mother      Anxiety Disorder Mother      Bone Cancer Maternal Grandfather       Breast Cancer Maternal Grandmother 60.00     Anxiety Disorder Paternal Grandfather      Malignant Hypertension No family hx of      Current Outpatient Medications   Medication Sig Dispense Refill     ascorbic acid 500 MG TABS Take 500 mg by mouth daily       atenolol (TENORMIN) 25 MG tablet Take 1 tablet (25 mg) by mouth 2 times daily 45 tablet 0     boric acid 600 mg vaginal suppository - PHARMACY TO MIX COMPOUND Place 600 mg vaginally At Bedtime       buPROPion (WELLBUTRIN XL) 300 MG 24 hr tablet Take 1 tablet (300 mg) by mouth daily 90 tablet 3     clindamycin-benzoyl peroxide (BENZACLIN) gel Apply topically to acne once daily       desvenlafaxine succinate (PRISTIQ) 25 MG 24 hr tablet        gabapentin (NEURONTIN) 100 MG capsule Take 3 capsules (300 mg) by mouth 2 times daily 540 capsule 3     hydrOXYzine (ATARAX) 25 MG tablet Take 1-2 tablets (25-50 mg) by mouth At Bedtime 180 tablet 1     melatonin 3 MG tablet Take 1 tablet (3 mg) by mouth nightly as needed for sleep 30 tablet 3     Multiple Vitamins-Minerals (DAILY MULTI PO) Take 1 tablet by mouth daily       omeprazole (PRILOSEC) 40 MG DR capsule Take 1 capsule (40 mg) by mouth daily 60 capsule 0     OnabotulinumtoxinA (BOTOX IJ) Inject every 3 months for migraines       ondansetron (ZOFRAN) 4 MG tablet Take 1 tablet (4 mg) by mouth every 8 hours as needed for nausea 30 tablet 0     Riboflavin 400 MG TABS Take 400 mg by mouth daily        rizatriptan (MAXALT) 10 MG tablet TAKE 1 TABLET BY MOUTH AS NEEDED FOR MIGRAINE. MAY REPEAT IN 2 HOURS IF NEEDED 10 tablet 3     spironolactone (ALDACTONE) 50 MG tablet Take 1 tablet (50 mg) by mouth 2 times daily 180 tablet 3     tretinoin (RETIN-A) 0.025 % topical gel Apply topically daily       tretinoin (RETIN-A) 0.05 % external cream Apply topically At Bedtime 45 g 0     valACYclovir (VALTREX) 1000 mg tablet TAKE 2 TABLETS BY MOUTH 12 HOURS APART AS NEEDED EPISODE 12 tablet 3     vitamin E (TOCOPHEROL) 1000 UNIT  capsule Take 1,000 Units by mouth daily       zolpidem (AMBIEN) 5 MG tablet Take 1 tablet (5 mg) by mouth nightly as needed 30 tablet 0     lidocaine (LIDODERM) 5 % patch Place 1 patch onto the skin as needed To neck, shoulders, and back (Patient not taking: Reported on 9/5/2021) 30 patch 0     Social History     Tobacco Use     Smoking status: Never Smoker     Smokeless tobacco: Never Used   Substance Use Topics     Alcohol use: No       OBJECTIVE  /86 (BP Location: Right arm, Patient Position: Sitting, Cuff Size: Adult Regular)   Pulse 80   Temp 98.5  F (36.9  C) (Oral)   Wt 58.6 kg (129 lb 3.2 oz)   SpO2 100%   BMI 24.41 kg/m      Physical Exam  Constitutional:       General: She is in acute distress.      Appearance: She is ill-appearing.      Comments: Appears in pain, pale   Cardiovascular:      Rate and Rhythm: Normal rate and regular rhythm.      Pulses: Normal pulses.      Heart sounds: Normal heart sounds.   Pulmonary:      Effort: Pulmonary effort is normal.      Breath sounds: Normal breath sounds.   Abdominal:      General: There is distension.      Palpations: Abdomen is soft.      Tenderness: There is abdominal tenderness. There is guarding. There is no right CVA tenderness or left CVA tenderness.      Comments: LLQ tenderness, with guarding present   Skin:     General: Skin is warm.      Capillary Refill: Capillary refill takes less than 2 seconds.      Findings: No rash.   Neurological:      Mental Status: She is alert and oriented to person, place, and time.   Psychiatric:         Behavior: Behavior normal.         Labs:  Results for orders placed or performed in visit on 09/05/21 (from the past 24 hour(s))   Wet prep - Clinic Collect    Specimen: Vagina; Swab   Result Value Ref Range    Trichomonas Absent Absent    Yeast Absent Absent    Clue Cells Absent Absent    WBCs/high power field None None   UA with Microscopic reflex to Culture - Clinic Collect    Specimen: Urine, Clean Catch    Result Value Ref Range    Color Urine Yellow Colorless, Straw, Light Yellow, Yellow    Appearance Urine Clear Clear    Glucose Urine Negative Negative mg/dL    Bilirubin Urine Negative Negative    Ketones Urine Negative Negative mg/dL    Specific Gravity Urine 1.020 1.005 - 1.030    Blood Urine Negative Negative    pH Urine 8.5 (H) 5.0 - 8.0    Protein Albumin Urine Negative Negative mg/dL    Urobilinogen Urine 0.2 0.2, 1.0 E.U./dL    Nitrite Urine Negative Negative    Leukocyte Esterase Urine Negative Negative   Urine Microscopic   Result Value Ref Range    Bacteria Urine Few (A) None Seen /HPF    RBC Urine None Seen 0-2 /HPF /HPF    WBC Urine None Seen 0-5 /HPF /HPF    Squamous Epithelials Urine Few (A) None Seen /LPF    Narrative    Urine Culture not indicated   CBC with platelets and differential    Narrative    The following orders were created for panel order CBC with platelets and differential.  Procedure                               Abnormality         Status                     ---------                               -----------         ------                     CBC with platelets and d...[[id number]]                      Final result                 Please view results for these tests on the individual orders.   CBC with platelets and differential   Result Value Ref Range    WBC Count 7.5 4.0 - 11.0 10e3/uL    RBC Count 4.55 3.80 - 5.20 10e6/uL    Hemoglobin 12.9 11.7 - 15.7 g/dL    Hematocrit 38.2 35.0 - 47.0 %    MCV 84 78 - 100 fL    MCH 28.4 26.5 - 33.0 pg    MCHC 33.8 31.5 - 36.5 g/dL    RDW 12.5 10.0 - 15.0 %    Platelet Count 292 150 - 450 10e3/uL    % Neutrophils 62 %    % Lymphocytes 30 %    % Monocytes 7 %    % Eosinophils 1 %    % Basophils 0 %    % Immature Granulocytes 0 %    Absolute Neutrophils 4.7 1.6 - 8.3 10e3/uL    Absolute Lymphocytes 2.3 0.8 - 5.3 10e3/uL    Absolute Monocytes 0.5 0.0 - 1.3 10e3/uL    Absolute Eosinophils 0.1 0.0 - 0.7 10e3/uL    Absolute Basophils 0.0 0.0 - 0.2  10e3/uL    Absolute Immature Granulocytes 0.0 <=0.0 10e3/uL         ASSESSMENT:    ICD-10-CM    1. Abdominal pain, left lower quadrant  R10.32 Wet prep - Clinic Collect     UA with Microscopic reflex to Culture - Clinic Collect     Urine Microscopic     CBC with platelets and differential     CBC with platelets and differential   2. Vaginal itching  N89.8 Wet prep - Clinic Collect     UA with Microscopic reflex to Culture - Clinic Collect     Urine Microscopic        Medical Decision Making:    Differential Diagnosis:  LLQ abdominal pain     Serious Comorbid Conditions:  Adult:  IBS    PLAN: Discussed with patient negative lab, urine and wet prep findings, not consistent with clinical presentation for elevated LLQ abdominal pain. Patient advised to present to the ER for further evaluation of acute abdominal pian. Patient agreed to the plan of care.     SELENA Duron CNP      Patient Instructions     Patient Education     Unknown Causes of Abdominal Pain (Female)    The exact cause of your belly (abdominal) pain is not clear. This does not mean that this is something to worry about. Everyone likes to know the exact cause of the problem. But sometimes with belly pain, there is no clear-cut cause, and this could be a good thing. The good news is that your symptoms can be treated, and you will feel better.   Your condition does not seem serious now. But sometimes the signs of a serious problem may take more time to appear. For this reason, it is important for you to watch for any new symptoms, problems, or worsening of your condition.  Over the next few days, the abdominal pain may come and go. Or it may be constant. Other common symptoms can include nausea and vomiting. Sometimes it can be difficult to tell if you feel nauseous. You may just feel bad and not connect that feeling to nausea. Constipation, diarrhea, and a fever may go along with the pain.  The pain may continue even if treated correctly over the  following days. Depending on how things go, sometimes the cause can become clear and may need more or different treatment. Additional evaluations, medicines, or tests may also be needed.  Home care  Your healthcare provider may prescribe medicine for pain, symptoms, or an infection.  Follow the healthcare provider's instructions for taking these medicines.  General care    Rest as much as you can until your next exam. No strenuous activities.    Try to find positions that ease discomfort. A small pillow placed on the abdomen may help relieve pain.    Something warm on your abdomen (such as a heating pad) may help, but be careful not to burn yourself.  Diet    Don t force yourself to eat, especially if having cramps, vomiting, or diarrhea.    Water is important so you don't get dehydrated. Soup may also be good. Sports drinks may also help, especially if they are not too acidic. Don't drink sugary drinks as this can make things worse. Take liquids in small amounts. Don t guzzle them.    Caffeine sometimes makes the pain and cramping worse.    Don t take dairy products if you have vomiting or diarrhea.    Don't eat large amounts at a time. Wait a few minutes between bites.    Eat a diet low in fiber (called a low-residue diet). Foods allowed include refined breads, white rice, fruit and vegetable juices without pulp, tender meats. These foods will pass more easily through the intestine.    Don t have whole-grain foods, whole fruits and vegetables, meats, seeds and nuts, fried or fatty foods, dairy, alcohol and spicy foods until your symptoms go away.  Follow-up care  Follow up with your healthcare provider, or as advised, if your pain does not begin to improve in the next 24 hours.  Call 911  Call 911 if any of these occur:    Trouble breathing    Confusion    Fainting or loss of consciousness    Rapid heart rate    Seizure  When to seek medical advice  Call your healthcare provider right away if any of these  occur:    Pain gets worse or moves to the right lower abdomen    New or worsening vomiting or diarrhea    Swelling of the abdomen    Unable to pass stool for more than 3 days    Fever of 100.4 F (38 C) or higher, or as directed by your healthcare provider.    Blood in vomit or bowel movements (dark red or black color)    Yellow color of eyes and skin (jaundice)    Weakness, dizziness    Chest, arm, back, neck, or jaw pain    Unexpected vaginal bleeding or missed period    Can't keep down liquids or water and you are getting dehydrated  [employer] last reviewed this educational content on 6/1/2018 2000-2021 [employer]. All rights reserved. This information is not intended as a substitute for professional medical care. Always follow your healthcare professional's instructions.

## 2021-09-06 NOTE — DISCHARGE INSTRUCTIONS
You were seen in the Emergency Department today for evaluation of left-sided abdominal pain.  Your lab work showed no cause of your symptoms. Your imaging studies showed left hydrosalpinx. You were prescribed oxycodone for pain not relieved by Tylenol or ibuprofen. You should take all medications as prescribed.  Follow up with your primary care physician to ensure resolution of symptoms. Return if you have new or worsening symptoms.
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
## 2021-09-27 DIAGNOSIS — Z11.59 ENCOUNTER FOR SCREENING FOR OTHER VIRAL DISEASES: ICD-10-CM

## 2021-09-30 ENCOUNTER — OFFICE VISIT (OUTPATIENT)
Dept: FAMILY MEDICINE | Facility: CLINIC | Age: 41
End: 2021-09-30
Payer: COMMERCIAL

## 2021-09-30 VITALS
WEIGHT: 131 LBS | BODY MASS INDEX: 24.73 KG/M2 | OXYGEN SATURATION: 98 % | DIASTOLIC BLOOD PRESSURE: 62 MMHG | HEIGHT: 61 IN | HEART RATE: 90 BPM | SYSTOLIC BLOOD PRESSURE: 100 MMHG

## 2021-09-30 DIAGNOSIS — Z01.818 PREOP GENERAL PHYSICAL EXAM: Primary | ICD-10-CM

## 2021-09-30 DIAGNOSIS — N70.11 HYDROSALPINX: ICD-10-CM

## 2021-09-30 LAB
ALBUMIN SERPL-MCNC: 3.8 G/DL (ref 3.5–5)
ANION GAP SERPL CALCULATED.3IONS-SCNC: 10 MMOL/L (ref 5–18)
BUN SERPL-MCNC: 8 MG/DL (ref 8–22)
CALCIUM SERPL-MCNC: 9.9 MG/DL (ref 8.5–10.5)
CHLORIDE BLD-SCNC: 104 MMOL/L (ref 98–107)
CO2 SERPL-SCNC: 24 MMOL/L (ref 22–31)
CREAT SERPL-MCNC: 0.81 MG/DL (ref 0.6–1.1)
GFR SERPL CREATININE-BSD FRML MDRD: 90 ML/MIN/1.73M2
GLUCOSE BLD-MCNC: 85 MG/DL (ref 70–125)
HGB BLD-MCNC: 13.4 G/DL (ref 11.7–15.7)
PHOSPHATE SERPL-MCNC: 3.4 MG/DL (ref 2.5–4.5)
POTASSIUM BLD-SCNC: 4 MMOL/L (ref 3.5–5)
SODIUM SERPL-SCNC: 138 MMOL/L (ref 136–145)

## 2021-09-30 PROCEDURE — 99214 OFFICE O/P EST MOD 30 MIN: CPT | Performed by: FAMILY MEDICINE

## 2021-09-30 PROCEDURE — 85018 HEMOGLOBIN: CPT | Performed by: FAMILY MEDICINE

## 2021-09-30 PROCEDURE — 80069 RENAL FUNCTION PANEL: CPT | Performed by: FAMILY MEDICINE

## 2021-09-30 PROCEDURE — 36415 COLL VENOUS BLD VENIPUNCTURE: CPT | Performed by: FAMILY MEDICINE

## 2021-09-30 RX ORDER — DESVENLAFAXINE 25 MG/1
75 TABLET, EXTENDED RELEASE ORAL DAILY
COMMUNITY
End: 2021-10-04

## 2021-09-30 ASSESSMENT — ANXIETY QUESTIONNAIRES
GAD7 TOTAL SCORE: 12
5. BEING SO RESTLESS THAT IT IS HARD TO SIT STILL: MORE THAN HALF THE DAYS
GAD7 TOTAL SCORE: 12
2. NOT BEING ABLE TO STOP OR CONTROL WORRYING: SEVERAL DAYS
GAD7 TOTAL SCORE: 12
3. WORRYING TOO MUCH ABOUT DIFFERENT THINGS: SEVERAL DAYS
4. TROUBLE RELAXING: NEARLY EVERY DAY
7. FEELING AFRAID AS IF SOMETHING AWFUL MIGHT HAPPEN: SEVERAL DAYS
8. IF YOU CHECKED OFF ANY PROBLEMS, HOW DIFFICULT HAVE THESE MADE IT FOR YOU TO DO YOUR WORK, TAKE CARE OF THINGS AT HOME, OR GET ALONG WITH OTHER PEOPLE?: VERY DIFFICULT
6. BECOMING EASILY ANNOYED OR IRRITABLE: SEVERAL DAYS
7. FEELING AFRAID AS IF SOMETHING AWFUL MIGHT HAPPEN: SEVERAL DAYS
1. FEELING NERVOUS, ANXIOUS, OR ON EDGE: NEARLY EVERY DAY

## 2021-09-30 ASSESSMENT — PATIENT HEALTH QUESTIONNAIRE - PHQ9
SUM OF ALL RESPONSES TO PHQ QUESTIONS 1-9: 9
SUM OF ALL RESPONSES TO PHQ QUESTIONS 1-9: 9
10. IF YOU CHECKED OFF ANY PROBLEMS, HOW DIFFICULT HAVE THESE PROBLEMS MADE IT FOR YOU TO DO YOUR WORK, TAKE CARE OF THINGS AT HOME, OR GET ALONG WITH OTHER PEOPLE: SOMEWHAT DIFFICULT

## 2021-09-30 ASSESSMENT — MIFFLIN-ST. JEOR: SCORE: 1196.59
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
## 2021-10-01 ASSESSMENT — ANXIETY QUESTIONNAIRES: GAD7 TOTAL SCORE: 12

## 2021-10-01 ASSESSMENT — PATIENT HEALTH QUESTIONNAIRE - PHQ9: SUM OF ALL RESPONSES TO PHQ QUESTIONS 1-9: 9

## 2021-10-04 ENCOUNTER — TELEPHONE (OUTPATIENT)
Dept: FAMILY MEDICINE | Facility: CLINIC | Age: 41
End: 2021-10-04

## 2021-10-04 DIAGNOSIS — F33.9 MAJOR DEPRESSION, RECURRENT, CHRONIC (H): Primary | ICD-10-CM

## 2021-10-04 RX ORDER — DESVENLAFAXINE SUCCINATE 25 MG/1
75 TABLET, EXTENDED RELEASE ORAL DAILY
Qty: 270 TABLET | Refills: 1 | Status: SHIPPED | OUTPATIENT
Start: 2021-10-04 | End: 2022-04-28

## 2021-10-04 NOTE — TELEPHONE ENCOUNTER
Prior Authorization Request  Who s requesting:  Pharmacy  Pharmacy Name and Location: [facility] #[id number]  Medication Name: pristiq 25 ROCHELLE  Insurance Plan: blue plus / medicare  Insurance Member ID Number: [id number]  CoverMyMeds Key:n/a  Informed patient that prior authorizations can take up to 10 business days for response:   No  Okay to leave a detailed message: No     Route to pool:  P [facility] PA MED      PT out of meds today, previous PA .

## 2021-10-06 RX ORDER — VENLAFAXINE HYDROCHLORIDE 75 MG/1
TABLET, EXTENDED RELEASE ORAL
Qty: 90 TABLET | Refills: 0 | Status: SHIPPED | OUTPATIENT
Start: 2021-10-06 | End: 2021-10-07

## 2021-10-06 NOTE — TELEPHONE ENCOUNTER
I spoke with the patient and reviewed her medical records.  It does not appear that she has been tried on venlafaxine in the past.  I just faxed in a prescription for her to give this a try and to stay in close touch with me as it relates to her response to the treatment and any side effects.  Thank you!

## 2021-10-06 NOTE — TELEPHONE ENCOUNTER
Insurance plan requires the patient to try/ fail all three of the formulary medications below prior to approving brand Pristiq.  Formulary alternatives: desvenlafaxine ext-rel, duloxetine, venlafaxine, venlafaxine ext-rel capsule  It wasn't clear if the pt tried venlafaxine in the past. Please clarify if venlafaxine has been tried- if yes, please provide the trial duration and reason for discontinuation (Insurance requires this documentation). Please route encounter back to the central pa team. Thanks

## 2021-10-07 ENCOUNTER — TELEPHONE (OUTPATIENT)
Dept: FAMILY MEDICINE | Facility: CLINIC | Age: 41
End: 2021-10-07

## 2021-10-07 DIAGNOSIS — F33.9 MAJOR DEPRESSION, RECURRENT, CHRONIC (H): Primary | ICD-10-CM

## 2021-10-07 RX ORDER — VENLAFAXINE HYDROCHLORIDE 75 MG/1
75 CAPSULE, EXTENDED RELEASE ORAL DAILY
Qty: 30 CAPSULE | Refills: 1 | Status: SHIPPED | OUTPATIENT
Start: 2021-10-07 | End: 2021-11-01

## 2021-10-07 NOTE — TELEPHONE ENCOUNTER
rx was sent for venlafaxine tabs insurance will only cover capsules.  Pharm will not take a verbal  Please resend rx for capsules

## 2021-10-14 ENCOUNTER — ANESTHESIA EVENT (OUTPATIENT)
Dept: SURGERY | Facility: HOSPITAL | Age: 41
End: 2021-10-14
Payer: COMMERCIAL

## 2021-10-16 ENCOUNTER — LAB (OUTPATIENT)
Dept: FAMILY MEDICINE | Facility: CLINIC | Age: 41
End: 2021-10-16
Payer: COMMERCIAL

## 2021-10-16 DIAGNOSIS — Z11.59 ENCOUNTER FOR SCREENING FOR OTHER VIRAL DISEASES: ICD-10-CM

## 2021-10-16 PROCEDURE — U0003 INFECTIOUS AGENT DETECTION BY NUCLEIC ACID (DNA OR RNA); SEVERE ACUTE RESPIRATORY SYNDROME CORONAVIRUS 2 (SARS-COV-2) (CORONAVIRUS DISEASE [COVID-19]), AMPLIFIED PROBE TECHNIQUE, MAKING USE OF HIGH THROUGHPUT TECHNOLOGIES AS DESCRIBED BY CMS-2020-01-R: HCPCS

## 2021-10-16 PROCEDURE — U0005 INFEC AGEN DETEC AMPLI PROBE: HCPCS

## 2021-10-17 LAB — SARS-COV-2 RNA RESP QL NAA+PROBE: NEGATIVE

## 2021-10-18 ENCOUNTER — ANESTHESIA (OUTPATIENT)
Dept: SURGERY | Facility: HOSPITAL | Age: 41
End: 2021-10-18
Payer: COMMERCIAL

## 2021-10-18 ENCOUNTER — HOSPITAL ENCOUNTER (OUTPATIENT)
Facility: HOSPITAL | Age: 41
Discharge: HOME | End: 2021-10-18
Attending: OBSTETRICS & GYNECOLOGY | Admitting: OBSTETRICS & GYNECOLOGY
Payer: COMMERCIAL

## 2021-10-18 VITALS
BODY MASS INDEX: 24.2 KG/M2 | HEART RATE: 90 BPM | WEIGHT: 128.2 LBS | RESPIRATION RATE: 20 BRPM | SYSTOLIC BLOOD PRESSURE: 110 MMHG | HEIGHT: 61 IN | DIASTOLIC BLOOD PRESSURE: 68 MMHG | TEMPERATURE: 97.8 F | OXYGEN SATURATION: 100 %

## 2021-10-18 LAB
HGB BLD-MCNC: 13 G/DL (ref 11.7–15.7)
HOLD SPECIMEN: NORMAL

## 2021-10-18 PROCEDURE — 360N000077 HC SURGERY LEVEL 4, PER MIN: Performed by: OBSTETRICS & GYNECOLOGY

## 2021-10-18 PROCEDURE — 36415 COLL VENOUS BLD VENIPUNCTURE: CPT | Performed by: NURSE PRACTITIONER

## 2021-10-18 PROCEDURE — 250N000011 HC RX IP 250 OP 636: Performed by: STUDENT IN AN ORGANIZED HEALTH CARE EDUCATION/TRAINING PROGRAM

## 2021-10-18 PROCEDURE — 250N000009 HC RX 250: Performed by: STUDENT IN AN ORGANIZED HEALTH CARE EDUCATION/TRAINING PROGRAM

## 2021-10-18 PROCEDURE — 710N000012 HC RECOVERY PHASE 2, PER MINUTE: Performed by: OBSTETRICS & GYNECOLOGY

## 2021-10-18 PROCEDURE — 250N000009 HC RX 250: Performed by: ANESTHESIOLOGY

## 2021-10-18 PROCEDURE — 250N000013 HC RX MED GY IP 250 OP 250 PS 637: Performed by: NURSE PRACTITIONER

## 2021-10-18 PROCEDURE — 258N000003 HC RX IP 258 OP 636: Performed by: ANESTHESIOLOGY

## 2021-10-18 PROCEDURE — 88305 TISSUE EXAM BY PATHOLOGIST: CPT | Mod: TC | Performed by: OBSTETRICS & GYNECOLOGY

## 2021-10-18 PROCEDURE — 85018 HEMOGLOBIN: CPT | Performed by: NURSE PRACTITIONER

## 2021-10-18 PROCEDURE — 710N000009 HC RECOVERY PHASE 1, LEVEL 1, PER MIN: Performed by: OBSTETRICS & GYNECOLOGY

## 2021-10-18 PROCEDURE — 272N000001 HC OR GENERAL SUPPLY STERILE: Performed by: OBSTETRICS & GYNECOLOGY

## 2021-10-18 PROCEDURE — 250N000011 HC RX IP 250 OP 636: Performed by: ANESTHESIOLOGY

## 2021-10-18 PROCEDURE — 370N000017 HC ANESTHESIA TECHNICAL FEE, PER MIN: Performed by: OBSTETRICS & GYNECOLOGY

## 2021-10-18 PROCEDURE — 250N000011 HC RX IP 250 OP 636: Performed by: OBSTETRICS & GYNECOLOGY

## 2021-10-18 PROCEDURE — 999N000141 HC STATISTIC PRE-PROCEDURE NURSING ASSESSMENT: Performed by: OBSTETRICS & GYNECOLOGY

## 2021-10-18 PROCEDURE — 250N000013 HC RX MED GY IP 250 OP 250 PS 637: Performed by: ANESTHESIOLOGY

## 2021-10-18 RX ORDER — SODIUM CHLORIDE 9 MG/ML
INJECTION, SOLUTION INTRAVENOUS CONTINUOUS
Status: DISCONTINUED | OUTPATIENT
Start: 2021-10-18 | End: 2021-10-18 | Stop reason: HOSPADM

## 2021-10-18 RX ORDER — ONDANSETRON 2 MG/ML
INJECTION INTRAMUSCULAR; INTRAVENOUS PRN
Status: DISCONTINUED | OUTPATIENT
Start: 2021-10-18 | End: 2021-10-18

## 2021-10-18 RX ORDER — ACETAMINOPHEN 325 MG/1
975 TABLET ORAL ONCE
Status: DISCONTINUED | OUTPATIENT
Start: 2021-10-18 | End: 2021-10-18 | Stop reason: HOSPADM

## 2021-10-18 RX ORDER — KETOROLAC TROMETHAMINE 30 MG/ML
INJECTION, SOLUTION INTRAMUSCULAR; INTRAVENOUS PRN
Status: DISCONTINUED | OUTPATIENT
Start: 2021-10-18 | End: 2021-10-18

## 2021-10-18 RX ORDER — FENTANYL CITRATE 50 UG/ML
50 INJECTION, SOLUTION INTRAMUSCULAR; INTRAVENOUS EVERY 5 MIN PRN
Status: DISCONTINUED | OUTPATIENT
Start: 2021-10-18 | End: 2021-10-18 | Stop reason: HOSPADM

## 2021-10-18 RX ORDER — ONDANSETRON 2 MG/ML
4 INJECTION INTRAMUSCULAR; INTRAVENOUS EVERY 30 MIN PRN
Status: DISCONTINUED | OUTPATIENT
Start: 2021-10-18 | End: 2021-10-18 | Stop reason: HOSPADM

## 2021-10-18 RX ORDER — ONDANSETRON 4 MG/1
4 TABLET, ORALLY DISINTEGRATING ORAL EVERY 30 MIN PRN
Status: DISCONTINUED | OUTPATIENT
Start: 2021-10-18 | End: 2021-10-18 | Stop reason: HOSPADM

## 2021-10-18 RX ORDER — DEXAMETHASONE SODIUM PHOSPHATE 10 MG/ML
INJECTION, SOLUTION INTRAMUSCULAR; INTRAVENOUS PRN
Status: DISCONTINUED | OUTPATIENT
Start: 2021-10-18 | End: 2021-10-18

## 2021-10-18 RX ORDER — NALOXONE HYDROCHLORIDE 0.4 MG/ML
0.4 INJECTION, SOLUTION INTRAMUSCULAR; INTRAVENOUS; SUBCUTANEOUS
Status: DISCONTINUED | OUTPATIENT
Start: 2021-10-18 | End: 2021-10-18 | Stop reason: HOSPADM

## 2021-10-18 RX ORDER — LIDOCAINE 40 MG/G
CREAM TOPICAL
Status: DISCONTINUED | OUTPATIENT
Start: 2021-10-18 | End: 2021-10-18 | Stop reason: HOSPADM

## 2021-10-18 RX ORDER — IBUPROFEN 800 MG/1
800 TABLET, FILM COATED ORAL ONCE
Status: DISCONTINUED | OUTPATIENT
Start: 2021-10-18 | End: 2021-10-18 | Stop reason: HOSPADM

## 2021-10-18 RX ORDER — ACETAMINOPHEN 325 MG/1
975 TABLET ORAL ONCE
Status: COMPLETED | OUTPATIENT
Start: 2021-10-18 | End: 2021-10-18

## 2021-10-18 RX ORDER — SODIUM CHLORIDE, SODIUM LACTATE, POTASSIUM CHLORIDE, CALCIUM CHLORIDE 600; 310; 30; 20 MG/100ML; MG/100ML; MG/100ML; MG/100ML
INJECTION, SOLUTION INTRAVENOUS CONTINUOUS
Status: DISCONTINUED | OUTPATIENT
Start: 2021-10-18 | End: 2021-10-18 | Stop reason: HOSPADM

## 2021-10-18 RX ORDER — BUPROPION HYDROCHLORIDE 300 MG/1
300 TABLET ORAL EVERY MORNING
COMMUNITY
End: 2022-01-19

## 2021-10-18 RX ORDER — MEPERIDINE HYDROCHLORIDE 25 MG/ML
12.5 INJECTION INTRAMUSCULAR; INTRAVENOUS; SUBCUTANEOUS
Status: DISCONTINUED | OUTPATIENT
Start: 2021-10-18 | End: 2021-10-18 | Stop reason: HOSPADM

## 2021-10-18 RX ORDER — PROPOFOL 10 MG/ML
INJECTION, EMULSION INTRAVENOUS CONTINUOUS PRN
Status: DISCONTINUED | OUTPATIENT
Start: 2021-10-18 | End: 2021-10-18

## 2021-10-18 RX ORDER — HYDROMORPHONE HCL IN WATER/PF 6 MG/30 ML
0.4 PATIENT CONTROLLED ANALGESIA SYRINGE INTRAVENOUS EVERY 5 MIN PRN
Status: DISCONTINUED | OUTPATIENT
Start: 2021-10-18 | End: 2021-10-18 | Stop reason: HOSPADM

## 2021-10-18 RX ORDER — NALOXONE HYDROCHLORIDE 0.4 MG/ML
0.2 INJECTION, SOLUTION INTRAMUSCULAR; INTRAVENOUS; SUBCUTANEOUS
Status: DISCONTINUED | OUTPATIENT
Start: 2021-10-18 | End: 2021-10-18 | Stop reason: HOSPADM

## 2021-10-18 RX ORDER — BUPIVACAINE HYDROCHLORIDE 2.5 MG/ML
INJECTION, SOLUTION INFILTRATION; PERINEURAL PRN
Status: DISCONTINUED | OUTPATIENT
Start: 2021-10-18 | End: 2021-10-18 | Stop reason: HOSPADM

## 2021-10-18 RX ORDER — HALOPERIDOL 5 MG/ML
1 INJECTION INTRAMUSCULAR
Status: DISCONTINUED | OUTPATIENT
Start: 2021-10-18 | End: 2021-10-18 | Stop reason: HOSPADM

## 2021-10-18 RX ORDER — FENTANYL CITRATE 50 UG/ML
INJECTION, SOLUTION INTRAMUSCULAR; INTRAVENOUS PRN
Status: DISCONTINUED | OUTPATIENT
Start: 2021-10-18 | End: 2021-10-18

## 2021-10-18 RX ORDER — OXYCODONE HYDROCHLORIDE 5 MG/1
5 TABLET ORAL EVERY 4 HOURS PRN
Status: DISCONTINUED | OUTPATIENT
Start: 2021-10-18 | End: 2021-10-18 | Stop reason: HOSPADM

## 2021-10-18 RX ORDER — PROPOFOL 10 MG/ML
INJECTION, EMULSION INTRAVENOUS PRN
Status: DISCONTINUED | OUTPATIENT
Start: 2021-10-18 | End: 2021-10-18

## 2021-10-18 RX ORDER — MAGNESIUM SULFATE 4 G/50ML
4 INJECTION INTRAVENOUS ONCE
Status: COMPLETED | OUTPATIENT
Start: 2021-10-18 | End: 2021-10-18

## 2021-10-18 RX ORDER — LIDOCAINE HYDROCHLORIDE 20 MG/ML
INJECTION, SOLUTION INFILTRATION; PERINEURAL PRN
Status: DISCONTINUED | OUTPATIENT
Start: 2021-10-18 | End: 2021-10-18

## 2021-10-18 RX ORDER — FENTANYL CITRATE 50 UG/ML
50 INJECTION, SOLUTION INTRAMUSCULAR; INTRAVENOUS
Status: CANCELLED | OUTPATIENT
Start: 2021-10-18

## 2021-10-18 RX ORDER — SCOLOPAMINE TRANSDERMAL SYSTEM 1 MG/1
1 PATCH, EXTENDED RELEASE TRANSDERMAL ONCE
Status: DISCONTINUED | OUTPATIENT
Start: 2021-10-18 | End: 2021-10-18 | Stop reason: HOSPADM

## 2021-10-18 RX ADMIN — ONDANSETRON 4 MG: 2 INJECTION INTRAMUSCULAR; INTRAVENOUS at 12:19

## 2021-10-18 RX ADMIN — FENTANYL CITRATE 50 MCG: 50 INJECTION INTRAMUSCULAR; INTRAVENOUS at 13:35

## 2021-10-18 RX ADMIN — SODIUM CHLORIDE, POTASSIUM CHLORIDE, SODIUM LACTATE AND CALCIUM CHLORIDE: 600; 310; 30; 20 INJECTION, SOLUTION INTRAVENOUS at 11:14

## 2021-10-18 RX ADMIN — SCOPALAMINE 1 PATCH: 1 PATCH, EXTENDED RELEASE TRANSDERMAL at 10:24

## 2021-10-18 RX ADMIN — FENTANYL CITRATE 100 MCG: 50 INJECTION, SOLUTION INTRAMUSCULAR; INTRAVENOUS at 11:34

## 2021-10-18 RX ADMIN — LIDOCAINE HYDROCHLORIDE 60 MG: 20 INJECTION, SOLUTION INFILTRATION; PERINEURAL at 11:34

## 2021-10-18 RX ADMIN — PROPOFOL 200 MCG/KG/MIN: 10 INJECTION, EMULSION INTRAVENOUS at 11:34

## 2021-10-18 RX ADMIN — ACETAMINOPHEN 975 MG: 325 TABLET ORAL at 10:21

## 2021-10-18 RX ADMIN — MIDAZOLAM HYDROCHLORIDE 2 MG: 1 INJECTION, SOLUTION INTRAMUSCULAR; INTRAVENOUS at 11:26

## 2021-10-18 RX ADMIN — MAGNESIUM SULFATE HEPTAHYDRATE 4 G: 80 INJECTION, SOLUTION INTRAVENOUS at 10:25

## 2021-10-18 RX ADMIN — OXYCODONE HYDROCHLORIDE 5 MG: 5 TABLET ORAL at 15:20

## 2021-10-18 RX ADMIN — DEXAMETHASONE SODIUM PHOSPHATE 10 MG: 10 INJECTION, SOLUTION INTRAMUSCULAR; INTRAVENOUS at 11:34

## 2021-10-18 RX ADMIN — SODIUM CHLORIDE, POTASSIUM CHLORIDE, SODIUM LACTATE AND CALCIUM CHLORIDE: 600; 310; 30; 20 INJECTION, SOLUTION INTRAVENOUS at 12:24

## 2021-10-18 RX ADMIN — KETOROLAC TROMETHAMINE 15 MG: 30 INJECTION, SOLUTION INTRAMUSCULAR; INTRAVENOUS at 12:19

## 2021-10-18 RX ADMIN — PROPOFOL 120 MG: 10 INJECTION, EMULSION INTRAVENOUS at 11:34

## 2021-10-18 ASSESSMENT — MIFFLIN-ST. JEOR: SCORE: 1178.01

## 2021-10-18 NOTE — ANESTHESIA CARE TRANSFER NOTE
Patient: Sherry Sweeney    Procedure: Procedure(s):  LAPAROCOPY,  LEFT OVARIAN CYSTECTOMY, LYSIS OF ADHESIONS.       Diagnosis: Salpingitis [N70.91]  Ovarian cyst [N83.209]  Diagnosis Additional Information: No value filed.    Anesthesia Type:   General     Note:    Oropharynx: oropharynx clear of all foreign objects and spontaneously breathing  Level of Consciousness: drowsy  Oxygen Supplementation: face mask  Level of Supplemental Oxygen (L/min / FiO2): 10  Independent Airway: airway patency satisfactory and stable  Dentition: dentition unchanged  Vital Signs Stable: post-procedure vital signs reviewed and stable  Report to RN Given: handoff report given  Patient transferred to: PACU    Handoff Report: Identifed the Patient, Identified the Reponsible Provider, Reviewed the pertinent medical history, Discussed the surgical course, Reviewed Intra-OP anesthesia mangement and issues during anesthesia, Set expectations for post-procedure period and Allowed opportunity for questions and acknowledgement of understanding      Vitals:  Vitals Value Taken Time   /64 10/18/21 1242   Temp 36.2  C (97.1  F) 10/18/21 1241   Pulse 85 10/18/21 1242   Resp 12 10/18/21 1242   SpO2 99 % 10/18/21 1242   Vitals shown include unvalidated device data.    Electronically Signed By: SELENA Qureshi CRNA  October 18, 2021  12:44 PM

## 2021-10-18 NOTE — ANESTHESIA PREPROCEDURE EVALUATION
Anesthesia Pre-Procedure Evaluation    Patient: Sherry Sweeney   MRN: [id number] : 1980        Preoperative Diagnosis: Salpingitis [N70.91]  Ovarian cyst [N83.209]    Procedure : Procedure(s):  LAPAROCOPY LEFT SALPINGECTOMY, LEFT OVARIAN CYSTECTOMY          Past Medical History:   Diagnosis Date     Chronic fatigue syndrome      Chronic migraine      Depression      Depression      Depressive disorder      Fibromyalgia      Fibromyalgia      History of anesthesia complications     slow to wake     IBS (irritable bowel syndrome)      Mitochondrial disease (H)      Mitochondrial myopathy      Parasomnia      PONV (postoperative nausea and vomiting)       Past Surgical History:   Procedure Laterality Date     ABDOMEN SURGERY   & 2018, fe.     APPENDECTOMY       BIOPSY       HC REMOVAL OF OVARIAN CYST(S)      Description: Ovarian Cystectomy;  Recorded: 2013;     HYSTERECTOMY Bilateral 2018    Procedure: TOTAL ABDOMINAL HYSTERECTOMY BILATERAL SALPINGECTOMY, RIGHT OOPHORECTOMY;  Surgeon: Joanne Bedolla DO;  [place] [facility] [place];  Service:      PICC  2018           Allergies   Allergen Reactions     Flu Virus Vaccine Shortness Of Breath and Anaphylaxis     No Clinical Screening - See Comments Anaphylaxis     Stomach swelling     Sulfa Drugs Rash and Hives     Albumin, Egg Other (See Comments)     Swollen colon, belly pain     Milnacipran Other (See Comments)     Chest pain, hot/cold flashes     Seroquel [Quetiapine]      Tachycardia, nervousness, elevated blood pressure.      Ciprofloxacin Itching and Rash     Rash over whole body       Social History     Tobacco Use     Smoking status: Never Smoker     Smokeless tobacco: Never Used   Substance Use Topics     Alcohol use: No      Wt Readings from Last 1 Encounters:   10/18/21 58.2 kg (128 lb 3.2 oz)        Anesthesia Evaluation   Pt has had prior anesthetic. Type: General.    History of anesthetic complications  -  PONV.      ROS/MED HX  ENT/Pulmonary:  - neg pulmonary ROS     Neurologic: Comment:     fibromyalgia      Cardiovascular:  - neg cardiovascular ROS     METS/Exercise Tolerance: >4 METS    Hematologic:  - neg hematologic  ROS     Musculoskeletal: Comment: Mitochondrial myopathy, generalized weakness one year ago has slowly resolved.  Pt denies current weakness.      GI/Hepatic: Comment: IBS   (-) GERD   Renal/Genitourinary:  - neg Renal ROS     Endo:  - neg endo ROS     Psychiatric/Substance Use:     (+) psychiatric history depression     Infectious Disease:  - neg infectious disease ROS     Malignancy:       Other: Comment: Ovarian cyst, left.           Physical Exam    Airway        Mallampati: II   TM distance: > 3 FB   Neck ROM: full   Mouth opening: > 3 cm    Respiratory Devices and Support         Dental  no notable dental history         Cardiovascular   cardiovascular exam normal          Pulmonary   pulmonary exam normal                OUTSIDE LABS:  CBC:   Lab Results   Component Value Date    WBC 7.5 09/05/2021    WBC 12.9 (H) 07/23/2020    HGB 13.0 10/18/2021    HGB 13.4 09/30/2021    HCT 38.2 09/05/2021    HCT 40.7 07/23/2020     09/05/2021     07/23/2020     BMP:   Lab Results   Component Value Date     09/30/2021     09/05/2021    POTASSIUM 4.0 09/30/2021    POTASSIUM 4.6 09/05/2021    CHLORIDE 104 09/30/2021    CHLORIDE 106 09/05/2021    CO2 24 09/30/2021    CO2 20 (L) 09/05/2021    BUN 8 09/30/2021    BUN 9 09/05/2021    CR 0.81 09/30/2021    CR 0.72 09/05/2021    GLC 85 09/30/2021    GLC 79 09/05/2021     COAGS: No results found for: PTT, INR, FIBR  POC:   Lab Results   Component Value Date    HCG Negative 09/05/2019     HEPATIC:   Lab Results   Component Value Date    ALBUMIN 3.8 09/30/2021    PROTTOTAL 6.6 08/25/2021    ALT 14 08/25/2021    AST 17 08/25/2021    ALKPHOS 50 08/25/2021    BILITOTAL 0.3 08/25/2021     OTHER:   Lab Results   Component Value Date    LACT 1.8  02/25/2018    MAGALI 9.9 09/30/2021    PHOS 3.4 09/30/2021    MAG 2.2 01/03/2020    LIPASE 28 07/23/2020    TSH 1.51 01/03/2020    CRP 0.2 03/01/2019    SED 10 03/01/2019       Anesthesia Plan    ASA Status:  3      Anesthesia Type: General.     - Airway: ETT   Induction: Propofol, Intravenous.   Maintenance: TIVA.        Consents    Anesthesia Plan(s) and associated risks, benefits, and realistic alternatives discussed. Questions answered and patient/representative(s) expressed understanding.     - Discussed with:  Patient      - Extended Intubation/Ventilatory Support Discussed: No.      - Patient is DNR/DNI Status: No    Use of blood products discussed: Yes.     - Discussed with: Patient.     - Consented: consented to blood products            Reason for refusal: other.     Postoperative Care    Pain management: IV analgesics, Multi-modal analgesia.   PONV prophylaxis: Ondansetron (or other 5HT-3), Dexamethasone or Solumedrol, Scopolamine patch     Comments:    Limit muscle relaxants due to mitochondrial myopathy    Avoid succinylcholine    TIVA with propofol  Decadron 10 mg  Zofran    Reverse with Sugammadex             Beatrice Carballo MD

## 2021-10-18 NOTE — ANESTHESIA POSTPROCEDURE EVALUATION
Patient: Sherry Sweeney    Procedure: Procedure(s):  LAPAROCOPY,  LEFT OVARIAN CYSTECTOMY, LYSIS OF ADHESIONS.       Diagnosis:Salpingitis [N70.91]  Ovarian cyst [N83.209]  Diagnosis Additional Information: No value filed.    Anesthesia Type:  General    Note:  Disposition: Outpatient   Postop Pain Control: Uneventful            Sign Out: Well controlled pain   PONV: No   Neuro/Psych: Uneventful            Sign Out: Acceptable/Baseline neuro status   Airway/Respiratory: Uneventful            Sign Out: Acceptable/Baseline resp. status   CV/Hemodynamics: Uneventful            Sign Out: Acceptable CV status; No obvious hypovolemia; No obvious fluid overload   Other NRE: NONE   DID A NON-ROUTINE EVENT OCCUR?            Last vitals:  Vitals Value Taken Time   /61 10/18/21 1400   Temp 36.6  C (97.8  F) 10/18/21 1315   Pulse 85 10/18/21 1403   Resp 10 10/18/21 1403   SpO2 100 % 10/18/21 1403   Vitals shown include unvalidated device data.    Electronically Signed By: Derek Santiago MD  October 18, 2021  2:34 PM

## 2021-10-18 NOTE — OP NOTE
PROCEDURE NOTE - LAPAROSCOPY, JOSE and Left ovarian cystectomy    NAME: Sherry Sweeney   : 1980   MRN: [id number]      DATE OF SERVICE: 10/18/2021     PREOPERATIVE DIAGNOSIS:  L sided pelvic pain and ovarian cyst    POSTOPERATIVE DIAGNOSIS:  the same plus adhesive dz    PROCEDURES: laparoscopy, lysis of adhesions and L sidedcystectomy    SURGEON: Joanne Bedolla DO, FACOG      ASSISTANT: OR Staff    ANESTHESIA: general     ESTIMATED BLOOD LOSS: 5cc    HISTORY OF PRESENT ILLNESS:   Sherry Sweeney is a 41 year old female with history of KEAGAN and bilateral salpingectomy was seen in the ER for pelvic pain.  Notable tubular structure on the L side with ovarian cyst.  Here for managment.  .     CONSENT:  She was met preoperatively, where we discussed the procedure and the risks associated with the procedure. She understood these to be, but not limited to injury to adjacent organs including bladder, bowel, ureter, infection and bleeding. Patient signed consent and was brought back to the operating room in stable condition.     PROCEDURE:  Patient underwent induction of a general anesthetic, she was carefully prepped and draped in sterile dorsal lithotomy position for the procedure. Timeout was performed. Bladder was drained with a Summers catheter.   A sponge stick was placed into the vagina.    Attention was turned to the abdomen. An incision above the umbilicus was made. A Veress needle was introduced with a 2 pop technique, saline drop test confirmed adequate placement. Opening pressure was 3-4. Insufflation was done until 15mm of pressure was established. A 5 nonbladed trocar was placed at the umbilicus. Two more port sights were place in the right and left lower quadrants under direct visualization. Trandelenburg assistance was then used.     The procedure began with identifying the anatomy. The uterus was removed and adhesions noted at the apex of the vagina.  Left sided colon was adhesed to the L sidewall  and with gentle traction the filmy adhesions were pulled from the sidewall.   The L ovary was then able to be visualized and ovarian cyst contained clear, straw colored fluid.  The Ligasure was used to transect the cyst and remove through a port.   All pedicles appeared hemostatic.  Irrigation took place.       The trocars and gas were then removed from the abdomen. Skin was closed with 4-0 suture. Steri-Strips applied. Instruments were removed from vagina. No active bleeding was noted. Sponge and needle counts were correct X 2. She was taken to recovery in stable condition.     Joanne Bedolla DO, FACOG  10/18/2021 12:36 PM          cc: Joanne Bedolla MD

## 2021-10-18 NOTE — H&P
History and Physical Update    I have examined the patient and reviewed the history and physical that is present on this chart. The changes in the patient's history and physical condition are as follows:    Here for management of pain noted in LLQ.  Known L sided ovarian cyst.   Previous salpingectomies and R oophorectomy with abd hyst.          Past Medical History:   Diagnosis Date     Chronic fatigue syndrome      Chronic migraine      Depression      Depression      Depressive disorder 2013     Fibromyalgia      Fibromyalgia      History of anesthesia complications     slow to wake     IBS (irritable bowel syndrome)      Mitochondrial disease (H)      Mitochondrial myopathy      Parasomnia      PONV (postoperative nausea and vomiting)      Past Surgical History:   Procedure Laterality Date     ABDOMEN SURGERY  1996 & 2018, feb.     APPENDECTOMY  1996     BIOPSY       HC REMOVAL OF OVARIAN CYST(S)      Description: Ovarian Cystectomy;  Recorded: 11/07/2013;     HYSTERECTOMY Bilateral 2/22/2018    Procedure: TOTAL ABDOMINAL HYSTERECTOMY BILATERAL SALPINGECTOMY, RIGHT OOPHORECTOMY;  Surgeon: Joanne Bedolal DO;  Location: [facility];  Service:      [facility]  2/24/2018            Current Outpatient Medications   Medication Instructions     buPROPion (WELLBUTRIN XL) 300 mg, Oral, EVERY MORNING     clindamycin-benzoyl peroxide (BENZACLIN) gel Apply topically to acne once daily     gabapentin (NEURONTIN) 300 mg, Oral, 2 TIMES DAILY     hydrOXYzine (ATARAX) 25-50 mg, Oral, AT BEDTIME     hydrOXYzine (VISTARIL) 25 mg, Oral, 3 TIMES DAILY PRN     lidocaine (LIDODERM) 5 % patch 1 patch, Transdermal, PRN, To neck, shoulders, and back     melatonin 3 mg, Oral, AT BEDTIME PRN     Multiple Vitamin (MULTIVITAMIN ADULT PO) 1 tablet, Oral, DAILY     Multiple Vitamins-Minerals (DAILY MULTI PO) 1 tablet, Oral, DAILY     omeprazole (PRILOSEC) 40 mg, Oral, DAILY     OnabotulinumtoxinA (BOTOX IJ) Inject every 3 months for  "migraines     ondansetron (ZOFRAN) 4 mg, Oral, EVERY 8 HOURS PRN     oxyCODONE (ROXICODONE) 5 mg, Oral, EVERY 6 HOURS PRN     Pristiq 75 mg, Oral, DAILY, 2 am and 1 in pm      Riboflavin 400 mg, Oral, DAILY     rizatriptan (MAXALT) 10 MG tablet TAKE 1 TABLET BY MOUTH AS NEEDED FOR MIGRAINE. MAY REPEAT IN 2 HOURS IF NEEDED     tretinoin (RETIN-A) 0.025 % topical gel Topical, DAILY     tretinoin (RETIN-A) 0.05 % external cream Topical, AT BEDTIME     valACYclovir (VALTREX) 1000 mg tablet TAKE 2 TABLETS BY MOUTH 12 HOURS APART AS NEEDED EPISODE     venlafaxine (EFFEXOR-XR) 75 mg, Oral, DAILY     vitamin C (ASCORBIC ACID) 500 mg, Oral, DAILY     vitamin E (TOCOPHEROL) 1,000 Units, Oral, DAILY     zolpidem (AMBIEN) 5 mg, Oral, AT BEDTIME PRN            Allergies   Allergen Reactions     Flu Virus Vaccine Shortness Of Breath and Anaphylaxis     No Clinical Screening - See Comments Anaphylaxis     Stomach swelling     Sulfa Drugs Rash and Hives     Albumin, Egg Other (See Comments)     Swollen colon, belly pain     Milnacipran Other (See Comments)     Chest pain, hot/cold flashes     Seroquel [Quetiapine]      Tachycardia, nervousness, elevated blood pressure.      Ciprofloxacin Itching and Rash     Rash over whole body        /84 (Cuff Size: Adult Small)   Pulse 97   Temp 98.2  F (36.8  C) (Oral)   Resp 18   Ht 1.54 m (5' 0.63\")   Wt 58.2 kg (128 lb 3.2 oz)   SpO2 100%   BMI 24.52 kg/m        RRR  CTAB  Soft, NT, L side marked  NT, no CCE    A/P:  42yo with L sided ovarian cyst.  Lap ovarian cystectomy vs oophorectomy.  R/B addressed.     Joanne Bedolla DO, FACOG  10/18/2021 11:34 AM           "

## 2021-10-18 NOTE — ANESTHESIA PROCEDURE NOTES
Airway       Patient location during procedure: OR       Procedure Start/Stop Times: 10/18/2021 11:37 AM  Staff -        Anesthesiologist:  Derek Santiago MD       CRNA: Aixa Rodríguez APRN CRNA       Performed By: CRNA  Consent for Airway        Urgency: elective  Indications and Patient Condition       Indications for airway management: solomon-procedural       Induction type:intravenous       Mask difficulty assessment: 1 - vent by mask    Final Airway Details       Final airway type: endotracheal airway       Successful airway: ETT - single  Endotracheal Airway Details        ETT size (mm): 7.0       Cuffed: yes       Successful intubation technique: direct laryngoscopy       DL Blade Type: Spears 2       Grade View of Cords: 1       Adjucts: stylet and tooth guard       Position: Right       Measured from: lips       Secured at (cm): 20       Bite block used: None    Post intubation assessment        Placement verified by: capnometry, equal breath sounds and chest rise        Number of attempts at approach: 1       Number of other approaches attempted: 0       Secured with: silk tape       Ease of procedure: easy       Dentition: Intact and Unchanged

## 2021-10-19 LAB
PATH REPORT.COMMENTS IMP SPEC: NORMAL
PATH REPORT.COMMENTS IMP SPEC: NORMAL
PATH REPORT.FINAL DX SPEC: NORMAL
PATH REPORT.GROSS SPEC: NORMAL
PATH REPORT.MICROSCOPIC SPEC OTHER STN: NORMAL
PATH REPORT.RELEVANT HX SPEC: NORMAL
PHOTO IMAGE: NORMAL

## 2021-10-19 PROCEDURE — 88305 TISSUE EXAM BY PATHOLOGIST: CPT | Mod: 26 | Performed by: PATHOLOGY

## 2021-10-24 DIAGNOSIS — F41.1 GENERALIZED ANXIETY DISORDER: ICD-10-CM

## 2021-10-26 RX ORDER — HYDROXYZINE PAMOATE 25 MG/1
25 CAPSULE ORAL 3 TIMES DAILY PRN
Qty: 90 CAPSULE | Refills: 3 | OUTPATIENT
Start: 2021-10-26

## 2021-11-17 DIAGNOSIS — G47.00 INSOMNIA, UNSPECIFIED TYPE: ICD-10-CM

## 2021-11-18 RX ORDER — OMEPRAZOLE MAGNESIUM 20 MG
CAPSULE,DELAYED RELEASE (ENTERIC COATED) ORAL
Qty: 90 TABLET | Refills: 1 | Status: SHIPPED | OUTPATIENT
Start: 2021-11-18 | End: 2022-07-19

## 2021-11-18 NOTE — TELEPHONE ENCOUNTER
Routing refill request to provider for review/approval because:  Drug not on the [facility] [place]     Last Written Prescription Date:  8/25/21  Last Fill Quantity: 30,  # refills: 3   Last office visit provider: 9/30/21      Requested Prescriptions   Pending Prescriptions Disp Refills     [facility] MELATONIN 3 MG tablet [Pharmacy Med Name: [facility] MELATONIN 3 MG TABLET] 90 tablet 1     Sig: TAKE 1 TABLET (3 MG) BY MOUTH NIGHTLY AS NEEDED FOR SLEEP       There is no refill protocol information for this order          Ashly Mckee RN 11/18/21 1:47 PM

## 2021-11-29 DIAGNOSIS — L70.0 ACNE VULGARIS: ICD-10-CM

## 2021-12-01 RX ORDER — TRETINOIN 0.5 MG/G
CREAM TOPICAL
Qty: 45 G | Refills: 2 | Status: SHIPPED | OUTPATIENT
Start: 2021-12-01 | End: 2022-06-27

## 2021-12-01 NOTE — TELEPHONE ENCOUNTER
"Last Written Prescription Date:  8/25/21  Last Fill Quantity: 45g,  # refills: 0   Last office visit provider:  9/30/21     Requested Prescriptions   Pending Prescriptions Disp Refills     tretinoin (RETIN-A) 0.05 % external cream [Pharmacy Med Name: TRETINOIN 0.05% CREAM] 45 g 0     Sig: APPLY TO AFFECTED AREA EVERY DAY AT BEDTIME       Topical Acne Medications Protocol Passed - 11/29/2021  3:49 PM        Passed - Patient is 12 years of age or older        Passed - Recent (12 mo) or future (30 days) visit within the authorizing provider's specialty     Patient has had an office visit with the authorizing provider or a provider within the authorizing providers department within the previous 12 mos or has a future within next 30 days. See \"Patient Info\" tab in inbasket, or \"Choose Columns\" in Meds & Orders section of the refill encounter.              Passed - Medication is active on med list             Alejandrina Barboza RN 12/01/21 8:12 AM  "

## 2021-12-18 ENCOUNTER — E-VISIT (OUTPATIENT)
Dept: URGENT CARE | Facility: CLINIC | Age: 41
End: 2021-12-18
Payer: COMMERCIAL

## 2021-12-18 DIAGNOSIS — Z20.822 SUSPECTED COVID-19 VIRUS INFECTION: Primary | ICD-10-CM

## 2021-12-18 PROCEDURE — 99421 OL DIG E/M SVC 5-10 MIN: CPT | Performed by: NURSE PRACTITIONER

## 2021-12-19 NOTE — PATIENT INSTRUCTIONS
Dear Sherry Sweeney,    Your symptoms show that you may have coronavirus (COVID-19). This illness can cause fever, cough and trouble breathing. Many people get a mild case and get better on their own. Some people can get very sick.    Will I be tested for COVID-19?  We would like to test you for Covid-19 virus. I have placed orders for this test.     To schedule: go to your [facility] home page and scroll down to the section that says  You have an appointment that needs to be scheduled  and click the large green button that says  Schedule Now  and follow the steps to find the next available openings.    If you are unable to complete these [facility] scheduling steps, please call [phone] to schedule your testing.     Return to work/school/ guidance:  Please let your workplace manager and staffing office know when your quarantine ends     We can t give you an exact date as it depends on the above. You can calculate this on your own or work with your manager/staffing office to calculate this. (For example if you were exposed on 10/4, you would have to quarantine for 14 full days. That would be through 10/18. You could return on 10/19.)      If you receive a positive COVID-19 test result, follow the guidance of the those who are giving you the results. Usually the return to work is 10 (or in some cases 20 days from symptom onset.) If you work at [facility], you must also be cleared by Employee Occupational Health and Safety to return to work.        If you receive a negative COVID-19 test result and did not have a high risk exposure to someone with a known positive COVID-19 test, you can return to work once you're free of fever for 24 hours without fever-reducing medication and your symptoms are improving or resolved.      If you receive a negative COVID-19 test and If you had a high risk exposure to someone who has tested positive for COVID-19 then you can return to work 14 days after your last contact with  the positive individual    Note: If you have ongoing exposure to the covid positive person, this quarantine period may be more than 14 days. (For example, if you are continued to be exposed to your child who tested positive and cannot isolate from them, then the quarantine of 7-14 days can't start until your child is no longer contagious. This is typically 10 days from onset of the child's symptoms. So the total duration may be 17-24 days in this case.)    Sign up for [employer].   We know it's scary to hear that you might have COVID-19. We want to track your symptoms to make sure you're okay over the next 2 weeks. Please look for an email from [employer]--this is a free, online program that we'll use to keep in touch. To sign up, follow the link in the email you will receive. Learn more at [email or web address]    How can I take care of myself?    Get lots of rest. Drink extra fluids (unless a doctor has told you not to)    Take Tylenol (acetaminophen) or ibuprofen for fever or pain. If you have liver or kidney problems, ask your family doctor if it's okay to take Tylenol o ibuprofen    If you have other health problems (like cancer, heart failure, an organ transplant or severe kidney disease): Call your specialty clinic if you don't feel better in the next 2 days.    Know when to call 911. Emergency warning signs include:  o Trouble breathing or shortness of breath  o Pain or pressure in the chest that doesn't go away  o Feeling confused like you haven't felt before, or not being able to wake up  o Bluish-colored lips or face    Where can I get more information?  [employer] [facility] - About COVID-19:   [email or web address]    CDC - What to Do If You're Sick:   www.cdc.gov/coronavirus/2019-ncov/about/steps-when-sick.html

## 2021-12-20 ENCOUNTER — LAB (OUTPATIENT)
Dept: FAMILY MEDICINE | Facility: CLINIC | Age: 41
End: 2021-12-20
Attending: NURSE PRACTITIONER
Payer: COMMERCIAL

## 2021-12-20 ENCOUNTER — TELEPHONE (OUTPATIENT)
Dept: FAMILY MEDICINE | Facility: CLINIC | Age: 41
End: 2021-12-20
Payer: COMMERCIAL

## 2021-12-20 DIAGNOSIS — Z20.822 SUSPECTED COVID-19 VIRUS INFECTION: ICD-10-CM

## 2021-12-20 DIAGNOSIS — F41.8 DEPRESSION WITH ANXIETY: ICD-10-CM

## 2021-12-20 PROCEDURE — U0003 INFECTIOUS AGENT DETECTION BY NUCLEIC ACID (DNA OR RNA); SEVERE ACUTE RESPIRATORY SYNDROME CORONAVIRUS 2 (SARS-COV-2) (CORONAVIRUS DISEASE [COVID-19]), AMPLIFIED PROBE TECHNIQUE, MAKING USE OF HIGH THROUGHPUT TECHNOLOGIES AS DESCRIBED BY CMS-2020-01-R: HCPCS

## 2021-12-20 PROCEDURE — U0005 INFEC AGEN DETEC AMPLI PROBE: HCPCS

## 2021-12-20 RX ORDER — HYDROXYZINE HYDROCHLORIDE 25 MG/1
25-50 TABLET, FILM COATED ORAL AT BEDTIME
Qty: 180 TABLET | Refills: 1 | Status: SHIPPED | OUTPATIENT
Start: 2021-12-20 | End: 2022-04-19

## 2021-12-20 NOTE — TELEPHONE ENCOUNTER
2 different rx's in chart both for hydroxyzine with different sigs.  Please advise on sending tablets over instead of capsules   Tried to do verbal they need new rx sent over.

## 2021-12-20 NOTE — TELEPHONE ENCOUNTER
Reason for Call:  Medication or medication refill:    Do you use a [facility]?  Name of the pharmacy and phone number for the current request:    [facility] in [place] - [phone]    Name of the medication requested: Patient gets a reaction to hydroxyzine capsule and would like to get a prescription for hydroxyzine chloride tablet    Other request: no    Can we leave a detailed message on this number? YES    Phone number patient can be reached at: Other phone number:  [facility] [phone] Veronica    Best Time: Anytime    Call taken on 12/20/2021 at 11:00 AM by Obdulia Bal

## 2021-12-21 ENCOUNTER — IMMUNIZATION (OUTPATIENT)
Dept: NURSING | Facility: CLINIC | Age: 41
End: 2021-12-21
Payer: MEDICARE

## 2021-12-21 LAB — SARS-COV-2 RNA RESP QL NAA+PROBE: NEGATIVE

## 2021-12-21 PROCEDURE — 91306 COVID-19,PF,MODERNA (18+ YRS BOOSTER .25ML): CPT

## 2021-12-21 PROCEDURE — 0064A COVID-19,PF,MODERNA (18+ YRS BOOSTER .25ML): CPT

## 2022-01-17 DIAGNOSIS — F33.9 MAJOR DEPRESSIVE DISORDER, RECURRENT, UNSPECIFIED (H): ICD-10-CM

## 2022-01-19 DIAGNOSIS — G43.719 INTRACTABLE CHRONIC MIGRAINE WITHOUT AURA AND WITHOUT STATUS MIGRAINOSUS: ICD-10-CM

## 2022-01-19 RX ORDER — BUPROPION HYDROCHLORIDE 300 MG/1
TABLET ORAL
Qty: 90 TABLET | Refills: 3 | Status: SHIPPED | OUTPATIENT
Start: 2022-01-19 | End: 2022-06-27

## 2022-01-20 NOTE — TELEPHONE ENCOUNTER
"Routing refill request to provider for review/approval because:  Failed selective serotonin reuptake inhibitor protocol    Last Written Prescription Date:  4/5/2021  Last Fill Quantity: 90,  # refills: 0   Last office visit provider:  9/30/2021 Dr. Martinez     buPROPion (WELLBUTRIN XL) 300 MG 24 hr tablet 90 tablet 0 4/5/2021  No   Sig - Route: Take 1 tablet (300 mg total) by mouth every morning. - Oral   Sent to pharmacy as: buPROPion HCL  mg 24 hr tablet, extended release (WELLBUTRIN XL)   E-Prescribing Status: Receipt confirmed by pharmacy (4/5/2021  1:42 PM CDT         Requested Prescriptions   Pending Prescriptions Disp Refills     buPROPion (WELLBUTRIN XL) 300 MG 24 hr tablet [Pharmacy Med Name: BUPROPION HCL  MG TABLET] 90 tablet 3     Sig: TAKE 1 TABLET BY MOUTH EVERY DAY       SSRIs Protocol Failed - 1/19/2022  2:01 PM        Failed - PHQ-9 score less than 5 in past 6 months     Please review last PHQ-9 score.           Passed - Medication is Bupropion     If the medication is Bupropion (Wellbutrin), and the patient is taking for smoking cessation; OK to refill.          Passed - Medication is active on med list        Passed - Patient is age 18 or older        Passed - No active pregnancy on record        Passed - No positive pregnancy test in last 12 months        Passed - Recent (6 mo) or future (30 days) visit within the authorizing provider's specialty     Patient had office visit in the last 6 months or has a visit in the next 30 days with authorizing provider or within the authorizing provider's specialty.  See \"Patient Info\" tab in inbasket, or \"Choose Columns\" in Meds & Orders section of the refill encounter.                 Beverly Smallwood RN 01/19/22 6:20 PM  "
Patient calling for update on refill request. Patient will be out of medication 1/20/2022.  
Statement Selected

## 2022-01-21 RX ORDER — RIZATRIPTAN BENZOATE 10 MG/1
TABLET ORAL
Qty: 10 TABLET | Refills: 3 | Status: SHIPPED | OUTPATIENT
Start: 2022-01-21 | End: 2022-08-12

## 2022-01-21 NOTE — TELEPHONE ENCOUNTER
"    Last Written Prescription Date:  7/28/21  Last Fill Quantity: 10,  # refills: 3   Last office visit provider: 9/30/21     Requested Prescriptions   Pending Prescriptions Disp Refills     rizatriptan (MAXALT) 10 MG tablet [Pharmacy Med Name: RIZATRIPTAN 10 MG TABLET] 10 tablet 3     Sig: TAKE 1 TABLET BY MOUTH AS NEEDED FOR MIGRAINE. MAY REPEAT IN 2 HOURS IF NEEDED       Serotonin Agonists Failed - 1/19/2022  2:11 PM        Failed - Serotonin Agonist request needs review.     Please review patient's record. If patient has had 8 or more treatments in the past month, please forward to provider.          Passed - Blood pressure under 140/90 in past 12 months     BP Readings from Last 3 Encounters:   10/18/21 110/68   09/30/21 100/62   09/05/21 127/76                 Passed - Recent (12 mo) or future (30 days) visit within the authorizing provider's specialty     Patient has had an office visit with the authorizing provider or a provider within the authorizing providers department within the previous 12 mos or has a future within next 30 days. See \"Patient Info\" tab in inbasket, or \"Choose Columns\" in Meds & Orders section of the refill encounter.              Passed - Medication is active on med list        Passed - Patient is age 18 or older        Passed - No active pregnancy on record        Passed - No positive pregnancy test in past 12 months             Ashly Mckee RN 01/21/22 3:26 PM  "

## 2022-02-02 ENCOUNTER — HOSPITAL ENCOUNTER (EMERGENCY)
Facility: HOSPITAL | Age: 42
Discharge: HOME | End: 2022-02-03
Attending: EMERGENCY MEDICINE | Admitting: EMERGENCY MEDICINE
Payer: COMMERCIAL

## 2022-02-02 ENCOUNTER — E-VISIT (OUTPATIENT)
Dept: URGENT CARE | Facility: URGENT CARE | Age: 42
End: 2022-02-02
Payer: COMMERCIAL

## 2022-02-02 DIAGNOSIS — R10.32 ABDOMINAL PAIN, LEFT LOWER QUADRANT: ICD-10-CM

## 2022-02-02 DIAGNOSIS — K21.00 GASTROESOPHAGEAL REFLUX DISEASE WITH ESOPHAGITIS WITHOUT HEMORRHAGE: Primary | ICD-10-CM

## 2022-02-02 DIAGNOSIS — N83.202 CYST OF LEFT OVARY: ICD-10-CM

## 2022-02-02 LAB
ALBUMIN SERPL-MCNC: 3.7 G/DL (ref 3.5–5)
ALP SERPL-CCNC: 51 U/L (ref 45–120)
ALT SERPL W P-5'-P-CCNC: 10 U/L (ref 0–45)
ANION GAP SERPL CALCULATED.3IONS-SCNC: 9 MMOL/L (ref 5–18)
AST SERPL W P-5'-P-CCNC: 16 U/L (ref 0–40)
BASOPHILS # BLD AUTO: 0.1 10E3/UL (ref 0–0.2)
BASOPHILS NFR BLD AUTO: 1 %
BILIRUB DIRECT SERPL-MCNC: 0.1 MG/DL
BILIRUB SERPL-MCNC: 0.4 MG/DL (ref 0–1)
BUN SERPL-MCNC: 8 MG/DL (ref 8–22)
CALCIUM SERPL-MCNC: 8.9 MG/DL (ref 8.5–10.5)
CHLORIDE BLD-SCNC: 105 MMOL/L (ref 98–107)
CO2 SERPL-SCNC: 23 MMOL/L (ref 22–31)
CREAT SERPL-MCNC: 0.77 MG/DL (ref 0.6–1.1)
EOSINOPHIL # BLD AUTO: 0.1 10E3/UL (ref 0–0.7)
EOSINOPHIL NFR BLD AUTO: 1 %
ERYTHROCYTE [DISTWIDTH] IN BLOOD BY AUTOMATED COUNT: 12.7 % (ref 10–15)
GFR SERPL CREATININE-BSD FRML MDRD: >90 ML/MIN/1.73M2
GLUCOSE BLD-MCNC: 90 MG/DL (ref 70–125)
HCT VFR BLD AUTO: 39.1 % (ref 35–47)
HGB BLD-MCNC: 12.6 G/DL (ref 11.7–15.7)
IMM GRANULOCYTES # BLD: 0 10E3/UL
IMM GRANULOCYTES NFR BLD: 0 %
LYMPHOCYTES # BLD AUTO: 2.8 10E3/UL (ref 0.8–5.3)
LYMPHOCYTES NFR BLD AUTO: 24 %
MCH RBC QN AUTO: 27.9 PG (ref 26.5–33)
MCHC RBC AUTO-ENTMCNC: 32.2 G/DL (ref 31.5–36.5)
MCV RBC AUTO: 87 FL (ref 78–100)
MONOCYTES # BLD AUTO: 0.6 10E3/UL (ref 0–1.3)
MONOCYTES NFR BLD AUTO: 5 %
NEUTROPHILS # BLD AUTO: 8.2 10E3/UL (ref 1.6–8.3)
NEUTROPHILS NFR BLD AUTO: 69 %
NRBC # BLD AUTO: 0 10E3/UL
NRBC BLD AUTO-RTO: 0 /100
PLATELET # BLD AUTO: 331 10E3/UL (ref 150–450)
POTASSIUM BLD-SCNC: 4 MMOL/L (ref 3.5–5)
PROT SERPL-MCNC: 6.4 G/DL (ref 6–8)
RBC # BLD AUTO: 4.52 10E6/UL (ref 3.8–5.2)
SODIUM SERPL-SCNC: 137 MMOL/L (ref 136–145)
WBC # BLD AUTO: 11.9 10E3/UL (ref 4–11)

## 2022-02-02 PROCEDURE — 82310 ASSAY OF CALCIUM: CPT | Performed by: EMERGENCY MEDICINE

## 2022-02-02 PROCEDURE — 96374 THER/PROPH/DIAG INJ IV PUSH: CPT | Mod: 59

## 2022-02-02 PROCEDURE — 99285 EMERGENCY DEPT VISIT HI MDM: CPT | Mod: 25

## 2022-02-02 PROCEDURE — 36415 COLL VENOUS BLD VENIPUNCTURE: CPT | Performed by: EMERGENCY MEDICINE

## 2022-02-02 PROCEDURE — 250N000011 HC RX IP 250 OP 636: Performed by: EMERGENCY MEDICINE

## 2022-02-02 PROCEDURE — 93005 ELECTROCARDIOGRAM TRACING: CPT

## 2022-02-02 PROCEDURE — 258N000003 HC RX IP 258 OP 636: Performed by: EMERGENCY MEDICINE

## 2022-02-02 PROCEDURE — 96361 HYDRATE IV INFUSION ADD-ON: CPT

## 2022-02-02 PROCEDURE — 82248 BILIRUBIN DIRECT: CPT | Performed by: EMERGENCY MEDICINE

## 2022-02-02 PROCEDURE — 85025 COMPLETE CBC W/AUTO DIFF WBC: CPT | Performed by: EMERGENCY MEDICINE

## 2022-02-02 PROCEDURE — 99421 OL DIG E/M SVC 5-10 MIN: CPT | Performed by: PHYSICIAN ASSISTANT

## 2022-02-02 PROCEDURE — 93005 ELECTROCARDIOGRAM TRACING: CPT | Performed by: EMERGENCY MEDICINE

## 2022-02-02 RX ORDER — KETOROLAC TROMETHAMINE 30 MG/ML
15 INJECTION, SOLUTION INTRAMUSCULAR; INTRAVENOUS ONCE
Status: COMPLETED | OUTPATIENT
Start: 2022-02-02 | End: 2022-02-02

## 2022-02-02 RX ORDER — OMEPRAZOLE 40 MG/1
40 CAPSULE, DELAYED RELEASE ORAL
Qty: 30 CAPSULE | Refills: 2 | Status: SHIPPED | OUTPATIENT
Start: 2022-02-02 | End: 2022-06-29

## 2022-02-02 RX ADMIN — SODIUM CHLORIDE 1000 ML: 9 INJECTION, SOLUTION INTRAVENOUS at 22:19

## 2022-02-02 RX ADMIN — KETOROLAC TROMETHAMINE 15 MG: 30 INJECTION, SOLUTION INTRAMUSCULAR at 22:17

## 2022-02-02 ASSESSMENT — ENCOUNTER SYMPTOMS
NAUSEA: 0
SHORTNESS OF BREATH: 0
FATIGUE: 0
ABDOMINAL PAIN: 1
FEVER: 0
HEADACHES: 0
COUGH: 0
DYSURIA: 0
DIZZINESS: 1
PALPITATIONS: 1
DIARRHEA: 0
CHILLS: 0
VOMITING: 0
WEAKNESS: 0

## 2022-02-02 ASSESSMENT — MIFFLIN-ST. JEOR: SCORE: 1162.57

## 2022-02-02 NOTE — PATIENT INSTRUCTIONS
Thank you for choosing us for your care. I have placed an order for a prescription so that you can start treatment. View your full visit summary for details by clicking on the link below. Your pharmacist will able to address any questions you may have about the medication.      If you're not feeling better within 2 weeks please schedule an appointment.  You can schedule an appointment right here in [facility], or call [phone]  If the visit is for the same symptoms as your eVisit, we'll refund the cost of your eVisit if seen within seven days.      GERD (Adult)    The esophagus is a tube that carries food from the mouth to the stomach. A valve (the LES, lower esophageal sphincter) at the lower end of the esophagus prevents stomach acid from flowing upward. When this valve doesn't work properly, stomach contents may repeatedly flow back up (reflux) into the esophagus. This is called gastroesophageal reflux disease (GERD). GERD can irritate the esophagus. It can cause problems with pain, swallowing or breathing. In severe cases, GERD can cause recurrent pneumonia (from aspiration or breathing in particles) or other serious problems.  Symptoms of reflux include burning, pressure or sharp pain in the upper abdomen or mid to lower chest. The pain can spread to the neck, back, or shoulder. There may be belching, an acid taste in the back of the throat, chronic cough, or sore throat, or hoarseness. GERD symptoms often occur during the day after a big meal. They can also occur at night when lying down.   Home care  Lifestyle changes can help reduce symptoms. If needed, your healthcare provider may prescribe medicines. Symptoms often improve with treatment, but if treatment is stopped, the symptoms often return after a few months. So most persons with GERD will need to continue treatment or get treatment on and off.  Lifestyle changes    Limit or avoid fatty, fried, and spicy foods, as well as coffee, chocolate, mint,  "and foods with high acid content such as tomatoes and citrus fruit and juices (orange, grapefruit, lemon).    Don t eat large meals, especially at night. Frequent, smaller meals are best. Don't lie down right after eating. And don t eat anything 3 hours before going to bed.    Don't drink alcohol or smoke. As much as possible, stay away from second hand smoke.    If you are overweight, losing weight will reduce symptoms.     Don't wear tight clothing around your stomach area.    If your symptoms occur during sleep, use a foam wedge to elevate your upper body (not just your head.) Or, place 4\" blocks under the head of your bed. Or use 2 bed risers under your bedframe.  Medicines  If needed, medicines can help relieve the symptoms of GERD and prevent damage to the esophagus. Discuss a medicine plan with your healthcare provider. This may include one or more of the following medicines:    Antacids to help neutralize the normal acids in your stomach.    Acid blockers (Histamine or H2 blockers) to decrease acid production.    Acid inhibitors (proton pump inhibitors PPIs) to decrease acid production in a different way than the blockers. They may work better, but can take a little longer to take effect.  Take an antacid 30 to 60 minutes after eating and at bedtime, but not at the same time as an acid blocker.  Try not to take medicines such as ibuprofen and aspirin. If you are taking aspirin for your heart or other medical reasons, talk to your healthcare provider about stopping it.  Follow-up care  Follow up with your healthcare provider or as advised by our staff.  When to seek medical advice  Call your healthcare provider if any of the following occur:    Stomach pain gets worse or moves to the lower right abdomen (appendix area)    Chest pain appears or gets worse, or spreads to the back, neck, shoulder, or arm    An over-the-counter trial of medicine doesn't relieve your symptoms    Weight loss that can't be " explained    Trouble or pain swallowing    Frequent vomiting (can t keep down liquids)    Blood in the stool or vomit (red or black in color)    Feeling weak or dizzy    Fever of 100.4 F (38 C) or higher, or as directed by your healthcare provider  [employer] last reviewed this educational content on 3/1/2018    9214-1898 [employer]. All rights reserved. This information is not intended as a substitute for professional medical care. Always follow your healthcare professional's instructions.

## 2022-02-03 ENCOUNTER — APPOINTMENT (OUTPATIENT)
Dept: CT IMAGING | Facility: HOSPITAL | Age: 42
End: 2022-02-03
Attending: EMERGENCY MEDICINE
Payer: COMMERCIAL

## 2022-02-03 ENCOUNTER — APPOINTMENT (OUTPATIENT)
Dept: ULTRASOUND IMAGING | Facility: HOSPITAL | Age: 42
End: 2022-02-03
Attending: EMERGENCY MEDICINE
Payer: COMMERCIAL

## 2022-02-03 VITALS
HEART RATE: 90 BPM | BODY MASS INDEX: 22.08 KG/M2 | HEIGHT: 62 IN | OXYGEN SATURATION: 100 % | RESPIRATION RATE: 11 BRPM | SYSTOLIC BLOOD PRESSURE: 121 MMHG | DIASTOLIC BLOOD PRESSURE: 71 MMHG | WEIGHT: 120 LBS | TEMPERATURE: 100 F

## 2022-02-03 LAB
ALBUMIN UR-MCNC: NEGATIVE MG/DL
APPEARANCE UR: CLEAR
BACTERIA #/AREA URNS HPF: ABNORMAL /HPF
BILIRUB UR QL STRIP: NEGATIVE
COLOR UR AUTO: COLORLESS
GLUCOSE UR STRIP-MCNC: NEGATIVE MG/DL
HGB UR QL STRIP: NEGATIVE
KETONES UR STRIP-MCNC: 20 MG/DL
LEUKOCYTE ESTERASE UR QL STRIP: NEGATIVE
NITRATE UR QL: NEGATIVE
PH UR STRIP: 7 [PH] (ref 5–7)
RBC URINE: 0 /HPF
SP GR UR STRIP: 1.01 (ref 1–1.03)
SQUAMOUS EPITHELIAL: <1 /HPF
UROBILINOGEN UR STRIP-MCNC: <2 MG/DL
WBC URINE: 0 /HPF

## 2022-02-03 PROCEDURE — 81001 URINALYSIS AUTO W/SCOPE: CPT | Performed by: EMERGENCY MEDICINE

## 2022-02-03 PROCEDURE — 76830 TRANSVAGINAL US NON-OB: CPT

## 2022-02-03 PROCEDURE — 250N000011 HC RX IP 250 OP 636: Performed by: EMERGENCY MEDICINE

## 2022-02-03 PROCEDURE — 74177 CT ABD & PELVIS W/CONTRAST: CPT

## 2022-02-03 RX ORDER — HYDROMORPHONE HYDROCHLORIDE 1 MG/ML
0.5 INJECTION, SOLUTION INTRAMUSCULAR; INTRAVENOUS; SUBCUTANEOUS ONCE
Status: DISCONTINUED | OUTPATIENT
Start: 2022-02-03 | End: 2022-02-03 | Stop reason: HOSPADM

## 2022-02-03 RX ORDER — IOPAMIDOL 755 MG/ML
100 INJECTION, SOLUTION INTRAVASCULAR ONCE
Status: COMPLETED | OUTPATIENT
Start: 2022-02-03 | End: 2022-02-03

## 2022-02-03 RX ORDER — OXYCODONE HYDROCHLORIDE 5 MG/1
5 TABLET ORAL EVERY 6 HOURS PRN
Qty: 8 TABLET | Refills: 0 | Status: SHIPPED | OUTPATIENT
Start: 2022-02-03 | End: 2022-02-06

## 2022-02-03 RX ORDER — IOPAMIDOL 755 MG/ML
75 INJECTION, SOLUTION INTRAVASCULAR ONCE
Status: DISCONTINUED | OUTPATIENT
Start: 2022-02-03 | End: 2022-02-03

## 2022-02-03 RX ADMIN — IOPAMIDOL 100 ML: 755 INJECTION, SOLUTION INTRAVENOUS at 00:24

## 2022-02-03 NOTE — ED PROVIDER NOTES
eMERGENCY dEPARTMENT SIGN OUT NOTE         ED COURSE AND MEDICAL DECISION MAKING  Patient was signed out to me by Dr. De Los Santos at 1:09 AM    2:05 AM I re-evaluated and updated patient. We discussed plans for discharge and patient is agreeable.     In brief, Sherry Sweeney is a 41 year old female who initially presented with left sided abdominal pain and dizziness. History of hysterectomy, ovarian cyst rupture, and ovarian cyst removal.     At time of sign out, disposition was pending ultrasound. ***    LAB  Pertinent labs results reviewed   Results for orders placed or performed during the hospital encounter of 02/02/22   Abd/pelvis CT,  IV  contrast only TRAUMA / AAA    Impression    IMPRESSION:   1.  Lobulated tubular structure involving the left adnexa measuring 5.4 x 2.5 x 3.5 cm. This has developed/increased since prior examination. This may represent multiple enlarging ovarian cysts or a complex ovarian cyst. A small nodular enhancing 0.9 cm   component cannot be excluded. Recommend ultrasound for further characterization.    2.  Hysterectomy.    3.  No bowel dilatation or inflammatory change.   Basic metabolic panel   Result Value Ref Range    Sodium 137 136 - 145 mmol/L    Potassium 4.0 3.5 - 5.0 mmol/L    Chloride 105 98 - 107 mmol/L    Carbon Dioxide (CO2) 23 22 - 31 mmol/L    Anion Gap 9 5 - 18 mmol/L    Urea Nitrogen 8 8 - 22 mg/dL    Creatinine 0.77 0.60 - 1.10 mg/dL    Calcium 8.9 8.5 - 10.5 mg/dL    Glucose 90 70 - 125 mg/dL    GFR Estimate >90 >60 mL/min/1.73m2   Hepatic function panel   Result Value Ref Range    Bilirubin Total 0.4 0.0 - 1.0 mg/dL    Bilirubin Direct 0.1 <=0.5 mg/dL    Protein Total 6.4 6.0 - 8.0 g/dL    Albumin 3.7 3.5 - 5.0 g/dL    Alkaline Phosphatase 51 45 - 120 U/L    AST 16 0 - 40 U/L    ALT 10 0 - 45 U/L   CBC with platelets and differential   Result Value Ref Range    WBC Count 11.9 (H) 4.0 - 11.0 10e3/uL    RBC Count 4.52 3.80 - 5.20 10e6/uL    Hemoglobin 12.6 11.7 - 15.7 g/dL     Hematocrit 39.1 35.0 - 47.0 %    MCV 87 78 - 100 fL    MCH 27.9 26.5 - 33.0 pg    MCHC 32.2 31.5 - 36.5 g/dL    RDW 12.7 10.0 - 15.0 %    Platelet Count 331 150 - 450 10e3/uL    % Neutrophils 69 %    % Lymphocytes 24 %    % Monocytes 5 %    % Eosinophils 1 %    % Basophils 1 %    % Immature Granulocytes 0 %    NRBCs per 100 WBC 0 <1 /100    Absolute Neutrophils 8.2 1.6 - 8.3 10e3/uL    Absolute Lymphocytes 2.8 0.8 - 5.3 10e3/uL    Absolute Monocytes 0.6 0.0 - 1.3 10e3/uL    Absolute Eosinophils 0.1 0.0 - 0.7 10e3/uL    Absolute Basophils 0.1 0.0 - 0.2 10e3/uL    Absolute Immature Granulocytes 0.0 <=0.4 10e3/uL    Absolute NRBCs 0.0 10e3/uL   UA with Microscopic reflex to Culture    Specimen: Urine, Midstream   Result Value Ref Range    Color Urine Colorless Colorless, Straw, Light Yellow, Yellow    Appearance Urine Clear Clear    Glucose Urine Negative Negative mg/dL    Bilirubin Urine Negative Negative    Ketones Urine 20  (A) Negative mg/dL    Specific Gravity Urine 1.009 1.001 - 1.030    Blood Urine Negative Negative    pH Urine 7.0 5.0 - 7.0    Protein Albumin Urine Negative Negative mg/dL    Urobilinogen Urine <2.0 <2.0 mg/dL    Nitrite Urine Negative Negative    Leukocyte Esterase Urine Negative Negative    Bacteria Urine Few (A) None Seen /HPF    RBC Urine 0 <=2 /HPF    WBC Urine 0 <=5 /HPF    Squamous Epithelials Urine <1 <=1 /HPF         RADIOLOGY    Pertinent imaging reviewed   Please see official radiology report.  Abd/pelvis CT,  IV  contrast only TRAUMA / AAA   Final Result   IMPRESSION:    1.  Lobulated tubular structure involving the left adnexa measuring 5.4 x 2.5 x 3.5 cm. This has developed/increased since prior examination. This may represent multiple enlarging ovarian cysts or a complex ovarian cyst. A small nodular enhancing 0.9 cm    component cannot be excluded. Recommend ultrasound for further characterization.      2.  Hysterectomy.      3.  No bowel dilatation or inflammatory change.       US Pelvic Complete with Transvaginal    (Results Pending)       FINAL IMPRESSION    1. Abdominal pain, left lower quadrant

## 2022-02-03 NOTE — DISCHARGE INSTRUCTIONS
Please make an appointment to follow-up with your OB/GYN especially if pain continues.  Return for any new or worsening symptoms.
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
## 2022-02-28 ENCOUNTER — MYC MEDICAL ADVICE (OUTPATIENT)
Dept: FAMILY MEDICINE | Facility: CLINIC | Age: 42
End: 2022-02-28
Payer: COMMERCIAL

## 2022-02-28 DIAGNOSIS — F33.9 MAJOR DEPRESSION, RECURRENT, CHRONIC (H): Primary | ICD-10-CM

## 2022-03-09 ENCOUNTER — TELEPHONE (OUTPATIENT)
Dept: FAMILY MEDICINE | Facility: CLINIC | Age: 42
End: 2022-03-09
Payer: COMMERCIAL

## 2022-03-09 NOTE — TELEPHONE ENCOUNTER
Prior Authorization Request  Who s requesting:  Pharmacy  Pharmacy Name and Location: [place] [address]  Medication Name: EFFEXOR XR 75 MG 24 hr capsule  Insurance Plan: Blue Plus and Medicare  Insurance Member ID Number:    Blue Plus [id number]  Medicare [id number]  CoverMyMeds Key:   Informed patient that prior authorizations can take up to 10 business days for response:   NA  Okay to leave a detailed message: No     Route to pool:  DAR[employer]O TRAN MED

## 2022-03-15 NOTE — TELEPHONE ENCOUNTER
Central Prior Authorization Team   Phone: [phone]    PA Initiation    Medication: Effexor XR 75MG er capsules  Insurance Company: [employer] - Phone [phone] Fax [phone]  Pharmacy Filling the Rx: [id number] - [place], [place] - [facility]  Filling Pharmacy Phone: [phone]  Filling Pharmacy Fax:    Start Date: 3/15/2022

## 2022-03-15 NOTE — TELEPHONE ENCOUNTER
Please provide clinical rational as to why the patient needs brand name Effexor XR capsules and can not take the venlafaxine ER capsules that are covered without authorization.

## 2022-03-18 NOTE — TELEPHONE ENCOUNTER
Prior Authorization Approval    Authorization Effective Date: 3/16/2022  Authorization Expiration Date: 3/16/2023  Medication: Effexor XR 75MG er capsules  Approved Dose/Quantity:    Reference #:     Insurance Company: [employer] [phone] Fax [phone]  Expected CoPay:       CoPay Card Available:      Foundation Assistance Needed:    Which Pharmacy is filling the prescription (Not needed for infusion/clinic administered): [id number] - [place], [place] - [address] [place] [address]  Pharmacy Notified: Yes  Patient Notified: Yes

## 2022-04-17 DIAGNOSIS — F41.8 DEPRESSION WITH ANXIETY: ICD-10-CM

## 2022-04-19 RX ORDER — HYDROXYZINE HYDROCHLORIDE 25 MG/1
25-50 TABLET, FILM COATED ORAL AT BEDTIME
Qty: 180 TABLET | Refills: 1 | Status: SHIPPED | OUTPATIENT
Start: 2022-04-19 | End: 2022-06-29

## 2022-04-19 NOTE — TELEPHONE ENCOUNTER
"Routing refill request to provider for review/approval because:  Early refill requested.    Last Written Prescription Date:  12/20/21  Last Fill Quantity: 180,  # refills: 1   Last office visit provider:  9/30/21     Requested Prescriptions   Pending Prescriptions Disp Refills     hydrOXYzine (ATARAX) 25 MG tablet [Pharmacy Med Name: HYDROXYZINE HCL 25 MG TABLET] 180 tablet 1     Sig: TAKE 1-2 TABLETS (25-50 MG) BY MOUTH AT BEDTIME       Antihistamines Protocol Passed - 4/19/2022  7:33 AM        Passed - Recent (12 mo) or future (30 days) visit within the authorizing provider's specialty     Patient has had an office visit with the authorizing provider or a provider within the authorizing providers department within the previous 12 mos or has a future within next 30 days. See \"Patient Info\" tab in inbasket, or \"Choose Columns\" in Meds & Orders section of the refill encounter.              Passed - Patient is age 3 or older     Apply age and/or weight-based dosing for peds patients age 3 and older.    Forward request to provider for patients under the age of 3.          Passed - Medication is active on med list             Thien Castro RN 04/19/22 7:34 AM  "

## 2022-04-28 ENCOUNTER — OFFICE VISIT (OUTPATIENT)
Dept: FAMILY MEDICINE | Facility: CLINIC | Age: 42
End: 2022-04-28
Payer: COMMERCIAL

## 2022-04-28 VITALS
TEMPERATURE: 98.1 F | WEIGHT: 130 LBS | RESPIRATION RATE: 20 BRPM | DIASTOLIC BLOOD PRESSURE: 83 MMHG | HEART RATE: 104 BPM | OXYGEN SATURATION: 99 % | BODY MASS INDEX: 23.78 KG/M2 | SYSTOLIC BLOOD PRESSURE: 111 MMHG

## 2022-04-28 DIAGNOSIS — B02.9 HERPES ZOSTER WITHOUT COMPLICATION: Primary | ICD-10-CM

## 2022-04-28 PROCEDURE — 99214 OFFICE O/P EST MOD 30 MIN: CPT | Performed by: PHYSICIAN ASSISTANT

## 2022-04-28 RX ORDER — VALACYCLOVIR HYDROCHLORIDE 1 G/1
1000 TABLET, FILM COATED ORAL 3 TIMES DAILY
Qty: 21 TABLET | Refills: 0 | Status: SHIPPED | OUTPATIENT
Start: 2022-04-28 | End: 2022-07-14

## 2022-04-28 NOTE — PROGRESS NOTES
Patient presents with:  Derm Problem: Itching rash back and neck x 2 months       Clinical Decision Making:  I had a conversation the patient stating I do think that this is a herpes zoster dermatome pattern on the left anterior neck at the C4-C5 and the posterior scapular thoracic chest wall region on T1 T5 dermatome.  Patient is treated with Valtrex and risks and benefits of the medication were gone over. Expected course of resolution and indication for return was gone over and questions were answered to patient/parent's satisfaction before discharge.        ICD-10-CM    1. Herpes zoster without complication  B02.9 valACYclovir (VALTREX) 1000 mg tablet       Patient Instructions     Discussed differentials and treatment options, lesions are benign and patient is reassured.  Take medication as prescribed, and recheck with PCP if not resolving as expected, sooner with Walk-In Clinic or Emergency Room if worsening.      Herpes Zoster:    This is a viral illness caused by the chicken post virus that lies dormant in the nerve root. It can be stimulated by stress. If effects one nerve pattern, so will not spread to another area or cross the midline. The secretions are contagious, but once the lesions dry, they are not contagious. It is treated with antiviral medication that is most effective when given in the first 72 hours to help dry the lesions and reduce the risk of long-term pain from the condition.     Prescribed: Valtrex. Discussed pain medication options and prescribed: OTC Tylenol. If requires stonger or more pain medication call primary to discuss.     Patient handout on Herpes zoster provided.     Follow-up with primary for persistence or worsening of symptoms.                  HPI:  Sherry Sweeney is a 42 year old female who presents for a 1 month intermittent history of rash on the posterior thoracic chest wall and scapular region.  She has had new lesions that popped out and then resolve and come back.   Patient has had fluid-filled vesicles had been itchy painful and burning.  She has scratched them over the last week she had them return and is again painful and itching.  Distally she has some involvement on the left lateral neck but it does not involve the face or the ear or the eyes.  She has not had headache fever chills night sweats fatigue myalgias or arthralgias.    History obtained from chart review and the patient.    Problem List:  2021-08: Fibromyalgia  2021-08: Insomnia  2021-08: Major depression, recurrent, chronic (H)  2021-08: Migraine headache  2021-08: Generalized anxiety disorder  2021-08: Post-traumatic stress disorder  2020-09: Parasomnia  2019-09: Suicidal ideations  2018-03: Arthralgia of temporomandibular joint  2018-02: H/O: hysterectomy  2017-06: Mitochondrial myopathy  2017-03: Acne vulgaris  2016-05: Debility  2015-04: IBS (irritable bowel syndrome)  1980-03: Hypermobility syndrome      Past Medical History:   Diagnosis Date     Chronic fatigue syndrome      Chronic migraine      Depression      Depression      Depressive disorder 2013     Fibromyalgia      Fibromyalgia      History of anesthesia complications     slow to wake     IBS (irritable bowel syndrome)      Mitochondrial disease (H)      Mitochondrial myopathy      Parasomnia      PONV (postoperative nausea and vomiting)        Social History     Tobacco Use     Smoking status: Never Smoker     Smokeless tobacco: Never Used   Substance Use Topics     Alcohol use: No       Review of Systems  As above in HPI otherwise negative.    Vitals:    04/28/22 1718   BP: 111/83   BP Location: Left arm   Patient Position: Sitting   Pulse: 104   Resp: 20   Temp: 98.1  F (36.7  C)   TempSrc: Oral   SpO2: 99%   Weight: 59 kg (130 lb)       General: Patient is resting comfortably no acute distress is afebrile  HEENT: Head is normocephalic atraumatic   eyes are PERRL EOMI sclera anicteric   TMs are clear bilaterally  Throat is clear  No cervical  lymphadenopathy present  LUNGS: Clear to auscultation bilaterally  HEART: Regular rate and rhythm  Skin: With rash on the posterior left thoracic chest wall on the scapular region that is with the T1 T5 dermatome and also wraps around to the left lateral neck and the clavicle area of the C4-C5 on the anterior chest wall.  Does not involve the face the ears and does not cross the midline or involve the right posterior thoracic chest wall.    Physical Exam    At the end of the encounter, I discussed results, diagnosis, medications. Discussed red flags for immediate return to clinic/ER, as well as indications for follow up if no improvement. Patient understood and agreed to plan. Patient was stable for discharge.

## 2022-04-28 NOTE — PATIENT INSTRUCTIONS
Discussed differentials and treatment options, lesions are benign and patient is reassured.  Take medication as prescribed, and recheck with PCP if not resolving as expected, sooner with Walk-In Clinic or Emergency Room if worsening.      Herpes Zoster:    This is a viral illness caused by the chicken post virus that lies dormant in the nerve root. It can be stimulated by stress. If effects one nerve pattern, so will not spread to another area or cross the midline. The secretions are contagious, but once the lesions dry, they are not contagious. It is treated with antiviral medication that is most effective when given in the first 72 hours to help dry the lesions and reduce the risk of long-term pain from the condition.     Prescribed: Valtrex. Discussed pain medication options and prescribed: OTC Tylenol. If requires stonger or more pain medication call primary to discuss.     Patient handout on Herpes zoster provided.     Follow-up with primary for persistence or worsening of symptoms.

## 2022-05-11 ENCOUNTER — OFFICE VISIT (OUTPATIENT)
Dept: FAMILY MEDICINE | Facility: CLINIC | Age: 42
End: 2022-05-11
Payer: COMMERCIAL

## 2022-05-11 VITALS
OXYGEN SATURATION: 99 % | WEIGHT: 127 LBS | DIASTOLIC BLOOD PRESSURE: 64 MMHG | BODY MASS INDEX: 23.23 KG/M2 | HEART RATE: 94 BPM | SYSTOLIC BLOOD PRESSURE: 118 MMHG

## 2022-05-11 DIAGNOSIS — F33.9 MAJOR DEPRESSION, RECURRENT, CHRONIC (H): ICD-10-CM

## 2022-05-11 DIAGNOSIS — G89.29 CHRONIC BACK PAIN, UNSPECIFIED BACK LOCATION, UNSPECIFIED BACK PAIN LATERALITY: ICD-10-CM

## 2022-05-11 DIAGNOSIS — R21 RASH: Primary | ICD-10-CM

## 2022-05-11 DIAGNOSIS — E88.40 MITOCHONDRIAL DISEASE (H): ICD-10-CM

## 2022-05-11 DIAGNOSIS — M54.9 CHRONIC BACK PAIN, UNSPECIFIED BACK LOCATION, UNSPECIFIED BACK PAIN LATERALITY: ICD-10-CM

## 2022-05-11 PROCEDURE — 99214 OFFICE O/P EST MOD 30 MIN: CPT | Performed by: FAMILY MEDICINE

## 2022-05-11 RX ORDER — VENLAFAXINE HYDROCHLORIDE 75 MG/1
75 CAPSULE, EXTENDED RELEASE ORAL DAILY
COMMUNITY
End: 2023-05-15

## 2022-05-11 RX ORDER — LIDOCAINE 50 MG/G
1 PATCH TOPICAL PRN
Qty: 30 PATCH | Refills: 0 | Status: SHIPPED | OUTPATIENT
Start: 2022-05-11 | End: 2022-08-03

## 2022-05-11 RX ORDER — PREDNISONE 20 MG/1
TABLET ORAL
Qty: 14 TABLET | Refills: 0 | Status: SHIPPED | OUTPATIENT
Start: 2022-05-11 | End: 2023-01-30

## 2022-05-11 ASSESSMENT — PATIENT HEALTH QUESTIONNAIRE - PHQ9
SUM OF ALL RESPONSES TO PHQ QUESTIONS 1-9: 11
SUM OF ALL RESPONSES TO PHQ QUESTIONS 1-9: 11
10. IF YOU CHECKED OFF ANY PROBLEMS, HOW DIFFICULT HAVE THESE PROBLEMS MADE IT FOR YOU TO DO YOUR WORK, TAKE CARE OF THINGS AT HOME, OR GET ALONG WITH OTHER PEOPLE: EXTREMELY DIFFICULT

## 2022-05-11 NOTE — PROGRESS NOTES
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              Behavioral    Major depression, recurrent, chronic (H)     The patient states that her depression has been worse in particular over the past couple of weeks in response to her dog passing away.  She definitely identifies it as significant grief but worries with her history of depression as she has really had trouble focusing and getting stuff done.  We discussed monitoring closely and getting back in touch over the next week or 2 if she is not starting to feel better.  Consideration could include increasing her Effexor to 150 mg daily.           Relevant Medications    venlafaxine (EFFEXOR XR) 75 MG 24 hr capsule      Other Visit Diagnoses     Rash    -  Primary    Relevant Medications    lidocaine (LIDODERM) 5 % patch    predniSONE (DELTASONE) 20 MG tablet    Chronic back pain, unspecified back location, unspecified back pain laterality        Relevant Medications    lidocaine (LIDODERM) 5 % patch    predniSONE (DELTASONE) 20 MG tablet        It is an unusual rash but I suspect this is more of an allergic contact dermatitis of some sort almost like poison ivy.  I prescribed oral prednisone taper and recommended using over-the-counter calamine lotion or Benadryl cream.  I asked her to let me know if the symptoms have not significantly improved in the next 7 to 10 days.  I treated her warts today and refilled some of her medications as well.  We discussed mood as it relates to grief and complicated grief and I recommended letting me know if in the next couple of weeks she does not start noticing improvement in her  mood.    MATTHEW Powell is a 42 year old who presents today with a chief complaint of a rash.  The patient was seen on  regarding the same rash and at that time had reported that the symptoms had been there for about a month.  She was preliminarily diagnosed as shingles but states that the rash does not seem to be improving and questions the diagnosis.  The rash has been quite itchy and now is spreading up towards her neck.  She is not sure what could be contributing to the rash or causing it.  She has tried a steroid cream that she had on hand but states that that caused the skin to feel like it was burning and so she only use at the one time.  She is otherwise doing okay although notes that her mental health is not doing so well as her dog  recently and she is grieving.  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  Lastly, the patient has a wart on her finger that she has tried treating with over-the-counter freezing treatments but it does not seem to be responding.     Objective    /64 (BP Location: Left arm, Patient Position: Left side, Cuff Size: Adult Regular)   Pulse 94   Wt 57.6 kg (127 lb)   SpO2 99%   BMI 23.23 kg/m    Body mass index is 23.23 kg/m .  Physical Exam   GENERAL: healthy, alert and no distress  FINGER: two separate warts on distal finger treated with liquid nitrogen and canteridin today  SKIN: The patient has an interesting distribution of her rash and that it is predominantly involving the left upper portion of her back.  She has excoriations present from scratching.  She has multiple papules most of those with a scab but none of them are in clusters as you would expect with shingles.  She has a similar rash going up into the neck and the base of the scalp mostly on the left side.            Answers for HPI/ROS  submitted by the patient on 5/11/2022  If you checked off any problems, how difficult have these problems made it for you to do your work, take care of things at home, or get along with other people?: Extremely difficult  PHQ9 TOTAL SCORE: 11  How many servings of fruits and vegetables do you eat daily?: 0-1  On average, how many sweetened beverages do you drink each day (Examples: soda, juice, sweet tea, etc.  Do NOT count diet or artificially sweetened beverages)?: 1  How many minutes a day do you exercise enough to make your heart beat faster?: 9 or less  How many days a week do you exercise enough to make your heart beat faster?: 3 or less  How many days per week do you miss taking your medication?: 0  What is the reason for your visit today?: Rash  When did your symptoms begin?: More than a month  What are your symptoms?: Itchy rash  How would you describe these symptoms?: Moderate  Are your symptoms:: Staying the same  Have you had these symptoms before?: No  Is there anything that makes you feel worse?: Steroid cream  Is there anything that makes you feel better?: Keeping it dry

## 2022-05-11 NOTE — ASSESSMENT & PLAN NOTE
The patient states that her depression has been worse in particular over the past couple of weeks in response to her dog passing away.  She definitely identifies it as significant grief but worries with her history of depression as she has really had trouble focusing and getting stuff done.  We discussed monitoring closely and getting back in touch over the next week or 2 if she is not starting to feel better.  Consideration could include increasing her Effexor to 150 mg daily.

## 2022-05-11 NOTE — ASSESSMENT & PLAN NOTE
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

## 2022-05-12 ASSESSMENT — PATIENT HEALTH QUESTIONNAIRE - PHQ9: SUM OF ALL RESPONSES TO PHQ QUESTIONS 1-9: 11

## 2022-06-10 ENCOUNTER — E-VISIT (OUTPATIENT)
Dept: FAMILY MEDICINE | Facility: CLINIC | Age: 42
End: 2022-06-10
Payer: COMMERCIAL
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
## 2022-06-10 PROCEDURE — 99207 PR NON-BILLABLE SERV PER CHARTING: CPT | Performed by: FAMILY MEDICINE

## 2022-06-12 ENCOUNTER — E-VISIT (OUTPATIENT)
Dept: URGENT CARE | Facility: CLINIC | Age: 42
End: 2022-06-12
Payer: MEDICARE

## 2022-06-12 DIAGNOSIS — R68.89 FLU-LIKE SYMPTOMS: Primary | ICD-10-CM

## 2022-06-12 PROCEDURE — 99207 PR NON-BILLABLE SERV PER CHARTING: CPT | Performed by: FAMILY MEDICINE

## 2022-06-12 NOTE — PATIENT INSTRUCTIONS
Dear Sherry Sweeney,    We are sorry you are not feeling well. Based on the responses you provided, it is recommended that you be seen in-person in urgent care so we can better evaluate your symptoms. Please click here to find the nearest urgent care location to you.   You will not be charged for this Visit. Thank you for trusting us with your care.    Petra Gutiérrez MD

## 2022-06-14 RX ORDER — DOXYCYCLINE HYCLATE 100 MG
200 TABLET ORAL ONCE
Qty: 2 TABLET | Refills: 0 | Status: SHIPPED | OUTPATIENT
Start: 2022-06-14 | End: 2022-06-14

## 2022-06-14 NOTE — PATIENT INSTRUCTIONS
Thank you for choosing us for your care. I have placed an order for a prescription so that you can start treatment. View your full visit summary for details by clicking on the link below. Your pharmacist will able to address any questions you may have about the medication.     If you're not feeling better within 5-7 days, please schedule an appointment.  You can schedule an appointment right here in [facility], or call [phone]  If the visit is for the same symptoms as your eVisit, we'll refund the cost of your eVisit if seen within seven days.

## 2022-06-27 ENCOUNTER — OFFICE VISIT (OUTPATIENT)
Dept: FAMILY MEDICINE | Facility: CLINIC | Age: 42
End: 2022-06-27
Payer: COMMERCIAL

## 2022-06-27 VITALS — HEART RATE: 98 BPM | OXYGEN SATURATION: 98 % | SYSTOLIC BLOOD PRESSURE: 122 MMHG | DIASTOLIC BLOOD PRESSURE: 74 MMHG

## 2022-06-27 DIAGNOSIS — K21.00 GASTROESOPHAGEAL REFLUX DISEASE WITH ESOPHAGITIS WITHOUT HEMORRHAGE: ICD-10-CM

## 2022-06-27 DIAGNOSIS — F33.9 MAJOR DEPRESSION, RECURRENT, CHRONIC (H): ICD-10-CM

## 2022-06-27 DIAGNOSIS — L70.0 ACNE VULGARIS: ICD-10-CM

## 2022-06-27 DIAGNOSIS — B07.8 OTHER VIRAL WARTS: Primary | ICD-10-CM

## 2022-06-27 PROCEDURE — 17110 DESTRUCTION B9 LES UP TO 14: CPT | Performed by: FAMILY MEDICINE

## 2022-06-27 PROCEDURE — 99213 OFFICE O/P EST LOW 20 MIN: CPT | Mod: 25 | Performed by: FAMILY MEDICINE

## 2022-06-27 ASSESSMENT — PATIENT HEALTH QUESTIONNAIRE - PHQ9
10. IF YOU CHECKED OFF ANY PROBLEMS, HOW DIFFICULT HAVE THESE PROBLEMS MADE IT FOR YOU TO DO YOUR WORK, TAKE CARE OF THINGS AT HOME, OR GET ALONG WITH OTHER PEOPLE: VERY DIFFICULT
SUM OF ALL RESPONSES TO PHQ QUESTIONS 1-9: 8
SUM OF ALL RESPONSES TO PHQ QUESTIONS 1-9: 8

## 2022-06-29 ENCOUNTER — MYC MEDICAL ADVICE (OUTPATIENT)
Dept: FAMILY MEDICINE | Facility: CLINIC | Age: 42
End: 2022-06-29

## 2022-06-29 DIAGNOSIS — R25.2 MUSCLE CRAMP: Primary | ICD-10-CM

## 2022-06-29 RX ORDER — CYCLOBENZAPRINE HCL 5 MG
5 TABLET ORAL 3 TIMES DAILY PRN
Qty: 21 TABLET | Refills: 0 | Status: SHIPPED | OUTPATIENT
Start: 2022-06-29 | End: 2022-07-06

## 2022-06-29 RX ORDER — BUPROPION HYDROCHLORIDE 300 MG/1
300 TABLET ORAL DAILY
Qty: 90 TABLET | Refills: 3 | Status: SHIPPED | OUTPATIENT
Start: 2022-06-29 | End: 2023-08-02

## 2022-06-29 RX ORDER — TRETINOIN 0.5 MG/G
CREAM TOPICAL
Qty: 45 G | Refills: 2 | Status: SHIPPED | OUTPATIENT
Start: 2022-06-29 | End: 2023-10-30

## 2022-06-29 RX ORDER — OMEPRAZOLE 40 MG/1
40 CAPSULE, DELAYED RELEASE ORAL
Qty: 90 CAPSULE | Refills: 1 | Status: SHIPPED | OUTPATIENT
Start: 2022-06-29 | End: 2022-12-28

## 2022-06-29 RX ORDER — HYDROXYZINE HYDROCHLORIDE 25 MG/1
25-50 TABLET, FILM COATED ORAL AT BEDTIME
Qty: 180 TABLET | Refills: 1 | Status: SHIPPED | OUTPATIENT
Start: 2022-06-29 | End: 2023-10-30

## 2022-06-29 NOTE — PROGRESS NOTES
Problem List Items Addressed This Visit        Musculoskeletal and Integumentary    Acne vulgaris    Relevant Medications    tretinoin (RETIN-A) 0.05 % external cream       Behavioral    Major depression, recurrent, chronic (H)    Relevant Medications    buPROPion (WELLBUTRIN XL) 300 MG 24 hr tablet    hydrOXYzine (ATARAX) 25 MG tablet      Other Visit Diagnoses     Other viral warts    -  Primary    Gastroesophageal reflux disease with esophagitis without hemorrhage        Relevant Medications    omeprazole (PRILOSEC) 40 MG DR capsule        I retreated the warts today and recommended returning to clinic in 2 to 3 weeks if they have not completely resolved for retreatment.  In regards to the nonspecific skin papules, I recommended monitoring for now as I do not have a specific diagnosis although they do look a bit like mosquito bites.  I refilled the patient's chronic medication as noted above including her acne cream.    MATTHEW Powell is a 42 year old who presents today for pretreatment of 2 warts on her finger and one on her toe.  These were treated previously and she states that they did seem to respond to the liquid nitrogen but then recently came back again.  She also needs several refills on her chronic medications.  The patient would like me to look at a couple of skin lesions that she believes may be an insect bite of some kind.  They are itchy.     Objective    /74 (BP Location: Left arm, Patient Position: Left side, Cuff Size: Adult Regular)   Pulse 98   SpO2 98%   There is no height or weight on file to calculate BMI.  Physical Exam   GENERAL: healthy, alert and no distress  SKIN: 1) warts on finger with one at end of finger at tip of nail and one near base of nail. Also one wart at tip of toe near nail. These were treated with liquid nitrogen X 3 deep freezes. 2) patient has 3-4 erythematous papules on side of torso and leg. Non-specific in appearance.

## 2022-07-03 ENCOUNTER — OFFICE VISIT (OUTPATIENT)
Dept: FAMILY MEDICINE | Facility: CLINIC | Age: 42
End: 2022-07-03
Payer: COMMERCIAL

## 2022-07-03 VITALS
HEART RATE: 97 BPM | DIASTOLIC BLOOD PRESSURE: 87 MMHG | TEMPERATURE: 98.2 F | BODY MASS INDEX: 23.01 KG/M2 | SYSTOLIC BLOOD PRESSURE: 124 MMHG | RESPIRATION RATE: 16 BRPM | OXYGEN SATURATION: 99 % | WEIGHT: 125.8 LBS

## 2022-07-03 DIAGNOSIS — B07.8 OTHER VIRAL WARTS: Primary | ICD-10-CM

## 2022-07-03 DIAGNOSIS — B37.2 YEAST INFECTION OF THE SKIN: ICD-10-CM

## 2022-07-03 DIAGNOSIS — R00.0 RAPID HEART BEAT: ICD-10-CM

## 2022-07-03 PROCEDURE — 17110 DESTRUCTION B9 LES UP TO 14: CPT | Performed by: PHYSICIAN ASSISTANT

## 2022-07-03 PROCEDURE — 99215 OFFICE O/P EST HI 40 MIN: CPT | Mod: 25 | Performed by: PHYSICIAN ASSISTANT

## 2022-07-03 RX ORDER — KETOCONAZOLE 20 MG/G
CREAM TOPICAL 2 TIMES DAILY
Qty: 30 G | Refills: 0 | Status: SHIPPED | OUTPATIENT
Start: 2022-07-03 | End: 2022-07-10

## 2022-07-03 ASSESSMENT — ENCOUNTER SYMPTOMS
PALPITATIONS: 1
SHORTNESS OF BREATH: 0

## 2022-07-03 NOTE — PATIENT INSTRUCTIONS
Apply ketoconazole to the nipple 2 times per day for 7 to 10 days.  Once the itching is improved do it for 2 additional days.  Follow-up with your primary care provider for further discussion of your rapid heart rate.  I would recommend doing thyroid test and complete blood count, but if you want to do hormone testing only once recommend going through them.  Also follow-up with your primary care provider if your warts do not resolve completely on their own.  May take a couple of weeks before this freezing session completely heals.  For treatment of triggers wash the skin that is effective aggressively with plain soap.  Apply topical anti-itch treatments such as calamine lotion, baking soda bath/paste, oatmeal bath, or topical low-dose steroid such as 1% hydrocortisone.

## 2022-07-03 NOTE — PROGRESS NOTES
Patient presents with:  Derm Problem: Patient states she has had an itchy nipple on right x 3-4 days ago. History of it. Patient has some warts removed. Patient also has racing heart in morning x 1 months.      Clinical Decision Making: Patient experiencing recurrence of warts.  These warts were retreated with liquid nitrogen here in the clinic.  A total of 3 warts were treated today.    Patient experiencing itching right nipple with history of inverted nipple.  Suspect candidal infection.  Patient started on topical ketoconazole cream for treatment.    Patient was educated that sugars need to be washed of the skin aggressively, but the bites will resolve themselves.  We discussed supportive cares.    Patient experiencing sensation of rapid heart rate especially in the mornings.  Patient is currently vitally stable without any chest pain or shortness of breath.  We discussed doing TSH and CBC, but the patient would like to also do hormonal levels since she does not have reproductive organs.  I did inform her that hormone testing is outside the scope of walk-in care.  She would prefer to do a single work-up with primary care instead of doing multiple blood draws.  Given her stable vital state I feel that this is appropriate.      ICD-10-CM    1. Other viral warts  B07.8    2. Yeast infection of the skin  B37.2 ketoconazole (NIZORAL) 2 % external cream   3. Rapid heart beat  R00.0        Patient Instructions   1. Apply ketoconazole to the nipple 2 times per day for 7 to 10 days.  Once the itching is improved do it for 2 additional days.  2. Follow-up with your primary care provider for further discussion of your rapid heart rate.  I would recommend doing thyroid test and complete blood count, but if you want to do hormone testing only once recommend going through them.  3. Also follow-up with your primary care provider if your warts do not resolve completely on their own.  May take a couple of weeks before this  freezing session completely heals.  4. For treatment of triggers wash the skin that is effective aggressively with plain soap.  Apply topical anti-itch treatments such as calamine lotion, baking soda bath/paste, oatmeal bath, or topical low-dose steroid such as 1% hydrocortisone.      HPI:  Sherry Sweeney is a 42 year old female who presents today complaining of itchy nipple on the right side for the past 3 to 4 days.  She has had this before.  She has been trying to apply a nystatin powder, but it is not sticking very well.  She has an inverted nipple and moisture can get trapped in it causing this itching.     Patient also has had some warts removed, but is having recurrence of them on her toes and finger the left hand.    She is also had some insect bites that she is gotten from a shade garden.  She believes that they are chiggers.  She is wondering what she can do for them.     She is also been experiencing racing heart especially in the morning for the past 1 month.  She denies any shortness of breath and usually resolves on its own.    History obtained from the patient.    Problem List:  2021-08: Fibromyalgia  2021-08: Insomnia  2021-08: Major depression, recurrent, chronic (H)  2021-08: Migraine headache  2021-08: Generalized anxiety disorder  2021-08: Post-traumatic stress disorder  2020-09: Parasomnia  2019-09: Suicidal ideations  2018-03: Arthralgia of temporomandibular joint  2018-02: H/O: hysterectomy  2017-03: Acne vulgaris  2016-05: Debility  2015-04: IBS (irritable bowel syndrome)  1980-03: Hypermobility syndrome  Mitochondrial disease (H)      Past Medical History:   Diagnosis Date     Chronic fatigue syndrome      Chronic migraine      Depression      Depression      Depressive disorder 2013     Fibromyalgia      Fibromyalgia      History of anesthesia complications     slow to wake     IBS (irritable bowel syndrome)      Mitochondrial disease (H)      Mitochondrial myopathy      Parasomnia      PONV  (postoperative nausea and vomiting)        Social History     Tobacco Use     Smoking status: Never Smoker     Smokeless tobacco: Never Used   Substance Use Topics     Alcohol use: No       Review of Systems   Respiratory: Negative for shortness of breath.    Cardiovascular: Positive for palpitations (sensation of rapid heart in the morning). Negative for chest pain.   Skin:        (+) warts, insect bites, itching right nipple.    All other systems reviewed and are negative.      Vitals:    07/03/22 1542   BP: 124/87   Pulse: 97   Resp: 16   Temp: 98.2  F (36.8  C)   TempSrc: Oral   SpO2: 99%   Weight: 57.1 kg (125 lb 12.8 oz)       Physical Exam  Vitals and nursing note reviewed.   Constitutional:       General: She is not in acute distress.     Appearance: She is not toxic-appearing or diaphoretic.   HENT:      Head: Normocephalic and atraumatic.      Right Ear: External ear normal.      Left Ear: External ear normal.   Eyes:      Conjunctiva/sclera: Conjunctivae normal.   Cardiovascular:      Rate and Rhythm: Normal rate and regular rhythm.      Heart sounds: No murmur heard.  Pulmonary:      Effort: Pulmonary effort is normal. No respiratory distress.      Breath sounds: No stridor. No wheezing, rhonchi or rales.   Chest:   Breasts:      Right: Inverted nipple present. No swelling, bleeding or nipple discharge.        Comments: Mildly dry skin on right nipple. No significant erythema  Skin:     Comments: Warts on left index finger, left toe, and right toe.    Neurological:      Mental Status: She is alert.   Psychiatric:         Mood and Affect: Mood normal.         Behavior: Behavior normal.         Thought Content: Thought content normal.         Judgment: Judgment normal.         At the end of the encounter, I discussed results, diagnosis, medications. Discussed red flags for immediate return to clinic/ER, as well as indications for follow up if no improvement. Patient understood and agreed to plan. Patient  was stable for discharge.    45 minutes spent on the date of the encounter doing chart review, history and examination, documentation, and further activities as noted.

## 2022-07-14 DIAGNOSIS — G47.00 INSOMNIA, UNSPECIFIED TYPE: ICD-10-CM

## 2022-07-14 NOTE — TELEPHONE ENCOUNTER
Routing refill request to provider for review/approval because:  Drug not on the [facility] refill protocol     Last Written Prescription Date: 8/25/21  Last Fill Quantity: 30,  # refills: 0  Last office visit provider: 6/27/22    Requested Prescriptions   Pending Prescriptions Disp Refills     zolpidem (AMBIEN) 5 MG tablet [Pharmacy Med Name: ZOLPIDEM TARTRATE 5 MG TABLET] 30 tablet      Sig: TAKE 1 TABLET (5 MG) BY MOUTH NIGHTLY AS NEEDED       There is no refill protocol information for this order        Holden Laboy RN  [facility]

## 2022-07-14 NOTE — TELEPHONE ENCOUNTER
Will need to wait for pcp,  shows prescriptions for oxycodone and gabapentin which can interact with ambien but med list states she is not taking. Will need pcp to clarify if rx is appropriate.

## 2022-07-18 RX ORDER — ZOLPIDEM TARTRATE 5 MG/1
5 TABLET ORAL
Qty: 30 TABLET | Refills: 0 | Status: SHIPPED | OUTPATIENT
Start: 2022-07-18 | End: 2023-01-19

## 2022-07-19 ENCOUNTER — OFFICE VISIT (OUTPATIENT)
Dept: FAMILY MEDICINE | Facility: CLINIC | Age: 42
End: 2022-07-19

## 2022-07-19 ENCOUNTER — E-VISIT (OUTPATIENT)
Dept: URGENT CARE | Facility: CLINIC | Age: 42
End: 2022-07-19
Payer: COMMERCIAL

## 2022-07-19 VITALS
WEIGHT: 126.3 LBS | DIASTOLIC BLOOD PRESSURE: 78 MMHG | RESPIRATION RATE: 22 BRPM | BODY MASS INDEX: 23.1 KG/M2 | SYSTOLIC BLOOD PRESSURE: 109 MMHG | HEART RATE: 89 BPM | TEMPERATURE: 98.8 F | OXYGEN SATURATION: 98 %

## 2022-07-19 DIAGNOSIS — L30.9 DERMATITIS: ICD-10-CM

## 2022-07-19 DIAGNOSIS — R21 RASH: Primary | ICD-10-CM

## 2022-07-19 DIAGNOSIS — R53.83 FATIGUE, UNSPECIFIED TYPE: Primary | ICD-10-CM

## 2022-07-19 DIAGNOSIS — G47.00 INSOMNIA, UNSPECIFIED TYPE: ICD-10-CM

## 2022-07-19 LAB
ALBUMIN SERPL BCG-MCNC: 4 G/DL (ref 3.5–5.2)
ALP SERPL-CCNC: 62 U/L (ref 35–104)
ALT SERPL W P-5'-P-CCNC: 13 U/L (ref 10–35)
ANION GAP SERPL CALCULATED.3IONS-SCNC: 9 MMOL/L (ref 7–15)
AST SERPL W P-5'-P-CCNC: 18 U/L (ref 10–35)
BASOPHILS # BLD AUTO: 0.1 10E3/UL (ref 0–0.2)
BASOPHILS NFR BLD AUTO: 1 %
BILIRUB SERPL-MCNC: 0.4 MG/DL
BUN SERPL-MCNC: 11.2 MG/DL (ref 6–20)
CALCIUM SERPL-MCNC: 9.7 MG/DL (ref 8.6–10)
CHLORIDE SERPL-SCNC: 104 MMOL/L (ref 98–107)
CREAT SERPL-MCNC: 0.8 MG/DL (ref 0.51–0.95)
DEPRECATED HCO3 PLAS-SCNC: 24 MMOL/L (ref 22–29)
EOSINOPHIL # BLD AUTO: 0.2 10E3/UL (ref 0–0.7)
EOSINOPHIL NFR BLD AUTO: 2 %
ERYTHROCYTE [DISTWIDTH] IN BLOOD BY AUTOMATED COUNT: 13 % (ref 10–15)
GFR SERPL CREATININE-BSD FRML MDRD: >90 ML/MIN/1.73M2
GLUCOSE SERPL-MCNC: 88 MG/DL (ref 70–99)
HCT VFR BLD AUTO: 43 % (ref 35–47)
HGB BLD-MCNC: 14.1 G/DL (ref 11.7–15.7)
IMM GRANULOCYTES # BLD: 0 10E3/UL
IMM GRANULOCYTES NFR BLD: 0 %
LYMPHOCYTES # BLD AUTO: 2.1 10E3/UL (ref 0.8–5.3)
LYMPHOCYTES NFR BLD AUTO: 28 %
MCH RBC QN AUTO: 28.2 PG (ref 26.5–33)
MCHC RBC AUTO-ENTMCNC: 32.8 G/DL (ref 31.5–36.5)
MCV RBC AUTO: 86 FL (ref 78–100)
MONOCYTES # BLD AUTO: 0.4 10E3/UL (ref 0–1.3)
MONOCYTES NFR BLD AUTO: 5 %
NEUTROPHILS # BLD AUTO: 4.7 10E3/UL (ref 1.6–8.3)
NEUTROPHILS NFR BLD AUTO: 64 %
PLATELET # BLD AUTO: 362 10E3/UL (ref 150–450)
POTASSIUM SERPL-SCNC: 4.5 MMOL/L (ref 3.4–5.3)
PROT SERPL-MCNC: 7.4 G/DL (ref 6.4–8.3)
RBC # BLD AUTO: 5 10E6/UL (ref 3.8–5.2)
SODIUM SERPL-SCNC: 137 MMOL/L (ref 136–145)
TSH SERPL DL<=0.005 MIU/L-ACNC: 0.88 UIU/ML (ref 0.3–5)
WBC # BLD AUTO: 7.4 10E3/UL (ref 4–11)

## 2022-07-19 PROCEDURE — 99207 PR NON-BILLABLE SERV PER CHARTING: CPT | Performed by: NURSE PRACTITIONER

## 2022-07-19 PROCEDURE — 80050 GENERAL HEALTH PANEL: CPT | Performed by: PHYSICIAN ASSISTANT

## 2022-07-19 PROCEDURE — 99214 OFFICE O/P EST MOD 30 MIN: CPT | Performed by: PHYSICIAN ASSISTANT

## 2022-07-19 PROCEDURE — 36415 COLL VENOUS BLD VENIPUNCTURE: CPT | Performed by: PHYSICIAN ASSISTANT

## 2022-07-19 RX ORDER — OMEPRAZOLE MAGNESIUM 20 MG
CAPSULE,DELAYED RELEASE (ENTERIC COATED) ORAL
Qty: 90 TABLET | Refills: 1 | Status: SHIPPED | OUTPATIENT
Start: 2022-07-19

## 2022-07-19 RX ORDER — PREDNISONE 20 MG/1
40 TABLET ORAL DAILY
Qty: 10 TABLET | Refills: 0 | Status: SHIPPED | OUTPATIENT
Start: 2022-07-19 | End: 2022-07-24
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
## 2022-07-19 NOTE — PATIENT INSTRUCTIONS
Rash:  Patient was educated on the natural course of rash. Etiology is unclear. Take medications as prescribed. Side effects discussed. Conservative measures discussed including applying cool calamine lotion, and over-the-counter antihistamines (Benadryl every 4 to 6 hours). See your primary care provider if symptoms worsen or do not improve in 7 days. Seek emergency care if you develop severe pain/redness, shortness of breath, or difficulty swallowing.     Fatigue:  Wide differential including anemia, thyroid or electrolyte imbalance, depression, kidney dysfunction, among others. CBC and TSH were normal. CMP is pending. She has PCP appointment to discuss fatigue in more detail tomorrow.

## 2022-07-19 NOTE — TELEPHONE ENCOUNTER
Routing refill request to provider for review/approval because:  Drug not on the G refill protocol     Last Written Prescription Date: 11/18/21  Last Fill Quantity: 90,  # refills: 1  Last office visit provider: 6/27/22    Requested Prescriptions   Pending Prescriptions Disp Refills     [facility] MELATONIN 3 MG tablet [Pharmacy Med Name: [facility] MELATONIN 3 MG TABLET] 90 tablet 1     Sig: TAKE 1 TABLET (3 MG) BY MOUTH NIGHTLY AS NEEDED FOR SLEEP       There is no refill protocol information for this order        Holden Laboy RN  [facility]

## 2022-07-19 NOTE — PATIENT INSTRUCTIONS
Dear Sherry Sweeney,    We are sorry you are not feeling well. Based on the responses you provided, it is recommended that you be seen in-person in urgent care so we can better evaluate your symptoms. Please click here to find the nearest urgent care location to you.   You will not be charged for this Visit. Thank you for trusting us with your care.    SELENA Bay CNP

## 2022-07-25 ENCOUNTER — E-VISIT (OUTPATIENT)
Dept: URGENT CARE | Facility: CLINIC | Age: 42
End: 2022-07-25
Payer: COMMERCIAL

## 2022-07-25 DIAGNOSIS — N39.0 ACUTE UTI (URINARY TRACT INFECTION): Primary | ICD-10-CM

## 2022-07-25 PROCEDURE — 99421 OL DIG E/M SVC 5-10 MIN: CPT | Performed by: FAMILY MEDICINE

## 2022-07-25 RX ORDER — NITROFURANTOIN 25; 75 MG/1; MG/1
100 CAPSULE ORAL 2 TIMES DAILY
Qty: 10 CAPSULE | Refills: 0 | Status: SHIPPED | OUTPATIENT
Start: 2022-07-25 | End: 2022-07-30

## 2022-07-25 NOTE — PATIENT INSTRUCTIONS
Dear Sherry Sweeney    After reviewing your responses, I've been able to diagnose you with a urinary tract infection, which is a common infection of the bladder with bacteria.  This is not a sexually transmitted infection, though urinating immediately after intercourse can help prevent infections.  Drinking lots of fluids is also helpful to clear your current infection and prevent the next one.      I have sent a prescription for antibiotics to your pharmacy to treat this infection.    It is important that you take all of your prescribed medication even if your symptoms are improving after a few doses.  Taking all of your medicine helps prevent the symptoms from returning.     If your symptoms worsen, you develop pain in your back or stomach, develop fevers, or are not improving in 5 days, please contact your primary care provider for an appointment or visit any of our convenient Walk-in or Urgent Care Centers to be seen, which can be found on our website here.    Thanks again for choosing us as your health care partner,    Petra Gutiérrez MD

## 2022-08-15 ENCOUNTER — OFFICE VISIT (OUTPATIENT)
Dept: FAMILY MEDICINE | Facility: CLINIC | Age: 42
End: 2022-08-15
Payer: COMMERCIAL

## 2022-08-15 VITALS
WEIGHT: 126.9 LBS | DIASTOLIC BLOOD PRESSURE: 68 MMHG | HEART RATE: 86 BPM | BODY MASS INDEX: 23.21 KG/M2 | SYSTOLIC BLOOD PRESSURE: 100 MMHG | OXYGEN SATURATION: 97 %

## 2022-08-15 DIAGNOSIS — R00.2 PALPITATIONS: ICD-10-CM

## 2022-08-15 DIAGNOSIS — B07.8 COMMON WART: Primary | ICD-10-CM

## 2022-08-15 DIAGNOSIS — G43.719 INTRACTABLE CHRONIC MIGRAINE WITHOUT AURA AND WITHOUT STATUS MIGRAINOSUS: ICD-10-CM

## 2022-08-15 PROCEDURE — 99213 OFFICE O/P EST LOW 20 MIN: CPT | Performed by: FAMILY MEDICINE

## 2022-08-15 RX ORDER — RIZATRIPTAN BENZOATE 10 MG/1
TABLET ORAL
Qty: 10 TABLET | Refills: 3 | Status: SHIPPED | OUTPATIENT
Start: 2022-08-15 | End: 2022-12-09

## 2022-08-15 RX ORDER — IMIQUIMOD 12.5 MG/.25G
CREAM TOPICAL
Qty: 12 PACKET | Refills: 3 | Status: SHIPPED | OUTPATIENT
Start: 2022-08-15 | End: 2023-01-19

## 2022-08-16 NOTE — PROGRESS NOTES
Problem List Items Addressed This Visit        Nervous and Auditory    Migraine headache    Relevant Medications    rizatriptan (MAXALT) 10 MG tablet    Other Relevant Orders    Neurology  Referral (Migraine Care Package)      Other Visit Diagnoses     Common wart    -  Primary    Relevant Medications    imiquimod (ALDARA) 5 % external cream    Palpitations        Relevant Orders    Adult Cardiac Event Monitor        In regards to the wart, because it is in such a sensitive area and did not seem to respond to liquid nitrogen I suggested a trial of Aldara cream as I think that will be better tolerated and hopefully more effective as well.  I recommended an event monitor to further evaluate her concerned about palpitations.  Lastly, the patient previously had received a referral to neurology regarding her migraine headaches.  I provided a refill on her Maxalt and resubmitted a referral for her chronic headaches.    MATTHEW Powell is a 42 year old who presents today regarding 3 separate issues.  First, the patient reports that some of the warts that were treated resolved, but the one on her finger near the nailbed seems to be getting larger.  The patient also is here to follow-up regarding her recent urgent care visit related to fatigue.  She had an evaluation and some lab work including thyroid which came back normal.  They mention something about possibly some hormone testing and she wonders if she needs to pursue that.  Lastly, the patient reports that especially in the morning she has been having what feels like palpitations in her chest where her heart feels like it is racing.  She does not have any associated chest pain, lightheadedness or difficulty breathing at that time.  However, it has been occurring on average every other day without a good cause.     Objective    /68 (BP Location: Left arm, Patient Position: Left side, Cuff Size: Adult Regular)   Pulse 86   Wt  57.6 kg (126 lb 14.4 oz)   SpO2 97%   BMI 23.21 kg/m    Body mass index is 23.21 kg/m .  Physical Exam   GENERAL: healthy, alert and no distress  RESP: lungs clear to auscultation - no rales, rhonchi or wheezes  CV: regular rate and rhythm, normal S1 S2, no S3 or S4, no murmur, click or rub, no peripheral edema and peripheral pulses strong  SKIN: wart along nail bed of finger            Answers for HPI/ROS submitted by the patient on 8/15/2022  If you checked off any problems, how difficult have these problems made it for you to do your work, take care of things at home, or get along with other people?: Somewhat difficult  PHQ9 TOTAL SCORE: 6  What is the reason for your visit today? : Wart  How many servings of fruits and vegetables do you eat daily?: 2-3  On average, how many sweetened beverages do you drink each day (Examples: soda, juice, sweet tea, etc.  Do NOT count diet or artificially sweetened beverages)?: 1  How many minutes a day do you exercise enough to make your heart beat faster?: 9 or less  How many days a week do you exercise enough to make your heart beat faster?: 3 or less  How many days per week do you miss taking your medication?: 0

## 2022-08-26 ENCOUNTER — HOSPITAL ENCOUNTER (OUTPATIENT)
Dept: CARDIOLOGY | Facility: HOSPITAL | Age: 42
Discharge: HOME | End: 2022-08-26
Attending: FAMILY MEDICINE | Admitting: FAMILY MEDICINE
Payer: COMMERCIAL

## 2022-08-26 DIAGNOSIS — R00.2 PALPITATIONS: ICD-10-CM

## 2022-08-26 PROCEDURE — 93270 REMOTE 30 DAY ECG REV/REPORT: CPT

## 2022-09-18 ENCOUNTER — HEALTH MAINTENANCE LETTER (OUTPATIENT)
Age: 42
End: 2022-09-18

## 2022-10-03 ENCOUNTER — OFFICE VISIT (OUTPATIENT)
Dept: FAMILY MEDICINE | Facility: CLINIC | Age: 42
End: 2022-10-03
Payer: COMMERCIAL

## 2022-10-03 VITALS
SYSTOLIC BLOOD PRESSURE: 122 MMHG | HEART RATE: 78 BPM | TEMPERATURE: 98.7 F | DIASTOLIC BLOOD PRESSURE: 83 MMHG | WEIGHT: 128.7 LBS | BODY MASS INDEX: 23.54 KG/M2 | OXYGEN SATURATION: 99 % | RESPIRATION RATE: 16 BRPM

## 2022-10-03 DIAGNOSIS — R21 RASH AND NONSPECIFIC SKIN ERUPTION: Primary | ICD-10-CM

## 2022-10-03 PROCEDURE — 99213 OFFICE O/P EST LOW 20 MIN: CPT | Performed by: NURSE PRACTITIONER

## 2022-10-03 RX ORDER — MUPIROCIN 20 MG/G
OINTMENT TOPICAL 2 TIMES DAILY
Qty: 15 G | Refills: 0 | Status: SHIPPED | OUTPATIENT
Start: 2022-10-03 | End: 2022-10-10

## 2022-10-03 RX ORDER — CETIRIZINE HYDROCHLORIDE 10 MG/1
10 TABLET ORAL DAILY PRN
Qty: 30 TABLET | Refills: 0 | Status: SHIPPED | OUTPATIENT
Start: 2022-10-03 | End: 2022-10-27

## 2022-10-03 ASSESSMENT — ENCOUNTER SYMPTOMS
EYE ITCHING: 0
CHILLS: 0
FEVER: 0

## 2022-10-03 NOTE — PROGRESS NOTES
Assessment & Plan     Rash and nonspecific skin eruption    - cetirizine (ZYRTEC) 10 MG tablet  Dispense: 30 tablet; Refill: 0  - mupirocin (BACTROBAN) 2 % external ointment  Dispense: 15 g; Refill: 0  - Adult Dermatology Referral     Patient with waxing and waning erythematous, maculopapular rash located on back with persistent right greater than left nipple itching.  Has been seen a couple times for this.  Had similar last fall and it went away on its own in the winter.  There were 2 or 3 bumps that are tender associated with this.  Otherwise, everything is pruritic only.    Differential includes insect bites such as bedbugs, scabies, hives.  The papules do have a bite maryjo look to them, but patient states she believes this is from scratching them intensely as he did not look like that initially.  Is not in a dermatomal distribution.    Has been using antifungal cream to right nipple.  This does not look fungal in nature.  Okay to stop this and try 1% hydrocortisone.    Mupirocin to tender papules located on the left upper back area.  This do not look like drainable abscesses.    Since has been a chronic intermittent problem, recommended establishing care also with dermatology.  Can follow-up with PCP as well.    Generalized pruritus, without rash and with rash, she can try Zyrtec 10 mg daily.  May increase to 20 mg daily if very itchy.  Informed her this can cause drowsiness and dry mouth.  Avoid hot baths and showers.    We will treat as a hives possible early secondary infection in 2 of these bumps and have patient monitor for improvement.            Return in about 10 days (around 10/13/2022) for If no better.    Zaira Ybarra, CNP  [facility]    Comfort Powell is a 42 year old female who presents to clinic today for the following health issues:  Chief Complaint   Patient presents with     Derm Problem     X Over the summer. Constant Itchy nipple. Both nipple. Burning. Breast  "getting sore.     HPI    Hx inverted nipples with nipple pruritis for months    Has been using an antifungal cream x 4-5 days.  \"Makes it burn.\"  Started on her own.  No rash.  Was prescribed in June for the same thing. Worked the first time.  ?Ketoconazole.    Had a rash on neck/back, but it isn't very itchy now.  One area is tender.      Feels \"generally itchy.\"  Goes away in the Winter.      No other people with rashes in the house.      Had similar for which she got prednisone in the past       Review of Systems   Constitutional: Negative for chills and fever.   HENT: Negative for congestion and sneezing.    Eyes: Negative for itching.   Allergic/Immunologic: Negative for environmental allergies.           Objective    /83 (BP Location: Right arm, Patient Position: Sitting, Cuff Size: Adult Regular)   Pulse 78   Temp 98.7  F (37.1  C) (Oral)   Resp 16   Wt 58.4 kg (128 lb 11.2 oz)   SpO2 99%   BMI 23.54 kg/m    Physical Exam  Constitutional:       General: She is not in acute distress.     Appearance: She is well-developed.   Eyes:      General:         Right eye: No discharge.         Left eye: No discharge.      Conjunctiva/sclera: Conjunctivae normal.   Pulmonary:      Effort: Pulmonary effort is normal.   Musculoskeletal:         General: Normal range of motion.   Skin:     General: Skin is warm and dry.      Capillary Refill: Capillary refill takes less than 2 seconds.      Comments: Several erythematous papules located on back, generalized, with scratch marks associated with these.  3 of these bumps are tender    Right nipple appears normal without obvious rash.   Neurological:      Mental Status: She is alert and oriented to person, place, and time.   Psychiatric:         Mood and Affect: Mood normal.         Behavior: Behavior normal.         Thought Content: Thought content normal.         Judgment: Judgment normal.                  "

## 2022-10-03 NOTE — PATIENT INSTRUCTIONS
Start with cetirizine 10 mg daily and increase to 20 mg daily if very itchy     Avoid hot showers and baths     OK to stop topical antifungal - can try if you want 1% hydrocortisone to the nipple as needed.

## 2022-10-10 ENCOUNTER — E-VISIT (OUTPATIENT)
Dept: URGENT CARE | Facility: CLINIC | Age: 42
End: 2022-10-10
Payer: COMMERCIAL

## 2022-10-10 DIAGNOSIS — L30.9 ECZEMA, UNSPECIFIED TYPE: Primary | ICD-10-CM

## 2022-10-10 PROCEDURE — 99421 OL DIG E/M SVC 5-10 MIN: CPT | Performed by: PHYSICIAN ASSISTANT

## 2022-10-10 RX ORDER — TRIAMCINOLONE ACETONIDE 1 MG/G
OINTMENT TOPICAL 2 TIMES DAILY
Qty: 80 G | Refills: 1 | Status: SHIPPED | OUTPATIENT
Start: 2022-10-10 | End: 2023-08-17

## 2022-10-10 NOTE — PATIENT INSTRUCTIONS
Dear Sherry Sweeney    After reviewing your responses, I've been able to diagnose you with eczema, which is a common skin condition that causes small fluid-filled blisters or bumps to appear on your skin. The exact cause is unknown but your risk may increase if you have allergies, smoke, or have other skin conditions. Some foods such as mushrooms, chocolate and coffee also are known potential triggers.     Based on your responses, I have prescribed triamcinolone to treat this. Please follow the instructions on the medication. If you experience irritation of your skin, new rash, or any other new symptoms, you should stop using this medication and contact your primary care provider.     If this treatment does not work for you or you will run out of refills, please plan to follow- up with your primary care provider to set refills for a longer period of time or to try other options.     Things you can do to help prevent this:     Do not scratch your rash.Bacteria from your fingernails may enter your open sores during scratching and cause an infection.     Use moisturizes or emollients, such as petroleum jelly.These help relieve itching and help prevent bacteria from getting in your sores. If you have a doctor's order for medicated cream, apply that first. Then apply the moisturizer or emollient on top.    Thanks for choosing us as your health care partner,    Loida Potts PA-C, [facility]

## 2022-10-27 ENCOUNTER — E-VISIT (OUTPATIENT)
Dept: URGENT CARE | Facility: CLINIC | Age: 42
End: 2022-10-27
Payer: COMMERCIAL

## 2022-10-27 DIAGNOSIS — N39.0 ACUTE UTI (URINARY TRACT INFECTION): Primary | ICD-10-CM

## 2022-10-27 DIAGNOSIS — R21 RASH AND NONSPECIFIC SKIN ERUPTION: ICD-10-CM

## 2022-10-27 PROCEDURE — 99421 OL DIG E/M SVC 5-10 MIN: CPT | Performed by: NURSE PRACTITIONER

## 2022-10-27 RX ORDER — NITROFURANTOIN 25; 75 MG/1; MG/1
100 CAPSULE ORAL 2 TIMES DAILY
Qty: 10 CAPSULE | Refills: 0 | Status: SHIPPED | OUTPATIENT
Start: 2022-10-27 | End: 2022-11-01

## 2022-10-27 NOTE — PATIENT INSTRUCTIONS
Dear Sherry Sweeney    After reviewing your responses, I've been able to diagnose you with a urinary tract infection, which is a common infection of the bladder with bacteria.  This is not a sexually transmitted infection, though urinating immediately after intercourse can help prevent infections.  Drinking lots of fluids is also helpful to clear your current infection and prevent the next one.      I have sent a prescription for antibiotics to your pharmacy to treat this infection.    It is important that you take all of your prescribed medication even if your symptoms are improving after a few doses.  Taking all of your medicine helps prevent the symptoms from returning.     If your symptoms worsen, you develop pain in your back or stomach, develop fevers, or are not improving in 5 days, please contact your primary care provider for an appointment or visit any of our convenient Walk-in or Urgent Care Centers to be seen, which can be found on our website here.    Thanks again for choosing us as your health care partner,    SELENA Bay CNP    Urinary Tract Infections in Women  Urinary tract infections (UTIs) are most often caused by bacteria. These bacteria enter the urinary tract. The bacteria may come from inside the body. Or they may travel from the skin outside the rectum or vagina into the urethra. Female anatomy makes it easy for bacteria from the bowel to enter a woman s urinary tract, which is the most common source of UTI. This means women develop UTIs more often than men. Pain in or around the urinary tract is a common UTI symptom. But the only way to know for sure if you have a UTI for the healthcare provider to test your urine. The two tests that may be done are the urinalysis and urine culture.     Types of UTIs    Cystitis. A bladder infection (cystitis) is the most common UTI in women. You may have urgent or frequent need to pee. You may also have pain, burning when you pee, and bloody  urine.    Urethritis. This is an inflamed urethra, which is the tube that carries urine from the bladder to outside the body. You may have lower stomach or back pain. You may also have urgent or frequent need to pee.    Pyelonephritis. This is a kidney infection. If not treated, it can be serious and damage your kidneys. In severe cases, you may need to stay in the hospital. You may have a fever and lower back pain.    Medicines to treat a UTI  Most UTIs are treated with antibiotics. These kill the bacteria. The length of time you need to take them depends on the type of infection. It may be as short as 3 days. If you have repeated UTIs, you may need a low-dose antibiotic for several months. Take antibiotics exactly as directed. Don t stop taking them until all of the medicine is gone. If you stop taking the antibiotic too soon, the infection may not go away. You may also develop a resistance to the antibiotic. This can make it much harder to treat.   Lifestyle changes to treat and prevent UTIs   The lifestyle changes below will help get rid of your UTI. They may also help prevent future UTIs.     Drink plenty of fluids. This includes water, juice, or other caffeine-free drinks. Fluids help flush bacteria out of your body.    Empty your bladder. Always empty your bladder when you feel the urge to pee. And always pee before going to sleep. Urine that stays in your bladder can lead to infection. Try to pee before and after sex as well.    Practice good personal hygiene. Wipe yourself from front to back after using the toilet. This helps keep bacteria from getting into the urethra.    Use condoms during sex. These help prevent UTIs caused by sexually transmitted bacteria. Also don't use spermicides during sex. These can increase the risk for UTIs. Choose other forms of birth control instead. For women who tend to get UTIs after sex, a low-dose of a preventive antibiotic may be used. Be sure to discuss this option with  your healthcare provider.    Follow up with your healthcare provider as directed. He or she may test to make sure the infection has cleared. If needed, more treatment may be started.  [employer] last reviewed this educational content on 7/1/2019 2000-2021 [employer]. All rights reserved. This information is not intended as a substitute for professional medical care. Always follow your healthcare professional's instructions.

## 2022-10-28 RX ORDER — CETIRIZINE HYDROCHLORIDE 10 MG/1
10 TABLET ORAL DAILY PRN
Qty: 90 TABLET | Refills: 3 | Status: SHIPPED | OUTPATIENT
Start: 2022-10-28 | End: 2022-12-18

## 2022-10-28 NOTE — TELEPHONE ENCOUNTER
"Last Written Prescription Date:  10/3/22  Last Fill Quantity: 30,  # refills: 0   Last office visit provider:  8/15/22     Requested Prescriptions   Pending Prescriptions Disp Refills     cetirizine (ZYRTEC) 10 MG tablet 30 tablet 1     Sig: Take 1 tablet (10 mg) by mouth daily as needed for allergies May increase to 20 mg daily if itching not relieved.       Antihistamines Protocol Passed - 10/28/2022  8:54 AM        Passed - Patient is 3-64 years of age     Apply weight-based dosing for peds patients age 3 - 12 years of age.    Forward request to provider for patients under the age of 3 or over the age of 64.          Passed - Recent (12 mo) or future (30 days) visit within the authorizing provider's specialty     Patient has had an office visit with the authorizing provider or a provider within the authorizing providers department within the previous 12 mos or has a future within next 30 days. See \"Patient Info\" tab in inbasket, or \"Choose Columns\" in Meds & Orders section of the refill encounter.              Passed - Medication is active on med list             Thien Castro RN 10/28/22 8:54 AM  "

## 2022-11-08 ENCOUNTER — OFFICE VISIT (OUTPATIENT)
Dept: FAMILY MEDICINE | Facility: CLINIC | Age: 42
End: 2022-11-08
Payer: COMMERCIAL

## 2022-11-08 ENCOUNTER — HOSPITAL ENCOUNTER (OUTPATIENT)
Dept: ULTRASOUND IMAGING | Facility: HOSPITAL | Age: 42
Discharge: HOME | End: 2022-11-08
Payer: COMMERCIAL

## 2022-11-08 VITALS
OXYGEN SATURATION: 99 % | DIASTOLIC BLOOD PRESSURE: 84 MMHG | TEMPERATURE: 98.5 F | BODY MASS INDEX: 23.12 KG/M2 | WEIGHT: 126.4 LBS | RESPIRATION RATE: 18 BRPM | HEART RATE: 86 BPM | SYSTOLIC BLOOD PRESSURE: 123 MMHG

## 2022-11-08 DIAGNOSIS — M79.661 PAIN OF RIGHT LOWER LEG: Primary | ICD-10-CM

## 2022-11-08 PROCEDURE — 99213 OFFICE O/P EST LOW 20 MIN: CPT

## 2022-11-08 PROCEDURE — 93971 EXTREMITY STUDY: CPT | Mod: RT

## 2022-11-08 NOTE — PROGRESS NOTES
Assessment & Plan   (M79.661) Pain of right lower leg  (primary encounter diagnosis)  Plan: US Lower Extremity Venous Duplex Right    Discussed with the patient the ultrasound results are negative for a blood clot.  Per the patient's request I placed orders for orthopedics as well as cardiology for her to further explore her right calf pain.  Discussed the need to return with any new or worsening symptoms.  Patient acknowledged her understanding of the above plan.    SELENA Mcdonald CNP  [facility] CHIARA Powell is a 42 year old female who presents to clinic today for the following health issues:  Chief Complaint   Patient presents with     Musculoskeletal Problem     Pt states 2 day Right leg on calf area      HPI  Right back sided calf pain ongoing for the past 2 days.  The patient reports it is intermittent squeezing of the right calf. 6-7/10 Patient also reports intermittent numbness and tingling in the right foot.  Patient indicates it is worse with activity.       Review of Systems  Negative except noted above.       Objective    /84 (BP Location: Right arm, Patient Position: Sitting, Cuff Size: Adult Regular)   Pulse 86   Temp 98.5  F (36.9  C) (Oral)   Resp 18   Wt 57.3 kg (126 lb 6.4 oz)   SpO2 99%   BMI 23.12 kg/m    Physical Exam   GENERAL: healthy, alert and no distress  RESP: lungs clear to auscultation - no rales, rhonchi or wheezes  BREAST: normal without masses, tenderness or nipple discharge and no palpable axillary masses or adenopathy  CV: regular rate and rhythm, normal S1 S2, no S3 or S4, no murmur, click or rub, no peripheral edema and peripheral pulses strong  MS: no gross musculoskeletal defects noted, no edema  SKIN: no suspicious lesions or rashes  NEURO: Normal strength and tone, mentation intact and speech normal  PSYCH: mentation appears normal, affect normal/bright

## 2022-11-09 NOTE — PATIENT INSTRUCTIONS
Ultrasound results are negative for a blood clot.  Per your request referrals for orthopedics as well as cardiology have been ordered.

## 2022-11-15 NOTE — PROGRESS NOTES
Sherry sent an e-mail requesting that Care Guide change her upcoming appointments, I have rescheduled all appointments and have sent the change of times and days to Sherry.   yes

## 2022-11-21 ENCOUNTER — OFFICE VISIT (OUTPATIENT)
Dept: CARDIOLOGY | Facility: CLINIC | Age: 42
End: 2022-11-21
Payer: COMMERCIAL

## 2022-11-21 VITALS
HEART RATE: 115 BPM | SYSTOLIC BLOOD PRESSURE: 100 MMHG | DIASTOLIC BLOOD PRESSURE: 80 MMHG | HEIGHT: 62 IN | BODY MASS INDEX: 24.48 KG/M2 | WEIGHT: 133 LBS | RESPIRATION RATE: 16 BRPM

## 2022-11-21 DIAGNOSIS — M79.661 PAIN OF RIGHT LOWER LEG: ICD-10-CM

## 2022-11-21 PROCEDURE — 99204 OFFICE O/P NEW MOD 45 MIN: CPT | Performed by: INTERNAL MEDICINE

## 2022-11-21 RX ORDER — NORETHINDRONE ACETATE AND ETHINYL ESTRADIOL AND FERROUS FUMARATE 1.5-30(21)
1 KIT ORAL PRN
COMMUNITY
Start: 2022-03-04 | End: 2023-08-16

## 2022-11-21 RX ORDER — DULOXETIN HYDROCHLORIDE 60 MG/1
CAPSULE, DELAYED RELEASE ORAL
COMMUNITY
End: 2023-01-19

## 2022-11-21 RX ORDER — BETAMETHASONE DIPROPIONATE 0.5 MG/G
LOTION TOPICAL
COMMUNITY
Start: 2022-10-19

## 2022-11-21 RX ORDER — ALBUTEROL SULFATE 90 UG/1
AEROSOL, METERED RESPIRATORY (INHALATION)
COMMUNITY
End: 2023-01-19

## 2022-11-21 RX ORDER — CYCLOBENZAPRINE HCL 10 MG
10 TABLET ORAL DAILY PRN
COMMUNITY

## 2022-11-21 RX ORDER — HYDROXYZINE HYDROCHLORIDE 25 MG/1
25 TABLET, FILM COATED ORAL
COMMUNITY
Start: 2021-04-05 | End: 2022-11-21
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
## 2022-11-21 NOTE — PATIENT INSTRUCTIONS
"It was a pleasure to meet with you today.      Below is a summary of your visit.   Your leg pain does not seem to be related to impaired blood flow to your leg or other vascular disease.   You have been referred to orthopedics as another place to try to find a reason for your leg pain. \"[facility]  will call you to coordinate your care as prescribed by your provider. A representative will call you within 1 business day to help schedule your appointment, or you may contact the  Representative at: [phone]\"  In order to evaluate your cardiac risk, provided you don't have any exertional chest pain or shortness of breath, you could consider a CT cardiac calcium score. This is generally not covered by insurance, and costs approximately $100.   Follow up with me as needed.     Please do not hesitate to call the [facility] with any questions or concerns at [phone].     Sincerely,       "

## 2022-12-05 NOTE — TELEPHONE ENCOUNTER
DIAGNOSIS: Pain of right lower leg \ [facility]\ [facility]o on    APPOINTMENT DATE: 12.12.22   NOTES STATUS DETAILS   OFFICE NOTE from referring provider Internal 11.8.22 Malcolm Herzog   OFFICE NOTE from other specialist Internal 11.21.22 Raegan     MEDICATION LIST Internal

## 2022-12-08 ENCOUNTER — MYC MEDICAL ADVICE (OUTPATIENT)
Dept: FAMILY MEDICINE | Facility: CLINIC | Age: 42
End: 2022-12-08

## 2022-12-08 DIAGNOSIS — G43.719 INTRACTABLE CHRONIC MIGRAINE WITHOUT AURA AND WITHOUT STATUS MIGRAINOSUS: ICD-10-CM

## 2022-12-09 RX ORDER — RIZATRIPTAN BENZOATE 10 MG/1
TABLET ORAL
Qty: 10 TABLET | Refills: 3 | Status: SHIPPED | OUTPATIENT
Start: 2022-12-09 | End: 2023-01-19

## 2022-12-09 NOTE — TELEPHONE ENCOUNTER
"Prescription approved per [facility] Refill Protocol.    Last Written Prescription Date: 8/15/22  Last Fill Quantity: 10, # refills: 3  Last office visit provider: 8/15/22    Requested Prescriptions   Pending Prescriptions Disp Refills     rizatriptan (MAXALT) 10 MG tablet 10 tablet 3     Sig: TAKE 1 TABLET BY MOUTH AS NEEDED FOR MIGRAINE. MAY REPEAT IN 2 HOURS IF NEEDED       Serotonin Agonists Failed - 12/8/2022  2:02 PM        Failed - Serotonin Agonist request needs review.     Please review patient's record. If patient has had 8 or more treatments in the past month, please forward to provider.          Passed - Blood pressure under 140/90 in past 12 months     BP Readings from Last 3 Encounters:   11/21/22 100/80   11/08/22 123/84   10/03/22 122/83                 Passed - Recent (12 mo) or future (30 days) visit within the authorizing provider's specialty     Patient has had an office visit with the authorizing provider or a provider within the authorizing providers department within the previous 12 mos or has a future within next 30 days. See \"Patient Info\" tab in inbasket, or \"Choose Columns\" in Meds & Orders section of the refill encounter.              Passed - Medication is active on med list        Passed - Patient is age 18 or older        Passed - No active pregnancy on record        Passed - No positive pregnancy test in past 12 months             Holden Laboy RN 12/09/22 8:09 AM    "

## 2022-12-12 ENCOUNTER — PRE VISIT (OUTPATIENT)
Dept: ORTHOPEDICS | Facility: CLINIC | Age: 42
End: 2022-12-12

## 2022-12-12 ENCOUNTER — OFFICE VISIT (OUTPATIENT)
Dept: ORTHOPEDICS | Facility: CLINIC | Age: 42
End: 2022-12-12
Payer: COMMERCIAL

## 2022-12-12 DIAGNOSIS — M54.31 SCIATICA, RIGHT SIDE: Primary | ICD-10-CM

## 2022-12-12 DIAGNOSIS — F33.9 MAJOR DEPRESSION, RECURRENT, CHRONIC (H): ICD-10-CM

## 2022-12-12 DIAGNOSIS — M79.7 FIBROMYALGIA: ICD-10-CM

## 2022-12-12 DIAGNOSIS — F41.1 GENERALIZED ANXIETY DISORDER: ICD-10-CM

## 2022-12-12 DIAGNOSIS — K58.9 IRRITABLE BOWEL SYNDROME, UNSPECIFIED TYPE: ICD-10-CM

## 2022-12-12 PROCEDURE — 99203 OFFICE O/P NEW LOW 30 MIN: CPT | Performed by: FAMILY MEDICINE
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
## 2022-12-17 DIAGNOSIS — R21 RASH AND NONSPECIFIC SKIN ERUPTION: ICD-10-CM

## 2022-12-18 RX ORDER — CETIRIZINE HYDROCHLORIDE 10 MG/1
TABLET ORAL
Qty: 90 TABLET | Refills: 2 | Status: SHIPPED | OUTPATIENT
Start: 2022-12-18

## 2022-12-18 NOTE — TELEPHONE ENCOUNTER
"Last Written Prescription Date:  10/28/22  Last Fill Quantity: 90,  # refills: 3   Last office visit provider:  8/15/22     Requested Prescriptions   Pending Prescriptions Disp Refills     cetirizine (ZYRTEC) 10 MG tablet [Pharmacy Med Name: CETIRIZINE HCL 10 MG TABLET] 90 tablet 3     Sig: TAKE 1 TAB ORALLY DAILY AS NEEDED FOR ALLERGIES MAY INCREASE TO 2 TAB DAILY IF ITCHING NOT RELIEVED       Antihistamines Protocol Passed - 12/17/2022  1:30 PM        Passed - Patient is 3-64 years of age     Apply weight-based dosing for peds patients age 3 - 12 years of age.    Forward request to provider for patients under the age of 3 or over the age of 64.          Passed - Recent (12 mo) or future (30 days) visit within the authorizing provider's specialty     Patient has had an office visit with the authorizing provider or a provider within the authorizing providers department within the previous 12 mos or has a future within next 30 days. See \"Patient Info\" tab in inbasket, or \"Choose Columns\" in Meds & Orders section of the refill encounter.              Passed - Medication is active on med list             Marcela Sims RN 12/18/22 2:55 PM  " with patient

## 2022-12-19 ENCOUNTER — E-VISIT (OUTPATIENT)
Dept: URGENT CARE | Facility: CLINIC | Age: 42
End: 2022-12-19
Payer: COMMERCIAL

## 2022-12-19 DIAGNOSIS — N39.0 ACUTE UTI (URINARY TRACT INFECTION): Primary | ICD-10-CM

## 2022-12-19 PROCEDURE — 99421 OL DIG E/M SVC 5-10 MIN: CPT | Performed by: PHYSICIAN ASSISTANT

## 2022-12-19 RX ORDER — NITROFURANTOIN 25; 75 MG/1; MG/1
100 CAPSULE ORAL 2 TIMES DAILY
Qty: 10 CAPSULE | Refills: 0 | Status: SHIPPED | OUTPATIENT
Start: 2022-12-19 | End: 2022-12-24

## 2022-12-19 NOTE — PATIENT INSTRUCTIONS
Dear Sherry Sweeney    After reviewing your responses, I've been able to diagnose you with a urinary tract infection, which is a common infection of the bladder with bacteria.  This is not a sexually transmitted infection, though urinating immediately after intercourse can help prevent infections.  Drinking lots of fluids is also helpful to clear your current infection and prevent the next one.      I have sent a prescription for antibiotics to your pharmacy to treat this infection.    It is important that you take all of your prescribed medication even if your symptoms are improving after a few doses.  Taking all of your medicine helps prevent the symptoms from returning.     If your symptoms worsen, you develop pain in your back or stomach, develop fevers, or are not improving in 5 days, please contact your primary care provider for an appointment or visit any of our convenient Walk-in or Urgent Care Centers to be seen, which can be found on our website here.    Thanks again for choosing us as your health care partner,    Joseph Goldman, [facility], PA-C    Urinary Tract Infections in Women  Urinary tract infections (UTIs) are most often caused by bacteria. These bacteria enter the urinary tract. The bacteria may come from inside the body. Or they may travel from the skin outside the rectum or vagina into the urethra. Female anatomy makes it easy for bacteria from the bowel to enter a woman s urinary tract, which is the most common source of UTI. This means women develop UTIs more often than men. Pain in or around the urinary tract is a common UTI symptom. But the only way to know for sure if you have a UTI for the healthcare provider to test your urine. The two tests that may be done are the urinalysis and urine culture.     Types of UTIs    Cystitis. A bladder infection (cystitis) is the most common UTI in women. You may have urgent or frequent need to pee. You may also have pain, burning when you pee, and bloody  urine.    Urethritis. This is an inflamed urethra, which is the tube that carries urine from the bladder to outside the body. You may have lower stomach or back pain. You may also have urgent or frequent need to pee.    Pyelonephritis. This is a kidney infection. If not treated, it can be serious and damage your kidneys. In severe cases, you may need to stay in the hospital. You may have a fever and lower back pain.    Medicines to treat a UTI  Most UTIs are treated with antibiotics. These kill the bacteria. The length of time you need to take them depends on the type of infection. It may be as short as 3 days. If you have repeated UTIs, you may need a low-dose antibiotic for several months. Take antibiotics exactly as directed. Don t stop taking them until all of the medicine is gone. If you stop taking the antibiotic too soon, the infection may not go away. You may also develop a resistance to the antibiotic. This can make it much harder to treat.   Lifestyle changes to treat and prevent UTIs   The lifestyle changes below will help get rid of your UTI. They may also help prevent future UTIs.     Drink plenty of fluids. This includes water, juice, or other caffeine-free drinks. Fluids help flush bacteria out of your body.    Empty your bladder. Always empty your bladder when you feel the urge to pee. And always pee before going to sleep. Urine that stays in your bladder can lead to infection. Try to pee before and after sex as well.    Practice good personal hygiene. Wipe yourself from front to back after using the toilet. This helps keep bacteria from getting into the urethra.    Use condoms during sex. These help prevent UTIs caused by sexually transmitted bacteria. Also don't use spermicides during sex. These can increase the risk for UTIs. Choose other forms of birth control instead. For women who tend to get UTIs after sex, a low-dose of a preventive antibiotic may be used. Be sure to discuss this option with  your healthcare provider.    Follow up with your healthcare provider as directed. He or she may test to make sure the infection has cleared. If needed, more treatment may be started.  [employer] last reviewed this educational content on 7/1/2019 2000-2021 [employer]. All rights reserved. This information is not intended as a substitute for professional medical care. Always follow your healthcare professional's instructions.

## 2022-12-21 ENCOUNTER — E-VISIT (OUTPATIENT)
Dept: URGENT CARE | Facility: CLINIC | Age: 42
End: 2022-12-21
Payer: COMMERCIAL

## 2022-12-21 DIAGNOSIS — N39.0 ACUTE UTI (URINARY TRACT INFECTION): Primary | ICD-10-CM

## 2022-12-21 PROCEDURE — 99421 OL DIG E/M SVC 5-10 MIN: CPT | Performed by: PHYSICIAN ASSISTANT

## 2022-12-22 RX ORDER — GRANULES FOR ORAL 3 G/1
3 POWDER ORAL ONCE
Qty: 1 PACKET | Refills: 0 | Status: SHIPPED | OUTPATIENT
Start: 2022-12-22 | End: 2022-12-22

## 2022-12-22 NOTE — PATIENT INSTRUCTIONS
Dear Sherry Sweeney    After reviewing your responses, I've been able to diagnose you with a urinary tract infection, which is a common infection of the bladder with bacteria.  This is not a sexually transmitted infection, though urinating immediately after intercourse can help prevent infections.  Drinking lots of fluids is also helpful to clear your current infection and prevent the next one.      I have sent a prescription for antibiotics to your pharmacy to treat this infection.    It is important that you take all of your prescribed medication even if your symptoms are improving after a few doses.  Taking all of your medicine helps prevent the symptoms from returning.     If your symptoms worsen, you develop pain in your back or stomach, develop fevers, or are not improving in 5 days, please contact your primary care provider for an appointment or visit any of our convenient Walk-in or Urgent Care Centers to be seen, which can be found on our website here.    Thanks again for choosing us as your health care partner,    Loida Potts PA-C, [facility]    Urinary Tract Infections in Women  Urinary tract infections (UTIs) are most often caused by bacteria. These bacteria enter the urinary tract. The bacteria may come from inside the body. Or they may travel from the skin outside the rectum or vagina into the urethra. Female anatomy makes it easy for bacteria from the bowel to enter a woman s urinary tract, which is the most common source of UTI. This means women develop UTIs more often than men. Pain in or around the urinary tract is a common UTI symptom. But the only way to know for sure if you have a UTI for the healthcare provider to test your urine. The two tests that may be done are the urinalysis and urine culture.     Types of UTIs    Cystitis. A bladder infection (cystitis) is the most common UTI in women. You may have urgent or frequent need to pee. You may also have pain, burning when you pee, and bloody  urine.    Urethritis. This is an inflamed urethra, which is the tube that carries urine from the bladder to outside the body. You may have lower stomach or back pain. You may also have urgent or frequent need to pee.    Pyelonephritis. This is a kidney infection. If not treated, it can be serious and damage your kidneys. In severe cases, you may need to stay in the hospital. You may have a fever and lower back pain.    Medicines to treat a UTI  Most UTIs are treated with antibiotics. These kill the bacteria. The length of time you need to take them depends on the type of infection. It may be as short as 3 days. If you have repeated UTIs, you may need a low-dose antibiotic for several months. Take antibiotics exactly as directed. Don t stop taking them until all of the medicine is gone. If you stop taking the antibiotic too soon, the infection may not go away. You may also develop a resistance to the antibiotic. This can make it much harder to treat.   Lifestyle changes to treat and prevent UTIs   The lifestyle changes below will help get rid of your UTI. They may also help prevent future UTIs.     Drink plenty of fluids. This includes water, juice, or other caffeine-free drinks. Fluids help flush bacteria out of your body.    Empty your bladder. Always empty your bladder when you feel the urge to pee. And always pee before going to sleep. Urine that stays in your bladder can lead to infection. Try to pee before and after sex as well.    Practice good personal hygiene. Wipe yourself from front to back after using the toilet. This helps keep bacteria from getting into the urethra.    Use condoms during sex. These help prevent UTIs caused by sexually transmitted bacteria. Also don't use spermicides during sex. These can increase the risk for UTIs. Choose other forms of birth control instead. For women who tend to get UTIs after sex, a low-dose of a preventive antibiotic may be used. Be sure to discuss this option with  your healthcare provider.    Follow up with your healthcare provider as directed. He or she may test to make sure the infection has cleared. If needed, more treatment may be started.  [employer] last reviewed this educational content on 7/1/2019 2000-2021 [employer]. All rights reserved. This information is not intended as a substitute for professional medical care. Always follow your healthcare professional's instructions.        Hi Sherry    It looks like you tolerated the medication you are on multiple times in the past, but since you're side effects are getting worse, lets stop it this time.  I am sending in another medication to take since you didn't finish the course of this one.  Hope this helps.    .Loida Potts PA-C, [facility]  12/22/2022  12:08 AM

## 2022-12-28 DIAGNOSIS — K21.00 GASTROESOPHAGEAL REFLUX DISEASE WITH ESOPHAGITIS WITHOUT HEMORRHAGE: ICD-10-CM

## 2022-12-28 RX ORDER — OMEPRAZOLE 40 MG/1
40 CAPSULE, DELAYED RELEASE ORAL
Qty: 90 CAPSULE | Refills: 3 | Status: SHIPPED | OUTPATIENT
Start: 2022-12-28 | End: 2024-03-18

## 2022-12-29 NOTE — TELEPHONE ENCOUNTER
"Last Written Prescription Date:  6/29/2022  Last Fill Quantity: 90,  # refills: 1   Last office visit provider:  11/8/2022 with ZEE Spencer CNP    Requested Prescriptions   Pending Prescriptions Disp Refills     omeprazole (PRILOSEC) 40 MG DR capsule [Pharmacy Med Name: OMEPRAZOLE DR 40 MG CAPSULE] 90 capsule 1     Sig: TAKE 1 CAPSULE (40 MG) BY MOUTH EVERY MORNING (BEFORE BREAKFAST) 30 MINUTES BEFORE MEAL       PPI Protocol Passed - 12/28/2022 12:20 AM        Passed - Not on Clopidogrel (unless Pantoprazole ordered)        Passed - No diagnosis of osteoporosis on record        Passed - Recent (12 mo) or future (30 days) visit within the authorizing provider's specialty     Patient has had an office visit with the authorizing provider or a provider within the authorizing providers department within the previous 12 mos or has a future within next 30 days. See \"Patient Info\" tab in inbasket, or \"Choose Columns\" in Meds & Orders section of the refill encounter.              Passed - Medication is active on med list        Passed - Patient is age 18 or older        Passed - No active pregnacy on record        Passed - No positive pregnancy test in past 12 months             Kimberly Smith RN 12/28/22 8:47 PM  "

## 2023-01-19 ENCOUNTER — VIRTUAL VISIT (OUTPATIENT)
Dept: FAMILY MEDICINE | Facility: CLINIC | Age: 43
End: 2023-01-19
Payer: COMMERCIAL

## 2023-01-19 DIAGNOSIS — G47.00 INSOMNIA, UNSPECIFIED TYPE: ICD-10-CM

## 2023-01-19 DIAGNOSIS — E88.40 MITOCHONDRIAL DISEASE (H): ICD-10-CM

## 2023-01-19 DIAGNOSIS — F33.8 SEASONAL DEPRESSION (H): Primary | ICD-10-CM

## 2023-01-19 PROCEDURE — 99213 OFFICE O/P EST LOW 20 MIN: CPT | Mod: 95 | Performed by: FAMILY MEDICINE

## 2023-01-19 RX ORDER — MULTIVIT-MIN/IRON/FOLIC ACID/K 18-600-40
3000 CAPSULE ORAL
COMMUNITY
End: 2023-05-15

## 2023-01-19 RX ORDER — RIZATRIPTAN BENZOATE 10 MG/1
TABLET ORAL
Qty: 10 TABLET | Refills: 3 | Status: SHIPPED | OUTPATIENT
Start: 2023-01-19 | End: 2023-03-08

## 2023-01-19 RX ORDER — VENLAFAXINE HYDROCHLORIDE 150 MG/1
150 CAPSULE, EXTENDED RELEASE ORAL DAILY
Qty: 90 CAPSULE | Refills: 0 | Status: SHIPPED | OUTPATIENT
Start: 2023-01-19 | End: 2023-05-15

## 2023-01-19 RX ORDER — ONDANSETRON 4 MG/1
4 TABLET, FILM COATED ORAL EVERY 8 HOURS PRN
Qty: 18 TABLET | Refills: 1 | Status: SHIPPED | OUTPATIENT
Start: 2023-01-19 | End: 2023-03-08

## 2023-01-19 RX ORDER — ZOLPIDEM TARTRATE 5 MG/1
5 TABLET ORAL
Qty: 30 TABLET | Refills: 0 | Status: SHIPPED | OUTPATIENT
Start: 2023-01-19 | End: 2023-03-08

## 2023-01-19 ASSESSMENT — PATIENT HEALTH QUESTIONNAIRE - PHQ9
10. IF YOU CHECKED OFF ANY PROBLEMS, HOW DIFFICULT HAVE THESE PROBLEMS MADE IT FOR YOU TO DO YOUR WORK, TAKE CARE OF THINGS AT HOME, OR GET ALONG WITH OTHER PEOPLE: EXTREMELY DIFFICULT
SUM OF ALL RESPONSES TO PHQ QUESTIONS 1-9: 15
SUM OF ALL RESPONSES TO PHQ QUESTIONS 1-9: 15

## 2023-01-19 ASSESSMENT — ANXIETY QUESTIONNAIRES
GAD7 TOTAL SCORE: 10
GAD7 TOTAL SCORE: 10
1. FEELING NERVOUS, ANXIOUS, OR ON EDGE: NEARLY EVERY DAY
8. IF YOU CHECKED OFF ANY PROBLEMS, HOW DIFFICULT HAVE THESE MADE IT FOR YOU TO DO YOUR WORK, TAKE CARE OF THINGS AT HOME, OR GET ALONG WITH OTHER PEOPLE?: SOMEWHAT DIFFICULT
2. NOT BEING ABLE TO STOP OR CONTROL WORRYING: SEVERAL DAYS
7. FEELING AFRAID AS IF SOMETHING AWFUL MIGHT HAPPEN: SEVERAL DAYS
3. WORRYING TOO MUCH ABOUT DIFFERENT THINGS: SEVERAL DAYS
5. BEING SO RESTLESS THAT IT IS HARD TO SIT STILL: NOT AT ALL
IF YOU CHECKED OFF ANY PROBLEMS ON THIS QUESTIONNAIRE, HOW DIFFICULT HAVE THESE PROBLEMS MADE IT FOR YOU TO DO YOUR WORK, TAKE CARE OF THINGS AT HOME, OR GET ALONG WITH OTHER PEOPLE: SOMEWHAT DIFFICULT
4. TROUBLE RELAXING: NEARLY EVERY DAY
GAD7 TOTAL SCORE: 10
7. FEELING AFRAID AS IF SOMETHING AWFUL MIGHT HAPPEN: SEVERAL DAYS
6. BECOMING EASILY ANNOYED OR IRRITABLE: SEVERAL DAYS

## 2023-01-19 NOTE — ASSESSMENT & PLAN NOTE
The patient does continue to struggle with significant fatigue and headaches that come on often after just 4 hours of work time.

## 2023-01-19 NOTE — PROGRESS NOTES
Sherry is a 42 year old who is being evaluated via a billable video visit.      How would you like to obtain your AVS? MyChart  If the video visit is dropped, the invitation should be resent by: Text to cell phone: [phone]  Will anyone else be joining your video visit? No        Video-Visit Details    Type of service:  Video Visit   Video Start Time: 11:45  Video End Time:11:59    Originating Location (pt. Location): Home    Distant Location (provider location):  On-site  Platform used for Video Visit: [employer]    Problem List Items Addressed This Visit        Endocrine    Mitochondrial disease (H)     The patient does continue to struggle with significant fatigue and headaches that come on often after just 4 hours of work time.             Behavioral    Seasonal depression (H) - Primary     The patient does have a history of major depression for which she is currently on Effexor XR 75mg daily.  However, she really notes that there is a seasonal component to her depression that gets quite a bit worse around January of each year.  She already has an SAD light that she uses regularly.  I recommended increasing her Effexor dose to 150 mg daily and asked her follow-up with me via [employer] in 1 month regarding her response to that treatment.  We talked about in the future increasing her dose of antidepressant 1 month prior to when her symptoms were typically start and then reducing back down to the 75 mg dose each spring.         Relevant Medications    venlafaxine (EFFEXOR XR) 150 MG 24 hr capsule       Other    Insomnia    Relevant Medications    zolpidem (AMBIEN) 5 MG tablet          Comfort Powell is a 42 year old who presents today to discuss worsening mood symptoms.  The patient has a significant history of major depression but had been doing reasonably well up until December/January.  The patient states that she is aware of a seasonal component to her depression each year but that it seems to be getting more  pronounced.  Typically she will start feeling more down in December and then particularly bad in January.  She has been feeling more depressed, less motivated and spending a lot of time in bed the past couple weeks.  She also is having increased difficulty sleeping.  This causes her to feel quite fatigued throughout the day.  Her current PHQ-9 score is 15 and she does deny any thoughts of self-harm or suicidal ideations.  She has an SAD light at home and does use that regularly.      Chief Complaint   Patient presents with     Discuss SAD              Objective           Vitals:  No vitals were obtained today due to virtual visit.    Physical Exam   GENERAL: healthy, alert and no distress      This note has been dictated using voice recognition software. Any grammatical or context distortions are unintentional and inherent to the software

## 2023-01-19 NOTE — ASSESSMENT & PLAN NOTE
The patient does have a history of major depression for which she is currently on Effexor XR 75mg daily.  However, she really notes that there is a seasonal component to her depression that gets quite a bit worse around January of each year.  She already has an SAD light that she uses regularly.  I recommended increasing her Effexor dose to 150 mg daily and asked her follow-up with me via MyChart in 1 month regarding her response to that treatment.  We talked about in the future increasing her dose of antidepressant 1 month prior to when her symptoms were typically start and then reducing back down to the 75 mg dose each spring.

## 2023-01-20 ENCOUNTER — TELEPHONE (OUTPATIENT)
Dept: FAMILY MEDICINE | Facility: CLINIC | Age: 43
End: 2023-01-20
Payer: COMMERCIAL

## 2023-01-20 DIAGNOSIS — F33.9 MAJOR DEPRESSION, RECURRENT, CHRONIC (H): Primary | ICD-10-CM

## 2023-01-20 NOTE — TELEPHONE ENCOUNTER
"  Drug Change Request      What is the current medication?:  venlafaxine (EFFEXOR XR) 150 MG 24 hr capsule    What alternative is being requested?: BRAND NAME EFFEXOR  MG 24 hr capsule    Why the request to change?:  \"Brand name has been requested by the patient. Please advise- if brand name is required, please resend Rx with a ROCHELLE 1 or a brand required RX\"    Preferred Pharmacy:   [facility]/pharmacy #[id number] [place] [id number] [address]  Phone: [phone] Fax: [phone]        Please advise on above.  " This note was copied from a baby's chart.  D/c today. Mom reports she latches at times and has been pumping about every 3 hrs. She took the night off last night d/t elevated B/P. Baby is also receiving formula. She has [facility] card for f/u. She's not sure how long she will breast feed. Encouraged to call for further assist.

## 2023-01-22 DIAGNOSIS — L70.0 ACNE VULGARIS: ICD-10-CM

## 2023-01-22 RX ORDER — VENLAFAXINE HCL 150 MG
150 CAPSULE, EXT RELEASE 24 HR ORAL DAILY
Qty: 90 CAPSULE | Refills: 1 | Status: SHIPPED | OUTPATIENT
Start: 2023-01-22 | End: 2023-08-02

## 2023-01-23 RX ORDER — SPIRONOLACTONE 50 MG/1
50 TABLET, FILM COATED ORAL 2 TIMES DAILY
Qty: 180 TABLET | Refills: 1 | Status: SHIPPED | OUTPATIENT
Start: 2023-01-23 | End: 2023-08-17

## 2023-01-23 NOTE — TELEPHONE ENCOUNTER
"Last Written Prescription Date:  10/24/22  Last Fill Quantity: 180,  # refills: 0   Last office visit provider:  1/19/23     Requested Prescriptions   Pending Prescriptions Disp Refills     spironolactone (ALDACTONE) 50 MG tablet [Pharmacy Med Name: SPIRONOLACTONE 50 MG TABLET] 180 tablet 0     Sig: TAKE 1 TABLET BY MOUTH TWICE A DAY       Diuretics (Including Combos) Protocol Passed - 1/23/2023  8:41 AM        Passed - Blood pressure under 140/90 in past 12 months     BP Readings from Last 3 Encounters:   11/21/22 100/80   11/08/22 123/84   10/03/22 122/83                 Passed - Recent (12 mo) or future (30 days) visit within the authorizing provider's specialty     Patient has had an office visit with the authorizing provider or a provider within the authorizing providers department within the previous 12 mos or has a future within next 30 days. See \"Patient Info\" tab in inbasket, or \"Choose Columns\" in Meds & Orders section of the refill encounter.              Passed - Medication is active on med list        Passed - Patient is age 18 or older        Passed - No active pregancy on record        Passed - Normal serum creatinine on file in past 12 months     Recent Labs   Lab Test 07/19/22  1503   CR 0.80              Passed - Normal serum potassium on file in past 12 months     Recent Labs   Lab Test 07/19/22  1503   POTASSIUM 4.5                    Passed - Normal serum sodium on file in past 12 months     Recent Labs   Lab Test 07/19/22  1503                 Passed - No positive pregnancy test in past 12 months             Thien Castro RN 01/23/23 8:41 AM  "

## 2023-01-28 ENCOUNTER — HEALTH MAINTENANCE LETTER (OUTPATIENT)
Age: 43
End: 2023-01-28

## 2023-01-30 ENCOUNTER — OFFICE VISIT (OUTPATIENT)
Dept: NEUROLOGY | Facility: CLINIC | Age: 43
End: 2023-01-30
Attending: FAMILY MEDICINE
Payer: COMMERCIAL

## 2023-01-30 VITALS
HEIGHT: 62 IN | SYSTOLIC BLOOD PRESSURE: 100 MMHG | WEIGHT: 133 LBS | HEART RATE: 87 BPM | BODY MASS INDEX: 24.48 KG/M2 | DIASTOLIC BLOOD PRESSURE: 78 MMHG

## 2023-01-30 DIAGNOSIS — G43.719 CHRONIC MIGRAINE WITHOUT AURA, INTRACTABLE, WITHOUT STATUS MIGRAINOSUS: Primary | ICD-10-CM

## 2023-01-30 PROBLEM — Z90.710 H/O: HYSTERECTOMY: Status: RESOLVED | Noted: 2018-02-22 | Resolved: 2023-01-30

## 2023-01-30 PROCEDURE — 99205 OFFICE O/P NEW HI 60 MIN: CPT | Performed by: PSYCHIATRY & NEUROLOGY

## 2023-01-30 NOTE — NURSING NOTE
Chief Complaint   Patient presents with     Headache     New patient     Juanita Hernandez on 1/30/2023 at 2:21 PM

## 2023-01-30 NOTE — LETTER
2023         RE: Sherry Sweeney   [facility] [address]        Dear Colleague,    Thank you for referring your patient, Sherry Sweeney, to the [facility] [place]. Please see a copy of my visit note below.    NEUROLOGY CONSULTATION NOTE       [facility] [place]  [address] [place] [address]  Tel: [phone]  Fax: [phone]  [email or web address]     Sherry Sweeney,  1980, MRN [id number]  PCP: Layla Martinez  Date: 2023     ASSESSMENT & PLAN     Visit Diagnosis  1. Chronic migraine without aura, intractable, without status migrainosus     Chronic migraine without aura, intractable without status migrainosus  Pleasant 42-year-old female with history of mitochondrial myopathy, chronic migraine without aura, fibromyalgia who was previously seen in our clinic for similar symptoms and received Botox injection who returns for follow-up with worsening headaches.  She was last seen in 2019 but due to COVID pandemic could not come in for 3 monthly Botox injection and has noticed steadily worsening headache frequency and intensity.  She is currently getting more than 15 headaches in a month that has severely affected the quality of her life.  Previously she had tried multiple medication including Depakote, amitriptyline, beta-blocker that did not seem to help.  We discussed CGRP antagonists and Botox but she is not sure if she can inject herself and we have opted to schedule her for Botox after we get approval from her insurance company.  I plan on injecting 155 units.  Follow-up will be the day she is scheduled for Botox.    Thank you again for this referral, please feel free to contact me if you have any questions.    Mikel Hernandez MD  [facility]  (Former[facility], [facility]     REASON FOR CONSULTATION Headache        HISTORY OF PRESENT ILLNESS     We have been  requested by Dr. Layla Martinez to evaluate Sherry Sweeney who is a 42 year old  female for chronic migraine without aura    Patient is a 42-year-old female with history of mitochondrial myopathy, chronic migraine without aura, fibromyalgia who was referred for evaluation of worsening headaches.  According to patient her symptoms started in 2014 and progressively got worse.  She initially attributed it to pressure in the neck or TMJ abnormality and that in the past was evaluated and started on some hormonal supplements.  Due to progressive weakness she was evaluated for possible mitochondrial myopathy and had mitochondrial DNA tested that was positive.  For her headaches she was started on Depakote, amitriptyline and in the past had also tried Cymbalta and as she was having more than 15 headaches in a month Botox was recommended.  She started getting Botox in 2017 and did notice improvement in her headache.  She was last seen in our clinic on 7/3/2019 when she received her Botox injection.  After the COVID pandemic she could not come in person and stopped using Botox.  With Botox she had noticed more than 50% reduction in her headaches but gradually the frequency increased.  She is currently getting headaches more than 15 days in a month and is interested in trying Botox again.     PROBLEM LIST   Patient Active Problem List   Diagnosis Code     Acne vulgaris L70.0     Arthralgia of temporomandibular joint M26.629     Debility R53.81     Fibromyalgia M79.7     Hypermobility syndrome M35.7     IBS (irritable bowel syndrome) K58.9     Insomnia G47.00     Major depression, recurrent, chronic (H) F33.9     Chronic migraine without aura, intractable, without status migrainosus G43.719     Mitochondrial disease (H) E88.40     Generalized anxiety disorder F41.1     Post-traumatic stress disorder F43.10     Parasomnia G47.50     Seasonal depression (H) F33.8         PAST MEDICAL & SURGICAL HISTORY     Past Medical History:    Patient  has a past medical history of Chronic fatigue syndrome, Chronic migraine, Depression, Depression, Depressive disorder (2013), Fibromyalgia, Fibromyalgia, H/O: hysterectomy (2/22/2018), History of anesthesia complications, IBS (irritable bowel syndrome), Mitochondrial disease (H), Mitochondrial myopathy, Parasomnia, and PONV (postoperative nausea and vomiting).    Surgical History:  She  has a past surgical history that includes REMOVAL OF OVARIAN CYST(S); Hysterectomy (Bilateral, 2/22/2018); Picc (2/24/2018); Abdomen surgery (1996 & 2018, feb.); appendectomy (1996); biopsy; and Laparoscopic cystectomy ovarian (oncology) (Left, 10/18/2021).     SOCIAL HISTORY     Reviewed, and she  reports that she has never smoked. She has never used smokeless tobacco. She reports that she does not drink alcohol and does not use drugs.     FAMILY HISTORY     Reviewed, and family history includes Anxiety Disorder in her maternal grandmother, mother, paternal grandfather, and paternal grandmother; Bone Cancer in her maternal grandfather; Breast Cancer (age of onset: 60.00) in her maternal grandmother; Breast Cancer (age of onset: 62.00) in her mother; Cancer in her mother; Coronary Artery Disease (age of onset: 45.00) in her father; Depression in her brother, father, maternal grandmother, and mother; Diabetes in her cousin and cousin; Hyperlipidemia in her mother; Hypertension in her mother; Meniere's disease in her mother.     ALLERGIES     Allergies   Allergen Reactions     Albumin, Egg Other (See Comments) and GI Disturbance     Swollen colon, belly pain  Swollen colon, belly pain       Ciprofloxacin Itching, Rash and Hives     Rash over whole body   Rash over whole body        Flu Virus Vaccine Shortness Of Breath and Anaphylaxis     No Clinical Screening - See Comments Anaphylaxis     Stomach swelling     Sulfa Drugs Rash and Hives     Yeast Other (See Comments) and Anaphylaxis     Lactose Other (See Comments)      Desvenlafaxine Blisters, Itching and Rash     Milnacipran Other (See Comments) and Palpitations     Chest pain, hot/cold flashes     Quetiapine Palpitations     Tachycardia, nervousness, elevated blood pressure.   Tachycardia, nervousness, elevated blood pressure.          REVIEW OF SYSTEMS     A 12 point review of system was performed and was negative except as outlined in the history of present illness.     HOME MEDICATIONS     Current Outpatient Rx   Medication Sig Dispense Refill     ascorbic acid 500 MG TABS Take 500 mg by mouth daily       betamethasone dipropionate (DIPROSONE) 0.05 % external lotion        buPROPion (WELLBUTRIN XL) 300 MG 24 hr tablet Take 1 tablet (300 mg) by mouth daily 90 tablet 3     cetirizine (ZYRTEC) 10 MG tablet TAKE 1 TAB ORALLY DAILY AS NEEDED FOR ALLERGIES MAY INCREASE TO 2 TAB DAILY IF ITCHING NOT RELIEVED 90 tablet 2     clindamycin-benzoyl peroxide (BENZACLIN) gel Apply topically to acne once daily       [facility] MELATONIN 3 MG tablet TAKE 1 TABLET (3 MG) BY MOUTH NIGHTLY AS NEEDED FOR SLEEP 90 tablet 1     cyclobenzaprine (FLEXERIL) 10 MG tablet cyclobenzaprine 10 mg tablet       EFFEXOR  MG 24 hr capsule Take 1 capsule (150 mg) by mouth daily 90 capsule 1     EFFEXOR XR 75 MG 24 hr capsule Take 1 capsule (75 mg) by mouth daily 90 capsule 1     hydrOXYzine (ATARAX) 25 MG tablet Take 1-2 tablets (25-50 mg) by mouth At Bedtime 180 tablet 1     JUNEL FE 1.5/30 1.5-30 MG-MCG tablet Take 1 tablet by mouth daily       lidocaine (LIDODERM) 5 % patch PLACE 1 PATCH ONTO THE SKIN AS NEEDED TO NECK, SHOULDERS, AND BACK 30 patch 0     Multiple Vitamin (MULTIVITAMIN ADULT PO) Take 1 tablet by mouth daily       Multiple Vitamins-Minerals (DAILY MULTI PO) Take 1 tablet by mouth daily       omeprazole (PRILOSEC) 40 MG DR capsule TAKE 1 CAPSULE (40 MG) BY MOUTH EVERY MORNING (BEFORE BREAKFAST) 30 MINUTES BEFORE MEAL 90 capsule 3     ondansetron (ZOFRAN) 4 MG tablet Take 1 tablet (4 mg) by  "mouth every 8 hours as needed for nausea 18 tablet 1     Riboflavin 400 MG TABS Take 400 mg by mouth daily       rizatriptan (MAXALT) 10 MG tablet TAKE 1 TABLET BY MOUTH AS NEEDED FOR MIGRAINE. MAY REPEAT IN 2 HOURS IF NEEDED 10 tablet 3     spironolactone (ALDACTONE) 50 MG tablet Take 1 tablet (50 mg) by mouth 2 times daily 180 tablet 1     tretinoin (RETIN-A) 0.025 % topical gel Apply topically daily       tretinoin (RETIN-A) 0.05 % external cream APPLY TO AFFECTED AREA EVERY DAY AT BEDTIME 45 g 2     triamcinolone (KENALOG) 0.1 % external ointment Apply topically 2 times daily 80 g 1     valACYclovir (VALTREX) 1000 mg tablet TAKE 2 TABLETS BY MOUTH 12 HOURS APART AS NEEDED EPISODE 12 tablet 3     venlafaxine (EFFEXOR XR) 150 MG 24 hr capsule Take 1 capsule (150 mg) by mouth daily 90 capsule 0     venlafaxine (EFFEXOR XR) 75 MG 24 hr capsule Take 75 mg by mouth daily       Vitamin D (Cholecalciferol) 25 MCG (1000 UT) TABS Take 3,000 Units by mouth       vitamin E (TOCOPHEROL) 1000 UNIT capsule Take 1,000 Units by mouth daily       zolpidem (AMBIEN) 5 MG tablet Take 1 tablet (5 mg) by mouth nightly as needed for sleep 30 tablet 0         PHYSICAL EXAM     Vital signs  /78   Pulse 87   Ht 1.575 m (5' 2\")   Wt 60.3 kg (133 lb)   BMI 24.33 kg/m      Weight:   133 lbs 0 oz    Pleasant female who is alert and oriented vital signs were reviewed and documented in electronic medical record.  Neck supple.  Neurologically speech was normal.  Cranial nerves II through XII are intact motor strength neck flexor and extensor 4+/5 in upper extremity 5/5 in the lower extremity proximally 4+/5 distally 5/5 reflexes 2+ toes downgoing no dysmetria noted on finger-nose testing gait normal.     PERTINENT DIAGNOSTIC STUDIES     Following studies were reviewed:     MRI BRAIN 10/29/2015  1.  Normal Head MRI.   2.  No acute infarcts, mass lesions or hemorrhage.     PERTINENT LABS  Following labs were reviewed:  No visits with " results within 3 Month(s) from this visit.   Latest known visit with results is:   Office Visit on 07/19/2022   Component Date Value     Sodium 07/19/2022 137      Potassium 07/19/2022 4.5      Creatinine 07/19/2022 0.80      Urea Nitrogen 07/19/2022 11.2      Chloride 07/19/2022 104      Carbon Dioxide (CO2) 07/19/2022 24      Anion Gap 07/19/2022 9      Glucose 07/19/2022 88      Calcium 07/19/2022 9.7      Protein Total 07/19/2022 7.4      Albumin 07/19/2022 4.0      Bilirubin Total 07/19/2022 0.4      Alkaline Phosphatase 07/19/2022 62      AST 07/19/2022 18      ALT 07/19/2022 13      GFR Estimate 07/19/2022 >90      TSH 07/19/2022 0.88      WBC Count 07/19/2022 7.4      RBC Count 07/19/2022 5.00      Hemoglobin 07/19/2022 14.1      Hematocrit 07/19/2022 43.0      MCV 07/19/2022 86      MCH 07/19/2022 28.2      MCHC 07/19/2022 32.8      RDW 07/19/2022 13.0      Platelet Count 07/19/2022 362      % Neutrophils 07/19/2022 64      % Lymphocytes 07/19/2022 28      % Monocytes 07/19/2022 5      % Eosinophils 07/19/2022 2      % Basophils 07/19/2022 1      % Immature Granulocytes 07/19/2022 0      Absolute Neutrophils 07/19/2022 4.7      Absolute Lymphocytes 07/19/2022 2.1      Absolute Monocytes 07/19/2022 0.4      Absolute Eosinophils 07/19/2022 0.2      Absolute Basophils 07/19/2022 0.1      Absolute Immature Granul* 07/19/2022 0.0         Total time spent for face to face visit, reviewing labs/imaging studies, counseling and coordination of care was: 1 Hour spent on the date of the encounter doing chart review, review of outside records, review of test results, interpretation of tests, patient visit and documentation       This note was dictated using voice recognition software.  Any grammatical or context distortions are unintentional and inherent to the software.    No orders of the defined types were placed in this encounter.     New Prescriptions    No medications on file      Modified Medications    No  medications on file              Again, thank you for allowing me to participate in the care of your patient.        Sincerely,        Mikel Hernandez MD

## 2023-01-30 NOTE — PROGRESS NOTES
NEUROLOGY CONSULTATION NOTE       [facility] NEUROLOGY [place]  [address] [place] [address]  Tel: [phone]  Fax: [phone]  [email or web address]     Sherry Sweenye,  1980, MRN [id number]  PCP: Layla Martinez  Date: 2023     ASSESSMENT & PLAN     Visit Diagnosis  1. Chronic migraine without aura, intractable, without status migrainosus     Chronic migraine without aura, intractable without status migrainosus  Pleasant 42-year-old female with history of mitochondrial myopathy, chronic migraine without aura, fibromyalgia who was previously seen in our clinic for similar symptoms and received Botox injection who returns for follow-up with worsening headaches.  She was last seen in 2019 but due to COVID pandemic could not come in for 3 monthly Botox injection and has noticed steadily worsening headache frequency and intensity.  She is currently getting more than 15 headaches in a month that has severely affected the quality of her life.  Previously she had tried multiple medication including Depakote, amitriptyline, beta-blocker that did not seem to help.  We discussed CGRP antagonists and Botox but she is not sure if she can inject herself and we have opted to schedule her for Botox after we get approval from her insurance company.  I plan on injecting 155 units.  Follow-up will be the day she is scheduled for Botox.    Thank you again for this referral, please feel free to contact me if you have any questions.    Mikel Hernandez MD  [facility] [place]  (Formerly, [facility]     REASON FOR CONSULTATION Headache        HISTORY OF PRESENT ILLNESS     We have been requested by Dr. Layla Martinez to evaluate Sherry Sweeney who is a 42 year old  female for chronic migraine without aura    Patient is a 42-year-old female with history of mitochondrial myopathy, chronic migraine without aura, fibromyalgia who was referred for  evaluation of worsening headaches.  According to patient her symptoms started in 2014 and progressively got worse.  She initially attributed it to pressure in the neck or TMJ abnormality and that in the past was evaluated and started on some hormonal supplements.  Due to progressive weakness she was evaluated for possible mitochondrial myopathy and had mitochondrial DNA tested that was positive.  For her headaches she was started on Depakote, amitriptyline and in the past had also tried Cymbalta and as she was having more than 15 headaches in a month Botox was recommended.  She started getting Botox in 2017 and did notice improvement in her headache.  She was last seen in our clinic on 7/3/2019 when she received her Botox injection.  After the COVID pandemic she could not come in person and stopped using Botox.  With Botox she had noticed more than 50% reduction in her headaches but gradually the frequency increased.  She is currently getting headaches more than 15 days in a month and is interested in trying Botox again.     PROBLEM LIST   Patient Active Problem List   Diagnosis Code     Acne vulgaris L70.0     Arthralgia of temporomandibular joint M26.629     Debility R53.81     Fibromyalgia M79.7     Hypermobility syndrome M35.7     IBS (irritable bowel syndrome) K58.9     Insomnia G47.00     Major depression, recurrent, chronic (H) F33.9     Chronic migraine without aura, intractable, without status migrainosus G43.719     Mitochondrial disease (H) E88.40     Generalized anxiety disorder F41.1     Post-traumatic stress disorder F43.10     Parasomnia G47.50     Seasonal depression (H) F33.8         PAST MEDICAL & SURGICAL HISTORY     Past Medical History:   Patient  has a past medical history of Chronic fatigue syndrome, Chronic migraine, Depression, Depression, Depressive disorder (2013), Fibromyalgia, Fibromyalgia, H/O: hysterectomy (2/22/2018), History of anesthesia complications, IBS (irritable bowel  syndrome), Mitochondrial disease (H), Mitochondrial myopathy, Parasomnia, and PONV (postoperative nausea and vomiting).    Surgical History:  She  has a past surgical history that includes REMOVAL OF OVARIAN CYST(S); Hysterectomy (Bilateral, 2/22/2018); Picc (2/24/2018); Abdomen surgery (1996 & 2018, feb.); appendectomy (1996); biopsy; and Laparoscopic cystectomy ovarian (oncology) (Left, 10/18/2021).     SOCIAL HISTORY     Reviewed, and she  reports that she has never smoked. She has never used smokeless tobacco. She reports that she does not drink alcohol and does not use drugs.     FAMILY HISTORY     Reviewed, and family history includes Anxiety Disorder in her maternal grandmother, mother, paternal grandfather, and paternal grandmother; Bone Cancer in her maternal grandfather; Breast Cancer (age of onset: 60.00) in her maternal grandmother; Breast Cancer (age of onset: 62.00) in her mother; Cancer in her mother; Coronary Artery Disease (age of onset: 45.00) in her father; Depression in her brother, father, maternal grandmother, and mother; Diabetes in her cousin and cousin; Hyperlipidemia in her mother; Hypertension in her mother; Meniere's disease in her mother.     ALLERGIES     Allergies   Allergen Reactions     Albumin, Egg Other (See Comments) and GI Disturbance     Swollen colon, belly pain  Swollen colon, belly pain       Ciprofloxacin Itching, Rash and Hives     Rash over whole body   Rash over whole body        Flu Virus Vaccine Shortness Of Breath and Anaphylaxis     No Clinical Screening - See Comments Anaphylaxis     Stomach swelling     Sulfa Drugs Rash and Hives     Yeast Other (See Comments) and Anaphylaxis     Lactose Other (See Comments)     Desvenlafaxine Blisters, Itching and Rash     Milnacipran Other (See Comments) and Palpitations     Chest pain, hot/cold flashes     Quetiapine Palpitations     Tachycardia, nervousness, elevated blood pressure.   Tachycardia, nervousness, elevated blood  pressure.          REVIEW OF SYSTEMS     A 12 point review of system was performed and was negative except as outlined in the history of present illness.     HOME MEDICATIONS     Current Outpatient Rx   Medication Sig Dispense Refill     ascorbic acid 500 MG TABS Take 500 mg by mouth daily       betamethasone dipropionate (DIPROSONE) 0.05 % external lotion        buPROPion (WELLBUTRIN XL) 300 MG 24 hr tablet Take 1 tablet (300 mg) by mouth daily 90 tablet 3     cetirizine (ZYRTEC) 10 MG tablet TAKE 1 TAB ORALLY DAILY AS NEEDED FOR ALLERGIES MAY INCREASE TO 2 TAB DAILY IF ITCHING NOT RELIEVED 90 tablet 2     clindamycin-benzoyl peroxide (BENZACLIN) gel Apply topically to acne once daily       [facility]S MELATONIN 3 MG tablet TAKE 1 TABLET (3 MG) BY MOUTH NIGHTLY AS NEEDED FOR SLEEP 90 tablet 1     cyclobenzaprine (FLEXERIL) 10 MG tablet cyclobenzaprine 10 mg tablet       EFFEXOR  MG 24 hr capsule Take 1 capsule (150 mg) by mouth daily 90 capsule 1     EFFEXOR XR 75 MG 24 hr capsule Take 1 capsule (75 mg) by mouth daily 90 capsule 1     hydrOXYzine (ATARAX) 25 MG tablet Take 1-2 tablets (25-50 mg) by mouth At Bedtime 180 tablet 1     JUNEL FE 1.5/30 1.5-30 MG-MCG tablet Take 1 tablet by mouth daily       lidocaine (LIDODERM) 5 % patch PLACE 1 PATCH ONTO THE SKIN AS NEEDED TO NECK, SHOULDERS, AND BACK 30 patch 0     Multiple Vitamin (MULTIVITAMIN ADULT PO) Take 1 tablet by mouth daily       Multiple Vitamins-Minerals (DAILY MULTI PO) Take 1 tablet by mouth daily       omeprazole (PRILOSEC) 40 MG DR capsule TAKE 1 CAPSULE (40 MG) BY MOUTH EVERY MORNING (BEFORE BREAKFAST) 30 MINUTES BEFORE MEAL 90 capsule 3     ondansetron (ZOFRAN) 4 MG tablet Take 1 tablet (4 mg) by mouth every 8 hours as needed for nausea 18 tablet 1     Riboflavin 400 MG TABS Take 400 mg by mouth daily       rizatriptan (MAXALT) 10 MG tablet TAKE 1 TABLET BY MOUTH AS NEEDED FOR MIGRAINE. MAY REPEAT IN 2 HOURS IF NEEDED 10 tablet 3     spironolactone  "(ALDACTONE) 50 MG tablet Take 1 tablet (50 mg) by mouth 2 times daily 180 tablet 1     tretinoin (RETIN-A) 0.025 % topical gel Apply topically daily       tretinoin (RETIN-A) 0.05 % external cream APPLY TO AFFECTED AREA EVERY DAY AT BEDTIME 45 g 2     triamcinolone (KENALOG) 0.1 % external ointment Apply topically 2 times daily 80 g 1     valACYclovir (VALTREX) 1000 mg tablet TAKE 2 TABLETS BY MOUTH 12 HOURS APART AS NEEDED EPISODE 12 tablet 3     venlafaxine (EFFEXOR XR) 150 MG 24 hr capsule Take 1 capsule (150 mg) by mouth daily 90 capsule 0     venlafaxine (EFFEXOR XR) 75 MG 24 hr capsule Take 75 mg by mouth daily       Vitamin D (Cholecalciferol) 25 MCG (1000 UT) TABS Take 3,000 Units by mouth       vitamin E (TOCOPHEROL) 1000 UNIT capsule Take 1,000 Units by mouth daily       zolpidem (AMBIEN) 5 MG tablet Take 1 tablet (5 mg) by mouth nightly as needed for sleep 30 tablet 0         PHYSICAL EXAM     Vital signs  /78   Pulse 87   Ht 1.575 m (5' 2\")   Wt 60.3 kg (133 lb)   BMI 24.33 kg/m      Weight:   133 lbs 0 oz    Pleasant female who is alert and oriented vital signs were reviewed and documented in electronic medical record.  Neck supple.  Neurologically speech was normal.  Cranial nerves II through XII are intact motor strength neck flexor and extensor 4+/5 in upper extremity 5/5 in the lower extremity proximally 4+/5 distally 5/5 reflexes 2+ toes downgoing no dysmetria noted on finger-nose testing gait normal.     PERTINENT DIAGNOSTIC STUDIES     Following studies were reviewed:     MRI BRAIN 10/29/2015  1.  Normal Head MRI.   2.  No acute infarcts, mass lesions or hemorrhage.     PERTINENT LABS  Following labs were reviewed:  No visits with results within 3 Month(s) from this visit.   Latest known visit with results is:   Office Visit on 07/19/2022   Component Date Value     Sodium 07/19/2022 137      Potassium 07/19/2022 4.5      Creatinine 07/19/2022 0.80      Urea Nitrogen 07/19/2022 11.2      " Chloride 07/19/2022 104      Carbon Dioxide (CO2) 07/19/2022 24      Anion Gap 07/19/2022 9      Glucose 07/19/2022 88      Calcium 07/19/2022 9.7      Protein Total 07/19/2022 7.4      Albumin 07/19/2022 4.0      Bilirubin Total 07/19/2022 0.4      Alkaline Phosphatase 07/19/2022 62      AST 07/19/2022 18      ALT 07/19/2022 13      GFR Estimate 07/19/2022 >90      TSH 07/19/2022 0.88      WBC Count 07/19/2022 7.4      RBC Count 07/19/2022 5.00      Hemoglobin 07/19/2022 14.1      Hematocrit 07/19/2022 43.0      MCV 07/19/2022 86      MCH 07/19/2022 28.2      MCHC 07/19/2022 32.8      RDW 07/19/2022 13.0      Platelet Count 07/19/2022 362      % Neutrophils 07/19/2022 64      % Lymphocytes 07/19/2022 28      % Monocytes 07/19/2022 5      % Eosinophils 07/19/2022 2      % Basophils 07/19/2022 1      % Immature Granulocytes 07/19/2022 0      Absolute Neutrophils 07/19/2022 4.7      Absolute Lymphocytes 07/19/2022 2.1      Absolute Monocytes 07/19/2022 0.4      Absolute Eosinophils 07/19/2022 0.2      Absolute Basophils 07/19/2022 0.1      Absolute Immature Granul* 07/19/2022 0.0         Total time spent for face to face visit, reviewing labs/imaging studies, counseling and coordination of care was: 1 Hour spent on the date of the encounter doing chart review, review of outside records, review of test results, interpretation of tests, patient visit and documentation       This note was dictated using voice recognition software.  Any grammatical or context distortions are unintentional and inherent to the software.    No orders of the defined types were placed in this encounter.     New Prescriptions    No medications on file      Modified Medications    No medications on file

## 2023-02-04 ENCOUNTER — OFFICE VISIT (OUTPATIENT)
Dept: FAMILY MEDICINE | Facility: CLINIC | Age: 43
End: 2023-02-04
Payer: COMMERCIAL

## 2023-02-04 VITALS
SYSTOLIC BLOOD PRESSURE: 120 MMHG | DIASTOLIC BLOOD PRESSURE: 84 MMHG | TEMPERATURE: 98.6 F | HEART RATE: 86 BPM | RESPIRATION RATE: 12 BRPM | OXYGEN SATURATION: 98 %

## 2023-02-04 DIAGNOSIS — J02.9 SORE THROAT: Primary | ICD-10-CM

## 2023-02-04 LAB
DEPRECATED S PYO AG THROAT QL EIA: NEGATIVE
GROUP A STREP BY PCR: NOT DETECTED

## 2023-02-04 PROCEDURE — U0005 INFEC AGEN DETEC AMPLI PROBE: HCPCS | Performed by: FAMILY MEDICINE

## 2023-02-04 PROCEDURE — 99213 OFFICE O/P EST LOW 20 MIN: CPT | Mod: CS | Performed by: FAMILY MEDICINE

## 2023-02-04 PROCEDURE — 87651 STREP A DNA AMP PROBE: CPT | Performed by: FAMILY MEDICINE

## 2023-02-04 PROCEDURE — U0003 INFECTIOUS AGENT DETECTION BY NUCLEIC ACID (DNA OR RNA); SEVERE ACUTE RESPIRATORY SYNDROME CORONAVIRUS 2 (SARS-COV-2) (CORONAVIRUS DISEASE [COVID-19]), AMPLIFIED PROBE TECHNIQUE, MAKING USE OF HIGH THROUGHPUT TECHNOLOGIES AS DESCRIBED BY CMS-2020-01-R: HCPCS | Performed by: FAMILY MEDICINE

## 2023-02-04 NOTE — PROGRESS NOTES
Assessment & Plan     Sore throat    - Streptococcus A Rapid Screen w/Reflex to PCR - Clinic Collect  - Group A Streptococcus PCR Throat Swab  - Symptomatic COVID-19 Virus (Coronavirus) by PCR Nose    Reassured RST is negative.  COVID pending.  Suspect this is viral.  Continue with rest and fluids.  Close Follow-up if no change or new or worsening sx prn.    Petra Gutiérrez MD  [facility] CHIARA Powell is a 42 year old, presenting for the following health issues:  Pharyngitis      HPI     Presents with ST x 4 days.  No fever or chills.  No URI sx.  Has not yet had COVID.  Saw nephew earlier in week who later had flu-like symptoms of fever and vomiting.    Has noted some canker sores on back of tongue this week as well.      Review of Systems   Constitutional, HEENT, cardiovascular, pulmonary, GI, , musculoskeletal, neuro, skin, endocrine and psych systems are negative, except as otherwise noted.      Objective    /84   Pulse 86   Temp 98.6  F (37  C) (Oral)   Resp 12   SpO2 98%   There is no height or weight on file to calculate BMI.  Physical Exam   GENERAL: healthy, alert and no distress  EYES: Eyes grossly normal to inspection, PERRL and conjunctivae and sclerae normal  HENT: normal cephalic/atraumatic, nose and mouth without ulcers or lesions, oral mucous membranes moist, tonsillar erythema and + few scattered aphthous ulcers on back of tongue  NECK: no adenopathy, no asymmetry, masses, or scars and thyroid normal to palpation  MS: no gross musculoskeletal defects noted, no edema  PSYCH: mentation appears normal, affect normal/bright    Results for orders placed or performed in visit on 02/04/23 (from the past 24 hour(s))   Streptococcus A Rapid Screen w/Reflex to PCR - Clinic Collect    Specimen: Throat; Swab   Result Value Ref Range    Group A Strep antigen Negative Negative

## 2023-02-05 LAB — SARS-COV-2 RNA RESP QL NAA+PROBE: NEGATIVE

## 2023-02-12 NOTE — PROGRESS NOTES
NEUROLOGY FOLLOW UP VISIT  NOTE       [facility] [place]  [address] [place] [address]  Tel: [phone]  Fax: [phone]  [email or web address]     Sherry Sweeney,  1980, MRN [id number]  PCP: Layla Martinez  Date: 2023      ASSESSMENT & PLAN     Visit Diagnosis  1. Chronic migraine without aura, intractable, without status migrainosus     Chronic migraine without aura  Renetta 42-year-old female with history of mitochondrial myopathy, fibromyalgia and chronic migraine without aura who is here for Botox injection.  Previously she had tried Botox in 2019 but due to COVID pandemic could not come to the clinic.  She had previously failed multiple medications and was having more than 15 headaches in a month.  In the past Botox did help.  After explaining all the side effect and obtaining her consent, I injected 155 units according to the enclosed sheet.  45 units were discarded.  She was monitored in the clinic for a short duration and left in a satisfactory condition.  Follow-up will be in 3 to    Thank you again for this referral, please feel free to contact me if you have any questions.    Mikel Hernandez MD  [facility]  (Formerly, [facility]     HISTORY OF PRESENT ILLNESS     Patient is a renetta 42-year-old female with history of mitochondrial myopathy, chronic migraine without aura, fibromyalgia who was previously followed in our clinic and received Botox injection who returns recently after 3 years for worsening headaches.  She had noticed improvement with Botox in 2019 but due to COVID pandemic could not come in for her 3 monthly Botox injection and noticed steadily worsening headaches.  Previously she had tried multiple medication including Depakote, amitriptyline, beta-blocker that did not seem to help.  She is here for her Botox injection after almost 3 years.    Briefly patient is a female  with history of mitochondrial myopathy, chronic migraine without aura, fibromyalgia who started having headaches in 2014 and progressively got worse.  She initially attributed it to pressure in the neck or TMJ abnormality and that in the past was evaluated and started on some hormonal supplements.  Due to progressive weakness she was evaluated for possible mitochondrial myopathy and had mitochondrial DNA tested that was positive.  For her headaches she was started on Depakote, amitriptyline and in the past had also tried Cymbalta and as she was having more than 15 headaches in a month Botox was recommended.  She started getting Botox in 2017 and did notice improvement in her headache.  Discontinued until 2019 but due to COVID pandemic she stopped doing Botox injection and noticed worsening headaches and Botox was restarted in 2023.     PROBLEM LIST   Patient Active Problem List   Diagnosis Code     Acne vulgaris L70.0     Arthralgia of temporomandibular joint M26.629     Debility R53.81     Fibromyalgia M79.7     Hypermobility syndrome M35.7     IBS (irritable bowel syndrome) K58.9     Insomnia G47.00     Major depression, recurrent, chronic (H) F33.9     Chronic migraine without aura, intractable, without status migrainosus G43.719     Mitochondrial disease (H) E88.40     Generalized anxiety disorder F41.1     Post-traumatic stress disorder F43.10     Parasomnia G47.50     Seasonal depression (H) F33.8         PAST MEDICAL & SURGICAL HISTORY     Past Medical History:   Patient  has a past medical history of Chronic fatigue syndrome, Chronic migraine, Depression, Depression, Depressive disorder (2013), Fibromyalgia, Fibromyalgia, H/O: hysterectomy (2/22/2018), History of anesthesia complications, IBS (irritable bowel syndrome), Mitochondrial disease (H), Mitochondrial myopathy, Parasomnia, and PONV (postoperative nausea and vomiting).    Surgical History:  She  has a past surgical history that includes REMOVAL OF  OVARIAN CYST(S); Hysterectomy (Bilateral, 2/22/2018); Picc (2/24/2018); Abdomen surgery (1996 & 2018, feb.); appendectomy (1996); biopsy; and Laparoscopic cystectomy ovarian (oncology) (Left, 10/18/2021).     SOCIAL HISTORY     Reviewed, and she  reports that she has never smoked. She has never used smokeless tobacco. She reports that she does not drink alcohol and does not use drugs.     FAMILY HISTORY     Reviewed, and family history includes Anxiety Disorder in her maternal grandmother, mother, paternal grandfather, and paternal grandmother; Bone Cancer in her maternal grandfather; Breast Cancer (age of onset: 60.00) in her maternal grandmother; Breast Cancer (age of onset: 62.00) in her mother; Cancer in her mother; Coronary Artery Disease (age of onset: 45.00) in her father; Depression in her brother, father, maternal grandmother, and mother; Diabetes in her cousin and cousin; Hyperlipidemia in her mother; Hypertension in her mother; Meniere's disease in her mother.     ALLERGIES     Allergies   Allergen Reactions     Albumin, Egg Other (See Comments) and GI Disturbance     Swollen colon, belly pain  Swollen colon, belly pain       Ciprofloxacin Itching, Rash and Hives     Rash over whole body   Rash over whole body        Flu Virus Vaccine Shortness Of Breath and Anaphylaxis     No Clinical Screening - See Comments Anaphylaxis     Stomach swelling     Sulfa Drugs Rash and Hives     Yeast Other (See Comments) and Anaphylaxis     Lactose Other (See Comments)     Desvenlafaxine Blisters, Itching and Rash     Milnacipran Other (See Comments) and Palpitations     Chest pain, hot/cold flashes     Quetiapine Palpitations     Tachycardia, nervousness, elevated blood pressure.   Tachycardia, nervousness, elevated blood pressure.          REVIEW OF SYSTEMS     A 12 point review of system was performed and was negative except as outlined in the history of present illness.     HOME MEDICATIONS     Current Outpatient Rx    Medication Sig Dispense Refill     ascorbic acid 500 MG TABS Take 500 mg by mouth daily       betamethasone dipropionate (DIPROSONE) 0.05 % external lotion        buPROPion (WELLBUTRIN XL) 300 MG 24 hr tablet Take 1 tablet (300 mg) by mouth daily 90 tablet 3     cetirizine (ZYRTEC) 10 MG tablet TAKE 1 TAB ORALLY DAILY AS NEEDED FOR ALLERGIES MAY INCREASE TO 2 TAB DAILY IF ITCHING NOT RELIEVED 90 tablet 2     clindamycin-benzoyl peroxide (BENZACLIN) gel Apply topically to acne once daily       [facility] MELATONIN 3 MG tablet TAKE 1 TABLET (3 MG) BY MOUTH NIGHTLY AS NEEDED FOR SLEEP 90 tablet 1     cyclobenzaprine (FLEXERIL) 10 MG tablet cyclobenzaprine 10 mg tablet       EFFEXOR  MG 24 hr capsule Take 1 capsule (150 mg) by mouth daily 90 capsule 1     EFFEXOR XR 75 MG 24 hr capsule Take 1 capsule (75 mg) by mouth daily 90 capsule 1     hydrOXYzine (ATARAX) 25 MG tablet Take 1-2 tablets (25-50 mg) by mouth At Bedtime 180 tablet 1     JUNEL FE 1.5/30 1.5-30 MG-MCG tablet Take 1 tablet by mouth daily       lidocaine (LIDODERM) 5 % patch PLACE 1 PATCH ONTO THE SKIN AS NEEDED TO NECK, SHOULDERS, AND BACK 30 patch 0     Multiple Vitamin (MULTIVITAMIN ADULT PO) Take 1 tablet by mouth daily       Multiple Vitamins-Minerals (DAILY MULTI PO) Take 1 tablet by mouth daily       omeprazole (PRILOSEC) 40 MG DR capsule TAKE 1 CAPSULE (40 MG) BY MOUTH EVERY MORNING (BEFORE BREAKFAST) 30 MINUTES BEFORE MEAL 90 capsule 3     ondansetron (ZOFRAN) 4 MG tablet Take 1 tablet (4 mg) by mouth every 8 hours as needed for nausea 18 tablet 1     Riboflavin 400 MG TABS Take 400 mg by mouth daily       rizatriptan (MAXALT) 10 MG tablet TAKE 1 TABLET BY MOUTH AS NEEDED FOR MIGRAINE. MAY REPEAT IN 2 HOURS IF NEEDED 10 tablet 3     spironolactone (ALDACTONE) 50 MG tablet Take 1 tablet (50 mg) by mouth 2 times daily 180 tablet 1     tretinoin (RETIN-A) 0.025 % topical gel Apply topically daily       tretinoin (RETIN-A) 0.05 % external cream APPLY  "TO AFFECTED AREA EVERY DAY AT BEDTIME 45 g 2     triamcinolone (KENALOG) 0.1 % external ointment Apply topically 2 times daily 80 g 1     valACYclovir (VALTREX) 1000 mg tablet TAKE 2 TABLETS BY MOUTH 12 HOURS APART AS NEEDED EPISODE 12 tablet 3     venlafaxine (EFFEXOR XR) 150 MG 24 hr capsule Take 1 capsule (150 mg) by mouth daily 90 capsule 0     venlafaxine (EFFEXOR XR) 75 MG 24 hr capsule Take 75 mg by mouth daily       Vitamin D (Cholecalciferol) 25 MCG (1000 UT) TABS Take 3,000 Units by mouth       vitamin E (TOCOPHEROL) 1000 UNIT capsule Take 1,000 Units by mouth daily       zolpidem (AMBIEN) 5 MG tablet Take 1 tablet (5 mg) by mouth nightly as needed for sleep 30 tablet 0         PHYSICAL EXAM     Vital signs  Ht 1.575 m (5' 2\")   Wt 60.3 kg (133 lb)   BMI 24.33 kg/m      Weight:   133 lbs 0 oz    Pleasant female who is alert and oriented vital signs were reviewed and documented in electronic medical record.  Neck supple.  Neurologically speech was normal.  Cranial nerves II through XII are intact motor strength neck flexor and extensor 4+/5 in upper extremity 5/5 in the lower extremity proximally 4+/5 distally 5/5 reflexes 2+ toes downgoing no dysmetria noted on finger-nose testing gait normal.     PERTINENT DIAGNOSTIC STUDIES     Following studies were reviewed:     MRI BRAIN 10/29/2015  1.  Normal Head MRI.   2.  No acute infarcts, mass lesions or hemorrhage.          PERTINENT LABS  Following labs were reviewed:  Office Visit on 02/04/2023   Component Date Value     Group A Strep antigen 02/04/2023 Negative      Group A strep by PCR 02/04/2023 Not Detected      SARS CoV2 PCR 02/04/2023 Negative          Total time spent for face to face visit, reviewing labs/imaging studies, counseling and coordination of care was: 20 minutes spent on the date of the encounter doing chart review, review of outside records, review of test results, interpretation of tests, patient visit and documentation       This note " was dictated using voice recognition software.  Any grammatical or context distortions are unintentional and inherent to the software.    Orders Placed This Encounter   Procedures     99708 - MIGRAINE      New Prescriptions    No medications on file     Modified Medications    No medications on file

## 2023-02-14 ENCOUNTER — OFFICE VISIT (OUTPATIENT)
Dept: NEUROLOGY | Facility: CLINIC | Age: 43
End: 2023-02-14
Payer: COMMERCIAL

## 2023-02-14 VITALS — BODY MASS INDEX: 24.48 KG/M2 | HEIGHT: 62 IN | WEIGHT: 133 LBS

## 2023-02-14 DIAGNOSIS — G43.719 CHRONIC MIGRAINE WITHOUT AURA, INTRACTABLE, WITHOUT STATUS MIGRAINOSUS: Primary | ICD-10-CM

## 2023-02-14 PROCEDURE — 64615 CHEMODENERV MUSC MIGRAINE: CPT | Performed by: PSYCHIATRY & NEUROLOGY

## 2023-02-14 NOTE — LETTER
2023         RE: Sherry Sweeney   [facility] [place] [address]        Dear Colleague,    Thank you for referring your patient, Sherry Sweeney, to the [facility] [place]. Please see a copy of my visit note below.    NEUROLOGY FOLLOW UP VISIT  NOTE       [facility] NEUROLOG[facility] [place]  [address] [place] [address]  Tel: [phone]  Fax: [phone]  [email or web address]     Sherry Sweeney,  1980, MRN [id number]  PCP: Layla Martinez  Date: 2023      ASSESSMENT & PLAN     Visit Diagnosis  1. Chronic migraine without aura, intractable, without status migrainosus     Chronic migraine without aura  Renetta 42-year-old female with history of mitochondrial myopathy, fibromyalgia and chronic migraine without aura who is here for Botox injection.  Previously she had tried Botox in 2019 but due to COVID pandemic could not come to the clinic.  She had previously failed multiple medications and was having more than 15 headaches in a month.  In the past Botox did help.  After explaining all the side effect and obtaining her consent, I injected 155 units according to the enclosed sheet.  45 units were discarded.  She was monitored in the clinic for a short duration and left in a satisfactory condition.  Follow-up will be in 3 to    Thank you again for this referral, please feel free to contact me if you have any questions.    Mikel Hernandez MD  [facility]  ([facility], [facility]     HISTORY OF PRESENT ILLNESS     Patient is a renetta 42-year-old female with history of mitochondrial myopathy, chronic migraine without aura, fibromyalgia who was previously followed in our clinic and received Botox injection who returns recently after 3 years for worsening headaches.  She had noticed improvement with Botox in 2019 but due to COVID pandemic could not come in for her 3 monthly Botox injection  and noticed steadily worsening headaches.  Previously she had tried multiple medication including Depakote, amitriptyline, beta-blocker that did not seem to help.  She is here for her Botox injection after almost 3 years.    Briefly patient is a female with history of mitochondrial myopathy, chronic migraine without aura, fibromyalgia who started having headaches in 2014 and progressively got worse.  She initially attributed it to pressure in the neck or TMJ abnormality and that in the past was evaluated and started on some hormonal supplements.  Due to progressive weakness she was evaluated for possible mitochondrial myopathy and had mitochondrial DNA tested that was positive.  For her headaches she was started on Depakote, amitriptyline and in the past had also tried Cymbalta and as she was having more than 15 headaches in a month Botox was recommended.  She started getting Botox in 2017 and did notice improvement in her headache.  Discontinued until 2019 but due to COVID pandemic she stopped doing Botox injection and noticed worsening headaches and Botox was restarted in 2023.     PROBLEM LIST   Patient Active Problem List   Diagnosis Code     Acne vulgaris L70.0     Arthralgia of temporomandibular joint M26.629     Debility R53.81     Fibromyalgia M79.7     Hypermobility syndrome M35.7     IBS (irritable bowel syndrome) K58.9     Insomnia G47.00     Major depression, recurrent, chronic (H) F33.9     Chronic migraine without aura, intractable, without status migrainosus G43.719     Mitochondrial disease (H) E88.40     Generalized anxiety disorder F41.1     Post-traumatic stress disorder F43.10     Parasomnia G47.50     Seasonal depression (H) F33.8         PAST MEDICAL & SURGICAL HISTORY     Past Medical History:   Patient  has a past medical history of Chronic fatigue syndrome, Chronic migraine, Depression, Depression, Depressive disorder (2013), Fibromyalgia, Fibromyalgia, H/O: hysterectomy (2/22/2018), History  of anesthesia complications, IBS (irritable bowel syndrome), Mitochondrial disease (H), Mitochondrial myopathy, Parasomnia, and PONV (postoperative nausea and vomiting).    Surgical History:  She  has a past surgical history that includes REMOVAL OF OVARIAN CYST(S); Hysterectomy (Bilateral, 2/22/2018); Picc (2/24/2018); Abdomen surgery (1996 & 2018, feb.); appendectomy (1996); biopsy; and Laparoscopic cystectomy ovarian (oncology) (Left, 10/18/2021).     SOCIAL HISTORY     Reviewed, and she  reports that she has never smoked. She has never used smokeless tobacco. She reports that she does not drink alcohol and does not use drugs.     FAMILY HISTORY     Reviewed, and family history includes Anxiety Disorder in her maternal grandmother, mother, paternal grandfather, and paternal grandmother; Bone Cancer in her maternal grandfather; Breast Cancer (age of onset: 60.00) in her maternal grandmother; Breast Cancer (age of onset: 62.00) in her mother; Cancer in her mother; Coronary Artery Disease (age of onset: 45.00) in her father; Depression in her brother, father, maternal grandmother, and mother; Diabetes in her cousin and cousin; Hyperlipidemia in her mother; Hypertension in her mother; Meniere's disease in her mother.     ALLERGIES     Allergies   Allergen Reactions     Albumin, Egg Other (See Comments) and GI Disturbance     Swollen colon, belly pain  Swollen colon, belly pain       Ciprofloxacin Itching, Rash and Hives     Rash over whole body   Rash over whole body        Flu Virus Vaccine Shortness Of Breath and Anaphylaxis     No Clinical Screening - See Comments Anaphylaxis     Stomach swelling     Sulfa Drugs Rash and Hives     Yeast Other (See Comments) and Anaphylaxis     Lactose Other (See Comments)     Desvenlafaxine Blisters, Itching and Rash     Milnacipran Other (See Comments) and Palpitations     Chest pain, hot/cold flashes     Quetiapine Palpitations     Tachycardia, nervousness, elevated blood  pressure.   Tachycardia, nervousness, elevated blood pressure.          REVIEW OF SYSTEMS     A 12 point review of system was performed and was negative except as outlined in the history of present illness.     HOME MEDICATIONS     Current Outpatient Rx   Medication Sig Dispense Refill     ascorbic acid 500 MG TABS Take 500 mg by mouth daily       betamethasone dipropionate (DIPROSONE) 0.05 % external lotion        buPROPion (WELLBUTRIN XL) 300 MG 24 hr tablet Take 1 tablet (300 mg) by mouth daily 90 tablet 3     cetirizine (ZYRTEC) 10 MG tablet TAKE 1 TAB ORALLY DAILY AS NEEDED FOR ALLERGIES MAY INCREASE TO 2 TAB DAILY IF ITCHING NOT RELIEVED 90 tablet 2     clindamycin-benzoyl peroxide (BENZACLIN) gel Apply topically to acne once daily       [facility] MELATONIN 3 MG tablet TAKE 1 TABLET (3 MG) BY MOUTH NIGHTLY AS NEEDED FOR SLEEP 90 tablet 1     cyclobenzaprine (FLEXERIL) 10 MG tablet cyclobenzaprine 10 mg tablet       EFFEXOR  MG 24 hr capsule Take 1 capsule (150 mg) by mouth daily 90 capsule 1     EFFEXOR XR 75 MG 24 hr capsule Take 1 capsule (75 mg) by mouth daily 90 capsule 1     hydrOXYzine (ATARAX) 25 MG tablet Take 1-2 tablets (25-50 mg) by mouth At Bedtime 180 tablet 1     JUNEL FE 1.5/30 1.5-30 MG-MCG tablet Take 1 tablet by mouth daily       lidocaine (LIDODERM) 5 % patch PLACE 1 PATCH ONTO THE SKIN AS NEEDED TO NECK, SHOULDERS, AND BACK 30 patch 0     Multiple Vitamin (MULTIVITAMIN ADULT PO) Take 1 tablet by mouth daily       Multiple Vitamins-Minerals (DAILY MULTI PO) Take 1 tablet by mouth daily       omeprazole (PRILOSEC) 40 MG DR capsule TAKE 1 CAPSULE (40 MG) BY MOUTH EVERY MORNING (BEFORE BREAKFAST) 30 MINUTES BEFORE MEAL 90 capsule 3     ondansetron (ZOFRAN) 4 MG tablet Take 1 tablet (4 mg) by mouth every 8 hours as needed for nausea 18 tablet 1     Riboflavin 400 MG TABS Take 400 mg by mouth daily       rizatriptan (MAXALT) 10 MG tablet TAKE 1 TABLET BY MOUTH AS NEEDED FOR MIGRAINE. MAY REPEAT  "IN 2 HOURS IF NEEDED 10 tablet 3     spironolactone (ALDACTONE) 50 MG tablet Take 1 tablet (50 mg) by mouth 2 times daily 180 tablet 1     tretinoin (RETIN-A) 0.025 % topical gel Apply topically daily       tretinoin (RETIN-A) 0.05 % external cream APPLY TO AFFECTED AREA EVERY DAY AT BEDTIME 45 g 2     triamcinolone (KENALOG) 0.1 % external ointment Apply topically 2 times daily 80 g 1     valACYclovir (VALTREX) 1000 mg tablet TAKE 2 TABLETS BY MOUTH 12 HOURS APART AS NEEDED EPISODE 12 tablet 3     venlafaxine (EFFEXOR XR) 150 MG 24 hr capsule Take 1 capsule (150 mg) by mouth daily 90 capsule 0     venlafaxine (EFFEXOR XR) 75 MG 24 hr capsule Take 75 mg by mouth daily       Vitamin D (Cholecalciferol) 25 MCG (1000 UT) TABS Take 3,000 Units by mouth       vitamin E (TOCOPHEROL) 1000 UNIT capsule Take 1,000 Units by mouth daily       zolpidem (AMBIEN) 5 MG tablet Take 1 tablet (5 mg) by mouth nightly as needed for sleep 30 tablet 0         PHYSICAL EXAM     Vital signs  Ht 1.575 m (5' 2\")   Wt 60.3 kg (133 lb)   BMI 24.33 kg/m      Weight:   133 lbs 0 oz    Pleasant female who is alert and oriented vital signs were reviewed and documented in electronic medical record.  Neck supple.  Neurologically speech was normal.  Cranial nerves II through XII are intact motor strength neck flexor and extensor 4+/5 in upper extremity 5/5 in the lower extremity proximally 4+/5 distally 5/5 reflexes 2+ toes downgoing no dysmetria noted on finger-nose testing gait normal.     PERTINENT DIAGNOSTIC STUDIES     Following studies were reviewed:     MRI BRAIN 10/29/2015  1.  Normal Head MRI.   2.  No acute infarcts, mass lesions or hemorrhage.          PERTINENT LABS  Following labs were reviewed:  Office Visit on 02/04/2023   Component Date Value     Group A Strep antigen 02/04/2023 Negative      Group A strep by PCR 02/04/2023 Not Detected      SARS CoV2 PCR 02/04/2023 Negative          Total time spent for face to face visit, " reviewing labs/imaging studies, counseling and coordination of care was: 20 minutes spent on the date of the encounter doing chart review, review of outside records, review of test results, interpretation of tests, patient visit and documentation       This note was dictated using voice recognition software.  Any grammatical or context distortions are unintentional and inherent to the software.    Orders Placed This Encounter   Procedures     [id number] - MIGRAINE      New Prescriptions    No medications on file     Modified Medications    No medications on file                     Again, thank you for allowing me to participate in the care of your patient.        Sincerely,        Mikel Hernandez MD

## 2023-02-14 NOTE — PROCEDURES
BOTOX INJECTION PROCEDURE NOTE     Date: 02/14/2023    INDICATION: CHRONIC MIGRAINE    Number of headache days/month currently:  (BASELINE: >15   )  Number of headache hours/day currently :  (BASELINE: 4-24 Hours   )  Number of headache free days post treatment: N/A  Disability due to migraine headache: yes  Consent: Obtained  Indication: Chronic Migraine    Botox Lot No: [id number] expiration 5/30/2025  Number of units injected: 155 U  Number of units of unavoidable wastage: 45 U    LOCATION  At today s visit, patient was injected with a total of 155 units divided in 31 sites. A total of 2 mL of normal saline was used to dilute 100 units of the drug. The following muscles were injected:  10 units divided in two sites, procerus 5 units in one site, frontalis 20 units divided in four sites, temporalis 40 units divided in eight sites, occipitalis 30 units divided in six sites, cervical paraspinals 20 units divided in four sites, and trapezius 30 units divided in six sites. 45 units were unavoidable wastage        ASSESSMENT/PLAN  Chronic Migraine headache  The patient tolerated the procedure well. There were no immediate complications. Prior to the procedure, I discussed the potential side effects of Botox therapy, which includes generalized muscle weakness, diplopia, ptosis, dysphagia, dysphonia, dysarthria, urinary incontinence, and breathing difficulties. Also I discussed the black box warning of the drug that can include the rare chance of distant spread of the drug to swallowing and breathing muscles that can be life threatening. All of patient's questions were answered prior to our signing the consent form. patient left the clinic after a brief period of observation and will return for repeat injection in three months  time as needed. I informed patient again the goal is to maintain at least seven to eight headache free days on average a month. I explained to patient that most patients with chronic  migraines do need life time Botox treatment, however, at any time if the patient wishes to stop Botox we can do so as some patients do go into remission on their own over time. Follow up will be in 3-4 months      Mikel Hernandez M.D.  [facility]  (Formerly, [facility], ..)

## 2023-03-07 ENCOUNTER — TELEPHONE (OUTPATIENT)
Dept: FAMILY MEDICINE | Facility: CLINIC | Age: 43
End: 2023-03-07
Payer: COMMERCIAL

## 2023-03-07 DIAGNOSIS — G47.00 INSOMNIA, UNSPECIFIED TYPE: ICD-10-CM

## 2023-03-07 DIAGNOSIS — E88.40 MITOCHONDRIAL DISEASE (H): Primary | ICD-10-CM

## 2023-03-07 NOTE — TELEPHONE ENCOUNTER
General Call      Reason for Call:    Patient states that her pharmacy is unable to get her   venlafaxine (EFFEXOR XR) 150 MG 24 hr capsule medication in stock.    What are your questions or concerns:  She has been out for 4 days and is having bad withdrawal symptoms.  Patient is requesting that a new RX for the 75 MG twice daily instead of 150 mg 1x daily.    Please advise patient would like this addressed by a covering provider ASAP.    Date of last appointment with provider: 01/19/23    Could we send this information to you in [employer] or would you prefer to receive a phone call?:   Patient would prefer a phone call     Okay to leave a detailed message?: Yes at Cell number on file:    Telephone Information:   Mobile [phone]

## 2023-03-07 NOTE — TELEPHONE ENCOUNTER
Medication Request  Medication name:  zolpidem (AMBIEN) 5 MG tablet  rizatriptan (MAXALT) 10 MG tablet  ondansetron (ZOFRAN) 4 MG tablet  Requested Pharmacy: [facility] #[id number]  When was patient last seen for this?:  01/19/23  Patient offered appointment:  05/24/23  Okay to leave a detailed message: yes

## 2023-03-07 NOTE — TELEPHONE ENCOUNTER
Patient finally found this in stock at the [place] location and had them transfer it to  today.  Patient states that she keeps running into this issue with this medication and would still like the rx to be changed to 75mg twice daily if possible for next time.  Please advise.

## 2023-03-08 RX ORDER — ZOLPIDEM TARTRATE 5 MG/1
5 TABLET ORAL
Qty: 30 TABLET | Refills: 0 | Status: SHIPPED | OUTPATIENT
Start: 2023-03-08 | End: 2023-05-03

## 2023-03-08 RX ORDER — RIZATRIPTAN BENZOATE 10 MG/1
TABLET ORAL
Qty: 10 TABLET | Refills: 3 | Status: SHIPPED | OUTPATIENT
Start: 2023-03-08 | End: 2023-08-16

## 2023-03-08 RX ORDER — ONDANSETRON 4 MG/1
4 TABLET, FILM COATED ORAL EVERY 8 HOURS PRN
Qty: 18 TABLET | Refills: 1 | Status: SHIPPED | OUTPATIENT
Start: 2023-03-08

## 2023-03-12 NOTE — TELEPHONE ENCOUNTER
I am happy to do this; however, sometimes insurance will not permit due to the added cost. Did she just get a 90 day supply? I would have her contact me 5-7 days before she needs the new Rx and we can give this a try.

## 2023-03-13 NOTE — TELEPHONE ENCOUNTER
Left message to call back for: patient x1  Information to relay to patient: See provider message below.

## 2023-04-13 DIAGNOSIS — F33.9 MAJOR DEPRESSION, RECURRENT, CHRONIC (H): ICD-10-CM

## 2023-04-13 NOTE — TELEPHONE ENCOUNTER
"Routing refill request to provider for review/approval because:  PHQ-9 score of less than 5 in past 6 months    Last Written Prescription Date:  3/7/2022  Last Fill Quantity: 90,  # refills: 1   Last office visit provider:  2/4/2023 with GAIL Gutiérrez      Requested Prescriptions   Pending Prescriptions Disp Refills     EFFEXOR XR 75 MG 24 hr capsule [Pharmacy Med Name: EFFEXOR XR 75 MG CAPSULE] 90 capsule 1     Sig: TAKE 1 CAPSULE BY MOUTH EVERY DAY       Serotonin-Norepinephrine Reuptake Inhibitors  Failed - 4/13/2023 12:54 AM        Failed - PHQ-9 score of less than 5 in past 6 months     Please review last PHQ-9 score.           Passed - Blood pressure under 140/90 in past 12 months     BP Readings from Last 3 Encounters:   02/04/23 120/84   01/30/23 100/78   11/21/22 100/80                 Passed - Medication is active on med list        Passed - Patient is age 18 or older        Passed - No active pregnancy on record        Passed - Normal serum creatinine on file in past 12 months     Recent Labs   Lab Test 07/19/22  1503   CR 0.80       Ok to refill medication if creatinine is low          Passed - No positive pregnancy test in past 12 months        Passed - Recent (6 mo) or future (30 days) visit within the authorizing provider's specialty     Patient had office visit in the last 6 months or has a visit in the next 30 days with authorizing provider or within the authorizing provider's specialty.  See \"Patient Info\" tab in inbasket, or \"Choose Columns\" in Meds & Orders section of the refill encounter.                 Citlalli Rolle RN 04/13/23 2:51 PM  "

## 2023-04-19 ENCOUNTER — APPOINTMENT (OUTPATIENT)
Dept: RADIOLOGY | Facility: HOSPITAL | Age: 43
End: 2023-04-19
Attending: STUDENT IN AN ORGANIZED HEALTH CARE EDUCATION/TRAINING PROGRAM
Payer: COMMERCIAL

## 2023-04-19 ENCOUNTER — HOSPITAL ENCOUNTER (EMERGENCY)
Facility: HOSPITAL | Age: 43
Discharge: HOME | End: 2023-04-19
Attending: STUDENT IN AN ORGANIZED HEALTH CARE EDUCATION/TRAINING PROGRAM | Admitting: STUDENT IN AN ORGANIZED HEALTH CARE EDUCATION/TRAINING PROGRAM
Payer: COMMERCIAL

## 2023-04-19 ENCOUNTER — TELEPHONE (OUTPATIENT)
Dept: FAMILY MEDICINE | Facility: CLINIC | Age: 43
End: 2023-04-19

## 2023-04-19 VITALS
SYSTOLIC BLOOD PRESSURE: 122 MMHG | HEIGHT: 62 IN | OXYGEN SATURATION: 100 % | BODY MASS INDEX: 23.87 KG/M2 | RESPIRATION RATE: 16 BRPM | DIASTOLIC BLOOD PRESSURE: 71 MMHG | TEMPERATURE: 99 F | WEIGHT: 129.7 LBS | HEART RATE: 81 BPM

## 2023-04-19 DIAGNOSIS — R42 DIZZINESS: ICD-10-CM

## 2023-04-19 DIAGNOSIS — R00.2 PALPITATIONS: ICD-10-CM

## 2023-04-19 DIAGNOSIS — F33.9 MAJOR DEPRESSION, RECURRENT, CHRONIC (H): ICD-10-CM

## 2023-04-19 LAB
ANION GAP SERPL CALCULATED.3IONS-SCNC: 12 MMOL/L (ref 7–15)
BASOPHILS # BLD AUTO: 0.1 10E3/UL (ref 0–0.2)
BASOPHILS NFR BLD AUTO: 1 %
BUN SERPL-MCNC: 8.8 MG/DL (ref 6–20)
CALCIUM SERPL-MCNC: 9.7 MG/DL (ref 8.6–10)
CHLORIDE SERPL-SCNC: 102 MMOL/L (ref 98–107)
CREAT SERPL-MCNC: 0.78 MG/DL (ref 0.51–0.95)
DEPRECATED HCO3 PLAS-SCNC: 26 MMOL/L (ref 22–29)
EOSINOPHIL # BLD AUTO: 0.1 10E3/UL (ref 0–0.7)
EOSINOPHIL NFR BLD AUTO: 1 %
ERYTHROCYTE [DISTWIDTH] IN BLOOD BY AUTOMATED COUNT: 12.7 % (ref 10–15)
GFR SERPL CREATININE-BSD FRML MDRD: >90 ML/MIN/1.73M2
GLUCOSE SERPL-MCNC: 83 MG/DL (ref 70–99)
HCG SERPL QL: NEGATIVE
HCT VFR BLD AUTO: 44.7 % (ref 35–47)
HGB BLD-MCNC: 14.4 G/DL (ref 11.7–15.7)
IMM GRANULOCYTES # BLD: 0 10E3/UL
IMM GRANULOCYTES NFR BLD: 0 %
LYMPHOCYTES # BLD AUTO: 2 10E3/UL (ref 0.8–5.3)
LYMPHOCYTES NFR BLD AUTO: 21 %
MAGNESIUM SERPL-MCNC: 2.1 MG/DL (ref 1.7–2.3)
MCH RBC QN AUTO: 27.9 PG (ref 26.5–33)
MCHC RBC AUTO-ENTMCNC: 32.2 G/DL (ref 31.5–36.5)
MCV RBC AUTO: 87 FL (ref 78–100)
MONOCYTES # BLD AUTO: 0.4 10E3/UL (ref 0–1.3)
MONOCYTES NFR BLD AUTO: 4 %
NEUTROPHILS # BLD AUTO: 7.1 10E3/UL (ref 1.6–8.3)
NEUTROPHILS NFR BLD AUTO: 73 %
NRBC # BLD AUTO: 0 10E3/UL
NRBC BLD AUTO-RTO: 0 /100
PLATELET # BLD AUTO: 323 10E3/UL (ref 150–450)
POTASSIUM SERPL-SCNC: 4.1 MMOL/L (ref 3.4–5.3)
RBC # BLD AUTO: 5.17 10E6/UL (ref 3.8–5.2)
SODIUM SERPL-SCNC: 140 MMOL/L (ref 136–145)
TROPONIN T SERPL HS-MCNC: <6 NG/L
TSH SERPL DL<=0.005 MIU/L-ACNC: 1.4 UIU/ML (ref 0.3–4.2)
WBC # BLD AUTO: 9.5 10E3/UL (ref 4–11)

## 2023-04-19 PROCEDURE — 93005 ELECTROCARDIOGRAM TRACING: CPT | Performed by: STUDENT IN AN ORGANIZED HEALTH CARE EDUCATION/TRAINING PROGRAM

## 2023-04-19 PROCEDURE — 84484 ASSAY OF TROPONIN QUANT: CPT | Performed by: STUDENT IN AN ORGANIZED HEALTH CARE EDUCATION/TRAINING PROGRAM

## 2023-04-19 PROCEDURE — 85025 COMPLETE CBC W/AUTO DIFF WBC: CPT | Performed by: STUDENT IN AN ORGANIZED HEALTH CARE EDUCATION/TRAINING PROGRAM

## 2023-04-19 PROCEDURE — 83735 ASSAY OF MAGNESIUM: CPT | Performed by: STUDENT IN AN ORGANIZED HEALTH CARE EDUCATION/TRAINING PROGRAM

## 2023-04-19 PROCEDURE — 71046 X-RAY EXAM CHEST 2 VIEWS: CPT

## 2023-04-19 PROCEDURE — 84703 CHORIONIC GONADOTROPIN ASSAY: CPT | Performed by: STUDENT IN AN ORGANIZED HEALTH CARE EDUCATION/TRAINING PROGRAM

## 2023-04-19 PROCEDURE — 99285 EMERGENCY DEPT VISIT HI MDM: CPT | Mod: 25

## 2023-04-19 PROCEDURE — 36415 COLL VENOUS BLD VENIPUNCTURE: CPT | Performed by: STUDENT IN AN ORGANIZED HEALTH CARE EDUCATION/TRAINING PROGRAM

## 2023-04-19 PROCEDURE — 80048 BASIC METABOLIC PNL TOTAL CA: CPT | Performed by: STUDENT IN AN ORGANIZED HEALTH CARE EDUCATION/TRAINING PROGRAM

## 2023-04-19 PROCEDURE — 84443 ASSAY THYROID STIM HORMONE: CPT | Performed by: STUDENT IN AN ORGANIZED HEALTH CARE EDUCATION/TRAINING PROGRAM

## 2023-04-19 PROCEDURE — 250N000013 HC RX MED GY IP 250 OP 250 PS 637: Performed by: STUDENT IN AN ORGANIZED HEALTH CARE EDUCATION/TRAINING PROGRAM

## 2023-04-19 RX ORDER — ASPIRIN 81 MG/1
324 TABLET, CHEWABLE ORAL ONCE
Status: COMPLETED | OUTPATIENT
Start: 2023-04-19 | End: 2023-04-19

## 2023-04-19 RX ADMIN — ASPIRIN 324 MG: 81 TABLET, CHEWABLE ORAL at 16:38

## 2023-04-19 ASSESSMENT — ACTIVITIES OF DAILY LIVING (ADL): ADLS_ACUITY_SCORE: 33

## 2023-04-19 NOTE — ED TRIAGE NOTES
Pt comes in with complaints of a fast and irregular heartbeat around 0945 this morning. Felt hot, dizzy, blurred vision, feeling groggy and had a hard time communicating to  on the phone.      Pt still feels dizzy at this time, and some chest discomfort. Heart does not feel irregular like it did this morning, vision has improved.     No neuro deficits in triage at this time.

## 2023-04-19 NOTE — TELEPHONE ENCOUNTER
Please advise on current Rx.  Historically Effexor Rx 1/22/23 was 150mg XR    Patient requesting the 150 mg dose

## 2023-04-19 NOTE — ED NOTES
"Patient states woke up around 0930 feeling like her heart was racing. She was also nauseated, sweating, and very \"out of it\". This lasted for about 15 minutes. Currently denies chest pain and all other symptoms besides headache.   "
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
## 2023-04-19 NOTE — DISCHARGE INSTRUCTIONS
Thankfully your EKG, chest xray and lab work are reassuring  Please follow up closely with cardiology and your primary care doctor  Return to the Emergency Room with chest pain, changes in vision, focal numbness/weakness, fainting or other worsening symptoms or concerns

## 2023-04-19 NOTE — TELEPHONE ENCOUNTER
General Call      Reason for Call:  RX Change  EFFEXOR XR 75 MG 24 hr capsule  Patient Sig: TAKE 1 CAPSULE BY MOUTH EVERY DAY    What are your questions or concerns:  Patient's request was for 2 caps daily since she was originally taking one 150 mg cap. New RX was sent for only 1 cap daily.    Requesting a new RX with updated sig/dosage for   EFFEXOR XR 75 MG 24 hr capsule  Patient Sig: TAKE 2 CAPSULE BY MOUTH EVERY DAY (150MG).  [id number]    Please advise.

## 2023-04-19 NOTE — ED NOTES
Patient stating unable to receive certain IVF due to her history of mitochondrial myopathy. MD informed and discontinued bolus. Encouraging patient to drink PO.

## 2023-04-20 LAB
ATRIAL RATE - MUSE: 81 BPM
DIASTOLIC BLOOD PRESSURE - MUSE: NORMAL MMHG
INTERPRETATION ECG - MUSE: NORMAL
P AXIS - MUSE: 76 DEGREES
PR INTERVAL - MUSE: 140 MS
QRS DURATION - MUSE: 92 MS
QT - MUSE: 360 MS
QTC - MUSE: 418 MS
R AXIS - MUSE: 74 DEGREES
SYSTOLIC BLOOD PRESSURE - MUSE: NORMAL MMHG
T AXIS - MUSE: 65 DEGREES
VENTRICULAR RATE- MUSE: 81 BPM

## 2023-04-21 NOTE — TELEPHONE ENCOUNTER
Patient notified of provider's message as written. Patient verbalized understanding.    Encouraged patient to call the clinic with further questions/concerns.     Holden Laboy RN  [facility]

## 2023-05-03 ENCOUNTER — OFFICE VISIT (OUTPATIENT)
Dept: CARDIOLOGY | Facility: CLINIC | Age: 43
End: 2023-05-03
Payer: COMMERCIAL

## 2023-05-03 VITALS
WEIGHT: 128 LBS | HEART RATE: 90 BPM | RESPIRATION RATE: 16 BRPM | BODY MASS INDEX: 23.41 KG/M2 | OXYGEN SATURATION: 99 % | SYSTOLIC BLOOD PRESSURE: 106 MMHG | DIASTOLIC BLOOD PRESSURE: 75 MMHG

## 2023-05-03 DIAGNOSIS — R00.2 PALPITATIONS: ICD-10-CM

## 2023-05-03 DIAGNOSIS — G47.00 INSOMNIA, UNSPECIFIED TYPE: ICD-10-CM

## 2023-05-03 DIAGNOSIS — R06.09 DYSPNEA ON EXERTION: Primary | ICD-10-CM

## 2023-05-03 PROCEDURE — 99214 OFFICE O/P EST MOD 30 MIN: CPT | Performed by: INTERNAL MEDICINE

## 2023-05-03 RX ORDER — BETAMETHASONE DIPROPIONATE 0.5 MG/G
CREAM TOPICAL
COMMUNITY
End: 2023-08-17

## 2023-05-03 RX ORDER — CLOBETASOL PROPIONATE 0.5 MG/G
OINTMENT TOPICAL
COMMUNITY

## 2023-05-03 RX ORDER — ZOLPIDEM TARTRATE 5 MG/1
5 TABLET ORAL
Qty: 30 TABLET | Refills: 0 | Status: SHIPPED | OUTPATIENT
Start: 2023-05-03 | End: 2024-08-21

## 2023-05-03 NOTE — PROGRESS NOTES
CARDIOLOGY RAPID ACCESS CLINIC CONSULT NOTE     Assessment/Plan:   1.  Palpitations, dizziness, shortness of breath present upon awakening.  Symptoms do sound like possibly sleep apnea though she reports a previous negative evaluation.  Would consider repeat evaluation.  We will schedule 30-day event monitor in an effort to capture recording of 1 of these episodes.  2.  Dyspnea on exertion, fatigue, weakness may be atypical presentation of coronary artery disease.  With her family history of premature coronary atherosclerosis we will schedule stress echocardiogram.  Advised to begin a regular moderate exercise program targeting 150 minutes each week if the stress test shows no signs of problems.    Follow-up for abnormal test results     History of Present Illness:     It is my pleasure to see Sherry Sweeney at the [facility] RAPID ACCESS clinic for evaluation of palpitations, exertional dyspnea.    Sherry Sweeney is a 43 year old female with a past medical history of mitochondrial disease, with chronic fatigue.  She also reports poor sleep since she was in college and palpitations present upon awakening for nearly that long.  She recalls a sleep evaluation a number of years ago that did not disclose obstructive sleep apnea, but she has had weight gain since that time and feels that her weight is never been heavier.  She had an evaluation for palpitations last fall that included a 10-day monitor.  She had no episodes during that time but now has episodes about once a month with 2 occurring in the last week.  They generally occur about 11 AM when she typically awakens and are accompanied by a dry mouth, racing heartbeat that generally lasts a few seconds but can last up to 20 minutes.  She has had no syncope or falls but feels winded with activities, getting short of breath climbing a flight of steps.  She also notes some blurring of her vision with these episodes.  They are frequently associated with  nausea diaphoresis and lightheadedness but resolved with rest.    Past Medical History:     Patient Active Problem List   Diagnosis     Acne vulgaris     Arthralgia of temporomandibular joint     Debility     Fibromyalgia     Hypermobility syndrome     IBS (irritable bowel syndrome)     Insomnia     Major depression, recurrent, chronic (H)     Chronic migraine without aura, intractable, without status migrainosus     Mitochondrial disease (H)     Generalized anxiety disorder     Post-traumatic stress disorder     Parasomnia     Seasonal depression (H)       Past Surgical History:     Past Surgical History:   Procedure Laterality Date     ABDOMEN SURGERY   & , feb.     APPENDECTOMY       BIOPSY       HC REMOVAL OF OVARIAN CYST(S)      Description: Ovarian Cystectomy;  Recorded: 2013;     HYSTERECTOMY Bilateral 2018    Procedure: TOTAL ABDOMINAL HYSTERECTOMY BILATERAL SALPINGECTOMY, RIGHT OOPHORECTOMY;  Surgeon: Joanne Bedolla DO;  Location: [facility];  Service:      LAPAROSCOPIC CYSTECTOMY OVARIAN (ONCOLOGY) Left 10/18/2021    Procedure: LAPAROSCOPY,  LEFT OVARIAN CYSTECTOMY, LYSIS OF ADHESIONS.;  Surgeon: Joanne Bedolla MD;  Location: [facility]  2018            Family History:     Family History   Problem Relation Age of Onset     Depression Mother         sertraline     Hyperlipidemia Mother      Hypertension Mother      Breast Cancer Mother 62.00     Cancer Mother         thyroid      Meniere's disease Mother      Anxiety Disorder Mother      Depression Brother      Anxiety Disorder Maternal Grandmother      Breast Cancer Maternal Grandmother 60.00     Depression Maternal Grandmother      Coronary Artery Disease Father 45.00             Depression Father      Bone Cancer Maternal Grandfather      Anxiety Disorder Paternal Grandfather      Diabetes Cousin      Diabetes Cousin      Anxiety Disorder Paternal Grandmother      Malignant  Hypertension No family hx of      Father  of myocardial infarction at age 45    Social History:    reports that she has never smoked. She has never used smokeless tobacco. She reports that she does not drink alcohol and does not use drugs.    Exercise: Cares for elderly dog with slow walks, no aerobic exercise.    Sleep: Sleeps from 3 AM till 11 AM, awakening often with palpitations, dry mouth.  Positive for daytime sleepiness.  Reports sleep apnea evaluation a number of years ago when she weighed less was negative    Meds:     Current Outpatient Medications   Medication Sig Dispense Refill     ascorbic acid 500 MG TABS Take 500 mg by mouth daily       augmented betamethasone dipropionate (DIPROLENE AF) 0.05 % external cream APPLY A THIN LAYER TO TO AFFECTED AREA ON BODY 1-2X DAILY TIMES DAILY FOR UP TO 2 WEEKS AT A TIME. TAKE 2 WEEKS OFF. REPEAT AS NEEDED FOR FL       betamethasone dipropionate (DIPROSONE) 0.05 % external lotion        buPROPion (WELLBUTRIN XL) 300 MG 24 hr tablet Take 1 tablet (300 mg) by mouth daily 90 tablet 3     cetirizine (ZYRTEC) 10 MG tablet TAKE 1 TAB ORALLY DAILY AS NEEDED FOR ALLERGIES MAY INCREASE TO 2 TAB DAILY IF ITCHING NOT RELIEVED 90 tablet 2     clindamycin-benzoyl peroxide (BENZACLIN) gel Apply topically to acne once daily       clobetasol (TEMOVATE) 0.05 % external ointment        [facility] MELATONIN 3 MG tablet TAKE 1 TABLET (3 MG) BY MOUTH NIGHTLY AS NEEDED FOR SLEEP 90 tablet 1     cyclobenzaprine (FLEXERIL) 10 MG tablet Take 10 mg by mouth daily as needed for muscle spasms       EFFEXOR  MG 24 hr capsule Take 1 capsule (150 mg) by mouth daily 90 capsule 1     hydrOXYzine (ATARAX) 25 MG tablet Take 1-2 tablets (25-50 mg) by mouth At Bedtime 180 tablet 1     JUNEL FE 1.5/30 1.5-30 MG-MCG tablet Take 1 tablet by mouth as needed       lidocaine (LIDODERM) 5 % patch PLACE 1 PATCH ONTO THE SKIN AS NEEDED TO NECK, SHOULDERS, AND BACK 30 patch 0     Multiple Vitamin  (MULTIVITAMIN ADULT PO) Take 1 tablet by mouth daily       omeprazole (PRILOSEC) 40 MG DR capsule TAKE 1 CAPSULE (40 MG) BY MOUTH EVERY MORNING (BEFORE BREAKFAST) 30 MINUTES BEFORE MEAL 90 capsule 3     ondansetron (ZOFRAN) 4 MG tablet Take 1 tablet (4 mg) by mouth every 8 hours as needed for nausea 18 tablet 1     Riboflavin 400 MG TABS Take 400 mg by mouth daily       rizatriptan (MAXALT) 10 MG tablet TAKE 1 TABLET BY MOUTH AS NEEDED FOR MIGRAINE. MAY REPEAT IN 2 HOURS IF NEEDED 10 tablet 3     spironolactone (ALDACTONE) 50 MG tablet Take 1 tablet (50 mg) by mouth 2 times daily 180 tablet 1     tretinoin (RETIN-A) 0.05 % external cream APPLY TO AFFECTED AREA EVERY DAY AT BEDTIME 45 g 2     triamcinolone (KENALOG) 0.1 % external ointment Apply topically 2 times daily 80 g 1     valACYclovir (VALTREX) 1000 mg tablet TAKE 2 TABLETS BY MOUTH 12 HOURS APART AS NEEDED EPISODE 12 tablet 3     zolpidem (AMBIEN) 5 MG tablet TAKE 1 TABLET (5 MG) BY MOUTH NIGHTLY AS NEEDED FOR SLEEP 30 tablet 0     EFFEXOR XR 75 MG 24 hr capsule Take 2 capsules (150 mg) by mouth daily (Patient not taking: Reported on 5/3/2023) 180 capsule 1     venlafaxine (EFFEXOR XR) 150 MG 24 hr capsule Take 1 capsule (150 mg) by mouth daily (Patient not taking: Reported on 5/3/2023) 90 capsule 0     venlafaxine (EFFEXOR XR) 75 MG 24 hr capsule Take 75 mg by mouth daily (Patient not taking: Reported on 5/3/2023)       Vitamin D (Cholecalciferol) 25 MCG (1000 UT) TABS Take 3,000 Units by mouth (Patient not taking: Reported on 5/3/2023)       vitamin E (TOCOPHEROL) 1000 UNIT capsule Take 1,000 Units by mouth daily (Patient not taking: Reported on 5/3/2023)         Allergies:   Ciprofloxacin, Egg white (egg protein), Influenza virus vaccine, No clinical screening - see comments, Sulfa antibiotics, Yeast, Lactose, Desvenlafaxine, Milnacipran, and Quetiapine    Review of Systems:     Diffusely positive for visual disturbances, shortness of breath with  activity, irregular heartbeat, palpitations, nausea, joint pain, muscle weakness, muscle pain, poor wound healing, rash, daytime sleepiness, easy bruising.  12 point review of systems otherwise negative     Please refer above for cardiac ROS details.       Objective:      Physical Exam  58.1 kg (128 lb)     Body mass index is 23.41 kg/m .  /75 (BP Location: Right arm, Patient Position: Sitting, Cuff Size: Adult Regular)   Pulse 90   Resp 16   Wt 58.1 kg (128 lb)   SpO2 99%   BMI 23.41 kg/m          General Appearance : Awake, Alert, No acute distress.  Anxious  HEENT: No Scleral icterus; the mucous membranes were pink and moist.  Conjunctivae bilaterally injected.  Malar erythema  Neck:  No cervical bruits, jugular venous distention, or thyromegaly   Chest: The spine was straight. Chest wall symmetric  Lungs: Respirations unlabored; the lungs are clear to auscultation.  No wheezing   Cardiovascular:   Normal point of maximal impulse.  Auscultation reveals normal first and second heart sounds with no murmurs, rubs, or gallops.  No arrhythmias.  Carotid, radial, and dorsalis pedal pulses are intact and symmetric.    Abdomen: No organomegaly, masses, bruits, or tenderness. Bowels sounds are present  Extremities: No edema  Skin: No xanthelasma. Warm, Dry.  Musculoskeletal: No tenderness.  Neurologic: Alert and oriented ×3. Speech is fluent.      EKG (personally reviewed):  April 19, 2023: Sinus rhythm incomplete right bundle branch block 81 bpm.    Cardiac Imaging Studies:  10-day event monitor 9/2022:  Baseline rhythm was  sinus tachycardia 105 bpm.    Reported heart rate range 50 to 120 bpm, average 86 bpm.  No symptom triggers .  6 automated recordings included normal sinus rhythm to sinus tachycardia with HR of 101.  No sustained tachyarrhythmias.  No atrial fibrillation.  There were no pauses noted.  Supraventricular and ventricular ectopic beat frequency are not reported on this monitoring modality.         Lab Review   Lab Results   Component Value Date     04/19/2023     09/06/2019    CO2 26 04/19/2023    CO2 23 02/02/2022    CO2 29 09/06/2019    BUN 8.8 04/19/2023    BUN 8 02/02/2022    BUN 8 09/06/2019     Lab Results   Component Value Date    WBC 9.5 04/19/2023    WBC 8.9 09/06/2019    HGB 14.4 04/19/2023    HGB 14.0 09/06/2019    HCT 44.7 04/19/2023    HCT 43.8 09/06/2019    MCV 87 04/19/2023    MCV 86 09/06/2019     04/19/2023     09/06/2019     Lab Results   Component Value Date    CHOL 193 01/03/2020    CHOL 190 09/06/2019    TRIG 125 01/03/2020    TRIG 121 09/06/2019    HDL 52 01/03/2020    HDL 49 09/06/2019     No results found for: TROPONINI  Lab Results   Component Value Date     02/24/2018     Lab Results   Component Value Date    TSH 1.40 04/19/2023    TSH 0.88 07/19/2022    TSH 3.06 09/06/2019       Ryan García MD, MD [facility]      This note created using Dragon voice recognition software. Sound alike errors may have escaped editing.

## 2023-05-03 NOTE — LETTER
5/3/2023    MATTHEW RESENDIZ MD  [address] [facility] [id number]    RE: Sherry Sweeney       Dear Colleague,     I had the pleasure of seeing Sherry Sweeney in the [facility].    CARDIOLOGY RAPID ACCESS CLINIC CONSULT NOTE     Assessment/Plan:   1.  Palpitations, dizziness, shortness of breath present upon awakening.  Symptoms do sound like possibly sleep apnea though she reports a previous negative evaluation.  Would consider repeat evaluation.  We will schedule 30-day event monitor in an effort to capture recording of 1 of these episodes.  2.  Dyspnea on exertion, fatigue, weakness may be atypical presentation of coronary artery disease.  With her family history of premature coronary atherosclerosis we will schedule stress echocardiogram.  Advised to begin a regular moderate exercise program targeting 150 minutes each week if the stress test shows no signs of problems.    Follow-up for abnormal test results     History of Present Illness:     It is my pleasure to see Sherry Sweeney at the [facility] RAPID ACCESS clinic for evaluation of palpitations, exertional dyspnea.    Sherry Sweeney is a 43 year old female with a past medical history of mitochondrial disease, with chronic fatigue.  She also reports poor sleep since she was in college and palpitations present upon awakening for nearly that long.  She recalls a sleep evaluation a number of years ago that did not disclose obstructive sleep apnea, but she has had weight gain since that time and feels that her weight is never been heavier.  She had an evaluation for palpitations last fall that included a 10-day monitor.  She had no episodes during that time but now has episodes about once a month with 2 occurring in the last week.  They generally occur about 11 AM when she typically awakens and are accompanied by a dry mouth, racing heartbeat that generally lasts a few seconds but can last up to 20 minutes.  She has had  no syncope or falls but feels winded with activities, getting short of breath climbing a flight of steps.  She also notes some blurring of her vision with these episodes.  They are frequently associated with nausea diaphoresis and lightheadedness but resolved with rest.    Past Medical History:     Patient Active Problem List   Diagnosis    Acne vulgaris    Arthralgia of temporomandibular joint    Debility    Fibromyalgia    Hypermobility syndrome    IBS (irritable bowel syndrome)    Insomnia    Major depression, recurrent, chronic (H)    Chronic migraine without aura, intractable, without status migrainosus    Mitochondrial disease (H)    Generalized anxiety disorder    Post-traumatic stress disorder    Parasomnia    Seasonal depression (H)       Past Surgical History:     Past Surgical History:   Procedure Laterality Date    ABDOMEN SURGERY   & 2018, feb.    APPENDECTOMY  1996    BIOPSY      HC REMOVAL OF OVARIAN CYST(S)      Description: Ovarian Cystectomy;  Recorded: 2013;    HYSTERECTOMY Bilateral 2018    Procedure: TOTAL ABDOMINAL HYSTERECTOMY BILATERAL SALPINGECTOMY, RIGHT OOPHORECTOMY;  Surgeon: Joanne Bedolla DO;  [place] [facility] [place]:     LAPAROSCOPIC CYSTECTOMY OVARIAN (ONCOLOGY) Left 10/18/2021    Procedure: LAPAROSCOPY,  LEFT OVARIAN CYSTECTOMY, LYSIS OF ADHESIONS.;  Surgeon: Joanne Bedolla MD;  Location: [facility]  2018            Family History:     Family History   Problem Relation Age of Onset    Depression Mother         sertraline    Hyperlipidemia Mother     Hypertension Mother     Breast Cancer Mother 62.00    Cancer Mother         thyroid     Meniere's disease Mother     Anxiety Disorder Mother     Depression Brother     Anxiety Disorder Maternal Grandmother     Breast Cancer Maternal Grandmother 60.00    Depression Maternal Grandmother     Coronary Artery Disease Father 45.00            Depression Father     Bone Cancer  Maternal Grandfather     Anxiety Disorder Paternal Grandfather     Diabetes Cousin     Diabetes Cousin     Anxiety Disorder Paternal Grandmother     Malignant Hypertension No family hx of      Father  of myocardial infarction at age 45    Social History:    reports that she has never smoked. She has never used smokeless tobacco. She reports that she does not drink alcohol and does not use drugs.    Exercise: Cares for elderly dog with slow walks, no aerobic exercise.    Sleep: Sleeps from 3 AM till 11 AM, awakening often with palpitations, dry mouth.  Positive for daytime sleepiness.  Reports sleep apnea evaluation a number of years ago when she weighed less was negative    Meds:     Current Outpatient Medications   Medication Sig Dispense Refill    ascorbic acid 500 MG TABS Take 500 mg by mouth daily      augmented betamethasone dipropionate (DIPROLENE AF) 0.05 % external cream APPLY A THIN LAYER TO TO AFFECTED AREA ON BODY 1-2X DAILY TIMES DAILY FOR UP TO 2 WEEKS AT A TIME. TAKE 2 WEEKS OFF. REPEAT AS NEEDED FOR FL      betamethasone dipropionate (DIPROSONE) 0.05 % external lotion       buPROPion (WELLBUTRIN XL) 300 MG 24 hr tablet Take 1 tablet (300 mg) by mouth daily 90 tablet 3    cetirizine (ZYRTEC) 10 MG tablet TAKE 1 TAB ORALLY DAILY AS NEEDED FOR ALLERGIES MAY INCREASE TO 2 TAB DAILY IF ITCHING NOT RELIEVED 90 tablet 2    clindamycin-benzoyl peroxide (BENZACLIN) gel Apply topically to acne once daily      clobetasol (TEMOVATE) 0.05 % external ointment       [facility]S MELATONIN 3 MG tablet TAKE 1 TABLET (3 MG) BY MOUTH NIGHTLY AS NEEDED FOR SLEEP 90 tablet 1    cyclobenzaprine (FLEXERIL) 10 MG tablet Take 10 mg by mouth daily as needed for muscle spasms      EFFEXOR  MG 24 hr capsule Take 1 capsule (150 mg) by mouth daily 90 capsule 1    hydrOXYzine (ATARAX) 25 MG tablet Take 1-2 tablets (25-50 mg) by mouth At Bedtime 180 tablet 1    JUNEL FE 1.5/30 1.5-30 MG-MCG tablet Take 1 tablet by mouth as needed       lidocaine (LIDODERM) 5 % patch PLACE 1 PATCH ONTO THE SKIN AS NEEDED TO NECK, SHOULDERS, AND BACK 30 patch 0    Multiple Vitamin (MULTIVITAMIN ADULT PO) Take 1 tablet by mouth daily      omeprazole (PRILOSEC) 40 MG DR capsule TAKE 1 CAPSULE (40 MG) BY MOUTH EVERY MORNING (BEFORE BREAKFAST) 30 MINUTES BEFORE MEAL 90 capsule 3    ondansetron (ZOFRAN) 4 MG tablet Take 1 tablet (4 mg) by mouth every 8 hours as needed for nausea 18 tablet 1    Riboflavin 400 MG TABS Take 400 mg by mouth daily      rizatriptan (MAXALT) 10 MG tablet TAKE 1 TABLET BY MOUTH AS NEEDED FOR MIGRAINE. MAY REPEAT IN 2 HOURS IF NEEDED 10 tablet 3    spironolactone (ALDACTONE) 50 MG tablet Take 1 tablet (50 mg) by mouth 2 times daily 180 tablet 1    tretinoin (RETIN-A) 0.05 % external cream APPLY TO AFFECTED AREA EVERY DAY AT BEDTIME 45 g 2    triamcinolone (KENALOG) 0.1 % external ointment Apply topically 2 times daily 80 g 1    valACYclovir (VALTREX) 1000 mg tablet TAKE 2 TABLETS BY MOUTH 12 HOURS APART AS NEEDED EPISODE 12 tablet 3    zolpidem (AMBIEN) 5 MG tablet TAKE 1 TABLET (5 MG) BY MOUTH NIGHTLY AS NEEDED FOR SLEEP 30 tablet 0    EFFEXOR XR 75 MG 24 hr capsule Take 2 capsules (150 mg) by mouth daily (Patient not taking: Reported on 5/3/2023) 180 capsule 1    venlafaxine (EFFEXOR XR) 150 MG 24 hr capsule Take 1 capsule (150 mg) by mouth daily (Patient not taking: Reported on 5/3/2023) 90 capsule 0    venlafaxine (EFFEXOR XR) 75 MG 24 hr capsule Take 75 mg by mouth daily (Patient not taking: Reported on 5/3/2023)      Vitamin D (Cholecalciferol) 25 MCG (1000 UT) TABS Take 3,000 Units by mouth (Patient not taking: Reported on 5/3/2023)      vitamin E (TOCOPHEROL) 1000 UNIT capsule Take 1,000 Units by mouth daily (Patient not taking: Reported on 5/3/2023)         Allergies:   Ciprofloxacin, Egg white (egg protein), Influenza virus vaccine, No clinical screening - see comments, Sulfa antibiotics, Yeast, Lactose, Desvenlafaxine, Milnacipran,  and Quetiapine    Review of Systems:     Diffusely positive for visual disturbances, shortness of breath with activity, irregular heartbeat, palpitations, nausea, joint pain, muscle weakness, muscle pain, poor wound healing, rash, daytime sleepiness, easy bruising.  12 point review of systems otherwise negative     Please refer above for cardiac ROS details.       Objective:      Physical Exam  58.1 kg (128 lb)     Body mass index is 23.41 kg/m .  /75 (BP Location: Right arm, Patient Position: Sitting, Cuff Size: Adult Regular)   Pulse 90   Resp 16   Wt 58.1 kg (128 lb)   SpO2 99%   BMI 23.41 kg/m          General Appearance : Awake, Alert, No acute distress.  Anxious  HEENT: No Scleral icterus; the mucous membranes were pink and moist.  Conjunctivae bilaterally injected.  Malar erythema  Neck:  No cervical bruits, jugular venous distention, or thyromegaly   Chest: The spine was straight. Chest wall symmetric  Lungs: Respirations unlabored; the lungs are clear to auscultation.  No wheezing   Cardiovascular:   Normal point of maximal impulse.  Auscultation reveals normal first and second heart sounds with no murmurs, rubs, or gallops.  No arrhythmias.  Carotid, radial, and dorsalis pedal pulses are intact and symmetric.    Abdomen: No organomegaly, masses, bruits, or tenderness. Bowels sounds are present  Extremities: No edema  Skin: No xanthelasma. Warm, Dry.  Musculoskeletal: No tenderness.  Neurologic: Alert and oriented ×3. Speech is fluent.      EKG (personally reviewed):  April 19, 2023: Sinus rhythm incomplete right bundle branch block 81 bpm.    Cardiac Imaging Studies:  10-day event monitor 9/2022:  Baseline rhythm was  sinus tachycardia 105 bpm.    Reported heart rate range 50 to 120 bpm, average 86 bpm.  No symptom triggers .  6 automated recordings included normal sinus rhythm to sinus tachycardia with HR of 101.  No sustained tachyarrhythmias.  No atrial fibrillation.  There were no pauses  noted.  Supraventricular and ventricular ectopic beat frequency are not reported on this monitoring modality.        Lab Review   Lab Results   Component Value Date     04/19/2023     09/06/2019    CO2 26 04/19/2023    CO2 23 02/02/2022    CO2 29 09/06/2019    BUN 8.8 04/19/2023    BUN 8 02/02/2022    BUN 8 09/06/2019     Lab Results   Component Value Date    WBC 9.5 04/19/2023    WBC 8.9 09/06/2019    HGB 14.4 04/19/2023    HGB 14.0 09/06/2019    HCT 44.7 04/19/2023    HCT 43.8 09/06/2019    MCV 87 04/19/2023    MCV 86 09/06/2019     04/19/2023     09/06/2019     Lab Results   Component Value Date    CHOL 193 01/03/2020    CHOL 190 09/06/2019    TRIG 125 01/03/2020    TRIG 121 09/06/2019    HDL 52 01/03/2020    HDL 49 09/06/2019     No results found for: TROPONINI  Lab Results   Component Value Date     02/24/2018     Lab Results   Component Value Date    TSH 1.40 04/19/2023    TSH 0.88 07/19/2022    TSH 3.06 09/06/2019       Ryan García MD, MD [facility]      This note created using Dragon voice recognition software. Sound alike errors may have escaped editing.         Thank you for allowing me to participate in the care of your patient.      Sincerely,     Ryan García MD     [facility]  cc:   Nimo Clemente MD  [address] [place], [place] [id number]

## 2023-05-03 NOTE — PATIENT INSTRUCTIONS
Try to get 150 minutes of moderate aerobic exercise in each week.  We will schedule a stress echocardiogram to look for evidence of heart artery blockages or structural heart disease that could be causing your limitations.  Begin the exercise regimen after the stress test is completed  Consider sleep apnea evaluation.  If you wish to pursue this Please call [phone] to schedule Sleep consultation.  Another option is [facility] at [phone].  We will schedule a 30-day symptom triggered monitor in hopes of catching an episode of the heart racing palpitations

## 2023-05-14 NOTE — PROGRESS NOTES
NEUROLOGY FOLLOW UP VISIT  NOTE       [facility] NEUROLOGY [place]  [address] [place] [address]  Tel: [phone]  Fax: [phone]  [email or web address]     Sherry Sweeney,  1980, MRN [id number]  PCP: Layla Martinez  Date: 05/15/2023      ASSESSMENT & PLAN     Visit Diagnosis  1. Chronic migraine without aura, intractable, without status migrainosus     Chronic migraine without aura  Renetta 43-year-old female with history of mitochondrial myopathy, chronic migraine without aura, fibromyalgia who returns for 3-month Botox injection.  Previously she had tried different medication that did not seem to help but after she was switched to Botox there was dramatic improvement.  There was a break in Botox treatment due to COVID pandemic.  This was resumed on 2023 and reports the frequency declined somewhat but the intensity has declined significantly.  Headache only last for 1 to 4 hours.  She has about 16 days headache free interval since her last visit.  After explaining all the side effect and obtaining her consent, I injected 155 units according to the enclosed sheet.  45 units were discarded.  She was told to give a headache diary.  Follow-up will be in 3 months    Thank you again for this referral, please feel free to contact me if you have any questions.    Mikel Hernandez MD  [facility]  (Formerly, [facility]     HISTORY OF PRESENT ILLNESS     Patient is a renetta 43-year-old female with history of mitochondrial myopathy, chronic migraine without aura, fibromyalgia who was previously followed in our clinic and received Botox injection who returns recently after 3 years for worsening headaches.  She had noticed improvement with Botox in 2019 but due to COVID pandemic could not come in for her 3 monthly Botox injection and noticed steadily worsening headaches.  Previously she had tried multiple medication  including Depakote, amitriptyline, beta-blocker that did not seem to help.    Botox injection were resumed on 2/14/2023 and she reports some decline in her headache frequency but the intensity declined significantly..     Briefly patient is a female with history of mitochondrial myopathy, chronic migraine without aura, fibromyalgia who started having headaches in 2014 and progressively got worse.  She initially attributed it to pressure in the neck or TMJ abnormality and that in the past was evaluated and started on some hormonal supplements.  Due to progressive weakness she was evaluated for possible mitochondrial myopathy and had mitochondrial DNA tested that was positive.  For her headaches she was started on Depakote, amitriptyline and in the past had also tried Cymbalta and as she was having more than 15 headaches in a month Botox was recommended.  She started getting Botox in 2017 and did notice improvement in her headache.  Discontinued until 2019 but due to COVID pandemic she stopped doing Botox injection and noticed worsening headaches and Botox was restarted in 2023.     PROBLEM LIST   Patient Active Problem List   Diagnosis Code     Acne vulgaris L70.0     Arthralgia of temporomandibular joint M26.629     Debility R53.81     Fibromyalgia M79.7     Hypermobility syndrome M35.7     IBS (irritable bowel syndrome) K58.9     Insomnia G47.00     Major depression, recurrent, chronic (H) F33.9     Chronic migraine without aura, intractable, without status migrainosus G43.719     Mitochondrial disease (H) E88.40     Generalized anxiety disorder F41.1     Post-traumatic stress disorder F43.10     Parasomnia G47.50     Seasonal depression (H) F33.8         PAST MEDICAL & SURGICAL HISTORY     Past Medical History:   Patient  has a past medical history of Chronic fatigue syndrome, Chronic migraine, Depression, Depression, Depressive disorder (2013), Fibromyalgia, Fibromyalgia, H/O: hysterectomy (2/22/2018), History of  anesthesia complications, IBS (irritable bowel syndrome), Mitochondrial disease (H), Mitochondrial myopathy, Parasomnia, and PONV (postoperative nausea and vomiting).    Surgical History:  She  has a past surgical history that includes REMOVAL OF OVARIAN CYST(S); Hysterectomy (Bilateral, 2/22/2018); Picc (2/24/2018); Abdomen surgery (1996 & 2018, feb.); appendectomy (1996); biopsy; and Laparoscopic cystectomy ovarian (oncology) (Left, 10/18/2021).     SOCIAL HISTORY     Reviewed, and she  reports that she has never smoked. She has never used smokeless tobacco. She reports that she does not drink alcohol and does not use drugs.     FAMILY HISTORY     Reviewed, and family history includes Anxiety Disorder in her maternal grandmother, mother, paternal grandfather, and paternal grandmother; Bone Cancer in her maternal grandfather; Breast Cancer (age of onset: 60.00) in her maternal grandmother; Breast Cancer (age of onset: 62.00) in her mother; Cancer in her mother; Coronary Artery Disease (age of onset: 45.00) in her father; Depression in her brother, father, maternal grandmother, and mother; Diabetes in her cousin and cousin; Hyperlipidemia in her mother; Hypertension in her mother; Meniere's disease in her mother.     ALLERGIES     Allergies   Allergen Reactions     Ciprofloxacin Itching, Rash and Hives     Rash over whole body   Rash over whole body        Egg White (Egg Protein) Other (See Comments) and GI Disturbance     Swollen colon, belly pain  Swollen colon, belly pain       Influenza Virus Vaccine Shortness Of Breath and Anaphylaxis     No Clinical Screening - See Comments Anaphylaxis     Stomach swelling     Sulfa Antibiotics Rash and Hives     Yeast Other (See Comments) and Anaphylaxis     Lactose Other (See Comments)     Desvenlafaxine Blisters, Itching and Rash     Milnacipran Other (See Comments) and Palpitations     Chest pain, hot/cold flashes     Quetiapine Palpitations     Tachycardia, nervousness,  elevated blood pressure.   Tachycardia, nervousness, elevated blood pressure.          REVIEW OF SYSTEMS     A 12 point review of system was performed and was negative except as outlined in the history of present illness.     HOME MEDICATIONS     Current Outpatient Rx   Medication Sig Dispense Refill     ascorbic acid 500 MG TABS Take 500 mg by mouth daily       augmented betamethasone dipropionate (DIPROLENE AF) 0.05 % external cream APPLY A THIN LAYER TO TO AFFECTED AREA ON BODY 1-2X DAILY TIMES DAILY FOR UP TO 2 WEEKS AT A TIME. TAKE 2 WEEKS OFF. REPEAT AS NEEDED FOR FL       betamethasone dipropionate (DIPROSONE) 0.05 % external lotion        buPROPion (WELLBUTRIN XL) 300 MG 24 hr tablet Take 1 tablet (300 mg) by mouth daily 90 tablet 3     cetirizine (ZYRTEC) 10 MG tablet TAKE 1 TAB ORALLY DAILY AS NEEDED FOR ALLERGIES MAY INCREASE TO 2 TAB DAILY IF ITCHING NOT RELIEVED 90 tablet 2     clindamycin-benzoyl peroxide (BENZACLIN) gel Apply topically to acne once daily       clobetasol (TEMOVATE) 0.05 % external ointment        [facility] MELATONIN 3 MG tablet TAKE 1 TABLET (3 MG) BY MOUTH NIGHTLY AS NEEDED FOR SLEEP 90 tablet 1     cyclobenzaprine (FLEXERIL) 10 MG tablet Take 10 mg by mouth daily as needed for muscle spasms       EFFEXOR  MG 24 hr capsule Take 1 capsule (150 mg) by mouth daily 90 capsule 1     hydrOXYzine (ATARAX) 25 MG tablet Take 1-2 tablets (25-50 mg) by mouth At Bedtime 180 tablet 1     JUNEL FE 1.5/30 1.5-30 MG-MCG tablet Take 1 tablet by mouth as needed       lidocaine (LIDODERM) 5 % patch PLACE 1 PATCH ONTO THE SKIN AS NEEDED TO NECK, SHOULDERS, AND BACK 30 patch 0     Multiple Vitamin (MULTIVITAMIN ADULT PO) Take 1 tablet by mouth daily       omeprazole (PRILOSEC) 40 MG DR capsule TAKE 1 CAPSULE (40 MG) BY MOUTH EVERY MORNING (BEFORE BREAKFAST) 30 MINUTES BEFORE MEAL 90 capsule 3     ondansetron (ZOFRAN) 4 MG tablet Take 1 tablet (4 mg) by mouth every 8 hours as needed for nausea 18 tablet 1      Riboflavin 400 MG TABS Take 400 mg by mouth daily       rizatriptan (MAXALT) 10 MG tablet TAKE 1 TABLET BY MOUTH AS NEEDED FOR MIGRAINE. MAY REPEAT IN 2 HOURS IF NEEDED 10 tablet 3     spironolactone (ALDACTONE) 50 MG tablet Take 1 tablet (50 mg) by mouth 2 times daily 180 tablet 1     tretinoin (RETIN-A) 0.05 % external cream APPLY TO AFFECTED AREA EVERY DAY AT BEDTIME 45 g 2     triamcinolone (KENALOG) 0.1 % external ointment Apply topically 2 times daily 80 g 1     valACYclovir (VALTREX) 1000 mg tablet TAKE 2 TABLETS BY MOUTH 12 HOURS APART AS NEEDED EPISODE 12 tablet 3     zolpidem (AMBIEN) 5 MG tablet TAKE 1 TABLET (5 MG) BY MOUTH NIGHTLY AS NEEDED FOR SLEEP 30 tablet 0         PHYSICAL EXAM     Vital signs  /87   Pulse 87   Wt 58.1 kg (128 lb)   BMI 23.41 kg/m      Weight:   128 lbs 0 oz    Pleasant female who is alert and oriented vital signs were reviewed and documented in electronic medical record.  Neck supple.  Neurologically speech was normal.  Cranial nerves II through XII are intact motor strength neck flexor and extensor 4+/5 in upper extremity 5/5 in the lower extremity proximally 4+/5 distally 5/5 reflexes 2+ toes downgoing no dysmetria noted on finger-nose testing gait normal.     PERTINENT DIAGNOSTIC STUDIES     Following studies were reviewed:     MRI BRAIN 10/29/2015  1.  Normal Head MRI.   2.  No acute infarcts, mass lesions or hemorrhage.     PERTINENT LABS  Following labs were reviewed:  Admission on 04/19/2023, Discharged on 04/19/2023   Component Date Value     Ventricular Rate 04/19/2023 81      Atrial Rate 04/19/2023 81      CT Interval 04/19/2023 140      QRS Duration 04/19/2023 92      QT 04/19/2023 360      QTc 04/19/2023 418      P Axis 04/19/2023 76      R AXIS 04/19/2023 74      T Axis 04/19/2023 65      Interpretation ECG 04/19/2023                      Value:Sinus rhythm  Incomplete right bundle branch block  Borderline ECG  When compared with ECG of 02-FEB-2022  21:56,  No significant change was found  Confirmed by SEE ED PROVIDER NOTE FOR, ECG INTERPRETATION ([id number]),  COSMO LIRA ([id number]) on 4/20/2023 3:03:10 AM       Sodium 04/19/2023 140      Potassium 04/19/2023 4.1      Chloride 04/19/2023 102      Carbon Dioxide (CO2) 04/19/2023 26      Anion Gap 04/19/2023 12      Urea Nitrogen 04/19/2023 8.8      Creatinine 04/19/2023 0.78      Calcium 04/19/2023 9.7      Glucose 04/19/2023 83      GFR Estimate 04/19/2023 >90      Troponin T, High Sensiti* 04/19/2023 <6      Magnesium 04/19/2023 2.1      TSH 04/19/2023 1.40      hCG Serum Qualitative 04/19/2023 Negative      WBC Count 04/19/2023 9.5      RBC Count 04/19/2023 5.17      Hemoglobin 04/19/2023 14.4      Hematocrit 04/19/2023 44.7      MCV 04/19/2023 87      MCH 04/19/2023 27.9      MCHC 04/19/2023 32.2      RDW 04/19/2023 12.7      Platelet Count 04/19/2023 323      % Neutrophils 04/19/2023 73      % Lymphocytes 04/19/2023 21      % Monocytes 04/19/2023 4      % Eosinophils 04/19/2023 1      % Basophils 04/19/2023 1      % Immature Granulocytes 04/19/2023 0      NRBCs per 100 WBC 04/19/2023 0      Absolute Neutrophils 04/19/2023 7.1      Absolute Lymphocytes 04/19/2023 2.0      Absolute Monocytes 04/19/2023 0.4      Absolute Eosinophils 04/19/2023 0.1      Absolute Basophils 04/19/2023 0.1      Absolute Immature Granul* 04/19/2023 0.0      Absolute NRBCs 04/19/2023 0.0          Total time spent for face to face visit, reviewing labs/imaging studies, counseling and coordination of care was: 20 min spent on the date of the encounter doing chart review, review of outside records, review of test results, interpretation of tests, patient visit and documentation       This note was dictated using voice recognition software.  Any grammatical or context distortions are unintentional and inherent to the software.    Orders Placed This Encounter   Procedures     49188 - MIGRAINE      New Prescriptions    No medications on  file     Modified Medications    No medications on file

## 2023-05-15 ENCOUNTER — OFFICE VISIT (OUTPATIENT)
Dept: NEUROLOGY | Facility: CLINIC | Age: 43
End: 2023-05-15
Payer: COMMERCIAL

## 2023-05-15 ENCOUNTER — TELEPHONE (OUTPATIENT)
Dept: FAMILY MEDICINE | Facility: CLINIC | Age: 43
End: 2023-05-15

## 2023-05-15 VITALS
HEART RATE: 87 BPM | BODY MASS INDEX: 23.41 KG/M2 | WEIGHT: 128 LBS | SYSTOLIC BLOOD PRESSURE: 124 MMHG | DIASTOLIC BLOOD PRESSURE: 87 MMHG

## 2023-05-15 DIAGNOSIS — G43.719 CHRONIC MIGRAINE WITHOUT AURA, INTRACTABLE, WITHOUT STATUS MIGRAINOSUS: Primary | ICD-10-CM

## 2023-05-15 PROCEDURE — 64615 CHEMODENERV MUSC MIGRAINE: CPT | Performed by: PSYCHIATRY & NEUROLOGY

## 2023-05-15 NOTE — TELEPHONE ENCOUNTER
Prior Authorization Request  Who s requesting:  Pharmacy  Pharmacy Name and Location: [facility]/pharmacy [address] [place] [address]   Medication Name:   EFFEXOR  MG 24 hr capsule  EFFEXOR XR 75 MG 24 hr capsule  Insurance Plan:   [employer] [place] [employer]  Insurance Member ID Number:    [id number]  [id number]  CoverMyMeds Key: NA  Informed patient that prior authorizations can take up to 10 business days for response:   NA  Okay to leave a detailed message: Yes     Route to pool:  DAR[employer]O [employer]

## 2023-05-15 NOTE — TELEPHONE ENCOUNTER
PA Initiation    Medication: Effexor XR 75mg  Insurance Company: [employer] - Phone [phone] Fax [phone]  Pharmacy Filling the Rx: [id number] - [place], [place] - [facility]  Filling Pharmacy Phone: [phone]  Filling Pharmacy Fax: [phone]  Start Date: 5/15/2023

## 2023-05-15 NOTE — TELEPHONE ENCOUNTER
Prior Authorization Approval    Authorization Effective Date: 5/15/2023  Authorization Expiration Date: 5/15/2024  Medication: Effexor XR 75mg  Approved Dose/Quantity:   Reference #: [id number]   Insurance Company: [employer] [phone] Fax [phone]  Which Pharmacy is filling the prescription (Not needed for infusion/clinic administered): [facility]/PHARMACY #[id number]  Pharmacy Notified: Yes  Patient Notified: Yes

## 2023-05-15 NOTE — PROCEDURES
BOTOX INJECTION PROCEDURE NOTE     Date: 05/15/2023    INDICATION: CHRONIC MIGRAINE    Number of headache days/month currently: 14 (BASELINE: >15   )  Number of headache hours/day currently : Monitor for (BASELINE: 4-24 Hours   )  Number of headache free days post treatment:  16  Disability due to migraine headache: yes  Consent: Obtained  Indication: Chronic Migraine    Botox Lot No:   C4 expiration 11/30/2025  Number of units injected: 155 U  Number of units of unavoidable wastage: 45 U    LOCATION  At today s visit, patient was injected with a total of 155 units divided in 31 sites. A total of 2 mL of normal saline was used to dilute 100 units of the drug. The following muscles were injected:  10 units divided in two sites, procerus 5 units in one site, frontalis 20 units divided in four sites, temporalis 40 units divided in eight sites, occipitalis 30 units divided in six sites, cervical paraspinals 20 units divided in four sites, and trapezius 30 units divided in six sites. 45 units were unavoidable wastage        ASSESSMENT/PLAN  Chronic Migraine headache  The patient tolerated the procedure well. There were no immediate complications. Prior to the procedure, I discussed the potential side effects of Botox therapy, which includes generalized muscle weakness, diplopia, ptosis, dysphagia, dysphonia, dysarthria, urinary incontinence, and breathing difficulties. Also I discussed the black box warning of the drug that can include the rare chance of distant spread of the drug to swallowing and breathing muscles that can be life threatening. All of patient's questions were answered prior to our signing the consent form. patient left the clinic after a brief period of observation and will return for repeat injection in three months  time as needed. I informed patient again the goal is to maintain at least seven to eight headache free days on average a month. I explained to patient that most patients  with chronic migraines do need life time Botox treatment, however, at any time if the patient wishes to stop Botox we can do so as some patients do go into remission on their own over time. Follow up will be in 3-4 months      Mikel Hernandez M.D.  [facility]  (Formerly, [facility]

## 2023-05-15 NOTE — LETTER
5/15/2023         RE: Sherry Sweeney   [facility] [address]        Dear Colleague,    Thank you for referring your patient, Sherry Sweeney, to the [facility] [place]. Please see a copy of my visit note below.    NEUROLOGY FOLLOW UP VISIT  NOTE       [facility] [place]  [address] [place] [address]  Tel: [phone]  Fax: [phone]  [email or web address]     Sherry Sweeney,  1980, MRN [id number]  PCP: Layla Martinez  Date: 05/15/2023      ASSESSMENT & PLAN     Visit Diagnosis  1. Chronic migraine without aura, intractable, without status migrainosus     Chronic migraine without aura  Pleasant 43-year-old female with history of mitochondrial myopathy, chronic migraine without aura, fibromyalgia who returns for 3-month Botox injection.  Previously she had tried different medication that did not seem to help but after she was switched to Botox there was dramatic improvement.  There was a break in Botox treatment due to COVID pandemic.  This was resumed on 2023 and reports the frequency declined somewhat but the intensity has declined significantly.  Headache only last for 1 to 4 hours.  She has about 16 days headache free interval since her last visit.  After explaining all the side effect and obtaining her consent, I injected 155 units according to the enclosed sheet.  45 units were discarded.  She was told to give a headache diary.  Follow-up will be in 3 months    Thank you again for this referral, please feel free to contact me if you have any questions.    Mikel Hernandez MD  [facility]  (Formerly, [facility]     HISTORY OF PRESENT ILLNESS     Patient is a pleasant 43-year-old female with history of mitochondrial myopathy, chronic migraine without aura, fibromyalgia who was previously followed in our clinic and received Botox injection who returns recently  after 3 years for worsening headaches.  She had noticed improvement with Botox in 2019 but due to COVID pandemic could not come in for her 3 monthly Botox injection and noticed steadily worsening headaches.  Previously she had tried multiple medication including Depakote, amitriptyline, beta-blocker that did not seem to help.    Botox injection were resumed on 2/14/2023 and she reports some decline in her headache frequency but the intensity declined significantly..     Briefly patient is a female with history of mitochondrial myopathy, chronic migraine without aura, fibromyalgia who started having headaches in 2014 and progressively got worse.  She initially attributed it to pressure in the neck or TMJ abnormality and that in the past was evaluated and started on some hormonal supplements.  Due to progressive weakness she was evaluated for possible mitochondrial myopathy and had mitochondrial DNA tested that was positive.  For her headaches she was started on Depakote, amitriptyline and in the past had also tried Cymbalta and as she was having more than 15 headaches in a month Botox was recommended.  She started getting Botox in 2017 and did notice improvement in her headache.  Discontinued until 2019 but due to COVID pandemic she stopped doing Botox injection and noticed worsening headaches and Botox was restarted in 2023.     PROBLEM LIST   Patient Active Problem List   Diagnosis Code     Acne vulgaris L70.0     Arthralgia of temporomandibular joint M26.629     Debility R53.81     Fibromyalgia M79.7     Hypermobility syndrome M35.7     IBS (irritable bowel syndrome) K58.9     Insomnia G47.00     Major depression, recurrent, chronic (H) F33.9     Chronic migraine without aura, intractable, without status migrainosus G43.719     Mitochondrial disease (H) E88.40     Generalized anxiety disorder F41.1     Post-traumatic stress disorder F43.10     Parasomnia G47.50     Seasonal depression (H) F33.8         PAST  MEDICAL & SURGICAL HISTORY     Past Medical History:   Patient  has a past medical history of Chronic fatigue syndrome, Chronic migraine, Depression, Depression, Depressive disorder (2013), Fibromyalgia, Fibromyalgia, H/O: hysterectomy (2/22/2018), History of anesthesia complications, IBS (irritable bowel syndrome), Mitochondrial disease (H), Mitochondrial myopathy, Parasomnia, and PONV (postoperative nausea and vomiting).    Surgical History:  She  has a past surgical history that includes REMOVAL OF OVARIAN CYST(S); Hysterectomy (Bilateral, 2/22/2018); Picc (2/24/2018); Abdomen surgery (1996 & 2018, feb.); appendectomy (1996); biopsy; and Laparoscopic cystectomy ovarian (oncology) (Left, 10/18/2021).     SOCIAL HISTORY     Reviewed, and she  reports that she has never smoked. She has never used smokeless tobacco. She reports that she does not drink alcohol and does not use drugs.     FAMILY HISTORY     Reviewed, and family history includes Anxiety Disorder in her maternal grandmother, mother, paternal grandfather, and paternal grandmother; Bone Cancer in her maternal grandfather; Breast Cancer (age of onset: 60.00) in her maternal grandmother; Breast Cancer (age of onset: 62.00) in her mother; Cancer in her mother; Coronary Artery Disease (age of onset: 45.00) in her father; Depression in her brother, father, maternal grandmother, and mother; Diabetes in her cousin and cousin; Hyperlipidemia in her mother; Hypertension in her mother; Meniere's disease in her mother.     ALLERGIES     Allergies   Allergen Reactions     Ciprofloxacin Itching, Rash and Hives     Rash over whole body   Rash over whole body        Egg White (Egg Protein) Other (See Comments) and GI Disturbance     Swollen colon, belly pain  Swollen colon, belly pain       Influenza Virus Vaccine Shortness Of Breath and Anaphylaxis     No Clinical Screening - See Comments Anaphylaxis     Stomach swelling     Sulfa Antibiotics Rash and Hives     Yeast  Other (See Comments) and Anaphylaxis     Lactose Other (See Comments)     Desvenlafaxine Blisters, Itching and Rash     Milnacipran Other (See Comments) and Palpitations     Chest pain, hot/cold flashes     Quetiapine Palpitations     Tachycardia, nervousness, elevated blood pressure.   Tachycardia, nervousness, elevated blood pressure.          REVIEW OF SYSTEMS     A 12 point review of system was performed and was negative except as outlined in the history of present illness.     HOME MEDICATIONS     Current Outpatient Rx   Medication Sig Dispense Refill     ascorbic acid 500 MG TABS Take 500 mg by mouth daily       augmented betamethasone dipropionate (DIPROLENE AF) 0.05 % external cream APPLY A THIN LAYER TO TO AFFECTED AREA ON BODY 1-2X DAILY TIMES DAILY FOR UP TO 2 WEEKS AT A TIME. TAKE 2 WEEKS OFF. REPEAT AS NEEDED FOR FL       betamethasone dipropionate (DIPROSONE) 0.05 % external lotion        buPROPion (WELLBUTRIN XL) 300 MG 24 hr tablet Take 1 tablet (300 mg) by mouth daily 90 tablet 3     cetirizine (ZYRTEC) 10 MG tablet TAKE 1 TAB ORALLY DAILY AS NEEDED FOR ALLERGIES MAY INCREASE TO 2 TAB DAILY IF ITCHING NOT RELIEVED 90 tablet 2     clindamycin-benzoyl peroxide (BENZACLIN) gel Apply topically to acne once daily       clobetasol (TEMOVATE) 0.05 % external ointment        [facility] MELATONIN 3 MG tablet TAKE 1 TABLET (3 MG) BY MOUTH NIGHTLY AS NEEDED FOR SLEEP 90 tablet 1     cyclobenzaprine (FLEXERIL) 10 MG tablet Take 10 mg by mouth daily as needed for muscle spasms       EFFEXOR  MG 24 hr capsule Take 1 capsule (150 mg) by mouth daily 90 capsule 1     hydrOXYzine (ATARAX) 25 MG tablet Take 1-2 tablets (25-50 mg) by mouth At Bedtime 180 tablet 1     JUNEL FE 1.5/30 1.5-30 MG-MCG tablet Take 1 tablet by mouth as needed       lidocaine (LIDODERM) 5 % patch PLACE 1 PATCH ONTO THE SKIN AS NEEDED TO NECK, SHOULDERS, AND BACK 30 patch 0     Multiple Vitamin (MULTIVITAMIN ADULT PO) Take 1 tablet by mouth  daily       omeprazole (PRILOSEC) 40 MG DR capsule TAKE 1 CAPSULE (40 MG) BY MOUTH EVERY MORNING (BEFORE BREAKFAST) 30 MINUTES BEFORE MEAL 90 capsule 3     ondansetron (ZOFRAN) 4 MG tablet Take 1 tablet (4 mg) by mouth every 8 hours as needed for nausea 18 tablet 1     Riboflavin 400 MG TABS Take 400 mg by mouth daily       rizatriptan (MAXALT) 10 MG tablet TAKE 1 TABLET BY MOUTH AS NEEDED FOR MIGRAINE. MAY REPEAT IN 2 HOURS IF NEEDED 10 tablet 3     spironolactone (ALDACTONE) 50 MG tablet Take 1 tablet (50 mg) by mouth 2 times daily 180 tablet 1     tretinoin (RETIN-A) 0.05 % external cream APPLY TO AFFECTED AREA EVERY DAY AT BEDTIME 45 g 2     triamcinolone (KENALOG) 0.1 % external ointment Apply topically 2 times daily 80 g 1     valACYclovir (VALTREX) 1000 mg tablet TAKE 2 TABLETS BY MOUTH 12 HOURS APART AS NEEDED EPISODE 12 tablet 3     zolpidem (AMBIEN) 5 MG tablet TAKE 1 TABLET (5 MG) BY MOUTH NIGHTLY AS NEEDED FOR SLEEP 30 tablet 0         PHYSICAL EXAM     Vital signs  /87   Pulse 87   Wt 58.1 kg (128 lb)   BMI 23.41 kg/m      Weight:   128 lbs 0 oz    Pleasant female who is alert and oriented vital signs were reviewed and documented in electronic medical record.  Neck supple.  Neurologically speech was normal.  Cranial nerves II through XII are intact motor strength neck flexor and extensor 4+/5 in upper extremity 5/5 in the lower extremity proximally 4+/5 distally 5/5 reflexes 2+ toes downgoing no dysmetria noted on finger-nose testing gait normal.     PERTINENT DIAGNOSTIC STUDIES     Following studies were reviewed:     MRI BRAIN 10/29/2015  1.  Normal Head MRI.   2.  No acute infarcts, mass lesions or hemorrhage.     PERTINENT LABS  Following labs were reviewed:  Admission on 04/19/2023, Discharged on 04/19/2023   Component Date Value     Ventricular Rate 04/19/2023 81      Atrial Rate 04/19/2023 81      OH Interval 04/19/2023 140      QRS Duration 04/19/2023 92      QT 04/19/2023 360      QTc  04/19/2023 418      P Axis 04/19/2023 76      R AXIS 04/19/2023 74      T Axis 04/19/2023 65      Interpretation ECG 04/19/2023                      Value:Sinus rhythm  Incomplete right bundle branch block  Borderline ECG  When compared with ECG of 02-FEB-2022 21:56,  No significant change was found  Confirmed by SEE ED PROVIDER NOTE FOR, ECG INTERPRETATION ([id number]),  COSMO LIRA ([id number]) on 4/20/2023 3:03:10 AM       Sodium 04/19/2023 140      Potassium 04/19/2023 4.1      Chloride 04/19/2023 102      Carbon Dioxide (CO2) 04/19/2023 26      Anion Gap 04/19/2023 12      Urea Nitrogen 04/19/2023 8.8      Creatinine 04/19/2023 0.78      Calcium 04/19/2023 9.7      Glucose 04/19/2023 83      GFR Estimate 04/19/2023 >90      Troponin T, High Sensiti* 04/19/2023 <6      Magnesium 04/19/2023 2.1      TSH 04/19/2023 1.40      hCG Serum Qualitative 04/19/2023 Negative      WBC Count 04/19/2023 9.5      RBC Count 04/19/2023 5.17      Hemoglobin 04/19/2023 14.4      Hematocrit 04/19/2023 44.7      MCV 04/19/2023 87      MCH 04/19/2023 27.9      MCHC 04/19/2023 32.2      RDW 04/19/2023 12.7      Platelet Count 04/19/2023 323      % Neutrophils 04/19/2023 73      % Lymphocytes 04/19/2023 21      % Monocytes 04/19/2023 4      % Eosinophils 04/19/2023 1      % Basophils 04/19/2023 1      % Immature Granulocytes 04/19/2023 0      NRBCs per 100 WBC 04/19/2023 0      Absolute Neutrophils 04/19/2023 7.1      Absolute Lymphocytes 04/19/2023 2.0      Absolute Monocytes 04/19/2023 0.4      Absolute Eosinophils 04/19/2023 0.1      Absolute Basophils 04/19/2023 0.1      Absolute Immature Granul* 04/19/2023 0.0      Absolute NRBCs 04/19/2023 0.0          Total time spent for face to face visit, reviewing labs/imaging studies, counseling and coordination of care was: 20 min spent on the date of the encounter doing chart review, review of outside records, review of test results, interpretation of tests, patient visit and documentation        This note was dictated using voice recognition software.  Any grammatical or context distortions are unintentional and inherent to the software.    Orders Placed This Encounter   Procedures     16942 - MIGRAINE      New Prescriptions    No medications on file     Modified Medications    No medications on file                     Again, thank you for allowing me to participate in the care of your patient.        Sincerely,        Mikel Hernandez MD

## 2023-05-15 NOTE — NURSING NOTE
"Sherry Sweeney is a 43 year old female who presents for:  Chief Complaint   Patient presents with     Botox Migraine        Initial Vitals:  /87   Pulse 87   Wt 58.1 kg (128 lb)   BMI 23.41 kg/m   Estimated body mass index is 23.41 kg/m  as calculated from the following:    Height as of 4/19/23: 1.575 m (5' 2\").    Weight as of this encounter: 58.1 kg (128 lb).. Body surface area is 1.59 meters squared. BP completed using cuff size: wrist cuff    Gonzalez Rivera  "

## 2023-05-22 ENCOUNTER — HOSPITAL ENCOUNTER (OUTPATIENT)
Dept: CARDIOLOGY | Facility: HOSPITAL | Age: 43
Discharge: HOME | End: 2023-05-22
Attending: INTERNAL MEDICINE
Payer: COMMERCIAL

## 2023-05-22 DIAGNOSIS — R00.2 PALPITATIONS: ICD-10-CM

## 2023-05-22 DIAGNOSIS — R06.09 DYSPNEA ON EXERTION: ICD-10-CM

## 2023-05-22 PROCEDURE — 93016 CV STRESS TEST SUPVJ ONLY: CPT | Performed by: INTERNAL MEDICINE

## 2023-05-22 PROCEDURE — 93325 DOPPLER ECHO COLOR FLOW MAPG: CPT | Mod: 26 | Performed by: INTERNAL MEDICINE

## 2023-05-22 PROCEDURE — 93017 CV STRESS TEST TRACING ONLY: CPT

## 2023-05-22 PROCEDURE — 255N000002 HC RX 255 OP 636: Performed by: INTERNAL MEDICINE

## 2023-05-22 PROCEDURE — 93350 STRESS TTE ONLY: CPT | Mod: 26 | Performed by: INTERNAL MEDICINE

## 2023-05-22 PROCEDURE — 93321 DOPPLER ECHO F-UP/LMTD STD: CPT | Mod: 26 | Performed by: INTERNAL MEDICINE

## 2023-05-22 PROCEDURE — 999N000208 ECHO STRESS ECHOCARDIOGRAM

## 2023-05-22 PROCEDURE — 93018 CV STRESS TEST I&R ONLY: CPT | Performed by: INTERNAL MEDICINE

## 2023-05-22 PROCEDURE — 93270 REMOTE 30 DAY ECG REV/REPORT: CPT

## 2023-05-22 PROCEDURE — 93352 ADMIN ECG CONTRAST AGENT: CPT | Performed by: INTERNAL MEDICINE

## 2023-05-22 RX ADMIN — PERFLUTREN 2.5 ML: 6.52 INJECTION, SUSPENSION INTRAVENOUS at 09:30

## 2023-06-13 ENCOUNTER — OFFICE VISIT (OUTPATIENT)
Dept: FAMILY MEDICINE | Facility: CLINIC | Age: 43
End: 2023-06-13
Payer: COMMERCIAL

## 2023-06-13 VITALS
RESPIRATION RATE: 16 BRPM | OXYGEN SATURATION: 99 % | TEMPERATURE: 97.3 F | SYSTOLIC BLOOD PRESSURE: 121 MMHG | BODY MASS INDEX: 23.41 KG/M2 | WEIGHT: 128 LBS | HEART RATE: 92 BPM | DIASTOLIC BLOOD PRESSURE: 79 MMHG

## 2023-06-13 DIAGNOSIS — M20.11 HALLUX VALGUS, ACQUIRED, RIGHT: Primary | ICD-10-CM

## 2023-06-13 DIAGNOSIS — H92.01 RIGHT EAR PAIN: ICD-10-CM

## 2023-06-13 PROCEDURE — 99214 OFFICE O/P EST MOD 30 MIN: CPT | Performed by: PHYSICIAN ASSISTANT

## 2023-06-13 ASSESSMENT — ENCOUNTER SYMPTOMS
RHINORRHEA: 0
ACTIVITY CHANGE: 1
SINUS PAIN: 0
SINUS PRESSURE: 0
LIGHT-HEADEDNESS: 0
SORE THROAT: 0
COUGH: 0
FATIGUE: 1
CHILLS: 1
DIZZINESS: 0

## 2023-06-13 NOTE — PROGRESS NOTES
"Patient presents with:  Ear Problem: Rt ear clogged x 2-3 day. Ache. Chills. Fatigue. Tired.  Foot Pain: Lt foot x 2-3 week.      Clinical Decision Making:    Referral to podiatry for evaluation and management of possible hallux valgus. No evidence of ear infection at this time. Suspect eustachian tube dysfunction. Recommend trial of Zyrtec and or Flonase nasal spray for treatment of eustachian tube dysfunction. Sudafed also recommended for symptom relief. Encouraged patient to follow up with her OB re: possible ovarian cyst.       ICD-10-CM    1. Hallux valgus, acquired, right  M20.11 Orthopedic  Referral      2. Right ear pain  H92.01           Patient Instructions   1. Podiatry will reach out to you to schedule an appointment.  2. Wear supportive and non-narrow shoes.  3. Tylenol and Ibuprofen for right ear pain.  4. Can try an over the counter antihistamine such as Zyrtec and or Flonase nasal spray for eustachian tube dysfunction ear pain relief.  5. Sudafed many also provide symptom relief.   6. Follow up with OB provider re: ovarian cyst      HPI:  Sherry Sweeney is a 43 year old female who presents today complaining of ear pain and foot pain. Re: ear pain. Patient reports that her right ear is sore and painful. Two days ago she started to feel tired, and her ear started to feel \"off\". Pain started today. Pain is similar to previous ear infections. She has not taken any medications for pain. She does state that her \"equilibrium is off\" and that she feels wobbly, shaky.  Hearing is grossly unaffected.    Re: left foot pain. About 2-3 weeks ago patient noticed a lump on her right medial  Foot, just to the right and below her right great toe. This bump can be painful when she flexes her foot or bears weight. Rest relieves the pain. Pain is minimal at this time, but it is bothersome to patient.     Of note, patient also suspects that she has an ovarian cyst. She does get ovarian cysts periodically and these " are managed by her OB provider. She has messaged her OB provider re: new cyst. She does not want Urgent Care to work this up or manage it, she just mentioned so that providers in Urgent Care would understand her whole health picture. She states that in the past her ovarian cysts have made her very tired.     History obtained from the patient.    Problem List:  2023-01: Seasonal depression (H)  2021-08: Fibromyalgia  2021-08: Insomnia  2021-08: Major depression, recurrent, chronic (H)  2021-08: Chronic migraine without aura, intractable, without status   migrainosus  2021-08: Generalized anxiety disorder  2021-08: Post-traumatic stress disorder  2020-09: Parasomnia  2019-09: Suicidal ideations  2018-03: Arthralgia of temporomandibular joint  2018-02: H/O: hysterectomy  2017-03: Acne vulgaris  2016-05: Debility  2015-04: IBS (irritable bowel syndrome)  1980-03: Hypermobility syndrome  Mitochondrial disease (H)      Past Medical History:   Diagnosis Date     Chronic fatigue syndrome      Chronic migraine      Depression      Depression      Depressive disorder 2013     Fibromyalgia      Fibromyalgia      H/O: hysterectomy 2/22/2018     History of anesthesia complications     slow to wake     IBS (irritable bowel syndrome)      Mitochondrial disease (H)      Mitochondrial myopathy      Parasomnia      PONV (postoperative nausea and vomiting)        Social History     Tobacco Use     Smoking status: Never     Smokeless tobacco: Never   Vaping Use     Vaping status: Not on file   Substance Use Topics     Alcohol use: No       Review of Systems   Constitutional: Positive for activity change, chills and fatigue.   HENT: Negative for rhinorrhea, sinus pressure, sinus pain and sore throat.    Respiratory: Negative for cough.    Musculoskeletal:        Body aches   Neurological: Negative for dizziness and light-headedness.       Vitals:    06/13/23 1323   BP: 121/79   Pulse: 92   Resp: 16   Temp: 97.3  F (36.3  C)   TempSrc:  Tympanic   SpO2: 99%   Weight: 58.1 kg (128 lb)       Physical Exam  Vitals and nursing note reviewed.   Constitutional:       General: She is not in acute distress.     Appearance: She is not toxic-appearing or diaphoretic.   HENT:      Head: Normocephalic and atraumatic.      Right Ear: Tympanic membrane, ear canal and external ear normal.      Left Ear: Tympanic membrane, ear canal and external ear normal.   Eyes:      Conjunctiva/sclera: Conjunctivae normal.   Cardiovascular:      Heart sounds: No murmur heard.  Pulmonary:      Effort: Pulmonary effort is normal. No respiratory distress.   Musculoskeletal:        Feet:    Feet:      Comments: Hard lump approximately 1.2 cm, round at blue [place]  Neurological:      Mental Status: She is alert.   Psychiatric:         Mood and Affect: Mood normal.         Behavior: Behavior normal.         Thought Content: Thought content normal.         Judgment: Judgment normal.         Results:  No results found for any visits on 06/13/23.      At the end of the encounter, I discussed results, diagnosis, medications. Discussed red flags for immediate return to clinic/ER, as well as indications for follow up if no improvement. Patient understood and agreed to plan. Patient was stable for discharge.

## 2023-06-13 NOTE — PATIENT INSTRUCTIONS
Podiatry will reach out to you to schedule an appointment.  Wear supportive and non-narrow shoes.  Tylenol and Ibuprofen for right ear pain.  Can try an over the counter antihistamine such as Zyrtec and or Flonase nasal spray for eustachian tube dysfunction ear pain relief.  Sudafed many also provide symptom relief.   Follow up with OB provider re: ovarian cyst

## 2023-06-26 ENCOUNTER — MYC MEDICAL ADVICE (OUTPATIENT)
Dept: FAMILY MEDICINE | Facility: CLINIC | Age: 43
End: 2023-06-26
Payer: COMMERCIAL

## 2023-06-26 ENCOUNTER — TELEPHONE (OUTPATIENT)
Dept: FAMILY MEDICINE | Facility: CLINIC | Age: 43
End: 2023-06-26
Payer: COMMERCIAL

## 2023-06-26 NOTE — TELEPHONE ENCOUNTER
Left message to call back for: Patient  Information to relay to patient: Please help patient schedule 10:20am 6/27 with Joanne Rivera in held spot.

## 2023-06-26 NOTE — TELEPHONE ENCOUNTER
Reason for Call:  Appointment Request    Patient requesting this type of appt: Pre-op    Requested provider: Any provider    Reason patient unable to be scheduled: Not within requested timeframe    When does patient want to be seen/preferred time: 1-2 days    Comments: Patient found out she is having surgery on 6/29 and needs a pre op    Could we send this information to you in [facility] or would you prefer to receive a phone call?:   Patient would prefer a phone call   Okay to leave a detailed message?: Yes at Cell number on file:    Telephone Information:   Mobile [phone]       Call taken on 6/26/2023 at 10:36 AM by Soumya Ford

## 2023-06-27 ENCOUNTER — TELEPHONE (OUTPATIENT)
Dept: CARDIOLOGY | Facility: CLINIC | Age: 43
End: 2023-06-27

## 2023-06-27 ENCOUNTER — OFFICE VISIT (OUTPATIENT)
Dept: FAMILY MEDICINE | Facility: CLINIC | Age: 43
End: 2023-06-27
Payer: COMMERCIAL

## 2023-06-27 VITALS
RESPIRATION RATE: 16 BRPM | HEART RATE: 98 BPM | DIASTOLIC BLOOD PRESSURE: 78 MMHG | WEIGHT: 129.2 LBS | BODY MASS INDEX: 23.77 KG/M2 | TEMPERATURE: 98.1 F | OXYGEN SATURATION: 100 % | SYSTOLIC BLOOD PRESSURE: 108 MMHG | HEIGHT: 62 IN

## 2023-06-27 DIAGNOSIS — G47.00 INSOMNIA, UNSPECIFIED TYPE: ICD-10-CM

## 2023-06-27 DIAGNOSIS — F33.9 MAJOR DEPRESSION, RECURRENT, CHRONIC (H): ICD-10-CM

## 2023-06-27 DIAGNOSIS — K58.9 IRRITABLE BOWEL SYNDROME, UNSPECIFIED TYPE: ICD-10-CM

## 2023-06-27 DIAGNOSIS — E88.40 MITOCHONDRIAL DISEASE (H): ICD-10-CM

## 2023-06-27 DIAGNOSIS — M79.7 FIBROMYALGIA: ICD-10-CM

## 2023-06-27 DIAGNOSIS — G43.719 CHRONIC MIGRAINE WITHOUT AURA, INTRACTABLE, WITHOUT STATUS MIGRAINOSUS: ICD-10-CM

## 2023-06-27 DIAGNOSIS — R00.2 PALPITATIONS: ICD-10-CM

## 2023-06-27 DIAGNOSIS — Z12.31 ENCOUNTER FOR SCREENING MAMMOGRAM FOR BREAST CANCER: ICD-10-CM

## 2023-06-27 DIAGNOSIS — Z01.818 PREOP GENERAL PHYSICAL EXAM: Primary | ICD-10-CM

## 2023-06-27 LAB
ERYTHROCYTE [DISTWIDTH] IN BLOOD BY AUTOMATED COUNT: 12.5 % (ref 10–15)
HCT VFR BLD AUTO: 42.5 % (ref 35–47)
HGB BLD-MCNC: 13.8 G/DL (ref 11.7–15.7)
MCH RBC QN AUTO: 28.1 PG (ref 26.5–33)
MCHC RBC AUTO-ENTMCNC: 32.5 G/DL (ref 31.5–36.5)
MCV RBC AUTO: 87 FL (ref 78–100)
PLATELET # BLD AUTO: 327 10E3/UL (ref 150–450)
RBC # BLD AUTO: 4.91 10E6/UL (ref 3.8–5.2)
WBC # BLD AUTO: 8.5 10E3/UL (ref 4–11)

## 2023-06-27 PROCEDURE — 36415 COLL VENOUS BLD VENIPUNCTURE: CPT

## 2023-06-27 PROCEDURE — 96127 BRIEF EMOTIONAL/BEHAV ASSMT: CPT

## 2023-06-27 PROCEDURE — 85027 COMPLETE CBC AUTOMATED: CPT

## 2023-06-27 PROCEDURE — 99214 OFFICE O/P EST MOD 30 MIN: CPT

## 2023-06-27 RX ORDER — KETOCONAZOLE 20 MG/G
CREAM TOPICAL
COMMUNITY

## 2023-06-27 RX ORDER — NAPROXEN 500 MG/1
TABLET ORAL
COMMUNITY

## 2023-06-27 ASSESSMENT — PATIENT HEALTH QUESTIONNAIRE - PHQ9
10. IF YOU CHECKED OFF ANY PROBLEMS, HOW DIFFICULT HAVE THESE PROBLEMS MADE IT FOR YOU TO DO YOUR WORK, TAKE CARE OF THINGS AT HOME, OR GET ALONG WITH OTHER PEOPLE: VERY DIFFICULT
SUM OF ALL RESPONSES TO PHQ QUESTIONS 1-9: 11
SUM OF ALL RESPONSES TO PHQ QUESTIONS 1-9: 11

## 2023-06-27 NOTE — ASSESSMENT & PLAN NOTE
Stable control. Worse symptoms in the winter. Has done Botox which does help the severity but does not eliminate migraines after the recommendation came from her neurologist to resume. Uses Maxalt as needed.

## 2023-06-27 NOTE — TELEPHONE ENCOUNTER
Dr. García,  Are you willing to clear patient from a cardiac standpoint to proceed with surgery?  Thank you,  Zaira      Stress Echo 5/22/23:  Conclusions: Negative stress echocardiogram by ECG and echocardiographic  criteria at the workload achieved.    Patient was unable to complete 30-day Event Monitor due to development of a rash.

## 2023-06-27 NOTE — PATIENT INSTRUCTIONS
For informational purposes only. Not to replace the advice of your health care provider. Copyright   2003,  [facility]. All rights reserved. Clinically reviewed by Julia Quesada MD. [employer] [id number] - REV .  Preparing for Your Surgery  Getting started  A nurse will call you to review your health history and instructions. They will give you an arrival time based on your scheduled surgery time. Please be ready to share:  Your doctor's clinic name and phone number  Your medical, surgical, and anesthesia history  A list of allergies and sensitivities  A list of medicines, including herbal treatments and over-the-counter drugs  Whether the patient has a legal guardian (ask how to send us the papers in advance)  Please tell us if you're pregnant--or if there's any chance you might be pregnant. Some surgeries may injure a fetus (unborn baby), so they require a pregnancy test. Surgeries that are safe for a fetus don't always need a test, and you can choose whether to have one.   If you have a child who's having surgery, please ask for a copy of Preparing for Your Child's Surgery.    Preparing for surgery  Within 10 to 30 days of surgery: Have a pre-op exam (sometimes called an H&P, or History and Physical). This can be done at a clinic or pre-operative center.  If you're having a , you may not need this exam. Talk to your care team.  At your pre-op exam, talk to your care team about all medicines you take. If you need to stop any medicines before surgery, ask when to start taking them again.  We do this for your safety. Many medicines can make you bleed too much during surgery. Some change how well surgery (anesthesia) drugs work.  Call your insurance company to let them know you're having surgery. (If you don't have insurance, call [phone].)  Call your clinic if there's any change in your health. This includes signs of a cold or flu (sore throat, runny nose, cough, rash, fever). It  also includes a scrape or scratch near the surgery site.  If you have questions on the day of surgery, call your hospital or surgery center.  Eating and drinking guidelines  For your safety: Unless your surgeon tells you otherwise, follow the guidelines below.  Eat and drink as usual until 8 hours before you arrive for surgery. After that, no food or milk.  Drink clear liquids until 2 hours before you arrive. These are liquids you can see through, like water, Gatorade, and Propel Water. They also include plain black coffee and tea (no cream or milk), candy, and breath mints. You can spit out gum when you arrive.  If you drink alcohol: Stop drinking it the night before surgery.  If your care team tells you to take medicine on the morning of surgery, it's okay to take it with a sip of water.  Preventing infection  Shower or bathe the night before and morning of your surgery. Follow the instructions your clinic gave you. (If no instructions, use regular soap.)  Don't shave or clip hair near your surgery site. We'll remove the hair if needed.  Don't smoke or vape the morning of surgery. You may chew nicotine gum up to 2 hours before surgery. A nicotine patch is okay.  Note: Some surgeries require you to completely quit smoking and nicotine. Check with your surgeon.  Your care team will make every effort to keep you safe from infection. We will:  Clean our hands often with soap and water (or an alcohol-based hand rub).  Clean the skin at your surgery site with a special soap that kills germs.  Give you a special gown to keep you warm. (Cold raises the risk of infection.)  Wear special hair covers, masks, gowns and gloves during surgery.  Give antibiotic medicine, if prescribed. Not all surgeries need antibiotics.  What to bring on the day of surgery  Photo ID and insurance card  Copy of your health care directive, if you have one  Glasses and hearing aids (bring cases)  You can't wear contacts during surgery  Inhaler and  eye drops, if you use them (tell us about these when you arrive)  CPAP machine or breathing device, if you use them  A few personal items, if spending the night  If you have . . .  A pacemaker, ICD (cardiac defibrillator) or other implant: Bring the ID card.  An implanted stimulator: Bring the remote control.  A legal guardian: Bring a copy of the certified (court-stamped) guardianship papers.  Please remove any jewelry, including body piercings. Leave jewelry and other valuables at home.  If you're going home the day of surgery  You must have a responsible adult drive you home. They should stay with you overnight as well.  If you don't have someone to stay with you, and you aren't safe to go home alone, we may keep you overnight. Insurance often won't pay for this.  After surgery  If it's hard to control your pain or you need more pain medicine, please call your surgeon's office.  Questions?   If you have any questions for your care team, list them here: _________________________________________________________________________________________________________________________________________________________________________ ____________________________________ ____________________________________ ____________________________________    How to Take Your Medication Before Surgery  - Take all of your medications before surgery except as noted below  - STOP taking all vitamins and herbal supplements 14 days before surgery.  - Do not take your migraine medication prior to surgery

## 2023-06-27 NOTE — ASSESSMENT & PLAN NOTE
Mood is stable. Current medications include bupropion 300mg and effexor 150mg daily. Increased her medications with her PCP over the winter and has found this to be very helpful.

## 2023-06-27 NOTE — ASSESSMENT & PLAN NOTE
Uses Ambien as needed. Has difficulty falling asleep, not staying asleep. Will sometimes only have to take half of her medication. PDMP reviewed in the context of a preoperative assessment and shows no suspicious prescriptions or fills.

## 2023-06-27 NOTE — TELEPHONE ENCOUNTER
----- Message from Kendra Rosario sent at 6/27/2023 11:52 AM CDT -----  Regarding: [facility] PATIENT  General phone call:    Caller: REYMUNDO NARAYANAN NP FROM [facility]    Primary cardiologist: [facility]    Detailed reason for call: WANTS A CALL BACK RE: IF PATIENT IS OK'D TO PROCEED WITH SURG FROM CARDIAC STAND POINT.     Best phone number: [phone] REYMUNDO MURDOCK    Best time to contact: ANY    Ok to leave a detailedmessage? YES    Device? NO    Additional Info:

## 2023-06-27 NOTE — ASSESSMENT & PLAN NOTE
Overlapping diagnosis with fibromyalgia per patient report after her chronic struggles with pain and fatigue. States that her surgeon is aware of this diagnosis and has plans to adjust both anesthesia medications and intraoperative fluids. She has had recent procedures with these adjustments and has done very well with these.

## 2023-06-27 NOTE — ASSESSMENT & PLAN NOTE
Cleared for planned procedure. Patient is having current workup with cardiology after her Emergency Room visit two months ago with complaints of palpitations and dizziness. See documentation elsewhere. Per Dr. García with cardiology, she has been cleared from a cardiovascular standpoint. Patient will await further instruction from her surgical team. NPO guidelines and medications discussed today.

## 2023-06-27 NOTE — ASSESSMENT & PLAN NOTE
Patient had an emergency room visit in April of this year with onset of palpitations and dizziness upon waking. She was worked up with a referral to rapid access cardiology clinic who reports they were suspicious of NILDA as a contributing factor to her morning dizziness/palpitations, although patient states that she has had a negative sleep evaluation in the past. She has had a negative stress echocardiogram. Attempted a 30 day monitor, as symptoms are relatively infrequent, but patient struggled with adhesive and is not sure if she captured an event while wearing the monitor. In the context of pre-operative clearance, I did discuss with Dr. García's office regarding their opinion on clearing her from a cardiac standpoint given she was unable to complete the monitor and has not done a sleep evaluation. I received a call back from Dr. García and after reviewing the planned procedure and the current state of her workup, from cardiology's standpoint she is cleared to proceed.
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
## 2023-06-28 NOTE — TELEPHONE ENCOUNTER
Patient notified regarding note below and ok to proceed with surgery.     Per PCP Pre-Op H&P:   Circulatory       Palpitations       Patient had an emergency room visit in April of this year with onset of palpitations and dizziness upon waking. She was worked up with a referral to rapid access cardiology clinic who reports they were suspicious of NILDA as a contributing factor to her morning dizziness/palpitations, although patient states that she has had a negative sleep evaluation in the past. She has had a negative stress echocardiogram. Attempted a 30 day monitor, as symptoms are relatively infrequent, but patient struggled with adhesive and is not sure if she captured an event while wearing the monitor. In the context of pre-operative clearance, I did discuss with Dr. García's office regarding their opinion on clearing her from a cardiac standpoint given she was unable to complete the monitor and has not done a sleep evaluation. I received a call back from Dr. García and after reviewing the planned procedure and the current state of her workup, from cardiology's standpoint she is cleared to proceed.

## 2023-06-29 ENCOUNTER — HOSPITAL ENCOUNTER (OUTPATIENT)
Facility: HOSPITAL | Age: 43
Discharge: HOME | End: 2023-06-29
Attending: OBSTETRICS & GYNECOLOGY | Admitting: OBSTETRICS & GYNECOLOGY
Payer: COMMERCIAL

## 2023-06-29 ENCOUNTER — ANESTHESIA (OUTPATIENT)
Dept: SURGERY | Facility: HOSPITAL | Age: 43
End: 2023-06-29
Payer: COMMERCIAL

## 2023-06-29 ENCOUNTER — ANESTHESIA EVENT (OUTPATIENT)
Dept: SURGERY | Facility: HOSPITAL | Age: 43
End: 2023-06-29
Payer: COMMERCIAL

## 2023-06-29 VITALS
SYSTOLIC BLOOD PRESSURE: 113 MMHG | HEIGHT: 61 IN | OXYGEN SATURATION: 100 % | HEART RATE: 91 BPM | DIASTOLIC BLOOD PRESSURE: 73 MMHG | RESPIRATION RATE: 20 BRPM | WEIGHT: 128.5 LBS | TEMPERATURE: 97.8 F | BODY MASS INDEX: 24.26 KG/M2

## 2023-06-29 DIAGNOSIS — G89.18 POST-OP PAIN: Primary | ICD-10-CM

## 2023-06-29 PROCEDURE — 250N000011 HC RX IP 250 OP 636: Performed by: ANESTHESIOLOGY

## 2023-06-29 PROCEDURE — 250N000025 HC SEVOFLURANE, PER MIN: Performed by: OBSTETRICS & GYNECOLOGY

## 2023-06-29 PROCEDURE — 88305 TISSUE EXAM BY PATHOLOGIST: CPT | Mod: TC | Performed by: OBSTETRICS & GYNECOLOGY

## 2023-06-29 PROCEDURE — 258N000003 HC RX IP 258 OP 636

## 2023-06-29 PROCEDURE — 360N000076 HC SURGERY LEVEL 3, PER MIN: Performed by: OBSTETRICS & GYNECOLOGY

## 2023-06-29 PROCEDURE — 250N000013 HC RX MED GY IP 250 OP 250 PS 637: Performed by: ANESTHESIOLOGY

## 2023-06-29 PROCEDURE — 88305 TISSUE EXAM BY PATHOLOGIST: CPT | Mod: 26 | Performed by: PATHOLOGY

## 2023-06-29 PROCEDURE — 999N000141 HC STATISTIC PRE-PROCEDURE NURSING ASSESSMENT: Performed by: OBSTETRICS & GYNECOLOGY

## 2023-06-29 PROCEDURE — 250N000011 HC RX IP 250 OP 636: Performed by: OBSTETRICS & GYNECOLOGY

## 2023-06-29 PROCEDURE — 370N000017 HC ANESTHESIA TECHNICAL FEE, PER MIN: Performed by: OBSTETRICS & GYNECOLOGY

## 2023-06-29 PROCEDURE — 258N000003 HC RX IP 258 OP 636: Performed by: OBSTETRICS & GYNECOLOGY

## 2023-06-29 PROCEDURE — 250N000013 HC RX MED GY IP 250 OP 250 PS 637: Performed by: NURSE PRACTITIONER

## 2023-06-29 PROCEDURE — 710N000009 HC RECOVERY PHASE 1, LEVEL 1, PER MIN: Performed by: OBSTETRICS & GYNECOLOGY

## 2023-06-29 PROCEDURE — 250N000009 HC RX 250

## 2023-06-29 PROCEDURE — 250N000011 HC RX IP 250 OP 636

## 2023-06-29 PROCEDURE — 258N000003 HC RX IP 258 OP 636: Performed by: ANESTHESIOLOGY

## 2023-06-29 PROCEDURE — 710N000012 HC RECOVERY PHASE 2, PER MINUTE: Performed by: OBSTETRICS & GYNECOLOGY

## 2023-06-29 PROCEDURE — 272N000001 HC OR GENERAL SUPPLY STERILE: Performed by: OBSTETRICS & GYNECOLOGY

## 2023-06-29 RX ORDER — ACETAMINOPHEN 325 MG/1
975 TABLET ORAL ONCE
Status: COMPLETED | OUTPATIENT
Start: 2023-06-29 | End: 2023-06-29

## 2023-06-29 RX ORDER — FENTANYL CITRATE 50 UG/ML
50 INJECTION, SOLUTION INTRAMUSCULAR; INTRAVENOUS EVERY 5 MIN PRN
Status: DISCONTINUED | OUTPATIENT
Start: 2023-06-29 | End: 2023-06-29 | Stop reason: HOSPADM

## 2023-06-29 RX ORDER — PROPOFOL 10 MG/ML
INJECTION, EMULSION INTRAVENOUS PRN
Status: DISCONTINUED | OUTPATIENT
Start: 2023-06-29 | End: 2023-06-29

## 2023-06-29 RX ORDER — LIDOCAINE 40 MG/G
CREAM TOPICAL
Status: DISCONTINUED | OUTPATIENT
Start: 2023-06-29 | End: 2023-06-29 | Stop reason: HOSPADM

## 2023-06-29 RX ORDER — ONDANSETRON 4 MG/1
4 TABLET, ORALLY DISINTEGRATING ORAL EVERY 30 MIN PRN
Status: DISCONTINUED | OUTPATIENT
Start: 2023-06-29 | End: 2023-06-29 | Stop reason: HOSPADM

## 2023-06-29 RX ORDER — FENTANYL CITRATE 50 UG/ML
INJECTION, SOLUTION INTRAMUSCULAR; INTRAVENOUS PRN
Status: DISCONTINUED | OUTPATIENT
Start: 2023-06-29 | End: 2023-06-29

## 2023-06-29 RX ORDER — HYDROMORPHONE HYDROCHLORIDE 1 MG/ML
0.4 INJECTION, SOLUTION INTRAMUSCULAR; INTRAVENOUS; SUBCUTANEOUS EVERY 5 MIN PRN
Status: DISCONTINUED | OUTPATIENT
Start: 2023-06-29 | End: 2023-06-29 | Stop reason: HOSPADM

## 2023-06-29 RX ORDER — SODIUM CHLORIDE, SODIUM LACTATE, POTASSIUM CHLORIDE, AND CALCIUM CHLORIDE .6; .31; .03; .02 G/100ML; G/100ML; G/100ML; G/100ML
IRRIGANT IRRIGATION PRN
Status: DISCONTINUED | OUTPATIENT
Start: 2023-06-29 | End: 2023-06-29 | Stop reason: HOSPADM

## 2023-06-29 RX ORDER — DEXAMETHASONE SODIUM PHOSPHATE 10 MG/ML
INJECTION, SOLUTION INTRAMUSCULAR; INTRAVENOUS PRN
Status: DISCONTINUED | OUTPATIENT
Start: 2023-06-29 | End: 2023-06-29

## 2023-06-29 RX ORDER — PROPOFOL 10 MG/ML
INJECTION, EMULSION INTRAVENOUS CONTINUOUS PRN
Status: DISCONTINUED | OUTPATIENT
Start: 2023-06-29 | End: 2023-06-29

## 2023-06-29 RX ORDER — ACETAMINOPHEN 325 MG/1
975 TABLET ORAL ONCE
Status: DISCONTINUED | OUTPATIENT
Start: 2023-06-29 | End: 2023-06-29 | Stop reason: HOSPADM

## 2023-06-29 RX ORDER — BUPIVACAINE HYDROCHLORIDE 2.5 MG/ML
INJECTION, SOLUTION INFILTRATION; PERINEURAL PRN
Status: DISCONTINUED | OUTPATIENT
Start: 2023-06-29 | End: 2023-06-29 | Stop reason: HOSPADM

## 2023-06-29 RX ORDER — SODIUM CHLORIDE, SODIUM LACTATE, POTASSIUM CHLORIDE, CALCIUM CHLORIDE 600; 310; 30; 20 MG/100ML; MG/100ML; MG/100ML; MG/100ML
INJECTION, SOLUTION INTRAVENOUS CONTINUOUS
Status: DISCONTINUED | OUTPATIENT
Start: 2023-06-29 | End: 2023-06-29 | Stop reason: HOSPADM

## 2023-06-29 RX ORDER — ONDANSETRON 2 MG/ML
4 INJECTION INTRAMUSCULAR; INTRAVENOUS EVERY 30 MIN PRN
Status: DISCONTINUED | OUTPATIENT
Start: 2023-06-29 | End: 2023-06-29 | Stop reason: HOSPADM

## 2023-06-29 RX ORDER — FENTANYL CITRATE 50 UG/ML
25 INJECTION, SOLUTION INTRAMUSCULAR; INTRAVENOUS EVERY 5 MIN PRN
Status: DISCONTINUED | OUTPATIENT
Start: 2023-06-29 | End: 2023-06-29 | Stop reason: HOSPADM

## 2023-06-29 RX ORDER — IBUPROFEN 200 MG
800 TABLET ORAL ONCE
Status: DISCONTINUED | OUTPATIENT
Start: 2023-06-29 | End: 2023-06-29 | Stop reason: HOSPADM

## 2023-06-29 RX ORDER — OXYCODONE HYDROCHLORIDE 5 MG/1
10 TABLET ORAL
Status: DISCONTINUED | OUTPATIENT
Start: 2023-06-29 | End: 2023-06-29 | Stop reason: HOSPADM

## 2023-06-29 RX ORDER — ONDANSETRON 2 MG/ML
INJECTION INTRAMUSCULAR; INTRAVENOUS PRN
Status: DISCONTINUED | OUTPATIENT
Start: 2023-06-29 | End: 2023-06-29

## 2023-06-29 RX ORDER — KETOROLAC TROMETHAMINE 30 MG/ML
INJECTION, SOLUTION INTRAMUSCULAR; INTRAVENOUS PRN
Status: DISCONTINUED | OUTPATIENT
Start: 2023-06-29 | End: 2023-06-29

## 2023-06-29 RX ORDER — OXYCODONE HYDROCHLORIDE 5 MG/1
5 TABLET ORAL
Status: COMPLETED | OUTPATIENT
Start: 2023-06-29 | End: 2023-06-29

## 2023-06-29 RX ORDER — HYDROMORPHONE HYDROCHLORIDE 1 MG/ML
0.2 INJECTION, SOLUTION INTRAMUSCULAR; INTRAVENOUS; SUBCUTANEOUS EVERY 5 MIN PRN
Status: DISCONTINUED | OUTPATIENT
Start: 2023-06-29 | End: 2023-06-29 | Stop reason: HOSPADM

## 2023-06-29 RX ORDER — FENTANYL CITRATE 50 UG/ML
25 INJECTION, SOLUTION INTRAMUSCULAR; INTRAVENOUS
Status: DISCONTINUED | OUTPATIENT
Start: 2023-06-29 | End: 2023-06-29 | Stop reason: HOSPADM

## 2023-06-29 RX ORDER — OXYCODONE HYDROCHLORIDE 5 MG/1
5-10 TABLET ORAL EVERY 4 HOURS PRN
Qty: 12 TABLET | Refills: 0 | Status: SHIPPED | OUTPATIENT
Start: 2023-06-29 | End: 2023-08-16

## 2023-06-29 RX ORDER — LIDOCAINE HYDROCHLORIDE 10 MG/ML
INJECTION, SOLUTION INFILTRATION; PERINEURAL PRN
Status: DISCONTINUED | OUTPATIENT
Start: 2023-06-29 | End: 2023-06-29

## 2023-06-29 RX ORDER — ACETAMINOPHEN 325 MG/1
975 TABLET ORAL ONCE
Status: DISCONTINUED | OUTPATIENT
Start: 2023-06-29 | End: 2023-06-29

## 2023-06-29 RX ADMIN — SODIUM CHLORIDE, POTASSIUM CHLORIDE, SODIUM LACTATE AND CALCIUM CHLORIDE: 600; 310; 30; 20 INJECTION, SOLUTION INTRAVENOUS at 11:09

## 2023-06-29 RX ADMIN — ACETAMINOPHEN 975 MG: 325 TABLET ORAL at 14:36

## 2023-06-29 RX ADMIN — LIDOCAINE HYDROCHLORIDE 5 ML: 10 INJECTION, SOLUTION INFILTRATION; PERINEURAL at 12:12

## 2023-06-29 RX ADMIN — FENTANYL CITRATE 100 MCG: 50 INJECTION, SOLUTION INTRAMUSCULAR; INTRAVENOUS at 12:12

## 2023-06-29 RX ADMIN — ROCURONIUM BROMIDE 50 MG: 50 INJECTION, SOLUTION INTRAVENOUS at 12:12

## 2023-06-29 RX ADMIN — ACETAMINOPHEN 975 MG: 325 TABLET ORAL at 10:59

## 2023-06-29 RX ADMIN — ONDANSETRON 4 MG: 2 INJECTION INTRAMUSCULAR; INTRAVENOUS at 12:41

## 2023-06-29 RX ADMIN — DEXAMETHASONE SODIUM PHOSPHATE 10 MG: 10 INJECTION, SOLUTION INTRAMUSCULAR; INTRAVENOUS at 12:12

## 2023-06-29 RX ADMIN — PHENYLEPHRINE HYDROCHLORIDE 100 MCG: 10 INJECTION INTRAVENOUS at 12:20

## 2023-06-29 RX ADMIN — KETOROLAC TROMETHAMINE 15 MG: 30 INJECTION, SOLUTION INTRAMUSCULAR at 12:46

## 2023-06-29 RX ADMIN — SUGAMMADEX 200 MG: 100 INJECTION, SOLUTION INTRAVENOUS at 12:46

## 2023-06-29 RX ADMIN — OXYCODONE HYDROCHLORIDE 5 MG: 5 TABLET ORAL at 14:37

## 2023-06-29 RX ADMIN — FENTANYL CITRATE 50 MCG: 50 INJECTION, SOLUTION INTRAMUSCULAR; INTRAVENOUS at 13:19

## 2023-06-29 RX ADMIN — PROPOFOL 200 MCG/KG/MIN: 10 INJECTION, EMULSION INTRAVENOUS at 12:12

## 2023-06-29 RX ADMIN — PROPOFOL 120 MG: 10 INJECTION, EMULSION INTRAVENOUS at 12:12

## 2023-06-29 RX ADMIN — MIDAZOLAM 2 MG: 1 INJECTION INTRAMUSCULAR; INTRAVENOUS at 12:07

## 2023-06-29 RX ADMIN — HYDROMORPHONE HYDROCHLORIDE 0.5 MG: 1 INJECTION, SOLUTION INTRAMUSCULAR; INTRAVENOUS; SUBCUTANEOUS at 12:29

## 2023-06-29 ASSESSMENT — ACTIVITIES OF DAILY LIVING (ADL)
ADLS_ACUITY_SCORE: 18

## 2023-06-29 NOTE — OP NOTE
PROCEDURE NOTE - LAPAROSCOPY, JOSE and L oophorectomy    NAME: Sherry Sweeney   : 1980   MRN: [id number]      DATE OF SERVICE: 2023     PREOPERATIVE DIAGNOSIS:  ovarian cyst.  Pelvic pain.      POSTOPERATIVE DIAGNOSIS:  the same.     PROCEDURES: laparoscopy, lysis of adhesions and Leftoopherectomy    SURGEON: Joanne Bedolla DO, FACOG     ASSISTANT: Jennifer Zamora    ANESTHESIA: general     ESTIMATED BLOOD LOSS: 5cc    HISTORY OF PRESENT ILLNESS:   Sherry Sweeney is a 43 year old female with history of pelvic pain adn ovarian cysts.  .     CONSENT:  She was met preoperatively, where we discussed the procedure and the risks associated with the procedure. She understood these to be, but not limited to injury to adjacent organs including bladder, bowel, ureter, infection and bleeding. Patient signed consent and was brought back to the operating room in stable condition.     PROCEDURE:  Patient underwent induction of a general anesthetic, she was carefully prepped and draped in sterile dorsal lithotomy position for the procedure. Timeout was performed. Bladder was drained with a Summers catheter.   A sterile speculum was placed.   A sponge stick was placed in the vagina.      Attention was turned to the abdomen. An incision above the umbilicus was made. A Veress needle was introduced with a 2 pop technique, saline drop test confirmed adequate placement. Opening pressure was 3-4. Insufflation was done until 15mm of pressure was established. A 5 nonbladed trocar was placed at the umbilicus. Two more port sights were place in the right and left lower quadrants under direct visualization. Trandelenburg assistance was then used.     The procedure began with identifying the anatomy. The uterus was absent, tubes absent, ovary on the left was adherent to bowel and sidewall.  The laparoscopic scissors was used to remove the adhesions to the ant abd wall.   The ovary was then grasped and dissected from its  attachments, cauterized and cut.  The pedicles all appeared hemostatic.  Irrigation took place and Surgicell powder placed on the pedicles.  Ureter was seen well below our area of dissection.       The trocars and gas were then removed from the abdomen. Skin was closed with 4-0 monocryl suture. Steri-Strips applied. Instruments were removed from vagina/bladder. No active bleeding was noted. Sponge and needle counts were correct X 2. She was taken to recovery in stable condition.     Joanne Bedolla DO, FACOG  6/29/2023 12:50 PM          cc: Joanne Bedolla MD

## 2023-06-29 NOTE — ANESTHESIA PREPROCEDURE EVALUATION
Anesthesia Pre-Procedure Evaluation    Patient: Sherry Sweeney   MRN: [id number] : 1980        Procedure : Procedure(s):  DIAGNOSTIC LAPAROSCOPY WITH LEFT SIDE OOPHORECTOMY          Past Medical History:   Diagnosis Date     Anemia      Chronic fatigue syndrome      Chronic migraine      Depression      Depression      Depressive disorder      Fibromyalgia      Fibromyalgia      H/O: hysterectomy 2018     History of anesthesia complications     slow to wake     IBS (irritable bowel syndrome)      Insomnia      Irregular heart beat      Mitochondrial disease (H)      Mitochondrial myopathy      Parasomnia      PONV (postoperative nausea and vomiting)       Past Surgical History:   Procedure Laterality Date     ABDOMEN SURGERY   & , feb.     APPENDECTOMY       BIOPSY       HC REMOVAL OF OVARIAN CYST(S)      Description: Ovarian Cystectomy;  Recorded: 2013;     HYSTERECTOMY Bilateral 2018    Procedure: TOTAL ABDOMINAL HYSTERECTOMY BILATERAL SALPINGECTOMY, RIGHT OOPHORECTOMY;  Surgeon: Joanne Bedolla DO;  Location: [facility];  Service:      LAPAROSCOPIC CYSTECTOMY OVARIAN (ONCOLOGY) Left 10/18/2021    Procedure: LAPAROSCOPY,  LEFT OVARIAN CYSTECTOMY, LYSIS OF ADHESIONS.;  Surgeon: Joanne Bedolla MD;  Location: [facility]  2018           Allergies   Allergen Reactions     Ciprofloxacin Itching, Rash and Hives     Rash over whole body   Rash over whole body        Egg White (Egg Protein) Other (See Comments) and GI Disturbance     Swollen colon, belly pain  Swollen colon, belly pain       Influenza Virus Vaccine Shortness Of Breath and Anaphylaxis     No Clinical Screening - See Comments Anaphylaxis     Stomach swelling     Sulfa Antibiotics Rash and Hives     Yeast Other (See Comments) and Anaphylaxis     Desvenlafaxine Blisters, Itching and Rash     Milnacipran Other (See Comments) and Palpitations     Chest pain, hot/cold flashes      Quetiapine Palpitations     Tachycardia, nervousness, elevated blood pressure.   Tachycardia, nervousness, elevated blood pressure.       Social History     Tobacco Use     Smoking status: Never     Smokeless tobacco: Never   Substance Use Topics     Alcohol use: No      Wt Readings from Last 1 Encounters:   06/27/23 58.6 kg (129 lb 3.2 oz)        Anesthesia Evaluation   Pt has had prior anesthetic.     History of anesthetic complications  - PONV.      ROS/MED HX  ENT/Pulmonary:       Neurologic:       Cardiovascular:  - neg cardiovascular ROS     METS/Exercise Tolerance:     Hematologic:       Musculoskeletal: Comment: Mitochondrial disease that causes weakness.       GI/Hepatic:  - neg GI/hepatic ROS     Renal/Genitourinary:  - neg Renal ROS     Endo:  - neg endo ROS     Psychiatric/Substance Use:     (+) psychiatric history depression     Infectious Disease:  - neg infectious disease ROS     Malignancy:       Other:      (+) , H/O Chronic Pain,        Physical Exam    Airway  airway exam normal      Mallampati: I   TM distance: > 3 FB   Neck ROM: full   Mouth opening: > 3 cm    Respiratory Devices and Support         Dental       (+) Minor Abnormalities - some fillings, tiny chips      Cardiovascular   cardiovascular exam normal          Pulmonary   pulmonary exam normal                OUTSIDE LABS:  CBC:   Lab Results   Component Value Date    WBC 8.5 06/27/2023    WBC 9.5 04/19/2023    HGB 13.8 06/27/2023    HGB 14.4 04/19/2023    HCT 42.5 06/27/2023    HCT 44.7 04/19/2023     06/27/2023     04/19/2023     BMP:   Lab Results   Component Value Date     04/19/2023     07/19/2022    POTASSIUM 4.1 04/19/2023    POTASSIUM 4.5 07/19/2022    CHLORIDE 102 04/19/2023    CHLORIDE 104 07/19/2022    CO2 26 04/19/2023    CO2 24 07/19/2022    BUN 8.8 04/19/2023    BUN 11.2 07/19/2022    CR 0.78 04/19/2023    CR 0.80 07/19/2022    GLC 83 04/19/2023    GLC 88 07/19/2022     COAGS: No results found  for: PTT, INR, FIBR  POC:   Lab Results   Component Value Date    HCG Negative 09/05/2019    HCGS Negative 04/19/2023     HEPATIC:   Lab Results   Component Value Date    ALBUMIN 4.0 07/19/2022    PROTTOTAL 7.4 07/19/2022    ALT 13 07/19/2022    AST 18 07/19/2022    ALKPHOS 62 07/19/2022    BILITOTAL 0.4 07/19/2022     OTHER:   Lab Results   Component Value Date    LACT 1.8 02/25/2018    MAGALI 9.7 04/19/2023    PHOS 3.4 09/30/2021    MAG 2.1 04/19/2023    LIPASE 28 07/23/2020    TSH 1.40 04/19/2023    CRP 0.2 03/01/2019    SED 10 03/01/2019       Anesthesia Plan    ASA Status:  3   NPO Status:  NPO Appropriate    Anesthesia Type: General.     - Airway: ETT   Induction: Intravenous.   Maintenance: TIVA.        Consents    Anesthesia Plan(s) and associated risks, benefits, and realistic alternatives discussed. Questions answered and patient/representative(s) expressed understanding.    - Discussed:     - Discussed with:  Patient      - Extended Intubation/Ventilatory Support Discussed: No.      - Patient is DNR/DNI Status: No         Postoperative Care    Pain management: Multi-modal analgesia.   PONV prophylaxis: Ondansetron (or other 5HT-3), Dexamethasone or Solumedrol     Comments:    Other Comments: Patient has a migraine this morning. Will give tylenol now and toradol post procedure.   Limit muscle relaxants due to mitochondrial myopathy     Avoid succinylcholine     TIVA with propofol  Decadron 10 mg  Zofran     Reverse with Sugammadex             Shefali Oseguera MD

## 2023-06-29 NOTE — ANESTHESIA POSTPROCEDURE EVALUATION
Patient: Sherry Sweeney    Procedure: Procedure(s):  DIAGNOSTIC LAPAROSCOPY WITH LEFT SIDE OOPHORECTOMY AND LYSIS OF ADHESION       Anesthesia Type:  General    Note:  Disposition: Outpatient   Postop Pain Control: Uneventful            Sign Out: Well controlled pain   PONV: No   Neuro/Psych: Uneventful            Sign Out: Acceptable/Baseline neuro status   Airway/Respiratory: Uneventful            Sign Out: Acceptable/Baseline resp. status   CV/Hemodynamics: Uneventful            Sign Out: Acceptable CV status; No obvious hypovolemia; No obvious fluid overload   Other NRE: NONE   DID A NON-ROUTINE EVENT OCCUR?            Last vitals:  Vitals Value Taken Time   /65 06/29/23 1345   Temp 36.4  C (97.5  F) 06/29/23 1301   Pulse 92 06/29/23 1351   Resp 24 06/29/23 1351   SpO2 96 % 06/29/23 1351   Vitals shown include unvalidated device data.    Electronically Signed By: Shefali Oseguera MD  June 29, 2023  2:15 PM

## 2023-06-29 NOTE — ANESTHESIA CARE TRANSFER NOTE
Patient: Sherry Sweeney    Procedure: Procedure(s):  DIAGNOSTIC LAPAROSCOPY WITH LEFT SIDE OOPHORECTOMY AND LYSIS OF ADHESION       Diagnosis: Pain in pelvis [R10.2]  Pelvic and perineal pain [R10.2]  Diagnosis Additional Information: No value filed.    Anesthesia Type:   General     Note:    Oropharynx: oropharynx clear of all foreign objects and spontaneously breathing  Level of Consciousness: drowsy  Oxygen Supplementation: face mask  Level of Supplemental Oxygen (L/min / FiO2): 10  Independent Airway: airway patency satisfactory and stable  Dentition: dentition unchanged  Vital Signs Stable: post-procedure vital signs reviewed and stable  Report to RN Given: handoff report given  Patient transferred to: PACU    Handoff Report: Identifed the Patient, Identified the Reponsible Provider, Reviewed the pertinent medical history, Discussed the surgical course, Reviewed Intra-OP anesthesia mangement and issues during anesthesia, Set expectations for post-procedure period and Allowed opportunity for questions and acknowledgement of understanding      Vitals:  Vitals Value Taken Time   /87 06/29/23 1301   Temp 97.5 F 06/29/23 1301   Pulse 91 06/29/23 1301   Resp 14 06/29/23 1301   SpO2 100 % 06/29/23 1301   Vitals shown include unvalidated device data.    Electronically Signed By: SELENA Powers CRNA  June 29, 2023  1:02 PM

## 2023-06-29 NOTE — ANESTHESIA PROCEDURE NOTES
Airway       Patient location during procedure: OR       Procedure Start/Stop Times: 6/29/2023 12:15 PM  Staff -        Anesthesiologist:  Shefali Oseguera MD       CRNA: Jose A Martinez APRN CRNA       Other Anesthesia Staff: Morteza Loyd       Performed By: VALERIE  Consent for Airway        Urgency: elective  Indications and Patient Condition       Indications for airway management: solomon-procedural       Induction type:intravenous       Mask difficulty assessment: 1 - vent by mask    Final Airway Details       Final airway type: endotracheal airway       Successful airway: ETT - single  Endotracheal Airway Details        ETT size (mm): 7.0       Cuffed: yes       Cuff volume (mL): 8       Successful intubation technique: direct laryngoscopy       DL Blade Type: Spears 2       Grade View of Cords: 1       Adjucts: stylet       Position: Right       Measured from: lips       Secured at (cm): 21       Bite block used: None    Post intubation assessment        Placement verified by: capnometry, equal breath sounds and chest rise        Number of attempts at approach: 1       Number of other approaches attempted: 0       Secured with: silk tape       Ease of procedure: easy       Dentition: Intact and Unchanged       Dental guard used and removed. Dental Guard Type: Proguard Red.    Medication(s) Administered   Medication Administration Time: 6/29/2023 12:15 PM

## 2023-06-29 NOTE — H&P
OBGYN HISTORY AND PHYSICAL UPDATE ADMISSION EXAM    Sherry Sweeney  1980  [id number]    HPI: 42yo here for removal of L ovary secondary to pelvic pain.   Previously removed uterus and R adnexa.      Past Medical History:   Diagnosis Date     Anemia      Chronic fatigue syndrome      Chronic migraine      Depression      Depression      Depressive disorder 2013     Fibromyalgia      Fibromyalgia      H/O: hysterectomy 02/22/2018     History of anesthesia complications     slow to wake     IBS (irritable bowel syndrome)      Insomnia      Irregular heart beat      Mitochondrial disease (H)      Mitochondrial myopathy      Parasomnia      PONV (postoperative nausea and vomiting)      Past Surgical History:   Procedure Laterality Date     ABDOMEN SURGERY  1996 & 2018, feb.     APPENDECTOMY  1996     BIOPSY       HC REMOVAL OF OVARIAN CYST(S)      Description: Ovarian Cystectomy;  Recorded: 11/07/2013;     HYSTERECTOMY Bilateral 2/22/2018    Procedure: TOTAL ABDOMINAL HYSTERECTOMY BILATERAL SALPINGECTOMY, RIGHT OOPHORECTOMY;  Surgeon: Joanne Bedolla DO;  Location: [facility];  Service:      LAPAROSCOPIC CYSTECTOMY OVARIAN (ONCOLOGY) Left 10/18/2021    Procedure: LAPAROSCOPY,  LEFT OVARIAN CYSTECTOMY, LYSIS OF ADHESIONS.;  Surgeon: Joanne Bedolla MD;  Location: [facility]  2/24/2018          Current Outpatient Medications   Medication Instructions     augmented betamethasone dipropionate (DIPROLENE AF) 0.05 % external cream APPLY A THIN LAYER TO TO AFFECTED AREA ON BODY 1-2X DAILY TIMES DAILY FOR UP TO 2 WEEKS AT A TIME. TAKE 2 WEEKS OFF. REPEAT AS NEEDED FOR FL     betamethasone dipropionate (DIPROSONE) 0.05 % external lotion      buPROPion (WELLBUTRIN XL) 300 mg, Oral, DAILY     cetirizine (ZYRTEC) 10 MG tablet TAKE 1 TAB ORALLY DAILY AS NEEDED FOR ALLERGIES MAY INCREASE TO 2 TAB DAILY IF ITCHING NOT RELIEVED     clindamycin-benzoyl peroxide (BENZACLIN) gel Apply topically to  "acne once daily     clobetasol (TEMOVATE) 0.05 % external ointment No dose, route, or frequency recorded.     [employer] MELATONIN 3 MG tablet TAKE 1 TABLET (3 MG) BY MOUTH NIGHTLY AS NEEDED FOR SLEEP     cyclobenzaprine (FLEXERIL) 10 mg, Oral, DAILY PRN     Effexor  mg, Oral, DAILY     hydrOXYzine (ATARAX) 25-50 mg, Oral, AT BEDTIME     JUNEL FE 1.5/30 1.5-30 MG-MCG tablet 1 tablet, Oral, PRN     ketoconazole (NIZORAL) 2 % external cream APPLY TO AFFECTED AREA TWICE A DAY FOR 7 DAYS     lidocaine (LIDODERM) 5 % patch 1 patch, Transdermal, PRN, To neck, shoulders, and back     Multiple Vitamin (MULTIVITAMIN ADULT PO) 1 tablet, Oral, DAILY     naproxen (NAPROSYN) 500 MG tablet No dose, route, or frequency recorded.     omeprazole (PRILOSEC) 40 mg, Oral, EVERY MORNING BEFORE BREAKFAST, 30 minutes before meal     ondansetron (ZOFRAN) 4 mg, Oral, EVERY 8 HOURS PRN     Riboflavin 400 mg, Oral, DAILY     rizatriptan (MAXALT) 10 MG tablet TAKE 1 TABLET BY MOUTH AS NEEDED FOR MIGRAINE. MAY REPEAT IN 2 HOURS IF NEEDED     spironolactone (ALDACTONE) 50 mg, Oral, 2 TIMES DAILY     tretinoin (RETIN-A) 0.05 % external cream APPLY TO AFFECTED AREA EVERY DAY AT BEDTIME     triamcinolone (KENALOG) 0.1 % external ointment Topical, 2 TIMES DAILY     valACYclovir (VALTREX) 1000 mg tablet TAKE 2 TABLETS BY MOUTH 12 HOURS APART AS NEEDED EPISODE     vitamin C (ASCORBIC ACID) 500 mg, Oral, DAILY     zolpidem (AMBIEN) 5 mg, Oral, AT BEDTIME PRN         OB History   No obstetric history on file.           Exam:    /81   Pulse 86   Temp 97.9  F (36.6  C) (Oral)   Resp 18   Ht 1.549 m (5' 1\")   Wt 58.3 kg (128 lb 8 oz)   SpO2 97%   BMI 24.28 kg/m          HEENT          WNL              Heart              WNL               Lungs             WNL                      Abdomen        WNL                       Extremities     WNL                         Assessment: Pelvic pain.  Ovarian cysts.      Plan: Planned laparoscopic " oophorectomy.   KIRSTY bobo.      Joanne Bedolla DO, FACOG  6/29/2023 11:28 AM

## 2023-07-03 PROCEDURE — 93272 ECG/REVIEW INTERPRET ONLY: CPT | Performed by: INTERNAL MEDICINE

## 2023-07-12 ENCOUNTER — ANCILLARY PROCEDURE (OUTPATIENT)
Dept: MAMMOGRAPHY | Facility: CLINIC | Age: 43
End: 2023-07-12
Payer: COMMERCIAL

## 2023-07-12 DIAGNOSIS — Z12.31 ENCOUNTER FOR SCREENING MAMMOGRAM FOR BREAST CANCER: ICD-10-CM

## 2023-07-12 PROCEDURE — 77067 SCR MAMMO BI INCL CAD: CPT

## 2023-07-14 ENCOUNTER — ANCILLARY PROCEDURE (OUTPATIENT)
Dept: MAMMOGRAPHY | Facility: CLINIC | Age: 43
End: 2023-07-14
Payer: COMMERCIAL

## 2023-07-14 DIAGNOSIS — N64.89 BREAST ASYMMETRY: ICD-10-CM

## 2023-07-14 PROCEDURE — 76642 ULTRASOUND BREAST LIMITED: CPT | Mod: RT

## 2023-07-14 PROCEDURE — 77061 BREAST TOMOSYNTHESIS UNI: CPT | Mod: RT

## 2023-07-28 NOTE — ASSESSMENT & PLAN NOTE
Assumed care 0700.  Wants to use restroom frequently then does not do
anything.  Staff informed him of q 2 hours toileting schedule. Patient instructed on:  Bring insurance card(s), photo ID.  No jewelery or body piercing's.  Wear comfortable, loose clothing appropriate for the procedure.  Procedural prep instructions received and reviewed.  Aware of medication(s) to take DOS and/or hold per anesthesia and surgeon guidelines.  To bring a form of payment if requested to pay a portion of bill at registration.   can drop off and , but needs to provide phone number and be available at estimated discharge time.  Reading materials and beverages are not provided in the waiting areas. Ok to bring something in from home.    Patient advised to contact surgeon for any illness concerns    Patient verbalizes understanding.         Stable. Finds effexor and wellbutrin helpful.

## 2023-08-01 DIAGNOSIS — F33.9 MAJOR DEPRESSION, RECURRENT, CHRONIC (H): ICD-10-CM

## 2023-08-02 RX ORDER — BUPROPION HYDROCHLORIDE 300 MG/1
TABLET ORAL
Qty: 90 TABLET | Refills: 3 | Status: SHIPPED | OUTPATIENT
Start: 2023-08-02 | End: 2024-07-12

## 2023-08-02 RX ORDER — VENLAFAXINE HCL 150 MG
CAPSULE, EXT RELEASE 24 HR ORAL
Qty: 90 CAPSULE | Refills: 1 | Status: SHIPPED | OUTPATIENT
Start: 2023-08-02 | End: 2023-09-11

## 2023-08-02 NOTE — TELEPHONE ENCOUNTER
"Routing refill request to provider for review/approval because:  PHQ9 out of target range  Allergies/contraindications    Last Written Prescription Date:  01/22/2023  Last Fill Quantity: 90,  # refills: 1   Last office visit provider:  06/27/2023     Requested Prescriptions   Pending Prescriptions Disp Refills    EFFEXOR  MG 24 hr capsule [Pharmacy Med Name: EFFEXOR  MG CAPSULE] 90 capsule 1     Sig: TAKE 1 CAPSULE BY MOUTH DAILY       Serotonin-Norepinephrine Reuptake Inhibitors  Failed - 8/2/2023  1:28 PM        Failed - PHQ-9 score of less than 5 in past 6 months     Please review last PHQ-9 score.           Passed - Blood pressure under 140/90 in past 12 months     BP Readings from Last 3 Encounters:   06/29/23 113/73   06/27/23 108/78   06/13/23 121/79                 Passed - Medication is active on med list        Passed - Patient is age 18 or older        Passed - No active pregnancy on record        Passed - Normal serum creatinine on file in past 12 months     Recent Labs   Lab Test 04/19/23  1634   CR 0.78       Ok to refill medication if creatinine is low          Passed - No positive pregnancy test in past 12 months        Passed - Recent (6 mo) or future (30 days) visit within the authorizing provider's specialty     Patient had office visit in the last 6 months or has a visit in the next 30 days with authorizing provider or within the authorizing provider's specialty.  See \"Patient Info\" tab in inbasket, or \"Choose Columns\" in Meds & Orders section of the refill encounter.                Routing refill request to provider for review/approval because:  PHQ9 out of target range    Last Written Prescription Date:  06/29/2022  Last Fill Quantity: 90,  # refills: 3   Last office visit provider:  06/27/2023         buPROPion (WELLBUTRIN XL) 300 MG 24 hr tablet [Pharmacy Med Name: BUPROPION HCL  MG TABLET] 90 tablet 3     Sig: TAKE 1 TABLET BY MOUTH EVERY DAY       SSRIs Protocol Failed - " "8/2/2023  1:28 PM        Failed - PHQ-9 score less than 5 in past 6 months     Please review last PHQ-9 score.           Passed - Medication is Bupropion     If the medication is Bupropion (Wellbutrin), and the patient is taking for smoking cessation; OK to refill.          Passed - Medication is active on med list        Passed - Patient is age 18 or older        Passed - No active pregnancy on record        Passed - No positive pregnancy test in last 12 months        Passed - Recent (6 mo) or future (30 days) visit within the authorizing provider's specialty     Patient had office visit in the last 6 months or has a visit in the next 30 days with authorizing provider or within the authorizing provider's specialty.  See \"Patient Info\" tab in inbasket, or \"Choose Columns\" in Meds & Orders section of the refill encounter.                 Brooke Lewis RN 08/02/23 1:28 PM  "

## 2023-08-14 NOTE — PROGRESS NOTES
NEUROLOGY FOLLOW UP VISIT  NOTE       [facility] [place]  [address] [place] [address]  Tel: [phone]  Fax: [phone]  [email or web address]     Sherry Sweeney,  1980, MRN [id number]  PCP: Layla Martinez  Date: 2023      ASSESSMENT & PLAN     Visit Diagnosis  Chronic migraine without aura, intractable, without status migrainosus     Chronic migraine without aura  Pleasant 43-year-old female with history of mitochondrial myopathy, chronic migraine without aura, fibromyalgia who returns for 3-month Botox injection.  Previously she had tried different medication that did not seem to help but after she was switched to Botox there was dramatic improvement.  There was a break in Botox treatment due to COVID pandemic.  This was resumed on 2023 and reports the frequency declined somewhat but the intensity has declined significantly.  Headache only last for 1 to 4 hours.  She continues to notice decline in her headache frequency and intensity and is quite pleased.  She also requested refill of rizatriptan.  After explaining all the side effect and obtaining her consent, I injected 155 units according to the enclosed sheet.  45 units were discarded.  She was told to give a headache diary.  Follow-up will be in 3 months    Thank you again for this referral, please feel free to contact me if you have any questions.    Mikel Hernandez MD  [facility]  (Formerly, [facility]     HISTORY OF PRESENT ILLNESS     Patient is a 43-year-old female with history of mitochondrial myopathy, chronic migraine without aura, fibromyalgia last seen on 5/15/2023 who returns for 3 monthly Botox injection.  She started getting Botox in 2019 and noticed improvement but due to COVID 19 pandemic could not continue and recently restarted her Botox injection.  Previously she had tried Depakote, amitriptyline, beta-blocker that did  not help.  After Botox was restarted she has noticed more than 50% decline in her headache frequency and intensity.    Briefly patient is a female with history of mitochondrial myopathy, chronic migraine without aura, fibromyalgia who started having headaches in 2014 and progressively got worse.  She initially attributed it to pressure in the neck or TMJ abnormality and that in the past was evaluated and started on some hormonal supplements.  Due to progressive weakness she was evaluated for possible mitochondrial myopathy and had mitochondrial DNA tested that was positive.  For her headaches she was started on Depakote, amitriptyline and in the past had also tried Cymbalta and as she was having more than 15 headaches in a month Botox was recommended.  She started getting Botox in 2017 and did notice improvement in her headache.  Discontinued until 2019 but due to COVID pandemic she stopped doing Botox injection and noticed worsening headaches and Botox was restarted in 2023.     PROBLEM LIST   Patient Active Problem List   Diagnosis Code    Acne vulgaris L70.0    Arthralgia of temporomandibular joint M26.629    Debility R53.81    Fibromyalgia M79.7    Hypermobility syndrome M35.7    IBS (irritable bowel syndrome) K58.9    Insomnia G47.00    Major depression, recurrent, chronic (H) F33.9    Chronic migraine without aura, intractable, without status migrainosus G43.719    Mitochondrial disease (H) E88.40    Generalized anxiety disorder F41.1    Post-traumatic stress disorder F43.10    Parasomnia G47.50    Seasonal depression (H) F33.8    Palpitations R00.2    Preop general physical exam Z01.818         PAST MEDICAL & SURGICAL HISTORY     Past Medical History:   Patient  has a past medical history of Anemia, Chronic fatigue syndrome, Chronic migraine, Depression, Depression, Depressive disorder (2013), Fibromyalgia, Fibromyalgia, H/O: hysterectomy (02/22/2018), History of anesthesia complications, IBS (irritable bowel  syndrome), Insomnia, Irregular heart beat, Mitochondrial disease (H), Mitochondrial myopathy, Parasomnia, and PONV (postoperative nausea and vomiting).    Surgical History:  She  has a past surgical history that includes REMOVAL OF OVARIAN CYST(S); Hysterectomy (Bilateral, 2/22/2018); Picc (2/24/2018); Abdomen surgery (1996 & 2018, feb.); appendectomy (1996); biopsy; Laparoscopic cystectomy ovarian (oncology) (Left, 10/18/2021); and Laparoscopy diagnostic (gyn) (Left, 6/29/2023).     SOCIAL HISTORY     Reviewed, and she  reports that she has never smoked. She has never used smokeless tobacco. She reports that she does not drink alcohol and does not use drugs.     FAMILY HISTORY     Reviewed, and family history includes Anxiety Disorder in her maternal grandmother, mother, paternal grandfather, and paternal grandmother; Bone Cancer in her maternal grandfather; Breast Cancer (age of onset: 60.00) in her maternal grandmother; Breast Cancer (age of onset: 62.00) in her mother; Cancer in her mother; Coronary Artery Disease (age of onset: 45.00) in her father; Depression in her brother, father, maternal grandmother, and mother; Diabetes in her cousin and cousin; Hyperlipidemia in her mother; Hypertension in her mother; Meniere's disease in her mother.     ALLERGIES     Allergies   Allergen Reactions    Ciprofloxacin Itching, Rash and Hives     Rash over whole body   Rash over whole body       Egg White (Egg Protein) Other (See Comments) and GI Disturbance     Swollen colon, belly pain  Swollen colon, belly pain      Influenza Virus Vaccine Shortness Of Breath and Anaphylaxis    No Clinical Screening - See Comments Anaphylaxis     Stomach swelling    Sulfa Antibiotics Rash and Hives    Yeast Other (See Comments) and Anaphylaxis    Desvenlafaxine Blisters, Itching and Rash    Milnacipran Other (See Comments) and Palpitations     Chest pain, hot/cold flashes    Quetiapine Palpitations     Tachycardia, nervousness, elevated  blood pressure.   Tachycardia, nervousness, elevated blood pressure.          REVIEW OF SYSTEMS     A 12 point review of system was performed and was negative except as outlined in the history of present illness.     HOME MEDICATIONS     Current Outpatient Rx   Medication Sig Dispense Refill    ascorbic acid 500 MG TABS Take 500 mg by mouth daily      buPROPion (WELLBUTRIN XL) 300 MG 24 hr tablet TAKE 1 TABLET BY MOUTH EVERY DAY 90 tablet 3    cetirizine (ZYRTEC) 10 MG tablet TAKE 1 TAB ORALLY DAILY AS NEEDED FOR ALLERGIES MAY INCREASE TO 2 TAB DAILY IF ITCHING NOT RELIEVED 90 tablet 2    clindamycin-benzoyl peroxide (BENZACLIN) gel Apply topically to acne once daily      clobetasol (TEMOVATE) 0.05 % external ointment       [facility] MELATONIN 3 MG tablet TAKE 1 TABLET (3 MG) BY MOUTH NIGHTLY AS NEEDED FOR SLEEP 90 tablet 1    cyclobenzaprine (FLEXERIL) 10 MG tablet Take 10 mg by mouth daily as needed for muscle spasms      EFFEXOR  MG 24 hr capsule TAKE 1 CAPSULE BY MOUTH DAILY 90 capsule 1    hydrOXYzine (ATARAX) 25 MG tablet Take 1-2 tablets (25-50 mg) by mouth At Bedtime 180 tablet 1    ketoconazole (NIZORAL) 2 % external cream APPLY TO AFFECTED AREA TWICE A DAY FOR 7 DAYS      lidocaine (LIDODERM) 5 % patch PLACE 1 PATCH ONTO THE SKIN AS NEEDED TO NECK, SHOULDERS, AND BACK 30 patch 0    Multiple Vitamin (MULTIVITAMIN ADULT PO) Take 1 tablet by mouth daily      naproxen (NAPROSYN) 500 MG tablet       omeprazole (PRILOSEC) 40 MG DR capsule TAKE 1 CAPSULE (40 MG) BY MOUTH EVERY MORNING (BEFORE BREAKFAST) 30 MINUTES BEFORE MEAL 90 capsule 3    ondansetron (ZOFRAN) 4 MG tablet Take 1 tablet (4 mg) by mouth every 8 hours as needed for nausea 18 tablet 1    Riboflavin 400 MG TABS Take 400 mg by mouth daily      rizatriptan (MAXALT) 10 MG tablet TAKE 1 TABLET BY MOUTH AS NEEDED FOR MIGRAINE. MAY REPEAT IN 2 HOURS IF NEEDED 10 tablet 3    spironolactone (ALDACTONE) 50 MG tablet Take 1 tablet (50 mg) by mouth 2 times  "daily 180 tablet 1    tretinoin (RETIN-A) 0.05 % external cream APPLY TO AFFECTED AREA EVERY DAY AT BEDTIME 45 g 2    triamcinolone (KENALOG) 0.1 % external ointment Apply topically 2 times daily 80 g 1    valACYclovir (VALTREX) 1000 mg tablet TAKE 2 TABLETS BY MOUTH 12 HOURS APART AS NEEDED EPISODE 12 tablet 3    zolpidem (AMBIEN) 5 MG tablet TAKE 1 TABLET (5 MG) BY MOUTH NIGHTLY AS NEEDED FOR SLEEP 30 tablet 0    augmented betamethasone dipropionate (DIPROLENE AF) 0.05 % external cream APPLY A THIN LAYER TO TO AFFECTED AREA ON BODY 1-2X DAILY TIMES DAILY FOR UP TO 2 WEEKS AT A TIME. TAKE 2 WEEKS OFF. REPEAT AS NEEDED FOR FL      betamethasone dipropionate (DIPROSONE) 0.05 % external lotion            PHYSICAL EXAM     Vital signs  /85   Pulse 91   Ht 1.549 m (5' 1\")   Wt 58.1 kg (128 lb)   BMI 24.19 kg/m      Weight:   128 lbs 0 oz    Pleasant female who is alert and oriented vital signs were reviewed and documented in electronic medical record.  Neck supple.  Neurologically speech was normal.  Cranial nerves II through XII are intact motor strength neck flexor and extensor 4+/5 in upper extremity 5/5 in the lower extremity proximally 4+/5 distally 5/5 reflexes 2+ toes downgoing no dysmetria noted on finger-nose testing gait normal.      PERTINENT DIAGNOSTIC STUDIES     Following studies were reviewed:       MRI BRAIN 10/29/2015  1.  Normal Head MRI.   2.  No acute infarcts, mass lesions or hemorrhage.        PERTINENT LABS  Following labs were reviewed:  Admission on 06/29/2023, Discharged on 06/29/2023   Component Date Value Ref Range Status    Case Report 06/29/2023    Final                    Value:Surgical Pathology Report                         Case: [id number]                                  Authorizing Provider:  Joanne Bedolla,   Collected:           06/29/2023 12:36 PM                                 MD                                                                         "   Ordering Location:     [facility]      Received:            06/29/2023 01:11 PM                                 [facility]                                                               Pathologist:           Francisco Richmond MD                                                      Specimen:    Ovary, Left, Left Ovary                                                                    Final Diagnosis 06/29/2023    Final                    Value:This result contains rich text formatting which cannot be displayed here.    Clinical Information 06/29/2023    Final                    Value:This result contains rich text formatting which cannot be displayed here.    Gross Description 06/29/2023    Final                    Value:This result contains rich text formatting which cannot be displayed here.    Microscopic Description 06/29/2023    Final                    Value:This result contains rich text formatting which cannot be displayed here.    Performing Labs 06/29/2023    Final                    Value:This result contains rich text formatting which cannot be displayed here.   Office Visit on 06/27/2023   Component Date Value Ref Range Status    WBC Count 06/27/2023 8.5  4.0 - 11.0 10e3/uL Final    RBC Count 06/27/2023 4.91  3.80 - 5.20 10e6/uL Final    Hemoglobin 06/27/2023 13.8  11.7 - 15.7 g/dL Final    Hematocrit 06/27/2023 42.5  35.0 - 47.0 % Final    MCV 06/27/2023 87  78 - 100 fL Final    MCH 06/27/2023 28.1  26.5 - 33.0 pg Final    MCHC 06/27/2023 32.5  31.5 - 36.5 g/dL Final    RDW 06/27/2023 12.5  10.0 - 15.0 % Final    Platelet Count 06/27/2023 327  150 - 450 10e3/uL Final         Total time spent for face to face visit, reviewing labs/imaging studies, counseling and coordination of care was: 20 minutes spent on the date of the encounter doing chart review, review of outside records, review of test results, interpretation of tests, patient visit, and documentation     This note was  dictated using voice recognition software.  Any grammatical or context distortions are unintentional and inherent to the software.    Orders Placed This Encounter   Procedures    04519 - MIGRAINE      New Prescriptions    No medications on file     Modified Medications    Modified Medication Previous Medication    RIZATRIPTAN (MAXALT) 10 MG TABLET rizatriptan (MAXALT) 10 MG tablet       TAKE 1 TABLET BY MOUTH AS NEEDED FOR MIGRAINE. MAY REPEAT IN 2 HOURS IF NEEDED    TAKE 1 TABLET BY MOUTH AS NEEDED FOR MIGRAINE. MAY REPEAT IN 2 HOURS IF NEEDED

## 2023-08-16 ENCOUNTER — OFFICE VISIT (OUTPATIENT)
Dept: NEUROLOGY | Facility: CLINIC | Age: 43
End: 2023-08-16
Payer: COMMERCIAL

## 2023-08-16 VITALS
BODY MASS INDEX: 24.17 KG/M2 | SYSTOLIC BLOOD PRESSURE: 117 MMHG | WEIGHT: 128 LBS | HEART RATE: 91 BPM | HEIGHT: 61 IN | DIASTOLIC BLOOD PRESSURE: 85 MMHG

## 2023-08-16 DIAGNOSIS — G47.00 INSOMNIA, UNSPECIFIED TYPE: ICD-10-CM

## 2023-08-16 DIAGNOSIS — G43.719 CHRONIC MIGRAINE WITHOUT AURA, INTRACTABLE, WITHOUT STATUS MIGRAINOSUS: Primary | ICD-10-CM

## 2023-08-16 PROCEDURE — 64615 CHEMODENERV MUSC MIGRAINE: CPT | Performed by: PSYCHIATRY & NEUROLOGY

## 2023-08-16 RX ORDER — RIZATRIPTAN BENZOATE 10 MG/1
TABLET ORAL
Qty: 10 TABLET | Refills: 3 | Status: SHIPPED | OUTPATIENT
Start: 2023-08-16 | End: 2024-02-13

## 2023-08-16 NOTE — PROCEDURES
BOTOX INJECTION PROCEDURE NOTE     Date: 08/16/2023    INDICATION: CHRONIC MIGRAINE    Number of headache days/month currently: 14 (BASELINE: >15   )  Number of headache hours/day currently : Monitor for (BASELINE: 4-24 Hours   )  Number of headache free days post treatment:  16  Disability due to migraine headache: yes  Consent: Obtained  Indication: Chronic Migraine    Botox Lot No: [id number] C4 expiration 12/31/2025  Number of units injected: 155 U  Number of units of unavoidable wastage: 45 U    LOCATION  At today s visit, patient was injected with a total of 155 units divided in 31 sites. A total of 2 mL of normal saline was used to dilute 100 units of the drug. The following muscles were injected:  10 units divided in two sites, procerus 5 units in one site, frontalis 20 units divided in four sites, temporalis 40 units divided in eight sites, occipitalis 30 units divided in six sites, cervical paraspinals 20 units divided in four sites, and trapezius 30 units divided in six sites. 45 units were unavoidable wastage        ASSESSMENT/PLAN  Chronic Migraine headache  The patient tolerated the procedure well. There were no immediate complications. Prior to the procedure, I discussed the potential side effects of Botox therapy, which includes generalized muscle weakness, diplopia, ptosis, dysphagia, dysphonia, dysarthria, urinary incontinence, and breathing difficulties. Also I discussed the black box warning of the drug that can include the rare chance of distant spread of the drug to swallowing and breathing muscles that can be life threatening. All of patient's questions were answered prior to our signing the consent form. patient left the clinic after a brief period of observation and will return for repeat injection in three months  time as needed. I informed patient again the goal is to maintain at least seven to eight headache free days on average a month. I explained to patient that most patients  with chronic migraines do need life time Botox treatment, however, at any time if the patient wishes to stop Botox we can do so as some patients do go into remission on their own over time. Follow up will be in 3-4 months      Mikel Hernandez M.D.  [facility]  (Former[facility], [facility]

## 2023-08-16 NOTE — LETTER
2023         RE: Sherry Sweeney   \[facility] [place] [facility]\"" [id number]        Dear Colleague,    Thank you for referring your patient, Sherry Sweeney, to the [facility] [place]. Please see a copy of my visit note below.    NEUROLOGY FOLLOW UP VISIT  NOTE       [facility] [place]  [address] [place] [address]  Tel: [phone]  Fax: [phone]  [email or web address]     Sherry Sweeney,  1980, MRN [id number]  PCP: Layla Martinez  Date: 2023      ASSESSMENT & PLAN     Visit Diagnosis  Chronic migraine without aura, intractable, without status migrainosus     Chronic migraine without aura  Pleasant 43-year-old female with history of mitochondrial myopathy, chronic migraine without aura, fibromyalgia who returns for 3-month Botox injection.  Previously she had tried different medication that did not seem to help but after she was switched to Botox there was dramatic improvement.  There was a break in Botox treatment due to COVID pandemic.  This was resumed on 2023 and reports the frequency declined somewhat but the intensity has declined significantly.  Headache only last for 1 to 4 hours.  She continues to notice decline in her headache frequency and intensity and is quite pleased.  She also requested refill of rizatriptan.  After explaining all the side effect and obtaining her consent, I injected 155 units according to the enclosed sheet.  45 units were discarded.  She was told to give a headache diary.  Follow-up will be in 3 months    Thank you again for this referral, please feel free to contact me if you have any questions.    Mikel Hernandez MD  [facility]  (Formerly, [facility]     HISTORY OF PRESENT ILLNESS     Patient is a 43-year-old female with history of mitochondrial myopathy, chronic migraine without aura, fibromyalgia last seen on 5/15/2023 who returns for  3 monthly Botox injection.  She started getting Botox in 2019 and noticed improvement but due to COVID 19 pandemic could not continue and recently restarted her Botox injection.  Previously she had tried Depakote, amitriptyline, beta-blocker that did not help.  After Botox was restarted she has noticed more than 50% decline in her headache frequency and intensity.    Briefly patient is a female with history of mitochondrial myopathy, chronic migraine without aura, fibromyalgia who started having headaches in 2014 and progressively got worse.  She initially attributed it to pressure in the neck or TMJ abnormality and that in the past was evaluated and started on some hormonal supplements.  Due to progressive weakness she was evaluated for possible mitochondrial myopathy and had mitochondrial DNA tested that was positive.  For her headaches she was started on Depakote, amitriptyline and in the past had also tried Cymbalta and as she was having more than 15 headaches in a month Botox was recommended.  She started getting Botox in 2017 and did notice improvement in her headache.  Discontinued until 2019 but due to COVID pandemic she stopped doing Botox injection and noticed worsening headaches and Botox was restarted in 2023.     PROBLEM LIST   Patient Active Problem List   Diagnosis Code     Acne vulgaris L70.0     Arthralgia of temporomandibular joint M26.629     Debility R53.81     Fibromyalgia M79.7     Hypermobility syndrome M35.7     IBS (irritable bowel syndrome) K58.9     Insomnia G47.00     Major depression, recurrent, chronic (H) F33.9     Chronic migraine without aura, intractable, without status migrainosus G43.719     Mitochondrial disease (H) E88.40     Generalized anxiety disorder F41.1     Post-traumatic stress disorder F43.10     Parasomnia G47.50     Seasonal depression (H) F33.8     Palpitations R00.2     Preop general physical exam Z01.818         PAST MEDICAL & SURGICAL HISTORY     Past Medical  History:   Patient  has a past medical history of Anemia, Chronic fatigue syndrome, Chronic migraine, Depression, Depression, Depressive disorder (2013), Fibromyalgia, Fibromyalgia, H/O: hysterectomy (02/22/2018), History of anesthesia complications, IBS (irritable bowel syndrome), Insomnia, Irregular heart beat, Mitochondrial disease (H), Mitochondrial myopathy, Parasomnia, and PONV (postoperative nausea and vomiting).    Surgical History:  She  has a past surgical history that includes REMOVAL OF OVARIAN CYST(S); Hysterectomy (Bilateral, 2/22/2018); Picc (2/24/2018); Abdomen surgery (1996 & 2018, feb.); appendectomy (1996); biopsy; Laparoscopic cystectomy ovarian (oncology) (Left, 10/18/2021); and Laparoscopy diagnostic (gyn) (Left, 6/29/2023).     SOCIAL HISTORY     Reviewed, and she  reports that she has never smoked. She has never used smokeless tobacco. She reports that she does not drink alcohol and does not use drugs.     FAMILY HISTORY     Reviewed, and family history includes Anxiety Disorder in her maternal grandmother, mother, paternal grandfather, and paternal grandmother; Bone Cancer in her maternal grandfather; Breast Cancer (age of onset: 60.00) in her maternal grandmother; Breast Cancer (age of onset: 62.00) in her mother; Cancer in her mother; Coronary Artery Disease (age of onset: 45.00) in her father; Depression in her brother, father, maternal grandmother, and mother; Diabetes in her cousin and cousin; Hyperlipidemia in her mother; Hypertension in her mother; Meniere's disease in her mother.     ALLERGIES     Allergies   Allergen Reactions     Ciprofloxacin Itching, Rash and Hives     Rash over whole body   Rash over whole body        Egg White (Egg Protein) Other (See Comments) and GI Disturbance     Swollen colon, belly pain  Swollen colon, belly pain       Influenza Virus Vaccine Shortness Of Breath and Anaphylaxis     No Clinical Screening - See Comments Anaphylaxis     Stomach swelling      Sulfa Antibiotics Rash and Hives     Yeast Other (See Comments) and Anaphylaxis     Desvenlafaxine Blisters, Itching and Rash     Milnacipran Other (See Comments) and Palpitations     Chest pain, hot/cold flashes     Quetiapine Palpitations     Tachycardia, nervousness, elevated blood pressure.   Tachycardia, nervousness, elevated blood pressure.          REVIEW OF SYSTEMS     A 12 point review of system was performed and was negative except as outlined in the history of present illness.     HOME MEDICATIONS     Current Outpatient Rx   Medication Sig Dispense Refill     ascorbic acid 500 MG TABS Take 500 mg by mouth daily       buPROPion (WELLBUTRIN XL) 300 MG 24 hr tablet TAKE 1 TABLET BY MOUTH EVERY DAY 90 tablet 3     cetirizine (ZYRTEC) 10 MG tablet TAKE 1 TAB ORALLY DAILY AS NEEDED FOR ALLERGIES MAY INCREASE TO 2 TAB DAILY IF ITCHING NOT RELIEVED 90 tablet 2     clindamycin-benzoyl peroxide (BENZACLIN) gel Apply topically to acne once daily       clobetasol (TEMOVATE) 0.05 % external ointment        [facility] MELATONIN 3 MG tablet TAKE 1 TABLET (3 MG) BY MOUTH NIGHTLY AS NEEDED FOR SLEEP 90 tablet 1     cyclobenzaprine (FLEXERIL) 10 MG tablet Take 10 mg by mouth daily as needed for muscle spasms       EFFEXOR  MG 24 hr capsule TAKE 1 CAPSULE BY MOUTH DAILY 90 capsule 1     hydrOXYzine (ATARAX) 25 MG tablet Take 1-2 tablets (25-50 mg) by mouth At Bedtime 180 tablet 1     ketoconazole (NIZORAL) 2 % external cream APPLY TO AFFECTED AREA TWICE A DAY FOR 7 DAYS       lidocaine (LIDODERM) 5 % patch PLACE 1 PATCH ONTO THE SKIN AS NEEDED TO NECK, SHOULDERS, AND BACK 30 patch 0     Multiple Vitamin (MULTIVITAMIN ADULT PO) Take 1 tablet by mouth daily       naproxen (NAPROSYN) 500 MG tablet        omeprazole (PRILOSEC) 40 MG DR capsule TAKE 1 CAPSULE (40 MG) BY MOUTH EVERY MORNING (BEFORE BREAKFAST) 30 MINUTES BEFORE MEAL 90 capsule 3     ondansetron (ZOFRAN) 4 MG tablet Take 1 tablet (4 mg) by mouth every 8 hours as  "needed for nausea 18 tablet 1     Riboflavin 400 MG TABS Take 400 mg by mouth daily       rizatriptan (MAXALT) 10 MG tablet TAKE 1 TABLET BY MOUTH AS NEEDED FOR MIGRAINE. MAY REPEAT IN 2 HOURS IF NEEDED 10 tablet 3     spironolactone (ALDACTONE) 50 MG tablet Take 1 tablet (50 mg) by mouth 2 times daily 180 tablet 1     tretinoin (RETIN-A) 0.05 % external cream APPLY TO AFFECTED AREA EVERY DAY AT BEDTIME 45 g 2     triamcinolone (KENALOG) 0.1 % external ointment Apply topically 2 times daily 80 g 1     valACYclovir (VALTREX) 1000 mg tablet TAKE 2 TABLETS BY MOUTH 12 HOURS APART AS NEEDED EPISODE 12 tablet 3     zolpidem (AMBIEN) 5 MG tablet TAKE 1 TABLET (5 MG) BY MOUTH NIGHTLY AS NEEDED FOR SLEEP 30 tablet 0     augmented betamethasone dipropionate (DIPROLENE AF) 0.05 % external cream APPLY A THIN LAYER TO TO AFFECTED AREA ON BODY 1-2X DAILY TIMES DAILY FOR UP TO 2 WEEKS AT A TIME. TAKE 2 WEEKS OFF. REPEAT AS NEEDED FOR FL       betamethasone dipropionate (DIPROSONE) 0.05 % external lotion            PHYSICAL EXAM     Vital signs  /85   Pulse 91   Ht 1.549 m (5' 1\")   Wt 58.1 kg (128 lb)   BMI 24.19 kg/m      Weight:   128 lbs 0 oz    Pleasant female who is alert and oriented vital signs were reviewed and documented in electronic medical record.  Neck supple.  Neurologically speech was normal.  Cranial nerves II through XII are intact motor strength neck flexor and extensor 4+/5 in upper extremity 5/5 in the lower extremity proximally 4+/5 distally 5/5 reflexes 2+ toes downgoing no dysmetria noted on finger-nose testing gait normal.      PERTINENT DIAGNOSTIC STUDIES     Following studies were reviewed:       MRI BRAIN 10/29/2015  1.  Normal Head MRI.   2.  No acute infarcts, mass lesions or hemorrhage.        PERTINENT LABS  Following labs were reviewed:  Admission on 06/29/2023, Discharged on 06/29/2023   Component Date Value Ref Range Status     Case Report 06/29/2023    Final                    " Value:Surgical Pathology Report                         Case: [id number]                                  Authorizing Provider:  Joanne Bedolla,   Collected:           06/29/2023 12:36 PM                                 MD                                                                           Ordering Location:     [facility]      Received:            06/29/2023 01:11 PM                                 [facility]                                                               Pathologist:           Francisco Richmond MD                                                      Specimen:    Ovary, Left, Left Ovary                                                                     Final Diagnosis 06/29/2023    Final                    Value:This result contains rich text formatting which cannot be displayed here.     Clinical Information 06/29/2023    Final                    Value:This result contains rich text formatting which cannot be displayed here.     Gross Description 06/29/2023    Final                    Value:This result contains rich text formatting which cannot be displayed here.     Microscopic Description 06/29/2023    Final                    Value:This result contains rich text formatting which cannot be displayed here.     Performing Labs 06/29/2023    Final                    Value:This result contains rich text formatting which cannot be displayed here.   Office Visit on 06/27/2023   Component Date Value Ref Range Status     WBC Count 06/27/2023 8.5  4.0 - 11.0 10e3/uL Final     RBC Count 06/27/2023 4.91  3.80 - 5.20 10e6/uL Final     Hemoglobin 06/27/2023 13.8  11.7 - 15.7 g/dL Final     Hematocrit 06/27/2023 42.5  35.0 - 47.0 % Final     MCV 06/27/2023 87  78 - 100 fL Final     MCH 06/27/2023 28.1  26.5 - 33.0 pg Final     MCHC 06/27/2023 32.5  31.5 - 36.5 g/dL Final     RDW 06/27/2023 12.5  10.0 - 15.0 % Final     Platelet Count 06/27/2023 327  150 - 450 10e3/uL Final          Total time spent for face to face visit, reviewing labs/imaging studies, counseling and coordination of care was: 20 minutes spent on the date of the encounter doing chart review, review of outside records, review of test results, interpretation of tests, patient visit, and documentation     This note was dictated using voice recognition software.  Any grammatical or context distortions are unintentional and inherent to the software.    Orders Placed This Encounter   Procedures     08397 - MIGRAINE      New Prescriptions    No medications on file     Modified Medications    Modified Medication Previous Medication    RIZATRIPTAN (MAXALT) 10 MG TABLET rizatriptan (MAXALT) 10 MG tablet       TAKE 1 TABLET BY MOUTH AS NEEDED FOR MIGRAINE. MAY REPEAT IN 2 HOURS IF NEEDED    TAKE 1 TABLET BY MOUTH AS NEEDED FOR MIGRAINE. MAY REPEAT IN 2 HOURS IF NEEDED                   Again, thank you for allowing me to participate in the care of your patient.        Sincerely,        Mikel Hernandez MD

## 2023-08-17 ENCOUNTER — OFFICE VISIT (OUTPATIENT)
Dept: FAMILY MEDICINE | Facility: CLINIC | Age: 43
End: 2023-08-17
Payer: COMMERCIAL

## 2023-08-17 VITALS
OXYGEN SATURATION: 98 % | WEIGHT: 134 LBS | SYSTOLIC BLOOD PRESSURE: 119 MMHG | HEART RATE: 100 BPM | DIASTOLIC BLOOD PRESSURE: 80 MMHG | HEIGHT: 61 IN | BODY MASS INDEX: 25.3 KG/M2

## 2023-08-17 DIAGNOSIS — E88.40 MITOCHONDRIAL DISEASE (H): ICD-10-CM

## 2023-08-17 DIAGNOSIS — L70.0 ACNE VULGARIS: ICD-10-CM

## 2023-08-17 DIAGNOSIS — F33.9 MAJOR DEPRESSION, RECURRENT, CHRONIC (H): ICD-10-CM

## 2023-08-17 DIAGNOSIS — M26.623 BILATERAL TEMPOROMANDIBULAR JOINT PAIN: ICD-10-CM

## 2023-08-17 DIAGNOSIS — G43.719 CHRONIC MIGRAINE WITHOUT AURA, INTRACTABLE, WITHOUT STATUS MIGRAINOSUS: ICD-10-CM

## 2023-08-17 DIAGNOSIS — Z00.00 ENCOUNTER FOR MEDICARE ANNUAL WELLNESS EXAM: Primary | ICD-10-CM

## 2023-08-17 DIAGNOSIS — F41.1 GENERALIZED ANXIETY DISORDER: ICD-10-CM

## 2023-08-17 DIAGNOSIS — E55.9 VITAMIN D DEFICIENCY: ICD-10-CM

## 2023-08-17 DIAGNOSIS — Z13.220 LIPID SCREENING: ICD-10-CM

## 2023-08-17 DIAGNOSIS — K21.9 GASTROESOPHAGEAL REFLUX DISEASE WITHOUT ESOPHAGITIS: ICD-10-CM

## 2023-08-17 DIAGNOSIS — K58.9 IRRITABLE BOWEL SYNDROME, UNSPECIFIED TYPE: ICD-10-CM

## 2023-08-17 DIAGNOSIS — R41.840 INATTENTION: ICD-10-CM

## 2023-08-17 DIAGNOSIS — R00.2 PALPITATIONS: ICD-10-CM

## 2023-08-17 PROBLEM — Z90.710 VAGINAL PAPANICOLAOU SMEAR NOT REQUIRED DUE TO HISTORY OF HYSTERECTOMY: Status: ACTIVE | Noted: 2023-08-17

## 2023-08-17 PROBLEM — Z01.818 PREOP GENERAL PHYSICAL EXAM: Status: RESOLVED | Noted: 2023-06-27 | Resolved: 2023-08-17

## 2023-08-17 PROCEDURE — 82306 VITAMIN D 25 HYDROXY: CPT | Performed by: FAMILY MEDICINE

## 2023-08-17 PROCEDURE — 91312 COVID-19 BIVALENT 12+ (PFIZER): CPT | Performed by: FAMILY MEDICINE

## 2023-08-17 PROCEDURE — 99214 OFFICE O/P EST MOD 30 MIN: CPT | Mod: 25 | Performed by: FAMILY MEDICINE

## 2023-08-17 PROCEDURE — 80061 LIPID PANEL: CPT | Performed by: FAMILY MEDICINE

## 2023-08-17 PROCEDURE — 36415 COLL VENOUS BLD VENIPUNCTURE: CPT | Performed by: FAMILY MEDICINE

## 2023-08-17 PROCEDURE — 0121A COVID-19 BIVALENT 12+ (PFIZER): CPT | Performed by: FAMILY MEDICINE

## 2023-08-17 PROCEDURE — G0439 PPPS, SUBSEQ VISIT: HCPCS | Performed by: FAMILY MEDICINE

## 2023-08-17 ASSESSMENT — ANXIETY QUESTIONNAIRES
7. FEELING AFRAID AS IF SOMETHING AWFUL MIGHT HAPPEN: SEVERAL DAYS
GAD7 TOTAL SCORE: 3
GAD7 TOTAL SCORE: 3
6. BECOMING EASILY ANNOYED OR IRRITABLE: SEVERAL DAYS
3. WORRYING TOO MUCH ABOUT DIFFERENT THINGS: SEVERAL DAYS
2. NOT BEING ABLE TO STOP OR CONTROL WORRYING: NOT AT ALL
IF YOU CHECKED OFF ANY PROBLEMS ON THIS QUESTIONNAIRE, HOW DIFFICULT HAVE THESE PROBLEMS MADE IT FOR YOU TO DO YOUR WORK, TAKE CARE OF THINGS AT HOME, OR GET ALONG WITH OTHER PEOPLE: SOMEWHAT DIFFICULT
1. FEELING NERVOUS, ANXIOUS, OR ON EDGE: NOT AT ALL
4. TROUBLE RELAXING: NOT AT ALL
5. BEING SO RESTLESS THAT IT IS HARD TO SIT STILL: NOT AT ALL

## 2023-08-17 ASSESSMENT — ENCOUNTER SYMPTOMS
NAUSEA: 0
ABDOMINAL PAIN: 0
PALPITATIONS: 1
ARTHRALGIAS: 0
DIZZINESS: 1
SHORTNESS OF BREATH: 1
WEAKNESS: 1
MYALGIAS: 1
PARESTHESIAS: 1
FREQUENCY: 0
HEADACHES: 1
HEARTBURN: 0
JOINT SWELLING: 0
SORE THROAT: 0
CHILLS: 0
COUGH: 0
CONSTIPATION: 0
HEMATURIA: 0
DYSURIA: 0
NERVOUS/ANXIOUS: 0
DIARRHEA: 0
BREAST MASS: 0
HEMATOCHEZIA: 0
FEVER: 0
EYE PAIN: 0

## 2023-08-17 ASSESSMENT — ACTIVITIES OF DAILY LIVING (ADL)
CURRENT_FUNCTION: HOUSEWORK REQUIRES ASSISTANCE
CURRENT_FUNCTION: MONEY MANAGEMENT REQUIRES ASSISTANCE
CURRENT_FUNCTION: PREPARING MEALS REQUIRES ASSISTANCE
CURRENT_FUNCTION: LAUNDRY REQUIRES ASSISTANCE

## 2023-08-17 ASSESSMENT — PATIENT HEALTH QUESTIONNAIRE - PHQ9
SUM OF ALL RESPONSES TO PHQ QUESTIONS 1-9: 10
SUM OF ALL RESPONSES TO PHQ QUESTIONS 1-9: 10
10. IF YOU CHECKED OFF ANY PROBLEMS, HOW DIFFICULT HAVE THESE PROBLEMS MADE IT FOR YOU TO DO YOUR WORK, TAKE CARE OF THINGS AT HOME, OR GET ALONG WITH OTHER PEOPLE: EXTREMELY DIFFICULT

## 2023-08-17 NOTE — PATIENT INSTRUCTIONS
Patient Education   Personalized Prevention Plan  You are due for the preventive services outlined below.  Your care team is available to assist you in scheduling these services.  If you have already completed any of these items, please share that information with your care team to update in your medical record.  Health Maintenance Due   Topic Date Due     ANNUAL REVIEW OF HM ORDERS  Never done     Depression Action Plan  Never done     COVID-19 Vaccine (4 - Moderna series) 02/15/2022     HPV Screening  08/22/2022     PAP Smear  08/22/2022     Your Health Risk Assessment indicates you feel you are not in good health    A healthy lifestyle helps keep the body fit and the mind alert. It helps protect you from disease, helps you fight disease, and helps prevent chronic disease (disease that doesn't go away) from getting worse. This is important as you get older and begin to notice twinges in muscles and joints and a decline in the strength and stamina you once took for granted. A healthy lifestyle includes good healthcare, good nutrition, weight control, recreation, and regular exercise. Avoid harmful substances and do what you can to keep safe. Another part of a healthy lifestyle is stay mentally active and socially involved.    Good healthcare   Have a wellness visit every year.   If you have new symptoms, let us know right away. Don't wait until the next checkup.   Take medicines exactly as prescribed and keep your medicines in a safe place. Tell us if your medicine causes problems.   Healthy diet and weight control   Eat 3 or 4 small, nutritious, low-fat, high-fiber meals a day. Include a variety of fruits, vegetables, and whole-grain foods.   Make sure you get enough calcium in your diet. Calcium, vitamin D, and exercise help prevent osteoporosis (bone thinning).   If you live alone, try eating with others when you can. That way you get a good meal and have company while you eat it.   Try to keep a healthy weight.  If you eat more calories than your body uses for energy, it will be stored as fat and you will gain weight.     Recreation   Recreation is not limited to sports and team events. It includes any activity that provides relaxation, interest, enjoyment, and exercise. Recreation provides an outlet for physical, mental, and social energy. It can give a sense of worth and achievement. It can help you stay healthy.    Mental Exercise and Social Involvement  Mental and emotional health is as important as physical health. Keep in touch with friends and family. Stay as active as possible. Continue to learn and challenge yourself.   Things you can do to stay mentally active are:  Learn something new, like a foreign language or musical instrument.   Play SCRABBLE or do crossword puzzles. If you cannot find people to play these games with you at home, you can play them with others on your computer through the Internet.   Join a games club--anything from card games to chess or checkers or lawn bowling.   Start a new hobby.   Go back to school.   Volunteer.   Read.   Keep up with world events.  Learning About Dietary Guidelines  What are the Dietary Guidelines for Americans?     Dietary Guidelines for Americans provide tips for eating well and staying healthy. This helps reduce the risk for long-term (chronic) diseases.  These guidelines recommend that you:  Eat and drink the right amount for you. The U.[place] government's food guide is called MyPlate. It can help you make your own well-balanced eating plan.  Try to balance your eating with your activity. This helps you stay at a healthy weight.  Drink alcohol in moderation, if at all.  Limit foods high in salt, saturated fat, trans fat, and added sugar.  These guidelines are from the U.S. Department of Agriculture and the U.S. Department of Health and Human Services. They are updated every 5 years.  What is MyPlate?  MyPlate is the U.[place]. government's food guide. It can help you make  "your own well-balanced eating plan. A balanced eating plan means that you eat enough, but not too much, and that your food gives you the nutrients you need to stay healthy.  MyPlate focuses on eating plenty of whole grains, fruits, and vegetables, and on limiting fat and sugar. It is available online at www.ChooseMyPlate.gov.  How can you get started?  If you're trying to eat healthier, you can slowly change your eating habits over time. You don't have to make big changes all at once. Start by adding one or two healthy foods to your meals each day.  Grains  Choose whole-grain breads and cereals and whole-wheat pasta and whole-grain crackers.  Vegetables  Eat a variety of vegetables every day. They have lots of nutrients and are part of a heart-healthy diet.  Fruits  Eat a variety of fruits every day. Fruits contain lots of nutrients. Choose fresh fruit instead of fruit juice.  Protein foods  Choose fish and lean poultry more often. Eat red meat and fried meats less often. Dried beans, tofu, and nuts are also good sources of protein.  Dairy  Choose low-fat or fat-free products from this food group. If you have problems digesting milk, try eating cheese or yogurt instead.  Fats and oils  Limit fats and oils if you're trying to cut calories. Choose healthy fats when you cook. These include canola oil and olive oil.  Where can you learn more?  Go to https://www.[employer].net/patiented  Enter D676 in the search box to learn more about \"Learning About Dietary Guidelines.\"  Current as of: March 1, 2023               Content Version: 13.7    9755-4738 [employer].   Care instructions adapted under license by your healthcare professional. If you have questions about a medical condition or this instruction, always ask your healthcare professional. [employer] disclaims any warranty or liability for your use of this information.      Activities of Daily Living    Your Health Risk Assessment indicates " you have difficulties with activities of daily living such as housework, bathing, preparing meals, taking medication, etc. Please make a follow up appointment for us to address this issue in more detail.  Your Health Risk Assessment indicates you feel you are not in good emotional health.    Recreation   Recreation is not limited to sports and team events. It includes any activity that provides relaxation, interest, enjoyment, and exercise. Recreation provides an outlet for physical, mental, and social energy. It can give a sense of worth and achievement. It can help you stay healthy.    Mental Exercise and Social Involvement  Mental and emotional health is as important as physical health. Keep in touch with friends and family. Stay as active as possible. Continue to learn and challenge yourself.   Things you can do to stay mentally active are:  Learn something new, like a foreign language or musical instrument.   Play SCRABBLE or do crossword puzzles. If you cannot find people to play these games with you at home, you can play them with others on your computer through the Internet.   Join a games club--anything from card games to chess or checkers or lawn bowling.   Start a new hobby.   Go back to school.   Volunteer.   Read.   Keep up with world events.  Learning About Depression Screening  What is depression screening?  Depression screening is a way to see if you have depression symptoms. It may be done by a doctor or counselor. It's often part of a routine checkup. That's because your mental health is just as important as your physical health.  Depression is a mental health condition that affects how you feel, think, and act. You may:  Have less energy.  Lose interest in your daily activities.  Feel sad and grouchy for a long time.  Depression is very common. It affects people of all ages.  Many things can lead to depression. Some people become depressed after they have a stroke or find out they have a major  "illness like cancer or heart disease. The death of a loved one or a breakup may lead to depression. It can run in families. Most experts believe that a combination of inherited genes and stressful life events can cause it.  What happens during screening?  You may be asked to fill out a form about your depression symptoms. You and the doctor will discuss your answers. The doctor may ask you more questions to learn more about how you think, act, and feel.  What happens after screening?  If you have symptoms of depression, your doctor will talk to you about your options.  Doctors usually treat depression with medicines or counseling. Often, combining the two works best. Many people don't get help because they think that they'll get over the depression on their own. But people with depression may not get better unless they get treatment.  The cause of depression is not well understood. There may be many factors involved. But if you have depression, it's not your fault.  A serious symptom of depression is thinking about death or suicide. If you or someone you care about talks about this or about feeling hopeless, get help right away.  It's important to know that depression can be treated. Medicine, counseling, and self-care may help.  Where can you learn more?  Go to https://www.[employer].net/patiented  Enter T185 in the search box to learn more about \"Learning About Depression Screening.\"  Current as of: October 20, 2022               Content Version: 13.7    4414-4025 [employer].   Care instructions adapted under license by your healthcare professional. If you have questions about a medical condition or this instruction, always ask your healthcare professional. [employer] disclaims any warranty or liability for your use of this information.         "

## 2023-08-17 NOTE — PROGRESS NOTES
The patient was provided with suggestions to help her develop a healthy physical lifestyle.  The patient was counseled and encouraged to consider modifying their diet and eating habits. She was provided with information on recommended healthy diet options.  The patient reports that she has difficulty with activities of daily living. I have asked that the patient make a follow up appointment in 26 weeks where this issue will be further evaluated and addressed.  The patient was provided with suggestions to help her develop a healthy emotional lifestyle.  The patient s PHQ-9 score is consistent with moderate depression. She was provided with information regarding depression and was advised to schedule a follow up appointment in 26 weeks to further address this issue.

## 2023-08-17 NOTE — PROGRESS NOTES
"SUBJECTIVE:   Sherry is a 43 year old who presents for Preventive Visit.      8/17/2023     4:02 PM   Additional Questions   Roomed by as   Accompanied by self         8/17/2023     4:02 PM   Patient Reported Additional Medications   Patient reports taking the following new medications no     HPI: Sherry is a 43-year-old female presenting today for an annual wellness visit.  The patient is on Social Security disability due to her diagnosis of mitochondrial disease which causes significant fatigue.  Overall the patient has been doing reasonably well recently.  Her chronic diseases for the most part are under good control.  She still gets quite fatigued if she does too much and is not employed.  She recently resumed Botox due to her chronic migraines.  She does have 1 concern today and that is regarding the possibility of an ADHD diagnosis.  The patient states that she feels that symptoms of distractibility as well as challenges initiating tasks are affecting her and her relationships negatively.  She finds that she often gets distracted while listening to somebody talk and gets lost in the conversation then.  She feels this really affects her socially.  She also finds that initiating tasks such as getting laundry done and other activities around the house are difficult.  She feels that the symptoms existed back into her childhood but that she never really had it addressed.    Are you in the first 12 months of your Medicare coverage?  No    Healthy Habits:     In general, how would you rate your overall health?  Fair    Frequency of exercise:  2-3 days/week    Duration of exercise:  Less than 15 minutes    Do you usually eat at least 4 servings of fruit and vegetables a day, include whole grains    & fiber and avoid regularly eating high fat or \"junk\" foods?  No    Taking medications regularly:  Yes    Medication side effects:  None    Ability to successfully perform activities of daily living:  Preparing meals requires " assistance, housework requires assistance, laundry requires assistance and money management requires assistance    Home Safety:  No safety concerns identified    Hearing Impairment:  No hearing concerns    In the past 6 months, have you been bothered by leaking of urine?  No    In general, how would you rate your overall mental or emotional health?  Fair    Additional concerns today:  Yes        Have you ever done Advance Care Planning? (For example, a Health Directive, POLST, or a discussion with a medical provider or your loved ones about your wishes): Yes, advance care planning is on file.       Fall risk     Cognitive Screening Pt declined    Do you have sleep apnea, excessive snoring or daytime drowsiness? : no    Reviewed and updated as needed this visit by clinical staff    Allergies               Reviewed and updated as needed this visit by Provider                 Social History     Tobacco Use    Smoking status: Never    Smokeless tobacco: Never   Substance Use Topics    Alcohol use: No             8/17/2023     3:57 PM   Alcohol Use   Prescreen: >3 drinks/day or >7 drinks/week? Not Applicable     Do you have a current opioid prescription? No  Do you use any other controlled substances or medications that are not prescribed by a provider? None        Current providers sharing in care for this patient include:   Patient Care Team:  Layla Martinez MD as PCP - General (Family Medicine)  Layla Martinez MD as Referring Physician (Family Practice)  Nadeem Trejo MD as MD (Neurology)  Layla Martinez MD as Assigned PCP  Mikel Hernandez MD as MD (Neurology)  Alan Carlin MD as MD (Cardiovascular Disease)  Bird Ruiz MD as Assigned Musculoskeletal Provider  Mikel Hernandez MD as Assigned Neuroscience Provider  Ryan García MD as MD (Cardiovascular Disease)  Ryan García MD as Assigned Heart and Vascular Provider  Layla Martinez MD as Assigned Pain  Medication Provider    The following health maintenance items are reviewed in Epic and correct as of today:  Health Maintenance   Topic Date Due    ANNUAL REVIEW OF HM ORDERS  Never done    DEPRESSION ACTION PLAN  Never done    COVID-19 Vaccine (4 - Moderna series) 02/15/2022    HPV TEST  08/22/2022    PAP  08/22/2022    MEDICARE ANNUAL WELLNESS VISIT  08/25/2022    INFLUENZA VACCINE (1) 09/01/2023    PHQ-9  02/17/2024    ADVANCE CARE PLANNING  08/29/2026    DTAP/TDAP/TD IMMUNIZATION (3 - Td or Tdap) 08/25/2031    HEPATITIS C SCREENING  Completed    HIV SCREENING  Completed    HEPATITIS B IMMUNIZATION  Completed    Pneumococcal Vaccine: Pediatrics (0 to 5 Years) and At-Risk Patients (6 to 64 Years)  Aged Out    IPV IMMUNIZATION  Aged Out    MENINGITIS IMMUNIZATION  Aged Out     Patient Active Problem List   Diagnosis    Acne vulgaris    Arthralgia of temporomandibular joint    Debility    Fibromyalgia    IBS (irritable bowel syndrome)    Insomnia    Major depression, recurrent, chronic (H)    Chronic migraine without aura, intractable, without status migrainosus    Mitochondrial disease (H)    Generalized anxiety disorder    Post-traumatic stress disorder    Parasomnia    Seasonal depression (H)    Palpitations    Gastroesophageal reflux disease without esophagitis    Vaginal Papanicolaou smear not required due to history of hysterectomy     Past Surgical History:   Procedure Laterality Date    ABDOMEN SURGERY  1996 & 2018, feb.    APPENDECTOMY  1996    BIOPSY      HC REMOVAL OF OVARIAN CYST(S)      Description: Ovarian Cystectomy;  Recorded: 11/07/2013;    HYSTERECTOMY Bilateral 02/22/2018    Procedure: TOTAL ABDOMINAL HYSTERECTOMY BILATERAL SALPINGECTOMY, RIGHT OOPHORECTOMY;  Surgeon: Joanne Bedolla DO;  Location: [facility];  Service:     HYSTERECTOMY TOTAL ABDOMINAL      LAPAROSCOPIC CYSTECTOMY OVARIAN (ONCOLOGY) Left 10/18/2021    Procedure: LAPAROSCOPY,  LEFT OVARIAN CYSTECTOMY, LYSIS OF  ADHESIONS.;  Surgeon: Joanne Bedolla MD;  Location: [facility] [place]    LAPAROSCOPY DIAGNOSTIC (GYN) Left 2023    Procedure: DIAGNOSTIC LAPAROSCOPY WITH LEFT SIDE OOPHORECTOMY AND LYSIS OF ADHESION;  Surgeon: Joanne Bedolla MD;  Location: [facility] [place] OR    PICC  2018            Social History     Tobacco Use    Smoking status: Never    Smokeless tobacco: Never   Substance Use Topics    Alcohol use: No     Family History   Problem Relation Age of Onset    Depression Mother         sertraline    Hyperlipidemia Mother     Hypertension Mother     Breast Cancer Mother 62.00    Cancer Mother         thyroid     Meniere's disease Mother     Anxiety Disorder Mother     Depression Brother     Anxiety Disorder Maternal Grandmother     Breast Cancer Maternal Grandmother 60.00    Depression Maternal Grandmother     Coronary Artery Disease Father 45.00            Depression Father     Bone Cancer Maternal Grandfather     Anxiety Disorder Paternal Grandfather     Diabetes Cousin     Diabetes Cousin     Anxiety Disorder Paternal Grandmother     Malignant Hypertension No family hx of          Current Outpatient Medications   Medication Sig Dispense Refill    ascorbic acid 500 MG TABS Take 500 mg by mouth daily      betamethasone dipropionate (DIPROSONE) 0.05 % external lotion       buPROPion (WELLBUTRIN XL) 300 MG 24 hr tablet TAKE 1 TABLET BY MOUTH EVERY DAY 90 tablet 3    cetirizine (ZYRTEC) 10 MG tablet TAKE 1 TAB ORALLY DAILY AS NEEDED FOR ALLERGIES MAY INCREASE TO 2 TAB DAILY IF ITCHING NOT RELIEVED 90 tablet 2    clindamycin-benzoyl peroxide (BENZACLIN) gel Apply topically to acne once daily      clobetasol (TEMOVATE) 0.05 % external ointment       [facility] MELATONIN 3 MG tablet TAKE 1 TABLET (3 MG) BY MOUTH NIGHTLY AS NEEDED FOR SLEEP 90 tablet 1    cyclobenzaprine (FLEXERIL) 10 MG tablet Take 10 mg by mouth daily as needed for muscle spasms      EFFEXOR  MG 24 hr capsule TAKE 1 CAPSULE  BY MOUTH DAILY 90 capsule 1    hydrOXYzine (ATARAX) 25 MG tablet Take 1-2 tablets (25-50 mg) by mouth At Bedtime 180 tablet 1    ketoconazole (NIZORAL) 2 % external cream APPLY TO AFFECTED AREA TWICE A DAY FOR 7 DAYS      lidocaine (LIDODERM) 5 % patch PLACE 1 PATCH ONTO THE SKIN AS NEEDED TO NECK, SHOULDERS, AND BACK 30 patch 0    Multiple Vitamin (MULTIVITAMIN ADULT PO) Take 1 tablet by mouth daily      naproxen (NAPROSYN) 500 MG tablet       omeprazole (PRILOSEC) 40 MG DR capsule TAKE 1 CAPSULE (40 MG) BY MOUTH EVERY MORNING (BEFORE BREAKFAST) 30 MINUTES BEFORE MEAL 90 capsule 3    ondansetron (ZOFRAN) 4 MG tablet Take 1 tablet (4 mg) by mouth every 8 hours as needed for nausea 18 tablet 1    Riboflavin 400 MG TABS Take 400 mg by mouth daily      rizatriptan (MAXALT) 10 MG tablet TAKE 1 TABLET BY MOUTH AS NEEDED FOR MIGRAINE. MAY REPEAT IN 2 HOURS IF NEEDED 10 tablet 3    tretinoin (RETIN-A) 0.05 % external cream APPLY TO AFFECTED AREA EVERY DAY AT BEDTIME 45 g 2    valACYclovir (VALTREX) 1000 mg tablet TAKE 2 TABLETS BY MOUTH 12 HOURS APART AS NEEDED EPISODE 12 tablet 3    zolpidem (AMBIEN) 5 MG tablet TAKE 1 TABLET (5 MG) BY MOUTH NIGHTLY AS NEEDED FOR SLEEP 30 tablet 0     Allergies   Allergen Reactions    Ciprofloxacin Itching, Rash and Hives     Rash over whole body   Rash over whole body       Egg White (Egg Protein) Other (See Comments) and GI Disturbance     Swollen colon, belly pain  Swollen colon, belly pain      Influenza Virus Vaccine Shortness Of Breath and Anaphylaxis    No Clinical Screening - See Comments Anaphylaxis     Stomach swelling    Sulfa Antibiotics Rash and Hives    Yeast Other (See Comments) and Anaphylaxis    Desvenlafaxine Blisters, Itching and Rash    Milnacipran Other (See Comments) and Palpitations     Chest pain, hot/cold flashes    Quetiapine Palpitations     Tachycardia, nervousness, elevated blood pressure.   Tachycardia, nervousness, elevated blood pressure.      Mammogram  "Screening: annual recommended at her age    FHS-7:       6/27/2023    10:03 AM 7/12/2023    10:53 AM 7/14/2023     9:53 AM 8/17/2023     3:59 PM   Breast CA Risk Assessment (FHS-7)   Did any of your first-degree relatives have breast or ovarian cancer? Yes Yes Yes No   Did any of your relatives have bilateral breast cancer? Unknown No No Unknown   Did any man in your family have breast cancer? No No No Yes   Did any woman in your family have breast and ovarian cancer? No No No No   Did any woman in your family have breast cancer before age 50 y? No No No Yes   Do you have 2 or more relatives with breast and/or ovarian cancer? Yes Yes Yes Yes   Do you have 2 or more relatives with breast and/or bowel cancer? Yes No Yes Yes       Pertinent mammograms are reviewed under the imaging tab.    Review of Systems   Constitutional:  Negative for chills and fever.   HENT:  Negative for congestion, ear pain, hearing loss and sore throat.    Eyes:  Negative for pain and visual disturbance.   Respiratory:  Positive for shortness of breath. Negative for cough.    Cardiovascular:  Positive for palpitations. Negative for chest pain and peripheral edema.   Gastrointestinal:  Negative for abdominal pain, constipation, diarrhea, heartburn, hematochezia and nausea.   Breasts:  Positive for tenderness. Negative for breast mass and discharge.   Genitourinary:  Negative for dysuria, frequency, genital sores, hematuria, pelvic pain, urgency, vaginal bleeding and vaginal discharge.   Musculoskeletal:  Positive for myalgias. Negative for arthralgias and joint swelling.   Skin:  Positive for rash.   Neurological:  Positive for dizziness, weakness, headaches and paresthesias.   Psychiatric/Behavioral:  Negative for mood changes. The patient is not nervous/anxious.          OBJECTIVE:   Ht 1.549 m (5' 1\")   Wt 60.8 kg (134 lb)   BMI 25.32 kg/m   Estimated body mass index is 25.32 kg/m  as calculated from the following:    Height as of this " "encounter: 1.549 m (5' 1\").    Weight as of this encounter: 60.8 kg (134 lb).  Physical Exam  GENERAL APPEARANCE: healthy, alert and no distress  EYES: Eyes grossly normal to inspection, PERRL and conjunctivae and sclerae normal  HENT: ear canals and TM's normal, nose and mouth without ulcers or lesions, oropharynx clear and oral mucous membranes moist  NECK: no adenopathy, no asymmetry, masses, or scars and thyroid normal to palpation  RESP: lungs clear to auscultation - no rales, rhonchi or wheezes  BREAST: normal without masses, tenderness or nipple discharge and no palpable axillary masses or adenopathy  CV: regular rate and rhythm, normal S1 S2, no S3 or S4, no murmur, click or rub, no peripheral edema and peripheral pulses strong  ABDOMEN: soft, nontender, no hepatosplenomegaly, no masses and bowel sounds normal  MS: no musculoskeletal defects are noted and gait is age appropriate without ataxia  SKIN: no suspicious lesions or rashes  NEURO: Normal strength and tone, sensory exam grossly normal, mentation intact and speech normal  PSYCH: mentation appears normal and affect normal/bright    Labs reviewed in Epic    ASSESSMENT / PLAN:     Problem List Items Addressed This Visit          Nervous and Auditory    Arthralgia of temporomandibular joint     Continues to use a mouthguard at night and feels that symptoms are under good control.         Chronic migraine without aura, intractable, without status migrainosus     The patient resumed Botox injections recently as her migraines have been increasingly severe recently.  She takes Maxalt as needed when she gets a migraine.  She sees a neurologist who manages this.            Digestive    IBS (irritable bowel syndrome)     Currently under good control.         Gastroesophageal reflux disease without esophagitis     Takes omeprazole 40 mg daily with good response.            Endocrine    Mitochondrial disease (H)     The patient was diagnosed with mitochondrial " "disease which has significantly affected her overall energy levels and functioning and she is on Social Security disability for this.            Circulatory    Palpitations     Continues to get intermittent palpitations but they are better than they were in the spring when she had a Holter monitor.  Reviewed results of her Holter monitor and reassurance provided at this time.            Musculoskeletal and Integumentary    Acne vulgaris     Continues to have issues with acne and uses Retin-A cream.  I did discontinue her spironolactone since she is status post bilateral oophorectomy at this time.            Behavioral    Major depression, recurrent, chronic (H)     The patient's PHQ-9 score is 10 at this time.  She continues on Effexor  mg daily.  Overall feels her depression is under reasonably good control.         Generalized anxiety disorder     Anxiety under reasonably good control on Effexor.          Other Visit Diagnoses       Encounter for Medicare annual wellness exam    -  Primary    Inattention        Concern for ADHD diagnosis; referral placed for diagnostic testing.    Relevant Orders    Adult Mental Health  Referral    Vitamin D deficiency        Relevant Orders    Vitamin D Deficiency (Completed)    Lipid screening        Relevant Orders    Lipid Profile (Chol, Trig, HDL, LDL calc) (Completed)                  COUNSELING:  Reviewed preventive health counseling, as reflected in patient instructions       Regular exercise       Healthy diet/nutrition       Osteoporosis prevention/bone health      BMI:   Estimated body mass index is 25.32 kg/m  as calculated from the following:    Height as of this encounter: 1.549 m (5' 1\").    Weight as of this encounter: 60.8 kg (134 lb).         She reports that she has never smoked. She has never used smokeless tobacco.      Appropriate preventive services were discussed with this patient, including applicable screening as appropriate for " cardiovascular disease, diabetes, osteopenia/osteoporosis, and glaucoma.  As appropriate for age/gender, discussed screening for colorectal cancer, prostate cancer, breast cancer, and cervical cancer. Checklist reviewing preventive services available has been given to the patient.    Reviewed patients plan of care and provided an AVS. The Basic Care Plan (routine screening as documented in Health Maintenance) for Sherry meets the Care Plan requirement. This Care Plan has been established and reviewed with the Patient.      MATTHEW RESENDIZ MD  [facility]    Identified Health Risks:  I have reviewed Opioid Use Disorder and Substance Use Disorder risk factors and made any needed referrals. Answers submitted by the patient for this visit:  Patient Health Questionnaire (Submitted on 8/17/2023)  If you checked off any problems, how difficult have these problems made it for you to do your work, take care of things at home, or get along with other people?: Extremely difficult  PHQ9 TOTAL SCORE: 10  JEREMY-7 (Submitted on 8/17/2023)  JEREMY 7 TOTAL SCORE: 3

## 2023-08-18 LAB
CHOLEST SERPL-MCNC: 227 MG/DL
DEPRECATED CALCIDIOL+CALCIFEROL SERPL-MC: 37 UG/L (ref 20–75)
HDLC SERPL-MCNC: 55 MG/DL
LDLC SERPL CALC-MCNC: 148 MG/DL
NONHDLC SERPL-MCNC: 172 MG/DL
TRIGL SERPL-MCNC: 122 MG/DL

## 2023-08-19 ENCOUNTER — HOSPITAL ENCOUNTER (EMERGENCY)
Facility: HOSPITAL | Age: 43
Discharge: HOME | End: 2023-08-19
Attending: EMERGENCY MEDICINE | Admitting: EMERGENCY MEDICINE
Payer: COMMERCIAL

## 2023-08-19 ENCOUNTER — APPOINTMENT (OUTPATIENT)
Dept: CT IMAGING | Facility: HOSPITAL | Age: 43
End: 2023-08-19
Attending: EMERGENCY MEDICINE
Payer: COMMERCIAL

## 2023-08-19 ENCOUNTER — APPOINTMENT (OUTPATIENT)
Dept: ULTRASOUND IMAGING | Facility: HOSPITAL | Age: 43
End: 2023-08-19
Attending: EMERGENCY MEDICINE
Payer: COMMERCIAL

## 2023-08-19 ENCOUNTER — APPOINTMENT (OUTPATIENT)
Dept: MRI IMAGING | Facility: HOSPITAL | Age: 43
End: 2023-08-19
Attending: EMERGENCY MEDICINE
Payer: COMMERCIAL

## 2023-08-19 VITALS
HEIGHT: 61 IN | TEMPERATURE: 98.1 F | BODY MASS INDEX: 24.55 KG/M2 | RESPIRATION RATE: 14 BRPM | OXYGEN SATURATION: 98 % | WEIGHT: 130 LBS | DIASTOLIC BLOOD PRESSURE: 62 MMHG | SYSTOLIC BLOOD PRESSURE: 98 MMHG | HEART RATE: 86 BPM

## 2023-08-19 DIAGNOSIS — M54.6 ACUTE MIDLINE THORACIC BACK PAIN: ICD-10-CM

## 2023-08-19 LAB
ALBUMIN SERPL BCG-MCNC: 4 G/DL (ref 3.5–5.2)
ALP SERPL-CCNC: 97 U/L (ref 35–104)
ALT SERPL W P-5'-P-CCNC: 90 U/L (ref 0–50)
ANION GAP SERPL CALCULATED.3IONS-SCNC: 12 MMOL/L (ref 7–15)
AST SERPL W P-5'-P-CCNC: 155 U/L (ref 0–45)
BASOPHILS # BLD AUTO: 0 10E3/UL (ref 0–0.2)
BASOPHILS NFR BLD AUTO: 0 %
BILIRUB DIRECT SERPL-MCNC: <0.2 MG/DL (ref 0–0.3)
BILIRUB SERPL-MCNC: 0.3 MG/DL
BUN SERPL-MCNC: 13.8 MG/DL (ref 6–20)
CALCIUM SERPL-MCNC: 9.7 MG/DL (ref 8.6–10)
CHLORIDE SERPL-SCNC: 102 MMOL/L (ref 98–107)
CREAT SERPL-MCNC: 0.83 MG/DL (ref 0.51–0.95)
DEPRECATED HCO3 PLAS-SCNC: 25 MMOL/L (ref 22–29)
EOSINOPHIL # BLD AUTO: 0.1 10E3/UL (ref 0–0.7)
EOSINOPHIL NFR BLD AUTO: 1 %
ERYTHROCYTE [DISTWIDTH] IN BLOOD BY AUTOMATED COUNT: 12.2 % (ref 10–15)
GFR SERPL CREATININE-BSD FRML MDRD: 89 ML/MIN/1.73M2
GLUCOSE SERPL-MCNC: 96 MG/DL (ref 70–99)
HCT VFR BLD AUTO: 42.5 % (ref 35–47)
HGB BLD-MCNC: 13.9 G/DL (ref 11.7–15.7)
HOLD SPECIMEN: NORMAL
IMM GRANULOCYTES # BLD: 0.1 10E3/UL
IMM GRANULOCYTES NFR BLD: 1 %
LIPASE SERPL-CCNC: 52 U/L (ref 13–60)
LYMPHOCYTES # BLD AUTO: 2.4 10E3/UL (ref 0.8–5.3)
LYMPHOCYTES NFR BLD AUTO: 22 %
MCH RBC QN AUTO: 27.9 PG (ref 26.5–33)
MCHC RBC AUTO-ENTMCNC: 32.7 G/DL (ref 31.5–36.5)
MCV RBC AUTO: 85 FL (ref 78–100)
MONOCYTES # BLD AUTO: 0.7 10E3/UL (ref 0–1.3)
MONOCYTES NFR BLD AUTO: 7 %
NEUTROPHILS # BLD AUTO: 7.5 10E3/UL (ref 1.6–8.3)
NEUTROPHILS NFR BLD AUTO: 69 %
NRBC # BLD AUTO: 0 10E3/UL
NRBC BLD AUTO-RTO: 0 /100
PLATELET # BLD AUTO: 332 10E3/UL (ref 150–450)
POTASSIUM SERPL-SCNC: 4.4 MMOL/L (ref 3.4–5.3)
PROT SERPL-MCNC: 6.4 G/DL (ref 6.4–8.3)
RBC # BLD AUTO: 4.98 10E6/UL (ref 3.8–5.2)
SODIUM SERPL-SCNC: 139 MMOL/L (ref 136–145)
TROPONIN T SERPL HS-MCNC: 7 NG/L
WBC # BLD AUTO: 10.8 10E3/UL (ref 4–11)

## 2023-08-19 PROCEDURE — 82248 BILIRUBIN DIRECT: CPT | Performed by: EMERGENCY MEDICINE

## 2023-08-19 PROCEDURE — G1010 CDSM STANSON: HCPCS

## 2023-08-19 PROCEDURE — A9585 GADOBUTROL INJECTION: HCPCS | Mod: JZ | Performed by: EMERGENCY MEDICINE

## 2023-08-19 PROCEDURE — 93005 ELECTROCARDIOGRAM TRACING: CPT | Performed by: EMERGENCY MEDICINE

## 2023-08-19 PROCEDURE — 74183 MRI ABD W/O CNTR FLWD CNTR: CPT | Mod: MG

## 2023-08-19 PROCEDURE — 99285 EMERGENCY DEPT VISIT HI MDM: CPT | Mod: 25

## 2023-08-19 PROCEDURE — 96374 THER/PROPH/DIAG INJ IV PUSH: CPT | Mod: 59

## 2023-08-19 PROCEDURE — 96375 TX/PRO/DX INJ NEW DRUG ADDON: CPT | Mod: 59

## 2023-08-19 PROCEDURE — 84484 ASSAY OF TROPONIN QUANT: CPT | Performed by: EMERGENCY MEDICINE

## 2023-08-19 PROCEDURE — 255N000002 HC RX 255 OP 636: Mod: JZ | Performed by: EMERGENCY MEDICINE

## 2023-08-19 PROCEDURE — 85014 HEMATOCRIT: CPT | Performed by: EMERGENCY MEDICINE

## 2023-08-19 PROCEDURE — 250N000011 HC RX IP 250 OP 636: Performed by: EMERGENCY MEDICINE

## 2023-08-19 PROCEDURE — 83690 ASSAY OF LIPASE: CPT | Performed by: EMERGENCY MEDICINE

## 2023-08-19 PROCEDURE — 36415 COLL VENOUS BLD VENIPUNCTURE: CPT | Performed by: EMERGENCY MEDICINE

## 2023-08-19 PROCEDURE — 80053 COMPREHEN METABOLIC PANEL: CPT | Performed by: EMERGENCY MEDICINE

## 2023-08-19 PROCEDURE — 250N000011 HC RX IP 250 OP 636: Mod: JZ | Performed by: EMERGENCY MEDICINE

## 2023-08-19 PROCEDURE — 76705 ECHO EXAM OF ABDOMEN: CPT

## 2023-08-19 RX ORDER — KETOROLAC TROMETHAMINE 30 MG/ML
30 INJECTION, SOLUTION INTRAMUSCULAR; INTRAVENOUS ONCE
Status: COMPLETED | OUTPATIENT
Start: 2023-08-19 | End: 2023-08-19

## 2023-08-19 RX ORDER — MORPHINE SULFATE 4 MG/ML
4 INJECTION, SOLUTION INTRAMUSCULAR; INTRAVENOUS ONCE
Status: COMPLETED | OUTPATIENT
Start: 2023-08-19 | End: 2023-08-19

## 2023-08-19 RX ORDER — ASPIRIN 81 MG/1
324 TABLET, CHEWABLE ORAL ONCE
Status: DISCONTINUED | OUTPATIENT
Start: 2023-08-19 | End: 2023-08-19

## 2023-08-19 RX ORDER — GADOBUTROL 604.72 MG/ML
6 INJECTION INTRAVENOUS ONCE
Status: COMPLETED | OUTPATIENT
Start: 2023-08-19 | End: 2023-08-19

## 2023-08-19 RX ORDER — IOPAMIDOL 755 MG/ML
90 INJECTION, SOLUTION INTRAVASCULAR ONCE
Status: COMPLETED | OUTPATIENT
Start: 2023-08-19 | End: 2023-08-19

## 2023-08-19 RX ADMIN — KETOROLAC TROMETHAMINE 30 MG: 30 INJECTION, SOLUTION INTRAMUSCULAR; INTRAVENOUS at 07:18

## 2023-08-19 RX ADMIN — IOPAMIDOL 90 ML: 755 INJECTION, SOLUTION INTRAVENOUS at 06:36

## 2023-08-19 RX ADMIN — GADOBUTROL 6 ML: 604.72 INJECTION INTRAVENOUS at 12:03

## 2023-08-19 RX ADMIN — MORPHINE SULFATE 4 MG: 4 INJECTION, SOLUTION INTRAMUSCULAR; INTRAVENOUS at 05:30

## 2023-08-19 ASSESSMENT — ACTIVITIES OF DAILY LIVING (ADL)
ADLS_ACUITY_SCORE: 35

## 2023-08-19 NOTE — ED TRIAGE NOTES
Pt woke up to excruciating mid/lower back pain; not chronic; pain w palpation; laid on floor; took a muscle relaxer; came in when she could not get back up off the floor after she had laid down to alleviate the pain.     Triage Assessment       Row Name 08/19/23 0510       Triage Assessment (Adult)    Airway WDL WDL       Respiratory WDL    Respiratory WDL WDL       Skin Circulation/Temperature WDL    Skin Circulation/Temperature WDL WDL       Cardiac WDL    Cardiac WDL WDL       Peripheral/Neurovascular WDL    Peripheral Neurovascular WDL WDL       Cognitive/Neuro/Behavioral WDL    Cognitive/Neuro/Behavioral WDL WDL       Wiseman Coma Scale    Best Eye Response 4-->(E4) spontaneous    Best Motor Response 6-->(M6) obeys commands    Best Verbal Response 5-->(V5) oriented    Lety Coma Scale Score 15

## 2023-08-19 NOTE — DISCHARGE INSTRUCTIONS
Your liver function tests were slightly elevated.  You should be rechecked within 2 weeks  Your ultrasound and MRI showed evidence of gallstones but no infection.  You may have passed a gallstone because your pain

## 2023-08-19 NOTE — ED NOTES
EMERGENCY DEPARTMENT SIGN OUT NOTE        ED COURSE AND MEDICAL DECISION MAKING  Patient was signed out to me by at the end of shift    Patient evaluated for resting back pain with some radiation to the epigastrium    Right upper quadrant ultrasound showed gallstones but no evidence of cholelithiasis.  There was some slight dilation of the common bile duct.    She has elevated liver function tests so GI was consulted.  GI recommended MRCP    MRCP did not show any acute pathology.    Patient was reevaluated and results were discussed.  She feels much better at this time.  Her pain has resolved.  She is able to tolerate oral intake.    I have advised her to recheck liver function tests in 2 weeks      FINAL IMPRESSION    1. Acute midline thoracic back pain        ED MEDS  Medications   morphine (PF) injection 4 mg (4 mg Intravenous $Given 8/19/23 7155)   iopamidol (ISOVUE-370) solution 90 mL (90 mLs Intravenous $Given 8/19/23 0636)   ketorolac (TORADOL) injection 30 mg (30 mg Intravenous $Given 8/19/23 0718)   gadobutrol (GADAVIST) injection 6 mL (6 mLs Intravenous $Given 8/19/23 1203)       LAB  Labs Ordered and Resulted from Time of ED Arrival to Time of ED Departure   HEPATIC FUNCTION PANEL - Abnormal       Result Value    Protein Total 6.4      Albumin 4.0      Bilirubin Total 0.3      Alkaline Phosphatase 97       (*)     ALT 90 (*)     Bilirubin Direct <0.20     BASIC METABOLIC PANEL - Normal    Sodium 139      Potassium 4.4      Chloride 102      Carbon Dioxide (CO2) 25      Anion Gap 12      Urea Nitrogen 13.8      Creatinine 0.83      Calcium 9.7      Glucose 96      GFR Estimate 89     TROPONIN T, HIGH SENSITIVITY - Normal    Troponin T, High Sensitivity 7     LIPASE - Normal    Lipase 52     CBC WITH PLATELETS AND DIFFERENTIAL    WBC Count 10.8      RBC Count 4.98      Hemoglobin 13.9      Hematocrit 42.5      MCV 85      MCH 27.9      MCHC 32.7      RDW 12.2      Platelet Count 332      %  Neutrophils 69      % Lymphocytes 22      % Monocytes 7      % Eosinophils 1      % Basophils 0      % Immature Granulocytes 1      NRBCs per 100 WBC 0      Absolute Neutrophils 7.5      Absolute Lymphocytes 2.4      Absolute Monocytes 0.7      Absolute Eosinophils 0.1      Absolute Basophils 0.0      Absolute Immature Granulocytes 0.1      Absolute NRBCs 0.0         RADIOLOGY    MR Abdomen MRCP w/o & w Contrast   Final Result   IMPRESSION:   1.  Common bile duct measures at the upper limits of normal, but no evidence of choledocholithiasis or intrahepatic biliary dilation.      2.  Cholelithiasis.      Abdomen US, limited (RUQ only)   Final Result   IMPRESSION:   1.  Cholelithiasis, but no acute inflammation.      2.  Common bile duct at the upper limits of normal, but with no significant intrahepatic biliary dilation. If there is high clinical concern for choledocholithiasis, recommend further evaluation with MRCP.            CTA Chest Abdomen Pelvis w Contrast   Final Result   IMPRESSION:    1. No acute abnormality identified in the chest, abdomen or pelvis.   2. The aorta is normal in caliber without dissection.   3. Cholelithiasis. No convincing CT evidence of acute cholecystitis.              DISCHARGE MEDS  New Prescriptions    No medications on file       Brandon Herrera MD  [facility] EMERGENCY DEPARTMENT  [address] [id number]  [phone]       Brandon Herrera MD  08/19/23 0867

## 2023-08-19 NOTE — ED NOTES
Bed: JNED-26  Expected date: 8/19/23  Expected time:   Means of arrival:   Comments:  Lauro  43 female  Non traumatic back pain.

## 2023-08-20 NOTE — ASSESSMENT & PLAN NOTE
The patient's PHQ-9 score is 10 at this time.  She continues on Effexor  mg daily.  Overall feels her depression is under reasonably good control.

## 2023-08-20 NOTE — ASSESSMENT & PLAN NOTE
The patient was diagnosed with mitochondrial disease which has significantly affected her overall energy levels and functioning and she is on Social Security disability for this.

## 2023-08-20 NOTE — ASSESSMENT & PLAN NOTE
Continues to have issues with acne and uses Retin-A cream.  I did discontinue her spironolactone since she is status post bilateral oophorectomy at this time.

## 2023-08-20 NOTE — ASSESSMENT & PLAN NOTE
The patient resumed Botox injections recently as her migraines have been increasingly severe recently.  She takes Maxalt as needed when she gets a migraine.  She sees a neurologist who manages this.

## 2023-08-20 NOTE — ASSESSMENT & PLAN NOTE
Continues to get intermittent palpitations but they are better than they were in the spring when she had a Holter monitor.  Reviewed results of her Holter monitor and reassurance provided at this time.

## 2023-08-21 ENCOUNTER — MYC MEDICAL ADVICE (OUTPATIENT)
Dept: FAMILY MEDICINE | Facility: CLINIC | Age: 43
End: 2023-08-21
Payer: COMMERCIAL

## 2023-08-21 LAB
ATRIAL RATE - MUSE: 99 BPM
DIASTOLIC BLOOD PRESSURE - MUSE: 86 MMHG
INTERPRETATION ECG - MUSE: NORMAL
P AXIS - MUSE: 64 DEGREES
PR INTERVAL - MUSE: 148 MS
QRS DURATION - MUSE: 90 MS
QT - MUSE: 348 MS
QTC - MUSE: 446 MS
R AXIS - MUSE: 66 DEGREES
SYSTOLIC BLOOD PRESSURE - MUSE: 147 MMHG
T AXIS - MUSE: 67 DEGREES
VENTRICULAR RATE- MUSE: 99 BPM

## 2023-08-23 NOTE — TELEPHONE ENCOUNTER
Left message to call back for: Patient  Information to relay to patient: Please help patient schedule 20min ED follow up. OK to used reserved slots.

## 2023-08-30 ENCOUNTER — OFFICE VISIT (OUTPATIENT)
Dept: FAMILY MEDICINE | Facility: CLINIC | Age: 43
End: 2023-08-30
Payer: COMMERCIAL

## 2023-08-30 VITALS
DIASTOLIC BLOOD PRESSURE: 77 MMHG | RESPIRATION RATE: 16 BRPM | HEART RATE: 85 BPM | BODY MASS INDEX: 24.19 KG/M2 | OXYGEN SATURATION: 99 % | WEIGHT: 128 LBS | TEMPERATURE: 98 F | SYSTOLIC BLOOD PRESSURE: 110 MMHG

## 2023-08-30 DIAGNOSIS — R74.8 ELEVATED LIVER ENZYMES: ICD-10-CM

## 2023-08-30 DIAGNOSIS — K80.00 CALCULUS OF GALLBLADDER WITH ACUTE CHOLECYSTITIS WITHOUT OBSTRUCTION: Primary | ICD-10-CM

## 2023-08-30 LAB
ALBUMIN SERPL BCG-MCNC: 4.4 G/DL (ref 3.5–5.2)
ALP SERPL-CCNC: 69 U/L (ref 35–104)
ALT SERPL W P-5'-P-CCNC: 22 U/L (ref 0–50)
AST SERPL W P-5'-P-CCNC: 28 U/L (ref 0–45)
BILIRUB DIRECT SERPL-MCNC: <0.2 MG/DL (ref 0–0.3)
BILIRUB SERPL-MCNC: 0.3 MG/DL
PROT SERPL-MCNC: 7.1 G/DL (ref 6.4–8.3)

## 2023-08-30 PROCEDURE — 36415 COLL VENOUS BLD VENIPUNCTURE: CPT | Performed by: FAMILY MEDICINE

## 2023-08-30 PROCEDURE — 99213 OFFICE O/P EST LOW 20 MIN: CPT | Performed by: FAMILY MEDICINE

## 2023-08-30 PROCEDURE — 80076 HEPATIC FUNCTION PANEL: CPT | Performed by: FAMILY MEDICINE

## 2023-08-30 NOTE — PROGRESS NOTES
Assessment & Plan   Problem List Items Addressed This Visit    None  Visit Diagnoses       Calculus of gallbladder with acute cholecystitis without obstruction    -  Primary    Relevant Orders    Adult General Surg Referral    Elevated liver enzymes        Relevant Orders    Hepatic panel (Albumin, ALT, AST, Bili, Alk Phos, TP)           I rechecked hepatic panel today and placed a referral for consultation with a general surgeon.  We reviewed her imaging and I answered all of her questions.  I do think she would benefit from cholecystectomy.    MATTHEW RESENDIZ MD  [facility] is a 43 year old who presents today for a follow-up from a recent emergency room visit.  The patient presented with mid back pain that wrapped around the front to her upper abdomen.  The patient did report that this pain came on shortly after eating.  She had an extensive evaluation and ultimately was found to have gallstones, borderline dilatation of her common bile duct, as well as elevated hepatic enzymes.  She has not had recurrence of the pain since discharge from the emergency room.  Follow up      8/30/2023     8:58 AM   Additional Questions   Roomed by as   Accompanied by self         8/30/2023     8:58 AM   Patient Reported Additional Medications   Patient reports taking the following new medications no             Objective    Wt 58.1 kg (128 lb)   BMI 24.19 kg/m    Body mass index is 24.19 kg/m .  Physical Exam   GENERAL: healthy, alert and no distress

## 2023-09-09 DIAGNOSIS — F41.1 GENERALIZED ANXIETY DISORDER: Primary | ICD-10-CM

## 2023-09-10 ENCOUNTER — MYC MEDICAL ADVICE (OUTPATIENT)
Dept: FAMILY MEDICINE | Facility: CLINIC | Age: 43
End: 2023-09-10
Payer: COMMERCIAL

## 2023-09-10 DIAGNOSIS — F41.1 GENERALIZED ANXIETY DISORDER: ICD-10-CM

## 2023-09-10 DIAGNOSIS — F33.9 MAJOR DEPRESSION, RECURRENT, CHRONIC (H): ICD-10-CM

## 2023-09-10 NOTE — TELEPHONE ENCOUNTER
"Routing refill request to provider for review/approval because:  Please review as Effexor 75 mg was discontinued on 5/15/2023. Dose ordered on 8/2/2023 was 150 mg.    Last Written Prescription Date:  8/2/2023  Last Fill Quantity: 90,  # refills: 1   Last office visit provider:  8/30/2023     Requested Prescriptions   Pending Prescriptions Disp Refills    EFFEXOR XR 75 MG 24 hr capsule [Pharmacy Med Name: EFFEXOR XR 75 MG CAPSULE] 90 capsule 1     Sig: TAKE 1 CAPSULE BY MOUTH EVERY DAY       Serotonin-Norepinephrine Reuptake Inhibitors  Passed - 9/9/2023 10:41 PM        Passed - Blood pressure under 140/90 in past 12 months     BP Readings from Last 3 Encounters:   08/30/23 110/77   08/19/23 98/62   08/17/23 119/80                 Passed - Recent (12 mo) or future (30 days) visit within the authorizing provider's specialty     Patient has had an office visit with the authorizing provider or a provider within the authorizing providers department within the previous 12 mos or has a future within next 30 days. See \"Patient Info\" tab in inbasket, or \"Choose Columns\" in Meds & Orders section of the refill encounter.              Passed - Medication is active on med list        Passed - Patient is age 18 or older        Passed - No active pregnancy on record        Passed - Normal serum creatinine on file in past 12 months     Recent Labs   Lab Test 08/19/23  0525   CR 0.83       Ok to refill medication if creatinine is low          Passed - No positive pregnancy test in past 12 months             Pamela Barkley RN 09/09/23 10:41 PM  "

## 2023-09-11 RX ORDER — VENLAFAXINE HCL 150 MG
150 CAPSULE, EXT RELEASE 24 HR ORAL DAILY
Qty: 90 CAPSULE | Refills: 3 | Status: SHIPPED | OUTPATIENT
Start: 2023-09-11 | End: 2024-07-22

## 2023-09-21 ENCOUNTER — OFFICE VISIT (OUTPATIENT)
Dept: SURGERY | Facility: CLINIC | Age: 43
End: 2023-09-21
Attending: FAMILY MEDICINE
Payer: COMMERCIAL

## 2023-09-21 VITALS — WEIGHT: 129 LBS | HEIGHT: 62 IN | BODY MASS INDEX: 23.74 KG/M2

## 2023-09-21 DIAGNOSIS — K80.00 CALCULUS OF GALLBLADDER WITH ACUTE CHOLECYSTITIS WITHOUT OBSTRUCTION: ICD-10-CM

## 2023-09-21 PROCEDURE — 99243 OFF/OP CNSLTJ NEW/EST LOW 30: CPT | Performed by: SURGERY

## 2023-09-21 RX ORDER — IBUPROFEN 400 MG/1
TABLET, FILM COATED ORAL
COMMUNITY

## 2023-09-21 RX ORDER — CLOTRIMAZOLE AND BETAMETHASONE DIPROPIONATE 10; .64 MG/G; MG/G
CREAM TOPICAL
COMMUNITY

## 2023-09-21 NOTE — LETTER
9/21/2023         RE: Sherry Sweeney  [address] [address]        Dear Colleague,    Thank you for referring your patient, Sherry Sweeney, to the [facility] [place]. Please see a copy of my visit note below.    HPI: Sherry Sweeney is a 43 year old female referred to see me by Layla Martinez for biliary colic.  She notes a particularly unpleasant episode of epigastric pain roughly 1 month ago, with pain beginning in the epigastric region and radiating in a bandlike distribution around her upper abdomen to her back.  This was severe enough that she presented to the emergency department for evaluation, was reassuring from the standpoint of cholecystitis but did note cholelithiasis within a distended gallbladder.   She denies any episodes as severe as what she had had 1 month ago, but has still been having some mild discomfort intermittently    Allergies:Ciprofloxacin, Egg white (egg protein), Influenza virus vaccine, No clinical screening - see comments, Sulfa antibiotics, Yeast, Desvenlafaxine, Milnacipran, and Quetiapine    Prior Medical History:   Past Medical History:   Diagnosis Date     Anemia      Chronic fatigue syndrome      Chronic migraine      Depression      Depression      Depressive disorder 2013     Fibromyalgia      Fibromyalgia      H/O: hysterectomy 02/22/2018     History of anesthesia complications     slow to wake     IBS (irritable bowel syndrome)      Insomnia      Irregular heart beat      Mitochondrial disease (H)      Mitochondrial myopathy      Parasomnia      PONV (postoperative nausea and vomiting)        Prior Surgical History:   Past Surgical History:   Procedure Laterality Date     ABDOMEN SURGERY  1996 & 2018, feb.     APPENDECTOMY  1996     BIOPSY       HC REMOVAL OF OVARIAN CYST(S)      Description: Ovarian Cystectomy;  Recorded: 11/07/2013;     HYSTERECTOMY Bilateral 02/22/2018    Procedure: TOTAL ABDOMINAL HYSTERECTOMY  BILATERAL SALPINGECTOMY, RIGHT OOPHORECTOMY;  Surgeon: Joanne Bedolla DO;  Location: [facility] OR;  Service:      HYSTERECTOMY TOTAL ABDOMINAL       LAPAROSCOPIC CYSTECTOMY OVARIAN (ONCOLOGY) Left 10/18/2021    Procedure: LAPAROSCOPY,  LEFT OVARIAN CYSTECTOMY, LYSIS OF ADHESIONS.;  Surgeon: Joanne Bedolla MD;  Location: [facility] [place]     LAPAROSCOPY DIAGNOSTIC (GYN) Left 06/29/2023    Procedure: DIAGNOSTIC LAPAROSCOPY WITH LEFT SIDE OOPHORECTOMY AND LYSIS OF ADHESION;  Surgeon: Joanne Bedolla MD;  Location: [facility] [place]     [facility]  02/24/2018            CURRENT MEDS:  Prior to Admission medications    Medication Sig Start Date End Date Taking? Authorizing Provider   ascorbic acid 500 MG TABS Take 500 mg by mouth daily    Unknown, Entered By History   betamethasone dipropionate (DIPROSONE) 0.05 % external lotion  10/19/22   Reported, Patient   buPROPion (WELLBUTRIN XL) 300 MG 24 hr tablet TAKE 1 TABLET BY MOUTH EVERY DAY 8/2/23   Layla Martinez MD   cetirizine (ZYRTEC) 10 MG tablet TAKE 1 TAB ORALLY DAILY AS NEEDED FOR ALLERGIES MAY INCREASE TO 2 TAB DAILY IF ITCHING NOT RELIEVED 12/18/22   Layla Martinez MD   clindamycin-benzoyl peroxide (BENZACLIN) gel Apply topically to acne once daily 7/9/18   Reported, Patient   clobetasol (TEMOVATE) 0.05 % external ointment     Reported, Patient   clotrimazole-betamethasone (LOTRISONE) 1-0.05 % external cream 1 application as needed by topical route.    Reported, Patient   [facility] MELATONIN 3 MG tablet TAKE 1 TABLET (3 MG) BY MOUTH NIGHTLY AS NEEDED FOR SLEEP 7/19/22   Layla Martinez MD   cyclobenzaprine (FLEXERIL) 10 MG tablet Take 10 mg by mouth daily as needed for muscle spasms    Reported, Patient   EFFEXOR  MG 24 hr capsule Take 1 capsule (150 mg) by mouth daily 9/11/23   Layla Martinez MD   EFFEXOR XR 75 MG 24 hr capsule Take 1 capsule (75 mg) by mouth daily 9/11/23   Layla Martinez MD   hydrOXYzine  (ATARAX) 25 MG tablet Take 1-2 tablets (25-50 mg) by mouth At Bedtime 22   Layla Martinez MD   ibuprofen (ADVIL/MOTRIN) 400 MG tablet     Reported, Patient   ketoconazole (NIZORAL) 2 % external cream APPLY TO AFFECTED AREA TWICE A DAY FOR 7 DAYS    Reported, Patient   lidocaine (LIDODERM) 5 % patch PLACE 1 PATCH ONTO THE SKIN AS NEEDED TO NECK, SHOULDERS, AND BACK 8/3/22   Layla Martinez MD   Multiple Vitamin (MULTIVITAMIN ADULT PO) Take 1 tablet by mouth daily    Reported, Patient   naproxen (NAPROSYN) 500 MG tablet     Reported, Patient   omeprazole (PRILOSEC) 40 MG DR capsule TAKE 1 CAPSULE (40 MG) BY MOUTH EVERY MORNING (BEFORE BREAKFAST) 30 MINUTES BEFORE MEAL 22   Layla Martinez MD   ondansetron (ZOFRAN) 4 MG tablet Take 1 tablet (4 mg) by mouth every 8 hours as needed for nausea 3/8/23   Yossi Hernandez MD   Riboflavin 400 MG TABS Take 400 mg by mouth daily    Reported, Patient   rizatriptan (MAXALT) 10 MG tablet TAKE 1 TABLET BY MOUTH AS NEEDED FOR MIGRAINE. MAY REPEAT IN 2 HOURS IF NEEDED 23   Mikel Hernandez MD   tretinoin (RETIN-A) 0.05 % external cream APPLY TO AFFECTED AREA EVERY DAY AT BEDTIME 22   Layla Martinez MD   valACYclovir (VALTREX) 1000 mg tablet TAKE 2 TABLETS BY MOUTH 12 HOURS APART AS NEEDED EPISODE 21   Layla Martinez MD   zolpidem (AMBIEN) 5 MG tablet TAKE 1 TABLET (5 MG) BY MOUTH NIGHTLY AS NEEDED FOR SLEEP 5/3/23   Layla Martinez MD         Family History   Problem Relation Age of Onset     Depression Mother         sertraline     Hyperlipidemia Mother      Hypertension Mother      Breast Cancer Mother 62.00     Cancer Mother         thyroid      Meniere's disease Mother      Anxiety Disorder Mother      Depression Brother      Anxiety Disorder Maternal Grandmother      Breast Cancer Maternal Grandmother 60.00     Depression Maternal Grandmother      Coronary Artery Disease Father 45.00             Depression Father   "    Bone Cancer Maternal Grandfather      Anxiety Disorder Paternal Grandfather      Diabetes Cousin      Diabetes Cousin      Anxiety Disorder Paternal Grandmother      Malignant Hypertension No family hx of         reports that she has never smoked. She has never used smokeless tobacco. She reports that she does not drink alcohol and does not use drugs.    History   Smoking Status     Never   Smokeless Tobacco     Never       Review of Systems -    The 10 point review of systems is within normal limits except as noted in HPI.    Ht 1.562 m (5' 1.5\")   Wt 58.5 kg (129 lb)   BMI 23.98 kg/m    129 lbs 0 oz  Body mass index is 23.98 kg/m .    EXAM:  GENERAL: Alert, cooperative, appears stated age  ABDOMEN: Soft, nondistended nontender currently      LABS:  Lab Results   Component Value Date    HGB 13.9 08/19/2023    WBC 10.8 08/19/2023     08/19/2023    AST 28 08/30/2023    ALT 22 08/30/2023    ALKPHOS 69 08/30/2023    BILITOTAL 0.3 08/30/2023    LIPASE 52 08/19/2023           Assessment/Plan:   1. Calculus of gallbladder with acute cholecystitis without obstruction          Sherry Sweeney is a 43 year old female with signs and symptoms consistent with ileocolic.  I have explained the pathophysiology of pericolic in detail as well as the surgical versus non-operative management strategies.      The risks of surgery were discussed in detail which include, but are not limited to, bleeding, infection, injury to surrounding structures.  Additionally, the risks of non operative management were discussed which include, but are not limited to, recurrent symptoms or progression to cholecystitis    She understands everything which was discussed and has consented to proceed with a upper scopic cholecystectomy.  Surgery will be scheduled at a time that works for the patient.      25 minutes spent on the date of the encounter doing chart review, patient visit, and documentation    Eusebio Gonzalez MD ,MD  [facility] " Department of Surgery    Again, thank you for allowing me to participate in the care of your patient.        Sincerely,        Eusebio Gonzalez MD

## 2023-09-22 NOTE — PROGRESS NOTES
HPI: Sherry Sweeney is a 43 year old female referred to see me by Layla Martinez for biliary colic.  She notes a particularly unpleasant episode of epigastric pain roughly 1 month ago, with pain beginning in the epigastric region and radiating in a bandlike distribution around her upper abdomen to her back.  This was severe enough that she presented to the emergency department for evaluation, was reassuring from the standpoint of cholecystitis but did note cholelithiasis within a distended gallbladder.   She denies any episodes as severe as what she had had 1 month ago, but has still been having some mild discomfort intermittently    Allergies:Ciprofloxacin, Egg white (egg protein), Influenza virus vaccine, No clinical screening - see comments, Sulfa antibiotics, Yeast, Desvenlafaxine, Milnacipran, and Quetiapine    Prior Medical History:   Past Medical History:   Diagnosis Date    Anemia     Chronic fatigue syndrome     Chronic migraine     Depression     Depression     Depressive disorder 2013    Fibromyalgia     Fibromyalgia     H/O: hysterectomy 02/22/2018    History of anesthesia complications     slow to wake    IBS (irritable bowel syndrome)     Insomnia     Irregular heart beat     Mitochondrial disease (H)     Mitochondrial myopathy     Parasomnia     PONV (postoperative nausea and vomiting)        Prior Surgical History:   Past Surgical History:   Procedure Laterality Date    ABDOMEN SURGERY  1996 & 2018, feb.    APPENDECTOMY  1996    BIOPSY      HC REMOVAL OF OVARIAN CYST(S)      Description: Ovarian Cystectomy;  Recorded: 11/07/2013;    HYSTERECTOMY Bilateral 02/22/2018    Procedure: TOTAL ABDOMINAL HYSTERECTOMY BILATERAL SALPINGECTOMY, RIGHT OOPHORECTOMY;  Surgeon: Joanne Bedolla DO;  Location: [facility];  Service:     HYSTERECTOMY TOTAL ABDOMINAL      LAPAROSCOPIC CYSTECTOMY OVARIAN (ONCOLOGY) Left 10/18/2021    Procedure: LAPAROSCOPY,  LEFT OVARIAN CYSTECTOMY, LYSIS OF ADHESIONS.;   Surgeon: Joanne Bedolla MD;  Location: [facility] [place]    LAPAROSCOPY DIAGNOSTIC (GYN) Left 06/29/2023    Procedure: DIAGNOSTIC LAPAROSCOPY WITH LEFT SIDE OOPHORECTOMY AND LYSIS OF ADHESION;  Surgeon: Joanne Bedolla MD;  Location: [facility]    PICC  02/24/2018            CURRENT MEDS:  Prior to Admission medications    Medication Sig Start Date End Date Taking? Authorizing Provider   ascorbic acid 500 MG TABS Take 500 mg by mouth daily    Unknown, Entered By History   betamethasone dipropionate (DIPROSONE) 0.05 % external lotion  10/19/22   Reported, Patient   buPROPion (WELLBUTRIN XL) 300 MG 24 hr tablet TAKE 1 TABLET BY MOUTH EVERY DAY 8/2/23   Layla Martinez MD   cetirizine (ZYRTEC) 10 MG tablet TAKE 1 TAB ORALLY DAILY AS NEEDED FOR ALLERGIES MAY INCREASE TO 2 TAB DAILY IF ITCHING NOT RELIEVED 12/18/22   Layla Martinez MD   clindamycin-benzoyl peroxide (BENZACLIN) gel Apply topically to acne once daily 7/9/18   Reported, Patient   clobetasol (TEMOVATE) 0.05 % external ointment     Reported, Patient   clotrimazole-betamethasone (LOTRISONE) 1-0.05 % external cream 1 application as needed by topical route.    Reported, Patient   [facility] MELATONIN 3 MG tablet TAKE 1 TABLET (3 MG) BY MOUTH NIGHTLY AS NEEDED FOR SLEEP 7/19/22   Layla Martniez MD   cyclobenzaprine (FLEXERIL) 10 MG tablet Take 10 mg by mouth daily as needed for muscle spasms    Reported, Patient   EFFEXOR  MG 24 hr capsule Take 1 capsule (150 mg) by mouth daily 9/11/23   Layla Martinez MD   EFFEXOR XR 75 MG 24 hr capsule Take 1 capsule (75 mg) by mouth daily 9/11/23   Layla Martinez MD   hydrOXYzine (ATARAX) 25 MG tablet Take 1-2 tablets (25-50 mg) by mouth At Bedtime 6/29/22   Layla Martinez MD   ibuprofen (ADVIL/MOTRIN) 400 MG tablet     Reported, Patient   ketoconazole (NIZORAL) 2 % external cream APPLY TO AFFECTED AREA TWICE A DAY FOR 7 DAYS    Reported, Patient   lidocaine (LIDODERM)  5 % patch PLACE 1 PATCH ONTO THE SKIN AS NEEDED TO NECK, SHOULDERS, AND BACK 8/3/22   Layla Martinez MD   Multiple Vitamin (MULTIVITAMIN ADULT PO) Take 1 tablet by mouth daily    Reported, Patient   naproxen (NAPROSYN) 500 MG tablet     Reported, Patient   omeprazole (PRILOSEC) 40 MG DR capsule TAKE 1 CAPSULE (40 MG) BY MOUTH EVERY MORNING (BEFORE BREAKFAST) 30 MINUTES BEFORE MEAL 22   Layla Martinez MD   ondansetron (ZOFRAN) 4 MG tablet Take 1 tablet (4 mg) by mouth every 8 hours as needed for nausea 3/8/23   Yossi Hernandez MD   Riboflavin 400 MG TABS Take 400 mg by mouth daily    Reported, Patient   rizatriptan (MAXALT) 10 MG tablet TAKE 1 TABLET BY MOUTH AS NEEDED FOR MIGRAINE. MAY REPEAT IN 2 HOURS IF NEEDED 23   Mikel Hernandez MD   tretinoin (RETIN-A) 0.05 % external cream APPLY TO AFFECTED AREA EVERY DAY AT BEDTIME 22   Layla Martinez MD   valACYclovir (VALTREX) 1000 mg tablet TAKE 2 TABLETS BY MOUTH 12 HOURS APART AS NEEDED EPISODE 21   Layla Martinez MD   zolpidem (AMBIEN) 5 MG tablet TAKE 1 TABLET (5 MG) BY MOUTH NIGHTLY AS NEEDED FOR SLEEP 5/3/23   Layla Martinez MD         Family History   Problem Relation Age of Onset    Depression Mother         sertraline    Hyperlipidemia Mother     Hypertension Mother     Breast Cancer Mother 62.00    Cancer Mother         thyroid     Meniere's disease Mother     Anxiety Disorder Mother     Depression Brother     Anxiety Disorder Maternal Grandmother     Breast Cancer Maternal Grandmother 60.00    Depression Maternal Grandmother     Coronary Artery Disease Father 45.00            Depression Father     Bone Cancer Maternal Grandfather     Anxiety Disorder Paternal Grandfather     Diabetes Cousin     Diabetes Cousin     Anxiety Disorder Paternal Grandmother     Malignant Hypertension No family hx of         reports that she has never smoked. She has never used smokeless tobacco. She reports that she does not  "drink alcohol and does not use drugs.    History   Smoking Status    Never   Smokeless Tobacco    Never       Review of Systems -    The 10 point review of systems is within normal limits except as noted in HPI.    Ht 1.562 m (5' 1.5\")   Wt 58.5 kg (129 lb)   BMI 23.98 kg/m    129 lbs 0 oz  Body mass index is 23.98 kg/m .    EXAM:  GENERAL: Alert, cooperative, appears stated age  ABDOMEN: Soft, nondistended nontender currently      LABS:  Lab Results   Component Value Date    HGB 13.9 08/19/2023    WBC 10.8 08/19/2023     08/19/2023    AST 28 08/30/2023    ALT 22 08/30/2023    ALKPHOS 69 08/30/2023    BILITOTAL 0.3 08/30/2023    LIPASE 52 08/19/2023           Assessment/Plan:   1. Calculus of gallbladder with acute cholecystitis without obstruction          Sherry Sweeney is a 43 year old female with signs and symptoms consistent with ileocolic.  I have explained the pathophysiology of pericolic in detail as well as the surgical versus non-operative management strategies.      The risks of surgery were discussed in detail which include, but are not limited to, bleeding, infection, injury to surrounding structures.  Additionally, the risks of non operative management were discussed which include, but are not limited to, recurrent symptoms or progression to cholecystitis    She understands everything which was discussed and has consented to proceed with a upper scopic cholecystectomy.  Surgery will be scheduled at a time that works for the patient.      25 minutes spent on the date of the encounter doing chart review, patient visit, and documentation    Eusebio Gonzalez MD ,MD  [facility] Department of Surgery  "

## 2023-09-25 ENCOUNTER — MYC MEDICAL ADVICE (OUTPATIENT)
Dept: FAMILY MEDICINE | Facility: CLINIC | Age: 43
End: 2023-09-25
Payer: COMMERCIAL

## 2023-09-26 ENCOUNTER — TELEPHONE (OUTPATIENT)
Dept: FAMILY MEDICINE | Facility: CLINIC | Age: 43
End: 2023-09-26
Payer: COMMERCIAL

## 2023-09-26 NOTE — TELEPHONE ENCOUNTER
Called Sherry due to [email or web address] message (note below).  Sherry is feeling fine today with no pain or indigestion/heart burn.  Recommended to go to ER if pain is intolerable, vomiting, fever, or any other concern with gall bladder.    Liz has tried twice to schedule her surgery.  Liz was given the number to schedule surgery.  She will try again today.      Routing to Dr Martinez as an [email or web address] message from 9/25/23:  Hi Dr. Martinez, I need to run some information by you. I was seen by Dr. Gonzalez on Thursday last week. Today, after leaving a [employer] message and a voicemail, no one has called to schedule my surgery. I'm fretting somewhat concerned. I have pain on my left side similar to the gallbladder pain that sent me to the ER, but not as severe. A nagging hunger accompanies it.     I also have not been well this whole past week. Two days of which I spent sleeping. I could tell my body was fighting something. I was having indigestion, burping up acid, burping a lot, and pain in the stomach and pancreas area. When I say Dr. Gonzalez, I asked if this was typical of gallbladder complications, and he said it could be and might be something else. So I took a tens unit and tried to find the exact location of the pain because pushing just hurt the whole area. I believe the pain to be in the stomach.      I started taking Ompeprozole a gaping last Tuesday. That seems to be helping some, but I still have pain in the gallbladder area, and Im having trouble eating and drinking. So I eat little bits and drink water as often as possible.      I'm sharing this all so you have a complete picture of the current situation, and I want your advice on what to do. How long do I have to wait to have gallbladder surgery as an outpatient? If this gets worse, I'm going into the ER.      Thanks so much for listening,  Sherry

## 2023-09-27 ENCOUNTER — OFFICE VISIT (OUTPATIENT)
Dept: FAMILY MEDICINE | Facility: CLINIC | Age: 43
End: 2023-09-27
Payer: COMMERCIAL

## 2023-09-27 ENCOUNTER — PREP FOR PROCEDURE (OUTPATIENT)
Dept: SURGERY | Facility: CLINIC | Age: 43
End: 2023-09-27
Payer: COMMERCIAL

## 2023-09-27 ENCOUNTER — TELEPHONE (OUTPATIENT)
Dept: SURGERY | Facility: CLINIC | Age: 43
End: 2023-09-27
Payer: COMMERCIAL

## 2023-09-27 VITALS
BODY MASS INDEX: 23.83 KG/M2 | OXYGEN SATURATION: 98 % | DIASTOLIC BLOOD PRESSURE: 78 MMHG | TEMPERATURE: 98.4 F | HEART RATE: 93 BPM | RESPIRATION RATE: 18 BRPM | SYSTOLIC BLOOD PRESSURE: 111 MMHG | WEIGHT: 128.2 LBS

## 2023-09-27 DIAGNOSIS — K80.50 BILIARY COLIC: Primary | ICD-10-CM

## 2023-09-27 DIAGNOSIS — R10.13 ABDOMINAL PAIN, EPIGASTRIC: Primary | ICD-10-CM

## 2023-09-27 LAB
ALBUMIN SERPL BCG-MCNC: 4.3 G/DL (ref 3.5–5.2)
ALP SERPL-CCNC: 63 U/L (ref 35–104)
ALT SERPL W P-5'-P-CCNC: 14 U/L (ref 0–50)
ANION GAP SERPL CALCULATED.3IONS-SCNC: 9 MMOL/L (ref 7–15)
AST SERPL W P-5'-P-CCNC: 23 U/L (ref 0–45)
BASOPHILS # BLD AUTO: 0 10E3/UL (ref 0–0.2)
BASOPHILS NFR BLD AUTO: 0 %
BILIRUB SERPL-MCNC: <0.2 MG/DL
BUN SERPL-MCNC: 8.1 MG/DL (ref 6–20)
CALCIUM SERPL-MCNC: 9.5 MG/DL (ref 8.6–10)
CHLORIDE SERPL-SCNC: 103 MMOL/L (ref 98–107)
CREAT SERPL-MCNC: 0.78 MG/DL (ref 0.51–0.95)
DEPRECATED HCO3 PLAS-SCNC: 26 MMOL/L (ref 22–29)
EGFRCR SERPLBLD CKD-EPI 2021: >90 ML/MIN/1.73M2
EOSINOPHIL # BLD AUTO: 0 10E3/UL (ref 0–0.7)
EOSINOPHIL NFR BLD AUTO: 0 %
ERYTHROCYTE [DISTWIDTH] IN BLOOD BY AUTOMATED COUNT: 12.4 % (ref 10–15)
GLUCOSE SERPL-MCNC: 90 MG/DL (ref 70–99)
HCT VFR BLD AUTO: 39.5 % (ref 35–47)
HGB BLD-MCNC: 13.2 G/DL (ref 11.7–15.7)
IMM GRANULOCYTES # BLD: 0 10E3/UL
IMM GRANULOCYTES NFR BLD: 0 %
LIPASE SERPL-CCNC: 29 U/L (ref 13–60)
LYMPHOCYTES # BLD AUTO: 1.7 10E3/UL (ref 0.8–5.3)
LYMPHOCYTES NFR BLD AUTO: 24 %
MCH RBC QN AUTO: 28.8 PG (ref 26.5–33)
MCHC RBC AUTO-ENTMCNC: 33.4 G/DL (ref 31.5–36.5)
MCV RBC AUTO: 86 FL (ref 78–100)
MONOCYTES # BLD AUTO: 0.5 10E3/UL (ref 0–1.3)
MONOCYTES NFR BLD AUTO: 7 %
NEUTROPHILS # BLD AUTO: 4.9 10E3/UL (ref 1.6–8.3)
NEUTROPHILS NFR BLD AUTO: 69 %
PLATELET # BLD AUTO: 306 10E3/UL (ref 150–450)
POTASSIUM SERPL-SCNC: 4.2 MMOL/L (ref 3.4–5.3)
PROT SERPL-MCNC: 6.8 G/DL (ref 6.4–8.3)
RBC # BLD AUTO: 4.58 10E6/UL (ref 3.8–5.2)
SODIUM SERPL-SCNC: 138 MMOL/L (ref 135–145)
WBC # BLD AUTO: 7.2 10E3/UL (ref 4–11)

## 2023-09-27 PROCEDURE — 80053 COMPREHEN METABOLIC PANEL: CPT | Performed by: FAMILY MEDICINE

## 2023-09-27 PROCEDURE — 99214 OFFICE O/P EST MOD 30 MIN: CPT | Performed by: FAMILY MEDICINE

## 2023-09-27 PROCEDURE — 83690 ASSAY OF LIPASE: CPT | Performed by: FAMILY MEDICINE

## 2023-09-27 PROCEDURE — 36415 COLL VENOUS BLD VENIPUNCTURE: CPT | Performed by: FAMILY MEDICINE

## 2023-09-27 PROCEDURE — 85025 COMPLETE CBC W/AUTO DIFF WBC: CPT | Performed by: FAMILY MEDICINE

## 2023-09-27 RX ORDER — ACETAMINOPHEN 325 MG/1
975 TABLET ORAL ONCE
Status: CANCELLED | OUTPATIENT
Start: 2023-09-27 | End: 2023-09-27

## 2023-09-27 NOTE — LETTER
Pre-op Physical: To be done by Dr Gonzalez day of procedure.    Surgery Date: 10/25/2023     Location: [place] [address]    Approximate Arrival Time: 11:00am  (Unless instructed differently by the pre-op call nurse)     Pre-Surgical Tasks:     Review all medications with your primary care or prescribing physician; they will advise you which meds to stop and when, and when you can resume taking.  Certain medications like blood thinners and weight loss medications need to be stopped in advance of surgery to proceed safely.      Blood thinners including but not exclusive to drugs like Xarelto, Eliquis, Warfarin and Aspirin, should be stopped five days before surgery, if your prescribing provider agrees. Follow your provider's advice on stopping blood thinners because they know you best.  If you are unsure if your medication is a blood thinner, ask your prescribing provider.    Weight loss medications: There are multiple medications being used for weight management and diabetes today, and the list is growing.  Phentermine, Ozempic, Wegovy, Trulicity, and other similar medications need to be stopped one week before surgery to avoid being cancelled.  Victoza and Saxenda can be continued longer but must be stopped one full day before surgery.  Please ask your prescribing provider for advice.    Diabetic medications: in addition to the medications talked about above that are used for either weight loss or diabetes, some people are on insulin that may require adjustment.  Please discuss managing diabetic medications with your prescribing doctor as these medications may require modification prior to surgery.     Please shower the evening before and morning of surgery with Hibiclens soap.  This can be found at your local pharmacy.     Fasting instructions will be provided by the pre-op nurse who will call you 1-3 days before surgery.  Typically, we advise normal food  up to 8 hours before you arrive for surgery. Clear liquids only from then until 2 hours before you arrive surgery, then nothing at all by mouth.  The nurse will review your specific instructions with you at the call.      Smoking impacts your body's ability to heal properly so we advise patients to quit if possible before surgery.  Plastic Surgery patients are required to be nicotine free for at least 8 weeks before surgery.      You will need an adult to drive you home and stay with you 24 hours after surgery. Public transportation or Medical Van Services are not permitted.    Visitor restrictions are subject to change, please verify with the pre-op nurse when they call how many people are permitted to accompany you.    We always encourage you to notify your insurance any time you have medical tests or procedures scheduled including surgery. The number is usually right on the back of your insurance card. To obtain pricing for surgery, please call  [facility] Cost of Care at [phone] or email [email or web address].        Call our office if you have any questions! Thank you!

## 2023-09-27 NOTE — PATIENT INSTRUCTIONS
Increase omeprazole to 40 mg twice daily.  We will order some blood work today and let you know the results of this when we get it back through [facility]    Follow-up with Dr. Martinez in 1 week.  Further evaluation may need to be done depending on the results of your blood work.

## 2023-09-27 NOTE — TELEPHONE ENCOUNTER
Spoke with patient today regarding surgery scheduling     Went over details/instructions.    Surgery Letter sent via [employer]  (Please see LETTERS TAB in chart to retrieve a copy of this letter)
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
## 2023-10-08 ENCOUNTER — HEALTH MAINTENANCE LETTER (OUTPATIENT)
Age: 43
End: 2023-10-08

## 2023-10-23 ENCOUNTER — ANESTHESIA EVENT (OUTPATIENT)
Dept: SURGERY | Facility: AMBULATORY SURGERY CENTER | Age: 43
End: 2023-10-23
Payer: MEDICARE

## 2023-10-25 ENCOUNTER — HOSPITAL ENCOUNTER (OUTPATIENT)
Facility: AMBULATORY SURGERY CENTER | Age: 43
Discharge: HOME | End: 2023-10-25
Attending: SURGERY
Payer: COMMERCIAL

## 2023-10-25 ENCOUNTER — MYC MEDICAL ADVICE (OUTPATIENT)
Dept: SURGERY | Facility: CLINIC | Age: 43
End: 2023-10-25

## 2023-10-25 ENCOUNTER — ANESTHESIA (OUTPATIENT)
Dept: SURGERY | Facility: AMBULATORY SURGERY CENTER | Age: 43
End: 2023-10-25
Payer: MEDICARE

## 2023-10-25 VITALS
WEIGHT: 128 LBS | HEART RATE: 104 BPM | OXYGEN SATURATION: 100 % | BODY MASS INDEX: 24.17 KG/M2 | HEIGHT: 61 IN | RESPIRATION RATE: 16 BRPM | TEMPERATURE: 97.4 F | DIASTOLIC BLOOD PRESSURE: 67 MMHG | SYSTOLIC BLOOD PRESSURE: 113 MMHG

## 2023-10-25 DIAGNOSIS — K80.50 BILIARY COLIC: ICD-10-CM

## 2023-10-25 PROCEDURE — 47562 LAPAROSCOPIC CHOLECYSTECTOMY: CPT | Performed by: SURGERY

## 2023-10-25 RX ORDER — ONDANSETRON 2 MG/ML
4 INJECTION INTRAMUSCULAR; INTRAVENOUS EVERY 30 MIN PRN
Status: DISCONTINUED | OUTPATIENT
Start: 2023-10-25 | End: 2023-10-26 | Stop reason: HOSPADM

## 2023-10-25 RX ORDER — ACETAMINOPHEN 325 MG/1
975 TABLET ORAL ONCE
Status: COMPLETED | OUTPATIENT
Start: 2023-10-25 | End: 2023-10-25

## 2023-10-25 RX ORDER — FENTANYL CITRATE 50 UG/ML
INJECTION, SOLUTION INTRAMUSCULAR; INTRAVENOUS PRN
Status: DISCONTINUED | OUTPATIENT
Start: 2023-10-25 | End: 2023-10-25

## 2023-10-25 RX ORDER — KETOROLAC TROMETHAMINE 30 MG/ML
INJECTION, SOLUTION INTRAMUSCULAR; INTRAVENOUS PRN
Status: DISCONTINUED | OUTPATIENT
Start: 2023-10-25 | End: 2023-10-25

## 2023-10-25 RX ORDER — FENTANYL CITRATE 0.05 MG/ML
25 INJECTION, SOLUTION INTRAMUSCULAR; INTRAVENOUS EVERY 5 MIN PRN
Status: DISCONTINUED | OUTPATIENT
Start: 2023-10-25 | End: 2023-10-26 | Stop reason: HOSPADM

## 2023-10-25 RX ORDER — CEFAZOLIN SODIUM 2 G/100ML
2 INJECTION, SOLUTION INTRAVENOUS
Status: COMPLETED | OUTPATIENT
Start: 2023-10-25 | End: 2023-10-25

## 2023-10-25 RX ORDER — SODIUM CHLORIDE, SODIUM LACTATE, POTASSIUM CHLORIDE, CALCIUM CHLORIDE 600; 310; 30; 20 MG/100ML; MG/100ML; MG/100ML; MG/100ML
INJECTION, SOLUTION INTRAVENOUS CONTINUOUS
Status: DISCONTINUED | OUTPATIENT
Start: 2023-10-25 | End: 2023-10-26 | Stop reason: HOSPADM

## 2023-10-25 RX ORDER — ONDANSETRON 4 MG/1
4 TABLET, ORALLY DISINTEGRATING ORAL EVERY 30 MIN PRN
Status: DISCONTINUED | OUTPATIENT
Start: 2023-10-25 | End: 2023-10-26 | Stop reason: HOSPADM

## 2023-10-25 RX ORDER — OXYCODONE HYDROCHLORIDE 5 MG/1
5 TABLET ORAL
Status: DISCONTINUED | OUTPATIENT
Start: 2023-10-25 | End: 2023-10-26 | Stop reason: HOSPADM

## 2023-10-25 RX ORDER — GLYCOPYRROLATE 0.2 MG/ML
INJECTION, SOLUTION INTRAMUSCULAR; INTRAVENOUS PRN
Status: DISCONTINUED | OUTPATIENT
Start: 2023-10-25 | End: 2023-10-25

## 2023-10-25 RX ORDER — DEXAMETHASONE SODIUM PHOSPHATE 4 MG/ML
INJECTION, SOLUTION INTRA-ARTICULAR; INTRALESIONAL; INTRAMUSCULAR; INTRAVENOUS; SOFT TISSUE PRN
Status: DISCONTINUED | OUTPATIENT
Start: 2023-10-25 | End: 2023-10-25

## 2023-10-25 RX ORDER — MAGNESIUM SULFATE HEPTAHYDRATE 40 MG/ML
4 INJECTION, SOLUTION INTRAVENOUS ONCE
Status: COMPLETED | OUTPATIENT
Start: 2023-10-25 | End: 2023-10-25

## 2023-10-25 RX ORDER — BUPIVACAINE HYDROCHLORIDE 2.5 MG/ML
INJECTION, SOLUTION INFILTRATION; PERINEURAL PRN
Status: DISCONTINUED | OUTPATIENT
Start: 2023-10-25 | End: 2023-10-25 | Stop reason: HOSPADM

## 2023-10-25 RX ORDER — PHENYLEPHRINE HYDROCHLORIDE 10 MG/ML
INJECTION INTRAVENOUS PRN
Status: DISCONTINUED | OUTPATIENT
Start: 2023-10-25 | End: 2023-10-25

## 2023-10-25 RX ORDER — HYDROMORPHONE HCL IN WATER/PF 6 MG/30 ML
0.4 PATIENT CONTROLLED ANALGESIA SYRINGE INTRAVENOUS EVERY 5 MIN PRN
Status: DISCONTINUED | OUTPATIENT
Start: 2023-10-25 | End: 2023-10-26 | Stop reason: HOSPADM

## 2023-10-25 RX ORDER — HYDROMORPHONE HCL IN WATER/PF 6 MG/30 ML
0.2 PATIENT CONTROLLED ANALGESIA SYRINGE INTRAVENOUS EVERY 5 MIN PRN
Status: DISCONTINUED | OUTPATIENT
Start: 2023-10-25 | End: 2023-10-26 | Stop reason: HOSPADM

## 2023-10-25 RX ORDER — FENTANYL CITRATE 0.05 MG/ML
50 INJECTION, SOLUTION INTRAMUSCULAR; INTRAVENOUS EVERY 5 MIN PRN
Status: DISCONTINUED | OUTPATIENT
Start: 2023-10-25 | End: 2023-10-26 | Stop reason: HOSPADM

## 2023-10-25 RX ORDER — FENTANYL CITRATE 0.05 MG/ML
25 INJECTION, SOLUTION INTRAMUSCULAR; INTRAVENOUS
Status: DISCONTINUED | OUTPATIENT
Start: 2023-10-25 | End: 2023-10-26 | Stop reason: HOSPADM

## 2023-10-25 RX ORDER — LIDOCAINE HYDROCHLORIDE 20 MG/ML
INJECTION, SOLUTION INFILTRATION; PERINEURAL PRN
Status: DISCONTINUED | OUTPATIENT
Start: 2023-10-25 | End: 2023-10-25

## 2023-10-25 RX ORDER — LIDOCAINE 40 MG/G
CREAM TOPICAL
Status: DISCONTINUED | OUTPATIENT
Start: 2023-10-25 | End: 2023-10-26 | Stop reason: HOSPADM

## 2023-10-25 RX ORDER — PROPOFOL 10 MG/ML
INJECTION, EMULSION INTRAVENOUS PRN
Status: DISCONTINUED | OUTPATIENT
Start: 2023-10-25 | End: 2023-10-25

## 2023-10-25 RX ORDER — CEFAZOLIN SODIUM 2 G/100ML
2 INJECTION, SOLUTION INTRAVENOUS SEE ADMIN INSTRUCTIONS
Status: DISCONTINUED | OUTPATIENT
Start: 2023-10-25 | End: 2023-10-26 | Stop reason: HOSPADM

## 2023-10-25 RX ORDER — TRAMADOL HYDROCHLORIDE 50 MG/1
50 TABLET ORAL EVERY 6 HOURS PRN
Qty: 10 TABLET | Refills: 0 | Status: SHIPPED | OUTPATIENT
Start: 2023-10-25 | End: 2023-10-28

## 2023-10-25 RX ORDER — PROPOFOL 10 MG/ML
INJECTION, EMULSION INTRAVENOUS CONTINUOUS PRN
Status: DISCONTINUED | OUTPATIENT
Start: 2023-10-25 | End: 2023-10-25

## 2023-10-25 RX ORDER — OXYCODONE HYDROCHLORIDE 10 MG/1
10 TABLET ORAL
Status: DISCONTINUED | OUTPATIENT
Start: 2023-10-25 | End: 2023-10-26 | Stop reason: HOSPADM

## 2023-10-25 RX ADMIN — PROPOFOL 200 MCG/KG/MIN: 10 INJECTION, EMULSION INTRAVENOUS at 12:18

## 2023-10-25 RX ADMIN — FENTANYL CITRATE 100 MCG: 50 INJECTION, SOLUTION INTRAMUSCULAR; INTRAVENOUS at 12:18

## 2023-10-25 RX ADMIN — LIDOCAINE HYDROCHLORIDE 60 MG: 20 INJECTION, SOLUTION INFILTRATION; PERINEURAL at 12:18

## 2023-10-25 RX ADMIN — KETOROLAC TROMETHAMINE 15 MG: 30 INJECTION, SOLUTION INTRAMUSCULAR; INTRAVENOUS at 12:52

## 2023-10-25 RX ADMIN — ACETAMINOPHEN 975 MG: 325 TABLET ORAL at 11:55

## 2023-10-25 RX ADMIN — PROPOFOL 50 MG: 10 INJECTION, EMULSION INTRAVENOUS at 12:33

## 2023-10-25 RX ADMIN — MAGNESIUM SULFATE HEPTAHYDRATE 4 G: 40 INJECTION, SOLUTION INTRAVENOUS at 11:55

## 2023-10-25 RX ADMIN — Medication 40 MG: at 12:18

## 2023-10-25 RX ADMIN — DEXAMETHASONE SODIUM PHOSPHATE 10 MG: 4 INJECTION, SOLUTION INTRA-ARTICULAR; INTRALESIONAL; INTRAMUSCULAR; INTRAVENOUS; SOFT TISSUE at 12:31

## 2023-10-25 RX ADMIN — CEFAZOLIN SODIUM 2 G: 2 INJECTION, SOLUTION INTRAVENOUS at 12:16

## 2023-10-25 RX ADMIN — SODIUM CHLORIDE, SODIUM LACTATE, POTASSIUM CHLORIDE, CALCIUM CHLORIDE: 600; 310; 30; 20 INJECTION, SOLUTION INTRAVENOUS at 11:55

## 2023-10-25 RX ADMIN — GLYCOPYRROLATE 0.2 MG: 0.2 INJECTION, SOLUTION INTRAMUSCULAR; INTRAVENOUS at 12:18

## 2023-10-25 RX ADMIN — PROPOFOL 150 MG: 10 INJECTION, EMULSION INTRAVENOUS at 12:18

## 2023-10-25 RX ADMIN — PHENYLEPHRINE HYDROCHLORIDE 50 MCG: 10 INJECTION INTRAVENOUS at 12:25

## 2023-10-25 NOTE — INTERVAL H&P NOTE
I have reviewed the surgical (or preoperative) H&P that is linked to this encounter, and examined the patient. There are no significant changes    Clinical Conditions Present on Arrival:  Clinically Significant Risk Factors Present on Admission     Eusebio Gonzalez MD, FACS  Office: [phone]  [facility] Bariatric Surgery

## 2023-10-25 NOTE — ANESTHESIA POSTPROCEDURE EVALUATION
Patient: Sherry Sweeney    Procedure: Procedure(s):  CHOLECYSTECTOMY, LAPAROSCOPIC       Anesthesia Type:  General    Note:  Disposition: Outpatient   Postop Pain Control: Uneventful            Sign Out: Well controlled pain   PONV: No   Neuro/Psych: Uneventful            Sign Out: Acceptable/Baseline neuro status   Airway/Respiratory: Uneventful            Sign Out: Acceptable/Baseline resp. status   CV/Hemodynamics: Uneventful            Sign Out: Acceptable CV status; No obvious hypovolemia; No obvious fluid overload   Other NRE: NONE   DID A NON-ROUTINE EVENT OCCUR? No           Last vitals:  Vitals Value Taken Time   /70 10/25/23 1317   Temp 96.5  F (35.8  C) 10/25/23 1310   Pulse 109 10/25/23 1317   Resp 16 10/25/23 1317   SpO2 100 % 10/25/23 1317       Electronically Signed By: Jon Abdalla MD  October 25, 2023  2:41 PM

## 2023-10-25 NOTE — ANESTHESIA PREPROCEDURE EVALUATION
Anesthesia Pre-Procedure Evaluation    Patient: Sherry Sweeney   MRN: [id number] : 1980        Procedure : Procedure(s):  DIAGNOSTIC LAPAROSCOPY WITH LEFT SIDE OOPHORECTOMY          Past Medical History:   Diagnosis Date    Anemia     Chronic fatigue syndrome     Chronic migraine     Depression     Depression     Depressive disorder     Fibromyalgia     Fibromyalgia     Gastroesophageal reflux disease     H/O: hysterectomy 2018    History of anesthesia complications     slow to wake    IBS (irritable bowel syndrome)     Insomnia     Irregular heart beat     Mitochondrial disease (H24)     Mitochondrial myopathy     Motion sickness     Parasomnia     PONV (postoperative nausea and vomiting)       Past Surgical History:   Procedure Laterality Date    ABDOMEN SURGERY   & 2018, feb.    APPENDECTOMY      BIOPSY      HC REMOVAL OF OVARIAN CYST(S)      Description: Ovarian Cystectomy;  Recorded: 2013;    HYSTERECTOMY Bilateral 2018    Procedure: TOTAL ABDOMINAL HYSTERECTOMY BILATERAL SALPINGECTOMY, RIGHT OOPHORECTOMY;  Surgeon: Joanne Bedolla DO;  Location: [facility];  Service:     HYSTERECTOMY TOTAL ABDOMINAL      LAPAROSCOPIC CYSTECTOMY OVARIAN (ONCOLOGY) Left 10/18/2021    Procedure: LAPAROSCOPY,  LEFT OVARIAN CYSTECTOMY, LYSIS OF ADHESIONS.;  Surgeon: Joanne Bedolla MD;  Location: [facility] OR    LAPAROSCOPY DIAGNOSTIC (GYN) Left 2023    Procedure: DIAGNOSTIC LAPAROSCOPY WITH LEFT SIDE OOPHORECTOMY AND LYSIS OF ADHESION;  Surgeon: Joanne Bedolla MD;  Location: [facility] OR    PICC  2018           Allergies   Allergen Reactions    Ciprofloxacin Itching, Rash and Hives     Rash over whole body   Rash over whole body       Egg White (Egg Protein) Other (See Comments) and GI Disturbance     Swollen colon, belly pain  Swollen colon, belly pain      Influenza Virus Vaccine Shortness Of Breath and Anaphylaxis    No Clinical Screening -  See Comments Anaphylaxis     Stomach swelling    Sulfa Antibiotics Rash and Hives    Yeast Other (See Comments) and Anaphylaxis    Desvenlafaxine Blisters, Itching and Rash    Milnacipran Other (See Comments) and Palpitations     Chest pain, hot/cold flashes    Quetiapine Palpitations     Tachycardia, nervousness, elevated blood pressure.   Tachycardia, nervousness, elevated blood pressure.       Social History     Tobacco Use    Smoking status: Never    Smokeless tobacco: Never   Substance Use Topics    Alcohol use: No      Wt Readings from Last 1 Encounters:   10/23/23 58.1 kg (128 lb)        Anesthesia Evaluation   Pt has had prior anesthetic.     No history of anesthetic complications       ROS/MED HX  ENT/Pulmonary:  - neg pulmonary ROS     Neurologic:  - neg neurologic ROS   (+)      migraines,                          Cardiovascular:  - neg cardiovascular ROS     METS/Exercise Tolerance: >4 METS    Hematologic:  - neg hematologic  ROS     Musculoskeletal: Comment: Mitochondrial disease that causes weakness.  - neg musculoskeletal ROS     GI/Hepatic: Comment: IBS - neg GI/hepatic ROS   (+) GERD, Asymptomatic on medication,                  Renal/Genitourinary:  - neg Renal ROS     Endo:  - neg endo ROS     Psychiatric/Substance Use:  - neg psychiatric ROS   (+) psychiatric history depression       Infectious Disease:  - neg infectious disease ROS     Malignancy:  - neg malignancy ROS     Other:  - neg other ROS    (+)  , H/O Chronic Pain,         Physical Exam    Airway  airway exam normal      Mallampati: I   TM distance: > 3 FB   Neck ROM: full   Mouth opening: > 3 cm    Respiratory Devices and Support         Dental       (+) Minor Abnormalities - some fillings, tiny chips    B=Bridge, C=Chipped, L=Loose, M=Missing    Cardiovascular   cardiovascular exam normal          Pulmonary   pulmonary exam normal                OUTSIDE LABS:  CBC:   Lab Results   Component Value Date    WBC 7.2 09/27/2023    WBC 10.8  "08/19/2023    HGB 13.2 09/27/2023    HGB 13.9 08/19/2023    HCT 39.5 09/27/2023    HCT 42.5 08/19/2023     09/27/2023     08/19/2023     BMP:   Lab Results   Component Value Date     09/27/2023     08/19/2023    POTASSIUM 4.2 09/27/2023    POTASSIUM 4.4 08/19/2023    CHLORIDE 103 09/27/2023    CHLORIDE 102 08/19/2023    CO2 26 09/27/2023    CO2 25 08/19/2023    BUN 8.1 09/27/2023    BUN 13.8 08/19/2023    CR 0.78 09/27/2023    CR 0.83 08/19/2023    GLC 90 09/27/2023    GLC 96 08/19/2023     COAGS: No results found for: \"PTT\", \"INR\", \"FIBR\"  POC:   Lab Results   Component Value Date    HCG Negative 09/05/2019    HCGS Negative 04/19/2023     HEPATIC:   Lab Results   Component Value Date    ALBUMIN 4.3 09/27/2023    PROTTOTAL 6.8 09/27/2023    ALT 14 09/27/2023    AST 23 09/27/2023    ALKPHOS 63 09/27/2023    BILITOTAL <0.2 09/27/2023     OTHER:   Lab Results   Component Value Date    LACT 1.8 02/25/2018    MAGALI 9.5 09/27/2023    PHOS 3.4 09/30/2021    MAG 2.1 04/19/2023    LIPASE 29 09/27/2023    TSH 1.40 04/19/2023    CRP 0.2 03/01/2019    SED 10 03/01/2019       Anesthesia Plan    ASA Status:  3    NPO Status:  NPO Appropriate    Anesthesia Type: General.     - Airway: ETT   Induction: Intravenous.   Maintenance: TIVA.        Consents    Anesthesia Plan(s) and associated risks, benefits, and realistic alternatives discussed. Questions answered and patient/representative(s) expressed understanding.     - Discussed:     - Discussed with:  Patient      - Extended Intubation/Ventilatory Support Discussed: No.      - Patient is DNR/DNI Status: No          Postoperative Care    Pain management: Multi-modal analgesia.   PONV prophylaxis: Ondansetron (or other 5HT-3), Dexamethasone or Solumedrol     Comments:    Other Comments:   Limit muscle relaxants due to mitochondrial myopathy     Avoid succinylcholine      TIVA with propofol  Decadron 10 mg  Zofran     Reverse with Sugammadex             Jon " JO-ANN Abdalla MD

## 2023-10-25 NOTE — ANESTHESIA PROCEDURE NOTES
Airway       Patient location during procedure: OR       Procedure Start/Stop Times: 10/25/2023 12:22 PM  Staff -        CRNA: Flora Chavez APRN CRNA       Performed By: CRNA  Consent for Airway        Urgency: elective  Indications and Patient Condition       Indications for airway management: solomon-procedural         Mask difficulty assessment: 1 - vent by mask    Final Airway Details       Final airway type: endotracheal airway       Successful airway: ETT - single  Endotracheal Airway Details        ETT size (mm): 7.0       Cuffed: yes       Successful intubation technique: video laryngoscopy       VL Blade Size: Glidescope 3 (used glidescope due to decreased mouth opening)       Grade View of Cords: 1       Adjucts: stylet       Position: Right       Measured from: gums/teeth       Secured at (cm): 21    Post intubation assessment        Placement verified by: capnometry and equal breath sounds        Number of attempts at approach: 1       Number of other approaches attempted: 0       Secured with: commercial tube gutierrez and silk tape       Ease of procedure: easy       Dentition: Intact    Medication(s) Administered   Medication Administration Time: 10/25/2023 12:22 PM

## 2023-10-25 NOTE — ANESTHESIA CARE TRANSFER NOTE
Patient: Sherry Sweeney    Procedure: Procedure(s):  CHOLECYSTECTOMY, LAPAROSCOPIC       Diagnosis: Biliary colic [K80.50]  Diagnosis Additional Information: No value filed.    Anesthesia Type:   General     Note:  Anesthesia Care Transfer Notewriter  Vitals:  Vitals Value Taken Time   /73 10/25/23 1310   Temp 96.5  F (35.8  C) 10/25/23 1310   Pulse 105 10/25/23 1310   Resp 16 10/25/23 1310   SpO2 100 % 10/25/23 1310       Electronically Signed By: SELENA Block CRNA  October 25, 2023  1:14 PM

## 2023-10-25 NOTE — OP NOTE
[facility]  Operative Note    Pre-operative diagnosis: Biliary colic [K80.50]   Post-operative diagnosis biliary colic   Procedure: Procedure(s):  CHOLECYSTECTOMY, LAPAROSCOPIC   Surgeon: Eusebio Gonzalez MD   Assistants(s): none   Anesthesia: General Anesthetic    Estimated blood loss: 20 ml                Drains: None   Specimens: Gallbladder       Findings: None.   Complications: None.           Description of procedure:      The patient was brought to the operating room where after induction of general anesthesia with endotracheal and the patient was positioned with the left arm tucked and right arm out.  The patient was then prepped and draped in standard sterile fashion.  After a procedural pause we began by injecting quarter percent Marcaine into the skin immediately adjacent to the umbilicus.  This was then sharply incised.  We then entered with a 5 mm optical trochar.  The patient was then placed in reverse Trendelenburg and right side up the body positioning.  We then proceeded to place 3 additional trochars across the right subcostal margin.      On initial evaluation the gallbladder it appeared without any acute inflammation.   We began our operation by grasping the fundus the gallbladder and retracting it cephalad over the dome of the liver.  The neck of the gallbladder was then retracted lateral to the right side.  We then began by scoring the peritoneum overlying the triangle of Calot. Using primarily blunt dissection and electrocautery we proceeded to clear the fatty tissues and lymph node away from the triangle of Calot. The cystic duct and cystic artery were skeletonized. A critical view was obtained.  We then proceeded to place three clips each on the cystic artery and duct, which were then divided.  We then utilized electrocautery to dissect the remainder of the gallbladder off the gallbladder fossa. Once this was completely removed we inspected the gallbladder fossa for hemostasis which  appeared excellent. The gallbladder was then placed into an Endo Catch bag. All spilled fluid and blood were then aspirated clear from the right upper quadrant and after confirming no active bleeding from the gallbladder fossa the Endo Catch bag with the gallbladder intact was removed through the 12 mm port site. An 0 Vicryl stitch was then placed under laparoscopic guidance in the 12 mm port site.  We then desufflated the abdomen and removed our ports. The preplaced fascial tie was then tied down with excellent obliteration of the fascial defect. The remainder of the local anesthetic was then injected in the skin and fascia surrounding the port sites and the skin closed with running 4-0 subcuticular sutures      Eusebio Gonzalez MD, [facility]  Office: [phone]  [facility]   General and Bariatric Surgery

## 2023-10-25 NOTE — ANESTHESIA CARE TRANSFER NOTE
Patient: Sherry Sweeney    Procedure: Procedure(s):  CHOLECYSTECTOMY, LAPAROSCOPIC       Diagnosis: Biliary colic [K80.50]  Diagnosis Additional Information: No value filed.    Anesthesia Type:   General     Note:    Oropharynx: oropharynx clear of all foreign objects and spontaneously breathing  Level of Consciousness: awake  Oxygen Supplementation: face mask  Level of Supplemental Oxygen (L/min / FiO2): 8  Independent Airway: airway patency satisfactory and stable  Dentition: dentition unchanged  Vital Signs Stable: post-procedure vital signs reviewed and stable  Report to RN Given: handoff report given  Patient transferred to: PACU    Handoff Report: Identifed the Patient, Identified the Reponsible Provider, Reviewed the pertinent medical history, Discussed the surgical course, Reviewed Intra-OP anesthesia mangement and issues during anesthesia, Set expectations for post-procedure period and Allowed opportunity for questions and acknowledgement of understanding      Vitals:  Vitals Value Taken Time   /73 10/25/23 1310   Temp 96.5  F (35.8  C) 10/25/23 1310   Pulse 105 10/25/23 1310   Resp 16 10/25/23 1310   SpO2 100 % 10/25/23 1310       Electronically Signed By: SELENA Block CRNA  October 25, 2023  1:14 PM

## 2023-10-25 NOTE — DISCHARGE INSTRUCTIONS
You have received 975 mg of Acetaminophen (Tylenol) at 12 PM. Please do not take an additional dose of Tylenol until after 6 PM     Do not exceed 4,000 mg of acetaminophen during a 24 hour period and keep in mind that acetaminophen can also be found in many over-the-counter cold medications as well as narcotics that may be given for pain.     You received a dose of IV Toradol at 1 PM. Please do not take any additional Ibuprofen/NSAID products (Aleve/Advil/Motrin/Naproxen/Celebrex) until after 7 PM.    For 24 hours after surgery    Get plenty of rest.  A responsible adult must stay with you for at least 24 hours after you leave the hospital.   Do not drive or use heavy equipment.  If you have weakness or tingling, don't drive or use heavy equipment until this feeling goes away.  Do not drink alcohol.  Avoid strenuous or risky activities.  Ask for help when climbing stairs.   You may feel lightheaded.  IF so, sit for a few minutes before standing.  Have someone help you get up.   If you have nausea (feel sick to your stomach): Drink only clear liquids such as apple juice, ginger ale, broth or 7-Up.  Rest may also help.  Be sure to drink enough fluids.  Move to a regular diet as you feel able.  You may have a slight fever. Call the doctor if your fever is over 100 F (37.7 C) (taken under the tongue) or lasts longer than 24 hours.  You may have a dry mouth, a sore throat, muscle aches or trouble sleeping.  These should go away after 24 hours.  Do not make important or legal decisions.   Call your doctor for any of the followin.  Signs of infection (fever, growing tenderness at the surgery site, a large amount of drainage or bleeding, severe pain, foul-smelling drainage, redness, swelling).    2. It has been over 8 to 10 hours since surgery and you are still not able to urinate (pass water).    3.  Headache for over 24 hours.    4.  Numbness, tingling or weakness the day after surgery (if you had spinal  anesthesia).  If you have any questions or concerns regarding your procedure, please contact Dr. Gonzalez, his office number is [phone].

## 2023-10-26 DIAGNOSIS — K80.50 BILIARY COLIC: Primary | ICD-10-CM

## 2023-10-26 RX ORDER — OXYCODONE HYDROCHLORIDE 5 MG/1
5 TABLET ORAL EVERY 6 HOURS PRN
Qty: 12 TABLET | Refills: 0 | Status: SHIPPED | OUTPATIENT
Start: 2023-10-26 | End: 2023-10-29

## 2023-10-26 NOTE — TELEPHONE ENCOUNTER
Patient notified of Rx sent. Reviewed how to incorporate this into her established OTC medication regimen and encouraged her to call back if she has any additional questions.

## 2023-10-28 DIAGNOSIS — L70.0 ACNE VULGARIS: ICD-10-CM

## 2023-10-30 RX ORDER — TRETINOIN 0.5 MG/G
CREAM TOPICAL
Qty: 45 G | Refills: 2 | Status: SHIPPED | OUTPATIENT
Start: 2023-10-30

## 2023-10-30 RX ORDER — HYDROXYZINE HYDROCHLORIDE 25 MG/1
25-50 TABLET, FILM COATED ORAL AT BEDTIME
Qty: 180 TABLET | Refills: 2 | Status: SHIPPED | OUTPATIENT
Start: 2023-10-30

## 2023-11-13 NOTE — PROGRESS NOTES
NEUROLOGY FOLLOW UP VISIT  NOTE       [facility] NEUROLOGY [place]  [address] [place] [address]  Tel: [phone]  Fax: [phone]  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 Previously she had tried different medication that did not seem to help but after she was switched to Botox there was dramatic improvement.  There was a break in Botox treatment due to COVID pandemic.  This was resumed on 2023 and reports the frequency declined somewhat but the intensity has declined significantly.  Headache only last for 1 to 4 hours.  After explaining all the side effect, 155 units were injected.  45 units were discarded.  She was monitored in the clinic for a short duration and left in satisfactory condition.  Follow-up will be in 3 months.    Thank you again for this referral, please feel free to contact me if you have any questions.    Mikel Hernandez MD  [facility]  (Formerly, [facility]     HISTORY OF PRESENT ILLNESS     Patient is a 43-year-old female with history of mitochondrial myopathy, chronic migraine without aura, fibromyalgia who is here for 3-month Botox injection.  She started Botox injection in 2019 and has noticed steady decline in her headache frequency and intensity.  During COVID pandemic she had to hold off Botox and noticed flareup of her symptoms.  Previously she had used Depakote, amitriptyline and beta-blocker that did not help.    Briefly patient is a female with history of mitochondrial myopathy, chronic migraine without aura, fibromyalgia who started having  headaches in 2014 and progressively got worse.  She initially attributed it to pressure in the neck or TMJ abnormality and that in the past was evaluated and started on some hormonal supplements.  Due to progressive weakness she was evaluated for possible mitochondrial myopathy and had mitochondrial DNA tested that was positive.  For her headaches she was started on Depakote, amitriptyline and in the past had also tried Cymbalta and as she was having more than 15 headaches in a month Botox was recommended.  She started getting Botox in 2017 and did notice improvement in her headache.  Discontinued until 2019 but due to COVID pandemic she stopped doing Botox injection and noticed worsening headaches and Botox was restarted in 2023.     PROBLEM LIST   Patient Active Problem List   Diagnosis Code    Acne vulgaris L70.0    Arthralgia of temporomandibular joint M26.629    Debility R53.81    Fibromyalgia M79.7    IBS (irritable bowel syndrome) K58.9    Insomnia G47.00    Major depression, recurrent, chronic (H24) F33.9    Chronic migraine without aura, intractable, without status migrainosus G43.719    Mitochondrial disease (H24) E88.40    Generalized anxiety disorder F41.1    Post-traumatic stress disorder F43.10    Parasomnia G47.50    Seasonal depression (H24) F33.8    Palpitations R00.2    Gastroesophageal reflux disease without esophagitis K21.9    Vaginal Papanicolaou smear not required due to history of hysterectomy Z90.710    Biliary colic K80.50         PAST MEDICAL & SURGICAL HISTORY     Past Medical History:   Patient  has a past medical history of Anemia, Chronic fatigue syndrome, Chronic migraine, Depression, Depression, Depressive disorder (2013), Fibromyalgia, Fibromyalgia, Gastroesophageal reflux disease, H/O: hysterectomy (02/22/2018), History of anesthesia complications, IBS (irritable bowel syndrome), Insomnia, Irregular heart beat, Mitochondrial disease (H24), Mitochondrial myopathy, Motion sickness,  Parasomnia, and PONV (postoperative nausea and vomiting).    Surgical History:  She  has a past surgical history that includes REMOVAL OF OVARIAN CYST(S); Hysterectomy (Bilateral, 02/22/2018); Picc (02/24/2018); Abdomen surgery (1996 & 2018, feb.); appendectomy (1996); biopsy; Laparoscopic cystectomy ovarian (oncology) (Left, 10/18/2021); Laparoscopy diagnostic (gyn) (Left, 06/29/2023); Hysterectomy total abdominal; and Laparoscopic cholecystectomy (N/A, 10/25/2023).     SOCIAL HISTORY     Reviewed, and she  reports that she has never smoked. She has never used smokeless tobacco. She reports that she does not drink alcohol and does not use drugs.     FAMILY HISTORY     Reviewed, and family history includes Anxiety Disorder in her maternal grandmother, mother, paternal grandfather, and paternal grandmother; Bone Cancer in her maternal grandfather; Breast Cancer (age of onset: 60.00) in her maternal grandmother; Breast Cancer (age of onset: 62.00) in her mother; Cancer in her mother; Coronary Artery Disease (age of onset: 45.00) in her father; Depression in her brother, father, maternal grandmother, and mother; Diabetes in her cousin and cousin; Hyperlipidemia in her mother; Hypertension in her mother; Meniere's disease in her mother.     ALLERGIES     Allergies   Allergen Reactions    Ciprofloxacin Itching, Rash and Hives     Rash over whole body   Rash over whole body       Egg White (Egg Protein) Other (See Comments) and GI Disturbance     Swollen colon, belly pain  Swollen colon, belly pain      Influenza Virus Vaccine Shortness Of Breath and Anaphylaxis    No Clinical Screening - See Comments Anaphylaxis     Stomach swelling    Sulfa Antibiotics Rash and Hives    Yeast Other (See Comments) and Anaphylaxis    Desvenlafaxine Blisters, Itching and Rash    Milnacipran Other (See Comments) and Palpitations     Chest pain, hot/cold flashes    Quetiapine Palpitations     Tachycardia, nervousness, elevated blood  pressure.   Tachycardia, nervousness, elevated blood pressure.          REVIEW OF SYSTEMS     A 12 point review of system was performed and was negative except as outlined in the history of present illness.     HOME MEDICATIONS     Current Outpatient Rx   Medication Sig Dispense Refill    ascorbic acid 500 MG TABS Take 500 mg by mouth daily      betamethasone dipropionate (DIPROSONE) 0.05 % external lotion       buPROPion (WELLBUTRIN XL) 300 MG 24 hr tablet TAKE 1 TABLET BY MOUTH EVERY DAY 90 tablet 3    cetirizine (ZYRTEC) 10 MG tablet TAKE 1 TAB ORALLY DAILY AS NEEDED FOR ALLERGIES MAY INCREASE TO 2 TAB DAILY IF ITCHING NOT RELIEVED 90 tablet 2    clindamycin-benzoyl peroxide (BENZACLIN) gel Apply topically to acne once daily      clobetasol (TEMOVATE) 0.05 % external ointment       clotrimazole-betamethasone (LOTRISONE) 1-0.05 % external cream 1 application as needed by topical route.      [employer] MELATONIN 3 MG tablet TAKE 1 TABLET (3 MG) BY MOUTH NIGHTLY AS NEEDED FOR SLEEP 90 tablet 1    cyclobenzaprine (FLEXERIL) 10 MG tablet Take 10 mg by mouth daily as needed for muscle spasms      EFFEXOR  MG 24 hr capsule Take 1 capsule (150 mg) by mouth daily 90 capsule 3    EFFEXOR XR 75 MG 24 hr capsule Take 1 capsule (75 mg) by mouth daily 90 capsule 3    hydrOXYzine (ATARAX) 25 MG tablet TAKE 1-2 TABLETS (25-50 MG) BY MOUTH AT BEDTIME 180 tablet 2    ibuprofen (ADVIL/MOTRIN) 400 MG tablet       ketoconazole (NIZORAL) 2 % external cream APPLY TO AFFECTED AREA TWICE A DAY FOR 7 DAYS      lidocaine (LIDODERM) 5 % patch PLACE 1 PATCH ONTO THE SKIN AS NEEDED TO NECK, SHOULDERS, AND BACK 30 patch 0    Multiple Vitamin (MULTIVITAMIN ADULT PO) Take 1 tablet by mouth daily      naproxen (NAPROSYN) 500 MG tablet       omeprazole (PRILOSEC) 40 MG DR capsule TAKE 1 CAPSULE (40 MG) BY MOUTH EVERY MORNING (BEFORE BREAKFAST) 30 MINUTES BEFORE MEAL 90 capsule 3    ondansetron (ZOFRAN) 4 MG tablet Take 1 tablet (4 mg) by mouth  "every 8 hours as needed for nausea 18 tablet 1    Riboflavin 400 MG TABS Take 400 mg by mouth daily      rizatriptan (MAXALT) 10 MG tablet TAKE 1 TABLET BY MOUTH AS NEEDED FOR MIGRAINE. MAY REPEAT IN 2 HOURS IF NEEDED 10 tablet 3    tretinoin (RETIN-A) 0.05 % external cream APPLY TO AFFECTED AREA EVERY DAY AT BEDTIME 45 g 2    valACYclovir (VALTREX) 1000 mg tablet TAKE 2 TABLETS BY MOUTH 12 HOURS APART AS NEEDED EPISODE 12 tablet 3    zolpidem (AMBIEN) 5 MG tablet TAKE 1 TABLET (5 MG) BY MOUTH NIGHTLY AS NEEDED FOR SLEEP 30 tablet 0         PHYSICAL EXAM     Vital signs  /77 (BP Location: Right arm, Patient Position: Sitting)   Pulse 101   Ht 1.549 m (5' 1\")   Wt 58.1 kg (128 lb)   BMI 24.19 kg/m      Weight:   128 lbs 0 oz    Pleasant female who is alert and oriented vital signs were reviewed and documented in electronic medical record.  Neck supple.  Neurologically speech was normal.  Cranial nerves II through XII are intact motor strength neck flexor and extensor 4+/5 in upper extremity 5/5 in the lower extremity proximally 4+/5 distally 5/5 reflexes 2+ toes downgoing no dysmetria noted on finger-nose testing gait normal.       PERTINENT DIAGNOSTIC STUDIES     Following studies were reviewed:     MRI BRAIN 10/29/2015  1.  Normal Head MRI.   2.  No acute infarcts, mass lesions or hemorrhage.     PERTINENT LABS  Following labs were reviewed:  Office Visit on 09/27/2023   Component Date Value Ref Range Status    Lipase 09/27/2023 29  13 - 60 U/L Final    Sodium 09/27/2023 138  135 - 145 mmol/L Final    Potassium 09/27/2023 4.2  3.4 - 5.3 mmol/L Final    Carbon Dioxide (CO2) 09/27/2023 26  22 - 29 mmol/L Final    Anion Gap 09/27/2023 9  7 - 15 mmol/L Final    Urea Nitrogen 09/27/2023 8.1  6.0 - 20.0 mg/dL Final    Creatinine 09/27/2023 0.78  0.51 - 0.95 mg/dL Final    GFR Estimate 09/27/2023 >90  >60 mL/min/1.73m2 Final    Calcium 09/27/2023 9.5  8.6 - 10.0 mg/dL Final    Chloride 09/27/2023 103  98 - 107 " mmol/L Final    Glucose 09/27/2023 90  70 - 99 mg/dL Final    Alkaline Phosphatase 09/27/2023 63  35 - 104 U/L Final    AST 09/27/2023 23  0 - 45 U/L Final    ALT 09/27/2023 14  0 - 50 U/L Final    Protein Total 09/27/2023 6.8  6.4 - 8.3 g/dL Final    Albumin 09/27/2023 4.3  3.5 - 5.2 g/dL Final    Bilirubin Total 09/27/2023 <0.2  <=1.2 mg/dL Final    WBC Count 09/27/2023 7.2  4.0 - 11.0 10e3/uL Final    RBC Count 09/27/2023 4.58  3.80 - 5.20 10e6/uL Final    Hemoglobin 09/27/2023 13.2  11.7 - 15.7 g/dL Final    Hematocrit 09/27/2023 39.5  35.0 - 47.0 % Final    MCV 09/27/2023 86  78 - 100 fL Final    MCH 09/27/2023 28.8  26.5 - 33.0 pg Final    MCHC 09/27/2023 33.4  31.5 - 36.5 g/dL Final    RDW 09/27/2023 12.4  10.0 - 15.0 % Final    Platelet Count 09/27/2023 306  150 - 450 10e3/uL Final    % Neutrophils 09/27/2023 69  % Final    % Lymphocytes 09/27/2023 24  % Final    % Monocytes 09/27/2023 7  % Final    % Eosinophils 09/27/2023 0  % Final    % Basophils 09/27/2023 0  % Final    % Immature Granulocytes 09/27/2023 0  % Final    Absolute Neutrophils 09/27/2023 4.9  1.6 - 8.3 10e3/uL Final    Absolute Lymphocytes 09/27/2023 1.7  0.8 - 5.3 10e3/uL Final    Absolute Monocytes 09/27/2023 0.5  0.0 - 1.3 10e3/uL Final    Absolute Eosinophils 09/27/2023 0.0  0.0 - 0.7 10e3/uL Final    Absolute Basophils 09/27/2023 0.0  0.0 - 0.2 10e3/uL Final    Absolute Immature Granulocytes 09/27/2023 0.0  <=0.4 10e3/uL Final   Office Visit on 08/30/2023   Component Date Value Ref Range Status    Protein Total 08/30/2023 7.1  6.4 - 8.3 g/dL Final    Albumin 08/30/2023 4.4  3.5 - 5.2 g/dL Final    Bilirubin Total 08/30/2023 0.3  <=1.2 mg/dL Final    Alkaline Phosphatase 08/30/2023 69  35 - 104 U/L Final    AST 08/30/2023 28  0 - 45 U/L Final    ALT 08/30/2023 22  0 - 50 U/L Final    Bilirubin Direct 08/30/2023 <0.20  0.00 - 0.30 mg/dL Final   Admission on 08/19/2023, Discharged on 08/19/2023   Component Date Value Ref Range Status     Sodium 08/19/2023 139  136 - 145 mmol/L Final    Potassium 08/19/2023 4.4  3.4 - 5.3 mmol/L Final    Chloride 08/19/2023 102  98 - 107 mmol/L Final    Carbon Dioxide (CO2) 08/19/2023 25  22 - 29 mmol/L Final    Anion Gap 08/19/2023 12  7 - 15 mmol/L Final    Urea Nitrogen 08/19/2023 13.8  6.0 - 20.0 mg/dL Final    Creatinine 08/19/2023 0.83  0.51 - 0.95 mg/dL Final    Calcium 08/19/2023 9.7  8.6 - 10.0 mg/dL Final    Glucose 08/19/2023 96  70 - 99 mg/dL Final    GFR Estimate 08/19/2023 89  >60 mL/min/1.73m2 Final    Troponin T, High Sensitivity 08/19/2023 7  <=14 ng/L Final    Systolic Blood Pressure 08/19/2023 147  mmHg Final    Diastolic Blood Pressure 08/19/2023 86  mmHg Final    Ventricular Rate 08/19/2023 99  BPM Final    Atrial Rate 08/19/2023 99  BPM Final    MA Interval 08/19/2023 148  ms Final    QRS Duration 08/19/2023 90  ms Final    QT 08/19/2023 348  ms Final    QTc 08/19/2023 446  ms Final    P Axis 08/19/2023 64  degrees Final    R AXIS 08/19/2023 66  degrees Final    T Axis 08/19/2023 67  degrees Final    Interpretation ECG 08/19/2023    Final                    Value:Sinus rhythm  Normal ECG  When compared with ECG of 19-APR-2023 15:52,  No significant change was found  Confirmed by SEE ED PROVIDER NOTE FOR, ECG INTERPRETATION ([id number]),  COSMO LIRA ([id number]) on 8/21/2023 9:42:10 AM      Protein Total 08/19/2023 6.4  6.4 - 8.3 g/dL Final    Albumin 08/19/2023 4.0  3.5 - 5.2 g/dL Final    Bilirubin Total 08/19/2023 0.3  <=1.2 mg/dL Final    Alkaline Phosphatase 08/19/2023 97  35 - 104 U/L Final    AST 08/19/2023 155 (H)  0 - 45 U/L Final    ALT 08/19/2023 90 (H)  0 - 50 U/L Final    Bilirubin Direct 08/19/2023 <0.20  0.00 - 0.30 mg/dL Final    Lipase 08/19/2023 52  13 - 60 U/L Final    WBC Count 08/19/2023 10.8  4.0 - 11.0 10e3/uL Final    RBC Count 08/19/2023 4.98  3.80 - 5.20 10e6/uL Final    Hemoglobin 08/19/2023 13.9  11.7 - 15.7 g/dL Final    Hematocrit 08/19/2023 42.5  35.0 - 47.0 % Final     MCV 08/19/2023 85  78 - 100 fL Final    MCH 08/19/2023 27.9  26.5 - 33.0 pg Final    MCHC 08/19/2023 32.7  31.5 - 36.5 g/dL Final    RDW 08/19/2023 12.2  10.0 - 15.0 % Final    Platelet Count 08/19/2023 332  150 - 450 10e3/uL Final    % Neutrophils 08/19/2023 69  % Final    % Lymphocytes 08/19/2023 22  % Final    % Monocytes 08/19/2023 7  % Final    % Eosinophils 08/19/2023 1  % Final    % Basophils 08/19/2023 0  % Final    % Immature Granulocytes 08/19/2023 1  % Final    NRBCs per 100 WBC 08/19/2023 0  <1 /100 Final    Absolute Neutrophils 08/19/2023 7.5  1.6 - 8.3 10e3/uL Final    Absolute Lymphocytes 08/19/2023 2.4  0.8 - 5.3 10e3/uL Final    Absolute Monocytes 08/19/2023 0.7  0.0 - 1.3 10e3/uL Final    Absolute Eosinophils 08/19/2023 0.1  0.0 - 0.7 10e3/uL Final    Absolute Basophils 08/19/2023 0.0  0.0 - 0.2 10e3/uL Final    Absolute Immature Granulocytes 08/19/2023 0.1  <=0.4 10e3/uL Final    Absolute NRBCs 08/19/2023 0.0  10e3/uL Final    Hold Specimen 08/19/2023 [facility]   Final    Hold Specimen 08/19/2023 [facility]   Final    Hold Specimen 08/19/2023 [facility]   Final   Office Visit on 08/17/2023   Component Date Value Ref Range Status    Cholesterol 08/17/2023 227 (H)  <200 mg/dL Final    Triglycerides 08/17/2023 122  <150 mg/dL Final    Direct Measure HDL 08/17/2023 55  >=50 mg/dL Final    LDL Cholesterol Calculated 08/17/2023 148 (H)  <=100 mg/dL Final    Non HDL Cholesterol 08/17/2023 172 (H)  <130 mg/dL Final    Vitamin D, Total (25-Hydroxy) 08/17/2023 37  20 - 75 ug/L Final         Total time spent for face to face visit, reviewing labs/imaging studies, counseling and coordination of care was: 20 minutes spent on the date of the encounter doing chart review, review of outside records, review of test results, interpretation of tests, patient visit, and documentation     This note was dictated using voice recognition software.  Any grammatical or context distortions are unintentional and inherent to the  software.    Orders Placed This Encounter   Procedures    [id number] - MIGRAINE      New Prescriptions    No medications on file     Modified Medications    No medications on file

## 2023-11-15 ENCOUNTER — MYC MEDICAL ADVICE (OUTPATIENT)
Dept: FAMILY MEDICINE | Facility: CLINIC | Age: 43
End: 2023-11-15

## 2023-11-15 ENCOUNTER — OFFICE VISIT (OUTPATIENT)
Dept: NEUROLOGY | Facility: CLINIC | Age: 43
End: 2023-11-15
Payer: COMMERCIAL

## 2023-11-15 VITALS
HEART RATE: 101 BPM | WEIGHT: 128 LBS | HEIGHT: 61 IN | BODY MASS INDEX: 24.17 KG/M2 | DIASTOLIC BLOOD PRESSURE: 77 MMHG | SYSTOLIC BLOOD PRESSURE: 120 MMHG

## 2023-11-15 DIAGNOSIS — R21 RASH: Primary | ICD-10-CM

## 2023-11-15 DIAGNOSIS — G43.719 CHRONIC MIGRAINE WITHOUT AURA, INTRACTABLE, WITHOUT STATUS MIGRAINOSUS: Primary | ICD-10-CM

## 2023-11-15 PROCEDURE — 64615 CHEMODENERV MUSC MIGRAINE: CPT | Mod: 79 | Performed by: PSYCHIATRY & NEUROLOGY

## 2023-11-15 NOTE — PROCEDURES
BOTOX INJECTION PROCEDURE NOTE     Date: 11/15/2023    INDICATION: CHRONIC MIGRAINE    Number of headache days/month currently: 14 (BASELINE: >15   )  Number of headache hours/day currently : Monitor for (BASELINE: 4-24 Hours   )  Number of headache free days post treatment:  16  Disability due to migraine headache: yes  Consent: Obtained  Indication: Chronic Migraine    Botox Lot No:  C4 expiration February 2026  Number of units injected: 155 U  Number of units of unavoidable wastage: 45 U    LOCATION  At today s visit, patient was injected with a total of 155 units divided in 31 sites. A total of 2 mL of normal saline was used to dilute 100 units of the drug. The following muscles were injected:  10 units divided in two sites, procerus 5 units in one site, frontalis 20 units divided in four sites, temporalis 40 units divided in eight sites, occipitalis 30 units divided in six sites, cervical paraspinals 20 units divided in four sites, and trapezius 30 units divided in six sites. 45 units were unavoidable wastage        ASSESSMENT/PLAN  Chronic Migraine headache  The patient tolerated the procedure well. There were no immediate complications. Prior to the procedure, I discussed the potential side effects of Botox therapy, which includes generalized muscle weakness, diplopia, ptosis, dysphagia, dysphonia, dysarthria, urinary incontinence, and breathing difficulties. Also I discussed the black box warning of the drug that can include the rare chance of distant spread of the drug to swallowing and breathing muscles that can be life threatening. All of patient's questions were answered prior to our signing the consent form. patient left the clinic after a brief period of observation and will return for repeat injection in three months  time as needed. I informed patient again the goal is to maintain at least seven to eight headache free days on average a month. I explained to patient that most patients  with chronic migraines do need life time Botox treatment, however, at any time if the patient wishes to stop Botox we can do so as some patients do go into remission on their own over time. Follow up will be in 3-4 months      Mikel Hernandez M.D.  [facility]  (Formerly, [facility]

## 2023-11-15 NOTE — LETTER
11/15/2023         RE: Sherry Sweeney   [facility] [address]        Dear Colleague,    Thank you for referring your patient, Sherry Sweeney, to the [facility] [place]. Please see a copy of my visit note below.    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 Previously she had tried different medication that did not seem to help but after she was switched to Botox there was dramatic improvement.  There was a break in Botox treatment due to COVID pandemic.  This was resumed on 2023 and reports the frequency declined somewhat but the intensity has declined significantly.  Headache only last for 1 to 4 hours.  After explaining all the side effect, 155 units were injected.  45 units were discarded.  She was monitored in the clinic for a short duration and left in satisfactory condition.  Follow-up will be in 3 months.    Thank you again for this referral, please feel free to contact me if you have any questions.    Mikel Hernandez MD  [facility]  (Formerly, [facility]     HISTORY OF PRESENT ILLNESS     Patient is a 43-year-old female with history of mitochondrial myopathy, chronic migraine without aura, fibromyalgia who is here for 3-month Botox injection.  She started Botox injection in 2019 and has noticed steady decline in her headache frequency and intensity.  During COVID pandemic she had to hold off Botox  and noticed flareup of her symptoms.  Previously she had used Depakote, amitriptyline and beta-blocker that did not help.    Briefly patient is a female with history of mitochondrial myopathy, chronic migraine without aura, fibromyalgia who started having headaches in 2014 and progressively got worse.  She initially attributed it to pressure in the neck or TMJ abnormality and that in the past was evaluated and started on some hormonal supplements.  Due to progressive weakness she was evaluated for possible mitochondrial myopathy and had mitochondrial DNA tested that was positive.  For her headaches she was started on Depakote, amitriptyline and in the past had also tried Cymbalta and as she was having more than 15 headaches in a month Botox was recommended.  She started getting Botox in 2017 and did notice improvement in her headache.  Discontinued until 2019 but due to COVID pandemic she stopped doing Botox injection and noticed worsening headaches and Botox was restarted in 2023.     PROBLEM LIST   Patient Active Problem List   Diagnosis Code     Acne vulgaris L70.0     Arthralgia of temporomandibular joint M26.629     Debility R53.81     Fibromyalgia M79.7     IBS (irritable bowel syndrome) K58.9     Insomnia G47.00     Major depression, recurrent, chronic (H24) F33.9     Chronic migraine without aura, intractable, without status migrainosus G43.719     Mitochondrial disease (H24) E88.40     Generalized anxiety disorder F41.1     Post-traumatic stress disorder F43.10     Parasomnia G47.50     Seasonal depression (H24) F33.8     Palpitations R00.2     Gastroesophageal reflux disease without esophagitis K21.9     Vaginal Papanicolaou smear not required due to history of hysterectomy Z90.710     Biliary colic K80.50         PAST MEDICAL & SURGICAL HISTORY     Past Medical History:   Patient  has a past medical history of Anemia, Chronic fatigue syndrome, Chronic migraine, Depression, Depression, Depressive disorder  (2013), Fibromyalgia, Fibromyalgia, Gastroesophageal reflux disease, H/O: hysterectomy (02/22/2018), History of anesthesia complications, IBS (irritable bowel syndrome), Insomnia, Irregular heart beat, Mitochondrial disease (H24), Mitochondrial myopathy, Motion sickness, Parasomnia, and PONV (postoperative nausea and vomiting).    Surgical History:  She  has a past surgical history that includes REMOVAL OF OVARIAN CYST(S); Hysterectomy (Bilateral, 02/22/2018); Picc (02/24/2018); Abdomen surgery (1996 & 2018, feb.); appendectomy (1996); biopsy; Laparoscopic cystectomy ovarian (oncology) (Left, 10/18/2021); Laparoscopy diagnostic (gyn) (Left, 06/29/2023); Hysterectomy total abdominal; and Laparoscopic cholecystectomy (N/A, 10/25/2023).     SOCIAL HISTORY     Reviewed, and she  reports that she has never smoked. She has never used smokeless tobacco. She reports that she does not drink alcohol and does not use drugs.     FAMILY HISTORY     Reviewed, and family history includes Anxiety Disorder in her maternal grandmother, mother, paternal grandfather, and paternal grandmother; Bone Cancer in her maternal grandfather; Breast Cancer (age of onset: 60.00) in her maternal grandmother; Breast Cancer (age of onset: 62.00) in her mother; Cancer in her mother; Coronary Artery Disease (age of onset: 45.00) in her father; Depression in her brother, father, maternal grandmother, and mother; Diabetes in her cousin and cousin; Hyperlipidemia in her mother; Hypertension in her mother; Meniere's disease in her mother.     ALLERGIES     Allergies   Allergen Reactions     Ciprofloxacin Itching, Rash and Hives     Rash over whole body   Rash over whole body        Egg White (Egg Protein) Other (See Comments) and GI Disturbance     Swollen colon, belly pain  Swollen colon, belly pain       Influenza Virus Vaccine Shortness Of Breath and Anaphylaxis     No Clinical Screening - See Comments Anaphylaxis     Stomach swelling     Sulfa  Antibiotics Rash and Hives     Yeast Other (See Comments) and Anaphylaxis     Desvenlafaxine Blisters, Itching and Rash     Milnacipran Other (See Comments) and Palpitations     Chest pain, hot/cold flashes     Quetiapine Palpitations     Tachycardia, nervousness, elevated blood pressure.   Tachycardia, nervousness, elevated blood pressure.          REVIEW OF SYSTEMS     A 12 point review of system was performed and was negative except as outlined in the history of present illness.     HOME MEDICATIONS     Current Outpatient Rx   Medication Sig Dispense Refill     ascorbic acid 500 MG TABS Take 500 mg by mouth daily       betamethasone dipropionate (DIPROSONE) 0.05 % external lotion        buPROPion (WELLBUTRIN XL) 300 MG 24 hr tablet TAKE 1 TABLET BY MOUTH EVERY DAY 90 tablet 3     cetirizine (ZYRTEC) 10 MG tablet TAKE 1 TAB ORALLY DAILY AS NEEDED FOR ALLERGIES MAY INCREASE TO 2 TAB DAILY IF ITCHING NOT RELIEVED 90 tablet 2     clindamycin-benzoyl peroxide (BENZACLIN) gel Apply topically to acne once daily       clobetasol (TEMOVATE) 0.05 % external ointment        clotrimazole-betamethasone (LOTRISONE) 1-0.05 % external cream 1 application as needed by topical route.       [employer] MELATONIN 3 MG tablet TAKE 1 TABLET (3 MG) BY MOUTH NIGHTLY AS NEEDED FOR SLEEP 90 tablet 1     cyclobenzaprine (FLEXERIL) 10 MG tablet Take 10 mg by mouth daily as needed for muscle spasms       EFFEXOR  MG 24 hr capsule Take 1 capsule (150 mg) by mouth daily 90 capsule 3     EFFEXOR XR 75 MG 24 hr capsule Take 1 capsule (75 mg) by mouth daily 90 capsule 3     hydrOXYzine (ATARAX) 25 MG tablet TAKE 1-2 TABLETS (25-50 MG) BY MOUTH AT BEDTIME 180 tablet 2     ibuprofen (ADVIL/MOTRIN) 400 MG tablet        ketoconazole (NIZORAL) 2 % external cream APPLY TO AFFECTED AREA TWICE A DAY FOR 7 DAYS       lidocaine (LIDODERM) 5 % patch PLACE 1 PATCH ONTO THE SKIN AS NEEDED TO NECK, SHOULDERS, AND BACK 30 patch 0     Multiple Vitamin  "(MULTIVITAMIN ADULT PO) Take 1 tablet by mouth daily       naproxen (NAPROSYN) 500 MG tablet        omeprazole (PRILOSEC) 40 MG DR capsule TAKE 1 CAPSULE (40 MG) BY MOUTH EVERY MORNING (BEFORE BREAKFAST) 30 MINUTES BEFORE MEAL 90 capsule 3     ondansetron (ZOFRAN) 4 MG tablet Take 1 tablet (4 mg) by mouth every 8 hours as needed for nausea 18 tablet 1     Riboflavin 400 MG TABS Take 400 mg by mouth daily       rizatriptan (MAXALT) 10 MG tablet TAKE 1 TABLET BY MOUTH AS NEEDED FOR MIGRAINE. MAY REPEAT IN 2 HOURS IF NEEDED 10 tablet 3     tretinoin (RETIN-A) 0.05 % external cream APPLY TO AFFECTED AREA EVERY DAY AT BEDTIME 45 g 2     valACYclovir (VALTREX) 1000 mg tablet TAKE 2 TABLETS BY MOUTH 12 HOURS APART AS NEEDED EPISODE 12 tablet 3     zolpidem (AMBIEN) 5 MG tablet TAKE 1 TABLET (5 MG) BY MOUTH NIGHTLY AS NEEDED FOR SLEEP 30 tablet 0         PHYSICAL EXAM     Vital signs  /77 (BP Location: Right arm, Patient Position: Sitting)   Pulse 101   Ht 1.549 m (5' 1\")   Wt 58.1 kg (128 lb)   BMI 24.19 kg/m      Weight:   128 lbs 0 oz    Pleasant female who is alert and oriented vital signs were reviewed and documented in electronic medical record.  Neck supple.  Neurologically speech was normal.  Cranial nerves II through XII are intact motor strength neck flexor and extensor 4+/5 in upper extremity 5/5 in the lower extremity proximally 4+/5 distally 5/5 reflexes 2+ toes downgoing no dysmetria noted on finger-nose testing gait normal.       PERTINENT DIAGNOSTIC STUDIES     Following studies were reviewed:     MRI BRAIN 10/29/2015  1.  Normal Head MRI.   2.  No acute infarcts, mass lesions or hemorrhage.     PERTINENT LABS  Following labs were reviewed:  Office Visit on 09/27/2023   Component Date Value Ref Range Status     Lipase 09/27/2023 29  13 - 60 U/L Final     Sodium 09/27/2023 138  135 - 145 mmol/L Final     Potassium 09/27/2023 4.2  3.4 - 5.3 mmol/L Final     Carbon Dioxide (CO2) 09/27/2023 26  22 - 29 " mmol/L Final     Anion Gap 09/27/2023 9  7 - 15 mmol/L Final     Urea Nitrogen 09/27/2023 8.1  6.0 - 20.0 mg/dL Final     Creatinine 09/27/2023 0.78  0.51 - 0.95 mg/dL Final     GFR Estimate 09/27/2023 >90  >60 mL/min/1.73m2 Final     Calcium 09/27/2023 9.5  8.6 - 10.0 mg/dL Final     Chloride 09/27/2023 103  98 - 107 mmol/L Final     Glucose 09/27/2023 90  70 - 99 mg/dL Final     Alkaline Phosphatase 09/27/2023 63  35 - 104 U/L Final     AST 09/27/2023 23  0 - 45 U/L Final     ALT 09/27/2023 14  0 - 50 U/L Final     Protein Total 09/27/2023 6.8  6.4 - 8.3 g/dL Final     Albumin 09/27/2023 4.3  3.5 - 5.2 g/dL Final     Bilirubin Total 09/27/2023 <0.2  <=1.2 mg/dL Final     WBC Count 09/27/2023 7.2  4.0 - 11.0 10e3/uL Final     RBC Count 09/27/2023 4.58  3.80 - 5.20 10e6/uL Final     Hemoglobin 09/27/2023 13.2  11.7 - 15.7 g/dL Final     Hematocrit 09/27/2023 39.5  35.0 - 47.0 % Final     MCV 09/27/2023 86  78 - 100 fL Final     MCH 09/27/2023 28.8  26.5 - 33.0 pg Final     MCHC 09/27/2023 33.4  31.5 - 36.5 g/dL Final     RDW 09/27/2023 12.4  10.0 - 15.0 % Final     Platelet Count 09/27/2023 306  150 - 450 10e3/uL Final     % Neutrophils 09/27/2023 69  % Final     % Lymphocytes 09/27/2023 24  % Final     % Monocytes 09/27/2023 7  % Final     % Eosinophils 09/27/2023 0  % Final     % Basophils 09/27/2023 0  % Final     % Immature Granulocytes 09/27/2023 0  % Final     Absolute Neutrophils 09/27/2023 4.9  1.6 - 8.3 10e3/uL Final     Absolute Lymphocytes 09/27/2023 1.7  0.8 - 5.3 10e3/uL Final     Absolute Monocytes 09/27/2023 0.5  0.0 - 1.3 10e3/uL Final     Absolute Eosinophils 09/27/2023 0.0  0.0 - 0.7 10e3/uL Final     Absolute Basophils 09/27/2023 0.0  0.0 - 0.2 10e3/uL Final     Absolute Immature Granulocytes 09/27/2023 0.0  <=0.4 10e3/uL Final   Office Visit on 08/30/2023   Component Date Value Ref Range Status     Protein Total 08/30/2023 7.1  6.4 - 8.3 g/dL Final     Albumin 08/30/2023 4.4  3.5 - 5.2 g/dL Final      Bilirubin Total 08/30/2023 0.3  <=1.2 mg/dL Final     Alkaline Phosphatase 08/30/2023 69  35 - 104 U/L Final     AST 08/30/2023 28  0 - 45 U/L Final     ALT 08/30/2023 22  0 - 50 U/L Final     Bilirubin Direct 08/30/2023 <0.20  0.00 - 0.30 mg/dL Final   Admission on 08/19/2023, Discharged on 08/19/2023   Component Date Value Ref Range Status     Sodium 08/19/2023 139  136 - 145 mmol/L Final     Potassium 08/19/2023 4.4  3.4 - 5.3 mmol/L Final     Chloride 08/19/2023 102  98 - 107 mmol/L Final     Carbon Dioxide (CO2) 08/19/2023 25  22 - 29 mmol/L Final     Anion Gap 08/19/2023 12  7 - 15 mmol/L Final     Urea Nitrogen 08/19/2023 13.8  6.0 - 20.0 mg/dL Final     Creatinine 08/19/2023 0.83  0.51 - 0.95 mg/dL Final     Calcium 08/19/2023 9.7  8.6 - 10.0 mg/dL Final     Glucose 08/19/2023 96  70 - 99 mg/dL Final     GFR Estimate 08/19/2023 89  >60 mL/min/1.73m2 Final     Troponin T, High Sensitivity 08/19/2023 7  <=14 ng/L Final     Systolic Blood Pressure 08/19/2023 147  mmHg Final     Diastolic Blood Pressure 08/19/2023 86  mmHg Final     Ventricular Rate 08/19/2023 99  BPM Final     Atrial Rate 08/19/2023 99  BPM Final     AR Interval 08/19/2023 148  ms Final     QRS Duration 08/19/2023 90  ms Final     QT 08/19/2023 348  ms Final     QTc 08/19/2023 446  ms Final     P Axis 08/19/2023 64  degrees Final     R AXIS 08/19/2023 66  degrees Final     T Axis 08/19/2023 67  degrees Final     Interpretation ECG 08/19/2023    Final                    Value:Sinus rhythm  Normal ECG  When compared with ECG of 19-APR-2023 15:52,  No significant change was found  Confirmed by SEE ED PROVIDER NOTE FOR, ECG INTERPRETATION ([id number]),  COSMO LIRA ([id number]) on 8/21/2023 9:42:10 AM       Protein Total 08/19/2023 6.4  6.4 - 8.3 g/dL Final     Albumin 08/19/2023 4.0  3.5 - 5.2 g/dL Final     Bilirubin Total 08/19/2023 0.3  <=1.2 mg/dL Final     Alkaline Phosphatase 08/19/2023 97  35 - 104 U/L Final     AST 08/19/2023 155 (H)  0  - 45 U/L Final     ALT 08/19/2023 90 (H)  0 - 50 U/L Final     Bilirubin Direct 08/19/2023 <0.20  0.00 - 0.30 mg/dL Final     Lipase 08/19/2023 52  13 - 60 U/L Final     WBC Count 08/19/2023 10.8  4.0 - 11.0 10e3/uL Final     RBC Count 08/19/2023 4.98  3.80 - 5.20 10e6/uL Final     Hemoglobin 08/19/2023 13.9  11.7 - 15.7 g/dL Final     Hematocrit 08/19/2023 42.5  35.0 - 47.0 % Final     MCV 08/19/2023 85  78 - 100 fL Final     MCH 08/19/2023 27.9  26.5 - 33.0 pg Final     MCHC 08/19/2023 32.7  31.5 - 36.5 g/dL Final     RDW 08/19/2023 12.2  10.0 - 15.0 % Final     Platelet Count 08/19/2023 332  150 - 450 10e3/uL Final     % Neutrophils 08/19/2023 69  % Final     % Lymphocytes 08/19/2023 22  % Final     % Monocytes 08/19/2023 7  % Final     % Eosinophils 08/19/2023 1  % Final     % Basophils 08/19/2023 0  % Final     % Immature Granulocytes 08/19/2023 1  % Final     NRBCs per 100 WBC 08/19/2023 0  <1 /100 Final     Absolute Neutrophils 08/19/2023 7.5  1.6 - 8.3 10e3/uL Final     Absolute Lymphocytes 08/19/2023 2.4  0.8 - 5.3 10e3/uL Final     Absolute Monocytes 08/19/2023 0.7  0.0 - 1.3 10e3/uL Final     Absolute Eosinophils 08/19/2023 0.1  0.0 - 0.7 10e3/uL Final     Absolute Basophils 08/19/2023 0.0  0.0 - 0.2 10e3/uL Final     Absolute Immature Granulocytes 08/19/2023 0.1  <=0.4 10e3/uL Final     Absolute NRBCs 08/19/2023 0.0  10e3/uL Final     Hold Specimen 08/19/2023 [facility]   Final     Hold Specimen 08/19/2023 [facility]   Final     Hold Specimen 08/19/2023 [facility]   Final   Office Visit on 08/17/2023   Component Date Value Ref Range Status     Cholesterol 08/17/2023 227 (H)  <200 mg/dL Final     Triglycerides 08/17/2023 122  <150 mg/dL Final     Direct Measure HDL 08/17/2023 55  >=50 mg/dL Final     LDL Cholesterol Calculated 08/17/2023 148 (H)  <=100 mg/dL Final     Non HDL Cholesterol 08/17/2023 172 (H)  <130 mg/dL Final     Vitamin D, Total (25-Hydroxy) 08/17/2023 37  20 - 75 ug/L Final         Total time spent for face  to face visit, reviewing labs/imaging studies, counseling and coordination of care was: 20 minutes spent on the date of the encounter doing chart review, review of outside records, review of test results, interpretation of tests, patient visit, and documentation     This note was dictated using voice recognition software.  Any grammatical or context distortions are unintentional and inherent to the software.    Orders Placed This Encounter   Procedures     [id number] - MIGRAINE      New Prescriptions    No medications on file     Modified Medications    No medications on file                 Again, thank you for allowing me to participate in the care of your patient.        Sincerely,        Mikel Hernandez MD

## 2023-11-16 ENCOUNTER — TRANSFERRED RECORDS (OUTPATIENT)
Dept: HEALTH INFORMATION MANAGEMENT | Facility: CLINIC | Age: 43
End: 2023-11-16
Payer: COMMERCIAL

## 2023-12-27 ENCOUNTER — TRANSFERRED RECORDS (OUTPATIENT)
Dept: HEALTH INFORMATION MANAGEMENT | Facility: CLINIC | Age: 43
End: 2023-12-27
Payer: COMMERCIAL

## 2024-02-11 NOTE — PROGRESS NOTES
NEUROLOGY FOLLOW UP VISIT  NOTE       [facility] NEUROLOGY [place]  [address] [place] [address]  Tel: [phone]  Fax: [phone]  [email or web address]     Sherry Sweeney,  1980, MRN [id number]  PCP: Layla Martinez  Date: 2024      ASSESSMENT & PLAN     Visit Diagnosis  Chronic migraine without aura, intractable, without status migrainosus     Migraine without aura  43-year-old female with history of mitochondrial myopathy, chronic migraine without aura, fibromyalgia is here for 3-month Botox injection.  She has noticed more than 50% reduction in her headache after she started getting Botox.  After explaining all the side effect and obtaining her consent, I injected 155 units according to the enclosed sheet.  45 units were discarded.  She was monitored in the clinic for a short duration and left in a satisfactory condition.  Follow-up will be in 3 months.    Thank you again for this referral, please feel free to contact me if you have any questions.    Mikel Hernandez MD  [facility]  (Formerly, [facility]     HISTORY OF PRESENT ILLNESS     Patient is a 43-year-old female with history of mitochondrial myopathy, chronic migraine without aura, fibromyalgia is here for 3-month Botox injection.  She started getting Botox injections in  and noticed steady decline in her headache frequency.  During COVID pandemic she had to hold off on Botox and noticed flareup of her symptoms but ever since she restarted there has been a steady decline.  Previously she had tried Depakote, amitriptyline and beta-blocker that did not help.    Briefly patient is a female with history of mitochondrial myopathy, chronic migraine without aura, fibromyalgia who started having headaches in  and progressively got worse.  She initially attributed it to pressure in the neck or TMJ abnormality and that in the past was evaluated and  started on some hormonal supplements.  Due to progressive weakness she was evaluated for possible mitochondrial myopathy and had mitochondrial DNA tested that was positive.  For her headaches she was started on Depakote, amitriptyline and in the past had also tried Cymbalta and as she was having more than 15 headaches in a month Botox was recommended.  She started getting Botox in 2017 and did notice improvement in her headache.  Discontinued until 2019 but due to COVID pandemic she stopped doing Botox injection and noticed worsening headaches and Botox was restarted in 2023.     PROBLEM LIST   Patient Active Problem List   Diagnosis Code    Acne vulgaris L70.0    Arthralgia of temporomandibular joint M26.629    Debility R53.81    Fibromyalgia M79.7    IBS (irritable bowel syndrome) K58.9    Insomnia G47.00    Major depression, recurrent, chronic (H24) F33.9    Chronic migraine without aura, intractable, without status migrainosus G43.719    Mitochondrial disease (H24) E88.40    Generalized anxiety disorder F41.1    Post-traumatic stress disorder F43.10    Parasomnia G47.50    Seasonal depression (H24) F33.8    Palpitations R00.2    Gastroesophageal reflux disease without esophagitis K21.9    Vaginal Papanicolaou smear not required due to history of hysterectomy Z90.710    Biliary colic K80.50         PAST MEDICAL & SURGICAL HISTORY     Past Medical History:   Patient  has a past medical history of Anemia, Chronic fatigue syndrome, Chronic migraine, Depression, Depression, Depressive disorder (2013), Fibromyalgia, Fibromyalgia, Gastroesophageal reflux disease, H/O: hysterectomy (02/22/2018), History of anesthesia complications, IBS (irritable bowel syndrome), Insomnia, Irregular heart beat, Mitochondrial disease (H24), Mitochondrial myopathy, Motion sickness, Parasomnia, and PONV (postoperative nausea and vomiting).    Surgical History:  She  has a past surgical history that includes REMOVAL OF OVARIAN CYST(S);  Hysterectomy (Bilateral, 02/22/2018); Picc (02/24/2018); Abdomen surgery (1996 & 2018, feb.); appendectomy (1996); biopsy; Laparoscopic cystectomy ovarian (oncology) (Left, 10/18/2021); Laparoscopy diagnostic (gyn) (Left, 06/29/2023); Hysterectomy total abdominal; and Laparoscopic cholecystectomy (N/A, 10/25/2023).     SOCIAL HISTORY     Reviewed, and she  reports that she has never smoked. She has never used smokeless tobacco. She reports that she does not drink alcohol and does not use drugs.     FAMILY HISTORY     Reviewed, and family history includes Anxiety Disorder in her maternal grandmother, mother, paternal grandfather, and paternal grandmother; Bone Cancer in her maternal grandfather; Breast Cancer (age of onset: 60.00) in her maternal grandmother; Breast Cancer (age of onset: 62.00) in her mother; Cancer in her mother; Coronary Artery Disease (age of onset: 45.00) in her father; Depression in her brother, father, maternal grandmother, and mother; Diabetes in her cousin and cousin; Hyperlipidemia in her mother; Hypertension in her mother; Meniere's disease in her mother.     ALLERGIES     Allergies   Allergen Reactions    Ciprofloxacin Itching, Rash and Hives     Rash over whole body   Rash over whole body       Egg White (Egg Protein) Other (See Comments) and GI Disturbance     Swollen colon, belly pain  Swollen colon, belly pain      Influenza Virus Vaccine Shortness Of Breath and Anaphylaxis    No Clinical Screening - See Comments Anaphylaxis     Stomach swelling    Sulfa Antibiotics Rash and Hives    Yeast Other (See Comments) and Anaphylaxis    Desvenlafaxine Blisters, Itching and Rash    Milnacipran Other (See Comments) and Palpitations     Chest pain, hot/cold flashes    Quetiapine Palpitations     Tachycardia, nervousness, elevated blood pressure.   Tachycardia, nervousness, elevated blood pressure.          REVIEW OF SYSTEMS     A 12 point review of system was performed and was negative except as  outlined in the history of present illness.     HOME MEDICATIONS     Current Outpatient Rx   Medication Sig Dispense Refill    ascorbic acid 500 MG TABS Take 500 mg by mouth daily      betamethasone dipropionate (DIPROSONE) 0.05 % external lotion       buPROPion (WELLBUTRIN XL) 300 MG 24 hr tablet TAKE 1 TABLET BY MOUTH EVERY DAY 90 tablet 3    cetirizine (ZYRTEC) 10 MG tablet TAKE 1 TAB ORALLY DAILY AS NEEDED FOR ALLERGIES MAY INCREASE TO 2 TAB DAILY IF ITCHING NOT RELIEVED 90 tablet 2    clindamycin-benzoyl peroxide (BENZACLIN) gel Apply topically to acne once daily      clobetasol (TEMOVATE) 0.05 % external ointment       clotrimazole-betamethasone (LOTRISONE) 1-0.05 % external cream 1 application as needed by topical route.      [employer] MELATONIN 3 MG tablet TAKE 1 TABLET (3 MG) BY MOUTH NIGHTLY AS NEEDED FOR SLEEP 90 tablet 1    cyclobenzaprine (FLEXERIL) 10 MG tablet Take 10 mg by mouth daily as needed for muscle spasms      EFFEXOR  MG 24 hr capsule Take 1 capsule (150 mg) by mouth daily 90 capsule 3    EFFEXOR XR 75 MG 24 hr capsule Take 1 capsule (75 mg) by mouth daily 90 capsule 3    hydrOXYzine (ATARAX) 25 MG tablet TAKE 1-2 TABLETS (25-50 MG) BY MOUTH AT BEDTIME 180 tablet 2    ibuprofen (ADVIL/MOTRIN) 400 MG tablet       ketoconazole (NIZORAL) 2 % external cream APPLY TO AFFECTED AREA TWICE A DAY FOR 7 DAYS      lidocaine (LIDODERM) 5 % patch PLACE 1 PATCH ONTO THE SKIN AS NEEDED TO NECK, SHOULDERS, AND BACK 30 patch 0    Multiple Vitamin (MULTIVITAMIN ADULT PO) Take 1 tablet by mouth daily      naproxen (NAPROSYN) 500 MG tablet       omeprazole (PRILOSEC) 40 MG DR capsule TAKE 1 CAPSULE (40 MG) BY MOUTH EVERY MORNING (BEFORE BREAKFAST) 30 MINUTES BEFORE MEAL 90 capsule 3    ondansetron (ZOFRAN) 4 MG tablet Take 1 tablet (4 mg) by mouth every 8 hours as needed for nausea 18 tablet 1    Riboflavin 400 MG TABS Take 400 mg by mouth daily      rizatriptan (MAXALT) 10 MG tablet TAKE 1 TABLET BY MOUTH AS  "NEEDED FOR MIGRAINE. MAY REPEAT IN 2 HOURS IF NEEDED 10 tablet 3    tretinoin (RETIN-A) 0.05 % external cream APPLY TO AFFECTED AREA EVERY DAY AT BEDTIME 45 g 2    valACYclovir (VALTREX) 1000 mg tablet TAKE 2 TABLETS BY MOUTH 12 HOURS APART AS NEEDED EPISODE 12 tablet 3    zolpidem (AMBIEN) 5 MG tablet TAKE 1 TABLET (5 MG) BY MOUTH NIGHTLY AS NEEDED FOR SLEEP 30 tablet 0         PHYSICAL EXAM     Vital signs  /87 (BP Location: Right arm, Patient Position: Sitting)   Pulse 102   Ht 1.549 m (5' 1\")   Wt 58.1 kg (128 lb)   BMI 24.19 kg/m      Weight:   128 lbs 0 oz    Pleasant female who is alert and oriented vital signs were reviewed and documented in electronic medical record.  Neck supple.  Neurologically speech was normal.  Cranial nerves II through XII are intact motor strength neck flexor and extensor 4+/5 in upper extremity 5/5 in the lower extremity proximally 4+/5 distally 5/5 reflexes 2+ toes downgoing no dysmetria noted on finger-nose testing gait normal.      PERTINENT DIAGNOSTIC STUDIES     Following studies were reviewed:     MRI BRAIN 10/29/2015  1.  Normal Head MRI.   2.  No acute infarcts, mass lesions or hemorrhage     PERTINENT LABS  Following labs were reviewed:  No visits with results within 3 Month(s) from this visit.   Latest known visit with results is:   Office Visit on 09/27/2023   Component Date Value Ref Range Status    Lipase 09/27/2023 29  13 - 60 U/L Final    Sodium 09/27/2023 138  135 - 145 mmol/L Final    Potassium 09/27/2023 4.2  3.4 - 5.3 mmol/L Final    Carbon Dioxide (CO2) 09/27/2023 26  22 - 29 mmol/L Final    Anion Gap 09/27/2023 9  7 - 15 mmol/L Final    Urea Nitrogen 09/27/2023 8.1  6.0 - 20.0 mg/dL Final    Creatinine 09/27/2023 0.78  0.51 - 0.95 mg/dL Final    GFR Estimate 09/27/2023 >90  >60 mL/min/1.73m2 Final    Calcium 09/27/2023 9.5  8.6 - 10.0 mg/dL Final    Chloride 09/27/2023 103  98 - 107 mmol/L Final    Glucose 09/27/2023 90  70 - 99 mg/dL Final    Alkaline " Phosphatase 09/27/2023 63  35 - 104 U/L Final    AST 09/27/2023 23  0 - 45 U/L Final    ALT 09/27/2023 14  0 - 50 U/L Final    Protein Total 09/27/2023 6.8  6.4 - 8.3 g/dL Final    Albumin 09/27/2023 4.3  3.5 - 5.2 g/dL Final    Bilirubin Total 09/27/2023 <0.2  <=1.2 mg/dL Final    WBC Count 09/27/2023 7.2  4.0 - 11.0 10e3/uL Final    RBC Count 09/27/2023 4.58  3.80 - 5.20 10e6/uL Final    Hemoglobin 09/27/2023 13.2  11.7 - 15.7 g/dL Final    Hematocrit 09/27/2023 39.5  35.0 - 47.0 % Final    MCV 09/27/2023 86  78 - 100 fL Final    MCH 09/27/2023 28.8  26.5 - 33.0 pg Final    MCHC 09/27/2023 33.4  31.5 - 36.5 g/dL Final    RDW 09/27/2023 12.4  10.0 - 15.0 % Final    Platelet Count 09/27/2023 306  150 - 450 10e3/uL Final    % Neutrophils 09/27/2023 69  % Final    % Lymphocytes 09/27/2023 24  % Final    % Monocytes 09/27/2023 7  % Final    % Eosinophils 09/27/2023 0  % Final    % Basophils 09/27/2023 0  % Final    % Immature Granulocytes 09/27/2023 0  % Final    Absolute Neutrophils 09/27/2023 4.9  1.6 - 8.3 10e3/uL Final    Absolute Lymphocytes 09/27/2023 1.7  0.8 - 5.3 10e3/uL Final    Absolute Monocytes 09/27/2023 0.5  0.0 - 1.3 10e3/uL Final    Absolute Eosinophils 09/27/2023 0.0  0.0 - 0.7 10e3/uL Final    Absolute Basophils 09/27/2023 0.0  0.0 - 0.2 10e3/uL Final    Absolute Immature Granulocytes 09/27/2023 0.0  <=0.4 10e3/uL Final         Total time spent for face to face visit, reviewing labs/imaging studies, counseling and coordination of care was: 20 minutes spent on the date of the encounter doing chart review, review of outside records, review of test results, interpretation of tests, patient visit, and documentation     This note was dictated using voice recognition software.  Any grammatical or context distortions are unintentional and inherent to the software.    No orders of the defined types were placed in this encounter.     New Prescriptions    No medications on file     Modified Medications     Modified Medication Previous Medication    RIZATRIPTAN (MAXALT) 10 MG TABLET rizatriptan (MAXALT) 10 MG tablet       TAKE 1 TABLET BY MOUTH AS NEEDED FOR MIGRAINE. MAY REPEAT IN 2 HOURS IF NEEDED    TAKE 1 TABLET BY MOUTH AS NEEDED FOR MIGRAINE. MAY REPEAT IN 2 HOURS IF NEEDED

## 2024-02-13 ENCOUNTER — OFFICE VISIT (OUTPATIENT)
Dept: NEUROLOGY | Facility: CLINIC | Age: 44
End: 2024-02-13
Payer: COMMERCIAL

## 2024-02-13 VITALS
SYSTOLIC BLOOD PRESSURE: 109 MMHG | BODY MASS INDEX: 24.17 KG/M2 | DIASTOLIC BLOOD PRESSURE: 87 MMHG | HEART RATE: 102 BPM | WEIGHT: 128 LBS | HEIGHT: 61 IN

## 2024-02-13 DIAGNOSIS — G47.00 INSOMNIA, UNSPECIFIED TYPE: ICD-10-CM

## 2024-02-13 DIAGNOSIS — G43.719 CHRONIC MIGRAINE WITHOUT AURA, INTRACTABLE, WITHOUT STATUS MIGRAINOSUS: Primary | ICD-10-CM

## 2024-02-13 PROCEDURE — 64615 CHEMODENERV MUSC MIGRAINE: CPT | Performed by: PSYCHIATRY & NEUROLOGY

## 2024-02-13 RX ORDER — RIZATRIPTAN BENZOATE 10 MG/1
TABLET ORAL
Qty: 10 TABLET | Refills: 3 | Status: SHIPPED | OUTPATIENT
Start: 2024-02-13

## 2024-02-13 NOTE — PROCEDURES
BOTOX INJECTION PROCEDURE NOTE     Date: 02/13/2024    INDICATION: CHRONIC MIGRAINE    Number of headache days/month currently: 14 (BASELINE: >15   )  Number of headache hours/day currently : Monitor for (BASELINE: 4-24 Hours   )  Number of headache free days post treatment:  16  Disability due to migraine headache: yes  Consent: Obtained  Indication: Chronic Migraine    Botox Lot No:  C4 expiration 06/2026  Number of units injected: 155 U  Number of units of unavoidable wastage: 45 U    LOCATION  At today s visit, patient was injected with a total of 155 units divided in 31 sites. A total of 2 mL of normal saline was used to dilute 100 units of the drug. The following muscles were injected:  10 units divided in two sites, procerus 5 units in one site, frontalis 20 units divided in four sites, temporalis 40 units divided in eight sites, occipitalis 30 units divided in six sites, cervical paraspinals 20 units divided in four sites, and trapezius 30 units divided in six sites. 45 units were unavoidable wastage        ASSESSMENT/PLAN  Chronic Migraine headache  The patient tolerated the procedure well. There were no immediate complications. Prior to the procedure, I discussed the potential side effects of Botox therapy, which includes generalized muscle weakness, diplopia, ptosis, dysphagia, dysphonia, dysarthria, urinary incontinence, and breathing difficulties. Also I discussed the black box warning of the drug that can include the rare chance of distant spread of the drug to swallowing and breathing muscles that can be life threatening. All of patient's questions were answered prior to our signing the consent form. patient left the clinic after a brief period of observation and will return for repeat injection in three months  time as needed. I informed patient again the goal is to maintain at least seven to eight headache free days on average a month. I explained to patient that most patients with  chronic migraines do need life time Botox treatment, however, at any time if the patient wishes to stop Botox we can do so as some patients do go into remission on their own over time. Follow up will be in 3-4 months      Mikel Hernandez M.D.  [facility]  (Formerly, [facility]

## 2024-02-13 NOTE — NURSING NOTE
Chief Complaint   Patient presents with    Botox Migraine     Silvina Ventura CMA on 2/13/2024 at 2:25 PM  [facility]      abdominal cramping

## 2024-02-13 NOTE — LETTER
2024         RE: Sherry Sweeney   [facility] [address]        Dear Colleague,    Thank you for referring your patient, Sherry Sweeney, to the [facility] [place]. Please see a copy of my visit note below.    NEUROLOGY FOLLOW UP VISIT  NOTE       [facility] [place]  [address] [place] [address]  Tel: [phone]  Fax: [phone]  [email or web address]     Sherry Sweeney,  1980, MRN [id number]  PCP: Layla Martinez  Date: 2024      ASSESSMENT & PLAN     Visit Diagnosis  Chronic migraine without aura, intractable, without status migrainosus     Migraine without aura  43-year-old female with history of mitochondrial myopathy, chronic migraine without aura, fibromyalgia is here for 3-month Botox injection.  She has noticed more than 50% reduction in her headache after she started getting Botox.  After explaining all the side effect and obtaining her consent, I injected 155 units according to the enclosed sheet.  45 units were discarded.  She was monitored in the clinic for a short duration and left in a satisfactory condition.  Follow-up will be in 3 months.    Thank you again for this referral, please feel free to contact me if you have any questions.    Mikel Hernandez MD  [facility]  (Formerly, [facility]     HISTORY OF PRESENT ILLNESS     Patient is a 43-year-old female with history of mitochondrial myopathy, chronic migraine without aura, fibromyalgia is here for 3-month Botox injection.  She started getting Botox injections in 2019 and noticed steady decline in her headache frequency.  During COVID pandemic she had to hold off on Botox and noticed flareup of her symptoms but ever since she restarted there has been a steady decline.  Previously she had tried Depakote, amitriptyline and beta-blocker that did not help.    Briefly patient is a female with history  of mitochondrial myopathy, chronic migraine without aura, fibromyalgia who started having headaches in 2014 and progressively got worse.  She initially attributed it to pressure in the neck or TMJ abnormality and that in the past was evaluated and started on some hormonal supplements.  Due to progressive weakness she was evaluated for possible mitochondrial myopathy and had mitochondrial DNA tested that was positive.  For her headaches she was started on Depakote, amitriptyline and in the past had also tried Cymbalta and as she was having more than 15 headaches in a month Botox was recommended.  She started getting Botox in 2017 and did notice improvement in her headache.  Discontinued until 2019 but due to COVID pandemic she stopped doing Botox injection and noticed worsening headaches and Botox was restarted in 2023.     PROBLEM LIST   Patient Active Problem List   Diagnosis Code     Acne vulgaris L70.0     Arthralgia of temporomandibular joint M26.629     Debility R53.81     Fibromyalgia M79.7     IBS (irritable bowel syndrome) K58.9     Insomnia G47.00     Major depression, recurrent, chronic (H24) F33.9     Chronic migraine without aura, intractable, without status migrainosus G43.719     Mitochondrial disease (H24) E88.40     Generalized anxiety disorder F41.1     Post-traumatic stress disorder F43.10     Parasomnia G47.50     Seasonal depression (H24) F33.8     Palpitations R00.2     Gastroesophageal reflux disease without esophagitis K21.9     Vaginal Papanicolaou smear not required due to history of hysterectomy Z90.710     Biliary colic K80.50         PAST MEDICAL & SURGICAL HISTORY     Past Medical History:   Patient  has a past medical history of Anemia, Chronic fatigue syndrome, Chronic migraine, Depression, Depression, Depressive disorder (2013), Fibromyalgia, Fibromyalgia, Gastroesophageal reflux disease, H/O: hysterectomy (02/22/2018), History of anesthesia complications, IBS (irritable bowel  syndrome), Insomnia, Irregular heart beat, Mitochondrial disease (H24), Mitochondrial myopathy, Motion sickness, Parasomnia, and PONV (postoperative nausea and vomiting).    Surgical History:  She  has a past surgical history that includes REMOVAL OF OVARIAN CYST(S); Hysterectomy (Bilateral, 02/22/2018); Picc (02/24/2018); Abdomen surgery (1996 & 2018, feb.); appendectomy (1996); biopsy; Laparoscopic cystectomy ovarian (oncology) (Left, 10/18/2021); Laparoscopy diagnostic (gyn) (Left, 06/29/2023); Hysterectomy total abdominal; and Laparoscopic cholecystectomy (N/A, 10/25/2023).     SOCIAL HISTORY     Reviewed, and she  reports that she has never smoked. She has never used smokeless tobacco. She reports that she does not drink alcohol and does not use drugs.     FAMILY HISTORY     Reviewed, and family history includes Anxiety Disorder in her maternal grandmother, mother, paternal grandfather, and paternal grandmother; Bone Cancer in her maternal grandfather; Breast Cancer (age of onset: 60.00) in her maternal grandmother; Breast Cancer (age of onset: 62.00) in her mother; Cancer in her mother; Coronary Artery Disease (age of onset: 45.00) in her father; Depression in her brother, father, maternal grandmother, and mother; Diabetes in her cousin and cousin; Hyperlipidemia in her mother; Hypertension in her mother; Meniere's disease in her mother.     ALLERGIES     Allergies   Allergen Reactions     Ciprofloxacin Itching, Rash and Hives     Rash over whole body   Rash over whole body        Egg White (Egg Protein) Other (See Comments) and GI Disturbance     Swollen colon, belly pain  Swollen colon, belly pain       Influenza Virus Vaccine Shortness Of Breath and Anaphylaxis     No Clinical Screening - See Comments Anaphylaxis     Stomach swelling     Sulfa Antibiotics Rash and Hives     Yeast Other (See Comments) and Anaphylaxis     Desvenlafaxine Blisters, Itching and Rash     Milnacipran Other (See Comments) and  Palpitations     Chest pain, hot/cold flashes     Quetiapine Palpitations     Tachycardia, nervousness, elevated blood pressure.   Tachycardia, nervousness, elevated blood pressure.          REVIEW OF SYSTEMS     A 12 point review of system was performed and was negative except as outlined in the history of present illness.     HOME MEDICATIONS     Current Outpatient Rx   Medication Sig Dispense Refill     ascorbic acid 500 MG TABS Take 500 mg by mouth daily       betamethasone dipropionate (DIPROSONE) 0.05 % external lotion        buPROPion (WELLBUTRIN XL) 300 MG 24 hr tablet TAKE 1 TABLET BY MOUTH EVERY DAY 90 tablet 3     cetirizine (ZYRTEC) 10 MG tablet TAKE 1 TAB ORALLY DAILY AS NEEDED FOR ALLERGIES MAY INCREASE TO 2 TAB DAILY IF ITCHING NOT RELIEVED 90 tablet 2     clindamycin-benzoyl peroxide (BENZACLIN) gel Apply topically to acne once daily       clobetasol (TEMOVATE) 0.05 % external ointment        clotrimazole-betamethasone (LOTRISONE) 1-0.05 % external cream 1 application as needed by topical route.       [employer] MELATONIN 3 MG tablet TAKE 1 TABLET (3 MG) BY MOUTH NIGHTLY AS NEEDED FOR SLEEP 90 tablet 1     cyclobenzaprine (FLEXERIL) 10 MG tablet Take 10 mg by mouth daily as needed for muscle spasms       EFFEXOR  MG 24 hr capsule Take 1 capsule (150 mg) by mouth daily 90 capsule 3     EFFEXOR XR 75 MG 24 hr capsule Take 1 capsule (75 mg) by mouth daily 90 capsule 3     hydrOXYzine (ATARAX) 25 MG tablet TAKE 1-2 TABLETS (25-50 MG) BY MOUTH AT BEDTIME 180 tablet 2     ibuprofen (ADVIL/MOTRIN) 400 MG tablet        ketoconazole (NIZORAL) 2 % external cream APPLY TO AFFECTED AREA TWICE A DAY FOR 7 DAYS       lidocaine (LIDODERM) 5 % patch PLACE 1 PATCH ONTO THE SKIN AS NEEDED TO NECK, SHOULDERS, AND BACK 30 patch 0     Multiple Vitamin (MULTIVITAMIN ADULT PO) Take 1 tablet by mouth daily       naproxen (NAPROSYN) 500 MG tablet        omeprazole (PRILOSEC) 40 MG DR capsule TAKE 1 CAPSULE (40 MG) BY MOUTH  "EVERY MORNING (BEFORE BREAKFAST) 30 MINUTES BEFORE MEAL 90 capsule 3     ondansetron (ZOFRAN) 4 MG tablet Take 1 tablet (4 mg) by mouth every 8 hours as needed for nausea 18 tablet 1     Riboflavin 400 MG TABS Take 400 mg by mouth daily       rizatriptan (MAXALT) 10 MG tablet TAKE 1 TABLET BY MOUTH AS NEEDED FOR MIGRAINE. MAY REPEAT IN 2 HOURS IF NEEDED 10 tablet 3     tretinoin (RETIN-A) 0.05 % external cream APPLY TO AFFECTED AREA EVERY DAY AT BEDTIME 45 g 2     valACYclovir (VALTREX) 1000 mg tablet TAKE 2 TABLETS BY MOUTH 12 HOURS APART AS NEEDED EPISODE 12 tablet 3     zolpidem (AMBIEN) 5 MG tablet TAKE 1 TABLET (5 MG) BY MOUTH NIGHTLY AS NEEDED FOR SLEEP 30 tablet 0         PHYSICAL EXAM     Vital signs  /87 (BP Location: Right arm, Patient Position: Sitting)   Pulse 102   Ht 1.549 m (5' 1\")   Wt 58.1 kg (128 lb)   BMI 24.19 kg/m      Weight:   128 lbs 0 oz    Pleasant female who is alert and oriented vital signs were reviewed and documented in electronic medical record.  Neck supple.  Neurologically speech was normal.  Cranial nerves II through XII are intact motor strength neck flexor and extensor 4+/5 in upper extremity 5/5 in the lower extremity proximally 4+/5 distally 5/5 reflexes 2+ toes downgoing no dysmetria noted on finger-nose testing gait normal.      PERTINENT DIAGNOSTIC STUDIES     Following studies were reviewed:     MRI BRAIN 10/29/2015  1.  Normal Head MRI.   2.  No acute infarcts, mass lesions or hemorrhage     PERTINENT LABS  Following labs were reviewed:  No visits with results within 3 Month(s) from this visit.   Latest known visit with results is:   Office Visit on 09/27/2023   Component Date Value Ref Range Status     Lipase 09/27/2023 29  13 - 60 U/L Final     Sodium 09/27/2023 138  135 - 145 mmol/L Final     Potassium 09/27/2023 4.2  3.4 - 5.3 mmol/L Final     Carbon Dioxide (CO2) 09/27/2023 26  22 - 29 mmol/L Final     Anion Gap 09/27/2023 9  7 - 15 mmol/L Final     Urea " Nitrogen 09/27/2023 8.1  6.0 - 20.0 mg/dL Final     Creatinine 09/27/2023 0.78  0.51 - 0.95 mg/dL Final     GFR Estimate 09/27/2023 >90  >60 mL/min/1.73m2 Final     Calcium 09/27/2023 9.5  8.6 - 10.0 mg/dL Final     Chloride 09/27/2023 103  98 - 107 mmol/L Final     Glucose 09/27/2023 90  70 - 99 mg/dL Final     Alkaline Phosphatase 09/27/2023 63  35 - 104 U/L Final     AST 09/27/2023 23  0 - 45 U/L Final     ALT 09/27/2023 14  0 - 50 U/L Final     Protein Total 09/27/2023 6.8  6.4 - 8.3 g/dL Final     Albumin 09/27/2023 4.3  3.5 - 5.2 g/dL Final     Bilirubin Total 09/27/2023 <0.2  <=1.2 mg/dL Final     WBC Count 09/27/2023 7.2  4.0 - 11.0 10e3/uL Final     RBC Count 09/27/2023 4.58  3.80 - 5.20 10e6/uL Final     Hemoglobin 09/27/2023 13.2  11.7 - 15.7 g/dL Final     Hematocrit 09/27/2023 39.5  35.0 - 47.0 % Final     MCV 09/27/2023 86  78 - 100 fL Final     MCH 09/27/2023 28.8  26.5 - 33.0 pg Final     MCHC 09/27/2023 33.4  31.5 - 36.5 g/dL Final     RDW 09/27/2023 12.4  10.0 - 15.0 % Final     Platelet Count 09/27/2023 306  150 - 450 10e3/uL Final     % Neutrophils 09/27/2023 69  % Final     % Lymphocytes 09/27/2023 24  % Final     % Monocytes 09/27/2023 7  % Final     % Eosinophils 09/27/2023 0  % Final     % Basophils 09/27/2023 0  % Final     % Immature Granulocytes 09/27/2023 0  % Final     Absolute Neutrophils 09/27/2023 4.9  1.6 - 8.3 10e3/uL Final     Absolute Lymphocytes 09/27/2023 1.7  0.8 - 5.3 10e3/uL Final     Absolute Monocytes 09/27/2023 0.5  0.0 - 1.3 10e3/uL Final     Absolute Eosinophils 09/27/2023 0.0  0.0 - 0.7 10e3/uL Final     Absolute Basophils 09/27/2023 0.0  0.0 - 0.2 10e3/uL Final     Absolute Immature Granulocytes 09/27/2023 0.0  <=0.4 10e3/uL Final         Total time spent for face to face visit, reviewing labs/imaging studies, counseling and coordination of care was: 20 minutes spent on the date of the encounter doing chart review, review of outside records, review of test results,  interpretation of tests, patient visit, and documentation     This note was dictated using voice recognition software.  Any grammatical or context distortions are unintentional and inherent to the software.    No orders of the defined types were placed in this encounter.     New Prescriptions    No medications on file     Modified Medications    Modified Medication Previous Medication    RIZATRIPTAN (MAXALT) 10 MG TABLET rizatriptan (MAXALT) 10 MG tablet       TAKE 1 TABLET BY MOUTH AS NEEDED FOR MIGRAINE. MAY REPEAT IN 2 HOURS IF NEEDED    TAKE 1 TABLET BY MOUTH AS NEEDED FOR MIGRAINE. MAY REPEAT IN 2 HOURS IF NEEDED                 Again, thank you for allowing me to participate in the care of your patient.        Sincerely,        Mikel Hernandez MD

## 2024-03-01 ENCOUNTER — MYC REFILL (OUTPATIENT)
Dept: FAMILY MEDICINE | Facility: CLINIC | Age: 44
End: 2024-03-01
Payer: COMMERCIAL

## 2024-03-01 DIAGNOSIS — Z86.19 HISTORY OF COLD SORES: ICD-10-CM

## 2024-03-01 RX ORDER — VALACYCLOVIR HYDROCHLORIDE 1 G/1
TABLET, FILM COATED ORAL
Qty: 12 TABLET | Refills: 3 | Status: SHIPPED | OUTPATIENT
Start: 2024-03-01

## 2024-03-17 DIAGNOSIS — K21.00 GASTROESOPHAGEAL REFLUX DISEASE WITH ESOPHAGITIS WITHOUT HEMORRHAGE: ICD-10-CM

## 2024-03-18 RX ORDER — OMEPRAZOLE 40 MG/1
40 CAPSULE, DELAYED RELEASE ORAL
Qty: 90 CAPSULE | Refills: 1 | Status: SHIPPED | OUTPATIENT
Start: 2024-03-18

## 2024-05-02 NOTE — PROGRESS NOTES
NEUROLOGY FOLLOW UP VISIT  NOTE       [facility] NEUROLOGY [place]  [address] [place] [address]  Tel: [phone]  Fax: [phone]  [email or web address]     Sherry Sweeney,  1980, MRN [id number]  PCP: Layla Martinez  Date: 2024      ASSESSMENT & PLAN     Visit Diagnosis  Chronic migraine without aura, intractable, without status migrainosus     Chronic migraine without aura  44-year-old female with history of mitochondrial myopathy, chronic migraine without aura, fibromyalgia who is here for 3-month Botox injection.  She continues to notice improvement in her symptoms and with Botox she has noticed more than 50% reduction in her headache.  After explaining all the side effect and obtaining her consent, I injected 155 units according to the enclosed sheet.  45 units were discarded.  She was monitored in the clinic for short duration and left in a satisfactory condition.  Follow-up will be in 3 months.      Thank you again for this referral, please feel free to contact me if you have any questions.    Mikel Hernandez MD  [facility]     HISTORY OF PRESENT ILLNESS     Patient is pleasant 43-year-old female with history of mitochondrial myopathy, chronic migraine without aura, fibromyalgia returns for 3-month Botox injection.  Previously she had failed multiple medication but ever since she started getting Botox there was significant improvement. She started getting Botox injections in 2019 and noticed steady decline in her headache frequency.  During COVID pandemic she had to hold off on Botox and noticed flareup of her symptoms but ever since she restarted there has been a steady decline.  Previously she had tried Depakote, amitriptyline and beta-blocker that did not help.    Briefly patient is a female with history of mitochondrial myopathy, chronic migraine without aura, fibromyalgia who started having headaches in  and progressively got  worse.  She initially attributed it to pressure in the neck or TMJ abnormality and that in the past was evaluated and started on some hormonal supplements.  Due to progressive weakness she was evaluated for possible mitochondrial myopathy and had mitochondrial DNA tested that was positive.  For her headaches she was started on Depakote, amitriptyline and in the past had also tried Cymbalta and as she was having more than 15 headaches in a month Botox was recommended.  She started getting Botox in 2017 and did notice improvement in her headache.  Discontinued until 2019 but due to COVID pandemic she stopped doing Botox injection and noticed worsening headaches and Botox was restarted in 2023.     PROBLEM LIST   Patient Active Problem List   Diagnosis    Acne vulgaris    Arthralgia of temporomandibular joint    Debility    Fibromyalgia    IBS (irritable bowel syndrome)    Insomnia    Major depression, recurrent, chronic (H24)    Chronic migraine without aura, intractable, without status migrainosus    Mitochondrial disease (H24)    Generalized anxiety disorder    Post-traumatic stress disorder    Parasomnia    Seasonal depression (H24)    Palpitations    Gastroesophageal reflux disease without esophagitis    Vaginal Papanicolaou smear not required due to history of hysterectomy    Biliary colic         PAST MEDICAL & SURGICAL HISTORY     Past Medical History:   Patient  has a past medical history of Anemia, Chronic fatigue syndrome, Chronic migraine, Depression, Depression, Depressive disorder (2013), Fibromyalgia, Fibromyalgia, Gastroesophageal reflux disease, H/O: hysterectomy (02/22/2018), History of anesthesia complications, IBS (irritable bowel syndrome), Insomnia, Irregular heart beat, Mitochondrial disease (H24), Mitochondrial myopathy, Motion sickness, Parasomnia, and PONV (postoperative nausea and vomiting).    Surgical History:  She  has a past surgical history that includes REMOVAL OF OVARIAN CYST(S);  Hysterectomy (Bilateral, 02/22/2018); Picc (02/24/2018); Abdomen surgery (1996 & 2018, feb.); appendectomy (1996); biopsy; Laparoscopic cystectomy ovarian (oncology) (Left, 10/18/2021); Laparoscopy diagnostic (gyn) (Left, 06/29/2023); Hysterectomy total abdominal; and Laparoscopic cholecystectomy (N/A, 10/25/2023).     SOCIAL HISTORY     Reviewed, and she  reports that she has never smoked. She has never used smokeless tobacco. She reports that she does not drink alcohol and does not use drugs.     FAMILY HISTORY     Reviewed, and family history includes Anxiety Disorder in her maternal grandmother, mother, paternal grandfather, and paternal grandmother; Bone Cancer in her maternal grandfather; Breast Cancer (age of onset: 60.00) in her maternal grandmother; Breast Cancer (age of onset: 62.00) in her mother; Cancer in her mother; Coronary Artery Disease (age of onset: 45.00) in her father; Depression in her brother, father, maternal grandmother, and mother; Diabetes in her cousin and cousin; Hyperlipidemia in her mother; Hypertension in her mother; Meniere's disease in her mother.     ALLERGIES     Allergies   Allergen Reactions    Ciprofloxacin Itching, Rash and Hives     Rash over whole body   Rash over whole body       Egg White (Egg Protein) Other (See Comments) and GI Disturbance     Swollen colon, belly pain  Swollen colon, belly pain      Influenza Virus Vaccine Shortness Of Breath and Anaphylaxis    No Clinical Screening - See Comments Anaphylaxis     Stomach swelling    Sulfa Antibiotics Rash and Hives    Yeast Other (See Comments) and Anaphylaxis    Desvenlafaxine Blisters, Itching and Rash    Milnacipran Other (See Comments) and Palpitations     Chest pain, hot/cold flashes    Quetiapine Palpitations     Tachycardia, nervousness, elevated blood pressure.   Tachycardia, nervousness, elevated blood pressure.          REVIEW OF SYSTEMS     A 12 point review of system was performed and was negative except as  outlined in the history of present illness.     HOME MEDICATIONS     Current Outpatient Rx   Medication Sig Dispense Refill    ascorbic acid 500 MG TABS Take 500 mg by mouth daily      betamethasone dipropionate (DIPROSONE) 0.05 % external lotion       buPROPion (WELLBUTRIN XL) 300 MG 24 hr tablet TAKE 1 TABLET BY MOUTH EVERY DAY 90 tablet 3    cetirizine (ZYRTEC) 10 MG tablet TAKE 1 TAB ORALLY DAILY AS NEEDED FOR ALLERGIES MAY INCREASE TO 2 TAB DAILY IF ITCHING NOT RELIEVED 90 tablet 2    clindamycin-benzoyl peroxide (BENZACLIN) gel Apply topically to acne once daily      clobetasol (TEMOVATE) 0.05 % external ointment       clotrimazole-betamethasone (LOTRISONE) 1-0.05 % external cream 1 application as needed by topical route.      [employer] MELATONIN 3 MG tablet TAKE 1 TABLET (3 MG) BY MOUTH NIGHTLY AS NEEDED FOR SLEEP 90 tablet 1    cyclobenzaprine (FLEXERIL) 10 MG tablet Take 10 mg by mouth daily as needed for muscle spasms      EFFEXOR  MG 24 hr capsule Take 1 capsule (150 mg) by mouth daily 90 capsule 3    EFFEXOR XR 75 MG 24 hr capsule Take 1 capsule (75 mg) by mouth daily 90 capsule 3    hydrOXYzine (ATARAX) 25 MG tablet TAKE 1-2 TABLETS (25-50 MG) BY MOUTH AT BEDTIME 180 tablet 2    ibuprofen (ADVIL/MOTRIN) 400 MG tablet       ketoconazole (NIZORAL) 2 % external cream APPLY TO AFFECTED AREA TWICE A DAY FOR 7 DAYS      lidocaine (LIDODERM) 5 % patch PLACE 1 PATCH ONTO THE SKIN AS NEEDED TO NECK, SHOULDERS, AND BACK 30 patch 0    Multiple Vitamin (MULTIVITAMIN ADULT PO) Take 1 tablet by mouth daily      naproxen (NAPROSYN) 500 MG tablet       omeprazole (PRILOSEC) 40 MG DR capsule TAKE 1 CAPSULE (40 MG) BY MOUTH EVERY MORNING (BEFORE BREAKFAST) 30 MINUTES BEFORE MEAL 90 capsule 1    ondansetron (ZOFRAN) 4 MG tablet Take 1 tablet (4 mg) by mouth every 8 hours as needed for nausea 18 tablet 1    Riboflavin 400 MG TABS Take 400 mg by mouth daily      rizatriptan (MAXALT) 10 MG tablet TAKE 1 TABLET BY MOUTH AS  "NEEDED FOR MIGRAINE. MAY REPEAT IN 2 HOURS IF NEEDED 10 tablet 3    tretinoin (RETIN-A) 0.05 % external cream APPLY TO AFFECTED AREA EVERY DAY AT BEDTIME 45 g 2    valACYclovir (VALTREX) 1000 mg tablet TAKE 2 TABLETS BY MOUTH 12 HOURS APART AS NEEDED EPISODE 12 tablet 3    zolpidem (AMBIEN) 5 MG tablet TAKE 1 TABLET (5 MG) BY MOUTH NIGHTLY AS NEEDED FOR SLEEP 30 tablet 0         PHYSICAL EXAM     Vital signs  /84 (BP Location: Right arm, Patient Position: Sitting)   Pulse 99   Ht 1.549 m (5' 1\")   Wt 58.1 kg (128 lb)   BMI 24.19 kg/m      Weight:   128 lbs 0 oz      5/8/2024     1:55 PM   HIT-6   When you have headaches, how often is the pain severe 11   How often do headaches limit your ability to do usual daily activities including household work, work, school, or social activities? 13   When you have a headache, how often do you wish you could lie down? 13   In the past 4 weeks, how often have you felt too tired to do work or daily activities because of your headaches 11   In the past 4 weeks, how often have you felt fed up or irritated because of your headaches 11   In the past 4 weeks, how often did headaches limit your ability to concentrate on work or daily activities 11   HIT-6 Total Score 70            No data to display                Pleasant female who is alert and oriented vital signs were reviewed and documented in electronic medical record.  Neck supple.  Neurologically speech was normal.  Cranial nerves II through XII are intact motor strength neck flexor and extensor 4+/5 in upper extremity 5/5 in the lower extremity proximally 4+/5 distally 5/5 reflexes 2+ toes downgoing no dysmetria noted on finger-nose testing gait normal.      PERTINENT DIAGNOSTIC STUDIES     Following studies were reviewed:     MRI BRAIN 10/29/2015  1.  Normal Head MRI.   2.  No acute infarcts, mass lesions or hemorrhage     PERTINENT LABS  Following labs were reviewed:  No visits with results within 3 Month(s) from " this visit.   Latest known visit with results is:   Office Visit on 09/27/2023   Component Date Value Ref Range Status    Lipase 09/27/2023 29  13 - 60 U/L Final    Sodium 09/27/2023 138  135 - 145 mmol/L Final    Potassium 09/27/2023 4.2  3.4 - 5.3 mmol/L Final    Carbon Dioxide (CO2) 09/27/2023 26  22 - 29 mmol/L Final    Anion Gap 09/27/2023 9  7 - 15 mmol/L Final    Urea Nitrogen 09/27/2023 8.1  6.0 - 20.0 mg/dL Final    Creatinine 09/27/2023 0.78  0.51 - 0.95 mg/dL Final    GFR Estimate 09/27/2023 >90  >60 mL/min/1.73m2 Final    Calcium 09/27/2023 9.5  8.6 - 10.0 mg/dL Final    Chloride 09/27/2023 103  98 - 107 mmol/L Final    Glucose 09/27/2023 90  70 - 99 mg/dL Final    Alkaline Phosphatase 09/27/2023 63  35 - 104 U/L Final    AST 09/27/2023 23  0 - 45 U/L Final    ALT 09/27/2023 14  0 - 50 U/L Final    Protein Total 09/27/2023 6.8  6.4 - 8.3 g/dL Final    Albumin 09/27/2023 4.3  3.5 - 5.2 g/dL Final    Bilirubin Total 09/27/2023 <0.2  <=1.2 mg/dL Final    WBC Count 09/27/2023 7.2  4.0 - 11.0 10e3/uL Final    RBC Count 09/27/2023 4.58  3.80 - 5.20 10e6/uL Final    Hemoglobin 09/27/2023 13.2  11.7 - 15.7 g/dL Final    Hematocrit 09/27/2023 39.5  35.0 - 47.0 % Final    MCV 09/27/2023 86  78 - 100 fL Final    MCH 09/27/2023 28.8  26.5 - 33.0 pg Final    MCHC 09/27/2023 33.4  31.5 - 36.5 g/dL Final    RDW 09/27/2023 12.4  10.0 - 15.0 % Final    Platelet Count 09/27/2023 306  150 - 450 10e3/uL Final    % Neutrophils 09/27/2023 69  % Final    % Lymphocytes 09/27/2023 24  % Final    % Monocytes 09/27/2023 7  % Final    % Eosinophils 09/27/2023 0  % Final    % Basophils 09/27/2023 0  % Final    % Immature Granulocytes 09/27/2023 0  % Final    Absolute Neutrophils 09/27/2023 4.9  1.6 - 8.3 10e3/uL Final    Absolute Lymphocytes 09/27/2023 1.7  0.8 - 5.3 10e3/uL Final    Absolute Monocytes 09/27/2023 0.5  0.0 - 1.3 10e3/uL Final    Absolute Eosinophils 09/27/2023 0.0  0.0 - 0.7 10e3/uL Final    Absolute Basophils  09/27/2023 0.0  0.0 - 0.2 10e3/uL Final    Absolute Immature Granulocytes 09/27/2023 0.0  <=0.4 10e3/uL Final         Total time spent for face to face visit, reviewing labs/imaging studies, counseling and coordination of care was: 20 minutes spent on the date of the encounter doing chart review, review of outside records, review of test results, interpretation of tests, patient visit, and documentation     The longitudinal plan of care for the diagnosis(es)/condition(s) as documented were addressed during this visit. Due to the added complexity in care, I will continue to support Sherry in the subsequent management and with ongoing continuity of care.    This note was dictated using voice recognition software.  Any grammatical or context distortions are unintentional and inherent to the software.    Orders Placed This Encounter   Procedures    [id number] - MIGRAINE      New Prescriptions    No medications on file     Modified Medications    No medications on file

## 2024-05-08 ENCOUNTER — OFFICE VISIT (OUTPATIENT)
Dept: NEUROLOGY | Facility: CLINIC | Age: 44
End: 2024-05-08
Payer: COMMERCIAL

## 2024-05-08 VITALS
SYSTOLIC BLOOD PRESSURE: 137 MMHG | DIASTOLIC BLOOD PRESSURE: 84 MMHG | WEIGHT: 128 LBS | HEIGHT: 61 IN | BODY MASS INDEX: 24.17 KG/M2 | HEART RATE: 99 BPM

## 2024-05-08 DIAGNOSIS — G43.719 CHRONIC MIGRAINE WITHOUT AURA, INTRACTABLE, WITHOUT STATUS MIGRAINOSUS: Primary | ICD-10-CM

## 2024-05-08 PROCEDURE — 64615 CHEMODENERV MUSC MIGRAINE: CPT | Performed by: PSYCHIATRY & NEUROLOGY

## 2024-05-08 ASSESSMENT — HEADACHE IMPACT TEST (HIT 6)
HOW OFTEN DID HEADACHS LIMIT CONCENTRATION ON WORK OR DAILY ACTIVITY: VERY OFTEN
HOW OFTEN HAVE YOU FELT TOO TIRED TO WORK BECAUSE OF YOUR HEADACHES: VERY OFTEN
WHEN YOU HAVE HEADACHES HOW OFTEN IS THE PAIN SEVERE: VERY OFTEN
HOW OFTEN DO HEADACHES LIMIT YOUR DAILY ACTIVITIES: ALWAYS
HOW OFTEN HAVE YOU FELT FED UP OR IRRITATED BECAUSE OF YOUR HEADACHES: VERY OFTEN
WHEN YOU HAVE A HEADACHE HOW OFTEN DO YOU WISH YOU COULD LIE DOWN: ALWAYS
HOW OFTEN HAVE YOU FELT TOO TIRED TO WORK BECAUSE OF YOUR HEADACHES: VERY OFTEN
HOW OFTEN DID HEADACHS LIMIT CONCENTRATION ON WORK OR DAILY ACTIVITY: VERY OFTEN
HIT6 TOTAL SCORE: 70
HOW OFTEN HAVE YOU FELT FED UP OR IRRITATED BECAUSE OF YOUR HEADACHES: VERY OFTEN
HOW OFTEN DO HEADACHES LIMIT YOUR DAILY ACTIVITIES: ALWAYS
HIT6 TOTAL SCORE: 70
WHEN YOU HAVE HEADACHES HOW OFTEN IS THE PAIN SEVERE: VERY OFTEN
WHEN YOU HAVE A HEADACHE HOW OFTEN DO YOU WISH YOU COULD LIE DOWN: ALWAYS

## 2024-05-08 NOTE — LETTER
2024         RE: Sherry Sweeney   [facility] [address]        Dear Colleague,    Thank you for referring your patient, Sherry Sweeney, to the [facility] [place]. Please see a copy of my visit note below.    NEUROLOGY FOLLOW UP VISIT  NOTE       [facility] [place]  [address] [place] [address]  Tel: [phone]  Fax: [phone]  [email or web address]     Sherry Sweeney,  1980, MRN [id number]  PCP: Layla Martinez  Date: 2024      ASSESSMENT & PLAN     Visit Diagnosis  Chronic migraine without aura, intractable, without status migrainosus     Chronic migraine without aura  44-year-old female with history of mitochondrial myopathy, chronic migraine without aura, fibromyalgia who is here for 3-month Botox injection.  She continues to notice improvement in her symptoms and with Botox she has noticed more than 50% reduction in her headache.  After explaining all the side effect and obtaining her consent, I injected 155 units according to the enclosed sheet.  45 units were discarded.  She was monitored in the clinic for short duration and left in a satisfactory condition.  Follow-up will be in 3 months.      Thank you again for this referral, please feel free to contact me if you have any questions.    Mikel Hernandez MD  [facility]     HISTORY OF PRESENT ILLNESS     Patient is pleasant 43-year-old female with history of mitochondrial myopathy, chronic migraine without aura, fibromyalgia returns for 3-month Botox injection.  Previously she had failed multiple medication but ever since she started getting Botox there was significant improvement. She started getting Botox injections in 2019 and noticed steady decline in her headache frequency.  During COVID pandemic she had to hold off on Botox and noticed flareup of her symptoms but ever since she restarted there has been a steady decline.   Previously she had tried Depakote, amitriptyline and beta-blocker that did not help.    Briefly patient is a female with history of mitochondrial myopathy, chronic migraine without aura, fibromyalgia who started having headaches in 2014 and progressively got worse.  She initially attributed it to pressure in the neck or TMJ abnormality and that in the past was evaluated and started on some hormonal supplements.  Due to progressive weakness she was evaluated for possible mitochondrial myopathy and had mitochondrial DNA tested that was positive.  For her headaches she was started on Depakote, amitriptyline and in the past had also tried Cymbalta and as she was having more than 15 headaches in a month Botox was recommended.  She started getting Botox in 2017 and did notice improvement in her headache.  Discontinued until 2019 but due to COVID pandemic she stopped doing Botox injection and noticed worsening headaches and Botox was restarted in 2023.     PROBLEM LIST   Patient Active Problem List   Diagnosis     Acne vulgaris     Arthralgia of temporomandibular joint     Debility     Fibromyalgia     IBS (irritable bowel syndrome)     Insomnia     Major depression, recurrent, chronic (H24)     Chronic migraine without aura, intractable, without status migrainosus     Mitochondrial disease (H24)     Generalized anxiety disorder     Post-traumatic stress disorder     Parasomnia     Seasonal depression (H24)     Palpitations     Gastroesophageal reflux disease without esophagitis     Vaginal Papanicolaou smear not required due to history of hysterectomy     Biliary colic         PAST MEDICAL & SURGICAL HISTORY     Past Medical History:   Patient  has a past medical history of Anemia, Chronic fatigue syndrome, Chronic migraine, Depression, Depression, Depressive disorder (2013), Fibromyalgia, Fibromyalgia, Gastroesophageal reflux disease, H/O: hysterectomy (02/22/2018), History of anesthesia complications, IBS (irritable  bowel syndrome), Insomnia, Irregular heart beat, Mitochondrial disease (H24), Mitochondrial myopathy, Motion sickness, Parasomnia, and PONV (postoperative nausea and vomiting).    Surgical History:  She  has a past surgical history that includes REMOVAL OF OVARIAN CYST(S); Hysterectomy (Bilateral, 02/22/2018); Picc (02/24/2018); Abdomen surgery (1996 & 2018, feb.); appendectomy (1996); biopsy; Laparoscopic cystectomy ovarian (oncology) (Left, 10/18/2021); Laparoscopy diagnostic (gyn) (Left, 06/29/2023); Hysterectomy total abdominal; and Laparoscopic cholecystectomy (N/A, 10/25/2023).     SOCIAL HISTORY     Reviewed, and she  reports that she has never smoked. She has never used smokeless tobacco. She reports that she does not drink alcohol and does not use drugs.     FAMILY HISTORY     Reviewed, and family history includes Anxiety Disorder in her maternal grandmother, mother, paternal grandfather, and paternal grandmother; Bone Cancer in her maternal grandfather; Breast Cancer (age of onset: 60.00) in her maternal grandmother; Breast Cancer (age of onset: 62.00) in her mother; Cancer in her mother; Coronary Artery Disease (age of onset: 45.00) in her father; Depression in her brother, father, maternal grandmother, and mother; Diabetes in her cousin and cousin; Hyperlipidemia in her mother; Hypertension in her mother; Meniere's disease in her mother.     ALLERGIES     Allergies   Allergen Reactions     Ciprofloxacin Itching, Rash and Hives     Rash over whole body   Rash over whole body        Egg White (Egg Protein) Other (See Comments) and GI Disturbance     Swollen colon, belly pain  Swollen colon, belly pain       Influenza Virus Vaccine Shortness Of Breath and Anaphylaxis     No Clinical Screening - See Comments Anaphylaxis     Stomach swelling     Sulfa Antibiotics Rash and Hives     Yeast Other (See Comments) and Anaphylaxis     Desvenlafaxine Blisters, Itching and Rash     Milnacipran Other (See Comments) and  Palpitations     Chest pain, hot/cold flashes     Quetiapine Palpitations     Tachycardia, nervousness, elevated blood pressure.   Tachycardia, nervousness, elevated blood pressure.          REVIEW OF SYSTEMS     A 12 point review of system was performed and was negative except as outlined in the history of present illness.     HOME MEDICATIONS     Current Outpatient Rx   Medication Sig Dispense Refill     ascorbic acid 500 MG TABS Take 500 mg by mouth daily       betamethasone dipropionate (DIPROSONE) 0.05 % external lotion        buPROPion (WELLBUTRIN XL) 300 MG 24 hr tablet TAKE 1 TABLET BY MOUTH EVERY DAY 90 tablet 3     cetirizine (ZYRTEC) 10 MG tablet TAKE 1 TAB ORALLY DAILY AS NEEDED FOR ALLERGIES MAY INCREASE TO 2 TAB DAILY IF ITCHING NOT RELIEVED 90 tablet 2     clindamycin-benzoyl peroxide (BENZACLIN) gel Apply topically to acne once daily       clobetasol (TEMOVATE) 0.05 % external ointment        clotrimazole-betamethasone (LOTRISONE) 1-0.05 % external cream 1 application as needed by topical route.       [employer] MELATONIN 3 MG tablet TAKE 1 TABLET (3 MG) BY MOUTH NIGHTLY AS NEEDED FOR SLEEP 90 tablet 1     cyclobenzaprine (FLEXERIL) 10 MG tablet Take 10 mg by mouth daily as needed for muscle spasms       EFFEXOR  MG 24 hr capsule Take 1 capsule (150 mg) by mouth daily 90 capsule 3     EFFEXOR XR 75 MG 24 hr capsule Take 1 capsule (75 mg) by mouth daily 90 capsule 3     hydrOXYzine (ATARAX) 25 MG tablet TAKE 1-2 TABLETS (25-50 MG) BY MOUTH AT BEDTIME 180 tablet 2     ibuprofen (ADVIL/MOTRIN) 400 MG tablet        ketoconazole (NIZORAL) 2 % external cream APPLY TO AFFECTED AREA TWICE A DAY FOR 7 DAYS       lidocaine (LIDODERM) 5 % patch PLACE 1 PATCH ONTO THE SKIN AS NEEDED TO NECK, SHOULDERS, AND BACK 30 patch 0     Multiple Vitamin (MULTIVITAMIN ADULT PO) Take 1 tablet by mouth daily       naproxen (NAPROSYN) 500 MG tablet        omeprazole (PRILOSEC) 40 MG DR capsule TAKE 1 CAPSULE (40 MG) BY MOUTH  "EVERY MORNING (BEFORE BREAKFAST) 30 MINUTES BEFORE MEAL 90 capsule 1     ondansetron (ZOFRAN) 4 MG tablet Take 1 tablet (4 mg) by mouth every 8 hours as needed for nausea 18 tablet 1     Riboflavin 400 MG TABS Take 400 mg by mouth daily       rizatriptan (MAXALT) 10 MG tablet TAKE 1 TABLET BY MOUTH AS NEEDED FOR MIGRAINE. MAY REPEAT IN 2 HOURS IF NEEDED 10 tablet 3     tretinoin (RETIN-A) 0.05 % external cream APPLY TO AFFECTED AREA EVERY DAY AT BEDTIME 45 g 2     valACYclovir (VALTREX) 1000 mg tablet TAKE 2 TABLETS BY MOUTH 12 HOURS APART AS NEEDED EPISODE 12 tablet 3     zolpidem (AMBIEN) 5 MG tablet TAKE 1 TABLET (5 MG) BY MOUTH NIGHTLY AS NEEDED FOR SLEEP 30 tablet 0         PHYSICAL EXAM     Vital signs  /84 (BP Location: Right arm, Patient Position: Sitting)   Pulse 99   Ht 1.549 m (5' 1\")   Wt 58.1 kg (128 lb)   BMI 24.19 kg/m      Weight:   128 lbs 0 oz      5/8/2024     1:55 PM   HIT-6   When you have headaches, how often is the pain severe 11   How often do headaches limit your ability to do usual daily activities including household work, work, school, or social activities? 13   When you have a headache, how often do you wish you could lie down? 13   In the past 4 weeks, how often have you felt too tired to do work or daily activities because of your headaches 11   In the past 4 weeks, how often have you felt fed up or irritated because of your headaches 11   In the past 4 weeks, how often did headaches limit your ability to concentrate on work or daily activities 11   HIT-6 Total Score 70            No data to display                Pleasant female who is alert and oriented vital signs were reviewed and documented in electronic medical record.  Neck supple.  Neurologically speech was normal.  Cranial nerves II through XII are intact motor strength neck flexor and extensor 4+/5 in upper extremity 5/5 in the lower extremity proximally 4+/5 distally 5/5 reflexes 2+ toes downgoing no dysmetria " noted on finger-nose testing gait normal.      PERTINENT DIAGNOSTIC STUDIES     Following studies were reviewed:     MRI BRAIN 10/29/2015  1.  Normal Head MRI.   2.  No acute infarcts, mass lesions or hemorrhage     PERTINENT LABS  Following labs were reviewed:  No visits with results within 3 Month(s) from this visit.   Latest known visit with results is:   Office Visit on 09/27/2023   Component Date Value Ref Range Status     Lipase 09/27/2023 29  13 - 60 U/L Final     Sodium 09/27/2023 138  135 - 145 mmol/L Final     Potassium 09/27/2023 4.2  3.4 - 5.3 mmol/L Final     Carbon Dioxide (CO2) 09/27/2023 26  22 - 29 mmol/L Final     Anion Gap 09/27/2023 9  7 - 15 mmol/L Final     Urea Nitrogen 09/27/2023 8.1  6.0 - 20.0 mg/dL Final     Creatinine 09/27/2023 0.78  0.51 - 0.95 mg/dL Final     GFR Estimate 09/27/2023 >90  >60 mL/min/1.73m2 Final     Calcium 09/27/2023 9.5  8.6 - 10.0 mg/dL Final     Chloride 09/27/2023 103  98 - 107 mmol/L Final     Glucose 09/27/2023 90  70 - 99 mg/dL Final     Alkaline Phosphatase 09/27/2023 63  35 - 104 U/L Final     AST 09/27/2023 23  0 - 45 U/L Final     ALT 09/27/2023 14  0 - 50 U/L Final     Protein Total 09/27/2023 6.8  6.4 - 8.3 g/dL Final     Albumin 09/27/2023 4.3  3.5 - 5.2 g/dL Final     Bilirubin Total 09/27/2023 <0.2  <=1.2 mg/dL Final     WBC Count 09/27/2023 7.2  4.0 - 11.0 10e3/uL Final     RBC Count 09/27/2023 4.58  3.80 - 5.20 10e6/uL Final     Hemoglobin 09/27/2023 13.2  11.7 - 15.7 g/dL Final     Hematocrit 09/27/2023 39.5  35.0 - 47.0 % Final     MCV 09/27/2023 86  78 - 100 fL Final     MCH 09/27/2023 28.8  26.5 - 33.0 pg Final     MCHC 09/27/2023 33.4  31.5 - 36.5 g/dL Final     RDW 09/27/2023 12.4  10.0 - 15.0 % Final     Platelet Count 09/27/2023 306  150 - 450 10e3/uL Final     % Neutrophils 09/27/2023 69  % Final     % Lymphocytes 09/27/2023 24  % Final     % Monocytes 09/27/2023 7  % Final     % Eosinophils 09/27/2023 0  % Final     % Basophils 09/27/2023 0   % Final     % Immature Granulocytes 09/27/2023 0  % Final     Absolute Neutrophils 09/27/2023 4.9  1.6 - 8.3 10e3/uL Final     Absolute Lymphocytes 09/27/2023 1.7  0.8 - 5.3 10e3/uL Final     Absolute Monocytes 09/27/2023 0.5  0.0 - 1.3 10e3/uL Final     Absolute Eosinophils 09/27/2023 0.0  0.0 - 0.7 10e3/uL Final     Absolute Basophils 09/27/2023 0.0  0.0 - 0.2 10e3/uL Final     Absolute Immature Granulocytes 09/27/2023 0.0  <=0.4 10e3/uL Final         Total time spent for face to face visit, reviewing labs/imaging studies, counseling and coordination of care was: 20 minutes spent on the date of the encounter doing chart review, review of outside records, review of test results, interpretation of tests, patient visit, and documentation     The longitudinal plan of care for the diagnosis(es)/condition(s) as documented were addressed during this visit. Due to the added complexity in care, I will continue to support Sherry in the subsequent management and with ongoing continuity of care.    This note was dictated using voice recognition software.  Any grammatical or context distortions are unintentional and inherent to the software.    Orders Placed This Encounter   Procedures     52966 - MIGRAINE      New Prescriptions    No medications on file     Modified Medications    No medications on file                 Again, thank you for allowing me to participate in the care of your patient.        Sincerely,        Mikel Hernandez MD

## 2024-05-08 NOTE — NURSING NOTE
Chief Complaint   Patient presents with    Botox Migraine     Last Patient-Answered HIT-6 Questionnaire      5/8/2024     1:55 PM   HIT-6   When you have headaches, how often is the pain severe 11   How often do headaches limit your ability to do usual daily activities including household work, work, school, or social activities? 13   When you have a headache, how often do you wish you could lie down? 13   In the past 4 weeks, how often have you felt too tired to do work or daily activities because of your headaches 11   In the past 4 weeks, how often have you felt fed up or irritated because of your headaches 11   In the past 4 weeks, how often did headaches limit your ability to concentrate on work or daily activities 11   HIT-6 Total Score 70     Silvina Ventura CMA on 5/8/2024 at 1:55 PM  [facility]

## 2024-05-08 NOTE — PROCEDURES
BOTOX INJECTION PROCEDURE NOTE     Date: 05/08/2024    INDICATION: CHRONIC MIGRAINE    Number of headache days/month currently: 14 (BASELINE: >15   )  Number of headache hours/day currently : Monitor for (BASELINE: 4-24 Hours   )  Number of headache free days post treatment:  16  Disability due to migraine headache: yes  Consent: Obtained  Indication: Chronic Migraine    Botox Lot No: [id number] C4 expiration 6/1/2026  Number of units injected: 155 U  Number of units of unavoidable wastage: 45 U    LOCATION  At today s visit, patient was injected with a total of 155 units divided in 31 sites. A total of 2 mL of normal saline was used to dilute 100 units of the drug. The following muscles were injected:  10 units divided in two sites, procerus 5 units in one site, frontalis 20 units divided in four sites, temporalis 40 units divided in eight sites, occipitalis 30 units divided in six sites, cervical paraspinals 20 units divided in four sites, and trapezius 30 units divided in six sites. 45 units were unavoidable wastage        ASSESSMENT/PLAN  Chronic Migraine headache  The patient tolerated the procedure well. There were no immediate complications. Prior to the procedure, I discussed the potential side effects of Botox therapy, which includes generalized muscle weakness, diplopia, ptosis, dysphagia, dysphonia, dysarthria, urinary incontinence, and breathing difficulties. Also I discussed the black box warning of the drug that can include the rare chance of distant spread of the drug to swallowing and breathing muscles that can be life threatening. All of patient's questions were answered prior to our signing the consent form. patient left the clinic after a brief period of observation and will return for repeat injection in three months  time as needed. I informed patient again the goal is to maintain at least seven to eight headache free days on average a month. I explained to patient that most patients  with chronic migraines do need life time Botox treatment, however, at any time if the patient wishes to stop Botox we can do so as some patients do go into remission on their own over time. Follow up will be in 3-4 months      Mikel Hernandez M.D.  [facility] [place]  (Formerly, [facility]

## 2024-06-06 ENCOUNTER — ANCILLARY PROCEDURE (OUTPATIENT)
Dept: GENERAL RADIOLOGY | Facility: CLINIC | Age: 44
End: 2024-06-06
Attending: FAMILY MEDICINE
Payer: COMMERCIAL

## 2024-06-06 ENCOUNTER — OFFICE VISIT (OUTPATIENT)
Dept: FAMILY MEDICINE | Facility: CLINIC | Age: 44
End: 2024-06-06
Payer: COMMERCIAL

## 2024-06-06 VITALS
HEIGHT: 61 IN | SYSTOLIC BLOOD PRESSURE: 116 MMHG | DIASTOLIC BLOOD PRESSURE: 88 MMHG | BODY MASS INDEX: 23.98 KG/M2 | OXYGEN SATURATION: 97 % | RESPIRATION RATE: 14 BRPM | HEART RATE: 103 BPM | WEIGHT: 127 LBS | TEMPERATURE: 99 F

## 2024-06-06 DIAGNOSIS — R06.09 DYSPNEA ON EXERTION: ICD-10-CM

## 2024-06-06 DIAGNOSIS — R53.83 OTHER FATIGUE: Primary | ICD-10-CM

## 2024-06-06 LAB
ALBUMIN UR-MCNC: NEGATIVE MG/DL
APPEARANCE UR: CLEAR
ATRIAL RATE - MUSE: 87 BPM
BACTERIA #/AREA URNS HPF: ABNORMAL /HPF
BILIRUB UR QL STRIP: NEGATIVE
COLOR UR AUTO: YELLOW
DIASTOLIC BLOOD PRESSURE - MUSE: NORMAL MMHG
ERYTHROCYTE [DISTWIDTH] IN BLOOD BY AUTOMATED COUNT: 12.5 % (ref 10–15)
ERYTHROCYTE [SEDIMENTATION RATE] IN BLOOD BY WESTERGREN METHOD: 8 MM/HR (ref 0–20)
GLUCOSE UR STRIP-MCNC: NEGATIVE MG/DL
HCT VFR BLD AUTO: 41.5 % (ref 35–47)
HGB BLD-MCNC: 13.8 G/DL (ref 11.7–15.7)
HGB UR QL STRIP: ABNORMAL
INTERPRETATION ECG - MUSE: NORMAL
KETONES UR STRIP-MCNC: NEGATIVE MG/DL
LEUKOCYTE ESTERASE UR QL STRIP: NEGATIVE
MCH RBC QN AUTO: 28.4 PG (ref 26.5–33)
MCHC RBC AUTO-ENTMCNC: 33.3 G/DL (ref 31.5–36.5)
MCV RBC AUTO: 85 FL (ref 78–100)
NITRATE UR QL: NEGATIVE
P AXIS - MUSE: 60 DEGREES
PH UR STRIP: 6.5 [PH] (ref 5–8)
PLATELET # BLD AUTO: 346 10E3/UL (ref 150–450)
PR INTERVAL - MUSE: 144 MS
QRS DURATION - MUSE: 80 MS
QT - MUSE: 346 MS
QTC - MUSE: 416 MS
R AXIS - MUSE: 23 DEGREES
RBC # BLD AUTO: 4.86 10E6/UL (ref 3.8–5.2)
RBC #/AREA URNS AUTO: ABNORMAL /HPF
SP GR UR STRIP: 1.01 (ref 1–1.03)
SQUAMOUS #/AREA URNS AUTO: ABNORMAL /LPF
SYSTOLIC BLOOD PRESSURE - MUSE: NORMAL MMHG
T AXIS - MUSE: 36 DEGREES
UROBILINOGEN UR STRIP-ACNC: 0.2 E.U./DL
VENTRICULAR RATE- MUSE: 87 BPM
WBC # BLD AUTO: 7.6 10E3/UL (ref 4–11)
WBC #/AREA URNS AUTO: ABNORMAL /HPF

## 2024-06-06 PROCEDURE — 81001 URINALYSIS AUTO W/SCOPE: CPT | Performed by: FAMILY MEDICINE

## 2024-06-06 PROCEDURE — 93005 ELECTROCARDIOGRAM TRACING: CPT | Performed by: FAMILY MEDICINE

## 2024-06-06 PROCEDURE — 86618 LYME DISEASE ANTIBODY: CPT | Performed by: FAMILY MEDICINE

## 2024-06-06 PROCEDURE — 80053 COMPREHEN METABOLIC PANEL: CPT | Performed by: FAMILY MEDICINE

## 2024-06-06 PROCEDURE — 85027 COMPLETE CBC AUTOMATED: CPT | Performed by: FAMILY MEDICINE

## 2024-06-06 PROCEDURE — 36415 COLL VENOUS BLD VENIPUNCTURE: CPT | Performed by: FAMILY MEDICINE

## 2024-06-06 PROCEDURE — 84443 ASSAY THYROID STIM HORMONE: CPT | Performed by: FAMILY MEDICINE

## 2024-06-06 PROCEDURE — 85652 RBC SED RATE AUTOMATED: CPT | Performed by: FAMILY MEDICINE

## 2024-06-06 PROCEDURE — 99214 OFFICE O/P EST MOD 30 MIN: CPT | Performed by: FAMILY MEDICINE

## 2024-06-06 PROCEDURE — 71046 X-RAY EXAM CHEST 2 VIEWS: CPT | Mod: TC | Performed by: RADIOLOGY

## 2024-06-06 PROCEDURE — 93010 ELECTROCARDIOGRAM REPORT: CPT | Performed by: INTERNAL MEDICINE

## 2024-06-06 PROCEDURE — 86140 C-REACTIVE PROTEIN: CPT | Performed by: FAMILY MEDICINE

## 2024-06-06 RX ORDER — TACROLIMUS 1 MG/G
OINTMENT TOPICAL
COMMUNITY
Start: 2023-11-16

## 2024-06-06 RX ORDER — SPIRONOLACTONE 50 MG/1
TABLET, FILM COATED ORAL
COMMUNITY
Start: 2024-05-22

## 2024-06-06 RX ORDER — CLOBETASOL PROPIONATE 0.5 MG/G
CREAM TOPICAL
COMMUNITY
Start: 2023-11-16

## 2024-06-06 ASSESSMENT — PATIENT HEALTH QUESTIONNAIRE - PHQ9
10. IF YOU CHECKED OFF ANY PROBLEMS, HOW DIFFICULT HAVE THESE PROBLEMS MADE IT FOR YOU TO DO YOUR WORK, TAKE CARE OF THINGS AT HOME, OR GET ALONG WITH OTHER PEOPLE: EXTREMELY DIFFICULT
SUM OF ALL RESPONSES TO PHQ QUESTIONS 1-9: 16
SUM OF ALL RESPONSES TO PHQ QUESTIONS 1-9: 16

## 2024-06-06 ASSESSMENT — ENCOUNTER SYMPTOMS: SHORTNESS OF BREATH: 1

## 2024-06-06 NOTE — PROGRESS NOTES
Assessment & Plan   Problem List Items Addressed This Visit    None  Visit Diagnoses       Other fatigue    -  Primary    Relevant Orders    Comprehensive metabolic panel (BMP + Alb, Alk Phos, ALT, AST, Total. Bili, TP)    CBC with platelets (Completed)    TSH with free T4 reflex    UA Macroscopic with reflex to Microscopic and Culture - Clinic Collect (Completed)    Lyme Disease Total Abs Bld with Reflex to Confirm CLIA    UA Microscopic with Reflex to Culture (Completed)    Dyspnea on exertion        Relevant Orders    EKG 12-lead, tracing only (Completed)    XR Chest 2 Views    ESR: Erythrocyte sedimentation rate (Completed)    CRP, inflammation           Sherry is a very pleasant 44-year-old female presenting today with about a 2-week history of increased fatigue and some shortness of breath with exertion.  The patient's EKG, chest x-ray as well as physical exam today are unremarkable.  Certainly some depression and reaction to grief from the loss of her dog could be a contributing factor.  I checked a number of other labs today trying to see if there is something else going on that could explain her symptoms.  I will get back to the patient following the results of her additional lab work.       Depression Screening Follow Up        Comfort Powell is a 44 year old presents today with a concern regarding excessive fatigue and some shortness of breath.  The patient reports onset of symptoms a couple of weeks ago.  She felt the symptoms came on relatively abruptly and remembers spending nearly 3 days mostly in bed because she felt so tired.  The fatigue has gradually improved and she is better today than she was a week ago.  However, she still struggles with fatigue and feeling like she is sleeping a lot.  In addition, she has been feeling some shortness of breath primarily with exertion.  She denies any associated chest pain.  She has been feeling some fluttering sensations in her heart at times as well.   "The patient does have a history of major depression and reports that she has been feeling a bit more down for the past 3 weeks after her dog .  She denies any viral type symptoms such as sore throat, nasal congestion or cough.  She also denies any gastrointestinal symptoms.  She does remember having some mid back pain just prior to the onset of the fatigue but that did resolve after about a week.  sleeping a lot and Shortness of Breath      2024     2:16 PM   Additional Questions   Roomed by as   Accompanied by self         2024     2:16 PM   Patient Reported Additional Medications   Patient reports taking the following new medications no     Shortness of Breath    History of Present Illness       Reason for visit:  Sleep alot  Symptom onset:  1-2 weeks ago  Symptoms include:  Sleep exhaustion weak shaky  Symptom intensity:  Severe  Symptom progression:  Improving  Had these symptoms before:  No  What makes it worse:  Pushing to hard    She eats 0-1 servings of fruits and vegetables daily.She consumes 1 sweetened beverage(s) daily.She exercises with enough effort to increase her heart rate 9 or less minutes per day.  She exercises with enough effort to increase her heart rate 3 or less days per week.   She is taking medications regularly.           Objective    /88 (BP Location: Left arm, Patient Position: Left side, Cuff Size: Adult Regular)   Pulse 103   Temp 99  F (37.2  C) (Oral)   Resp 14   Ht 1.549 m (5' 1\")   Wt 57.6 kg (127 lb)   SpO2 97%   BMI 24.00 kg/m    Body mass index is 24 kg/m .  Physical Exam   GENERAL: alert and no distress  HENT: ear canals and TM's normal, nose and mouth without ulcers or lesions  NECK: no adenopathy, no asymmetry, masses, or scars  RESP: lungs clear to auscultation - no rales, rhonchi or wheezes  CV: regular rate and rhythm, normal S1 S2, no S3 or S4, no murmur, click or rub, no peripheral edema   ABDOMEN: soft, nontender, no hepatosplenomegaly, no " masses and bowel sounds normal    CXR - Reviewed and interpreted by me Normal- no infiltrates, effusions, pneumothoraces, cardiomegaly or masses  EKG - Reviewed and interpreted by me appears normal, NSR, normal axis, normal intervals, no acute ST/T changes c/w ischemia, no LVH by voltage criteria, unchanged from previous tracings        Signed Electronically by: MATTHEW RESENDIZ MD

## 2024-06-07 LAB
ALBUMIN SERPL BCG-MCNC: 4.7 G/DL (ref 3.5–5.2)
ALP SERPL-CCNC: 71 U/L (ref 40–150)
ALT SERPL W P-5'-P-CCNC: 27 U/L (ref 0–50)
ANION GAP SERPL CALCULATED.3IONS-SCNC: 9 MMOL/L (ref 7–15)
AST SERPL W P-5'-P-CCNC: 28 U/L (ref 0–45)
B BURGDOR IGG+IGM SER QL: 0.03
BILIRUB SERPL-MCNC: 0.2 MG/DL
BUN SERPL-MCNC: 9.2 MG/DL (ref 6–20)
CALCIUM SERPL-MCNC: 9.8 MG/DL (ref 8.6–10)
CHLORIDE SERPL-SCNC: 101 MMOL/L (ref 98–107)
CREAT SERPL-MCNC: 0.89 MG/DL (ref 0.51–0.95)
CRP SERPL-MCNC: <3 MG/L
DEPRECATED HCO3 PLAS-SCNC: 28 MMOL/L (ref 22–29)
EGFRCR SERPLBLD CKD-EPI 2021: 82 ML/MIN/1.73M2
GLUCOSE SERPL-MCNC: 81 MG/DL (ref 70–99)
POTASSIUM SERPL-SCNC: 4.1 MMOL/L (ref 3.4–5.3)
PROT SERPL-MCNC: 7.5 G/DL (ref 6.4–8.3)
SODIUM SERPL-SCNC: 138 MMOL/L (ref 135–145)
TSH SERPL DL<=0.005 MIU/L-ACNC: 1.9 UIU/ML (ref 0.3–4.2)

## 2024-07-12 DIAGNOSIS — F33.9 MAJOR DEPRESSION, RECURRENT, CHRONIC (H): ICD-10-CM

## 2024-07-12 RX ORDER — BUPROPION HYDROCHLORIDE 300 MG/1
TABLET ORAL
Qty: 90 TABLET | Refills: 3 | Status: SHIPPED | OUTPATIENT
Start: 2024-07-12

## 2024-07-21 ENCOUNTER — MYC MEDICAL ADVICE (OUTPATIENT)
Dept: FAMILY MEDICINE | Facility: CLINIC | Age: 44
End: 2024-07-21
Payer: COMMERCIAL

## 2024-07-21 DIAGNOSIS — F33.9 MAJOR DEPRESSION, RECURRENT, CHRONIC (H): ICD-10-CM

## 2024-07-22 RX ORDER — VENLAFAXINE HYDROCHLORIDE 150 MG/1
150 CAPSULE, EXTENDED RELEASE ORAL DAILY
Qty: 90 CAPSULE | Refills: 3 | Status: SHIPPED | OUTPATIENT
Start: 2024-07-22

## 2024-07-22 NOTE — TELEPHONE ENCOUNTER
Patient requesting generic Effexor 150 mg prescription as insurance won't cover the brand name.    Last OV 6/6/24      Nancy Ojeda RN  [facility]

## 2024-07-29 ENCOUNTER — MYC MEDICAL ADVICE (OUTPATIENT)
Dept: FAMILY MEDICINE | Facility: CLINIC | Age: 44
End: 2024-07-29
Payer: COMMERCIAL

## 2024-08-05 ENCOUNTER — OFFICE VISIT (OUTPATIENT)
Dept: NEUROLOGY | Facility: CLINIC | Age: 44
End: 2024-08-05
Payer: COMMERCIAL

## 2024-08-05 VITALS
BODY MASS INDEX: 24.62 KG/M2 | SYSTOLIC BLOOD PRESSURE: 125 MMHG | DIASTOLIC BLOOD PRESSURE: 90 MMHG | HEART RATE: 90 BPM | WEIGHT: 130.3 LBS

## 2024-08-05 DIAGNOSIS — G43.719 CHRONIC MIGRAINE WITHOUT AURA, INTRACTABLE, WITHOUT STATUS MIGRAINOSUS: Primary | ICD-10-CM

## 2024-08-05 PROCEDURE — 64615 CHEMODENERV MUSC MIGRAINE: CPT | Performed by: PSYCHIATRY & NEUROLOGY

## 2024-08-05 NOTE — PROGRESS NOTES
NEUROLOGY FOLLOW UP VISIT  NOTE       [facility] NEUROLOGY [facility]  [address] [place] [address]  Tel: [phone]  Fax: [phone]  [email or web address]     Sherry Sweeney,  1980, MRN [id number]  PCP: Layla Martinez  Date: 2024      ASSESSMENT & PLAN     Visit Diagnosis  Chronic migraine without aura, intractable, without status migrainosus     Chronic migraine without aura  44-year-old female with history of mitochondrial myopathy, chronic migraine without aura, fibromyalgia who is here for 3-month Botox injection.  She continues to notice improvement in her symptoms and with Botox she has noticed more than 50% reduction in her headache.  After explaining all the side effect and obtaining her consent, I injected 155 units according to the enclosed sheet.  45 units were discarded.  She was monitored in the clinic for short duration and left in a satisfactory condition.  Follow-up will be in 3 months.  She will continue to use rizatriptan as needed    Thank you again for this referral, please feel free to contact me if you have any questions.    Mikel Hernandez MD  [facility] NEUROLOGY, [facility]     HISTORY OF PRESENT ILLNESS     Patient is a 44-year-old female with history of mitochondrial myopathy, chronic migraine without aura, fibromyalgia last seen on 2024 for 3 monthly Botox who returns for follow-up.  She continues to notice improvement with Botox and reports more than 50% reduction in her headaches.Previously she had failed multiple medication but ever since she started getting Botox there was significant improvement. She started getting Botox injections in 2019 and noticed steady decline in her headache frequency.  During COVID pandemic she had to hold off on Botox and noticed flareup of her symptoms but ever since she restarted there has been a steady decline.  Previously she had tried Depakote, amitriptyline and beta-blocker that did not help.      Briefly patient is a female with history of mitochondrial myopathy, chronic migraine without aura, fibromyalgia who started having headaches in 2014 and progressively got worse.  She initially attributed it to pressure in the neck or TMJ abnormality and that in the past was evaluated and started on some hormonal supplements.  Due to progressive weakness she was evaluated for possible mitochondrial myopathy and had mitochondrial DNA tested that was positive.  For her headaches she was started on Depakote, amitriptyline and in the past had also tried Cymbalta and as she was having more than 15 headaches in a month Botox was recommended.  She started getting Botox in 2017 and did notice improvement in her headache.  Discontinued until 2019 but due to COVID pandemic she stopped doing Botox injection and noticed worsening headaches and Botox was restarted in 2023.       PROBLEM LIST   Patient Active Problem List   Diagnosis    Acne vulgaris    Arthralgia of temporomandibular joint    Debility    Fibromyalgia    IBS (irritable bowel syndrome)    Insomnia    Major depression, recurrent, chronic (H24)    Chronic migraine without aura, intractable, without status migrainosus    Mitochondrial disease (H24)    Generalized anxiety disorder    Post-traumatic stress disorder    Parasomnia    Seasonal depression (H24)    Palpitations    Gastroesophageal reflux disease without esophagitis    Vaginal Papanicolaou smear not required due to history of hysterectomy    Biliary colic         PAST MEDICAL & SURGICAL HISTORY     Past Medical History:   Patient  has a past medical history of Anemia, Chronic fatigue syndrome, Chronic migraine, Depression, Depression, Depressive disorder (2013), Fibromyalgia, Fibromyalgia, Gastroesophageal reflux disease, H/O: hysterectomy (02/22/2018), History of anesthesia complications, IBS (irritable bowel syndrome), Insomnia, Irregular heart beat, Mitochondrial disease (H24), Mitochondrial myopathy,  Motion sickness, Parasomnia, and PONV (postoperative nausea and vomiting).    Surgical History:  She  has a past surgical history that includes REMOVAL OF OVARIAN CYST(S); Hysterectomy (Bilateral, 02/22/2018); Picc (02/24/2018); Abdomen surgery (1996 & 2018, feb.); appendectomy (1996); biopsy; Laparoscopic cystectomy ovarian (oncology) (Left, 10/18/2021); Laparoscopy diagnostic (gyn) (Left, 06/29/2023); Hysterectomy total abdominal; and Laparoscopic cholecystectomy (N/A, 10/25/2023).     SOCIAL HISTORY     Reviewed, and she  reports that she has never smoked. She has never used smokeless tobacco. She reports that she does not drink alcohol and does not use drugs.     FAMILY HISTORY     Reviewed, and family history includes Anxiety Disorder in her maternal grandmother, mother, paternal grandfather, and paternal grandmother; Bone Cancer in her maternal grandfather; Breast Cancer (age of onset: 60.00) in her maternal grandmother; Breast Cancer (age of onset: 62.00) in her mother; Cancer in her mother; Coronary Artery Disease (age of onset: 45.00) in her father; Depression in her brother, father, maternal grandmother, and mother; Diabetes in her cousin and cousin; Hyperlipidemia in her mother; Hypertension in her mother; Meniere's disease in her mother.     ALLERGIES     Allergies   Allergen Reactions    Ciprofloxacin Itching, Rash and Hives     Rash over whole body   Rash over whole body       Egg White (Egg Protein) Other (See Comments) and GI Disturbance     Swollen colon, belly pain  Swollen colon, belly pain      Influenza Virus Vaccine Shortness Of Breath and Anaphylaxis    No Clinical Screening - See Comments Anaphylaxis     Stomach swelling    Sulfa Antibiotics Rash and Hives    Yeast Other (See Comments) and Anaphylaxis    Desvenlafaxine Blisters, Itching and Rash    Milnacipran Other (See Comments) and Palpitations     Chest pain, hot/cold flashes    Quetiapine Palpitations     Tachycardia, nervousness,  elevated blood pressure.   Tachycardia, nervousness, elevated blood pressure.          REVIEW OF SYSTEMS     A 12 point review of system was performed and was negative except as outlined in the history of present illness.     HOME MEDICATIONS     Current Outpatient Rx   Medication Sig Dispense Refill    ascorbic acid 500 MG TABS Take 500 mg by mouth daily      betamethasone dipropionate (DIPROSONE) 0.05 % external lotion       buPROPion (WELLBUTRIN XL) 300 MG 24 hr tablet TAKE 1 TABLET BY MOUTH EVERY DAY 90 tablet 3    cetirizine (ZYRTEC) 10 MG tablet TAKE 1 TAB ORALLY DAILY AS NEEDED FOR ALLERGIES MAY INCREASE TO 2 TAB DAILY IF ITCHING NOT RELIEVED 90 tablet 2    clindamycin-benzoyl peroxide (BENZACLIN) gel Apply topically to acne once daily      clobetasol (TEMOVATE) 0.05 % external ointment       clobetasol propionate (TEMOVATE) 0.05 % external cream       clotrimazole-betamethasone (LOTRISONE) 1-0.05 % external cream 1 application as needed by topical route.      [employer] MELATONIN 3 MG tablet TAKE 1 TABLET (3 MG) BY MOUTH NIGHTLY AS NEEDED FOR SLEEP 90 tablet 1    cyclobenzaprine (FLEXERIL) 10 MG tablet Take 10 mg by mouth daily as needed for muscle spasms      EFFEXOR XR 75 MG 24 hr capsule Take 1 capsule (75 mg) by mouth daily 90 capsule 3    hydrOXYzine (ATARAX) 25 MG tablet TAKE 1-2 TABLETS (25-50 MG) BY MOUTH AT BEDTIME 180 tablet 2    ibuprofen (ADVIL/MOTRIN) 400 MG tablet       ketoconazole (NIZORAL) 2 % external cream APPLY TO AFFECTED AREA TWICE A DAY FOR 7 DAYS      lidocaine (LIDODERM) 5 % patch PLACE 1 PATCH ONTO THE SKIN AS NEEDED TO NECK, SHOULDERS, AND BACK 30 patch 0    Multiple Vitamin (MULTIVITAMIN ADULT PO) Take 1 tablet by mouth daily      naproxen (NAPROSYN) 500 MG tablet       omeprazole (PRILOSEC) 40 MG DR capsule TAKE 1 CAPSULE (40 MG) BY MOUTH EVERY MORNING (BEFORE BREAKFAST) 30 MINUTES BEFORE MEAL 90 capsule 1    ondansetron (ZOFRAN) 4 MG tablet Take 1 tablet (4 mg) by mouth every 8 hours  as needed for nausea 18 tablet 1    Riboflavin 400 MG TABS Take 400 mg by mouth daily      rizatriptan (MAXALT) 10 MG tablet TAKE 1 TABLET BY MOUTH AS NEEDED FOR MIGRAINE. MAY REPEAT IN 2 HOURS IF NEEDED 10 tablet 3    spironolactone (ALDACTONE) 50 MG tablet       tacrolimus (PROTOPIC) 0.1 % external ointment       tretinoin (RETIN-A) 0.05 % external cream APPLY TO AFFECTED AREA EVERY DAY AT BEDTIME 45 g 2    valACYclovir (VALTREX) 1000 mg tablet TAKE 2 TABLETS BY MOUTH 12 HOURS APART AS NEEDED EPISODE 12 tablet 3    venlafaxine (EFFEXOR XR) 150 MG 24 hr capsule Take 1 capsule (150 mg) by mouth daily 90 capsule 3    zolpidem (AMBIEN) 5 MG tablet TAKE 1 TABLET (5 MG) BY MOUTH NIGHTLY AS NEEDED FOR SLEEP 30 tablet 0         PHYSICAL EXAM     Vital signs  BP (!) 125/90   Pulse 90   Wt 59.1 kg (130 lb 4.8 oz)   BMI 24.62 kg/m      Weight:   130 lbs 4.8 oz    Pleasant female who is alert and oriented vital signs were reviewed and documented in electronic medical record.  Neck supple.  Neurologically speech was normal.  Cranial nerves II through XII are intact motor strength neck flexor and extensor 4+/5 in upper extremity 5/5 in the lower extremity proximally 4+/5 distally 5/5 reflexes 2+ toes downgoing no dysmetria noted on finger-nose testing gait normal.      PERTINENT DIAGNOSTIC STUDIES     Following studies were reviewed:     MRI BRAIN 10/29/2015  1.  Normal Head MRI.   2.  No acute infarcts, mass lesions or hemorrhage     PERTINENT LABS  Following labs were reviewed:  Office Visit on 06/06/2024   Component Date Value Ref Range Status    Color Urine 06/06/2024 Yellow  Colorless, Straw, Light Yellow, Yellow Final    Appearance Urine 06/06/2024 Clear  Clear Final    Glucose Urine 06/06/2024 Negative  Negative mg/dL Final    Bilirubin Urine 06/06/2024 Negative  Negative Final    Ketones Urine 06/06/2024 Negative  Negative mg/dL Final    Specific Gravity Urine 06/06/2024 1.010  1.005 - 1.030 Final    Blood Urine  06/06/2024 Trace (A)  Negative Final    pH Urine 06/06/2024 6.5  5.0 - 8.0 Final    Protein Albumin Urine 06/06/2024 Negative  Negative mg/dL Final    Urobilinogen Urine 06/06/2024 0.2  0.2, 1.0 E.U./dL Final    Nitrite Urine 06/06/2024 Negative  Negative Final    Leukocyte Esterase Urine 06/06/2024 Negative  Negative Final    Ventricular Rate 06/06/2024 87  BPM Final    Atrial Rate 06/06/2024 87  BPM Final    FL Interval 06/06/2024 144  ms Final    QRS Duration 06/06/2024 80  ms Final    QT 06/06/2024 346  ms Final    QTc 06/06/2024 416  ms Final    P Axis 06/06/2024 60  degrees Final    R AXIS 06/06/2024 23  degrees Final    T Axis 06/06/2024 36  degrees Final    Interpretation ECG 06/06/2024    Final                    Value:Sinus rhythm  Normal ECG  When compared with ECG of 19-AUG-2023 05:36,  No significant change was found  Confirmed by SHAI VIEIRA, LES LOC: ([id number]) on 6/6/2024 4:23:13 PM      CRP Inflammation 06/06/2024 <3.00  <5.00 mg/L Final    Erythrocyte Sedimentation Rate 06/06/2024 8  0 - 20 mm/hr Final    Lyme Disease Antibodies Total 06/06/2024 0.03  <0.90 Final    TSH 06/06/2024 1.90  0.30 - 4.20 uIU/mL Final    WBC Count 06/06/2024 7.6  4.0 - 11.0 10e3/uL Final    RBC Count 06/06/2024 4.86  3.80 - 5.20 10e6/uL Final    Hemoglobin 06/06/2024 13.8  11.7 - 15.7 g/dL Final    Hematocrit 06/06/2024 41.5  35.0 - 47.0 % Final    MCV 06/06/2024 85  78 - 100 fL Final    MCH 06/06/2024 28.4  26.5 - 33.0 pg Final    MCHC 06/06/2024 33.3  31.5 - 36.5 g/dL Final    RDW 06/06/2024 12.5  10.0 - 15.0 % Final    Platelet Count 06/06/2024 346  150 - 450 10e3/uL Final    Sodium 06/06/2024 138  135 - 145 mmol/L Final    Potassium 06/06/2024 4.1  3.4 - 5.3 mmol/L Final    Carbon Dioxide (CO2) 06/06/2024 28  22 - 29 mmol/L Final    Anion Gap 06/06/2024 9  7 - 15 mmol/L Final    Urea Nitrogen 06/06/2024 9.2  6.0 - 20.0 mg/dL Final    Creatinine 06/06/2024 0.89  0.51 - 0.95 mg/dL Final    GFR Estimate 06/06/2024 82  >60  mL/min/1.73m2 Final    Calcium 06/06/2024 9.8  8.6 - 10.0 mg/dL Final    Chloride 06/06/2024 101  98 - 107 mmol/L Final    Glucose 06/06/2024 81  70 - 99 mg/dL Final    Alkaline Phosphatase 06/06/2024 71  40 - 150 U/L Final    AST 06/06/2024 28  0 - 45 U/L Final    ALT 06/06/2024 27  0 - 50 U/L Final    Protein Total 06/06/2024 7.5  6.4 - 8.3 g/dL Final    Albumin 06/06/2024 4.7  3.5 - 5.2 g/dL Final    Bilirubin Total 06/06/2024 0.2  <=1.2 mg/dL Final    Bacteria Urine 06/06/2024 Few (A)  None Seen /HPF Final    RBC Urine 06/06/2024 None Seen  0-2 /HPF /HPF Final    WBC Urine 06/06/2024 None Seen  0-5 /HPF /HPF Final    Squamous Epithelials Urine 06/06/2024 Few (A)  None Seen /LPF Final         Total time spent for face to face visit, reviewing labs/imaging studies, counseling and coordination of care was: 20 minutes spent on the date of the encounter doing chart review, review of outside records, review of test results, interpretation of tests, patient visit, and documentation     The longitudinal plan of care for the diagnosis(es)/condition(s) as documented were addressed during this visit. Due to the added complexity in care, I will continue to support Sherry in the subsequent management and with ongoing continuity of care.    This note was dictated using voice recognition software.  Any grammatical or context distortions are unintentional and inherent to the software.    Orders Placed This Encounter   Procedures    [id number] - MIGRAINE      New Prescriptions    No medications on file     Modified Medications    No medications on file

## 2024-08-05 NOTE — PROCEDURES
BOTOX INJECTION PROCEDURE NOTE     Date: 08/05/2024    INDICATION: CHRONIC MIGRAINE    Number of headache days/month currently: 14 (BASELINE: >15   )  Number of headache hours/day currently : Monitor for (BASELINE: 4-24 Hours   )  Number of headache free days post treatment:  16  Disability due to migraine headache: yes  Consent: Obtained  Indication: Chronic Migraine    Botox Lot No:  C3 expiration 11/1/2026  Number of units injected: 155 U  Number of units of unavoidable wastage: 45 U    LOCATION  At today s visit, patient was injected with a total of 155 units divided in 31 sites. A total of 2 mL of normal saline was used to dilute 100 units of the drug. The following muscles were injected:  10 units divided in two sites, procerus 5 units in one site, frontalis 20 units divided in four sites, temporalis 40 units divided in eight sites, occipitalis 30 units divided in six sites, cervical paraspinals 20 units divided in four sites, and trapezius 30 units divided in six sites. 45 units were unavoidable wastage        ASSESSMENT/PLAN  Chronic Migraine headache  The patient tolerated the procedure well. There were no immediate complications. Prior to the procedure, I discussed the potential side effects of Botox therapy, which includes generalized muscle weakness, diplopia, ptosis, dysphagia, dysphonia, dysarthria, urinary incontinence, and breathing difficulties. Also I discussed the black box warning of the drug that can include the rare chance of distant spread of the drug to swallowing and breathing muscles that can be life threatening. All of patient's questions were answered prior to our signing the consent form. patient left the clinic after a brief period of observation and will return for repeat injection in three months  time as needed. I informed patient again the goal is to maintain at least seven to eight headache free days on average a month. I explained to patient that most patients  with chronic migraines do need life time Botox treatment, however, at any time if the patient wishes to stop Botox we can do so as some patients do go into remission on their own over time. Follow up will be in 3-4 months      Mikel Hernandez M.D.  [facility]  (Formerly, [facility]

## 2024-08-05 NOTE — LETTER
2024      Sherry Sweeney  [address] [facility] [address]      Dear Colleague,    Thank you for referring your patient, Sherry Sweeney, to the [facility] [place]. Please see a copy of my visit note below.    NEUROLOGY FOLLOW UP VISIT  NOTE       [facility] [place]  [address] [place] [address]  Tel: [phone]  Fax: [phone]  [email or web address]     Sherry Sweeney,  1980, MRN [id number]  PCP: Layla Martienz  Date: 2024      ASSESSMENT & PLAN     Visit Diagnosis  Chronic migraine without aura, intractable, without status migrainosus     Chronic migraine without aura  44-year-old female with history of mitochondrial myopathy, chronic migraine without aura, fibromyalgia who is here for 3-month Botox injection.  She continues to notice improvement in her symptoms and with Botox she has noticed more than 50% reduction in her headache.  After explaining all the side effect and obtaining her consent, I injected 155 units according to the enclosed sheet.  45 units were discarded.  She was monitored in the clinic for short duration and left in a satisfactory condition.  Follow-up will be in 3 months.  She will continue to use rizatriptan as needed    Thank you again for this referral, please feel free to contact me if you have any questions.    Mikel Hernandez MD  [facility]UR[facility], [place]     HISTORY OF PRESENT ILLNESS     Patient is a 44-year-old female with history of mitochondrial myopathy, chronic migraine without aura, fibromyalgia last seen on 2024 for 3 monthly Botox who returns for follow-up.  She continues to notice improvement with Botox and reports more than 50% reduction in her headaches.Previously she had failed multiple medication but ever since she started getting Botox there was significant improvement. She started getting Botox injections in  and noticed steady decline in her headache frequency.   During COVID pandemic she had to hold off on Botox and noticed flareup of her symptoms but ever since she restarted there has been a steady decline.  Previously she had tried Depakote, amitriptyline and beta-blocker that did not help.     Briefly patient is a female with history of mitochondrial myopathy, chronic migraine without aura, fibromyalgia who started having headaches in 2014 and progressively got worse.  She initially attributed it to pressure in the neck or TMJ abnormality and that in the past was evaluated and started on some hormonal supplements.  Due to progressive weakness she was evaluated for possible mitochondrial myopathy and had mitochondrial DNA tested that was positive.  For her headaches she was started on Depakote, amitriptyline and in the past had also tried Cymbalta and as she was having more than 15 headaches in a month Botox was recommended.  She started getting Botox in 2017 and did notice improvement in her headache.  Discontinued until 2019 but due to COVID pandemic she stopped doing Botox injection and noticed worsening headaches and Botox was restarted in 2023.       PROBLEM LIST   Patient Active Problem List   Diagnosis     Acne vulgaris     Arthralgia of temporomandibular joint     Debility     Fibromyalgia     IBS (irritable bowel syndrome)     Insomnia     Major depression, recurrent, chronic (H24)     Chronic migraine without aura, intractable, without status migrainosus     Mitochondrial disease (H24)     Generalized anxiety disorder     Post-traumatic stress disorder     Parasomnia     Seasonal depression (H24)     Palpitations     Gastroesophageal reflux disease without esophagitis     Vaginal Papanicolaou smear not required due to history of hysterectomy     Biliary colic         PAST MEDICAL & SURGICAL HISTORY     Past Medical History:   Patient  has a past medical history of Anemia, Chronic fatigue syndrome, Chronic migraine, Depression, Depression, Depressive disorder  (2013), Fibromyalgia, Fibromyalgia, Gastroesophageal reflux disease, H/O: hysterectomy (02/22/2018), History of anesthesia complications, IBS (irritable bowel syndrome), Insomnia, Irregular heart beat, Mitochondrial disease (H24), Mitochondrial myopathy, Motion sickness, Parasomnia, and PONV (postoperative nausea and vomiting).    Surgical History:  She  has a past surgical history that includes REMOVAL OF OVARIAN CYST(S); Hysterectomy (Bilateral, 02/22/2018); Picc (02/24/2018); Abdomen surgery (1996 & 2018, feb.); appendectomy (1996); biopsy; Laparoscopic cystectomy ovarian (oncology) (Left, 10/18/2021); Laparoscopy diagnostic (gyn) (Left, 06/29/2023); Hysterectomy total abdominal; and Laparoscopic cholecystectomy (N/A, 10/25/2023).     SOCIAL HISTORY     Reviewed, and she  reports that she has never smoked. She has never used smokeless tobacco. She reports that she does not drink alcohol and does not use drugs.     FAMILY HISTORY     Reviewed, and family history includes Anxiety Disorder in her maternal grandmother, mother, paternal grandfather, and paternal grandmother; Bone Cancer in her maternal grandfather; Breast Cancer (age of onset: 60.00) in her maternal grandmother; Breast Cancer (age of onset: 62.00) in her mother; Cancer in her mother; Coronary Artery Disease (age of onset: 45.00) in her father; Depression in her brother, father, maternal grandmother, and mother; Diabetes in her cousin and cousin; Hyperlipidemia in her mother; Hypertension in her mother; Meniere's disease in her mother.     ALLERGIES     Allergies   Allergen Reactions     Ciprofloxacin Itching, Rash and Hives     Rash over whole body   Rash over whole body        Egg White (Egg Protein) Other (See Comments) and GI Disturbance     Swollen colon, belly pain  Swollen colon, belly pain       Influenza Virus Vaccine Shortness Of Breath and Anaphylaxis     No Clinical Screening - See Comments Anaphylaxis     Stomach swelling     Sulfa  Antibiotics Rash and Hives     Yeast Other (See Comments) and Anaphylaxis     Desvenlafaxine Blisters, Itching and Rash     Milnacipran Other (See Comments) and Palpitations     Chest pain, hot/cold flashes     Quetiapine Palpitations     Tachycardia, nervousness, elevated blood pressure.   Tachycardia, nervousness, elevated blood pressure.          REVIEW OF SYSTEMS     A 12 point review of system was performed and was negative except as outlined in the history of present illness.     HOME MEDICATIONS     Current Outpatient Rx   Medication Sig Dispense Refill     ascorbic acid 500 MG TABS Take 500 mg by mouth daily       betamethasone dipropionate (DIPROSONE) 0.05 % external lotion        buPROPion (WELLBUTRIN XL) 300 MG 24 hr tablet TAKE 1 TABLET BY MOUTH EVERY DAY 90 tablet 3     cetirizine (ZYRTEC) 10 MG tablet TAKE 1 TAB ORALLY DAILY AS NEEDED FOR ALLERGIES MAY INCREASE TO 2 TAB DAILY IF ITCHING NOT RELIEVED 90 tablet 2     clindamycin-benzoyl peroxide (BENZACLIN) gel Apply topically to acne once daily       clobetasol (TEMOVATE) 0.05 % external ointment        clobetasol propionate (TEMOVATE) 0.05 % external cream        clotrimazole-betamethasone (LOTRISONE) 1-0.05 % external cream 1 application as needed by topical route.       [employer] MELATONIN 3 MG tablet TAKE 1 TABLET (3 MG) BY MOUTH NIGHTLY AS NEEDED FOR SLEEP 90 tablet 1     cyclobenzaprine (FLEXERIL) 10 MG tablet Take 10 mg by mouth daily as needed for muscle spasms       EFFEXOR XR 75 MG 24 hr capsule Take 1 capsule (75 mg) by mouth daily 90 capsule 3     hydrOXYzine (ATARAX) 25 MG tablet TAKE 1-2 TABLETS (25-50 MG) BY MOUTH AT BEDTIME 180 tablet 2     ibuprofen (ADVIL/MOTRIN) 400 MG tablet        ketoconazole (NIZORAL) 2 % external cream APPLY TO AFFECTED AREA TWICE A DAY FOR 7 DAYS       lidocaine (LIDODERM) 5 % patch PLACE 1 PATCH ONTO THE SKIN AS NEEDED TO NECK, SHOULDERS, AND BACK 30 patch 0     Multiple Vitamin (MULTIVITAMIN ADULT PO) Take 1 tablet  by mouth daily       naproxen (NAPROSYN) 500 MG tablet        omeprazole (PRILOSEC) 40 MG DR capsule TAKE 1 CAPSULE (40 MG) BY MOUTH EVERY MORNING (BEFORE BREAKFAST) 30 MINUTES BEFORE MEAL 90 capsule 1     ondansetron (ZOFRAN) 4 MG tablet Take 1 tablet (4 mg) by mouth every 8 hours as needed for nausea 18 tablet 1     Riboflavin 400 MG TABS Take 400 mg by mouth daily       rizatriptan (MAXALT) 10 MG tablet TAKE 1 TABLET BY MOUTH AS NEEDED FOR MIGRAINE. MAY REPEAT IN 2 HOURS IF NEEDED 10 tablet 3     spironolactone (ALDACTONE) 50 MG tablet        tacrolimus (PROTOPIC) 0.1 % external ointment        tretinoin (RETIN-A) 0.05 % external cream APPLY TO AFFECTED AREA EVERY DAY AT BEDTIME 45 g 2     valACYclovir (VALTREX) 1000 mg tablet TAKE 2 TABLETS BY MOUTH 12 HOURS APART AS NEEDED EPISODE 12 tablet 3     venlafaxine (EFFEXOR XR) 150 MG 24 hr capsule Take 1 capsule (150 mg) by mouth daily 90 capsule 3     zolpidem (AMBIEN) 5 MG tablet TAKE 1 TABLET (5 MG) BY MOUTH NIGHTLY AS NEEDED FOR SLEEP 30 tablet 0         PHYSICAL EXAM     Vital signs  BP (!) 125/90   Pulse 90   Wt 59.1 kg (130 lb 4.8 oz)   BMI 24.62 kg/m      Weight:   130 lbs 4.8 oz    Pleasant female who is alert and oriented vital signs were reviewed and documented in electronic medical record.  Neck supple.  Neurologically speech was normal.  Cranial nerves II through XII are intact motor strength neck flexor and extensor 4+/5 in upper extremity 5/5 in the lower extremity proximally 4+/5 distally 5/5 reflexes 2+ toes downgoing no dysmetria noted on finger-nose testing gait normal.      PERTINENT DIAGNOSTIC STUDIES     Following studies were reviewed:     MRI BRAIN 10/29/2015  1.  Normal Head MRI.   2.  No acute infarcts, mass lesions or hemorrhage     PERTINENT LABS  Following labs were reviewed:  Office Visit on 06/06/2024   Component Date Value Ref Range Status     Color Urine 06/06/2024 Yellow  Colorless, Straw, Light Yellow, Yellow Final     Appearance  Urine 06/06/2024 Clear  Clear Final     Glucose Urine 06/06/2024 Negative  Negative mg/dL Final     Bilirubin Urine 06/06/2024 Negative  Negative Final     Ketones Urine 06/06/2024 Negative  Negative mg/dL Final     Specific Gravity Urine 06/06/2024 1.010  1.005 - 1.030 Final     Blood Urine 06/06/2024 Trace (A)  Negative Final     pH Urine 06/06/2024 6.5  5.0 - 8.0 Final     Protein Albumin Urine 06/06/2024 Negative  Negative mg/dL Final     Urobilinogen Urine 06/06/2024 0.2  0.2, 1.0 E.U./dL Final     Nitrite Urine 06/06/2024 Negative  Negative Final     Leukocyte Esterase Urine 06/06/2024 Negative  Negative Final     Ventricular Rate 06/06/2024 87  BPM Final     Atrial Rate 06/06/2024 87  BPM Final     VT Interval 06/06/2024 144  ms Final     QRS Duration 06/06/2024 80  ms Final     QT 06/06/2024 346  ms Final     QTc 06/06/2024 416  ms Final     P Axis 06/06/2024 60  degrees Final     R AXIS 06/06/2024 23  degrees Final     T Axis 06/06/2024 36  degrees Final     Interpretation ECG 06/06/2024    Final                    Value:Sinus rhythm  Normal ECG  When compared with ECG of 19-AUG-2023 05:36,  No significant change was found  Confirmed by SHAI VIEIRA, LES LOC:JN ([id number]) on 6/6/2024 4:23:13 PM       CRP Inflammation 06/06/2024 <3.00  <5.00 mg/L Final     Erythrocyte Sedimentation Rate 06/06/2024 8  0 - 20 mm/hr Final     Lyme Disease Antibodies Total 06/06/2024 0.03  <0.90 Final     TSH 06/06/2024 1.90  0.30 - 4.20 uIU/mL Final     WBC Count 06/06/2024 7.6  4.0 - 11.0 10e3/uL Final     RBC Count 06/06/2024 4.86  3.80 - 5.20 10e6/uL Final     Hemoglobin 06/06/2024 13.8  11.7 - 15.7 g/dL Final     Hematocrit 06/06/2024 41.5  35.0 - 47.0 % Final     MCV 06/06/2024 85  78 - 100 fL Final     MCH 06/06/2024 28.4  26.5 - 33.0 pg Final     MCHC 06/06/2024 33.3  31.5 - 36.5 g/dL Final     RDW 06/06/2024 12.5  10.0 - 15.0 % Final     Platelet Count 06/06/2024 346  150 - 450 10e3/uL Final     Sodium 06/06/2024 138  135 -  145 mmol/L Final     Potassium 06/06/2024 4.1  3.4 - 5.3 mmol/L Final     Carbon Dioxide (CO2) 06/06/2024 28  22 - 29 mmol/L Final     Anion Gap 06/06/2024 9  7 - 15 mmol/L Final     Urea Nitrogen 06/06/2024 9.2  6.0 - 20.0 mg/dL Final     Creatinine 06/06/2024 0.89  0.51 - 0.95 mg/dL Final     GFR Estimate 06/06/2024 82  >60 mL/min/1.73m2 Final     Calcium 06/06/2024 9.8  8.6 - 10.0 mg/dL Final     Chloride 06/06/2024 101  98 - 107 mmol/L Final     Glucose 06/06/2024 81  70 - 99 mg/dL Final     Alkaline Phosphatase 06/06/2024 71  40 - 150 U/L Final     AST 06/06/2024 28  0 - 45 U/L Final     ALT 06/06/2024 27  0 - 50 U/L Final     Protein Total 06/06/2024 7.5  6.4 - 8.3 g/dL Final     Albumin 06/06/2024 4.7  3.5 - 5.2 g/dL Final     Bilirubin Total 06/06/2024 0.2  <=1.2 mg/dL Final     Bacteria Urine 06/06/2024 Few (A)  None Seen /HPF Final     RBC Urine 06/06/2024 None Seen  0-2 /HPF /HPF Final     WBC Urine 06/06/2024 None Seen  0-5 /HPF /HPF Final     Squamous Epithelials Urine 06/06/2024 Few (A)  None Seen /LPF Final         Total time spent for face to face visit, reviewing labs/imaging studies, counseling and coordination of care was: 20 minutes spent on the date of the encounter doing chart review, review of outside records, review of test results, interpretation of tests, patient visit, and documentation     The longitudinal plan of care for the diagnosis(es)/condition(s) as documented were addressed during this visit. Due to the added complexity in care, I will continue to support Sherry in the subsequent management and with ongoing continuity of care.    This note was dictated using voice recognition software.  Any grammatical or context distortions are unintentional and inherent to the software.    Orders Placed This Encounter   Procedures     [id number] - MIGRAINE      New Prescriptions    No medications on file     Modified Medications    No medications on file                 Again, thank you for allowing  me to participate in the care of your patient.        Sincerely,        Mikel Hernandez MD

## 2024-08-06 ENCOUNTER — ANCILLARY PROCEDURE (OUTPATIENT)
Dept: MAMMOGRAPHY | Facility: CLINIC | Age: 44
End: 2024-08-06
Attending: FAMILY MEDICINE
Payer: COMMERCIAL

## 2024-08-06 DIAGNOSIS — Z12.31 VISIT FOR SCREENING MAMMOGRAM: ICD-10-CM

## 2024-08-06 PROCEDURE — 77063 BREAST TOMOSYNTHESIS BI: CPT

## 2024-08-18 ENCOUNTER — OFFICE VISIT (OUTPATIENT)
Dept: FAMILY MEDICINE | Facility: CLINIC | Age: 44
End: 2024-08-18
Payer: COMMERCIAL

## 2024-08-18 VITALS
SYSTOLIC BLOOD PRESSURE: 124 MMHG | HEIGHT: 61 IN | HEART RATE: 104 BPM | TEMPERATURE: 98.2 F | DIASTOLIC BLOOD PRESSURE: 72 MMHG | OXYGEN SATURATION: 97 % | WEIGHT: 130 LBS | RESPIRATION RATE: 15 BRPM | BODY MASS INDEX: 24.55 KG/M2

## 2024-08-18 DIAGNOSIS — Z20.822 EXPOSURE TO 2019 NOVEL CORONAVIRUS: Primary | ICD-10-CM

## 2024-08-18 PROCEDURE — 99213 OFFICE O/P EST LOW 20 MIN: CPT

## 2024-08-18 PROCEDURE — 87635 SARS-COV-2 COVID-19 AMP PRB: CPT

## 2024-08-18 NOTE — PATIENT INSTRUCTIONS
Your lab results will be available on [facility].     Continue to rest, drink plenty of fluids and take ibuprofen/tylenol as needed.

## 2024-08-18 NOTE — PROGRESS NOTES
"Assessment & Plan     Exposure to 2019 novel coronavirus  Symptom onset 8/15/24. Had known COVID exposure about one week ago. Endorsing sore throat, myalgias, fatigue, cough and mild SOB. Reassured by vitals. Lungs clear on physical exam. Testing in process. Provided reassurance and counseled on supportive cares.  - Symptomatic COVID-19 Virus (Coronavirus) by PCR     20 minutes spent by me on the date of the encounter doing chart review, history and exam, documentation and further activities per the note        Return if symptoms worsen or fail to improve.    Blair Kimbrough MD  [facility] CHIARA Powell is a 44 year old female who presents to clinic today for the following health issues:  Chief Complaint   Patient presents with    Covid Concern     Was exposed to friend who tested positive for covid 3 days ago        HPI  Was helping friend about one week ago, found to be Covid exposure.   Symptom onset about three days ago.   Sore throat, cough, fatigue, body aches, some shortness of breath. No fevers.   Has been taking OTCs.     Review of Systems  Constitutional, HEENT, cardiovascular, pulmonary, gi and gu systems are negative, except as otherwise noted.      Objective    /72   Pulse 104   Temp 98.2  F (36.8  C) (Oral)   Resp 15   Ht 1.549 m (5' 1\")   Wt 59 kg (130 lb)   SpO2 97%   BMI 24.56 kg/m    Physical Exam   GENERAL: alert and no distress, fatigued but non-toxic  EYES: Eyes grossly normal to inspection  RESP: lungs clear to auscultation - no rales, rhonchi or wheezes  CV: regular rate and rhythm, normal S1 S2, no murmurs, no peripheral edema  SKIN: no suspicious lesions or rashes on limited exam  NEURO: no focal deficits  PSYCH: mentation appears normal, affect normal/bright      "

## 2024-08-19 LAB — SARS-COV-2 RNA RESP QL NAA+PROBE: NEGATIVE

## 2024-08-20 ENCOUNTER — MYC MEDICAL ADVICE (OUTPATIENT)
Dept: FAMILY MEDICINE | Facility: CLINIC | Age: 44
End: 2024-08-20
Payer: COMMERCIAL

## 2024-08-20 DIAGNOSIS — G47.00 INSOMNIA, UNSPECIFIED TYPE: ICD-10-CM

## 2024-08-21 RX ORDER — ZOLPIDEM TARTRATE 5 MG/1
5 TABLET ORAL
Qty: 30 TABLET | Refills: 2 | Status: SHIPPED | OUTPATIENT
Start: 2024-08-21

## 2024-09-08 ENCOUNTER — OFFICE VISIT (OUTPATIENT)
Dept: FAMILY MEDICINE | Facility: CLINIC | Age: 44
End: 2024-09-08
Payer: COMMERCIAL

## 2024-09-08 VITALS
RESPIRATION RATE: 14 BRPM | DIASTOLIC BLOOD PRESSURE: 86 MMHG | BODY MASS INDEX: 24.55 KG/M2 | TEMPERATURE: 98.6 F | WEIGHT: 130 LBS | OXYGEN SATURATION: 98 % | HEIGHT: 61 IN | SYSTOLIC BLOOD PRESSURE: 121 MMHG | HEART RATE: 104 BPM

## 2024-09-08 DIAGNOSIS — H92.09 OTALGIA, UNSPECIFIED LATERALITY: Primary | ICD-10-CM

## 2024-09-08 LAB
DEPRECATED S PYO AG THROAT QL EIA: NEGATIVE
GROUP A STREP BY PCR: NOT DETECTED

## 2024-09-08 PROCEDURE — 87651 STREP A DNA AMP PROBE: CPT | Performed by: PHYSICIAN ASSISTANT

## 2024-09-08 PROCEDURE — 99213 OFFICE O/P EST LOW 20 MIN: CPT | Performed by: PHYSICIAN ASSISTANT

## 2024-09-08 PROCEDURE — 87635 SARS-COV-2 COVID-19 AMP PRB: CPT | Performed by: PHYSICIAN ASSISTANT

## 2024-09-08 NOTE — PATIENT INSTRUCTIONS
Start Flonase one spray in each nostril once per day.   I will call if the strep is positive.   At this time no significant signs of ear infection.

## 2024-09-08 NOTE — PROGRESS NOTES
Patient presents with:  Otalgia: Has been having ear pain for the last 3 days      Clinical Decision Making:  Right otalgia.  No evidence of otitis externa or otitis media or external ear cellulitis.  Throat exam appears normal.  RST is negative.  COVID test in process.  Recommend starting Flonase nasal spray in case this is eustachian tube dysfunction.      ICD-10-CM    1. Otalgia, unspecified laterality  H92.09 Symptomatic COVID-19 Virus (Coronavirus) by PCR Nose     Streptococcus A Rapid Screen w/Reflex to PCR          Patient Instructions   Start Flonase one spray in each nostril once per day.   I will call if the strep is positive.   At this time no significant signs of ear infection.     HPI:  Sherry Sweeney is a 44 year old female who presents today with concerns of right-sided earache that started 3 days ago.  At times she has tickling dry sensation in the ear.  She also reports some throat pain and worsening with swallowing.  No fevers or nasal congestion, but she does request COVID testing.    History obtained from the patient.    Problem List:  2023-09: Biliary colic  2023-08: Gastroesophageal reflux disease without esophagitis  2023-08: Vaginal Papanicolaou smear not required due to history of   hysterectomy  2023-06: Palpitations  2023-06: Preop general physical exam  2023-01: Seasonal depression (H24)  2021-08: Fibromyalgia  2021-08: Insomnia  2021-08: Major depression, recurrent, chronic (H24)  2021-08: Chronic migraine without aura, intractable, without status   migrainosus  2021-08: Generalized anxiety disorder  2021-08: Post-traumatic stress disorder  2020-09: Parasomnia  2019-09: Suicidal ideations  2018-03: Arthralgia of temporomandibular joint  2018-02: H/O: hysterectomy  2017-03: Acne vulgaris  2016-05: Debility  2015-04: IBS (irritable bowel syndrome)  1980-03: Hypermobility syndrome  Mitochondrial disease (H24)      Past Medical History:   Diagnosis Date    Anemia     Chronic fatigue syndrome   "   Chronic migraine     Depression     Depression     Depressive disorder 2013    Fibromyalgia     Fibromyalgia     Gastroesophageal reflux disease     H/O: hysterectomy 02/22/2018    History of anesthesia complications     slow to wake    IBS (irritable bowel syndrome)     Insomnia     Irregular heart beat     Mitochondrial disease (H24)     Mitochondrial myopathy     Motion sickness     Parasomnia     PONV (postoperative nausea and vomiting)        Social History     Tobacco Use    Smoking status: Never    Smokeless tobacco: Never   Substance Use Topics    Alcohol use: No         Review of Systems    Vitals:    09/08/24 1518   BP: 121/86   Pulse: 104   Resp: 14   Temp: 98.6  F (37  C)   TempSrc: Oral   SpO2: 98%   Weight: 59 kg (130 lb)   Height: 1.549 m (5' 1\")       Physical Exam  Vitals and nursing note reviewed.   Constitutional:       General: She is not in acute distress.     Appearance: She is not toxic-appearing or diaphoretic.   HENT:      Head: Normocephalic and atraumatic.      Right Ear: Tympanic membrane, ear canal and external ear normal.      Left Ear: Tympanic membrane, ear canal and external ear normal.      Mouth/Throat:      Mouth: Mucous membranes are moist.      Pharynx: No oropharyngeal exudate or posterior oropharyngeal erythema.   Eyes:      Conjunctiva/sclera: Conjunctivae normal.   Cardiovascular:      Rate and Rhythm: Normal rate and regular rhythm.      Heart sounds: No murmur heard.  Pulmonary:      Effort: Pulmonary effort is normal. No respiratory distress.   Neurological:      Mental Status: She is alert.   Psychiatric:         Mood and Affect: Mood normal.         Behavior: Behavior normal.         Thought Content: Thought content normal.         Judgment: Judgment normal.         Results:  No results found for any visits on 09/08/24.      At the end of the encounter, I discussed results, diagnosis, medications. Discussed red flags for immediate return to clinic/ER, as well as " indications for follow up if no improvement. Patient understood and agreed to plan. Patient was stable for discharge.

## 2024-09-09 LAB — SARS-COV-2 RNA RESP QL NAA+PROBE: NEGATIVE

## 2024-09-22 ENCOUNTER — HEALTH MAINTENANCE LETTER (OUTPATIENT)
Age: 44
End: 2024-09-22

## 2024-10-04 ENCOUNTER — MYC MEDICAL ADVICE (OUTPATIENT)
Dept: FAMILY MEDICINE | Facility: CLINIC | Age: 44
End: 2024-10-04
Payer: COMMERCIAL

## 2024-10-07 ENCOUNTER — OFFICE VISIT (OUTPATIENT)
Dept: FAMILY MEDICINE | Facility: CLINIC | Age: 44
End: 2024-10-07
Payer: COMMERCIAL

## 2024-10-07 VITALS
BODY MASS INDEX: 24.73 KG/M2 | WEIGHT: 131 LBS | HEART RATE: 94 BPM | OXYGEN SATURATION: 99 % | SYSTOLIC BLOOD PRESSURE: 113 MMHG | TEMPERATURE: 98.6 F | DIASTOLIC BLOOD PRESSURE: 81 MMHG | RESPIRATION RATE: 12 BRPM | HEIGHT: 61 IN

## 2024-10-07 DIAGNOSIS — G47.00 INSOMNIA, UNSPECIFIED TYPE: ICD-10-CM

## 2024-10-07 DIAGNOSIS — R10.32 ABDOMINAL PAIN, LEFT LOWER QUADRANT: ICD-10-CM

## 2024-10-07 DIAGNOSIS — F33.9 MAJOR DEPRESSION, RECURRENT, CHRONIC (H): ICD-10-CM

## 2024-10-07 DIAGNOSIS — E78.5 HYPERLIPIDEMIA, UNSPECIFIED HYPERLIPIDEMIA TYPE: Primary | ICD-10-CM

## 2024-10-07 PROCEDURE — 99214 OFFICE O/P EST MOD 30 MIN: CPT | Performed by: FAMILY MEDICINE

## 2024-10-07 PROCEDURE — 80061 LIPID PANEL: CPT | Performed by: FAMILY MEDICINE

## 2024-10-07 PROCEDURE — 36415 COLL VENOUS BLD VENIPUNCTURE: CPT | Performed by: FAMILY MEDICINE

## 2024-10-07 RX ORDER — SPIRONOLACTONE 25 MG/1
TABLET ORAL
COMMUNITY
Start: 2024-10-02

## 2024-10-07 ASSESSMENT — PATIENT HEALTH QUESTIONNAIRE - PHQ9
SUM OF ALL RESPONSES TO PHQ QUESTIONS 1-9: 12
10. IF YOU CHECKED OFF ANY PROBLEMS, HOW DIFFICULT HAVE THESE PROBLEMS MADE IT FOR YOU TO DO YOUR WORK, TAKE CARE OF THINGS AT HOME, OR GET ALONG WITH OTHER PEOPLE: VERY DIFFICULT
SUM OF ALL RESPONSES TO PHQ QUESTIONS 1-9: 12

## 2024-10-07 NOTE — ASSESSMENT & PLAN NOTE
The patient is struggling with sleep and I reviewed when she is taking her antidepressants and she is generally taking those at night.  This includes both Wellbutrin and Effexor.  She is going to try switching to taking those in the morning and see if that helps.  We also talked about possibly trying to reduce the dose of Wellbutrin down to 150 mg daily to see if that would help with the sleep issue

## 2024-10-07 NOTE — ASSESSMENT & PLAN NOTE
Recent increase in heartburn symptoms since having increase in left-sided abdominal pain.  She continues on omeprazole 40 mg daily.

## 2024-10-07 NOTE — PROGRESS NOTES
Problem List Items Addressed This Visit       Insomnia     The patient is struggling with sleep and I reviewed when she is taking her antidepressants and she is generally taking those at night.  This includes both Wellbutrin and Effexor.  She is going to try switching to taking those in the morning and see if that helps.  We also talked about possibly trying to reduce the dose of Wellbutrin down to 150 mg daily to see if that would help with the sleep issue         Major depression, recurrent, chronic (H)     The patient's PHQ-9 score is 12 today.  She says that she feels her depression is actually in pretty good control at this time although also mentions that her mom was recently diagnosed with Parkinson's and that is contributing to some stress.          Other Visit Diagnoses       Hyperlipidemia, unspecified hyperlipidemia type    -  Primary    Relevant Orders    Lipid Profile (Chol, Trig, HDL, LDL calc)    Abdominal pain, left lower quadrant                  MATTHEW RESENDIZ MD    Comfort Powell is a 44 year old who presents today primarily regarding GI issues.  The patient states that a couple weeks ago she had increase in left-sided abdominal pain.  Along with that, she felt more heartburn for which she has started on omeprazole again and feels that that symptom has gotten better.  However, she felt like her abdomen was quite tender for a while and she called into clinic with concerns.  Overall, she does feel like her abdominal pain is gradually getting better.  She denies any associated nausea, vomiting or fever.  She was having some diarrhea but states that over the past couple days she has actually had more constipation.  She also mentions that she has been having increased difficulty with sleeping.  It takes her sometimes a couple hours or more to fall asleep.  She does admit she has been under a bit more stress recently as her mom was recently diagnosed with Parkinson's and is having some  "increased challenges with cognition and memory.  She and her brother have started having conversations regarding what they need to anticipate to take care of her.     Objective    /81 (BP Location: Left arm, Patient Position: Left side, Cuff Size: Adult Regular)   Pulse 94   Temp 98.6  F (37  C) (Oral)   Resp 12   Ht 1.549 m (5' 1\")   Wt 59.4 kg (131 lb)   SpO2 99%   BMI 24.75 kg/m    Body mass index is 24.75 kg/m .  Physical Exam   GENERAL: alert and no distress  PSYCH: mentation appears normal, affect normal/bright            Answers submitted by the patient for this visit:  Patient Health Questionnaire (Submitted on 10/7/2024)  If you checked off any problems, how difficult have these problems made it for you to do your work, take care of things at home, or get along with other people?: Very difficult  PHQ9 TOTAL SCORE: 12  General Questionnaire (Submitted on 10/7/2024)  Chief Complaint: Chronic problems general questions HPI Form  How many days per week do you miss taking your medication?: 0  General Concern (Submitted on 10/7/2024)  Chief Complaint: Chronic problems general questions HPI Form  What is the reason for your visit today?: digestive system issues  When did your symptoms begin?: 1-2 weeks ago    "

## 2024-10-07 NOTE — ASSESSMENT & PLAN NOTE
The patient's PHQ-9 score is 12 today.  She says that she feels her depression is actually in pretty good control at this time although also mentions that her mom was recently diagnosed with Parkinson's and that is contributing to some stress.

## 2024-10-08 LAB
CHOLEST SERPL-MCNC: 202 MG/DL
FASTING STATUS PATIENT QL REPORTED: YES
HDLC SERPL-MCNC: 48 MG/DL
LDLC SERPL CALC-MCNC: 134 MG/DL
NONHDLC SERPL-MCNC: 154 MG/DL
TRIGL SERPL-MCNC: 102 MG/DL

## 2024-10-24 ENCOUNTER — MYC MEDICAL ADVICE (OUTPATIENT)
Dept: FAMILY MEDICINE | Facility: CLINIC | Age: 44
End: 2024-10-24
Payer: COMMERCIAL

## 2024-10-24 DIAGNOSIS — R10.32 LLQ ABDOMINAL PAIN: Primary | ICD-10-CM

## 2024-10-24 NOTE — TELEPHONE ENCOUNTER
"10/17 OV note-   \"Sherry is a 44 year old who presents today primarily regarding GI issues.  The patient states that a couple weeks ago she had increase in left-sided abdominal pain.  Along with that, she felt more heartburn for which she has started on omeprazole again and feels that that symptom has gotten better.  However, she felt like her abdomen was quite tender for a while and she called into clinic with concerns.  Overall, she does feel like her abdominal pain is gradually getting better.  She denies any associated nausea, vomiting or fever.  She was having some diarrhea but states that over the past couple days she has actually had more constipation.\"  "

## 2024-10-25 ENCOUNTER — LAB (OUTPATIENT)
Dept: LAB | Facility: CLINIC | Age: 44
End: 2024-10-25
Payer: COMMERCIAL

## 2024-10-25 DIAGNOSIS — R10.32 LLQ ABDOMINAL PAIN: ICD-10-CM

## 2024-10-25 LAB
ALBUMIN SERPL BCG-MCNC: 4.5 G/DL (ref 3.5–5.2)
ALP SERPL-CCNC: 74 U/L (ref 40–150)
ALT SERPL W P-5'-P-CCNC: 31 U/L (ref 0–50)
ANION GAP SERPL CALCULATED.3IONS-SCNC: 11 MMOL/L (ref 7–15)
AST SERPL W P-5'-P-CCNC: 32 U/L (ref 0–45)
BILIRUB SERPL-MCNC: 0.2 MG/DL
BUN SERPL-MCNC: 12.3 MG/DL (ref 6–20)
CALCIUM SERPL-MCNC: 9.8 MG/DL (ref 8.8–10.4)
CHLORIDE SERPL-SCNC: 102 MMOL/L (ref 98–107)
CREAT SERPL-MCNC: 0.93 MG/DL (ref 0.51–0.95)
CRP SERPL-MCNC: <3 MG/L
EGFRCR SERPLBLD CKD-EPI 2021: 77 ML/MIN/1.73M2
ERYTHROCYTE [DISTWIDTH] IN BLOOD BY AUTOMATED COUNT: 12.1 % (ref 10–15)
ERYTHROCYTE [SEDIMENTATION RATE] IN BLOOD BY WESTERGREN METHOD: 9 MM/HR (ref 0–20)
GLUCOSE SERPL-MCNC: 86 MG/DL (ref 70–99)
HCO3 SERPL-SCNC: 27 MMOL/L (ref 22–29)
HCT VFR BLD AUTO: 44.8 % (ref 35–47)
HGB BLD-MCNC: 14.8 G/DL (ref 11.7–15.7)
MCH RBC QN AUTO: 28 PG (ref 26.5–33)
MCHC RBC AUTO-ENTMCNC: 33 G/DL (ref 31.5–36.5)
MCV RBC AUTO: 85 FL (ref 78–100)
PLATELET # BLD AUTO: 323 10E3/UL (ref 150–450)
POTASSIUM SERPL-SCNC: 4.3 MMOL/L (ref 3.4–5.3)
PROT SERPL-MCNC: 7.3 G/DL (ref 6.4–8.3)
RBC # BLD AUTO: 5.28 10E6/UL (ref 3.8–5.2)
SODIUM SERPL-SCNC: 140 MMOL/L (ref 135–145)
WBC # BLD AUTO: 7.2 10E3/UL (ref 4–11)

## 2024-10-25 PROCEDURE — 86140 C-REACTIVE PROTEIN: CPT

## 2024-10-25 PROCEDURE — 36415 COLL VENOUS BLD VENIPUNCTURE: CPT

## 2024-10-25 PROCEDURE — 85027 COMPLETE CBC AUTOMATED: CPT

## 2024-10-25 PROCEDURE — 80053 COMPREHEN METABOLIC PANEL: CPT

## 2024-10-25 PROCEDURE — 85652 RBC SED RATE AUTOMATED: CPT

## 2024-10-28 ENCOUNTER — OFFICE VISIT (OUTPATIENT)
Dept: FAMILY MEDICINE | Facility: CLINIC | Age: 44
End: 2024-10-28
Payer: COMMERCIAL

## 2024-10-28 VITALS
SYSTOLIC BLOOD PRESSURE: 124 MMHG | HEIGHT: 61 IN | BODY MASS INDEX: 24.73 KG/M2 | DIASTOLIC BLOOD PRESSURE: 86 MMHG | TEMPERATURE: 98.4 F | OXYGEN SATURATION: 98 % | WEIGHT: 131 LBS | HEART RATE: 99 BPM | RESPIRATION RATE: 16 BRPM

## 2024-10-28 DIAGNOSIS — R10.12 ABDOMINAL PAIN, LEFT UPPER QUADRANT: Primary | ICD-10-CM

## 2024-10-28 DIAGNOSIS — R21 RASH: ICD-10-CM

## 2024-10-28 LAB
ALBUMIN UR-MCNC: NEGATIVE MG/DL
APPEARANCE UR: CLEAR
BILIRUB UR QL STRIP: NEGATIVE
COLOR UR AUTO: YELLOW
GLUCOSE UR STRIP-MCNC: NEGATIVE MG/DL
HGB UR QL STRIP: NEGATIVE
KETONES UR STRIP-MCNC: NEGATIVE MG/DL
LEUKOCYTE ESTERASE UR QL STRIP: NEGATIVE
NITRATE UR QL: NEGATIVE
PH UR STRIP: 6 [PH] (ref 5–8)
SP GR UR STRIP: 1.01 (ref 1–1.03)
UROBILINOGEN UR STRIP-ACNC: 0.2 E.U./DL

## 2024-10-28 PROCEDURE — 99213 OFFICE O/P EST LOW 20 MIN: CPT | Performed by: FAMILY MEDICINE

## 2024-10-28 PROCEDURE — G2211 COMPLEX E/M VISIT ADD ON: HCPCS | Performed by: FAMILY MEDICINE

## 2024-10-28 PROCEDURE — 81003 URINALYSIS AUTO W/O SCOPE: CPT | Performed by: FAMILY MEDICINE

## 2024-10-28 RX ORDER — BETAMETHASONE DIPROPIONATE 0.5 MG/G
LOTION TOPICAL 2 TIMES DAILY
Qty: 60 ML | Refills: 1 | Status: SHIPPED | OUTPATIENT
Start: 2024-10-28

## 2024-10-28 RX ORDER — CLOBETASOL PROPIONATE 0.5 MG/G
CREAM TOPICAL 2 TIMES DAILY
Qty: 60 G | Refills: 1 | Status: SHIPPED | OUTPATIENT
Start: 2024-10-28

## 2024-10-28 NOTE — PROGRESS NOTES
Assessment & Plan   Problem List Items Addressed This Visit    None  Visit Diagnoses       Abdominal pain, left upper quadrant    -  Primary    Relevant Orders    UA Macroscopic with reflex to Microscopic and Culture - Clinic Collect    US Abdomen Complete    Rash        Relevant Medications    betamethasone dipropionate (DIPROSONE) 0.05 % external lotion    clobetasol propionate (TEMOVATE) 0.05 % external cream           Recommended adding a urinalysis today considering the pain sometimes wraps around the left side.  I also recommended checking an abdominal ultrasound as a next step.  If those come back within normal limits, we did talk about a colonoscopy as the next diagnostic test recommended.    Comfort Powell is a 44 year old presents today for follow-up regarding left-sided abdominal pain.  The patient's pain started around the end of September.  I saw the patient a couple weeks ago and at that time she felt like the pain was gradually getting better after starting some omeprazole daily.  However, she contacted me again recently and says that the pain has intensified again and is now more prominent in the left upper quadrant in addition to the left lower quadrant.  She feels like she has a slight reduction in appetite but no associated nausea or vomiting.  She has not had any fevers or chills or really any significant change in her bowel movements recently.  She denies any blood in the stool.  She also does not have any urinary symptoms and she is status post hysterectomy and oophorectomy on the left.  She did come in for labs recently per my request and those all came back within normal limits.  She says that she does feel little bit more prominently in certain positions or when she bends over.  She ate an apple the other day and thought that it was a little worse after that.  She does have trouble sleeping but it does not interfere with sleep in general.  Follow Up    History of Present Illness  Detail Level: Generalized "      Reason for visit:  Pain on left side of abdominal area    She eats 0-1 servings of fruits and vegetables daily.She consumes 1 sweetened beverage(s) daily.She exercises with enough effort to increase her heart rate 9 or less minutes per day.  She exercises with enough effort to increase her heart rate 3 or less days per week.   She is taking medications regularly.           Objective    /86 (BP Location: Left arm, Patient Position: Left side, Cuff Size: Adult Regular)   Pulse 99   Temp 98.4  F (36.9  C) (Oral)   Resp 16   Ht 1.549 m (5' 1\")   Wt 59.4 kg (131 lb)   SpO2 98%   BMI 24.75 kg/m    Body mass index is 24.75 kg/m .  Physical Exam   GENERAL: alert and no distress  RESP: lungs clear to auscultation - no rales, rhonchi or wheezes  CV: regular rate and rhythm, normal S1 S2, no S3 or S4, no murmur, click or rub, no peripheral edema   ABDOMEN: soft with tenderness diffusely throughout the left side with deep palpation. No rebound. No masses felt.     The longitudinal plan of care for the diagnosis(es)/condition(s) as documented were addressed during this visit. Due to the added complexity in care, I will continue to support Sherry in the subsequent management and with ongoing continuity of care.        Signed Electronically by: MATTHEW RESENDIZ MD    " Detail Level: Zone Detail Level: Detailed

## 2024-10-30 ENCOUNTER — HOSPITAL ENCOUNTER (OUTPATIENT)
Dept: ULTRASOUND IMAGING | Facility: HOSPITAL | Age: 44
Discharge: HOME | End: 2024-10-30
Attending: FAMILY MEDICINE | Admitting: FAMILY MEDICINE
Payer: MEDICARE

## 2024-10-30 DIAGNOSIS — R10.12 ABDOMINAL PAIN, LEFT UPPER QUADRANT: ICD-10-CM

## 2024-10-30 PROCEDURE — 76700 US EXAM ABDOM COMPLETE: CPT

## 2024-10-31 ENCOUNTER — MYC MEDICAL ADVICE (OUTPATIENT)
Dept: FAMILY MEDICINE | Facility: CLINIC | Age: 44
End: 2024-10-31
Payer: COMMERCIAL

## 2024-10-31 DIAGNOSIS — R10.12 ABDOMINAL PAIN, LEFT UPPER QUADRANT: Primary | ICD-10-CM

## 2024-10-31 DIAGNOSIS — R10.32 LLQ ABDOMINAL PAIN: ICD-10-CM

## 2024-10-31 NOTE — PROGRESS NOTES
NEUROLOGY FOLLOW UP VISIT  NOTE       [facility]  [address] [place] [address]  Tel: [phone]  Fax: [phone]  [email or web address]     Sherry Sweeney,  1980, MRN [id number]  PCP: Layla Martinez  Date: 2024      ASSESSMENT & PLAN     Visit Diagnosis  Chronic migraine without aura, intractable, without status migrainosus     Chronic migraine without aura  Pleasant 44-year-old female with history of mitochondrial myopathy, chronic migraine without aura, fibromyalgia who is here for 3-month Botox injection.  She has noticed more than 50% reduction in her headache with Botox.  After explaining all the side effect and obtaining her consent, I injected 155 units according to the enclosed sheet.  45 units were discarded.  She was monitored in the clinic for short duration and left in a satisfactory condition.  Follow-up will be in 3 months.      Thank you again for this referral, please feel free to contact me if you have any questions.    Mikel Hernandez MD  [facility] [place]     HISTORY OF PRESENT ILLNESS     Patient is a 44-year-old female with history of mitochondrial myopathy chronic migraine without aura, fibromyalgia last seen on 2024 for 3 monthly Botox who returns for follow-up.  She continues to notice improvement with Botox and reports more than 50% reduction in her headaches.Previously she had failed multiple medication but ever since she started getting Botox there was significant improvement. She started getting Botox injections in 2019 and noticed steady decline in her headache frequency.  During COVID pandemic she had to hold off on Botox and noticed flareup of her symptoms but ever since she restarted there has been a steady decline.  Previously she had tried Depakote, amitriptyline and beta-blocker that did not help.     Briefly patient is a female with history of mitochondrial myopathy, chronic migraine without  aura, fibromyalgia who started having headaches in 2014 and progressively got worse.  She initially attributed it to pressure in the neck or TMJ abnormality and that in the past was evaluated and started on some hormonal supplements.  Due to progressive weakness she was evaluated for possible mitochondrial myopathy and had mitochondrial DNA tested that was positive.  For her headaches she was started on Depakote, amitriptyline and in the past had also tried Cymbalta and as she was having more than 15 headaches in a month Botox was recommended.  She started getting Botox in 2017 and did notice improvement in her headache.  Discontinued until 2019 but due to COVID pandemic she stopped doing Botox injection and noticed worsening headaches and Botox was restarted in 2023.       PROBLEM LIST   Patient Active Problem List   Diagnosis    Acne vulgaris    Arthralgia of temporomandibular joint    Debility    Fibromyalgia    IBS (irritable bowel syndrome)    Insomnia    Major depression, recurrent, chronic (H)    Chronic migraine without aura, intractable, without status migrainosus    Mitochondrial disease (H)    Generalized anxiety disorder    Post-traumatic stress disorder    Parasomnia    Seasonal depression (H)    Palpitations    Gastroesophageal reflux disease without esophagitis    Vaginal Papanicolaou smear not required due to history of hysterectomy    Biliary colic         PAST MEDICAL & SURGICAL HISTORY     Past Medical History:   Patient  has a past medical history of Anemia, Chronic fatigue syndrome, Chronic migraine, Depression, Depression, Depressive disorder (2013), Fibromyalgia, Fibromyalgia, Gastroesophageal reflux disease, H/O: hysterectomy (02/22/2018), History of anesthesia complications, IBS (irritable bowel syndrome), Insomnia, Irregular heart beat, Mitochondrial disease (H), Mitochondrial myopathy, Motion sickness, Parasomnia, and PONV (postoperative nausea and vomiting).    Surgical History:  She  has  a past surgical history that includes REMOVAL OF OVARIAN CYST(S); Hysterectomy (Bilateral, 02/22/2018); Picc (02/24/2018); Abdomen surgery (1996 & 2018, feb.); appendectomy (1996); biopsy; Laparoscopic cystectomy ovarian (oncology) (Left, 10/18/2021); Laparoscopy diagnostic (gyn) (Left, 06/29/2023); Hysterectomy total abdominal; and Laparoscopic cholecystectomy (N/A, 10/25/2023).     SOCIAL HISTORY     Reviewed, and she  reports that she has never smoked. She has never used smokeless tobacco. She reports that she does not drink alcohol and does not use drugs.     FAMILY HISTORY     Reviewed, and family history includes Anxiety Disorder in her maternal grandmother, mother, paternal grandfather, and paternal grandmother; Bone Cancer in her maternal grandfather; Breast Cancer (age of onset: 60.00) in her maternal grandmother; Breast Cancer (age of onset: 62.00) in her mother; Cancer in her mother; Coronary Artery Disease (age of onset: 45.00) in her father; Depression in her brother, father, maternal grandmother, and mother; Diabetes in her cousin and cousin; Hyperlipidemia in her mother; Hypertension in her mother; Meniere's disease in her mother.     ALLERGIES     Allergies   Allergen Reactions    Ciprofloxacin Itching, Rash and Hives     Rash over whole body   Rash over whole body       Influenza Virus Vaccine Shortness Of Breath and Anaphylaxis    No Clinical Screening - See Comments Anaphylaxis     Stomach swelling    Sulfa Antibiotics Rash and Hives    Desvenlafaxine Blisters, Itching and Rash    Milnacipran Other (See Comments) and Palpitations     Chest pain, hot/cold flashes    Quetiapine Palpitations     Tachycardia, nervousness, elevated blood pressure.   Tachycardia, nervousness, elevated blood pressure.          REVIEW OF SYSTEMS     A 12 point review of system was performed and was negative except as outlined in the history of present illness.     HOME MEDICATIONS     Current Outpatient Rx   Medication  Sig Dispense Refill    ascorbic acid 500 MG TABS Take 500 mg by mouth daily      betamethasone dipropionate (DIPROSONE) 0.05 % external lotion Apply topically 2 times daily. 60 mL 1    buPROPion (WELLBUTRIN XL) 300 MG 24 hr tablet TAKE 1 TABLET BY MOUTH EVERY DAY 90 tablet 3    cetirizine (ZYRTEC) 10 MG tablet TAKE 1 TAB ORALLY DAILY AS NEEDED FOR ALLERGIES MAY INCREASE TO 2 TAB DAILY IF ITCHING NOT RELIEVED 90 tablet 2    clindamycin-benzoyl peroxide (BENZACLIN) gel Apply topically to acne once daily      clobetasol (TEMOVATE) 0.05 % external ointment       clobetasol propionate (TEMOVATE) 0.05 % external cream Apply topically 2 times daily. 60 g 1    clotrimazole-betamethasone (LOTRISONE) 1-0.05 % external cream 1 application as needed by topical route.      [facility] MELATONIN 3 MG tablet TAKE 1 TABLET (3 MG) BY MOUTH NIGHTLY AS NEEDED FOR SLEEP 90 tablet 1    cyclobenzaprine (FLEXERIL) 10 MG tablet Take 10 mg by mouth daily as needed for muscle spasms      EFFEXOR XR 75 MG 24 hr capsule Take 1 capsule (75 mg) by mouth daily 90 capsule 3    hydrOXYzine (ATARAX) 25 MG tablet TAKE 1-2 TABLETS (25-50 MG) BY MOUTH AT BEDTIME 180 tablet 2    ibuprofen (ADVIL/MOTRIN) 400 MG tablet       ketoconazole (NIZORAL) 2 % external cream APPLY TO AFFECTED AREA TWICE A DAY FOR 7 DAYS      lidocaine (LIDODERM) 5 % patch PLACE 1 PATCH ONTO THE SKIN AS NEEDED TO NECK, SHOULDERS, AND BACK 30 patch 0    Multiple Vitamin (MULTIVITAMIN ADULT PO) Take 1 tablet by mouth daily      naproxen (NAPROSYN) 500 MG tablet       omeprazole (PRILOSEC) 40 MG DR capsule TAKE 1 CAPSULE (40 MG) BY MOUTH EVERY MORNING (BEFORE BREAKFAST) 30 MINUTES BEFORE MEAL 90 capsule 1    ondansetron (ZOFRAN) 4 MG tablet Take 1 tablet (4 mg) by mouth every 8 hours as needed for nausea 18 tablet 1    Riboflavin 400 MG TABS Take 400 mg by mouth daily      rizatriptan (MAXALT) 10 MG tablet TAKE 1 TABLET BY MOUTH AS NEEDED FOR MIGRAINE. MAY REPEAT IN 2 HOURS IF NEEDED 10  "tablet 3    spironolactone (ALDACTONE) 25 MG tablet TAKE ONE TABLET BY MOUTH ONCE DAILY WITH ONE 50MG TABLET (DAILY TOTAL OF 75MG). TAKE WITH WATER.      spironolactone (ALDACTONE) 50 MG tablet       tacrolimus (PROTOPIC) 0.1 % external ointment       tretinoin (RETIN-A) 0.05 % external cream APPLY TO AFFECTED AREA EVERY DAY AT BEDTIME 45 g 2    valACYclovir (VALTREX) 1000 mg tablet TAKE 2 TABLETS BY MOUTH 12 HOURS APART AS NEEDED EPISODE 12 tablet 3    venlafaxine (EFFEXOR XR) 150 MG 24 hr capsule Take 1 capsule (150 mg) by mouth daily 90 capsule 3    zolpidem (AMBIEN) 5 MG tablet Take 1 tablet (5 mg) by mouth nightly as needed for sleep. 30 tablet 2         PHYSICAL EXAM     Vital signs  /85 (BP Location: Right arm, Patient Position: Sitting)   Pulse 97   Ht 1.549 m (5' 1\")   Wt 59 kg (130 lb)   BMI 24.56 kg/m      Weight:   130 lbs 0 oz    Pleasant female who is alert and oriented vital signs were reviewed and documented in electronic medical record.  Neck supple.  Neurologically speech was normal.  Cranial nerves II through XII are intact motor strength neck flexor and extensor 4+/5 in upper extremity 5/5 in the lower extremity proximally 4+/5 distally 5/5 reflexes 2+ toes downgoing no dysmetria noted on finger-nose testing gait normal.       PERTINENT DIAGNOSTIC STUDIES     Following studies were reviewed:     MRI BRAIN 10/29/2015  1.  Normal Head MRI.   2.  No acute infarcts, mass lesions or hemorrhage     PERTINENT LABS  Following labs were reviewed:  Office Visit on 10/28/2024   Component Date Value Ref Range Status    Color Urine 10/28/2024 Yellow  Colorless, Straw, Light Yellow, Yellow Final    Appearance Urine 10/28/2024 Clear  Clear Final    Glucose Urine 10/28/2024 Negative  Negative mg/dL Final    Bilirubin Urine 10/28/2024 Negative  Negative Final    Ketones Urine 10/28/2024 Negative  Negative mg/dL Final    Specific Gravity Urine 10/28/2024 1.015  1.005 - 1.030 Final    Blood Urine " 10/28/2024 Negative  Negative Final    pH Urine 10/28/2024 6.0  5.0 - 8.0 Final    Protein Albumin Urine 10/28/2024 Negative  Negative mg/dL Final    Urobilinogen Urine 10/28/2024 0.2  0.2, 1.0 E.U./dL Final    Nitrite Urine 10/28/2024 Negative  Negative Final    Leukocyte Esterase Urine 10/28/2024 Negative  Negative Final   Lab on 10/25/2024   Component Date Value Ref Range Status    CRP Inflammation 10/25/2024 <3.00  <5.00 mg/L Final    Erythrocyte Sedimentation Rate 10/25/2024 9  0 - 20 mm/hr Final    WBC Count 10/25/2024 7.2  4.0 - 11.0 10e3/uL Final    RBC Count 10/25/2024 5.28 (H)  3.80 - 5.20 10e6/uL Final    Hemoglobin 10/25/2024 14.8  11.7 - 15.7 g/dL Final    Hematocrit 10/25/2024 44.8  35.0 - 47.0 % Final    MCV 10/25/2024 85  78 - 100 fL Final    MCH 10/25/2024 28.0  26.5 - 33.0 pg Final    MCHC 10/25/2024 33.0  31.5 - 36.5 g/dL Final    RDW 10/25/2024 12.1  10.0 - 15.0 % Final    Platelet Count 10/25/2024 323  150 - 450 10e3/uL Final    Sodium 10/25/2024 140  135 - 145 mmol/L Final    Potassium 10/25/2024 4.3  3.4 - 5.3 mmol/L Final    Carbon Dioxide (CO2) 10/25/2024 27  22 - 29 mmol/L Final    Anion Gap 10/25/2024 11  7 - 15 mmol/L Final    Urea Nitrogen 10/25/2024 12.3  6.0 - 20.0 mg/dL Final    Creatinine 10/25/2024 0.93  0.51 - 0.95 mg/dL Final    GFR Estimate 10/25/2024 77  >60 mL/min/1.73m2 Final    Calcium 10/25/2024 9.8  8.8 - 10.4 mg/dL Final    Chloride 10/25/2024 102  98 - 107 mmol/L Final    Glucose 10/25/2024 86  70 - 99 mg/dL Final    Alkaline Phosphatase 10/25/2024 74  40 - 150 U/L Final    AST 10/25/2024 32  0 - 45 U/L Final    ALT 10/25/2024 31  0 - 50 U/L Final    Protein Total 10/25/2024 7.3  6.4 - 8.3 g/dL Final    Albumin 10/25/2024 4.5  3.5 - 5.2 g/dL Final    Bilirubin Total 10/25/2024 0.2  <=1.2 mg/dL Final   Office Visit on 10/07/2024   Component Date Value Ref Range Status    Cholesterol 10/07/2024 202 (H)  <200 mg/dL Final    Triglycerides 10/07/2024 102  <150 mg/dL Final     Direct Measure HDL 10/07/2024 48 (L)  >=50 mg/dL Final    LDL Cholesterol Calculated 10/07/2024 134 (H)  <100 mg/dL Final    Non HDL Cholesterol 10/07/2024 154 (H)  <130 mg/dL Final    Patient Fasting > 8hrs? 10/07/2024 Yes   Final   Office Visit on 09/08/2024   Component Date Value Ref Range Status    SARS CoV2 PCR 09/08/2024 Negative  Negative Final    Group A Strep antigen 09/08/2024 Negative  Negative Final    Group A strep by PCR 09/08/2024 Not Detected  Not Detected Final   Office Visit on 08/18/2024   Component Date Value Ref Range Status    SARS CoV2 PCR 08/18/2024 Negative  Negative Final         Total time spent for face to face visit, reviewing labs/imaging studies, counseling and coordination of care was: 20 minutes spent on the date of the encounter doing chart review, review of outside records, review of test results, interpretation of tests, patient visit, and documentation     The longitudinal plan of care for the diagnosis(es)/condition(s) as documented were addressed during this visit. Due to the added complexity in care, I will continue to support Sherry in the subsequent management and with ongoing continuity of care.    This note was dictated using voice recognition software.  Any grammatical or context distortions are unintentional and inherent to the software.    Orders Placed This Encounter   Procedures    79919 - MIGRAINE      New Prescriptions    No medications on file     Modified Medications    No medications on file

## 2024-11-04 ENCOUNTER — OFFICE VISIT (OUTPATIENT)
Dept: NEUROLOGY | Facility: CLINIC | Age: 44
End: 2024-11-04
Payer: COMMERCIAL

## 2024-11-04 VITALS
BODY MASS INDEX: 24.55 KG/M2 | DIASTOLIC BLOOD PRESSURE: 85 MMHG | HEIGHT: 61 IN | SYSTOLIC BLOOD PRESSURE: 113 MMHG | WEIGHT: 130 LBS | HEART RATE: 97 BPM

## 2024-11-04 DIAGNOSIS — G43.719 CHRONIC MIGRAINE WITHOUT AURA, INTRACTABLE, WITHOUT STATUS MIGRAINOSUS: Primary | ICD-10-CM

## 2024-11-04 PROCEDURE — 64615 CHEMODENERV MUSC MIGRAINE: CPT | Performed by: PSYCHIATRY & NEUROLOGY

## 2024-11-04 ASSESSMENT — HEADACHE IMPACT TEST (HIT 6)
HOW OFTEN HAVE YOU FELT FED UP OR IRRITATED BECAUSE OF YOUR HEADACHES: VERY OFTEN
HOW OFTEN HAVE YOU FELT TOO TIRED TO WORK BECAUSE OF YOUR HEADACHES: VERY OFTEN
WHEN YOU HAVE HEADACHES HOW OFTEN IS THE PAIN SEVERE: VERY OFTEN
HOW OFTEN HAVE YOU FELT FED UP OR IRRITATED BECAUSE OF YOUR HEADACHES: VERY OFTEN
HOW OFTEN DO HEADACHES LIMIT YOUR DAILY ACTIVITIES: VERY OFTEN
WHEN YOU HAVE A HEADACHE HOW OFTEN DO YOU WISH YOU COULD LIE DOWN: ALWAYS
HOW OFTEN DO HEADACHES LIMIT YOUR DAILY ACTIVITIES: VERY OFTEN
HOW OFTEN HAVE YOU FELT TOO TIRED TO WORK BECAUSE OF YOUR HEADACHES: VERY OFTEN
WHEN YOU HAVE A HEADACHE HOW OFTEN DO YOU WISH YOU COULD LIE DOWN: ALWAYS
HIT6 TOTAL SCORE: 68
HIT6 TOTAL SCORE: 68
HOW OFTEN DID HEADACHS LIMIT CONCENTRATION ON WORK OR DAILY ACTIVITY: VERY OFTEN
WHEN YOU HAVE HEADACHES HOW OFTEN IS THE PAIN SEVERE: VERY OFTEN
HOW OFTEN DID HEADACHS LIMIT CONCENTRATION ON WORK OR DAILY ACTIVITY: VERY OFTEN

## 2024-11-04 NOTE — NURSING NOTE
Chief Complaint   Patient presents with    Botox Migraine     Last Patient-Answered HIT-6 Questionnaire      11/4/2024     1:38 PM   HIT-6   When you have headaches, how often is the pain severe 11    How often do headaches limit your ability to do usual daily activities including household work, work, school, or social activities? 11    When you have a headache, how often do you wish you could lie down? 13    In the past 4 weeks, how often have you felt too tired to do work or daily activities because of your headaches 11    In the past 4 weeks, how often have you felt fed up or irritated because of your headaches 11    In the past 4 weeks, how often did headaches limit your ability to concentrate on work or daily activities 11    HIT-6 Total Score 68        Patient-reported

## 2024-11-04 NOTE — PROCEDURES
BOTOX INJECTION PROCEDURE NOTE     Date: 11/04/2024    INDICATION: CHRONIC MIGRAINE    Number of headache days/month currently: 14 (BASELINE: >15   )  Number of headache hours/day currently : Monitor for (BASELINE: 4-24 Hours   )  Number of headache free days post treatment:  16  Disability due to migraine headache: yes  Consent: Obtained  Indication: Chronic Migraine    Botox Lot No: [id number] 3/1/2027  Number of units injected: 155 U  Number of units of unavoidable wastage: 45 U    LOCATION  At today s visit, patient was injected with a total of 155 units divided in 31 sites. A total of 2 mL of normal saline was used to dilute 100 units of the drug. The following muscles were injected:  10 units divided in two sites, procerus 5 units in one site, frontalis 20 units divided in four sites, temporalis 40 units divided in eight sites, occipitalis 30 units divided in six sites, cervical paraspinals 20 units divided in four sites, and trapezius 30 units divided in six sites. 45 units were unavoidable wastage        ASSESSMENT/PLAN  Chronic Migraine headache  The patient tolerated the procedure well. There were no immediate complications. Prior to the procedure, I discussed the potential side effects of Botox therapy, which includes generalized muscle weakness, diplopia, ptosis, dysphagia, dysphonia, dysarthria, urinary incontinence, and breathing difficulties. Also I discussed the black box warning of the drug that can include the rare chance of distant spread of the drug to swallowing and breathing muscles that can be life threatening. All of patient's questions were answered prior to our signing the consent form. patient left the clinic after a brief period of observation and will return for repeat injection in three months  time as needed. I informed patient again the goal is to maintain at least seven to eight headache free days on average a month. I explained to patient that most patients with  chronic migraines do need life time Botox treatment, however, at any time if the patient wishes to stop Botox we can do so as some patients do go into remission on their own over time. Follow up will be in 3-4 months      Mikel Hernandez M.D.  [facility]  (Formerly, [facility]

## 2024-11-04 NOTE — LETTER
2024      Sherry Sweeney  [address] [facility] [address]      Dear Colleague,    Thank you for referring your patient, Sherry Sweeney, to the [facility] [place]. Please see a copy of my visit note below.    NEUROLOGY FOLLOW UP VISIT  NOTE       [facility] [place]  [address] [place] [address]  Tel: [phone]  Fax: [phone]  [email or web address]     Sherry Sweeney,  1980, MRN [id number]  PCP: Layla Martinez  Date: 2024      ASSESSMENT & PLAN     Visit Diagnosis  Chronic migraine without aura, intractable, without status migrainosus     Chronic migraine without aura  Pleasant 44-year-old female with history of mitochondrial myopathy, chronic migraine without aura, fibromyalgia who is here for 3-month Botox injection.  She has noticed more than 50% reduction in her headache with Botox.  After explaining all the side effect and obtaining her consent, I injected 155 units according to the enclosed sheet.  45 units were discarded.  She was monitored in the clinic for short duration and left in a satisfactory condition.  Follow-up will be in 3 months.      Thank you again for this referral, please feel free to contact me if you have any questions.    Mikel Hernandez MD  [facility], [place]     HISTORY OF PRESENT ILLNESS     Patient is a 44-year-old female with history of mitochondrial myopathy chronic migraine without aura, fibromyalgia last seen on 2024 for 3 monthly Botox who returns for follow-up.  She continues to notice improvement with Botox and reports more than 50% reduction in her headaches.Previously she had failed multiple medication but ever since she started getting Botox there was significant improvement. She started getting Botox injections in 2019 and noticed steady decline in her headache frequency.  During COVID pandemic she had to hold off on Botox and noticed flareup of her symptoms but  ever since she restarted there has been a steady decline.  Previously she had tried Depakote, amitriptyline and beta-blocker that did not help.     Briefly patient is a female with history of mitochondrial myopathy, chronic migraine without aura, fibromyalgia who started having headaches in 2014 and progressively got worse.  She initially attributed it to pressure in the neck or TMJ abnormality and that in the past was evaluated and started on some hormonal supplements.  Due to progressive weakness she was evaluated for possible mitochondrial myopathy and had mitochondrial DNA tested that was positive.  For her headaches she was started on Depakote, amitriptyline and in the past had also tried Cymbalta and as she was having more than 15 headaches in a month Botox was recommended.  She started getting Botox in 2017 and did notice improvement in her headache.  Discontinued until 2019 but due to COVID pandemic she stopped doing Botox injection and noticed worsening headaches and Botox was restarted in 2023.       PROBLEM LIST   Patient Active Problem List   Diagnosis     Acne vulgaris     Arthralgia of temporomandibular joint     Debility     Fibromyalgia     IBS (irritable bowel syndrome)     Insomnia     Major depression, recurrent, chronic (H)     Chronic migraine without aura, intractable, without status migrainosus     Mitochondrial disease (H)     Generalized anxiety disorder     Post-traumatic stress disorder     Parasomnia     Seasonal depression (H)     Palpitations     Gastroesophageal reflux disease without esophagitis     Vaginal Papanicolaou smear not required due to history of hysterectomy     Biliary colic         PAST MEDICAL & SURGICAL HISTORY     Past Medical History:   Patient  has a past medical history of Anemia, Chronic fatigue syndrome, Chronic migraine, Depression, Depression, Depressive disorder (2013), Fibromyalgia, Fibromyalgia, Gastroesophageal reflux disease, H/O: hysterectomy (02/22/2018),  History of anesthesia complications, IBS (irritable bowel syndrome), Insomnia, Irregular heart beat, Mitochondrial disease (H), Mitochondrial myopathy, Motion sickness, Parasomnia, and PONV (postoperative nausea and vomiting).    Surgical History:  She  has a past surgical history that includes REMOVAL OF OVARIAN CYST(S); Hysterectomy (Bilateral, 02/22/2018); Picc (02/24/2018); Abdomen surgery (1996 & 2018, feb.); appendectomy (1996); biopsy; Laparoscopic cystectomy ovarian (oncology) (Left, 10/18/2021); Laparoscopy diagnostic (gyn) (Left, 06/29/2023); Hysterectomy total abdominal; and Laparoscopic cholecystectomy (N/A, 10/25/2023).     SOCIAL HISTORY     Reviewed, and she  reports that she has never smoked. She has never used smokeless tobacco. She reports that she does not drink alcohol and does not use drugs.     FAMILY HISTORY     Reviewed, and family history includes Anxiety Disorder in her maternal grandmother, mother, paternal grandfather, and paternal grandmother; Bone Cancer in her maternal grandfather; Breast Cancer (age of onset: 60.00) in her maternal grandmother; Breast Cancer (age of onset: 62.00) in her mother; Cancer in her mother; Coronary Artery Disease (age of onset: 45.00) in her father; Depression in her brother, father, maternal grandmother, and mother; Diabetes in her cousin and cousin; Hyperlipidemia in her mother; Hypertension in her mother; Meniere's disease in her mother.     ALLERGIES     Allergies   Allergen Reactions     Ciprofloxacin Itching, Rash and Hives     Rash over whole body   Rash over whole body        Influenza Virus Vaccine Shortness Of Breath and Anaphylaxis     No Clinical Screening - See Comments Anaphylaxis     Stomach swelling     Sulfa Antibiotics Rash and Hives     Desvenlafaxine Blisters, Itching and Rash     Milnacipran Other (See Comments) and Palpitations     Chest pain, hot/cold flashes     Quetiapine Palpitations     Tachycardia, nervousness, elevated blood  pressure.   Tachycardia, nervousness, elevated blood pressure.          REVIEW OF SYSTEMS     A 12 point review of system was performed and was negative except as outlined in the history of present illness.     HOME MEDICATIONS     Current Outpatient Rx   Medication Sig Dispense Refill     ascorbic acid 500 MG TABS Take 500 mg by mouth daily       betamethasone dipropionate (DIPROSONE) 0.05 % external lotion Apply topically 2 times daily. 60 mL 1     buPROPion (WELLBUTRIN XL) 300 MG 24 hr tablet TAKE 1 TABLET BY MOUTH EVERY DAY 90 tablet 3     cetirizine (ZYRTEC) 10 MG tablet TAKE 1 TAB ORALLY DAILY AS NEEDED FOR ALLERGIES MAY INCREASE TO 2 TAB DAILY IF ITCHING NOT RELIEVED 90 tablet 2     clindamycin-benzoyl peroxide (BENZACLIN) gel Apply topically to acne once daily       clobetasol (TEMOVATE) 0.05 % external ointment        clobetasol propionate (TEMOVATE) 0.05 % external cream Apply topically 2 times daily. 60 g 1     clotrimazole-betamethasone (LOTRISONE) 1-0.05 % external cream 1 application as needed by topical route.       [facility] MELATONIN 3 MG tablet TAKE 1 TABLET (3 MG) BY MOUTH NIGHTLY AS NEEDED FOR SLEEP 90 tablet 1     cyclobenzaprine (FLEXERIL) 10 MG tablet Take 10 mg by mouth daily as needed for muscle spasms       EFFEXOR XR 75 MG 24 hr capsule Take 1 capsule (75 mg) by mouth daily 90 capsule 3     hydrOXYzine (ATARAX) 25 MG tablet TAKE 1-2 TABLETS (25-50 MG) BY MOUTH AT BEDTIME 180 tablet 2     ibuprofen (ADVIL/MOTRIN) 400 MG tablet        ketoconazole (NIZORAL) 2 % external cream APPLY TO AFFECTED AREA TWICE A DAY FOR 7 DAYS       lidocaine (LIDODERM) 5 % patch PLACE 1 PATCH ONTO THE SKIN AS NEEDED TO NECK, SHOULDERS, AND BACK 30 patch 0     Multiple Vitamin (MULTIVITAMIN ADULT PO) Take 1 tablet by mouth daily       naproxen (NAPROSYN) 500 MG tablet        omeprazole (PRILOSEC) 40 MG DR capsule TAKE 1 CAPSULE (40 MG) BY MOUTH EVERY MORNING (BEFORE BREAKFAST) 30 MINUTES BEFORE MEAL 90 capsule 1      "ondansetron (ZOFRAN) 4 MG tablet Take 1 tablet (4 mg) by mouth every 8 hours as needed for nausea 18 tablet 1     Riboflavin 400 MG TABS Take 400 mg by mouth daily       rizatriptan (MAXALT) 10 MG tablet TAKE 1 TABLET BY MOUTH AS NEEDED FOR MIGRAINE. MAY REPEAT IN 2 HOURS IF NEEDED 10 tablet 3     spironolactone (ALDACTONE) 25 MG tablet TAKE ONE TABLET BY MOUTH ONCE DAILY WITH ONE 50MG TABLET (DAILY TOTAL OF 75MG). TAKE WITH WATER.       spironolactone (ALDACTONE) 50 MG tablet        tacrolimus (PROTOPIC) 0.1 % external ointment        tretinoin (RETIN-A) 0.05 % external cream APPLY TO AFFECTED AREA EVERY DAY AT BEDTIME 45 g 2     valACYclovir (VALTREX) 1000 mg tablet TAKE 2 TABLETS BY MOUTH 12 HOURS APART AS NEEDED EPISODE 12 tablet 3     venlafaxine (EFFEXOR XR) 150 MG 24 hr capsule Take 1 capsule (150 mg) by mouth daily 90 capsule 3     zolpidem (AMBIEN) 5 MG tablet Take 1 tablet (5 mg) by mouth nightly as needed for sleep. 30 tablet 2         PHYSICAL EXAM     Vital signs  /85 (BP Location: Right arm, Patient Position: Sitting)   Pulse 97   Ht 1.549 m (5' 1\")   Wt 59 kg (130 lb)   BMI 24.56 kg/m      Weight:   130 lbs 0 oz    Pleasant female who is alert and oriented vital signs were reviewed and documented in electronic medical record.  Neck supple.  Neurologically speech was normal.  Cranial nerves II through XII are intact motor strength neck flexor and extensor 4+/5 in upper extremity 5/5 in the lower extremity proximally 4+/5 distally 5/5 reflexes 2+ toes downgoing no dysmetria noted on finger-nose testing gait normal.       PERTINENT DIAGNOSTIC STUDIES     Following studies were reviewed:     MRI BRAIN 10/29/2015  1.  Normal Head MRI.   2.  No acute infarcts, mass lesions or hemorrhage     PERTINENT LABS  Following labs were reviewed:  Office Visit on 10/28/2024   Component Date Value Ref Range Status     Color Urine 10/28/2024 Yellow  Colorless, Straw, Light Yellow, Yellow Final     Appearance " Urine 10/28/2024 Clear  Clear Final     Glucose Urine 10/28/2024 Negative  Negative mg/dL Final     Bilirubin Urine 10/28/2024 Negative  Negative Final     Ketones Urine 10/28/2024 Negative  Negative mg/dL Final     Specific Gravity Urine 10/28/2024 1.015  1.005 - 1.030 Final     Blood Urine 10/28/2024 Negative  Negative Final     pH Urine 10/28/2024 6.0  5.0 - 8.0 Final     Protein Albumin Urine 10/28/2024 Negative  Negative mg/dL Final     Urobilinogen Urine 10/28/2024 0.2  0.2, 1.0 E.U./dL Final     Nitrite Urine 10/28/2024 Negative  Negative Final     Leukocyte Esterase Urine 10/28/2024 Negative  Negative Final   Lab on 10/25/2024   Component Date Value Ref Range Status     CRP Inflammation 10/25/2024 <3.00  <5.00 mg/L Final     Erythrocyte Sedimentation Rate 10/25/2024 9  0 - 20 mm/hr Final     WBC Count 10/25/2024 7.2  4.0 - 11.0 10e3/uL Final     RBC Count 10/25/2024 5.28 (H)  3.80 - 5.20 10e6/uL Final     Hemoglobin 10/25/2024 14.8  11.7 - 15.7 g/dL Final     Hematocrit 10/25/2024 44.8  35.0 - 47.0 % Final     MCV 10/25/2024 85  78 - 100 fL Final     MCH 10/25/2024 28.0  26.5 - 33.0 pg Final     MCHC 10/25/2024 33.0  31.5 - 36.5 g/dL Final     RDW 10/25/2024 12.1  10.0 - 15.0 % Final     Platelet Count 10/25/2024 323  150 - 450 10e3/uL Final     Sodium 10/25/2024 140  135 - 145 mmol/L Final     Potassium 10/25/2024 4.3  3.4 - 5.3 mmol/L Final     Carbon Dioxide (CO2) 10/25/2024 27  22 - 29 mmol/L Final     Anion Gap 10/25/2024 11  7 - 15 mmol/L Final     Urea Nitrogen 10/25/2024 12.3  6.0 - 20.0 mg/dL Final     Creatinine 10/25/2024 0.93  0.51 - 0.95 mg/dL Final     GFR Estimate 10/25/2024 77  >60 mL/min/1.73m2 Final     Calcium 10/25/2024 9.8  8.8 - 10.4 mg/dL Final     Chloride 10/25/2024 102  98 - 107 mmol/L Final     Glucose 10/25/2024 86  70 - 99 mg/dL Final     Alkaline Phosphatase 10/25/2024 74  40 - 150 U/L Final     AST 10/25/2024 32  0 - 45 U/L Final     ALT 10/25/2024 31  0 - 50 U/L Final      Protein Total 10/25/2024 7.3  6.4 - 8.3 g/dL Final     Albumin 10/25/2024 4.5  3.5 - 5.2 g/dL Final     Bilirubin Total 10/25/2024 0.2  <=1.2 mg/dL Final   Office Visit on 10/07/2024   Component Date Value Ref Range Status     Cholesterol 10/07/2024 202 (H)  <200 mg/dL Final     Triglycerides 10/07/2024 102  <150 mg/dL Final     Direct Measure HDL 10/07/2024 48 (L)  >=50 mg/dL Final     LDL Cholesterol Calculated 10/07/2024 134 (H)  <100 mg/dL Final     Non HDL Cholesterol 10/07/2024 154 (H)  <130 mg/dL Final     Patient Fasting > 8hrs? 10/07/2024 Yes   Final   Office Visit on 09/08/2024   Component Date Value Ref Range Status     SARS CoV2 PCR 09/08/2024 Negative  Negative Final     Group A Strep antigen 09/08/2024 Negative  Negative Final     Group A strep by PCR 09/08/2024 Not Detected  Not Detected Final   Office Visit on 08/18/2024   Component Date Value Ref Range Status     SARS CoV2 PCR 08/18/2024 Negative  Negative Final         Total time spent for face to face visit, reviewing labs/imaging studies, counseling and coordination of care was: 20 minutes spent on the date of the encounter doing chart review, review of outside records, review of test results, interpretation of tests, patient visit, and documentation     The longitudinal plan of care for the diagnosis(es)/condition(s) as documented were addressed during this visit. Due to the added complexity in care, I will continue to support Sherry in the subsequent management and with ongoing continuity of care.    This note was dictated using voice recognition software.  Any grammatical or context distortions are unintentional and inherent to the software.    Orders Placed This Encounter   Procedures     54400 - MIGRAINE      New Prescriptions    No medications on file     Modified Medications    No medications on file                 Again, thank you for allowing me to participate in the care of your patient.        Sincerely,        Mikel Hernandez MD

## 2024-11-25 ENCOUNTER — E-VISIT (OUTPATIENT)
Dept: URGENT CARE | Facility: CLINIC | Age: 44
End: 2024-11-25
Payer: COMMERCIAL

## 2024-11-25 DIAGNOSIS — R30.0 DYSURIA: Primary | ICD-10-CM

## 2024-11-26 ENCOUNTER — NURSE TRIAGE (OUTPATIENT)
Dept: FAMILY MEDICINE | Facility: CLINIC | Age: 44
End: 2024-11-26

## 2024-11-26 ENCOUNTER — LAB (OUTPATIENT)
Dept: LAB | Facility: CLINIC | Age: 44
End: 2024-11-26
Payer: COMMERCIAL

## 2024-11-26 ENCOUNTER — OFFICE VISIT (OUTPATIENT)
Dept: PEDIATRICS | Facility: CLINIC | Age: 44
End: 2024-11-26
Payer: COMMERCIAL

## 2024-11-26 ENCOUNTER — HOSPITAL ENCOUNTER (OUTPATIENT)
Dept: GENERAL RADIOLOGY | Facility: HOSPITAL | Age: 44
Discharge: HOME | End: 2024-11-26
Attending: EMERGENCY MEDICINE
Payer: MEDICARE

## 2024-11-26 VITALS
BODY MASS INDEX: 24.81 KG/M2 | OXYGEN SATURATION: 97 % | RESPIRATION RATE: 16 BRPM | TEMPERATURE: 99.4 F | WEIGHT: 131.3 LBS | HEART RATE: 100 BPM | SYSTOLIC BLOOD PRESSURE: 121 MMHG | DIASTOLIC BLOOD PRESSURE: 84 MMHG

## 2024-11-26 DIAGNOSIS — R30.0 DYSURIA: ICD-10-CM

## 2024-11-26 DIAGNOSIS — R10.84 GENERALIZED ABDOMINAL PAIN: Primary | ICD-10-CM

## 2024-11-26 DIAGNOSIS — R10.84 GENERALIZED ABDOMINAL PAIN: ICD-10-CM

## 2024-11-26 LAB
ALBUMIN SERPL-MCNC: 3.9 G/DL (ref 3.4–5)
ALBUMIN UR-MCNC: NEGATIVE MG/DL
ALP SERPL-CCNC: 56 U/L (ref 40–150)
ALT SERPL W P-5'-P-CCNC: 17 U/L (ref 0–50)
ANION GAP SERPL CALCULATED.3IONS-SCNC: 7 MMOL/L (ref 3–14)
APPEARANCE UR: CLEAR
AST SERPL W P-5'-P-CCNC: 24 U/L (ref 0–45)
BASOPHILS # BLD AUTO: 0 10E3/UL (ref 0–0.2)
BASOPHILS NFR BLD AUTO: 0 %
BILIRUB SERPL-MCNC: 0.5 MG/DL (ref 0.2–1.3)
BILIRUB UR QL STRIP: NEGATIVE
BUN SERPL-MCNC: 9 MG/DL (ref 7–30)
CALCIUM SERPL-MCNC: 9.1 MG/DL (ref 8.5–10.1)
CHLORIDE BLD-SCNC: 103 MMOL/L (ref 94–109)
CO2 SERPL-SCNC: 28 MMOL/L (ref 20–32)
COLOR UR AUTO: NORMAL
CREAT SERPL-MCNC: 0.7 MG/DL (ref 0.52–1.04)
EGFRCR SERPLBLD CKD-EPI 2021: >90 ML/MIN/1.73M2
EOSINOPHIL # BLD AUTO: 0 10E3/UL (ref 0–0.7)
EOSINOPHIL NFR BLD AUTO: 0 %
ERYTHROCYTE [DISTWIDTH] IN BLOOD BY AUTOMATED COUNT: 12.4 % (ref 10–15)
GLUCOSE BLD-MCNC: 93 MG/DL (ref 70–99)
GLUCOSE UR STRIP-MCNC: NEGATIVE MG/DL
HCT VFR BLD AUTO: 40.3 % (ref 35–47)
HGB BLD-MCNC: 13.5 G/DL (ref 11.7–15.7)
HGB UR QL STRIP: NEGATIVE
IMM GRANULOCYTES # BLD: 0 10E3/UL
IMM GRANULOCYTES NFR BLD: 0 %
KETONES UR STRIP-MCNC: NEGATIVE MG/DL
LEUKOCYTE ESTERASE UR QL STRIP: NEGATIVE
LIPASE SERPL-CCNC: 30 U/L (ref 13–60)
LYMPHOCYTES # BLD AUTO: 2.1 10E3/UL (ref 0.8–5.3)
LYMPHOCYTES NFR BLD AUTO: 26 %
MCH RBC QN AUTO: 28.1 PG (ref 26.5–33)
MCHC RBC AUTO-ENTMCNC: 33.5 G/DL (ref 31.5–36.5)
MCV RBC AUTO: 84 FL (ref 78–100)
MONOCYTES # BLD AUTO: 0.4 10E3/UL (ref 0–1.3)
MONOCYTES NFR BLD AUTO: 6 %
NEUTROPHILS # BLD AUTO: 5.4 10E3/UL (ref 1.6–8.3)
NEUTROPHILS NFR BLD AUTO: 68 %
NITRATE UR QL: NEGATIVE
PH UR STRIP: 6 [PH] (ref 5–7)
PLATELET # BLD AUTO: 315 10E3/UL (ref 150–450)
POTASSIUM BLD-SCNC: 4.3 MMOL/L (ref 3.4–5.3)
PROT SERPL-MCNC: 7 G/DL (ref 6.8–8.8)
RBC # BLD AUTO: 4.81 10E6/UL (ref 3.8–5.2)
SODIUM SERPL-SCNC: 138 MMOL/L (ref 135–145)
SP GR UR STRIP: 1 (ref 1–1.03)
UROBILINOGEN UR STRIP-MCNC: NORMAL MG/DL
WBC # BLD AUTO: 8.1 10E3/UL (ref 4–11)

## 2024-11-26 PROCEDURE — 81003 URINALYSIS AUTO W/O SCOPE: CPT | Performed by: PATHOLOGY

## 2024-11-26 PROCEDURE — 80053 COMPREHEN METABOLIC PANEL: CPT | Performed by: EMERGENCY MEDICINE

## 2024-11-26 PROCEDURE — 36415 COLL VENOUS BLD VENIPUNCTURE: CPT | Performed by: EMERGENCY MEDICINE

## 2024-11-26 PROCEDURE — 85025 COMPLETE CBC W/AUTO DIFF WBC: CPT | Performed by: EMERGENCY MEDICINE

## 2024-11-26 PROCEDURE — 83690 ASSAY OF LIPASE: CPT | Performed by: EMERGENCY MEDICINE

## 2024-11-26 PROCEDURE — 99214 OFFICE O/P EST MOD 30 MIN: CPT | Performed by: EMERGENCY MEDICINE

## 2024-11-26 PROCEDURE — 74019 RADEX ABDOMEN 2 VIEWS: CPT

## 2024-11-26 RX ORDER — LACTULOSE 10 G/15ML
30 SOLUTION ORAL 3 TIMES DAILY
Qty: 450 ML | Refills: 0 | Status: SHIPPED | OUTPATIENT
Start: 2024-11-26 | End: 2024-11-26

## 2024-11-26 RX ORDER — LACTULOSE 10 G/15ML
30 SOLUTION ORAL 3 TIMES DAILY
Qty: 450 ML | Refills: 0 | Status: SHIPPED | OUTPATIENT
Start: 2024-11-26

## 2024-11-26 RX ORDER — SENNOSIDES A AND B 8.6 MG/1
1 TABLET, FILM COATED ORAL DAILY
Qty: 60 TABLET | Refills: 0 | Status: SHIPPED | OUTPATIENT
Start: 2024-11-26 | End: 2025-01-25

## 2024-11-26 ASSESSMENT — PAIN SCALES - GENERAL: PAINLEVEL_OUTOF10: SEVERE PAIN (6)

## 2024-11-26 NOTE — PATIENT INSTRUCTIONS
Dear Sherry Sweeney,     After reviewing your responses, I would like you to come in for a urine test to make sure we treat you correctly. This urine test is to evaluate you for a possible urinary tract infection, and should be scheduled for today or tomorrow. Schedule a Lab Only appointment here.     Lab appointments are not available at most locations on the weekends. If no Lab Only appointment is available, you should be seen in any of our convenient Walk-in or Urgent Care Centers, which can be found on our website here.     You will receive instructions with your results in [facility] once they are available.     If your symptoms worsen, you develop pain in your back or stomach, develop fevers, or are not improving in 5 days, please contact your primary care provider for an appointment or visit a Walk-in or Urgent Care Center to be seen.     Thanks again for choosing us as your health care partner,     Anjel Roque MD  Lab ordered-either present to  [facility] lab for Urine test or make lab appt with [facility].

## 2024-11-26 NOTE — PROGRESS NOTES
Acute and Diagnostic Services Clinic Visit    Nursing triage note    Comfort Powell is a 44 year old, presenting for the following health issues:  Abdominal Pain (LLQ)      Objective    /84 (BP Location: Right arm, Patient Position: Sitting, Cuff Size: Adult Regular)   Pulse 100   Temp 99.4  F (37.4  C) (Oral)   Resp 16   Wt 59.6 kg (131 lb 4.8 oz)   SpO2 97%   BMI 24.81 kg/m    Body mass index is 24.81 kg/m .          ADS PROVIDER NOTE  [facility]    History     Chief Complaint   Patient presents with    Abdominal Pain     LLQ     HPI  Sherry Sweeney is a 44 year old female with multiple medical problems and multiple surgeries including a total abdominal hysterectomy and salpingo-oophorectomy who presents to the [facility] for further evaluation of abdominal pain that she feels is generalized and exacerbated by eating over the past 2 months.  Patient denies any fevers any vomiting and denies any diarrhea melena or bright blood per rectum.  Patient states she notes that her urine smelled concentrated and foul earlier today... This was checked from the clinic and revealed no abnormalities and a dilute specimen.    I have reviewed the Medications, Allergies, Past Medical and Surgical History, and Social History in the [employer] system.    Past Medical History:   Diagnosis Date    Anemia     Chronic fatigue syndrome     Chronic migraine     Depression     Depression     Depressive disorder 2013    Fibromyalgia     Fibromyalgia     Gastroesophageal reflux disease     H/O: hysterectomy 02/22/2018    History of anesthesia complications     slow to wake    IBS (irritable bowel syndrome)     Insomnia     Irregular heart beat     Mitochondrial disease (H)     Mitochondrial myopathy     Motion sickness     Parasomnia     PONV (postoperative nausea and vomiting)        Past Surgical History:   Procedure Laterality Date    ABDOMEN SURGERY  1996 & 2018, feb.    APPENDECTOMY  1996    BIOPSY      HC REMOVAL OF  OVARIAN CYST(S)      Description: Ovarian Cystectomy;  Recorded: 11/07/2013;    HYSTERECTOMY Bilateral 02/22/2018    Procedure: TOTAL ABDOMINAL HYSTERECTOMY BILATERAL SALPINGECTOMY, RIGHT OOPHORECTOMY;  Surgeon: Joanne Bedolla DO;  Location: [facility] [place];  Service:     HYSTERECTOMY TOTAL ABDOMINAL      LAPAROSCOPIC CHOLECYSTECTOMY N/A 10/25/2023    Procedure: CHOLECYSTECTOMY, LAPAROSCOPIC;  Surgeon: Eusebio Gonzalez MD;  Location: [facility] OR    LAPAROSCOPIC CYSTECTOMY OVARIAN (ONCOLOGY) Left 10/18/2021    Procedure: LAPAROSCOPY,  LEFT OVARIAN CYSTECTOMY, LYSIS OF ADHESIONS.;  Surgeon: Joanne Bedolla MD;  Location: [facility]    LAPAROSCOPY DIAGNOSTIC (GYN) Left 06/29/2023    Procedure: DIAGNOSTIC LAPAROSCOPY WITH LEFT SIDE OOPHORECTOMY AND LYSIS OF ADHESION;  Surgeon: Joanne Bedolla MD;  Location: [facility] OR    PICC  02/24/2018              Dose / Directions   betamethasone dipropionate 0.05 % external lotion  Commonly known as: DIPROSONE  Used for: Rash      Apply topically 2 times daily.  Quantity: 60 mL  Refills: 1     buPROPion 300 MG 24 hr tablet  Commonly known as: WELLBUTRIN XL  Used for: Major depression, recurrent, chronic (H)      TAKE 1 TABLET BY MOUTH EVERY DAY  Quantity: 90 tablet  Refills: 3     cetirizine 10 MG tablet  Commonly known as: zyrTEC  Used for: Rash and nonspecific skin eruption      TAKE 1 TAB ORALLY DAILY AS NEEDED FOR ALLERGIES MAY INCREASE TO 2 TAB DAILY IF ITCHING NOT RELIEVED  Quantity: 90 tablet  Refills: 2     clindamycin-benzoyl peroxide 1-5 % external gel  Commonly known as: BENZACLIN      Apply topically to acne once daily  Refills: 0     * clobetasol propionate 0.05 % external cream  Commonly known as: TEMOVATE  Used for: Rash      Apply topically 2 times daily.  Quantity: 60 g  Refills: 1     * clobetasol 0.05 % external ointment  Commonly known as: TEMOVATE      Refills: 0     clotrimazole-betamethasone 1-0.05 % external  cream  Commonly known as: LOTRISONE      1 application as needed by topical route.  Refills: 0     [employer] Melatonin 3 MG tablet  Used for: Insomnia, unspecified type  Generic drug: melatonin      TAKE 1 TABLET (3 MG) BY MOUTH NIGHTLY AS NEEDED FOR SLEEP  Quantity: 90 tablet  Refills: 1     cyclobenzaprine 10 MG tablet  Commonly known as: FLEXERIL      Dose: 10 mg  Take 10 mg by mouth daily as needed for muscle spasms  Refills: 0     * Effexor XR 75 MG 24 hr capsule  Used for: Generalized anxiety disorder  Generic drug: venlafaxine      Dose: 75 mg  Take 1 capsule (75 mg) by mouth daily  Quantity: 90 capsule  Refills: 3     * venlafaxine 150 MG 24 hr capsule  Commonly known as: Effexor XR  Used for: Major depression, recurrent, chronic (H)      Dose: 150 mg  Take 1 capsule (150 mg) by mouth daily  Quantity: 90 capsule  Refills: 3     hydrOXYzine HCl 25 MG tablet  Commonly known as: ATARAX  Used for: Acne vulgaris      Dose: 25-50 mg  TAKE 1-2 TABLETS (25-50 MG) BY MOUTH AT BEDTIME  Quantity: 180 tablet  Refills: 2     ibuprofen 400 MG tablet  Commonly known as: ADVIL/MOTRIN      Refills: 0     ketoconazole 2 % external cream  Commonly known as: NIZORAL      APPLY TO AFFECTED AREA TWICE A DAY FOR 7 DAYS  Refills: 0     lidocaine 5 % patch  Commonly known as: LIDODERM  Used for: Chronic back pain, unspecified back location, unspecified back pain laterality      Dose: 1 patch  PLACE 1 PATCH ONTO THE SKIN AS NEEDED TO NECK, SHOULDERS, AND BACK  Quantity: 30 patch  Refills: 0     MULTIVITAMIN ADULT PO      Dose: 1 tablet  Take 1 tablet by mouth daily  Refills: 0     naproxen 500 MG tablet  Commonly known as: NAPROSYN      Refills: 0     omeprazole 40 MG DR capsule  Commonly known as: PriLOSEC  Used for: Gastroesophageal reflux disease with esophagitis without hemorrhage      Dose: 40 mg  TAKE 1 CAPSULE (40 MG) BY MOUTH EVERY MORNING (BEFORE BREAKFAST) 30 MINUTES BEFORE MEAL  Quantity: 90 capsule  Refills: 1     ondansetron 4  MG tablet  Commonly known as: ZOFRAN  Used for: Mitochondrial disease (H)      Dose: 4 mg  Take 1 tablet (4 mg) by mouth every 8 hours as needed for nausea  Quantity: 18 tablet  Refills: 1     Riboflavin 400 MG Tabs      Dose: 400 mg  Take 400 mg by mouth daily  Refills: 0     rizatriptan 10 MG tablet  Commonly known as: MAXALT  Used for: Insomnia, unspecified type      TAKE 1 TABLET BY MOUTH AS NEEDED FOR MIGRAINE. MAY REPEAT IN 2 HOURS IF NEEDED  Quantity: 10 tablet  Refills: 3     * spironolactone 50 MG tablet  Commonly known as: ALDACTONE      Refills: 0     * spironolactone 25 MG tablet  Commonly known as: ALDACTONE      TAKE ONE TABLET BY MOUTH ONCE DAILY WITH ONE 50MG TABLET (DAILY TOTAL OF 75MG). TAKE WITH WATER.  Refills: 0     tacrolimus 0.1 % external ointment  Commonly known as: PROTOPIC      Refills: 0     tretinoin 0.05 % external cream  Commonly known as: RETIN-A  Used for: Acne vulgaris      APPLY TO AFFECTED AREA EVERY DAY AT BEDTIME  Quantity: 45 g  Refills: 2     valACYclovir 1000 mg tablet  Commonly known as: VALTREX  Used for: History of cold sores      TAKE 2 TABLETS BY MOUTH 12 HOURS APART AS NEEDED EPISODE  Quantity: 12 tablet  Refills: 3     vitamin C 500 MG tablet  Commonly known as: ASCORBIC ACID      Dose: 500 mg  Take 500 mg by mouth daily  Refills: 0     zolpidem 5 MG tablet  Commonly known as: AMBIEN  Used for: Insomnia, unspecified type      Dose: 5 mg  Take 1 tablet (5 mg) by mouth nightly as needed for sleep.  Quantity: 30 tablet  Refills: 2       Past medical history, past surgical history, medications, and allergies were reviewed with the patient. Additional pertinent items: None    Family History   Problem Relation Age of Onset    Depression Mother         sertraline    Hyperlipidemia Mother     Hypertension Mother     Breast Cancer Mother 62.00    Cancer Mother         thyroid     Meniere's disease Mother     Anxiety Disorder Mother     Depression Brother     Anxiety Disorder  Maternal Grandmother     Breast Cancer Maternal Grandmother 60.00    Depression Maternal Grandmother     Coronary Artery Disease Father 45.00            Depression Father     Bone Cancer Maternal Grandfather     Anxiety Disorder Paternal Grandfather     Diabetes Cousin     Diabetes Cousin     Anxiety Disorder Paternal Grandmother     Malignant Hypertension No family hx of        Social History     Tobacco Use    Smoking status: Never    Smokeless tobacco: Never   Substance Use Topics    Alcohol use: No     Social history was reviewed with the patient. Additional pertinent items: None    Allergies   Allergen Reactions    Ciprofloxacin Itching, Rash and Hives     Rash over whole body   Rash over whole body       Influenza Virus Vaccine Shortness Of Breath and Anaphylaxis    No Clinical Screening - See Comments Anaphylaxis     Stomach swelling    Sulfa Antibiotics Rash and Hives    Desvenlafaxine Blisters, Itching and Rash    Milnacipran Other (See Comments) and Palpitations     Chest pain, hot/cold flashes    Quetiapine Palpitations     Tachycardia, nervousness, elevated blood pressure.   Tachycardia, nervousness, elevated blood pressure.        Review of Systems  A medically appropriate review of systems was performed with pertinent positives and negatives noted in the HPI, and all other systems negative.    Physical Exam   BP: 121/84  Pulse: 100  Temp: 99.4  F (37.4  C)  Resp: 16  Weight: 59.6 kg (131 lb 4.8 oz)  SpO2: 97 %      Physical Exam  Vitals and nursing note reviewed.   Constitutional:       Appearance: She is not ill-appearing or diaphoretic.   HENT:      Head: Atraumatic.   Eyes:      Extraocular Movements: Extraocular movements intact.      Pupils: Pupils are equal, round, and reactive to light.   Pulmonary:      Effort: No respiratory distress.   Abdominal:      General: There is no distension.      Palpations: Abdomen is soft.      Comments: Patient has some minimal to mild tenderness diffusely in  the upper abdomen without rebound or guarding   Musculoskeletal:         General: No deformity.      Cervical back: Neck supple.   Neurological:      General: No focal deficit present.      Mental Status: She is oriented to person, place, and time.   Psychiatric:         Mood and Affect: Mood normal.         ED Course     Orders Placed This Encounter   Procedures    XR Abdomen 2 Views    Comprehensive metabolic panel    Lipase    CBC with platelets and differential    CBC with platelets differential       Procedures         Results for orders placed or performed in visit on 11/26/24 (from the past 24 hours)   UA Macroscopic with reflex to Microscopic and Culture    Specimen: Urine, Midstream   Result Value Ref Range    Color Urine Straw Colorless, Straw, Light Yellow, Yellow    Appearance Urine Clear Clear    Glucose Urine Negative Negative mg/dL    Bilirubin Urine Negative Negative    Ketones Urine Negative Negative mg/dL    Specific Gravity Urine 1.005 1.003 - 1.035    Blood Urine Negative Negative    pH Urine 6.0 5.0 - 7.0    Protein Albumin Urine Negative Negative mg/dL    Urobilinogen Urine Normal Normal, 2.0 mg/dL    Nitrite Urine Negative Negative    Leukocyte Esterase Urine Negative Negative    Narrative    Microscopic not indicated     Results for orders placed or performed in visit on 11/26/24 (from the past 24 hours)   Comprehensive metabolic panel   Result Value Ref Range    Sodium 138 135 - 145 mmol/L    Potassium 4.3 3.4 - 5.3 mmol/L    Chloride 103 94 - 109 mmol/L    Carbon Dioxide (CO2) 28 20 - 32 mmol/L    Anion Gap 7 3 - 14 mmol/L    Urea Nitrogen 9 7 - 30 mg/dL    Creatinine 0.70 0.52 - 1.04 mg/dL    GFR Estimate >90 >60 mL/min/1.73m2    Calcium 9.1 8.5 - 10.1 mg/dL    Glucose 93 70 - 99 mg/dL    Alkaline Phosphatase 56 40 - 150 U/L    AST 24 0 - 45 U/L    ALT 17 0 - 50 U/L    Protein Total 7.0 6.8 - 8.8 g/dL    Albumin 3.9 3.4 - 5.0 g/dL    Bilirubin Total 0.5 0.2 - 1.3 mg/dL   CBC with platelets  differential    Narrative    The following orders were created for panel order CBC with platelets differential.  Procedure                               Abnormality         Status                     ---------                               -----------         ------                     CBC with platelets and d...[[id number]]                      Final result                 Please view results for these tests on the individual orders.   CBC with platelets and differential   Result Value Ref Range    WBC Count 8.1 4.0 - 11.0 10e3/uL    RBC Count 4.81 3.80 - 5.20 10e6/uL    Hemoglobin 13.5 11.7 - 15.7 g/dL    Hematocrit 40.3 35.0 - 47.0 %    MCV 84 78 - 100 fL    MCH 28.1 26.5 - 33.0 pg    MCHC 33.5 31.5 - 36.5 g/dL    RDW 12.4 10.0 - 15.0 %    Platelet Count 315 150 - 450 10e3/uL    % Neutrophils 68 %    % Lymphocytes 26 %    % Monocytes 6 %    % Eosinophils 0 %    % Basophils 0 %    % Immature Granulocytes 0 %    Absolute Neutrophils 5.4 1.6 - 8.3 10e3/uL    Absolute Lymphocytes 2.1 0.8 - 5.3 10e3/uL    Absolute Monocytes 0.4 0.0 - 1.3 10e3/uL    Absolute Eosinophils 0.0 0.0 - 0.7 10e3/uL    Absolute Basophils 0.0 0.0 - 0.2 10e3/uL    Absolute Immature Granulocytes 0.0 <=0.4 10e3/uL       EXAM: XR ABDOMEN 2 VIEWS  LOCATION: [facility]  DATE: 11/26/2024     INDICATION:  Generalized abdominal pain  COMPARISON: 8/19/2023                                                                      IMPRESSION: No distended air-filled loops of small bowel. There is a moderate amount of stool throughout the colon. No intraperitoneal free air. Cholecystectomy clips are present.       Critical care was not performed.     Medical Decision Making  The patient's presentation was of moderate complexity (a chronic illness mild to moderate exacerbation, progression, or side effect of treatment).    The patient's evaluation involved:  review of 1 test result(s) ordered prior to this encounter (urinalysis)  ordering  and/or review of 3+ test(s) in this encounter (see above)    The patient's management necessitated moderate risk (prescription drug management including medications given in the ED).      Assessments & Plan (with Medical Decision Making)     I have reviewed the nursing notes.    Patient presents with acute on chronic abdominal pain with negative laboratory testing and an x-ray showing moderate amount of stool.  At this time the patient be placed on the medications below.    I have reviewed the findings, diagnosis, plan and need for follow up with the patient.    TOTAL PATIENT CARE TIME INCLUDING DOCUMENTATION, FAMILY COMMUNICATION AND CARE COORDINATION WAS 30 MINUTES.     New Prescriptions    LACTULOSE 20 GM/30ML SOLUTION    Take 30 mLs by mouth 3 times daily for 5 days.    SENNA (SENOKOT) 8.6 MG TABLET    Take 1 tablet by mouth daily.       Final diagnoses:   Generalized abdominal pain     Fill your prescriptions and take as directed    Patient is to follow-up with her primary care in 2 weeks for recheck as needed      FREDDY EARLY MD    11/26/2024   [facility]

## 2024-11-26 NOTE — TELEPHONE ENCOUNTER
Nurse Triage SBAR    Is this a 2nd Level Triage? YES, LICENSED PRACTITIONER REVIEW IS REQUIRED    Situation: Patient is stating that she has ongoing left side pain and now her urine is dark and has a strong smell.    Background: Patient was seen 10/28/24 with PCP regarding the left side pain.  Ultrasound was completed and impression was normal, no hydronephrosis.   History of fibromyalgia, GERD and biliary colic with cholecystectomy    Assessment: Pain located on her left side between her umbilicus and her left side.  States that pain is now constant and rates a 5-6 with flare ups to 7.  She states that eating aggravates the pain and she is unable to have anything touch her abdomen due to increase pain (she has been wearing bib overalls.)  Her Bowel movements have been normal, no constipation and just yesterday she had loose stool.      Protocol Recommended Disposition:   Call ADS/Go to ED/UCC Now (Or To Office with PCP Approval)    Recommendation: Patient was seen on 10/28/24 with Dr Martinez.  Had eVisit today that had negative UA.  Please advise on disposition.     Routed to provider    Does the patient meet one of the following criteria for ADS visit consideration? 16+ years old, with an [facility] PCP     TIP  Providers, please consider if this condition is appropriate for management at one of our Acute and Diagnostic Services sites.     If patient is a good candidate, please use dotphrase <dot>triageresponse and select Refer to ADS to document.     Patient continues to have a side ache.  Now urine is dark and strong smelling urine.    Reason for Disposition   MILD TO MODERATE constant pain lasting > 2 hours    Additional Information   Negative: Passed out (e.g., fainted, lost consciousness, blacked out and was not responding)   Negative: Shock suspected (e.g., cold/pale/clammy skin, too weak to stand, low BP, rapid pulse)   Negative: Sounds like a life-threatening emergency to the triager   Negative: Followed an  "abdomen (stomach) injury   Negative: Chest pain   Negative: Abdominal pain and pregnant < 20 weeks   Negative: Abdominal pain and pregnant 20 or more weeks   Negative: Pain is mainly in upper abdomen (if needed ask: 'is it mainly above the belly button?')   Negative: Abdomen bloating or swelling are main symptoms   Negative: SEVERE abdominal pain (e.g., excruciating)   Negative: Vomiting red blood or black (coffee ground) material   Negative: Blood in bowel movements  (Exception: Blood on surface of BM with constipation.)   Negative: Black or tarry bowel movements  (Exception: Chronic-unchanged black-grey BMs AND is taking iron pills or Pepto-Bismol.)    Answer Assessment - Initial Assessment Questions  1. LOCATION: \"Where does it hurt?\"       Left side of abdomen between umbilicus and her left side  2. RADIATION: \"Does the pain shoot anywhere else?\" (e.g., chest, back)      no  3. ONSET: \"When did the pain begin?\" (e.g., minutes, hours or days ago)       1 month ago and now has strong urine.    4. SUDDEN: \"Gradual or sudden onset?\"      gradual  5. PATTERN \"Does the pain come and go, or is it constant?\"      Comes and goes, flares up.    6. SEVERITY: \"How bad is the pain?\"  (e.g., Scale 1-10; mild, moderate, or severe)      Pain is 5-6, flares up to a 7   7. RECURRENT SYMPTOM: \"Have you ever had this type of stomach pain before?\" If Yes, ask: \"When was the last time?\" and \"What happened that time?\"       no  8. CAUSE: \"What do you think is causing the stomach pain?\"      No has been seen for this on 10/28 US normal  9. RELIEVING/AGGRAVATING FACTORS: \"What makes it better or worse?\" (e.g., antacids, bending or twisting motion, bowel movement)      Feeding aggravates it  10. OTHER SYMPTOMS: \"Do you have any other symptoms?\" (e.g., back pain, diarrhea, fever, urination pain, vomiting)        Smell with urine, dark urine color, urine urge.  Feels nauseated and general malaise    Protocols used: Abdominal Pain - " Female-A-[place]

## 2025-01-03 ENCOUNTER — TRANSFERRED RECORDS (OUTPATIENT)
Dept: HEALTH INFORMATION MANAGEMENT | Facility: CLINIC | Age: 45
End: 2025-01-03
Payer: COMMERCIAL

## 2025-01-14 DIAGNOSIS — K21.00 GASTROESOPHAGEAL REFLUX DISEASE WITH ESOPHAGITIS WITHOUT HEMORRHAGE: ICD-10-CM

## 2025-01-14 NOTE — TELEPHONE ENCOUNTER
Left message to call back for: Sherry  Information to relay to patient: Please inquire about medication and possible gap in taking this medicaiton

## 2025-01-14 NOTE — TELEPHONE ENCOUNTER
Clinic RN: Please investigate patient's chart or contact patient if the information cannot be found because patient should have run out of this medication on 09/2024. Confirm patient is taking this medication as prescribed. Document findings and route refill encounter to provider for approval or denial.

## 2025-01-16 RX ORDER — OMEPRAZOLE 40 MG/1
40 CAPSULE, DELAYED RELEASE ORAL
Qty: 90 CAPSULE | Refills: 1 | OUTPATIENT
Start: 2025-01-16

## 2025-01-23 ENCOUNTER — MYC MEDICAL ADVICE (OUTPATIENT)
Dept: FAMILY MEDICINE | Facility: CLINIC | Age: 45
End: 2025-01-23
Payer: COMMERCIAL

## 2025-01-23 DIAGNOSIS — L70.0 ACNE VULGARIS: Primary | ICD-10-CM

## 2025-01-23 RX ORDER — SPIRONOLACTONE 50 MG/1
50 TABLET, FILM COATED ORAL DAILY
Qty: 90 TABLET | Refills: 1 | Status: SHIPPED | OUTPATIENT
Start: 2025-01-23

## 2025-01-23 RX ORDER — SPIRONOLACTONE 25 MG/1
25 TABLET ORAL DAILY
Qty: 90 TABLET | Refills: 1 | Status: SHIPPED | OUTPATIENT
Start: 2025-01-23

## 2025-01-23 NOTE — TELEPHONE ENCOUNTER
Call to [facility] to confirm dosing and associated diagnosis code (acne)..  - Spironolactone 50mg Take 1 tablet by mouth daily. Take with 25mg tablet for total daily dose of 75mg.   - Spironolactone 25mg Take 1 tablet by mouth daily. Take with 50mg tablet for total daily dose of 75mg.     [facility] also confirms last appointment with patient was 12/2023, they will not rx until she is seen again. No upcoming appts scheduled.

## 2025-01-27 ENCOUNTER — MYC MEDICAL ADVICE (OUTPATIENT)
Dept: FAMILY MEDICINE | Facility: CLINIC | Age: 45
End: 2025-01-27
Payer: COMMERCIAL

## 2025-01-29 ENCOUNTER — OFFICE VISIT (OUTPATIENT)
Dept: FAMILY MEDICINE | Facility: CLINIC | Age: 45
End: 2025-01-29
Payer: COMMERCIAL

## 2025-01-29 VITALS
HEART RATE: 91 BPM | DIASTOLIC BLOOD PRESSURE: 85 MMHG | WEIGHT: 133 LBS | HEIGHT: 61 IN | OXYGEN SATURATION: 98 % | BODY MASS INDEX: 25.11 KG/M2 | SYSTOLIC BLOOD PRESSURE: 125 MMHG | TEMPERATURE: 98.9 F | RESPIRATION RATE: 14 BRPM

## 2025-01-29 DIAGNOSIS — R10.2 PELVIC PAIN IN FEMALE: Primary | ICD-10-CM

## 2025-01-29 DIAGNOSIS — F33.1 MODERATE RECURRENT MAJOR DEPRESSION (H): ICD-10-CM

## 2025-01-29 LAB
ALBUMIN UR-MCNC: NEGATIVE MG/DL
APPEARANCE UR: CLEAR
BACTERIA #/AREA URNS HPF: ABNORMAL /HPF
BILIRUB UR QL STRIP: NEGATIVE
COLOR UR AUTO: YELLOW
GLUCOSE UR STRIP-MCNC: NEGATIVE MG/DL
HGB UR QL STRIP: NEGATIVE
KETONES UR STRIP-MCNC: NEGATIVE MG/DL
LEUKOCYTE ESTERASE UR QL STRIP: NEGATIVE
NITRATE UR QL: NEGATIVE
PH UR STRIP: 7 [PH] (ref 5–8)
RBC #/AREA URNS AUTO: ABNORMAL /HPF
SP GR UR STRIP: 1.02 (ref 1–1.03)
SQUAMOUS #/AREA URNS AUTO: ABNORMAL /LPF
UROBILINOGEN UR STRIP-ACNC: 0.2 E.U./DL
WBC #/AREA URNS AUTO: ABNORMAL /HPF

## 2025-01-29 PROCEDURE — 81001 URINALYSIS AUTO W/SCOPE: CPT | Performed by: FAMILY MEDICINE

## 2025-01-29 RX ORDER — FAMOTIDINE 40 MG/1
TABLET, FILM COATED ORAL EVERY 24 HOURS
COMMUNITY
Start: 2025-01-03

## 2025-01-29 ASSESSMENT — ANXIETY QUESTIONNAIRES
GAD7 TOTAL SCORE: 9
2. NOT BEING ABLE TO STOP OR CONTROL WORRYING: MORE THAN HALF THE DAYS
5. BEING SO RESTLESS THAT IT IS HARD TO SIT STILL: NOT AT ALL
3. WORRYING TOO MUCH ABOUT DIFFERENT THINGS: MORE THAN HALF THE DAYS
1. FEELING NERVOUS, ANXIOUS, OR ON EDGE: SEVERAL DAYS
7. FEELING AFRAID AS IF SOMETHING AWFUL MIGHT HAPPEN: MORE THAN HALF THE DAYS
GAD7 TOTAL SCORE: 9
GAD7 TOTAL SCORE: 9
6. BECOMING EASILY ANNOYED OR IRRITABLE: SEVERAL DAYS
IF YOU CHECKED OFF ANY PROBLEMS ON THIS QUESTIONNAIRE, HOW DIFFICULT HAVE THESE PROBLEMS MADE IT FOR YOU TO DO YOUR WORK, TAKE CARE OF THINGS AT HOME, OR GET ALONG WITH OTHER PEOPLE: SOMEWHAT DIFFICULT
7. FEELING AFRAID AS IF SOMETHING AWFUL MIGHT HAPPEN: MORE THAN HALF THE DAYS
8. IF YOU CHECKED OFF ANY PROBLEMS, HOW DIFFICULT HAVE THESE MADE IT FOR YOU TO DO YOUR WORK, TAKE CARE OF THINGS AT HOME, OR GET ALONG WITH OTHER PEOPLE?: SOMEWHAT DIFFICULT
4. TROUBLE RELAXING: SEVERAL DAYS

## 2025-01-29 ASSESSMENT — PATIENT HEALTH QUESTIONNAIRE - PHQ9
SUM OF ALL RESPONSES TO PHQ QUESTIONS 1-9: 10
10. IF YOU CHECKED OFF ANY PROBLEMS, HOW DIFFICULT HAVE THESE PROBLEMS MADE IT FOR YOU TO DO YOUR WORK, TAKE CARE OF THINGS AT HOME, OR GET ALONG WITH OTHER PEOPLE: VERY DIFFICULT
SUM OF ALL RESPONSES TO PHQ QUESTIONS 1-9: 10

## 2025-01-29 NOTE — PROGRESS NOTES
NEUROLOGY FOLLOW UP VISIT  NOTE       [facility] [place]  [address] [place] [address]  Tel: [phone]  Fax: [phone]  [email or web address]     Sherry Sweeney,  1980, MRN [id number]  PCP: Layla Martinez  Date: 2025      ASSESSMENT & PLAN     Visit Diagnosis  Chronic migraine without aura, intractable, without status migrainosus     Chronic migraine without aura  44-year-old female with history of mitochondrial myopathy, chronic migraine without aura, fibromyalgia who had failed multiple medication in the past was here for 3-month Botox injection.  She continues to notice improvement in her headache and only gets 8-10 headaches  in a month.  She feels the intensity and frequency has declined after explaining all the side effect and obtaining her consent, injected 155 units according to the enclosed sheet.  45 units were discarded.  She was monitored in the clinic for short duration and satisfactory condition.  Follow-up will be in 3 months.    Thank you again for this referral, please feel free to contact me if you have any questions.    Mikel Hernandez MD  [facility], [place]     HISTORY OF PRESENT ILLNESS     Patient is 44-year-old female with history of mitochondrial myopathy, chronic migraine without aura, fibromyalgia who is here for 3-month Botox injection.  She received her last injection on 2024 and continues to experience improvement with more than 50% reduction in her headaches. Previously she had failed multiple medication but ever since she started getting Botox there was significant improvement. She started getting Botox injections in 2019 and noticed steady decline in her headache frequency.  During COVID pandemic she had to hold off on Botox and noticed flareup of her symptoms but ever since she restarted there has been a steady decline.  Previously she had tried Depakote, amitriptyline and beta-blocker that did not  help.     Briefly patient is a female with history of mitochondrial myopathy, chronic migraine without aura, fibromyalgia who started having headaches in 2014 and progressively got worse.  She initially attributed it to pressure in the neck or TMJ abnormality and that in the past was evaluated and started on some hormonal supplements.  Due to progressive weakness she was evaluated for possible mitochondrial myopathy and had mitochondrial DNA tested that was positive.  For her headaches she was started on Depakote, amitriptyline and in the past had also tried Cymbalta and as she was having more than 15 headaches in a month Botox was recommended.  She started getting Botox in 2017 and did notice improvement in her headache.  Discontinued until 2019 but due to COVID pandemic she stopped doing Botox injection and noticed worsening headaches and Botox was restarted in 2023.       PROBLEM LIST   Patient Active Problem List   Diagnosis    Acne vulgaris    Arthralgia of temporomandibular joint    Debility    Fibromyalgia    IBS (irritable bowel syndrome)    Insomnia    Moderate recurrent major depression (H)    Chronic migraine without aura, intractable, without status migrainosus    Mitochondrial disease    Generalized anxiety disorder    Post-traumatic stress disorder    Parasomnia    Seasonal depression    Palpitations    Gastroesophageal reflux disease without esophagitis    Vaginal Papanicolaou smear not required due to history of hysterectomy    Biliary colic         PAST MEDICAL & SURGICAL HISTORY     Past Medical History:   Patient  has a past medical history of Anemia, Chronic fatigue syndrome, Chronic migraine, Depression, Depression, Depressive disorder (2013), Fibromyalgia, Fibromyalgia, Gastroesophageal reflux disease, H/O: hysterectomy (02/22/2018), History of anesthesia complications, IBS (irritable bowel syndrome), Insomnia, Irregular heart beat, Mitochondrial disease, Mitochondrial myopathy, Motion  sickness, Parasomnia, and PONV (postoperative nausea and vomiting).    Surgical History:  She  has a past surgical history that includes REMOVAL OF OVARIAN CYST(S); Hysterectomy (Bilateral, 02/22/2018); Picc (02/24/2018); Abdomen surgery (1996 & 2018, feb.); appendectomy (1996); biopsy; Laparoscopic cystectomy ovarian (oncology) (Left, 10/18/2021); Laparoscopy diagnostic (gyn) (Left, 06/29/2023); Hysterectomy total abdominal; and Laparoscopic cholecystectomy (N/A, 10/25/2023).     SOCIAL HISTORY     Reviewed, and she  reports that she has never smoked. She has never used smokeless tobacco. She reports that she does not drink alcohol and does not use drugs.     FAMILY HISTORY     Reviewed, and family history includes Anxiety Disorder in her maternal grandmother, mother, paternal grandfather, and paternal grandmother; Bone Cancer in her maternal grandfather; Breast Cancer (age of onset: 60.00) in her maternal grandmother; Breast Cancer (age of onset: 62.00) in her mother; Cancer in her mother; Coronary Artery Disease (age of onset: 45.00) in her father; Depression in her brother, father, maternal grandmother, and mother; Diabetes in her cousin and cousin; Hyperlipidemia in her mother; Hypertension in her mother; Meniere's disease in her mother.     ALLERGIES     Allergies   Allergen Reactions    Ciprofloxacin Itching, Rash and Hives     Rash over whole body   Rash over whole body       Influenza Virus Vaccine Shortness Of Breath and Anaphylaxis    No Clinical Screening - See Comments Anaphylaxis     Stomach swelling    Sulfa Antibiotics Rash and Hives    Desvenlafaxine Blisters, Itching and Rash    Milnacipran Other (See Comments) and Palpitations     Chest pain, hot/cold flashes    Quetiapine Palpitations     Tachycardia, nervousness, elevated blood pressure.   Tachycardia, nervousness, elevated blood pressure.          REVIEW OF SYSTEMS     A 12 point review of system was performed and was negative except as  outlined in the history of present illness.     HOME MEDICATIONS     Current Outpatient Rx   Medication Sig Dispense Refill    ascorbic acid 500 MG TABS Take 500 mg by mouth daily      betamethasone dipropionate (DIPROSONE) 0.05 % external lotion Apply topically 2 times daily. 60 mL 1    buPROPion (WELLBUTRIN XL) 300 MG 24 hr tablet TAKE 1 TABLET BY MOUTH EVERY DAY 90 tablet 3    cetirizine (ZYRTEC) 10 MG tablet TAKE 1 TAB ORALLY DAILY AS NEEDED FOR ALLERGIES MAY INCREASE TO 2 TAB DAILY IF ITCHING NOT RELIEVED 90 tablet 2    clindamycin-benzoyl peroxide (BENZACLIN) gel Apply topically to acne once daily      clobetasol (TEMOVATE) 0.05 % external ointment       clobetasol propionate (TEMOVATE) 0.05 % external cream Apply topically 2 times daily. 60 g 1    clotrimazole-betamethasone (LOTRISONE) 1-0.05 % external cream 1 application as needed by topical route.      [facility] MELATONIN 3 MG tablet TAKE 1 TABLET (3 MG) BY MOUTH NIGHTLY AS NEEDED FOR SLEEP 90 tablet 1    cyclobenzaprine (FLEXERIL) 10 MG tablet Take 10 mg by mouth daily as needed for muscle spasms      hydrOXYzine (ATARAX) 25 MG tablet TAKE 1-2 TABLETS (25-50 MG) BY MOUTH AT BEDTIME 180 tablet 2    ibuprofen (ADVIL/MOTRIN) 400 MG tablet       ketoconazole (NIZORAL) 2 % external cream APPLY TO AFFECTED AREA TWICE A DAY FOR 7 DAYS      lactulose 20 GM/30ML solution Take 30 mLs by mouth 3 times daily. 450 mL 0    lidocaine (LIDODERM) 5 % patch PLACE 1 PATCH ONTO THE SKIN AS NEEDED TO NECK, SHOULDERS, AND BACK 30 patch 0    Multiple Vitamin (MULTIVITAMIN ADULT PO) Take 1 tablet by mouth daily      naproxen (NAPROSYN) 500 MG tablet       omeprazole (PRILOSEC) 40 MG DR capsule TAKE 1 CAPSULE (40 MG) BY MOUTH EVERY MORNING (BEFORE BREAKFAST) 30 MINUTES BEFORE MEAL 90 capsule 1    ondansetron (ZOFRAN) 4 MG tablet Take 1 tablet (4 mg) by mouth every 8 hours as needed for nausea. 18 tablet 1    PEPCID 40 MG tablet Take by mouth every 24 hours.      Riboflavin 400 MG TABS  "Take 400 mg by mouth daily      rizatriptan (MAXALT) 10 MG tablet TAKE 1 TABLET BY MOUTH AS NEEDED FOR MIGRAINE. MAY REPEAT IN 2 HOURS IF NEEDED 10 tablet 3    spironolactone (ALDACTONE) 25 MG tablet Take 1 tablet (25 mg) by mouth daily. 90 tablet 1    spironolactone (ALDACTONE) 25 MG tablet TAKE ONE TABLET BY MOUTH ONCE DAILY WITH ONE 50MG TABLET (DAILY TOTAL OF 75MG). TAKE WITH WATER.      tacrolimus (PROTOPIC) 0.1 % external ointment       tretinoin (RETIN-A) 0.05 % external cream APPLY TO AFFECTED AREA EVERY DAY AT BEDTIME 45 g 2    valACYclovir (VALTREX) 1000 mg tablet TAKE 2 TABLETS BY MOUTH 12 HOURS APART AS NEEDED EPISODE 12 tablet 3    venlafaxine (EFFEXOR XR) 150 MG 24 hr capsule Take 1 capsule (150 mg) by mouth daily 90 capsule 3    zolpidem (AMBIEN) 5 MG tablet Take 1 tablet (5 mg) by mouth nightly as needed for sleep. 30 tablet 2         PHYSICAL EXAM     Vital signs  /85 (BP Location: Left arm, Patient Position: Sitting)   Pulse 89   Ht 1.549 m (5' 1\")   Wt 60.3 kg (133 lb)   BMI 25.13 kg/m      Weight:   133 lbs 0 oz    Pleasant female who is alert and oriented vital signs were reviewed and documented in electronic medical record.  Neck supple.  Neurologically speech was normal.  Cranial nerves II through XII are intact motor strength neck flexor and extensor 4+/5 in upper extremity 5/5 in the lower extremity proximally 4+/5 distally 5/5 reflexes 2+ toes downgoing no dysmetria noted on finger-nose testing gait normal.      PERTINENT DIAGNOSTIC STUDIES     Following studies were reviewed:     MRI BRAIN 10/29/2015  1.  Normal Head MRI.   2.  No acute infarcts, mass lesions or hemorrhage     PERTINENT LABS  Following labs were reviewed:  Office Visit on 01/29/2025   Component Date Value Ref Range Status    Color Urine 01/29/2025 Yellow  Colorless, Straw, Light Yellow, Yellow Final    Appearance Urine 01/29/2025 Clear  Clear Final    Glucose Urine 01/29/2025 Negative  Negative mg/dL Final    " Bilirubin Urine 01/29/2025 Negative  Negative Final    Ketones Urine 01/29/2025 Negative  Negative mg/dL Final    Specific Gravity Urine 01/29/2025 1.020  1.005 - 1.030 Final    Blood Urine 01/29/2025 Negative  Negative Final    pH Urine 01/29/2025 7.0  5.0 - 8.0 Final    Protein Albumin Urine 01/29/2025 Negative  Negative mg/dL Final    Urobilinogen Urine 01/29/2025 0.2  0.2, 1.0 E.U./dL Final    Nitrite Urine 01/29/2025 Negative  Negative Final    Leukocyte Esterase Urine 01/29/2025 Negative  Negative Final    Bacteria Urine 01/29/2025 Few (A)  None Seen /HPF Final    RBC Urine 01/29/2025 None Seen  0-2 /HPF /HPF Final    WBC Urine 01/29/2025 0-5  0-5 /HPF /HPF Final    Squamous Epithelials Urine 01/29/2025 Few (A)  None Seen /LPF Final   Office Visit on 11/26/2024   Component Date Value Ref Range Status    Lipase 11/26/2024 30  13 - 60 U/L Final    Sodium 11/26/2024 138  135 - 145 mmol/L Final    Potassium 11/26/2024 4.3  3.4 - 5.3 mmol/L Final    Chloride 11/26/2024 103  94 - 109 mmol/L Final    Carbon Dioxide (CO2) 11/26/2024 28  20 - 32 mmol/L Final    Anion Gap 11/26/2024 7  3 - 14 mmol/L Final    Urea Nitrogen 11/26/2024 9  7 - 30 mg/dL Final    Creatinine 11/26/2024 0.70  0.52 - 1.04 mg/dL Final    GFR Estimate 11/26/2024 >90  >60 mL/min/1.73m2 Final    Calcium 11/26/2024 9.1  8.5 - 10.1 mg/dL Final    Glucose 11/26/2024 93  70 - 99 mg/dL Final    Alkaline Phosphatase 11/26/2024 56  40 - 150 U/L Final    AST 11/26/2024 24  0 - 45 U/L Final    ALT 11/26/2024 17  0 - 50 U/L Final    Protein Total 11/26/2024 7.0  6.8 - 8.8 g/dL Final    Albumin 11/26/2024 3.9  3.4 - 5.0 g/dL Final    Bilirubin Total 11/26/2024 0.5  0.2 - 1.3 mg/dL Final    WBC Count 11/26/2024 8.1  4.0 - 11.0 10e3/uL Final    RBC Count 11/26/2024 4.81  3.80 - 5.20 10e6/uL Final    Hemoglobin 11/26/2024 13.5  11.7 - 15.7 g/dL Final    Hematocrit 11/26/2024 40.3  35.0 - 47.0 % Final    MCV 11/26/2024 84  78 - 100 fL Final    MCH 11/26/2024 28.1   26.5 - 33.0 pg Final    MCHC 11/26/2024 33.5  31.5 - 36.5 g/dL Final    RDW 11/26/2024 12.4  10.0 - 15.0 % Final    Platelet Count 11/26/2024 315  150 - 450 10e3/uL Final    % Neutrophils 11/26/2024 68  % Final    % Lymphocytes 11/26/2024 26  % Final    % Monocytes 11/26/2024 6  % Final    % Eosinophils 11/26/2024 0  % Final    % Basophils 11/26/2024 0  % Final    % Immature Granulocytes 11/26/2024 0  % Final    Absolute Neutrophils 11/26/2024 5.4  1.6 - 8.3 10e3/uL Final    Absolute Lymphocytes 11/26/2024 2.1  0.8 - 5.3 10e3/uL Final    Absolute Monocytes 11/26/2024 0.4  0.0 - 1.3 10e3/uL Final    Absolute Eosinophils 11/26/2024 0.0  0.0 - 0.7 10e3/uL Final    Absolute Basophils 11/26/2024 0.0  0.0 - 0.2 10e3/uL Final    Absolute Immature Granulocytes 11/26/2024 0.0  <=0.4 10e3/uL Final   Lab on 11/26/2024   Component Date Value Ref Range Status    Color Urine 11/26/2024 Straw  Colorless, Straw, Light Yellow, Yellow Final    Appearance Urine 11/26/2024 Clear  Clear Final    Glucose Urine 11/26/2024 Negative  Negative mg/dL Final    Bilirubin Urine 11/26/2024 Negative  Negative Final    Ketones Urine 11/26/2024 Negative  Negative mg/dL Final    Specific Gravity Urine 11/26/2024 1.005  1.003 - 1.035 Final    Blood Urine 11/26/2024 Negative  Negative Final    pH Urine 11/26/2024 6.0  5.0 - 7.0 Final    Protein Albumin Urine 11/26/2024 Negative  Negative mg/dL Final    Urobilinogen Urine 11/26/2024 Normal  Normal, 2.0 mg/dL Final    Nitrite Urine 11/26/2024 Negative  Negative Final    Leukocyte Esterase Urine 11/26/2024 Negative  Negative Final         Total time spent for face to face visit, reviewing labs/imaging studies, counseling and coordination of care was: 20 minutes spent on the date of the encounter doing chart review, review of outside records, review of test results, interpretation of tests, patient visit, and documentation     The longitudinal plan of care for the diagnosis(es)/condition(s) as documented  were addressed during this visit. Due to the added complexity in care, I will continue to support Sherry in the subsequent management and with ongoing continuity of care.    This note was dictated using voice recognition software.  Any grammatical or context distortions are unintentional and inherent to the software.    Orders Placed This Encounter   Procedures    83371 - MIGRAINE      New Prescriptions    No medications on file     Modified Medications    No medications on file

## 2025-01-29 NOTE — PROGRESS NOTES
"  {PROVIDER CHARTING PREFERENCE:157595}    Comfort Powell is a 44 year old, presenting for the following health issues:  Pelvic Pain      1/29/2025     2:15 PM   Additional Questions   Roomed by as   Accompanied by self         1/29/2025     2:15 PM   Patient Reported Additional Medications   Patient reports taking the following new medications no     History of Present Illness       Reason for visit:  Pelvic area pain    She eats 2-3 servings of fruits and vegetables daily.She consumes 1 sweetened beverage(s) daily.She exercises with enough effort to increase her heart rate 9 or less minutes per day.  She exercises with enough effort to increase her heart rate 3 or less days per week.   She is taking medications regularly.       {MA/LPN/RN Pre-Provider Visit Orders- hCG/UA/Strep (Optional):977765}  {SUPERLIST (Optional):216188}  {additonal problems for provider to add (Optional):881712}    {ROS Picklists (Optional):900343}      Objective    /85 (BP Location: Left arm, Patient Position: Left side, Cuff Size: Adult Regular)   Pulse 91   Temp 98.9  F (37.2  C) (Oral)   Resp 14   Ht 1.549 m (5' 1\")   Wt 60.3 kg (133 lb)   SpO2 98%   BMI 25.13 kg/m    Body mass index is 25.13 kg/m .  Physical Exam   {Exam List (Optional):930313}    {Diagnostic Test Results (Optional):233001}        Signed Electronically by: MATTHEW RESENDIZ MD  {Email feedback regarding this note to [email or web address]   :[id number]}  "

## 2025-02-04 ENCOUNTER — OFFICE VISIT (OUTPATIENT)
Dept: NEUROLOGY | Facility: CLINIC | Age: 45
End: 2025-02-04
Payer: COMMERCIAL

## 2025-02-04 VITALS
HEART RATE: 89 BPM | WEIGHT: 133 LBS | DIASTOLIC BLOOD PRESSURE: 85 MMHG | SYSTOLIC BLOOD PRESSURE: 116 MMHG | BODY MASS INDEX: 25.11 KG/M2 | HEIGHT: 61 IN

## 2025-02-04 DIAGNOSIS — G43.719 CHRONIC MIGRAINE WITHOUT AURA, INTRACTABLE, WITHOUT STATUS MIGRAINOSUS: Primary | ICD-10-CM

## 2025-02-04 PROCEDURE — 64615 CHEMODENERV MUSC MIGRAINE: CPT | Performed by: PSYCHIATRY & NEUROLOGY

## 2025-02-04 NOTE — LETTER
2025      Sherry Sweeney  [address] [facility] [address]      Dear Colleague,    Thank you for referring your patient, Sherry Sweeney, to the [facility] [place]. Please see a copy of my visit note below.    NEUROLOGY FOLLOW UP VISIT  NOTE       [facility] [place]  [address] [place] [address]  Tel: [phone]  Fax: [phone]  [email or web address]     Sherry Sweeney,  1980, MRN [id number]  PCP: Layla Martinez  Date: 2025      ASSESSMENT & PLAN     Visit Diagnosis  Chronic migraine without aura, intractable, without status migrainosus     Chronic migraine without aura  44-year-old female with history of mitochondrial myopathy, chronic migraine without aura, fibromyalgia who had failed multiple medication in the past was here for 3-month Botox injection.  She continues to notice improvement in her headache and only gets 8-10 headaches  in a month.  She feels the intensity and frequency has declined after explaining all the side effect and obtaining her consent, injected 155 units according to the enclosed sheet.  45 units were discarded.  She was monitored in the clinic for short duration and satisfactory condition.  Follow-up will be in 3 months.    Thank you again for this referral, please feel free to contact me if you have any questions.    Mikel Hernandez MD  [facility], [place]     HISTORY OF PRESENT ILLNESS     Patient is 44-year-old female with history of mitochondrial myopathy, chronic migraine without aura, fibromyalgia who is here for 3-month Botox injection.  She received her last injection on 2024 and continues to experience improvement with more than 50% reduction in her headaches. Previously she had failed multiple medication but ever since she started getting Botox there was significant improvement. She started getting Botox injections in 2019 and noticed steady decline in her headache  frequency.  During COVID pandemic she had to hold off on Botox and noticed flareup of her symptoms but ever since she restarted there has been a steady decline.  Previously she had tried Depakote, amitriptyline and beta-blocker that did not help.     Briefly patient is a female with history of mitochondrial myopathy, chronic migraine without aura, fibromyalgia who started having headaches in 2014 and progressively got worse.  She initially attributed it to pressure in the neck or TMJ abnormality and that in the past was evaluated and started on some hormonal supplements.  Due to progressive weakness she was evaluated for possible mitochondrial myopathy and had mitochondrial DNA tested that was positive.  For her headaches she was started on Depakote, amitriptyline and in the past had also tried Cymbalta and as she was having more than 15 headaches in a month Botox was recommended.  She started getting Botox in 2017 and did notice improvement in her headache.  Discontinued until 2019 but due to COVID pandemic she stopped doing Botox injection and noticed worsening headaches and Botox was restarted in 2023.       PROBLEM LIST   Patient Active Problem List   Diagnosis     Acne vulgaris     Arthralgia of temporomandibular joint     Debility     Fibromyalgia     IBS (irritable bowel syndrome)     Insomnia     Moderate recurrent major depression (H)     Chronic migraine without aura, intractable, without status migrainosus     Mitochondrial disease     Generalized anxiety disorder     Post-traumatic stress disorder     Parasomnia     Seasonal depression     Palpitations     Gastroesophageal reflux disease without esophagitis     Vaginal Papanicolaou smear not required due to history of hysterectomy     Biliary colic         PAST MEDICAL & SURGICAL HISTORY     Past Medical History:   Patient  has a past medical history of Anemia, Chronic fatigue syndrome, Chronic migraine, Depression, Depression, Depressive disorder  (2013), Fibromyalgia, Fibromyalgia, Gastroesophageal reflux disease, H/O: hysterectomy (02/22/2018), History of anesthesia complications, IBS (irritable bowel syndrome), Insomnia, Irregular heart beat, Mitochondrial disease, Mitochondrial myopathy, Motion sickness, Parasomnia, and PONV (postoperative nausea and vomiting).    Surgical History:  She  has a past surgical history that includes REMOVAL OF OVARIAN CYST(S); Hysterectomy (Bilateral, 02/22/2018); Picc (02/24/2018); Abdomen surgery (1996 & 2018, feb.); appendectomy (1996); biopsy; Laparoscopic cystectomy ovarian (oncology) (Left, 10/18/2021); Laparoscopy diagnostic (gyn) (Left, 06/29/2023); Hysterectomy total abdominal; and Laparoscopic cholecystectomy (N/A, 10/25/2023).     SOCIAL HISTORY     Reviewed, and she  reports that she has never smoked. She has never used smokeless tobacco. She reports that she does not drink alcohol and does not use drugs.     FAMILY HISTORY     Reviewed, and family history includes Anxiety Disorder in her maternal grandmother, mother, paternal grandfather, and paternal grandmother; Bone Cancer in her maternal grandfather; Breast Cancer (age of onset: 60.00) in her maternal grandmother; Breast Cancer (age of onset: 62.00) in her mother; Cancer in her mother; Coronary Artery Disease (age of onset: 45.00) in her father; Depression in her brother, father, maternal grandmother, and mother; Diabetes in her cousin and cousin; Hyperlipidemia in her mother; Hypertension in her mother; Meniere's disease in her mother.     ALLERGIES     Allergies   Allergen Reactions     Ciprofloxacin Itching, Rash and Hives     Rash over whole body   Rash over whole body        Influenza Virus Vaccine Shortness Of Breath and Anaphylaxis     No Clinical Screening - See Comments Anaphylaxis     Stomach swelling     Sulfa Antibiotics Rash and Hives     Desvenlafaxine Blisters, Itching and Rash     Milnacipran Other (See Comments) and Palpitations     Chest  pain, hot/cold flashes     Quetiapine Palpitations     Tachycardia, nervousness, elevated blood pressure.   Tachycardia, nervousness, elevated blood pressure.          REVIEW OF SYSTEMS     A 12 point review of system was performed and was negative except as outlined in the history of present illness.     HOME MEDICATIONS     Current Outpatient Rx   Medication Sig Dispense Refill     ascorbic acid 500 MG TABS Take 500 mg by mouth daily       betamethasone dipropionate (DIPROSONE) 0.05 % external lotion Apply topically 2 times daily. 60 mL 1     buPROPion (WELLBUTRIN XL) 300 MG 24 hr tablet TAKE 1 TABLET BY MOUTH EVERY DAY 90 tablet 3     cetirizine (ZYRTEC) 10 MG tablet TAKE 1 TAB ORALLY DAILY AS NEEDED FOR ALLERGIES MAY INCREASE TO 2 TAB DAILY IF ITCHING NOT RELIEVED 90 tablet 2     clindamycin-benzoyl peroxide (BENZACLIN) gel Apply topically to acne once daily       clobetasol (TEMOVATE) 0.05 % external ointment        clobetasol propionate (TEMOVATE) 0.05 % external cream Apply topically 2 times daily. 60 g 1     clotrimazole-betamethasone (LOTRISONE) 1-0.05 % external cream 1 application as needed by topical route.       [facility] MELATONIN 3 MG tablet TAKE 1 TABLET (3 MG) BY MOUTH NIGHTLY AS NEEDED FOR SLEEP 90 tablet 1     cyclobenzaprine (FLEXERIL) 10 MG tablet Take 10 mg by mouth daily as needed for muscle spasms       hydrOXYzine (ATARAX) 25 MG tablet TAKE 1-2 TABLETS (25-50 MG) BY MOUTH AT BEDTIME 180 tablet 2     ibuprofen (ADVIL/MOTRIN) 400 MG tablet        ketoconazole (NIZORAL) 2 % external cream APPLY TO AFFECTED AREA TWICE A DAY FOR 7 DAYS       lactulose 20 GM/30ML solution Take 30 mLs by mouth 3 times daily. 450 mL 0     lidocaine (LIDODERM) 5 % patch PLACE 1 PATCH ONTO THE SKIN AS NEEDED TO NECK, SHOULDERS, AND BACK 30 patch 0     Multiple Vitamin (MULTIVITAMIN ADULT PO) Take 1 tablet by mouth daily       naproxen (NAPROSYN) 500 MG tablet        omeprazole (PRILOSEC) 40 MG DR capsule TAKE 1 CAPSULE (40  "MG) BY MOUTH EVERY MORNING (BEFORE BREAKFAST) 30 MINUTES BEFORE MEAL 90 capsule 1     ondansetron (ZOFRAN) 4 MG tablet Take 1 tablet (4 mg) by mouth every 8 hours as needed for nausea. 18 tablet 1     PEPCID 40 MG tablet Take by mouth every 24 hours.       Riboflavin 400 MG TABS Take 400 mg by mouth daily       rizatriptan (MAXALT) 10 MG tablet TAKE 1 TABLET BY MOUTH AS NEEDED FOR MIGRAINE. MAY REPEAT IN 2 HOURS IF NEEDED 10 tablet 3     spironolactone (ALDACTONE) 25 MG tablet Take 1 tablet (25 mg) by mouth daily. 90 tablet 1     spironolactone (ALDACTONE) 25 MG tablet TAKE ONE TABLET BY MOUTH ONCE DAILY WITH ONE 50MG TABLET (DAILY TOTAL OF 75MG). TAKE WITH WATER.       tacrolimus (PROTOPIC) 0.1 % external ointment        tretinoin (RETIN-A) 0.05 % external cream APPLY TO AFFECTED AREA EVERY DAY AT BEDTIME 45 g 2     valACYclovir (VALTREX) 1000 mg tablet TAKE 2 TABLETS BY MOUTH 12 HOURS APART AS NEEDED EPISODE 12 tablet 3     venlafaxine (EFFEXOR XR) 150 MG 24 hr capsule Take 1 capsule (150 mg) by mouth daily 90 capsule 3     zolpidem (AMBIEN) 5 MG tablet Take 1 tablet (5 mg) by mouth nightly as needed for sleep. 30 tablet 2         PHYSICAL EXAM     Vital signs  /85 (BP Location: Left arm, Patient Position: Sitting)   Pulse 89   Ht 1.549 m (5' 1\")   Wt 60.3 kg (133 lb)   BMI 25.13 kg/m      Weight:   133 lbs 0 oz    Pleasant female who is alert and oriented vital signs were reviewed and documented in electronic medical record.  Neck supple.  Neurologically speech was normal.  Cranial nerves II through XII are intact motor strength neck flexor and extensor 4+/5 in upper extremity 5/5 in the lower extremity proximally 4+/5 distally 5/5 reflexes 2+ toes downgoing no dysmetria noted on finger-nose testing gait normal.      PERTINENT DIAGNOSTIC STUDIES     Following studies were reviewed:     MRI BRAIN 10/29/2015  1.  Normal Head MRI.   2.  No acute infarcts, mass lesions or hemorrhage     PERTINENT " LABS  Following labs were reviewed:  Office Visit on 01/29/2025   Component Date Value Ref Range Status     Color Urine 01/29/2025 Yellow  Colorless, Straw, Light Yellow, Yellow Final     Appearance Urine 01/29/2025 Clear  Clear Final     Glucose Urine 01/29/2025 Negative  Negative mg/dL Final     Bilirubin Urine 01/29/2025 Negative  Negative Final     Ketones Urine 01/29/2025 Negative  Negative mg/dL Final     Specific Gravity Urine 01/29/2025 1.020  1.005 - 1.030 Final     Blood Urine 01/29/2025 Negative  Negative Final     pH Urine 01/29/2025 7.0  5.0 - 8.0 Final     Protein Albumin Urine 01/29/2025 Negative  Negative mg/dL Final     Urobilinogen Urine 01/29/2025 0.2  0.2, 1.0 E.U./dL Final     Nitrite Urine 01/29/2025 Negative  Negative Final     Leukocyte Esterase Urine 01/29/2025 Negative  Negative Final     Bacteria Urine 01/29/2025 Few (A)  None Seen /HPF Final     RBC Urine 01/29/2025 None Seen  0-2 /HPF /HPF Final     WBC Urine 01/29/2025 0-5  0-5 /HPF /HPF Final     Squamous Epithelials Urine 01/29/2025 Few (A)  None Seen /LPF Final   Office Visit on 11/26/2024   Component Date Value Ref Range Status     Lipase 11/26/2024 30  13 - 60 U/L Final     Sodium 11/26/2024 138  135 - 145 mmol/L Final     Potassium 11/26/2024 4.3  3.4 - 5.3 mmol/L Final     Chloride 11/26/2024 103  94 - 109 mmol/L Final     Carbon Dioxide (CO2) 11/26/2024 28  20 - 32 mmol/L Final     Anion Gap 11/26/2024 7  3 - 14 mmol/L Final     Urea Nitrogen 11/26/2024 9  7 - 30 mg/dL Final     Creatinine 11/26/2024 0.70  0.52 - 1.04 mg/dL Final     GFR Estimate 11/26/2024 >90  >60 mL/min/1.73m2 Final     Calcium 11/26/2024 9.1  8.5 - 10.1 mg/dL Final     Glucose 11/26/2024 93  70 - 99 mg/dL Final     Alkaline Phosphatase 11/26/2024 56  40 - 150 U/L Final     AST 11/26/2024 24  0 - 45 U/L Final     ALT 11/26/2024 17  0 - 50 U/L Final     Protein Total 11/26/2024 7.0  6.8 - 8.8 g/dL Final     Albumin 11/26/2024 3.9  3.4 - 5.0 g/dL Final      Bilirubin Total 11/26/2024 0.5  0.2 - 1.3 mg/dL Final     WBC Count 11/26/2024 8.1  4.0 - 11.0 10e3/uL Final     RBC Count 11/26/2024 4.81  3.80 - 5.20 10e6/uL Final     Hemoglobin 11/26/2024 13.5  11.7 - 15.7 g/dL Final     Hematocrit 11/26/2024 40.3  35.0 - 47.0 % Final     MCV 11/26/2024 84  78 - 100 fL Final     MCH 11/26/2024 28.1  26.5 - 33.0 pg Final     MCHC 11/26/2024 33.5  31.5 - 36.5 g/dL Final     RDW 11/26/2024 12.4  10.0 - 15.0 % Final     Platelet Count 11/26/2024 315  150 - 450 10e3/uL Final     % Neutrophils 11/26/2024 68  % Final     % Lymphocytes 11/26/2024 26  % Final     % Monocytes 11/26/2024 6  % Final     % Eosinophils 11/26/2024 0  % Final     % Basophils 11/26/2024 0  % Final     % Immature Granulocytes 11/26/2024 0  % Final     Absolute Neutrophils 11/26/2024 5.4  1.6 - 8.3 10e3/uL Final     Absolute Lymphocytes 11/26/2024 2.1  0.8 - 5.3 10e3/uL Final     Absolute Monocytes 11/26/2024 0.4  0.0 - 1.3 10e3/uL Final     Absolute Eosinophils 11/26/2024 0.0  0.0 - 0.7 10e3/uL Final     Absolute Basophils 11/26/2024 0.0  0.0 - 0.2 10e3/uL Final     Absolute Immature Granulocytes 11/26/2024 0.0  <=0.4 10e3/uL Final   Lab on 11/26/2024   Component Date Value Ref Range Status     Color Urine 11/26/2024 Straw  Colorless, Straw, Light Yellow, Yellow Final     Appearance Urine 11/26/2024 Clear  Clear Final     Glucose Urine 11/26/2024 Negative  Negative mg/dL Final     Bilirubin Urine 11/26/2024 Negative  Negative Final     Ketones Urine 11/26/2024 Negative  Negative mg/dL Final     Specific Gravity Urine 11/26/2024 1.005  1.003 - 1.035 Final     Blood Urine 11/26/2024 Negative  Negative Final     pH Urine 11/26/2024 6.0  5.0 - 7.0 Final     Protein Albumin Urine 11/26/2024 Negative  Negative mg/dL Final     Urobilinogen Urine 11/26/2024 Normal  Normal, 2.0 mg/dL Final     Nitrite Urine 11/26/2024 Negative  Negative Final     Leukocyte Esterase Urine 11/26/2024 Negative  Negative Final         Total  time spent for face to face visit, reviewing labs/imaging studies, counseling and coordination of care was: 20 minutes spent on the date of the encounter doing chart review, review of outside records, review of test results, interpretation of tests, patient visit, and documentation     The longitudinal plan of care for the diagnosis(es)/condition(s) as documented were addressed during this visit. Due to the added complexity in care, I will continue to support Sherry in the subsequent management and with ongoing continuity of care.    This note was dictated using voice recognition software.  Any grammatical or context distortions are unintentional and inherent to the software.    Orders Placed This Encounter   Procedures     68726 - MIGRAINE      New Prescriptions    No medications on file     Modified Medications    No medications on file                 Again, thank you for allowing me to participate in the care of your patient.        Sincerely,        Mikel Hernandez MD    Electronically signed

## 2025-02-04 NOTE — NURSING NOTE
Chief Complaint   Patient presents with    Botox Migraine     Silvina PHILLIPS CMA on 2/4/2025 at 11:58 AM  [facility]

## 2025-02-04 NOTE — PROCEDURES
BOTOX INJECTION PROCEDURE NOTE     Date: 02/04/2025    INDICATION: CHRONIC MIGRAINE    Number of headache days/month currently: 10 (BASELINE: >15   )  Number of headache hours/day currently : Monitor for (BASELINE: 4-24 Hours   )  Number of headache free days post treatment:  20  Disability due to migraine headache: yes  Consent: Obtained  Indication: Chronic Migraine    Botox Lot No: [id number] expiration 4/1/2027  Number of units injected: 155 U  Number of units of unavoidable wastage: 45 U    LOCATION  At today s visit, patient was injected with a total of 155 units divided in 31 sites. A total of 2 mL of normal saline was used to dilute 100 units of the drug. The following muscles were injected:  10 units divided in two sites, procerus 5 units in one site, frontalis 20 units divided in four sites, temporalis 40 units divided in eight sites, occipitalis 30 units divided in six sites, cervical paraspinals 20 units divided in four sites, and trapezius 30 units divided in six sites. 45 units were unavoidable wastage        ASSESSMENT/PLAN  Chronic Migraine headache  The patient tolerated the procedure well. There were no immediate complications. Prior to the procedure, I discussed the potential side effects of Botox therapy, which includes generalized muscle weakness, diplopia, ptosis, dysphagia, dysphonia, dysarthria, urinary incontinence, and breathing difficulties. Also I discussed the black box warning of the drug that can include the rare chance of distant spread of the drug to swallowing and breathing muscles that can be life threatening. All of patient's questions were answered prior to our signing the consent form. patient left the clinic after a brief period of observation and will return for repeat injection in three months  time as needed. I informed patient again the goal is to maintain at least seven to eight headache free days on average a month. I explained to patient that most patients with  chronic migraines do need life time Botox treatment, however, at any time if the patient wishes to stop Botox we can do so as some patients do go into remission on their own over time. Follow up will be in 3-4 months      Mikel Hernandez M.D.  [facility]  (Formerly, [facility]

## 2025-02-13 ENCOUNTER — PATIENT OUTREACH (OUTPATIENT)
Dept: CARE COORDINATION | Facility: CLINIC | Age: 45
End: 2025-02-13
Payer: COMMERCIAL

## 2025-02-18 ENCOUNTER — E-VISIT (OUTPATIENT)
Dept: URGENT CARE | Facility: CLINIC | Age: 45
End: 2025-02-18
Payer: COMMERCIAL

## 2025-02-18 DIAGNOSIS — R21 RASH AND NONSPECIFIC SKIN ERUPTION: Primary | ICD-10-CM

## 2025-02-18 PROCEDURE — 99207 PR NON-BILLABLE SERV PER CHARTING: CPT | Performed by: EMERGENCY MEDICINE

## 2025-02-18 RX ORDER — KETOCONAZOLE 20 MG/G
CREAM TOPICAL 2 TIMES DAILY
Qty: 30 G | Refills: 1 | Status: SHIPPED | OUTPATIENT
Start: 2025-02-18 | End: 2025-02-25

## 2025-03-02 ENCOUNTER — APPOINTMENT (OUTPATIENT)
Dept: CT IMAGING | Facility: HOSPITAL | Age: 45
End: 2025-03-02
Attending: STUDENT IN AN ORGANIZED HEALTH CARE EDUCATION/TRAINING PROGRAM
Payer: MEDICARE

## 2025-03-02 ENCOUNTER — HOSPITAL ENCOUNTER (EMERGENCY)
Facility: HOSPITAL | Age: 45
Discharge: HOME | End: 2025-03-02
Attending: STUDENT IN AN ORGANIZED HEALTH CARE EDUCATION/TRAINING PROGRAM | Admitting: STUDENT IN AN ORGANIZED HEALTH CARE EDUCATION/TRAINING PROGRAM
Payer: MEDICARE

## 2025-03-02 VITALS
DIASTOLIC BLOOD PRESSURE: 81 MMHG | WEIGHT: 130.9 LBS | HEART RATE: 90 BPM | SYSTOLIC BLOOD PRESSURE: 129 MMHG | HEIGHT: 63 IN | BODY MASS INDEX: 23.2 KG/M2 | RESPIRATION RATE: 16 BRPM | OXYGEN SATURATION: 100 %

## 2025-03-02 DIAGNOSIS — R51.9 NONINTRACTABLE HEADACHE, UNSPECIFIED CHRONICITY PATTERN, UNSPECIFIED HEADACHE TYPE: ICD-10-CM

## 2025-03-02 LAB
ANION GAP SERPL CALCULATED.3IONS-SCNC: 12 MMOL/L (ref 7–15)
BASOPHILS # BLD AUTO: 0.1 10E3/UL (ref 0–0.2)
BASOPHILS NFR BLD AUTO: 1 %
BUN SERPL-MCNC: 8.6 MG/DL (ref 6–20)
CALCIUM SERPL-MCNC: 10.1 MG/DL (ref 8.8–10.4)
CHLORIDE SERPL-SCNC: 102 MMOL/L (ref 98–107)
CREAT SERPL-MCNC: 0.86 MG/DL (ref 0.51–0.95)
EGFRCR SERPLBLD CKD-EPI 2021: 85 ML/MIN/1.73M2
EOSINOPHIL # BLD AUTO: 0 10E3/UL (ref 0–0.7)
EOSINOPHIL NFR BLD AUTO: 0 %
ERYTHROCYTE [DISTWIDTH] IN BLOOD BY AUTOMATED COUNT: 12.8 % (ref 10–15)
GLUCOSE SERPL-MCNC: 106 MG/DL (ref 70–99)
HCO3 SERPL-SCNC: 25 MMOL/L (ref 22–29)
HCT VFR BLD AUTO: 46.1 % (ref 35–47)
HGB BLD-MCNC: 15 G/DL (ref 11.7–15.7)
HOLD SPECIMEN: NORMAL
HOLD SPECIMEN: NORMAL
IMM GRANULOCYTES # BLD: 0 10E3/UL
IMM GRANULOCYTES NFR BLD: 0 %
LYMPHOCYTES # BLD AUTO: 3.3 10E3/UL (ref 0.8–5.3)
LYMPHOCYTES NFR BLD AUTO: 26 %
MCH RBC QN AUTO: 26.7 PG (ref 26.5–33)
MCHC RBC AUTO-ENTMCNC: 32.5 G/DL (ref 31.5–36.5)
MCV RBC AUTO: 82 FL (ref 78–100)
MONOCYTES # BLD AUTO: 0.9 10E3/UL (ref 0–1.3)
MONOCYTES NFR BLD AUTO: 7 %
NEUTROPHILS # BLD AUTO: 8.2 10E3/UL (ref 1.6–8.3)
NEUTROPHILS NFR BLD AUTO: 66 %
NRBC # BLD AUTO: 0 10E3/UL
NRBC BLD AUTO-RTO: 0 /100
PLATELET # BLD AUTO: 428 10E3/UL (ref 150–450)
POTASSIUM SERPL-SCNC: 3.7 MMOL/L (ref 3.4–5.3)
RBC # BLD AUTO: 5.62 10E6/UL (ref 3.8–5.2)
SODIUM SERPL-SCNC: 139 MMOL/L (ref 135–145)
WBC # BLD AUTO: 12.5 10E3/UL (ref 4–11)

## 2025-03-02 PROCEDURE — 70496 CT ANGIOGRAPHY HEAD: CPT

## 2025-03-02 PROCEDURE — 99285 EMERGENCY DEPT VISIT HI MDM: CPT | Mod: 25

## 2025-03-02 PROCEDURE — 96365 THER/PROPH/DIAG IV INF INIT: CPT | Mod: 59

## 2025-03-02 PROCEDURE — 258N000003 HC RX IP 258 OP 636: Performed by: STUDENT IN AN ORGANIZED HEALTH CARE EDUCATION/TRAINING PROGRAM

## 2025-03-02 PROCEDURE — 85025 COMPLETE CBC W/AUTO DIFF WBC: CPT | Performed by: STUDENT IN AN ORGANIZED HEALTH CARE EDUCATION/TRAINING PROGRAM

## 2025-03-02 PROCEDURE — 96375 TX/PRO/DX INJ NEW DRUG ADDON: CPT

## 2025-03-02 PROCEDURE — 80048 BASIC METABOLIC PNL TOTAL CA: CPT | Performed by: STUDENT IN AN ORGANIZED HEALTH CARE EDUCATION/TRAINING PROGRAM

## 2025-03-02 PROCEDURE — 36415 COLL VENOUS BLD VENIPUNCTURE: CPT | Performed by: STUDENT IN AN ORGANIZED HEALTH CARE EDUCATION/TRAINING PROGRAM

## 2025-03-02 PROCEDURE — 250N000011 HC RX IP 250 OP 636: Performed by: STUDENT IN AN ORGANIZED HEALTH CARE EDUCATION/TRAINING PROGRAM

## 2025-03-02 PROCEDURE — 96361 HYDRATE IV INFUSION ADD-ON: CPT

## 2025-03-02 RX ORDER — DEXAMETHASONE SODIUM PHOSPHATE 10 MG/ML
10 INJECTION, SOLUTION INTRAMUSCULAR; INTRAVENOUS ONCE
Status: COMPLETED | OUTPATIENT
Start: 2025-03-02 | End: 2025-03-02

## 2025-03-02 RX ORDER — KETOROLAC TROMETHAMINE 15 MG/ML
15 INJECTION, SOLUTION INTRAMUSCULAR; INTRAVENOUS ONCE
Status: COMPLETED | OUTPATIENT
Start: 2025-03-02 | End: 2025-03-02

## 2025-03-02 RX ORDER — DIPHENHYDRAMINE HYDROCHLORIDE 50 MG/ML
25 INJECTION, SOLUTION INTRAMUSCULAR; INTRAVENOUS ONCE
Status: COMPLETED | OUTPATIENT
Start: 2025-03-02 | End: 2025-03-02

## 2025-03-02 RX ORDER — MAGNESIUM SULFATE HEPTAHYDRATE 40 MG/ML
2 INJECTION, SOLUTION INTRAVENOUS ONCE
Status: COMPLETED | OUTPATIENT
Start: 2025-03-02 | End: 2025-03-02

## 2025-03-02 RX ORDER — METOCLOPRAMIDE HYDROCHLORIDE 5 MG/ML
10 INJECTION INTRAMUSCULAR; INTRAVENOUS ONCE
Status: COMPLETED | OUTPATIENT
Start: 2025-03-02 | End: 2025-03-02

## 2025-03-02 RX ORDER — IOPAMIDOL 755 MG/ML
80 INJECTION, SOLUTION INTRAVASCULAR ONCE
Status: COMPLETED | OUTPATIENT
Start: 2025-03-02 | End: 2025-03-02

## 2025-03-02 RX ADMIN — METOCLOPRAMIDE 10 MG: 5 INJECTION, SOLUTION INTRAMUSCULAR; INTRAVENOUS at 01:08

## 2025-03-02 RX ADMIN — DIPHENHYDRAMINE HYDROCHLORIDE 25 MG: 50 INJECTION, SOLUTION INTRAMUSCULAR; INTRAVENOUS at 01:05

## 2025-03-02 RX ADMIN — KETOROLAC TROMETHAMINE 15 MG: 15 INJECTION, SOLUTION INTRAMUSCULAR; INTRAVENOUS at 01:03

## 2025-03-02 RX ADMIN — SODIUM CHLORIDE, SODIUM LACTATE, POTASSIUM CHLORIDE, AND CALCIUM CHLORIDE 1000 ML: .6; .31; .03; .02 INJECTION, SOLUTION INTRAVENOUS at 02:07

## 2025-03-02 RX ADMIN — DEXAMETHASONE SODIUM PHOSPHATE 10 MG: 10 INJECTION, SOLUTION INTRAMUSCULAR; INTRAVENOUS at 01:23

## 2025-03-02 RX ADMIN — MAGNESIUM SULFATE HEPTAHYDRATE 2 G: 40 INJECTION, SOLUTION INTRAVENOUS at 01:26

## 2025-03-02 RX ADMIN — IOPAMIDOL 80 ML: 755 INJECTION, SOLUTION INTRAVENOUS at 02:01

## 2025-03-02 ASSESSMENT — COLUMBIA-SUICIDE SEVERITY RATING SCALE - C-SSRS
6. HAVE YOU EVER DONE ANYTHING, STARTED TO DO ANYTHING, OR PREPARED TO DO ANYTHING TO END YOUR LIFE?: NO
2. HAVE YOU ACTUALLY HAD ANY THOUGHTS OF KILLING YOURSELF IN THE PAST MONTH?: NO
1. IN THE PAST MONTH, HAVE YOU WISHED YOU WERE DEAD OR WISHED YOU COULD GO TO SLEEP AND NOT WAKE UP?: NO

## 2025-03-02 ASSESSMENT — ACTIVITIES OF DAILY LIVING (ADL)
ADLS_ACUITY_SCORE: 41
ADLS_ACUITY_SCORE: 41

## 2025-03-02 NOTE — ED TRIAGE NOTES
Pt. Has hx of migraines tonight she woke up with worst migraine of her life, Pt. Stated that the pain is everywhere in her head. Nauseated and vomiting, light sensitive, dizziness. Pt. Took migraine meds but threw up immediately after.      Triage Assessment (Adult)       Row Name 03/02/25 0039          Triage Assessment    Airway WDL WDL        Respiratory WDL    Respiratory WDL WDL        Skin Circulation/Temperature WDL    Skin Circulation/Temperature WDL WDL        Cardiac WDL    Cardiac WDL WDL        Peripheral/Neurovascular WDL    Peripheral Neurovascular WDL WDL        Cognitive/Neuro/Behavioral WDL    Cognitive/Neuro/Behavioral WDL WDL

## 2025-03-02 NOTE — DISCHARGE INSTRUCTIONS
Your blood work today was overall reassuring.  The CTA of your head and neck was overall normal.    You received a migraine cocktail tonight which included Reglan, Benadryl, Toradol, Decadron, magnesium, and fluids.    Follow-up with your neurologist if you continue to have worsening migraines talk about further medications at home.

## 2025-03-02 NOTE — ED PROVIDER NOTES
"  Emergency Department Encounter         FINAL IMPRESSION:  headache          ED COURSE AND MEDICAL DECISION MAKING     12:48 AM I met with the patient, obtained history, performed an initial exam, and discussed options and plan for diagnostics and treatment here in the ED.  2:46 AM I decided that the patient is ready for discharge.    ED Course as of 03/02/25 0240   Sun Mar 02, 2025   0052 Reviewed 2/4/2025 Dr. Hernandez neurology's note where patient received Botox injections for headache.     Patient is a 44-year-old female history of migraine headaches followed by our neurology group, here with worsening headaches that she reports is \"the worst headache of my life.\"  Reports worsening overnight.  Endorses photophobia and phonophobia with some nausea.  No chest pain or trouble breathing.  No abdominal pain.  No dysuria or bowel movement changes.  No unilateral weakness although patient reports generalized paresthesias.    Arrival she is crying and holding her head.  Has a face mask over her eyes.  Her neuro examination is otherwise unremarkable.  Normal heart and lungs.  Plan for migraine cocktail, imaging of her head and neck and reevaluate.    - CTA negative.  Patient reports now a complete resolution of her symptomatology.  Repeat neuro examination nonfocal.                     Medical Decision Making  Obtained supplemental history:Supplemental history obtained?: Documented in chart and Family Member/Significant Other  Reviewed external records: External records reviewed?: Documented in chart and Outpatient Record: [facility] [place] visit from 02/04/2025  Care impacted by chronic illness:Documented in Chart and Other: N/A  Did you consider but not order tests?: Work up considered but not performed and documented in chart, if applicable  Did you interpret images independently?: Independent interpretation of ECG and images noted in documentation, when applicable.  Consultation discussion " "with other provider:Did you involve another provider (consultant, MH, pharmacy, etc.)?: No  Discharge. No recommendations on prescription strength medication(s). See documentation for any additional details.    MIPS (CTPE, Dental pain, Summers, Sinusitis, Asthma/COPD, Head Trauma): Not Applicable            At the conclusion of the encounter I discussed the results of all the tests and the disposition. The questions were answered. The patient or family acknowledged understanding and was agreeable with the care plan.        MEDICATIONS GIVEN IN THE EMERGENCY DEPARTMENT:  Medications   lactated ringers BOLUS 1,000 mL (1,000 mLs Intravenous $New Bag 3/2/25 0207)   ketorolac (TORADOL) injection 15 mg (15 mg Intravenous $Given 3/2/25 0103)   metoclopramide (REGLAN) injection 10 mg (10 mg Intravenous $Given 3/2/25 0108)   diphenhydrAMINE (BENADRYL) injection 25 mg (25 mg Intravenous $Given 3/2/25 0105)   dexAMETHasone PF (DECADRON) injection 10 mg (10 mg Intravenous $Given 3/2/25 0123)   magnesium sulfate 2 g in 50 mL sterile water intermittent infusion (0 g Intravenous Stopped 3/2/25 0201)   iopamidol (ISOVUE-370) solution 80 mL (80 mLs Intravenous $Given 3/2/25 0201)       NEW PRESCRIPTIONS STARTED AT TODAY'S ED VISIT:  New Prescriptions    No medications on file       HPI     Patient information obtained from: patient and patient's family member    Use of : N/A    Sherry Sweeney is a 44 year old female with a pertinent history of anxiety and chronic migraines without aura who presents to this ED walking for evaluation of a headache.    Per patient and patient's family member, she is having \"the worst migraine ever.\" She is a night owl so she was up through the night and went to bed at 2:00 PM yesterday (03/01/2025). She then awoke at 11:00 PM yesterday with the migraine. It came on suddenly.     She did not mention taking anything for the pain. No daily medications were mentioned.        MEDICAL HISTORY     Past " Medical History:   Diagnosis Date    Anemia     Chronic fatigue syndrome     Chronic migraine     Depression     Depression     Depressive disorder 2013    Fibromyalgia     Fibromyalgia     Gastroesophageal reflux disease     H/O: hysterectomy 02/22/2018    History of anesthesia complications     IBS (irritable bowel syndrome)     Insomnia     Irregular heart beat     Mitochondrial disease     Mitochondrial myopathy     Motion sickness     Parasomnia     PONV (postoperative nausea and vomiting)        Past Surgical History:   Procedure Laterality Date    ABDOMEN SURGERY  1996 & 2018, feb.    APPENDECTOMY  1996    BIOPSY      HC REMOVAL OF OVARIAN CYST(S)      Description: Ovarian Cystectomy;  Recorded: 11/07/2013;    HYSTERECTOMY Bilateral 02/22/2018    Procedure: TOTAL ABDOMINAL HYSTERECTOMY BILATERAL SALPINGECTOMY, RIGHT OOPHORECTOMY;  Surgeon: Joanne Bedolla DO;  Location: [facility];  Service:     HYSTERECTOMY TOTAL ABDOMINAL      LAPAROSCOPIC CHOLECYSTECTOMY N/A 10/25/2023    Procedure: CHOLECYSTECTOMY, LAPAROSCOPIC;  Surgeon: Eusebio Gonzalez MD;  Location: [facility]    LAPAROSCOPIC CYSTECTOMY OVARIAN (ONCOLOGY) Left 10/18/2021    Procedure: LAPAROSCOPY,  LEFT OVARIAN CYSTECTOMY, LYSIS OF ADHESIONS.;  Surgeon: Joanne Bedolla MD;  Location: [facility] OR    LAPAROSCOPY DIAGNOSTIC (GYN) Left 06/29/2023    Procedure: DIAGNOSTIC LAPAROSCOPY WITH LEFT SIDE OOPHORECTOMY AND LYSIS OF ADHESION;  Surgeon: Joanne Bedolla MD;  Location: [facility] OR    PIC  02/24/2018            Social History     Tobacco Use    Smoking status: Never    Smokeless tobacco: Never   Vaping Use    Vaping status: Never Used   Substance Use Topics    Alcohol use: No    Drug use: No       ascorbic acid 500 MG TABS  betamethasone dipropionate (DIPROSONE) 0.05 % external lotion  buPROPion (WELLBUTRIN XL) 300 MG 24 hr tablet  cetirizine (ZYRTEC) 10 MG tablet  clindamycin-benzoyl peroxide  "(BENZACLIN) gel  clobetasol (TEMOVATE) 0.05 % external ointment  clobetasol propionate (TEMOVATE) 0.05 % external cream  clotrimazole-betamethasone (LOTRISONE) 1-0.05 % external cream  [facility] MELATONIN 3 MG tablet  cyclobenzaprine (FLEXERIL) 10 MG tablet  hydrOXYzine (ATARAX) 25 MG tablet  ibuprofen (ADVIL/MOTRIN) 400 MG tablet  ketoconazole (NIZORAL) 2 % external cream  lactulose 20 GM/30ML solution  lidocaine (LIDODERM) 5 % patch  Multiple Vitamin (MULTIVITAMIN ADULT PO)  naproxen (NAPROSYN) 500 MG tablet  omeprazole (PRILOSEC) 40 MG DR capsule  ondansetron (ZOFRAN) 4 MG tablet  PEPCID 40 MG tablet  Riboflavin 400 MG TABS  rizatriptan (MAXALT) 10 MG tablet  spironolactone (ALDACTONE) 25 MG tablet  spironolactone (ALDACTONE) 25 MG tablet  tacrolimus (PROTOPIC) 0.1 % external ointment  tretinoin (RETIN-A) 0.05 % external cream  valACYclovir (VALTREX) 1000 mg tablet  venlafaxine (EFFEXOR XR) 150 MG 24 hr capsule  zolpidem (AMBIEN) 5 MG tablet            PHYSICAL EXAM     /81   Pulse 87   Resp 16   Ht 1.6 m (5' 3\")   Wt 59.4 kg (130 lb 14.4 oz)   SpO2 100%   BMI 23.19 kg/m        PHYSICAL EXAM:     General: Patient appears well, nontoxic, comfortable  HEENT: Moist mucous membranes,  No head trauma.    Cardiovascular: Normal rate, normal rhythm, no extremity edema.  No appreciable murmur.  Respiratory: No signs of respiratory distress, lungs are clear to auscultation bilaterally with no wheezes rhonchi or rales.  Abdominal: Soft, nontender, nondistended, no palpable masses, no guarding, no rebound  Musculoskeletal: Full range of motion of joints, no deformities appreciated.  Alert and oriented, +5 strength UE/LE, normal finger to nose, , gross sensation intact throughout, CN II-12 intact grossly, no difficulty with ambulation, no slurring of words, no word finding difficulty    Neurological: Alert and oriented, grossly neurologically intact.  Psychological: Normal affect and mood.  Integument: No rashes " appreciated          RESULTS       Labs Ordered and Resulted from Time of ED Arrival to Time of ED Departure   BASIC METABOLIC PANEL - Abnormal       Result Value    Sodium 139      Potassium 3.7      Chloride 102      Carbon Dioxide (CO2) 25      Anion Gap 12      Urea Nitrogen 8.6      Creatinine 0.86      GFR Estimate 85      Calcium 10.1      Glucose 106 (*)    CBC WITH PLATELETS AND DIFFERENTIAL - Abnormal    WBC Count 12.5 (*)     RBC Count 5.62 (*)     Hemoglobin 15.0      Hematocrit 46.1      MCV 82      MCH 26.7      MCHC 32.5      RDW 12.8      Platelet Count 428      % Neutrophils 66      % Lymphocytes 26      % Monocytes 7      % Eosinophils 0      % Basophils 1      % Immature Granulocytes 0      NRBCs per 100 WBC 0      Absolute Neutrophils 8.2      Absolute Lymphocytes 3.3      Absolute Monocytes 0.9      Absolute Eosinophils 0.0      Absolute Basophils 0.1      Absolute Immature Granulocytes 0.0      Absolute NRBCs 0.0         CTA Head Neck with Contrast   Final Result   IMPRESSION:    HEAD CT:   1.  No acute intracranial process.      HEAD CTA:    1.  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   2.  Otherwise, unremarkable CTA head.      NECK CTA:   1.  Normal neck CTA.            PROCEDURES:  Procedures:  Procedures       I, Jefferson Figueroa am serving as a scribe to document services personally performed by Nima Zelaya DO, based on my observations and the provider's statements to me.  I, Nima Zelaya DO, attest that Jefferson Figueroa is acting in a scribe capacity, has observed my performance of the services and has documented them in accordance with my direction.    Nima Zelaya DO  Emergency Medicine  [facility] EMERGENCY DEPARTMENT      Nima Zelaya DO  03/02/25 0252

## 2025-03-24 ENCOUNTER — MYC REFILL (OUTPATIENT)
Dept: NEUROLOGY | Facility: CLINIC | Age: 45
End: 2025-03-24
Payer: COMMERCIAL

## 2025-03-24 ENCOUNTER — MYC REFILL (OUTPATIENT)
Dept: FAMILY MEDICINE | Facility: CLINIC | Age: 45
End: 2025-03-24
Payer: COMMERCIAL

## 2025-03-24 DIAGNOSIS — G47.00 INSOMNIA, UNSPECIFIED TYPE: ICD-10-CM

## 2025-03-24 RX ORDER — ZOLPIDEM TARTRATE 5 MG/1
5 TABLET ORAL
Qty: 30 TABLET | Refills: 2 | Status: SHIPPED | OUTPATIENT
Start: 2025-03-24

## 2025-03-24 RX ORDER — RIZATRIPTAN BENZOATE 10 MG/1
TABLET ORAL
Qty: 10 TABLET | Refills: 11 | Status: SHIPPED | OUTPATIENT
Start: 2025-03-24

## 2025-03-24 NOTE — TELEPHONE ENCOUNTER
Refill request for: rizatriptan (MAXALT) 10 MG tablet    Directions: TAKE 1 TABLET BY MOUTH AS NEEDED FOR MIGRAINE. MAY REPEAT IN 2 HOURS IF NEEDED     LOV: 2/4/25  NOV: 5/1/25    #10 with 11 refills Medication T'd for review and signature  Silvina PHILLIPS CMA on 3/24/2025 at 12:54 PM  [facility]

## 2025-04-25 ENCOUNTER — TRANSFERRED RECORDS (OUTPATIENT)
Dept: HEALTH INFORMATION MANAGEMENT | Facility: CLINIC | Age: 45
End: 2025-04-25
Payer: COMMERCIAL

## 2025-04-29 NOTE — PROGRESS NOTES
NEUROLOGY FOLLOW UP VISIT  NOTE       [facility] NEUROLOGY [place]  [address] [place] [address]  Tel: [phone]  Fax: [phone]  [email or web address]     Sherry Sweeney,  1980, MRN [id number]  PCP: Layla Martinez  Date: 2025      ASSESSMENT & PLAN     Visit Diagnosis  Chronic migraine without aura, intractable, without status migrainosus     Chronic migraine without aura  Pleasant 45-year-old female with history of mitochondrial myopathy, chronic migraine without aura, fibromyalgia who returns for 3 monthly Botox injection.  Previously she had tried different medication but continued to be symptomatic.  But with Botox she has noticed more than 50% reduction in her headache.  She also feels intensity and frequency of the headaches have declined.  For abortive treatment she takes Maxalt.  Additionally she is on Effexor, riboflavin and Wellbutrin.  After explaining all the side effect and obtaining her consent, I injected 155 units.  45 units were discarded.  She was monitored in the clinic for short duration and left in satisfactory condition.  Follow-up will be in 3 months.    Thank you again for this referral, please feel free to contact me if you have any questions.    Mikel Hernandez MD  [facility] NEUROLOGY, [place]     HISTORY OF PRESENT ILLNESS     Patient is a 45-year-old female with history of mitochondrial myopathy, chronic migraine without aura, fibromyalgia who is here for 3 monthly Botox injection.  With Botox she has reported more than 50% reduction in her headache.  Previously she had failed multiple medication but ever since she started getting Botox there was significant improvement. She started getting Botox injections in 2019 and noticed steady decline in her headache frequency.     Briefly patient is a female with history of mitochondrial myopathy, chronic migraine without aura, fibromyalgia who started having headaches in  and  progressively got worse.  She initially attributed it to pressure in the neck or TMJ abnormality and that in the past was evaluated and started on some hormonal supplements.  Due to progressive weakness she was evaluated for possible mitochondrial myopathy and had mitochondrial DNA tested that was positive.  For her headaches she was started on Depakote, amitriptyline and in the past had also tried Cymbalta and as she was having more than 15 headaches in a month Botox was recommended.  She started getting Botox in 2017 and did notice improvement in her headache.  Discontinued until 2019 but due to COVID pandemic she stopped doing Botox injection and noticed worsening headaches and Botox was restarted in 2023.     PROBLEM LIST   Patient Active Problem List   Diagnosis    Acne vulgaris    Arthralgia of temporomandibular joint    Debility    Fibromyalgia    IBS (irritable bowel syndrome)    Insomnia    Moderate recurrent major depression (H)    Chronic migraine without aura, intractable, without status migrainosus    Mitochondrial disease    Generalized anxiety disorder    Post-traumatic stress disorder    Parasomnia    Seasonal depression    Palpitations    Gastroesophageal reflux disease without esophagitis    Vaginal Papanicolaou smear not required due to history of hysterectomy    Biliary colic    Inflammatory pseudotumor of colon (H)         PAST MEDICAL & SURGICAL HISTORY     Past Medical History:   Patient  has a past medical history of Anemia, Chronic fatigue syndrome, Chronic migraine, Depression, Depression, Depressive disorder (2013), Fibromyalgia, Fibromyalgia, Gastroesophageal reflux disease, H/O: hysterectomy (02/22/2018), History of anesthesia complications, IBS (irritable bowel syndrome), Insomnia, Irregular heart beat, Mitochondrial disease, Mitochondrial myopathy, Motion sickness, Parasomnia, and PONV (postoperative nausea and vomiting).    Surgical History:  She  has a past surgical history that  includes REMOVAL OF OVARIAN CYST(S); Hysterectomy (Bilateral, 02/22/2018); Picc (02/24/2018); Abdomen surgery (1996 & 2018, feb.); appendectomy (1996); biopsy; Laparoscopic cystectomy ovarian (oncology) (Left, 10/18/2021); Laparoscopy diagnostic (gyn) (Left, 06/29/2023); Hysterectomy total abdominal; and Laparoscopic cholecystectomy (N/A, 10/25/2023).     SOCIAL HISTORY     Reviewed, and she  reports that she has never smoked. She has never used smokeless tobacco. She reports that she does not drink alcohol and does not use drugs.     FAMILY HISTORY     Reviewed, and family history includes Anxiety Disorder in her maternal grandmother, mother, paternal grandfather, and paternal grandmother; Bone Cancer in her maternal grandfather; Breast Cancer (age of onset: 60.00) in her maternal grandmother; Breast Cancer (age of onset: 62.00) in her mother; Cancer in her mother; Coronary Artery Disease (age of onset: 45.00) in her father; Depression in her brother, father, maternal grandmother, and mother; Diabetes in her cousin and cousin; Hyperlipidemia in her mother; Hypertension in her mother; Meniere's disease in her mother.     ALLERGIES     Allergies   Allergen Reactions    Ciprofloxacin Itching, Rash and Hives     Rash over whole body   Rash over whole body       Influenza Virus Vaccine Shortness Of Breath and Anaphylaxis    No Clinical Screening - See Comments Anaphylaxis     Stomach swelling    Sulfa Antibiotics Rash and Hives    Desvenlafaxine Blisters, Itching and Rash    Milnacipran Other (See Comments) and Palpitations     Chest pain, hot/cold flashes    Quetiapine Palpitations     Tachycardia, nervousness, elevated blood pressure.   Tachycardia, nervousness, elevated blood pressure.          REVIEW OF SYSTEMS     A 12 point review of system was performed and was negative except as outlined in the history of present illness.     HOME MEDICATIONS     Current Outpatient Rx   Medication Sig Dispense Refill    ascorbic  acid 500 MG TABS Take 500 mg by mouth daily      betamethasone dipropionate (DIPROSONE) 0.05 % external lotion Apply topically 2 times daily. 60 mL 1    buPROPion (WELLBUTRIN XL) 300 MG 24 hr tablet TAKE 1 TABLET BY MOUTH EVERY DAY 90 tablet 3    cetirizine (ZYRTEC) 10 MG tablet TAKE 1 TAB ORALLY DAILY AS NEEDED FOR ALLERGIES MAY INCREASE TO 2 TAB DAILY IF ITCHING NOT RELIEVED 90 tablet 2    clindamycin-benzoyl peroxide (BENZACLIN) gel Apply topically to acne once daily      clobetasol (TEMOVATE) 0.05 % external ointment       clobetasol propionate (TEMOVATE) 0.05 % external cream Apply topically 2 times daily. 60 g 1    clotrimazole-betamethasone (LOTRISONE) 1-0.05 % external cream 1 application as needed by topical route.      [facility] MELATONIN 3 MG tablet TAKE 1 TABLET (3 MG) BY MOUTH NIGHTLY AS NEEDED FOR SLEEP 90 tablet 1    cyclobenzaprine (FLEXERIL) 10 MG tablet Take 10 mg by mouth daily as needed for muscle spasms      hydrOXYzine (ATARAX) 25 MG tablet TAKE 1-2 TABLETS (25-50 MG) BY MOUTH AT BEDTIME 180 tablet 2    ibuprofen (ADVIL/MOTRIN) 400 MG tablet       ketoconazole (NIZORAL) 2 % external cream APPLY TO AFFECTED AREA TWICE A DAY FOR 7 DAYS      lactulose 20 GM/30ML solution Take 30 mLs by mouth 3 times daily. 450 mL 0    lidocaine (LIDODERM) 5 % patch PLACE 1 PATCH ONTO THE SKIN AS NEEDED TO NECK, SHOULDERS, AND BACK (Patient taking differently: Place onto the skin as needed. To neck, shoulders, and back) 30 patch 0    Multiple Vitamin (MULTIVITAMIN ADULT PO) Take 1 tablet by mouth daily      naproxen (NAPROSYN) 500 MG tablet       omeprazole (PRILOSEC) 40 MG DR capsule TAKE 1 CAPSULE (40 MG) BY MOUTH EVERY MORNING (BEFORE BREAKFAST) 30 MINUTES BEFORE MEAL 90 capsule 1    ondansetron (ZOFRAN) 4 MG tablet Take 1 tablet (4 mg) by mouth every 8 hours as needed for nausea. 18 tablet 1    PEPCID 40 MG tablet Take by mouth every 24 hours.      Riboflavin 400 MG TABS Take 400 mg by mouth daily      rizatriptan  (MAXALT) 10 MG tablet TAKE 1 TABLET BY MOUTH AS NEEDED FOR MIGRAINE. MAY REPEAT IN 2 HOURS IF NEEDED 10 tablet 11    spironolactone (ALDACTONE) 25 MG tablet Take 1 tablet (25 mg) by mouth daily. 90 tablet 1    spironolactone (ALDACTONE) 25 MG tablet TAKE ONE TABLET BY MOUTH ONCE DAILY WITH ONE 50MG TABLET (DAILY TOTAL OF 75MG). TAKE WITH WATER.      tacrolimus (PROTOPIC) 0.1 % external ointment       tretinoin (RETIN-A) 0.05 % external cream APPLY TO AFFECTED AREA EVERY DAY AT BEDTIME 45 g 2    valACYclovir (VALTREX) 1000 mg tablet TAKE 2 TABLETS BY MOUTH 12 HOURS APART AS NEEDED EPISODE 12 tablet 3    venlafaxine (EFFEXOR XR) 150 MG 24 hr capsule Take 1 capsule (150 mg) by mouth daily 90 capsule 3    zolpidem (AMBIEN) 5 MG tablet Take 1 tablet (5 mg) by mouth nightly as needed for sleep. 30 tablet 2         PHYSICAL EXAM     Vital signs  There were no vitals taken for this visit.    Weight:   0 lbs 0 oz    Pleasant female who is alert and oriented vital signs were reviewed and documented in electronic medical record.  Neck supple.  Neurologically speech was normal.  Cranial nerves II through XII are intact motor strength neck flexor and extensor 4+/5 in upper extremity 5/5 in the lower extremity proximally 4+/5 distally 5/5 reflexes 2+ toes downgoing no dysmetria noted on finger-nose testing gait normal.      PERTINENT DIAGNOSTIC STUDIES     Following studies were reviewed:     MRI BRAIN 10/29/2015  1.  Normal Head MRI.   2.  No acute infarcts, mass lesions or hemorrhage     PERTINENT LABS  Following labs were reviewed:  Admission on 03/02/2025, Discharged on 03/02/2025   Component Date Value Ref Range Status    Sodium 03/02/2025 139  135 - 145 mmol/L Final    Potassium 03/02/2025 3.7  3.4 - 5.3 mmol/L Final    Chloride 03/02/2025 102  98 - 107 mmol/L Final    Carbon Dioxide (CO2) 03/02/2025 25  22 - 29 mmol/L Final    Anion Gap 03/02/2025 12  7 - 15 mmol/L Final    Urea Nitrogen 03/02/2025 8.6  6.0 - 20.0 mg/dL  Final    Creatinine 03/02/2025 0.86  0.51 - 0.95 mg/dL Final    GFR Estimate 03/02/2025 85  >60 mL/min/1.73m2 Final    Calcium 03/02/2025 10.1  8.8 - 10.4 mg/dL Final    Glucose 03/02/2025 106 (H)  70 - 99 mg/dL Final    WBC Count 03/02/2025 12.5 (H)  4.0 - 11.0 10e3/uL Final    RBC Count 03/02/2025 5.62 (H)  3.80 - 5.20 10e6/uL Final    Hemoglobin 03/02/2025 15.0  11.7 - 15.7 g/dL Final    Hematocrit 03/02/2025 46.1  35.0 - 47.0 % Final    MCV 03/02/2025 82  78 - 100 fL Final    MCH 03/02/2025 26.7  26.5 - 33.0 pg Final    MCHC 03/02/2025 32.5  31.5 - 36.5 g/dL Final    RDW 03/02/2025 12.8  10.0 - 15.0 % Final    Platelet Count 03/02/2025 428  150 - 450 10e3/uL Final    % Neutrophils 03/02/2025 66  % Final    % Lymphocytes 03/02/2025 26  % Final    % Monocytes 03/02/2025 7  % Final    % Eosinophils 03/02/2025 0  % Final    % Basophils 03/02/2025 1  % Final    % Immature Granulocytes 03/02/2025 0  % Final    NRBCs per 100 WBC 03/02/2025 0  <1 /100 Final    Absolute Neutrophils 03/02/2025 8.2  1.6 - 8.3 10e3/uL Final    Absolute Lymphocytes 03/02/2025 3.3  0.8 - 5.3 10e3/uL Final    Absolute Monocytes 03/02/2025 0.9  0.0 - 1.3 10e3/uL Final    Absolute Eosinophils 03/02/2025 0.0  0.0 - 0.7 10e3/uL Final    Absolute Basophils 03/02/2025 0.1  0.0 - 0.2 10e3/uL Final    Absolute Immature Granulocytes 03/02/2025 0.0  <=0.4 10e3/uL Final    Absolute NRBCs 03/02/2025 0.0  10e3/uL Final    Hold Specimen 03/02/2025 J[facility]   Final    Hold Specimen 03/02/2025 [facility]   Final         Total time spent for face to face visit, reviewing labs/imaging studies, counseling and coordination of care was: 20 minutes spent on the date of the encounter doing chart review, review of outside records, review of test results, interpretation of tests, patient visit, and documentation     The longitudinal plan of care for the diagnosis(es)/condition(s) as documented were addressed during this visit. Due to the added complexity in care, I will continue  to support Sherry in the subsequent management and with ongoing continuity of care.    This note was dictated using voice recognition software.  Any grammatical or context distortions are unintentional and inherent to the software.    Orders Placed This Encounter   Procedures    [id number] - MIGRAINE      New Prescriptions    No medications on file     Modified Medications    No medications on file

## 2025-05-01 ENCOUNTER — OFFICE VISIT (OUTPATIENT)
Dept: NEUROLOGY | Facility: CLINIC | Age: 45
End: 2025-05-01
Payer: COMMERCIAL

## 2025-05-01 DIAGNOSIS — G43.719 CHRONIC MIGRAINE WITHOUT AURA, INTRACTABLE, WITHOUT STATUS MIGRAINOSUS: Primary | ICD-10-CM

## 2025-05-01 PROBLEM — K51.40 INFLAMMATORY PSEUDOTUMOR OF COLON (H): Status: ACTIVE | Noted: 2025-01-07

## 2025-05-01 NOTE — PROCEDURES
BOTOX INJECTION PROCEDURE NOTE     Date: 05/01/2025    INDICATION: CHRONIC MIGRAINE    Number of headache days/month currently: 10 (BASELINE: >15   )  Number of headache hours/day currently : Monitor for (BASELINE: 4-24 Hours   )  Number of headache free days post treatment:  20  Disability due to migraine headache: yes  Consent: Obtained  Indication: Chronic Migraine    Botox Lot No: [id number] expiration 4/30/2027  Number of units injected: 155 U  Number of units of unavoidable wastage: 45 U    LOCATION  At today s visit, patient was injected with a total of 155 units divided in 31 sites. A total of 2 mL of normal saline was used to dilute 100 units of the drug. The following muscles were injected:  10 units divided in two sites, procerus 5 units in one site, frontalis 20 units divided in four sites, temporalis 40 units divided in eight sites, occipitalis 30 units divided in six sites, cervical paraspinals 20 units divided in four sites, and trapezius 30 units divided in six sites. 45 units were unavoidable wastage        ASSESSMENT/PLAN  Chronic Migraine headache  The patient tolerated the procedure well. There were no immediate complications. Prior to the procedure, I discussed the potential side effects of Botox therapy, which includes generalized muscle weakness, diplopia, ptosis, dysphagia, dysphonia, dysarthria, urinary incontinence, and breathing difficulties. Also I discussed the black box warning of the drug that can include the rare chance of distant spread of the drug to swallowing and breathing muscles that can be life threatening. All of patient's questions were answered prior to our signing the consent form. patient left the clinic after a brief period of observation and will return for repeat injection in three months  time as needed. I informed patient again the goal is to maintain at least seven to eight headache free days on average a month. I explained to patient that most patients  with chronic migraines do need life time Botox treatment, however, at any time if the patient wishes to stop Botox we can do so as some patients do go into remission on their own over time. Follow up will be in 3-4 months      Mikel Hernandez M.D.  [facility]  (Formerly, [facility]

## 2025-05-01 NOTE — LETTER
2025      Sherry Sweeney  [address] [address]      Dear Colleague,    Thank you for referring your patient, Sherry Sweeney, to the [facility] [place]. Please see a copy of my visit note below.    NEUROLOGY FOLLOW UP VISIT  NOTE       [facility] [place]  [address] [place] [address]  Tel: [phone]  Fax: [phone]  [email or web address]     Sherry Sweeney,  1980, MRN [id number]  PCP: Layla Martinez  Date: 2025      ASSESSMENT & PLAN     Visit Diagnosis  Chronic migraine without aura, intractable, without status migrainosus     Chronic migraine without aura  Pleasant 45-year-old female with history of mitochondrial myopathy, chronic migraine without aura, fibromyalgia who returns for 3 monthly Botox injection.  Previously she had tried different medication but continued to be symptomatic.  But with Botox she has noticed more than 50% reduction in her headache.  She also feels intensity and frequency of the headaches have declined.  For abortive treatment she takes Maxalt.  Additionally she is on Effexor, riboflavin and Wellbutrin.  After explaining all the side effect and obtaining her consent, I injected 155 units.  45 units were discarded.  She was monitored in the clinic for short duration and left in satisfactory condition.  Follow-up will be in 3 months.    Thank you again for this referral, please feel free to contact me if you have any questions.    Mikel Hernandez MD  [facility], [place]     HISTORY OF PRESENT ILLNESS     Patient is a 45-year-old female with history of mitochondrial myopathy, chronic migraine without aura, fibromyalgia who is here for 3 monthly Botox injection.  With Botox she has reported more than 50% reduction in her headache.  Previously she had failed multiple medication but ever since she started getting Botox there was significant improvement. She started getting Botox  injections in 2019 and noticed steady decline in her headache frequency.     Briefly patient is a female with history of mitochondrial myopathy, chronic migraine without aura, fibromyalgia who started having headaches in 2014 and progressively got worse.  She initially attributed it to pressure in the neck or TMJ abnormality and that in the past was evaluated and started on some hormonal supplements.  Due to progressive weakness she was evaluated for possible mitochondrial myopathy and had mitochondrial DNA tested that was positive.  For her headaches she was started on Depakote, amitriptyline and in the past had also tried Cymbalta and as she was having more than 15 headaches in a month Botox was recommended.  She started getting Botox in 2017 and did notice improvement in her headache.  Discontinued until 2019 but due to COVID pandemic she stopped doing Botox injection and noticed worsening headaches and Botox was restarted in 2023.     PROBLEM LIST   Patient Active Problem List   Diagnosis     Acne vulgaris     Arthralgia of temporomandibular joint     Debility     Fibromyalgia     IBS (irritable bowel syndrome)     Insomnia     Moderate recurrent major depression (H)     Chronic migraine without aura, intractable, without status migrainosus     Mitochondrial disease     Generalized anxiety disorder     Post-traumatic stress disorder     Parasomnia     Seasonal depression     Palpitations     Gastroesophageal reflux disease without esophagitis     Vaginal Papanicolaou smear not required due to history of hysterectomy     Biliary colic     Inflammatory pseudotumor of colon (H)         PAST MEDICAL & SURGICAL HISTORY     Past Medical History:   Patient  has a past medical history of Anemia, Chronic fatigue syndrome, Chronic migraine, Depression, Depression, Depressive disorder (2013), Fibromyalgia, Fibromyalgia, Gastroesophageal reflux disease, H/O: hysterectomy (02/22/2018), History of anesthesia complications,  IBS (irritable bowel syndrome), Insomnia, Irregular heart beat, Mitochondrial disease, Mitochondrial myopathy, Motion sickness, Parasomnia, and PONV (postoperative nausea and vomiting).    Surgical History:  She  has a past surgical history that includes REMOVAL OF OVARIAN CYST(S); Hysterectomy (Bilateral, 02/22/2018); Picc (02/24/2018); Abdomen surgery (1996 & 2018, feb.); appendectomy (1996); biopsy; Laparoscopic cystectomy ovarian (oncology) (Left, 10/18/2021); Laparoscopy diagnostic (gyn) (Left, 06/29/2023); Hysterectomy total abdominal; and Laparoscopic cholecystectomy (N/A, 10/25/2023).     SOCIAL HISTORY     Reviewed, and she  reports that she has never smoked. She has never used smokeless tobacco. She reports that she does not drink alcohol and does not use drugs.     FAMILY HISTORY     Reviewed, and family history includes Anxiety Disorder in her maternal grandmother, mother, paternal grandfather, and paternal grandmother; Bone Cancer in her maternal grandfather; Breast Cancer (age of onset: 60.00) in her maternal grandmother; Breast Cancer (age of onset: 62.00) in her mother; Cancer in her mother; Coronary Artery Disease (age of onset: 45.00) in her father; Depression in her brother, father, maternal grandmother, and mother; Diabetes in her cousin and cousin; Hyperlipidemia in her mother; Hypertension in her mother; Meniere's disease in her mother.     ALLERGIES     Allergies   Allergen Reactions     Ciprofloxacin Itching, Rash and Hives     Rash over whole body   Rash over whole body        Influenza Virus Vaccine Shortness Of Breath and Anaphylaxis     No Clinical Screening - See Comments Anaphylaxis     Stomach swelling     Sulfa Antibiotics Rash and Hives     Desvenlafaxine Blisters, Itching and Rash     Milnacipran Other (See Comments) and Palpitations     Chest pain, hot/cold flashes     Quetiapine Palpitations     Tachycardia, nervousness, elevated blood pressure.   Tachycardia, nervousness,  elevated blood pressure.          REVIEW OF SYSTEMS     A 12 point review of system was performed and was negative except as outlined in the history of present illness.     HOME MEDICATIONS     Current Outpatient Rx   Medication Sig Dispense Refill     ascorbic acid 500 MG TABS Take 500 mg by mouth daily       betamethasone dipropionate (DIPROSONE) 0.05 % external lotion Apply topically 2 times daily. 60 mL 1     buPROPion (WELLBUTRIN XL) 300 MG 24 hr tablet TAKE 1 TABLET BY MOUTH EVERY DAY 90 tablet 3     cetirizine (ZYRTEC) 10 MG tablet TAKE 1 TAB ORALLY DAILY AS NEEDED FOR ALLERGIES MAY INCREASE TO 2 TAB DAILY IF ITCHING NOT RELIEVED 90 tablet 2     clindamycin-benzoyl peroxide (BENZACLIN) gel Apply topically to acne once daily       clobetasol (TEMOVATE) 0.05 % external ointment        clobetasol propionate (TEMOVATE) 0.05 % external cream Apply topically 2 times daily. 60 g 1     clotrimazole-betamethasone (LOTRISONE) 1-0.05 % external cream 1 application as needed by topical route.       [facility] MELATONIN 3 MG tablet TAKE 1 TABLET (3 MG) BY MOUTH NIGHTLY AS NEEDED FOR SLEEP 90 tablet 1     cyclobenzaprine (FLEXERIL) 10 MG tablet Take 10 mg by mouth daily as needed for muscle spasms       hydrOXYzine (ATARAX) 25 MG tablet TAKE 1-2 TABLETS (25-50 MG) BY MOUTH AT BEDTIME 180 tablet 2     ibuprofen (ADVIL/MOTRIN) 400 MG tablet        ketoconazole (NIZORAL) 2 % external cream APPLY TO AFFECTED AREA TWICE A DAY FOR 7 DAYS       lactulose 20 GM/30ML solution Take 30 mLs by mouth 3 times daily. 450 mL 0     lidocaine (LIDODERM) 5 % patch PLACE 1 PATCH ONTO THE SKIN AS NEEDED TO NECK, SHOULDERS, AND BACK (Patient taking differently: Place onto the skin as needed. To neck, shoulders, and back) 30 patch 0     Multiple Vitamin (MULTIVITAMIN ADULT PO) Take 1 tablet by mouth daily       naproxen (NAPROSYN) 500 MG tablet        omeprazole (PRILOSEC) 40 MG DR capsule TAKE 1 CAPSULE (40 MG) BY MOUTH EVERY MORNING (BEFORE  BREAKFAST) 30 MINUTES BEFORE MEAL 90 capsule 1     ondansetron (ZOFRAN) 4 MG tablet Take 1 tablet (4 mg) by mouth every 8 hours as needed for nausea. 18 tablet 1     PEPCID 40 MG tablet Take by mouth every 24 hours.       Riboflavin 400 MG TABS Take 400 mg by mouth daily       rizatriptan (MAXALT) 10 MG tablet TAKE 1 TABLET BY MOUTH AS NEEDED FOR MIGRAINE. MAY REPEAT IN 2 HOURS IF NEEDED 10 tablet 11     spironolactone (ALDACTONE) 25 MG tablet Take 1 tablet (25 mg) by mouth daily. 90 tablet 1     spironolactone (ALDACTONE) 25 MG tablet TAKE ONE TABLET BY MOUTH ONCE DAILY WITH ONE 50MG TABLET (DAILY TOTAL OF 75MG). TAKE WITH WATER.       tacrolimus (PROTOPIC) 0.1 % external ointment        tretinoin (RETIN-A) 0.05 % external cream APPLY TO AFFECTED AREA EVERY DAY AT BEDTIME 45 g 2     valACYclovir (VALTREX) 1000 mg tablet TAKE 2 TABLETS BY MOUTH 12 HOURS APART AS NEEDED EPISODE 12 tablet 3     venlafaxine (EFFEXOR XR) 150 MG 24 hr capsule Take 1 capsule (150 mg) by mouth daily 90 capsule 3     zolpidem (AMBIEN) 5 MG tablet Take 1 tablet (5 mg) by mouth nightly as needed for sleep. 30 tablet 2         PHYSICAL EXAM     Vital signs  There were no vitals taken for this visit.    Weight:   0 lbs 0 oz    Pleasant female who is alert and oriented vital signs were reviewed and documented in electronic medical record.  Neck supple.  Neurologically speech was normal.  Cranial nerves II through XII are intact motor strength neck flexor and extensor 4+/5 in upper extremity 5/5 in the lower extremity proximally 4+/5 distally 5/5 reflexes 2+ toes downgoing no dysmetria noted on finger-nose testing gait normal.      PERTINENT DIAGNOSTIC STUDIES     Following studies were reviewed:     MRI BRAIN 10/29/2015  1.  Normal Head MRI.   2.  No acute infarcts, mass lesions or hemorrhage     PERTINENT LABS  Following labs were reviewed:  Admission on 03/02/2025, Discharged on 03/02/2025   Component Date Value Ref Range Status     Sodium  03/02/2025 139  135 - 145 mmol/L Final     Potassium 03/02/2025 3.7  3.4 - 5.3 mmol/L Final     Chloride 03/02/2025 102  98 - 107 mmol/L Final     Carbon Dioxide (CO2) 03/02/2025 25  22 - 29 mmol/L Final     Anion Gap 03/02/2025 12  7 - 15 mmol/L Final     Urea Nitrogen 03/02/2025 8.6  6.0 - 20.0 mg/dL Final     Creatinine 03/02/2025 0.86  0.51 - 0.95 mg/dL Final     GFR Estimate 03/02/2025 85  >60 mL/min/1.73m2 Final     Calcium 03/02/2025 10.1  8.8 - 10.4 mg/dL Final     Glucose 03/02/2025 106 (H)  70 - 99 mg/dL Final     WBC Count 03/02/2025 12.5 (H)  4.0 - 11.0 10e3/uL Final     RBC Count 03/02/2025 5.62 (H)  3.80 - 5.20 10e6/uL Final     Hemoglobin 03/02/2025 15.0  11.7 - 15.7 g/dL Final     Hematocrit 03/02/2025 46.1  35.0 - 47.0 % Final     MCV 03/02/2025 82  78 - 100 fL Final     MCH 03/02/2025 26.7  26.5 - 33.0 pg Final     MCHC 03/02/2025 32.5  31.5 - 36.5 g/dL Final     RDW 03/02/2025 12.8  10.0 - 15.0 % Final     Platelet Count 03/02/2025 428  150 - 450 10e3/uL Final     % Neutrophils 03/02/2025 66  % Final     % Lymphocytes 03/02/2025 26  % Final     % Monocytes 03/02/2025 7  % Final     % Eosinophils 03/02/2025 0  % Final     % Basophils 03/02/2025 1  % Final     % Immature Granulocytes 03/02/2025 0  % Final     NRBCs per 100 WBC 03/02/2025 0  <1 /100 Final     Absolute Neutrophils 03/02/2025 8.2  1.6 - 8.3 10e3/uL Final     Absolute Lymphocytes 03/02/2025 3.3  0.8 - 5.3 10e3/uL Final     Absolute Monocytes 03/02/2025 0.9  0.0 - 1.3 10e3/uL Final     Absolute Eosinophils 03/02/2025 0.0  0.0 - 0.7 10e3/uL Final     Absolute Basophils 03/02/2025 0.1  0.0 - 0.2 10e3/uL Final     Absolute Immature Granulocytes 03/02/2025 0.0  <=0.4 10e3/uL Final     Absolute NRBCs 03/02/2025 0.0  10e3/uL Final     Hold Specimen 03/02/2025 JI   Final     Hold Specimen 03/02/2025 [facility]   Final         Total time spent for face to face visit, reviewing labs/imaging studies, counseling and coordination of care was: 20 minutes  spent on the date of the encounter doing chart review, review of outside records, review of test results, interpretation of tests, patient visit, and documentation     The longitudinal plan of care for the diagnosis(es)/condition(s) as documented were addressed during this visit. Due to the added complexity in care, I will continue to support Sherry in the subsequent management and with ongoing continuity of care.    This note was dictated using voice recognition software.  Any grammatical or context distortions are unintentional and inherent to the software.    Orders Placed This Encounter   Procedures     [id number] - MIGRAINE      New Prescriptions    No medications on file     Modified Medications    No medications on file                 Again, thank you for allowing me to participate in the care of your patient.        Sincerely,        Mikel Hernandez MD    Electronically signed

## 2025-06-03 ENCOUNTER — MYC MEDICAL ADVICE (OUTPATIENT)
Dept: FAMILY MEDICINE | Facility: CLINIC | Age: 45
End: 2025-06-03
Payer: COMMERCIAL

## 2025-06-03 DIAGNOSIS — E88.40 MITOCHONDRIAL DISEASE: ICD-10-CM

## 2025-06-03 DIAGNOSIS — F33.9 MAJOR DEPRESSION, RECURRENT, CHRONIC: ICD-10-CM

## 2025-06-04 RX ORDER — BUPROPION HYDROCHLORIDE 300 MG/1
300 TABLET ORAL DAILY
Qty: 90 TABLET | Refills: 3 | Status: SHIPPED | OUTPATIENT
Start: 2025-06-04

## 2025-06-04 RX ORDER — VENLAFAXINE HYDROCHLORIDE 150 MG/1
150 CAPSULE, EXTENDED RELEASE ORAL DAILY
Qty: 90 CAPSULE | Refills: 3 | Status: SHIPPED | OUTPATIENT
Start: 2025-06-04

## 2025-06-04 RX ORDER — ONDANSETRON 4 MG/1
4 TABLET, FILM COATED ORAL EVERY 8 HOURS PRN
Qty: 18 TABLET | Refills: 1 | Status: SHIPPED | OUTPATIENT
Start: 2025-06-04

## 2025-07-30 ENCOUNTER — APPOINTMENT (OUTPATIENT)
Dept: CT IMAGING | Facility: HOSPITAL | Age: 45
End: 2025-07-30
Payer: MEDICARE

## 2025-07-30 ENCOUNTER — HOSPITAL ENCOUNTER (EMERGENCY)
Facility: HOSPITAL | Age: 45
Discharge: HOME | End: 2025-07-30
Payer: MEDICARE

## 2025-07-30 ENCOUNTER — OFFICE VISIT (OUTPATIENT)
Dept: URGENT CARE | Facility: URGENT CARE | Age: 45
End: 2025-07-30
Payer: COMMERCIAL

## 2025-07-30 VITALS
HEART RATE: 93 BPM | OXYGEN SATURATION: 100 % | SYSTOLIC BLOOD PRESSURE: 129 MMHG | BODY MASS INDEX: 23.03 KG/M2 | RESPIRATION RATE: 16 BRPM | TEMPERATURE: 98.5 F | WEIGHT: 130 LBS | DIASTOLIC BLOOD PRESSURE: 83 MMHG

## 2025-07-30 VITALS
RESPIRATION RATE: 20 BRPM | HEART RATE: 105 BPM | TEMPERATURE: 98.1 F | DIASTOLIC BLOOD PRESSURE: 90 MMHG | SYSTOLIC BLOOD PRESSURE: 132 MMHG | OXYGEN SATURATION: 99 %

## 2025-07-30 DIAGNOSIS — R11.0 NAUSEA: ICD-10-CM

## 2025-07-30 DIAGNOSIS — R10.9 RIGHT FLANK PAIN: ICD-10-CM

## 2025-07-30 DIAGNOSIS — R10.9 RIGHT SIDED ABDOMINAL PAIN: Primary | ICD-10-CM

## 2025-07-30 DIAGNOSIS — R10.11 RUQ ABDOMINAL PAIN: Primary | ICD-10-CM

## 2025-07-30 LAB
ALBUMIN SERPL BCG-MCNC: 4.5 G/DL (ref 3.5–5.2)
ALBUMIN UR-MCNC: NEGATIVE MG/DL
ALP SERPL-CCNC: 75 U/L (ref 40–150)
ALT SERPL W P-5'-P-CCNC: 27 U/L (ref 0–50)
ANION GAP SERPL CALCULATED.3IONS-SCNC: 10 MMOL/L (ref 7–15)
APPEARANCE UR: CLEAR
AST SERPL W P-5'-P-CCNC: 25 U/L (ref 0–45)
BACTERIA #/AREA URNS HPF: ABNORMAL /HPF
BASOPHILS # BLD AUTO: 0 10E3/UL (ref 0–0.2)
BASOPHILS NFR BLD AUTO: 0 %
BILIRUB DIRECT SERPL-MCNC: 0.09 MG/DL (ref 0–0.3)
BILIRUB SERPL-MCNC: 0.3 MG/DL
BILIRUB UR QL STRIP: NEGATIVE
BUN SERPL-MCNC: 8.6 MG/DL (ref 6–20)
CALCIUM SERPL-MCNC: 9.8 MG/DL (ref 8.8–10.4)
CHLORIDE SERPL-SCNC: 101 MMOL/L (ref 98–107)
COLOR UR AUTO: ABNORMAL
CREAT SERPL-MCNC: 0.86 MG/DL (ref 0.51–0.95)
EGFRCR SERPLBLD CKD-EPI 2021: 84 ML/MIN/1.73M2
EOSINOPHIL # BLD AUTO: 0 10E3/UL (ref 0–0.7)
EOSINOPHIL NFR BLD AUTO: 0 %
ERYTHROCYTE [DISTWIDTH] IN BLOOD BY AUTOMATED COUNT: 12.3 % (ref 10–15)
GLUCOSE SERPL-MCNC: 97 MG/DL (ref 70–99)
GLUCOSE UR STRIP-MCNC: NEGATIVE MG/DL
HCO3 SERPL-SCNC: 27 MMOL/L (ref 22–29)
HCT VFR BLD AUTO: 43 % (ref 35–47)
HGB BLD-MCNC: 13.8 G/DL (ref 11.7–15.7)
HGB UR QL STRIP: NEGATIVE
IMM GRANULOCYTES # BLD: 0 10E3/UL
IMM GRANULOCYTES NFR BLD: 0 %
KETONES UR STRIP-MCNC: NEGATIVE MG/DL
LEUKOCYTE ESTERASE UR QL STRIP: NEGATIVE
LIPASE SERPL-CCNC: 31 U/L (ref 13–60)
LYMPHOCYTES # BLD AUTO: 1.6 10E3/UL (ref 0.8–5.3)
LYMPHOCYTES NFR BLD AUTO: 23 %
MCH RBC QN AUTO: 27.2 PG (ref 26.5–33)
MCHC RBC AUTO-ENTMCNC: 32.1 G/DL (ref 31.5–36.5)
MCV RBC AUTO: 85 FL (ref 78–100)
MONOCYTES # BLD AUTO: 0.6 10E3/UL (ref 0–1.3)
MONOCYTES NFR BLD AUTO: 8 %
NEUTROPHILS # BLD AUTO: 5 10E3/UL (ref 1.6–8.3)
NEUTROPHILS NFR BLD AUTO: 69 %
NITRATE UR QL: NEGATIVE
NRBC # BLD AUTO: 0 10E3/UL
NRBC BLD AUTO-RTO: 0 /100
PH UR STRIP: 7 [PH] (ref 5–7)
PLATELET # BLD AUTO: 337 10E3/UL (ref 150–450)
POTASSIUM SERPL-SCNC: 4.3 MMOL/L (ref 3.4–5.3)
PROT SERPL-MCNC: 7.2 G/DL (ref 6.4–8.3)
RBC # BLD AUTO: 5.08 10E6/UL (ref 3.8–5.2)
RBC URINE: <1 /HPF
SODIUM SERPL-SCNC: 138 MMOL/L (ref 135–145)
SP GR UR STRIP: 1.01 (ref 1–1.03)
SQUAMOUS EPITHELIAL: 2 /HPF
UROBILINOGEN UR STRIP-MCNC: NORMAL MG/DL
WBC # BLD AUTO: 7.2 10E3/UL (ref 4–11)
WBC URINE: <1 /HPF

## 2025-07-30 PROCEDURE — 74177 CT ABD & PELVIS W/CONTRAST: CPT

## 2025-07-30 PROCEDURE — 82248 BILIRUBIN DIRECT: CPT

## 2025-07-30 PROCEDURE — 83690 ASSAY OF LIPASE: CPT

## 2025-07-30 PROCEDURE — 250N000011 HC RX IP 250 OP 636

## 2025-07-30 PROCEDURE — 99285 EMERGENCY DEPT VISIT HI MDM: CPT | Mod: 25

## 2025-07-30 PROCEDURE — 96374 THER/PROPH/DIAG INJ IV PUSH: CPT | Mod: 59

## 2025-07-30 PROCEDURE — 81003 URINALYSIS AUTO W/O SCOPE: CPT

## 2025-07-30 PROCEDURE — 99214 OFFICE O/P EST MOD 30 MIN: CPT | Performed by: PHYSICIAN ASSISTANT

## 2025-07-30 PROCEDURE — 85025 COMPLETE CBC W/AUTO DIFF WBC: CPT

## 2025-07-30 PROCEDURE — 80053 COMPREHEN METABOLIC PANEL: CPT

## 2025-07-30 PROCEDURE — 36415 COLL VENOUS BLD VENIPUNCTURE: CPT

## 2025-07-30 RX ORDER — ONDANSETRON 4 MG/1
4 TABLET, ORALLY DISINTEGRATING ORAL EVERY 8 HOURS PRN
Qty: 10 TABLET | Refills: 0 | Status: SHIPPED | OUTPATIENT
Start: 2025-07-30 | End: 2025-08-02

## 2025-07-30 RX ORDER — IOPAMIDOL 755 MG/ML
80 INJECTION, SOLUTION INTRAVASCULAR ONCE
Status: COMPLETED | OUTPATIENT
Start: 2025-07-30 | End: 2025-07-30

## 2025-07-30 RX ORDER — KETOROLAC TROMETHAMINE 15 MG/ML
15 INJECTION, SOLUTION INTRAMUSCULAR; INTRAVENOUS ONCE
Status: COMPLETED | OUTPATIENT
Start: 2025-07-30 | End: 2025-07-30

## 2025-07-30 RX ORDER — ONDANSETRON 2 MG/ML
4 INJECTION INTRAMUSCULAR; INTRAVENOUS EVERY 30 MIN PRN
Status: DISCONTINUED | OUTPATIENT
Start: 2025-07-30 | End: 2025-07-31 | Stop reason: HOSPADM

## 2025-07-30 RX ADMIN — KETOROLAC TROMETHAMINE 15 MG: 15 INJECTION, SOLUTION INTRAMUSCULAR; INTRAVENOUS at 18:23

## 2025-07-30 RX ADMIN — IOPAMIDOL 80 ML: 755 INJECTION, SOLUTION INTRAVENOUS at 20:27

## 2025-07-30 ASSESSMENT — ACTIVITIES OF DAILY LIVING (ADL)
ADLS_ACUITY_SCORE: 41

## 2025-07-30 ASSESSMENT — COLUMBIA-SUICIDE SEVERITY RATING SCALE - C-SSRS
1. IN THE PAST MONTH, HAVE YOU WISHED YOU WERE DEAD OR WISHED YOU COULD GO TO SLEEP AND NOT WAKE UP?: NO
6. HAVE YOU EVER DONE ANYTHING, STARTED TO DO ANYTHING, OR PREPARED TO DO ANYTHING TO END YOUR LIFE?: NO
2. HAVE YOU ACTUALLY HAD ANY THOUGHTS OF KILLING YOURSELF IN THE PAST MONTH?: NO

## 2025-07-30 NOTE — ED NOTES
Expected Patient Referral to ED  5:25 PM    Referring Clinic/Provider:  Urgent Care    Reason for referral/Clinical facts:  - RUQ pain intermittent since 7/4, worse past couple days  - s/p cholecystectomy  - tender on exam, vitals fine  - sending for abdominal pain workup, no tests done (imaging not available now)    Recommendations provided:  Send to ED for further evaluation    Caller was informed that this institution does possess the capabilities and/or resources to provide for patient and should be transferred to our facility.    Discussed that if direct admit is sought and any hurdles are encountered, this ED would be happy to see the patient and evaluate.    Informed caller that recommendations provided are recommendations based only on the facts provided and that they responsible to accept or reject the advice, or to seek a formal in person consultation as needed and that this ED will see/treat patient should they arrive.      Law Hunter MD  [facility] EMERGENCY DEPARTMENT  [address] [place] [id number]  [phone]     Law Hunter MD  07/30/25 [id number]

## 2025-07-30 NOTE — ED PROVIDER NOTES
Emergency Department Encounter   NAME: Sherry Sweeney ; AGE: 45 year old female ; YOB: 1980 ; MRN: [id number] ; PCP: Layla Martinez   ED PROVIDER: Sarah Lawrence PA-C    Evaluation Date & Time:   No admission date for patient encounter.    CHIEF COMPLAINT:  Flank Pain      Impression and Plan   MDM: Sherry Sweeney is a 45 year old female who presents to the ED for evaluation of right-sided abdominal/flank pain.  Patient states that 3 and half weeks ago she noticed a mild pain to the right flank while agitated with aspirin.  States in the coming weeks pain has become stronger and more frequent.  Has been taking Tylenol PM for the pain with minimal relief.  Reports pain makes her nauseous, but no vomiting, normal bowel or bladder movements.  No dysuria, hematuria, other urinary symptoms.  She has been eating less due to the nausea/pain.  No hemoptysis or melena.  No fever, chills, recent sick contacts.  History of cholecystectomy, hysterectomy, appended to me.    Patient is vitally normal no acute distress but uncomfortable appearing. Physical exam is significant for mild right-sided CVA tenderness as well as right upper quadrant tenderness on exam.  No notable epigastric or other abdominal pain/tenderness.  There is no swelling or edema in BLE.  Heart lung sounds are clear auscultation. Differential diagnosis includes pyelonephritis, kidney stone, UTI, pancreatitis, bowel obstruction, renal abscess.    I independently reviewed and interpreted all labs and imaging;  Labs show overall normal BMP, CBC, liver function and lipase.  Urine shows no sign of infection. CT abd/pelvis shows no acute findings.    On reevaluation, we discussed her lab and imaging findings.  Discussed there is no acute findings noted on her lab and imaging findings here today.  She follow-up with her primary care provider if she continues to have pain, stable for discharge.    Return precautions to the ED were discussed, patient  verbalized understanding and were agreeable with the plan. All questions were answered.     Per chart review:  -7/30/2025 (earlier today) at [facility] regarding RUQ abdominal pain. Has had abdominal soreness for a few weeks that was thought to be soreness, but has progressively been worsening. Experiencing nausea and difficulty bending. No history of kidney stones. Patient was given pain and nausea medication, with plans to do imaging tomorrow. Was unable to wait until then even with pain medication, so was transported to ED via private vehicle.   - Recent labs and imaging reviewed  - Care everywhere reviewed    Medical Decision Making      Discharge. No recommendations on prescription strength medication(s). N/A.    MIPS:  Not Applicable    SEPSIS: None        ED COURSE:  5:47 PM I met and introduced myself to the patient. I gathered initial history and performed my physical exam. We discussed plan for initial workup.   9:54 PM I rechecked the patient and discussed results, discharge, follow up, and reasons to return to the ED.       FINAL IMPRESSION:    ICD-10-CM    1. Right sided abdominal pain  R10.9       2. Right flank pain  R10.9             MEDICATIONS GIVEN IN THE EMERGENCY DEPARTMENT:  Medications   ketorolac (TORADOL) injection 15 mg (15 mg Intravenous $Given 7/30/25 1823)   iopamidol (ISOVUE-370) solution 80 mL (80 mLs Intravenous $Given 7/30/25 2027)         NEW PRESCRIPTIONS STARTED AT TODAY'S ED VISIT:  Discharge Medication List as of 7/30/2025  9:58 PM        START taking these medications    Details   ondansetron (ZOFRAN ODT) 4 MG ODT tab Take 1 tablet (4 mg) by mouth every 8 hours as needed for nausea., Disp-10 tablet, R-0, E-Prescribe               HPI   Use of Intrepreter: N/A     Sherryadiel Sweeney is a 45 year old female with a pertinent history of recurrent major depression, generalized anxiety disorder, PTSD, GERD, s\p bilateral hysterectomy, and chronic migraines who  "presents to the ED for evaluation of flank pain.     Patient is coming from urgent care with right upper quadrant and flank pain that began on 7/5/2025 (3.5 weeks ago), in which she wore a back brace out to dinner. Upon taking it off, she began to experience pain. Says that pain around the rib subsided, but that there was a lingering pain that has persisted and progressively worsened with associated nausea and a \"pinching sensation\". Has been having Tylenol PM and lying completely still for pain management. Denies any vomiting, urinary/bowel issues, fever, or recent sick contacts. Has no changes in appetite, but says that eating and drinking provokes nausea and creates a \"pressure\".  Has had gallbladder, ovaries, and appendix removed.     REVIEW OF SYSTEMS:  Pertinent positive and negative symptoms per HPI.       Medical History     Past Medical History:   Diagnosis Date    Anemia     Chronic fatigue syndrome     Chronic migraine     Depression     Depression     Depressive disorder 2013    Fibromyalgia     Fibromyalgia     Gastroesophageal reflux disease     H/O: hysterectomy 02/22/2018    History of anesthesia complications     IBS (irritable bowel syndrome)     Insomnia     Irregular heart beat     Mitochondrial disease     Mitochondrial myopathy     Motion sickness     Parasomnia     PONV (postoperative nausea and vomiting)        Past Surgical History:   Procedure Laterality Date    ABDOMEN SURGERY  1996 & 2018, feb.    APPENDECTOMY  1996    BIOPSY      HC REMOVAL OF OVARIAN CYST(S)      Description: Ovarian Cystectomy;  Recorded: 11/07/2013;    HYSTERECTOMY Bilateral 02/22/2018    Procedure: TOTAL ABDOMINAL HYSTERECTOMY BILATERAL SALPINGECTOMY, RIGHT OOPHORECTOMY;  Surgeon: Joanne Bedolla DO;  Location: [facility];  Service:     HYSTERECTOMY TOTAL ABDOMINAL      LAPAROSCOPIC CHOLECYSTECTOMY N/A 10/25/2023    Procedure: CHOLECYSTECTOMY, LAPAROSCOPIC;  Surgeon: Eusebio Gonzalez MD;  Location: " [facility]    LAPAROSCOPIC CYSTECTOMY OVARIAN (ONCOLOGY) Left 10/18/2021    Procedure: LAPAROSCOPY,  LEFT OVARIAN CYSTECTOMY, LYSIS OF ADHESIONS.;  Surgeon: Joanne Bedolla MD;  Location: [facility]    LAPAROSCOPY DIAGNOSTIC (GYN) Left 2023    Procedure: DIAGNOSTIC LAPAROSCOPY WITH LEFT SIDE OOPHORECTOMY AND LYSIS OF ADHESION;  Surgeon: Joanne Bedolla MD;  [facility]    [facility]            Family History   Problem Relation Age of Onset    Depression Mother         sertraline    Hyperlipidemia Mother     Hypertension Mother     Breast Cancer Mother 62.00    Cancer Mother         thyroid     Meniere's disease Mother     Anxiety Disorder Mother     Depression Brother     Anxiety Disorder Maternal Grandmother     Breast Cancer Maternal Grandmother 60.00    Depression Maternal Grandmother     Coronary Artery Disease Father 45.00            Depression Father     Bone Cancer Maternal Grandfather     Anxiety Disorder Paternal Grandfather     Diabetes Cousin     Diabetes Cousin     Anxiety Disorder Paternal Grandmother     Malignant Hypertension No family hx of        Social History     Tobacco Use    Smoking status: Never    Smokeless tobacco: Never   Vaping Use    Vaping status: Never Used   Substance Use Topics    Alcohol use: No    Drug use: No       ondansetron (ZOFRAN ODT) 4 MG ODT tab  ascorbic acid 500 MG TABS  betamethasone dipropionate (DIPROSONE) 0.05 % external lotion  buPROPion (WELLBUTRIN XL) 300 MG 24 hr tablet  cetirizine (ZYRTEC) 10 MG tablet  clindamycin-benzoyl peroxide (BENZACLIN) gel  clobetasol (TEMOVATE) 0.05 % external ointment  clobetasol propionate (TEMOVATE) 0.05 % external cream  clotrimazole-betamethasone (LOTRISONE) 1-0.05 % external cream  [facility] MELATONIN 3 MG tablet  cyclobenzaprine (FLEXERIL) 10 MG tablet  hydrOXYzine (ATARAX) 25 MG tablet  ibuprofen (ADVIL/MOTRIN) 400 MG tablet  ketoconazole (NIZORAL) 2 % external  cream  lactulose 20 GM/30ML solution  lidocaine (LIDODERM) 5 % patch  Multiple Vitamin (MULTIVITAMIN ADULT PO)  naproxen (NAPROSYN) 500 MG tablet  omeprazole (PRILOSEC) 40 MG DR capsule  ondansetron (ZOFRAN) 4 MG tablet  PEPCID 40 MG tablet  Riboflavin 400 MG TABS  rizatriptan (MAXALT) 10 MG tablet  spironolactone (ALDACTONE) 25 MG tablet  spironolactone (ALDACTONE) 25 MG tablet  spironolactone (ALDACTONE) 50 MG tablet  tacrolimus (PROTOPIC) 0.1 % external ointment  tretinoin (RETIN-A) 0.05 % external cream  valACYclovir (VALTREX) 1000 mg tablet  venlafaxine (EFFEXOR XR) 150 MG 24 hr capsule  zolpidem (AMBIEN) 5 MG tablet          Physical Exam     First Vitals:  Patient Vitals for the past 24 hrs:   BP Temp Temp src Pulse Resp SpO2 Weight   07/30/25 2101 129/83 -- -- -- -- -- --   07/30/25 2100 -- -- -- 93 -- 100 % --   07/30/25 1745 135/85 -- -- 98 -- 100 % --   07/30/25 1735 (!) 143/92 98.5  F (36.9  C) Oral 94 16 98 % --   07/30/25 1734 -- -- -- -- -- -- 59 kg (130 lb)         PHYSICAL EXAM:   Physical Exam  Constitutional:       General: She is not in acute distress.     Appearance: She is well-developed. She is not ill-appearing.   HENT:      Head: Normocephalic.      Right Ear: External ear normal.      Left Ear: External ear normal.      Nose: Nose normal. No congestion.      Mouth/Throat:      Mouth: Mucous membranes are moist.   Eyes:      Conjunctiva/sclera: Conjunctivae normal.   Cardiovascular:      Rate and Rhythm: Normal rate and regular rhythm.      Heart sounds: Normal heart sounds.   Pulmonary:      Effort: Pulmonary effort is normal. No respiratory distress.      Breath sounds: Normal breath sounds. No wheezing or rales.   Abdominal:      General: Abdomen is flat. There is no distension.      Palpations: There is no mass.      Tenderness: There is abdominal tenderness in the right upper quadrant. There is right CVA tenderness. There is no left CVA tenderness, guarding or rebound.    Musculoskeletal:      Cervical back: Normal range of motion and neck supple.      Right lower leg: No edema.      Left lower leg: No edema.   Skin:     General: Skin is warm.      Capillary Refill: Capillary refill takes less than 2 seconds.   Neurological:      General: No focal deficit present.      Mental Status: She is alert and oriented to person, place, and time.      Motor: No weakness.      Gait: Gait normal.   Psychiatric:         Mood and Affect: Mood normal.         Behavior: Behavior normal.             Results     LAB:  All pertinent labs reviewed and interpreted  Labs Ordered and Resulted from Time of ED Arrival to Time of ED Departure   ROUTINE UA WITH MICROSCOPIC REFLEX TO CULTURE - Abnormal       Result Value    Color Urine Light Yellow      Appearance Urine Clear      Glucose Urine Negative      Bilirubin Urine Negative      Ketones Urine Negative      Specific Gravity Urine 1.014      Blood Urine Negative      pH Urine 7.0      Protein Albumin Urine Negative      Urobilinogen Urine Normal      Nitrite Urine Negative      Leukocyte Esterase Urine Negative      Bacteria Urine Few (*)     RBC Urine <1      WBC Urine <1      Squamous Epithelials Urine 2 (*)    BASIC METABOLIC PANEL (LIMITED OCCURRENCES) - Normal    Sodium 138      Potassium 4.3      Chloride 101      Carbon Dioxide (CO2) 27      Anion Gap 10      Urea Nitrogen 8.6      Creatinine 0.86      GFR Estimate 84      Calcium 9.8      Glucose 97     HEPATIC FUNCTION PANEL (LIMITED OCCURRENCES) - Normal    Protein Total 7.2      Albumin 4.5      Bilirubin Total 0.3      Alkaline Phosphatase 75      AST 25      ALT 27      Bilirubin Direct 0.09     LIPASE - Normal    Lipase 31     CBC WITH PLATELETS AND DIFFERENTIAL    WBC Count 7.2      RBC Count 5.08      Hemoglobin 13.8      Hematocrit 43.0      MCV 85      MCH 27.2      MCHC 32.1      RDW 12.3      Platelet Count 337      % Neutrophils 69      % Lymphocytes 23      % Monocytes 8      %  Eosinophils 0      % Basophils 0      % Immature Granulocytes 0      NRBCs per 100 WBC 0      Absolute Neutrophils 5.0      Absolute Lymphocytes 1.6      Absolute Monocytes 0.6      Absolute Eosinophils 0.0      Absolute Basophils 0.0      Absolute Immature Granulocytes 0.0      Absolute NRBCs 0.0         RADIOLOGY:  CT Abdomen Pelvis w Contrast   Final Result   IMPRESSION:    No acute findings in the abdomen or pelvis.             I, Shady Nelson, am serving as a scribe to document services personally performed by Sarah Lawrence PA-C, based on my observation and the provider's statements to me. I, Sarah Lawrence PA-C attest that Shady Nelson is acting in a scribe capacity, has observed my performance of the services and has documented them in accordance with my direction.       Sarah Lawrence PA-C   Emergency Medicine   [facility] EMERGENCY DEPARTMENT       Sarah Lawrence PA-C  07/31/25 0051

## 2025-07-30 NOTE — PROGRESS NOTES
[facility] [address] [address]  Phone: [phone]  Fax: [phone]    Patient:  Sherry Sweeney, Date of birth 1980  Date of Visit:  07/30/2025  Referring Provider No ref. provider found    Patient presents with:  Abdominal Pain: Abdominal soreness for a few weeks, thought it was a muscle strain but progressed into more noticeable pain. Nausea. Difficulty bending, feels like something is off.         ICD-10-CM    1. RUQ abdominal pain  R10.11       2. Nausea  R11.0           There are no Patient Instructions on file for this visit.    Assessment & Plan      Assessment:  - I did offer start of workup with nausea medicine and pain management with the plan to do advanced imaging tomorrow morning with the acute diagnostic services clinic.  We do not have the capability of doing advanced imaging this evening due to time constraint.  -Patient does not feel like she can wait till tomorrow even with pain meds and nausea medicine, so she would prefer to be seen in the emergency department.  Report was given to ED provider prior to the patient's arrival.  Patient was transported via private vehicle.  - I did previously review her previous CTs going back into 2023 to see if there is any prior history of kidney stone and I did not find any that showed stones.           History of Present Illness     Pertinent history obtain from: patient    Sherry Sweeney, 45-year-old female.    On 07/04/2025, developed abdominal discomfort after wearing a back brace for mid-back pain during an outing. Noted significant soreness after removing the brace, which initially improved but was followed by persistent, nagging abdominal pain. Over subsequent weeks, pain worsened, making movement and bending forward difficult, and causing decreased appetite. Reports pain is aggravated by pressure, rolling over in bed, or using abdominal muscles. She denied alcohol use. She denied current  diarrheal symptoms. Reports feeling generally unwell and physically nauseated. Has a history of gallbladder removal for inflammation and cholelithiasis.She denied recent use of pain or nausea medications, but she has had Tylenol PM a day or two ago. Expressed concern about possible exposure to stray cat but did not report specific symptoms related to this.    Problem List:  2025-01: Inflammatory pseudotumor of colon (H)  2023-09: Biliary colic  2023-08: Gastroesophageal reflux disease without esophagitis  2023-08: Vaginal Papanicolaou smear not required due to history of   hysterectomy  2023-06: Palpitations  2023-06: Preop general physical exam  2023-01: Seasonal depression  2021-08: Fibromyalgia  2021-08: Insomnia  2021-08: Moderate recurrent major depression (H)  2021-08: Chronic migraine without aura, intractable, without status   migrainosus  2021-08: Generalized anxiety disorder  2021-08: Post-traumatic stress disorder  2020-09: Parasomnia  2019-09: Suicidal ideations  2018-03: Arthralgia of temporomandibular joint  2018-02: H/O: hysterectomy  2017-03: Acne vulgaris  2016-05: Debility  2015-04: IBS (irritable bowel syndrome)  1980-03: Hypermobility syndrome  Mitochondrial disease      Past Medical History:   Diagnosis Date    Anemia     Chronic fatigue syndrome     Chronic migraine     Depression     Depression     Depressive disorder 2013    Fibromyalgia     Fibromyalgia     Gastroesophageal reflux disease     H/O: hysterectomy 02/22/2018    History of anesthesia complications     slow to wake    IBS (irritable bowel syndrome)     Insomnia     Irregular heart beat     Mitochondrial disease     Mitochondrial myopathy     Motion sickness     Parasomnia     PONV (postoperative nausea and vomiting)        Social History     Tobacco Use    Smoking status: Never    Smokeless tobacco: Never   Substance Use Topics    Alcohol use: No       Physical Exam     Physical Exam  Vitals and nursing note reviewed.    Constitutional:       General: She is not in acute distress.     Appearance: She is not toxic-appearing or diaphoretic.   HENT:      Head: Normocephalic and atraumatic.   Eyes:      Conjunctiva/sclera: Conjunctivae normal.   Pulmonary:      Effort: Pulmonary effort is normal.   Abdominal:      General: Abdomen is flat. Bowel sounds are normal. There is no distension.      Palpations: Abdomen is soft. There is no mass.      Tenderness: There is abdominal tenderness. There is right CVA tenderness. There is no rebound. Positive signs include Patton's sign.      Hernia: No hernia is present.   Neurological:      Mental Status: She is alert.   Psychiatric:         Mood and Affect: Mood normal.         Behavior: Behavior normal.         Thought Content: Thought content normal.         Judgment: Judgment normal.       Vital signs:  BP (!) 132/90 (BP Location: Left arm, Patient Position: Sitting, Cuff Size: Adult Regular)   Pulse 105   Temp 98.1  F (36.7  C) (Oral)   Resp 20   SpO2 99%                  Consent was obtained from the patient to use an AI documentation tool in the creation of this note

## 2025-07-30 NOTE — ED TRIAGE NOTES
Right flank pain with nausea for a week, seen at walk in clinic and told to come here for imaging.     Triage Assessment (Adult)       Row Name 07/30/25 3036          Triage Assessment    Airway WDL WDL        Respiratory WDL    Respiratory WDL WDL        Skin Circulation/Temperature WDL    Skin Circulation/Temperature WDL WDL        Cardiac WDL    Cardiac WDL WDL        Peripheral/Neurovascular WDL    Peripheral Neurovascular WDL WDL        Cognitive/Neuro/Behavioral WDL    Cognitive/Neuro/Behavioral WDL WDL

## 2025-07-31 NOTE — DISCHARGE INSTRUCTIONS
Your clinic is overall reassuring here.  Your CT scan shows no acute findings here today.  You are given a prescription of Zofran to help with your nausea at home.  Recommend follow-up with your primary care provider in the next week or 2 today for further discussion of your symptoms.    If you develop any nausea vomiting, bladder changes, worsening abdominal pain, return to the ED for further evaluation.

## 2025-07-31 NOTE — ED NOTES
Pt is a/o x4, vss, states she wants to talk to ED provider regarding ultrasound instead of CT scan due to the amount of previous CT scans and radiation exposure.

## 2025-08-27 ENCOUNTER — TRANSFERRED RECORDS (OUTPATIENT)
Dept: HEALTH INFORMATION MANAGEMENT | Facility: CLINIC | Age: 45
End: 2025-08-27
Payer: COMMERCIAL

## (undated) DEVICE — SUCTION MANIFOLD NEPTUNE 2 SYS 1 PORT 702-025-000

## (undated) DEVICE — PREP CHLORAPREP 26ML TINTED HI-LITE ORANGE 930815

## (undated) DEVICE — PREP DYNA-HEX 4% CHG SCRUB 4OZ BOTTLE MDS098710

## (undated) DEVICE — GLOVE UNDER INDICATOR PI SZ 6.5 LF 41665

## (undated) DEVICE — GLOVE BIOGEL PI ULTRATOUCH G SZ 6.5 42165

## (undated) DEVICE — ENDO SHEARS RENEW LAP ENDOCUT SCISSOR TIP 16.5MM 3142

## (undated) DEVICE — PLATE GROUNDING ADULT W/CORD 9165L

## (undated) DEVICE — SYR 10ML FINGER CONTROL W/O NDL 309695

## (undated) DEVICE — CUSTOM PACK PELVISCOPY SMA5BPVHEA

## (undated) DEVICE — TUBING LAP SUCT/IRRIG STRYKER 250070500

## (undated) DEVICE — MAT FLOOR SURGICAL 40X38 0702140238

## (undated) DEVICE — GOWN LG DISP 9515

## (undated) DEVICE — ENDO TROCAR SLEEVE KII ADV FIXATION 05X100MM CFS02

## (undated) DEVICE — BANDAGE ADH LF 1X3 ABN3100A

## (undated) DEVICE — DECANTER VIAL 2006S

## (undated) DEVICE — SCALPEL DISP SAFETY #11

## (undated) DEVICE — TROCAR ADV FIXATION NONBLADED 5X100MM

## (undated) DEVICE — ENDO TROCAR FIRST ENTRY KII FIOS Z-THRD 05X100MM CTF03

## (undated) DEVICE — PAD POS XL 1X20X40IN PINK PIGAZZI

## (undated) DEVICE — PREP POVIDONE-IODINE 7.5% SCRUB 4OZ BOTTLE MDS093945

## (undated) DEVICE — SOL ADH LIQUID BENZOIN SWAB 0.6ML C1544

## (undated) DEVICE — GOWN IMPERVIOUS BREATHABLE SMART LG 89015

## (undated) DEVICE — DRSG TELFA 3X4" 1050

## (undated) DEVICE — SOL WATER IRRIG 1000ML BOTTLE 2F7114

## (undated) DEVICE — ESU LIGASURE MARYLAND LAPAROSCOPIC SLR/DVDR 5MMX37CM LF1937

## (undated) DEVICE — SU MONOCRYL+ 4-0 18IN PS2 UND MCP496G

## (undated) DEVICE — ESU HOLDER LAP INST DISP PURPLE LONG 330MM H-PRO-330

## (undated) DEVICE — PREP DURAPREP 26ML APL 8630

## (undated) DEVICE — Device

## (undated) DEVICE — ESU LIGASURE LAPAROSCOPIC BLUNT TIP SEALER 5MMX37CM LF1837

## (undated) DEVICE — PROTECTOR ARM STANDARD ONE STEP

## (undated) DEVICE — TUBING SMOKE EVAC PNEUMOCLEAR HIGH FLOW 0620050250

## (undated) DEVICE — CATH FOLEY 16FR 5ML LUBRICATH LATEX 0165L16

## (undated) DEVICE — ADH SKIN CLOSURE PREMIERPRO EXOFIN 1.0ML 3470

## (undated) DEVICE — NEEDLE SPINAL DISP 22X4-3/4 QUIN 333315

## (undated) DEVICE — GLOVE UNDER INDICATOR PI SZ 6 LF 41660

## (undated) DEVICE — DRSG EYE PAD STERILE 1.63X2.63" NON21600

## (undated) DEVICE — PREP POVIDONE IODINE SOLUTION 10% 4OZ BOTTLE 29906-004

## (undated) DEVICE — SYRINGE 10ML FILL SALINE FLUSH STERILE 306553

## (undated) DEVICE — CATH FOLEY 5CC 16FR SIL/LTX 0165V16S

## (undated) DEVICE — TROCAR ADV FIXATION NONBLADED 11X100MM CFR33

## (undated) DEVICE — GLOVE BIOGEL PI ULTRATOUCH G SZ 6.0 42160

## (undated) DEVICE — APPLICATOR ENDOSCOPIC 5 SURGICEL POWDER 3123SPEA

## (undated) DEVICE — WARMER SCOPE DISPOSABLE DLW510

## (undated) RX ORDER — DEXAMETHASONE SODIUM PHOSPHATE 10 MG/ML
INJECTION, SOLUTION INTRAMUSCULAR; INTRAVENOUS
Status: DISPENSED
Start: 2023-06-29

## (undated) RX ORDER — PROPOFOL 10 MG/ML
INJECTION, EMULSION INTRAVENOUS
Status: DISPENSED
Start: 2023-06-29

## (undated) RX ORDER — ONDANSETRON 2 MG/ML
INJECTION INTRAMUSCULAR; INTRAVENOUS
Status: DISPENSED
Start: 2023-06-29

## (undated) RX ORDER — FENTANYL CITRATE-0.9 % NACL/PF 10 MCG/ML
PLASTIC BAG, INJECTION (ML) INTRAVENOUS
Status: DISPENSED
Start: 2023-06-29

## (undated) RX ORDER — VASOPRESSIN 20 U/ML
INJECTION PARENTERAL
Status: DISPENSED
Start: 2021-10-18

## (undated) RX ORDER — BUPIVACAINE HYDROCHLORIDE 2.5 MG/ML
INJECTION, SOLUTION EPIDURAL; INFILTRATION; INTRACAUDAL
Status: DISPENSED
Start: 2021-10-18

## (undated) RX ORDER — LIDOCAINE HYDROCHLORIDE 10 MG/ML
INJECTION, SOLUTION EPIDURAL; INFILTRATION; INTRACAUDAL; PERINEURAL
Status: DISPENSED
Start: 2023-06-29

## (undated) RX ORDER — GLYCOPYRROLATE 0.2 MG/ML
INJECTION, SOLUTION INTRAMUSCULAR; INTRAVENOUS
Status: DISPENSED
Start: 2023-06-29

## (undated) RX ORDER — BUPIVACAINE HYDROCHLORIDE 2.5 MG/ML
INJECTION, SOLUTION INFILTRATION; PERINEURAL
Status: DISPENSED
Start: 2023-06-29

## (undated) RX ORDER — FENTANYL CITRATE 50 UG/ML
INJECTION, SOLUTION INTRAMUSCULAR; INTRAVENOUS
Status: DISPENSED
Start: 2023-06-29